# Patient Record
Sex: FEMALE | Race: WHITE | Employment: UNEMPLOYED | URBAN - METROPOLITAN AREA
[De-identification: names, ages, dates, MRNs, and addresses within clinical notes are randomized per-mention and may not be internally consistent; named-entity substitution may affect disease eponyms.]

---

## 2017-01-23 ENCOUNTER — OFFICE VISIT (OUTPATIENT)
Dept: PHYSICAL MEDICINE AND REHAB | Facility: CLINIC | Age: 47
End: 2017-01-23

## 2017-01-23 VITALS
SYSTOLIC BLOOD PRESSURE: 135 MMHG | HEART RATE: 91 BPM | WEIGHT: 231.9 LBS | HEIGHT: 66 IN | RESPIRATION RATE: 20 BRPM | TEMPERATURE: 97.6 F | OXYGEN SATURATION: 98 % | DIASTOLIC BLOOD PRESSURE: 57 MMHG | BODY MASS INDEX: 37.27 KG/M2

## 2017-01-23 DIAGNOSIS — G24.3 SPASMODIC TORTICOLLIS: Primary | ICD-10-CM

## 2017-01-23 DIAGNOSIS — G24.1 DYSTONIA, TORSION, FRAGMENTS OF: ICD-10-CM

## 2017-01-23 ASSESSMENT — PAIN SCALES - GENERAL: PAINLEVEL: MILD PAIN (3)

## 2017-01-23 NOTE — Clinical Note
"1/23/2017       RE: Roxanna Celis  PO   [facility] [id number]     Dear Colleague,    Thank you for referring your patient, Roxanna Celis, to the [facility] PHYSICAL MEDICINE AND REHABILITATION at [facility]. Please see a copy of my visit note below.    BOTULINUM TOXIN PROCEDURE NOTE    Chief Complaint   Patient presents with     RECHECK     UMP- BOTOX-CERVICAL DYSTONIA       /57 mmHg  Pulse 91  Temp(Src) 97.6  F (36.4  C) (Oral)  Resp 20  Ht 1.676 m (5' 6\")  Wt 105.189 kg (231 lb 14.4 oz)  BMI 37.45 kg/m2  SpO2 98%  Breastfeeding? No      Current outpatient prescriptions:      OnabotulinumtoxinA (BOTOX IJ), Inject 225 Units into the muscle Lot # /C3  Exp: 6/2019, Disp: , Rfl:      gabapentin (NEURONTIN) 100 MG capsule, Take 1 capsule (100 mg) by mouth At Bedtime (Patient taking differently: Take 300 mg by mouth 3 times daily ), Disp: 90 capsule, Rfl: 1     OnabotulinumtoxinA (BOTOX IJ), Inject 225 Units into the muscle once Lot# /C3, Exp 03/2019, Disp: , Rfl:      OnabotulinumtoxinA (BOTOX IJ), Inject 225 Units as directed Lot #:  C3 Exp: 12/2018, Disp: , Rfl:      OnabotulinumtoxinA (BOTOX IJ), Inject 225 Units into the muscle Lot # /C3  Exp: 8/2018, Disp: , Rfl:      OnabotulinumtoxinA (BOTOX IJ), Inject 225 Units into the muscle, Disp: , Rfl:      OnabotulinumtoxinA (BOTOX IJ), Inject 75 Units into the muscle Lot # /C3  Exp: 1/2018, Disp: , Rfl:      OnabotulinumtoxinA (BOTOX IJ), Inject 200 Units into the muscle Lot # /C3  Exp: 1/2018, Disp: , Rfl:      OnabotulinumtoxinA (BOTOX IJ), Inject 200 Units into the muscle Lot#/C3, Exp 09/2017, Disp: , Rfl:      OnabotulinumtoxinA (BOTOX IJ), Inject 200 Units into the muscle Lot # /C3  Exp: 3/2017, Disp: , Rfl:      OnabotulinumtoxinA (BOTOX IJ), Inject 200 Units into the muscle Lot# /C3, Exp 01/2017, Disp: , Rfl:      OLANZapine (ZYPREXA PO), Take 15 mg by mouth, Disp: " , Rfl:      DULoxetine HCl (CYMBALTA PO), Take 60 mg by mouth, Disp: , Rfl:      OnabotulinumtoxinA (BOTOX IJ), Inject 200 Units into the muscle Lot # /C3  Exp: 10/2016, Disp: , Rfl:      OnabotulinumtoxinA (BOTOX IJ), Inject 200 Units into the muscle, Disp: , Rfl:      OnabotulinumtoxinA (BOTOX IJ), Inject 200 Units as directed  C3 Exp 3/2016, Disp: , Rfl:      OnabotulinumtoxinA (BOTOX IJ), Inject 200 Units into the muscle Lot# /C3, Exp 02/2016, Disp: , Rfl:      OnabotulinumtoxinA (BOTOX IJ), Inject 200 Units into the muscle Lot# /C3, Exp 10/2015, Disp: , Rfl:      OnabotulinumtoxinA (BOTOX IJ), Inject 75 Units into the muscle Lot# /C3  Exp: 07/2015, Disp: , Rfl:      OnabotulinumtoxinA (BOTOX IJ), Inject 175 Units into the muscle Lot# /C3, Exp 09/2015, Disp: , Rfl:      OnabotulinumtoxinA (BOTOX IJ), Inject 100 Units into the muscle. Lot # /C  Exp: 4/2015, Disp: , Rfl:      OnabotulinumtoxinA (BOTOX IJ), Inject 125 Units into the muscle. Lot # /C3  Exp: 4/2015, Disp: , Rfl:      solifenacin (VESICARE) 10 MG tablet, Take 1 tablet by mouth daily., Disp: , Rfl:      OnabotulinumtoxinA (BOTOX IJ), Inject 125 Units into the muscle. Lot# /C3, Exp 10/2014, Disp: , Rfl:      Desvenlafaxine Succinate (PRISTIQ) 100 MG TB24 24 hr tablet, Take 1 tablet by mouth daily., Disp: , Rfl:      ClonAZEPAM (KLONOPIN) 1 MG tablet, Take 1 mg by mouth 2 times daily as needed., Disp: , Rfl:      topiramate (TOPAMAX) 100 MG tablet, Take 0.5 tablets by mouth 2 times daily., Disp: , Rfl:      PROGESTERONE 200 MG CAPS COMPOUND (PROGESTERONE 200 MG CAPS COMPOUND), Take 4 capsules by mouth daily., Disp: , Rfl:      Ibuprofen (ADVIL) 200 MG capsule, Take 200 mg by mouth every 4 hours as needed., Disp: , Rfl:      aspirin-acetaminophen-caffeine (EXCEDRIN MIGRAINE) 250-250-65 MG per tablet, Take 1 tablet by mouth every 6 hours as needed., Disp: , Rfl:      EPINEPHrine (EPIPEN) 0.3 MG/0.3ML injection,  Inject 0.3 mg into the muscle once as needed., Disp: , Rfl:      OnabotulinumtoxinA (BOTOX IJ), Inject 140 Units into the muscle. Lot# /C3, Exp 2014, Disp: , Rfl:      Allergies   Allergen Reactions     Ivp Dye [Contrast Dye] Anaphylaxis     Lisinopril-Hctz Anaphylaxis     Maple Tree Anaphylaxis     Allergy includes maple syrup.     Gluten Itching and Swelling     Codeine Sulfate Cramps and GI Disturbance     Erythromycin Nausea and Vomiting and Cramps        PHYSICAL EXAM:  NECK AND TRUNK PATTERN:   Head Tremor: Observed only slightly at right, increases with left rotation   Left Side-bending: Not Present  Left Shoulder Elevation: slightly elevated  Head on Body Lateral Shear: To the right - slight    HPI:    Patient denies new medical diagnoses, illnesses, hospitalizations, emergency room visits, and injuries since the previous injection with botulinum neurotoxin.    We reviewed the recommended safety guidelines for  Botox from any vaccine injection, such as the seasonal flu vaccine, by a minimum of 10-14 days with Roxanna Celis. She acknowledged understanding.    RESPONSE TO PREVIOUS TREATMENT:    Roxanna Celis received 225 units of Botox on 2016.    Problems following the previous series of neurotoxin injections included:  No problems reported    BENEFITS BY PATIENT REPORT:    Pain Improvement: Yes.  Percent Improvement: 95 %    Duration of Benefit:  12 weeks.    Dystonia Improvement: Yes.  Percent Improvement: 80 %    Duration of Benefit:  9 weeks.      BOTULINUM NEUROTOXIN INJECTION PROCEDURES:    VERIFICATION OF PATIENT IDENTIFICATION AND PROCEDURE     Initials   Patient Name rpa   Patient  rpa   Procedure Verified by: rpa     Prior to the start of the procedure and with procedural staff participation, I verbally confirmed the patient s identity using two indicators, relevant allergies, that the procedure was appropriate and matched the consent or emergent situation, and that the  correct equipment/implants were available. Immediately prior to starting the procedure I conducted the Time Out with the procedural staff and re-confirmed the patient s name, procedure, and site/side. (The Joint Commission universal protocol was followed.)  Yes    Sedation (Moderate or Deep): None      Above assessments performed by:  Resident/Fellow         Anamika Diaz          The attending provider was present for the entire procedure documented below.      Haja Bradley MD      INDICATION/S FOR PROCEDURE/S:  Roxanna Celis is a 46 year old patient with dystonia affecting the  head, neck and shoulder girdle musculature secondary to a diagnosis of cervical dystonia with associated  pain, tremor, spasms, loss of joint motion, loss of volitional motor control and difficulty with activities of daily living.     Her baseline symptoms have been recalcitrant to oral medications and conservative therapy.  She is here today for an injection of Botox.      GOAL OF PROCEDURE:  The goal of this procedure is to increase active range of motion, improve volitional motor control, decrease pain  and enhance functional independence associated with dystonic movements.    TOTAL DOSE ADMINISTERED:  Dose Administered:  225 units Botox    Diluent Used: Preservative Free Normal Saline  Total Volume of Diluent Used:  2.25 ml  Lot # /C3 with Expiration Date:  08/2019  NDC #: Botox 100u ([address]-94)    Medication guide was offered to patient and was declined.    CONSENT:  The risks, benefits, and treatment options were discussed with Roxanna Celis and she agreed to proceed.      Written consent was obtained by [facility].     EQUIPMENT USED:  Needle-37mm stimulating/recording  EMG/NCS Machine    SKIN PREPARATION:  Skin preparation was performed using an alcohol wipe.    GUIDANCE DESCRIPTION:  Electro-myographic guidance was necessary throughout the procedure to accurately identify all areas of dystonic muscles while avoiding injection of  non-dystonic muscles, neighboring nerves and nearby vascular structures.     AREA/MUSCLE INJECTED:  225 units of Botox  1. HEAD & NECK MUSCLES: 225 units Botox = Total Dose, 1:1 Dilution for 125 units Botox and 2:1 dilution for 100 units Botox to occipitalis   Right Rectus Capitis (upper cervical) - 5 units of Botox at 1 site/s.   Left Rectus Capitis (upper cervical) - 5 units of Botox at 1 site/s.    Right Mid-Trapezius (mid cervical) - 2.5 units of Botox at 1 site/s.   Left Mid-Trapezius (mid cervical) - 2.5 units of Botox at 1 site/s.    Right Semispinalis - 10 units of Botox at 1 site/s.   Left Semispinalis - 10 units of Botox at 1 site/s.    Right Splenius Cervicis/Capitus - 10 units of Botox at 1 site/s.   Left Splenius Cervicis/Capitus - 10 units of Botox at 1 site/s.    Right Levator Scapulae - 10 units of Botox at 1 site/s.   Left Levator Scapulae - 10 units of Botox at 1 site/s.    Right Longissimus - 10 units of Botox at 1 site/s.   Left Longissimus - 10 units of Botox at 1 site/s.    Right Inferior Oblique - 15 units of Botox at 1 site/s.  Left Inferior Oblique - 15 units of Botox at 1 site/s.    Right occipitalis - 50 units of Botox at 5 site/s (2:1 dilution).    Left occipitalis - 50 units of Botox at 5 site/s (2:1 dilution).     RESPONSE TO PROCEDURE:  Roxanna Celis tolerated the procedure well and there were no immediate complications.  She was allowed to recover for an appropriate period of time and was discharged home in stable condition.    FOLLOW UP:  Roxanna Celis was asked to follow up by phone in 7-14 days with Tiffany Lezama PT, Care Coordinator or Josefina King RN, Care Coordinator, to report her response to this series of injections.  Based on the patient's previous response to this therapy, Roxanna Celis was rescheduled for the next series of injections in 12 weeks.    PLAN (Medication Changes, Therapy Orders, Work or Disability Issues, etc.): Will monitor response to today's  injections.

## 2017-01-23 NOTE — PROGRESS NOTES
"BOTULINUM TOXIN PROCEDURE NOTE    Chief Complaint   Patient presents with     RECHECK     UMP- BOTOX-CERVICAL DYSTONIA       /57 mmHg  Pulse 91  Temp(Src) 97.6  F (36.4  C) (Oral)  Resp 20  Ht 1.676 m (5' 6\")  Wt 105.189 kg (231 lb 14.4 oz)  BMI 37.45 kg/m2  SpO2 98%  Breastfeeding? No      Current outpatient prescriptions:      OnabotulinumtoxinA (BOTOX IJ), Inject 225 Units into the muscle Lot # /C3  Exp: 6/2019, Disp: , Rfl:      gabapentin (NEURONTIN) 100 MG capsule, Take 1 capsule (100 mg) by mouth At Bedtime (Patient taking differently: Take 300 mg by mouth 3 times daily ), Disp: 90 capsule, Rfl: 1     OnabotulinumtoxinA (BOTOX IJ), Inject 225 Units into the muscle once Lot# /C3, Exp 03/2019, Disp: , Rfl:      OnabotulinumtoxinA (BOTOX IJ), Inject 225 Units as directed Lot #:  C3 Exp: 12/2018, Disp: , Rfl:      OnabotulinumtoxinA (BOTOX IJ), Inject 225 Units into the muscle Lot # /C3  Exp: 8/2018, Disp: , Rfl:      OnabotulinumtoxinA (BOTOX IJ), Inject 225 Units into the muscle, Disp: , Rfl:      OnabotulinumtoxinA (BOTOX IJ), Inject 75 Units into the muscle Lot # /C3  Exp: 1/2018, Disp: , Rfl:      OnabotulinumtoxinA (BOTOX IJ), Inject 200 Units into the muscle Lot # /C3  Exp: 1/2018, Disp: , Rfl:      OnabotulinumtoxinA (BOTOX IJ), Inject 200 Units into the muscle Lot#/C3, Exp 09/2017, Disp: , Rfl:      OnabotulinumtoxinA (BOTOX IJ), Inject 200 Units into the muscle Lot # /C3  Exp: 3/2017, Disp: , Rfl:      OnabotulinumtoxinA (BOTOX IJ), Inject 200 Units into the muscle Lot# /C3, Exp 01/2017, Disp: , Rfl:      OLANZapine (ZYPREXA PO), Take 15 mg by mouth, Disp: , Rfl:      DULoxetine HCl (CYMBALTA PO), Take 60 mg by mouth, Disp: , Rfl:      OnabotulinumtoxinA (BOTOX IJ), Inject 200 Units into the muscle Lot # /C3  Exp: 10/2016, Disp: , Rfl:      OnabotulinumtoxinA (BOTOX IJ), Inject 200 Units into the muscle, Disp: , Rfl:      OnabotulinumtoxinA " (BOTOX IJ), Inject 200 Units as directed  C3 Exp 3/2016, Disp: , Rfl:      OnabotulinumtoxinA (BOTOX IJ), Inject 200 Units into the muscle Lot# /C3, Exp 02/2016, Disp: , Rfl:      OnabotulinumtoxinA (BOTOX IJ), Inject 200 Units into the muscle Lot# /C3, Exp 10/2015, Disp: , Rfl:      OnabotulinumtoxinA (BOTOX IJ), Inject 75 Units into the muscle Lot# /C3  Exp: 07/2015, Disp: , Rfl:      OnabotulinumtoxinA (BOTOX IJ), Inject 175 Units into the muscle Lot# /C3, Exp 09/2015, Disp: , Rfl:      OnabotulinumtoxinA (BOTOX IJ), Inject 100 Units into the muscle. Lot # /C  Exp: 4/2015, Disp: , Rfl:      OnabotulinumtoxinA (BOTOX IJ), Inject 125 Units into the muscle. Lot # /C3  Exp: 4/2015, Disp: , Rfl:      solifenacin (VESICARE) 10 MG tablet, Take 1 tablet by mouth daily., Disp: , Rfl:      OnabotulinumtoxinA (BOTOX IJ), Inject 125 Units into the muscle. Lot# /C3, Exp 10/2014, Disp: , Rfl:      Desvenlafaxine Succinate (PRISTIQ) 100 MG TB24 24 hr tablet, Take 1 tablet by mouth daily., Disp: , Rfl:      ClonAZEPAM (KLONOPIN) 1 MG tablet, Take 1 mg by mouth 2 times daily as needed., Disp: , Rfl:      topiramate (TOPAMAX) 100 MG tablet, Take 0.5 tablets by mouth 2 times daily., Disp: , Rfl:      PROGESTERONE 200 MG CAPS COMPOUND (PROGESTERONE 200 MG CAPS COMPOUND), Take 4 capsules by mouth daily., Disp: , Rfl:      Ibuprofen (ADVIL) 200 MG capsule, Take 200 mg by mouth every 4 hours as needed., Disp: , Rfl:      aspirin-acetaminophen-caffeine (EXCEDRIN MIGRAINE) 250-250-65 MG per tablet, Take 1 tablet by mouth every 6 hours as needed., Disp: , Rfl:      EPINEPHrine (EPIPEN) 0.3 MG/0.3ML injection, Inject 0.3 mg into the muscle once as needed., Disp: , Rfl:      OnabotulinumtoxinA (BOTOX IJ), Inject 140 Units into the muscle. Lot# /C3, Exp 02/2014, Disp: , Rfl:      Allergies   Allergen Reactions     Ivp Dye [Contrast Dye] Anaphylaxis     Lisinopril-Hctz Anaphylaxis     Maple Tree  Anaphylaxis     Allergy includes maple syrup.     Gluten Itching and Swelling     Codeine Sulfate Cramps and GI Disturbance     Erythromycin Nausea and Vomiting and Cramps        PHYSICAL EXAM:  NECK AND TRUNK PATTERN:   Head Tremor: Observed only slightly at right, increases with left rotation   Left Side-bending: Not Present  Left Shoulder Elevation: slightly elevated  Head on Body Lateral Shear: To the right - slight    HPI:    Patient denies new medical diagnoses, illnesses, hospitalizations, emergency room visits, and injuries since the previous injection with botulinum neurotoxin.    We reviewed the recommended safety guidelines for  Botox from any vaccine injection, such as the seasonal flu vaccine, by a minimum of 10-14 days with Roxanna Celis. She acknowledged understanding.    RESPONSE TO PREVIOUS TREATMENT:    Roxanna Cleis received 225 units of Botox on 2016.    Problems following the previous series of neurotoxin injections included:  No problems reported    BENEFITS BY PATIENT REPORT:    Pain Improvement: Yes.  Percent Improvement: 95 %    Duration of Benefit:  12 weeks.    Dystonia Improvement: Yes.  Percent Improvement: 80 %    Duration of Benefit:  9 weeks.      BOTULINUM NEUROTOXIN INJECTION PROCEDURES:    VERIFICATION OF PATIENT IDENTIFICATION AND PROCEDURE     Initials   Patient Name rpa   Patient  rpa   Procedure Verified by: rpa     Prior to the start of the procedure and with procedural staff participation, I verbally confirmed the patient s identity using two indicators, relevant allergies, that the procedure was appropriate and matched the consent or emergent situation, and that the correct equipment/implants were available. Immediately prior to starting the procedure I conducted the Time Out with the procedural staff and re-confirmed the patient s name, procedure, and site/side. (The Joint Commission universal protocol was followed.)  Yes    Sedation (Moderate or Deep):  None      Above assessments performed by:  Resident/Fellow         Anamika Diaz          The attending provider was present for the entire procedure documented below.      Haja Bradley MD      INDICATION/S FOR PROCEDURE/S:  Roxanna Celis is a 46 year old patient with dystonia affecting the  head, neck and shoulder girdle musculature secondary to a diagnosis of cervical dystonia with associated  pain, tremor, spasms, loss of joint motion, loss of volitional motor control and difficulty with activities of daily living.     Her baseline symptoms have been recalcitrant to oral medications and conservative therapy.  She is here today for an injection of Botox.      GOAL OF PROCEDURE:  The goal of this procedure is to increase active range of motion, improve volitional motor control, decrease pain  and enhance functional independence associated with dystonic movements.    TOTAL DOSE ADMINISTERED:  Dose Administered:  225 units Botox    Diluent Used: Preservative Free Normal Saline  Total Volume of Diluent Used:  2.25 ml  Lot # /C3 with Expiration Date:  08/2019  NDC #: Botox 100u ([address]-69)    Medication guide was offered to patient and was declined.    CONSENT:  The risks, benefits, and treatment options were discussed with Roxanna Celis and she agreed to proceed.      Written consent was obtained by [facility].     EQUIPMENT USED:  Needle-37mm stimulating/recording  EMG/NCS Machine    SKIN PREPARATION:  Skin preparation was performed using an alcohol wipe.    GUIDANCE DESCRIPTION:  Electro-myographic guidance was necessary throughout the procedure to accurately identify all areas of dystonic muscles while avoiding injection of non-dystonic muscles, neighboring nerves and nearby vascular structures.     AREA/MUSCLE INJECTED:  225 units of Botox  1. HEAD & NECK MUSCLES: 225 units Botox = Total Dose, 1:1 Dilution for 125 units Botox and 2:1 dilution for 100 units Botox to occipitalis   Right Rectus Capitis (upper  cervical) - 5 units of Botox at 1 site/s.   Left Rectus Capitis (upper cervical) - 5 units of Botox at 1 site/s.    Right Mid-Trapezius (mid cervical) - 2.5 units of Botox at 1 site/s.   Left Mid-Trapezius (mid cervical) - 2.5 units of Botox at 1 site/s.    Right Semispinalis - 10 units of Botox at 1 site/s.   Left Semispinalis - 10 units of Botox at 1 site/s.    Right Splenius Cervicis/Capitus - 10 units of Botox at 1 site/s.   Left Splenius Cervicis/Capitus - 10 units of Botox at 1 site/s.    Right Levator Scapulae - 10 units of Botox at 1 site/s.   Left Levator Scapulae - 10 units of Botox at 1 site/s.    Right Longissimus - 10 units of Botox at 1 site/s.   Left Longissimus - 10 units of Botox at 1 site/s.    Right Inferior Oblique - 15 units of Botox at 1 site/s.  Left Inferior Oblique - 15 units of Botox at 1 site/s.    Right occipitalis - 50 units of Botox at 5 site/s (2:1 dilution).    Left occipitalis - 50 units of Botox at 5 site/s (2:1 dilution).     RESPONSE TO PROCEDURE:  Roxanna Celis tolerated the procedure well and there were no immediate complications.  She was allowed to recover for an appropriate period of time and was discharged home in stable condition.    FOLLOW UP:  Roxanna Celis was asked to follow up by phone in 7-14 days with Tiffany Lezama PT, Care Coordinator or Josefina King RN, Care Coordinator, to report her response to this series of injections.  Based on the patient's previous response to this therapy, Roxanna Celis was rescheduled for the next series of injections in 12 weeks.    PLAN (Medication Changes, Therapy Orders, Work or Disability Issues, etc.): Will monitor response to today's injections.

## 2017-03-15 ENCOUNTER — CARE COORDINATION (OUTPATIENT)
Dept: PHYSICAL MEDICINE AND REHAB | Facility: CLINIC | Age: 47
End: 2017-03-15

## 2017-03-15 NOTE — PROGRESS NOTES
I called Roxanna Celis to respond to her message requesting an earlier appointment with Dr. Haja Bradley.  I was unable to reach her and left voice-mail on her phone with the reason for my call and my contact information, as well as the contact information for my colleague, Josefina King RN.   I requested a call back to discuss rescheduling her 04/17/2017 appointment.  Currently awaiting call back.

## 2017-03-24 PROBLEM — G24.3 TORTICOLLIS, SPASMODIC: Status: ACTIVE | Noted: 2017-03-24

## 2017-03-28 ENCOUNTER — OFFICE VISIT (OUTPATIENT)
Dept: PHYSICAL MEDICINE AND REHAB | Facility: CLINIC | Age: 47
End: 2017-03-28

## 2017-03-28 VITALS
SYSTOLIC BLOOD PRESSURE: 154 MMHG | HEART RATE: 109 BPM | WEIGHT: 231.7 LBS | DIASTOLIC BLOOD PRESSURE: 82 MMHG | TEMPERATURE: 97.9 F | BODY MASS INDEX: 37.24 KG/M2 | HEIGHT: 66 IN

## 2017-03-28 DIAGNOSIS — G24.3 SPASMODIC TORTICOLLIS: Primary | ICD-10-CM

## 2017-03-28 DIAGNOSIS — G24.1 DYSTONIA, TORSION, FRAGMENTS OF: ICD-10-CM

## 2017-03-28 ASSESSMENT — PAIN SCALES - GENERAL: PAINLEVEL: EXTREME PAIN (8)

## 2017-03-28 NOTE — PROGRESS NOTES
BOTULINUM TOXIN PROCEDURE NOTE    Chief Complaint   Patient presents with     RECHECK     [facility] RETURN BOTOX       Wt 105.1 kg (231 lb 11.2 oz)  BMI 37.4 kg/m2      Current Outpatient Prescriptions:      OnabotulinumtoxinA (BOTOX IJ), Inject 225 Units as directed once Lot# /C3 Exp: 08/2019, Disp: , Rfl:      OnabotulinumtoxinA (BOTOX IJ), Inject 225 Units into the muscle Lot # /C3  Exp: 6/2019, Disp: , Rfl:      gabapentin (NEURONTIN) 100 MG capsule, Take 1 capsule (100 mg) by mouth At Bedtime (Patient taking differently: Take 300 mg by mouth 3 times daily ), Disp: 90 capsule, Rfl: 1     OnabotulinumtoxinA (BOTOX IJ), Inject 225 Units into the muscle once Lot# /C3, Exp 03/2019, Disp: , Rfl:      OnabotulinumtoxinA (BOTOX IJ), Inject 225 Units as directed Lot #:  C3 Exp: 12/2018, Disp: , Rfl:      OnabotulinumtoxinA (BOTOX IJ), Inject 225 Units into the muscle Lot # /C3  Exp: 8/2018, Disp: , Rfl:      OnabotulinumtoxinA (BOTOX IJ), Inject 225 Units into the muscle, Disp: , Rfl:      OnabotulinumtoxinA (BOTOX IJ), Inject 75 Units into the muscle Lot # /C3  Exp: 1/2018, Disp: , Rfl:      OnabotulinumtoxinA (BOTOX IJ), Inject 200 Units into the muscle Lot # /C3  Exp: 1/2018, Disp: , Rfl:      OnabotulinumtoxinA (BOTOX IJ), Inject 200 Units into the muscle Lot#/C3, Exp 09/2017, Disp: , Rfl:      OnabotulinumtoxinA (BOTOX IJ), Inject 200 Units into the muscle Lot # /C3  Exp: 3/2017, Disp: , Rfl:      OnabotulinumtoxinA (BOTOX IJ), Inject 200 Units into the muscle Lot# /C3, Exp 01/2017, Disp: , Rfl:      OLANZapine (ZYPREXA PO), Take 15 mg by mouth, Disp: , Rfl:      DULoxetine HCl (CYMBALTA PO), Take 60 mg by mouth, Disp: , Rfl:      OnabotulinumtoxinA (BOTOX IJ), Inject 200 Units into the muscle Lot # /C3  Exp: 10/2016, Disp: , Rfl:      OnabotulinumtoxinA (BOTOX IJ), Inject 200 Units into the muscle, Disp: , Rfl:      OnabotulinumtoxinA (BOTOX IJ), Inject 200 Units  as directed  C3 Exp 3/2016, Disp: , Rfl:      OnabotulinumtoxinA (BOTOX IJ), Inject 200 Units into the muscle Lot# /C3, Exp 02/2016, Disp: , Rfl:      OnabotulinumtoxinA (BOTOX IJ), Inject 200 Units into the muscle Lot# /C3, Exp 10/2015, Disp: , Rfl:      OnabotulinumtoxinA (BOTOX IJ), Inject 75 Units into the muscle Lot# /C3  Exp: 07/2015, Disp: , Rfl:      OnabotulinumtoxinA (BOTOX IJ), Inject 175 Units into the muscle Lot# /C3, Exp 09/2015, Disp: , Rfl:      OnabotulinumtoxinA (BOTOX IJ), Inject 100 Units into the muscle. Lot # /C  Exp: 4/2015, Disp: , Rfl:      OnabotulinumtoxinA (BOTOX IJ), Inject 125 Units into the muscle. Lot # /C3  Exp: 4/2015, Disp: , Rfl:      solifenacin (VESICARE) 10 MG tablet, Take 1 tablet by mouth daily., Disp: , Rfl:      OnabotulinumtoxinA (BOTOX IJ), Inject 125 Units into the muscle. Lot# /C3, Exp 10/2014, Disp: , Rfl:      Desvenlafaxine Succinate (PRISTIQ) 100 MG TB24 24 hr tablet, Take 1 tablet by mouth daily., Disp: , Rfl:      ClonAZEPAM (KLONOPIN) 1 MG tablet, Take 1 mg by mouth 2 times daily as needed., Disp: , Rfl:      topiramate (TOPAMAX) 100 MG tablet, Take 0.5 tablets by mouth 2 times daily., Disp: , Rfl:      PROGESTERONE 200 MG CAPS COMPOUND (PROGESTERONE 200 MG CAPS COMPOUND), Take 4 capsules by mouth daily., Disp: , Rfl:      Ibuprofen (ADVIL) 200 MG capsule, Take 200 mg by mouth every 4 hours as needed., Disp: , Rfl:      aspirin-acetaminophen-caffeine (EXCEDRIN MIGRAINE) 250-250-65 MG per tablet, Take 1 tablet by mouth every 6 hours as needed., Disp: , Rfl:      EPINEPHrine (EPIPEN) 0.3 MG/0.3ML injection, Inject 0.3 mg into the muscle once as needed., Disp: , Rfl:      OnabotulinumtoxinA (BOTOX IJ), Inject 140 Units into the muscle. Lot# /C3, Exp 02/2014, Disp: , Rfl:      Allergies   Allergen Reactions     Ivp Dye [Contrast Dye] Anaphylaxis     Lisinopril-Hctz Anaphylaxis     Maple Tree Anaphylaxis     Allergy  includes maple syrup.     Gluten Itching and Swelling     Codeine Sulfate Cramps and GI Disturbance     Erythromycin Nausea and Vomiting and Cramps        PHYSICAL EXAM:  NECK AND TRUNK PATTERN:   Head Tremor: Observed only slightly at right, increases with left rotation   Left Side-bending: Not Present  Left Shoulder Elevation: slightly elevated  Head on Body Lateral Shear: To the right - slight    HPI:    Patient denies new medical diagnoses, illnesses, hospitalizations, emergency room visits, and injuries since the previous injection with botulinum neurotoxin.    We reviewed the recommended safety guidelines for  Botox from any vaccine injection, such as the seasonal flu vaccine, by a minimum of 10-14 days with Roxanna Celis. She acknowledged understanding.    RESPONSE TO PREVIOUS TREATMENT:    Roxanna Celis received 225 units of Botox on 2017.    Problems following the previous series of neurotoxin injections included:  No problems reported    BENEFITS BY PATIENT REPORT:    Pain Improvement: Yes.  Percent Improvement: 45%    Duration of Benefit:  4 weeks and followed by a gradual reduction in benefit.  Patient reports less benefit for pain reduction with this series of injections.  She also noted a shorter duration of effect compared to her usual response.    Dystonia Improvement: Yes.  Percent Improvement: 75%    Duration of Benefit:  6 weeks and followed by a gradual reduction in benefit.  [place] duration of effect compared to her usual response.      BOTULINUM NEUROTOXIN INJECTION PROCEDURES:    VERIFICATION OF PATIENT IDENTIFICATION AND PROCEDURE     Initials   Patient Name tlb   Patient  tlb   Procedure Verified by: tlb     Prior to the start of the procedure and with procedural staff participation, I verbally confirmed the patient s identity using two indicators, relevant allergies, that the procedure was appropriate and matched the consent or emergent situation, and that the correct  equipment/implants were available. Immediately prior to starting the procedure I conducted the Time Out with the procedural staff and re-confirmed the patient s name, procedure, and site/side. (The Joint Commission universal protocol was followed.)  Yes    Sedation (Moderate or Deep): None      Above assessments performed by:  Tiffany Lezama, PT, Care Coordinator    Haja Bradley MD      INDICATION/S FOR PROCEDURE/S:  Roxanna Celis is a 46 year old patient with dystonia affecting the  head, neck and shoulder girdle musculature secondary to a diagnosis of cervical dystonia with associated  pain, tremor, spasms, loss of joint motion, loss of volitional motor control and difficulty with activities of daily living.     In order to provide improved response and longer duration of effect, we will increase her dose from 225 units of Botox to 250 units of Botox.  We discussed this with Ms. Celis.  She acknowledged understanding and is in agreement with this plan.    Her baseline symptoms have been recalcitrant to oral medications and conservative therapy.  She is here today for an injection of Botox.      GOAL OF PROCEDURE:  The goal of this procedure is to increase active range of motion, improve volitional motor control, decrease pain  and enhance functional independence associated with dystonic movements.    TOTAL DOSE ADMINISTERED:  Dose Administered:  250 units Botox    Diluent Used: Preservative Free Normal Saline  Total Volume of Diluent Used:  2.5 ml  Lot # /C3 with Expiration Date:  09/2019  NDC #: Botox 100u ([address]-35)    Medication guide was offered to patient and was declined.    CONSENT:  The risks, benefits, and treatment options were discussed with Roxanna Celis and she agreed to proceed.      Written consent was obtained by TLB.     EQUIPMENT USED:  Needle-37mm stimulating/recording  EMG/NCS Machine    SKIN PREPARATION:  Skin preparation was performed using an alcohol wipe.    GUIDANCE  DESCRIPTION:  Electro-myographic guidance was necessary throughout the procedure to accurately identify all areas of dystonic muscles while avoiding injection of non-dystonic muscles, neighboring nerves and nearby vascular structures.     AREA/MUSCLE INJECTED:   250 units Botox.    1. HEAD & NECK MUSCLES: 225 units Botox = Total Dose, 1:1 Dilution for 125 units Botox and 2:1 dilution for 100 units Botox to occipitalis   Right Rectus Capitis (upper cervical) - 10 units of Botox at 1 site/s.   Left Rectus Capitis (upper cervical) - 10 units of Botox at 1 site/s.    Right Mid-Trapezius (mid cervical) - 5 units of Botox at 1 site/s.   Left Mid-Trapezius (mid cervical) - 5 units of Botox at 1 site/s.    Right Semispinalis - 10 units of Botox at 1 site/s.   Left Semispinalis - 10 units of Botox at 1 site/s.    Right Splenius Cervicis/Capitus - 10 units of Botox at 1 site/s.   Left Splenius Cervicis/Capitus - 10 units of Botox at 1 site/s.    Right Levator Scapulae - 15 units of Botox at 1 site/s.   Left Levator Scapulae - 15 units of Botox at 1 site/s.    Right Longissimus - 10 units of Botox at 1 site/s.   Left Longissimus - 10 units of Botox at 1 site/s.    Right Inferior Oblique - 15 units of Botox at 1 site/s.  Left Inferior Oblique - 15 units of Botox at 1 site/s.    Right occipitalis - 50 units of Botox at 5 site/s (2:1 dilution).    Left occipitalis - 50 units of Botox at 5 site/s (2:1 dilution).     RESPONSE TO PROCEDURE:  Roxanna Celis tolerated the procedure well and there were no immediate complications.  She was allowed to recover for an appropriate period of time and was discharged home in stable condition.    FOLLOW UP:  Roxanna Celis was asked to follow up by phone in 7-14 days with Tiffany Lezama PT, Care Coordinator or Josefina King RN, Care Coordinator, to report her response to this series of injections.  Based on the patient's previous response to this therapy, Roxanna Celis was rescheduled for  the next series of injections in 9-10 weeks.    PLAN (Medication Changes, Therapy Orders, Work or Disability Issues, etc.): Will monitor response to today's injections.

## 2017-03-28 NOTE — LETTER
3/28/2017       RE: Roxanna Celis  [address]   [facility] [address]     Dear Colleague,    Thank you for referring your patient, Roxanna Celis, to the [facility]YS[facility]INE [facility]ITATION at [facility]. Please see a copy of my visit note below.    BOTULINUM TOXIN PROCEDURE NOTE    Chief Complaint   Patient presents with     RECHECK     [facility] RETURN BOTOX       Wt 105.1 kg (231 lb 11.2 oz)  BMI 37.4 kg/m2      Current Outpatient Prescriptions:      OnabotulinumtoxinA (BOTOX IJ), Inject 225 Units as directed once Lot# /C3 Exp: 08/2019, Disp: , Rfl:      OnabotulinumtoxinA (BOTOX IJ), Inject 225 Units into the muscle Lot # /C3  Exp: 6/2019, Disp: , Rfl:      gabapentin (NEURONTIN) 100 MG capsule, Take 1 capsule (100 mg) by mouth At Bedtime (Patient taking differently: Take 300 mg by mouth 3 times daily ), Disp: 90 capsule, Rfl: 1     OnabotulinumtoxinA (BOTOX IJ), Inject 225 Units into the muscle once Lot# /C3, Exp 03/2019, Disp: , Rfl:      OnabotulinumtoxinA (BOTOX IJ), Inject 225 Units as directed Lot #:  C3 Exp: 12/2018, Disp: , Rfl:      OnabotulinumtoxinA (BOTOX IJ), Inject 225 Units into the muscle Lot # /C3  Exp: 8/2018, Disp: , Rfl:      OnabotulinumtoxinA (BOTOX IJ), Inject 225 Units into the muscle, Disp: , Rfl:      OnabotulinumtoxinA (BOTOX IJ), Inject 75 Units into the muscle Lot # /C3  Exp: 1/2018, Disp: , Rfl:      OnabotulinumtoxinA (BOTOX IJ), Inject 200 Units into the muscle Lot # /C3  Exp: 1/2018, Disp: , Rfl:      OnabotulinumtoxinA (BOTOX IJ), Inject 200 Units into the muscle Lot#/C3, Exp 09/2017, Disp: , Rfl:      OnabotulinumtoxinA (BOTOX IJ), Inject 200 Units into the muscle Lot # /C3  Exp: 3/2017, Disp: , Rfl:      OnabotulinumtoxinA (BOTOX IJ), Inject 200 Units into the muscle Lot# /C3, Exp 01/2017, Disp: , Rfl:      OLANZapine (ZYPREXA PO), Take 15 mg by mouth, Disp: , Rfl:      DULoxetine HCl  (CYMBALTA PO), Take 60 mg by mouth, Disp: , Rfl:      OnabotulinumtoxinA (BOTOX IJ), Inject 200 Units into the muscle Lot # /C3  Exp: 10/2016, Disp: , Rfl:      OnabotulinumtoxinA (BOTOX IJ), Inject 200 Units into the muscle, Disp: , Rfl:      OnabotulinumtoxinA (BOTOX IJ), Inject 200 Units as directed  C3 Exp 3/2016, Disp: , Rfl:      OnabotulinumtoxinA (BOTOX IJ), Inject 200 Units into the muscle Lot# /C3, Exp 02/2016, Disp: , Rfl:      OnabotulinumtoxinA (BOTOX IJ), Inject 200 Units into the muscle Lot# /C3, Exp 10/2015, Disp: , Rfl:      OnabotulinumtoxinA (BOTOX IJ), Inject 75 Units into the muscle Lot# /C3  Exp: 07/2015, Disp: , Rfl:      OnabotulinumtoxinA (BOTOX IJ), Inject 175 Units into the muscle Lot# /C3, Exp 09/2015, Disp: , Rfl:      OnabotulinumtoxinA (BOTOX IJ), Inject 100 Units into the muscle. Lot # /C  Exp: 4/2015, Disp: , Rfl:      OnabotulinumtoxinA (BOTOX IJ), Inject 125 Units into the muscle. Lot # /C3  Exp: 4/2015, Disp: , Rfl:      solifenacin (VESICARE) 10 MG tablet, Take 1 tablet by mouth daily., Disp: , Rfl:      OnabotulinumtoxinA (BOTOX IJ), Inject 125 Units into the muscle. Lot# /C3, Exp 10/2014, Disp: , Rfl:      Desvenlafaxine Succinate (PRISTIQ) 100 MG TB24 24 hr tablet, Take 1 tablet by mouth daily., Disp: , Rfl:      ClonAZEPAM (KLONOPIN) 1 MG tablet, Take 1 mg by mouth 2 times daily as needed., Disp: , Rfl:      topiramate (TOPAMAX) 100 MG tablet, Take 0.5 tablets by mouth 2 times daily., Disp: , Rfl:      PROGESTERONE 200 MG CAPS COMPOUND (PROGESTERONE 200 MG CAPS COMPOUND), Take 4 capsules by mouth daily., Disp: , Rfl:      Ibuprofen (ADVIL) 200 MG capsule, Take 200 mg by mouth every 4 hours as needed., Disp: , Rfl:      aspirin-acetaminophen-caffeine (EXCEDRIN MIGRAINE) 250-250-65 MG per tablet, Take 1 tablet by mouth every 6 hours as needed., Disp: , Rfl:      EPINEPHrine (EPIPEN) 0.3 MG/0.3ML injection, Inject 0.3 mg into the  muscle once as needed., Disp: , Rfl:      OnabotulinumtoxinA (BOTOX IJ), Inject 140 Units into the muscle. Lot# /C3, Exp 2014, Disp: , Rfl:      Allergies   Allergen Reactions     Ivp Dye [Contrast Dye] Anaphylaxis     Lisinopril-Hctz Anaphylaxis     Maple Tree Anaphylaxis     Allergy includes maple syrup.     Gluten Itching and Swelling     Codeine Sulfate Cramps and GI Disturbance     Erythromycin Nausea and Vomiting and Cramps        PHYSICAL EXAM:  NECK AND TRUNK PATTERN:   Head Tremor: Observed only slightly at right, increases with left rotation   Left Side-bending: Not Present  Left Shoulder Elevation: slightly elevated  Head on Body Lateral Shear: To the right - slight    HPI:    Patient denies new medical diagnoses, illnesses, hospitalizations, emergency room visits, and injuries since the previous injection with botulinum neurotoxin.    We reviewed the recommended safety guidelines for  Botox from any vaccine injection, such as the seasonal flu vaccine, by a minimum of 10-14 days with Roxanna Celis. She acknowledged understanding.    RESPONSE TO PREVIOUS TREATMENT:    Roxanna Celis received 225 units of Botox on 2017.    Problems following the previous series of neurotoxin injections included:  No problems reported    BENEFITS BY PATIENT REPORT:    Pain Improvement: Yes.  Percent Improvement: 45%    Duration of Benefit:  4 weeks and followed by a gradual reduction in benefit.  Patient reports less benefit for pain reduction with this series of injections.  She also noted a shorter duration of effect compared to her usual response.    Dystonia Improvement: Yes.  Percent Improvement: 75%    Duration of Benefit:  6 weeks and followed by a gradual reduction in benefit.  [place] duration of effect compared to her usual response.      BOTULINUM NEUROTOXIN INJECTION PROCEDURES:    VERIFICATION OF PATIENT IDENTIFICATION AND PROCEDURE     Initials   Patient Name tlb   Patient  tlb    Procedure Verified by: tlb     Prior to the start of the procedure and with procedural staff participation, I verbally confirmed the patient s identity using two indicators, relevant allergies, that the procedure was appropriate and matched the consent or emergent situation, and that the correct equipment/implants were available. Immediately prior to starting the procedure I conducted the Time Out with the procedural staff and re-confirmed the patient s name, procedure, and site/side. (The Joint Commission universal protocol was followed.)  Yes    Sedation (Moderate or Deep): None      Above assessments performed by:  Tiffany Lezama PT, Care Coordinator    Haja Bradley MD      INDICATION/S FOR PROCEDURE/S:  Roxanna Celis is a 46 year old patient with dystonia affecting the  head, neck and shoulder girdle musculature secondary to a diagnosis of cervical dystonia with associated  pain, tremor, spasms, loss of joint motion, loss of volitional motor control and difficulty with activities of daily living.     In order to provide improved response and longer duration of effect, we will increase her dose from 225 units of Botox to 250 units of Botox.  We discussed this with Ms. Celis.  She acknowledged understanding and is in agreement with this plan.    Her baseline symptoms have been recalcitrant to oral medications and conservative therapy.  She is here today for an injection of Botox.      GOAL OF PROCEDURE:  The goal of this procedure is to increase active range of motion, improve volitional motor control, decrease pain  and enhance functional independence associated with dystonic movements.    TOTAL DOSE ADMINISTERED:  Dose Administered:  250 units Botox    Diluent Used: Preservative Free Normal Saline  Total Volume of Diluent Used:  2.5 ml  Lot # /C3 with Expiration Date:  09/2019  NDC #: Botox 100u ([address]-35)    Medication guide was offered to patient and was declined.    CONSENT:  The risks,  benefits, and treatment options were discussed with Roxanna Celis and she agreed to proceed.      Written consent was obtained by TLB.     EQUIPMENT USED:  Needle-37mm stimulating/recording  EMG/NCS Machine    SKIN PREPARATION:  Skin preparation was performed using an alcohol wipe.    GUIDANCE DESCRIPTION:  Electro-myographic guidance was necessary throughout the procedure to accurately identify all areas of dystonic muscles while avoiding injection of non-dystonic muscles, neighboring nerves and nearby vascular structures.     AREA/MUSCLE INJECTED:   250 units Botox.    1. HEAD & NECK MUSCLES: 225 units Botox = Total Dose, 1:1 Dilution for 125 units Botox and 2:1 dilution for 100 units Botox to occipitalis   Right Rectus Capitis (upper cervical) - 10 units of Botox at 1 site/s.   Left Rectus Capitis (upper cervical) - 10 units of Botox at 1 site/s.    Right Mid-Trapezius (mid cervical) - 5 units of Botox at 1 site/s.   Left Mid-Trapezius (mid cervical) - 5 units of Botox at 1 site/s.    Right Semispinalis - 10 units of Botox at 1 site/s.   Left Semispinalis - 10 units of Botox at 1 site/s.    Right Splenius Cervicis/Capitus - 10 units of Botox at 1 site/s.   Left Splenius Cervicis/Capitus - 10 units of Botox at 1 site/s.    Right Levator Scapulae - 15 units of Botox at 1 site/s.   Left Levator Scapulae - 15 units of Botox at 1 site/s.    Right Longissimus - 10 units of Botox at 1 site/s.   Left Longissimus - 10 units of Botox at 1 site/s.    Right Inferior Oblique - 15 units of Botox at 1 site/s.  Left Inferior Oblique - 15 units of Botox at 1 site/s.    Right occipitalis - 50 units of Botox at 5 site/s (2:1 dilution).    Left occipitalis - 50 units of Botox at 5 site/s (2:1 dilution).     RESPONSE TO PROCEDURE:  Roxanna Celis tolerated the procedure well and there were no immediate complications.  She was allowed to recover for an appropriate period of time and was discharged home in stable condition.    FOLLOW  UP:  Roxanna Celis was asked to follow up by phone in 7-14 days with Tiffany Lezama PT, Care Coordinator or Josefina King RN, Care Coordinator, to report her response to this series of injections.  Based on the patient's previous response to this therapy, Roxanna Celis was rescheduled for the next series of injections in 9-10 weeks.    PLAN (Medication Changes, Therapy Orders, Work or Disability Issues, etc.): Will monitor response to today's injections.        Again, thank you for allowing me to participate in the care of your patient.      Sincerely,    Haja Bradley MD

## 2017-03-28 NOTE — MR AVS SNAPSHOT
After Visit Summary   3/28/2017    Roxanna Celis    MRN: [id number]           Patient Information     Date Of Birth          1970        Visit Information        Provider Department      3/28/2017 1:40 PM Haja Bradley MD [facility] Rehabilitation         Follow-ups after your visit        Follow-up notes from your care team     Return in about 10 weeks (around 6/6/2017) for Cervical Dystonia.      Your next 10 appointments already scheduled     May 30, 2017  8:20 AM CDT   (Arrive by 8:05 AM)   Return Botox with Haja Bradley MD   [facility] Rehabilitation ([facility])    [address] [place] [id number] [phone]            Aug 01, 2017  9:40 AM CDT   (Arrive by 9:25 AM)   Return Botox with Haja Bradley MD   [facility] Rehabilitation ([facility])    [address] [place] [id number] [phone]              Who to contact     Please call your clinic at [phone] to:    Ask questions about your health    Make or cancel appointments    Discuss your medicines    Learn about your test results    Speak to your doctor   If you have compliments or concerns about an experience at your clinic, or if you wish to file a complaint, please contact [facility] Patient Relations at [phone] or email us at [email or web address]         Additional Information About Your Visit        MyChart Information     TopVisible is an electronic gateway that provides easy, online access to your medical records. With TopVisible, you can request a clinic appointment, read your test results, renew a prescription or communicate with your care team.     To sign up for StorSimplet visit the website at [email or web address]   You will be asked to enter the access code listed below, as well as some  "personal information. Please follow the directions to create your username and password.     Your access code is: [email or web address]  Expires: 6/15/2017  6:30 AM     Your access code will  in 90 days. If you need help or a new code, please contact your [facility] or call [phone] for assistance.        Care EveryWhere ID     This is your Care EveryWhere ID. This could be used by other organizations to access your [place] medical records  [id number]        Your Vitals Were     Pulse Temperature Height BMI (Body Mass Index)          109 97.9  F (36.6  C) 1.676 m (5' 6\") 37.4 kg/m2         Blood Pressure from Last 3 Encounters:   17 154/82   17 135/57   16 149/85    Weight from Last 3 Encounters:   17 105.1 kg (231 lb 11.2 oz)   17 105.2 kg (231 lb 14.4 oz)   09/10/15 116.6 kg (257 lb)              Today, you had the following     No orders found for display         Today's Medication Changes          These changes are accurate as of: 3/28/17  2:10 PM.  If you have any questions, ask your nurse or doctor.               These medicines have changed or have updated prescriptions.        Dose/Directions    gabapentin 100 MG capsule   Commonly known as:  NEURONTIN   This may have changed:    - how much to take  - when to take this   Used for:  Spasmodic torticollis, Neck pain        Dose:  100 mg   Take 1 capsule (100 mg) by mouth At Bedtime   Quantity:  90 capsule   Refills:  1                Primary Care Provider Office Phone # Fax #    [place] HILLMAN Vignesh [phone]       [facility] [address] [address]        Thank you!     Thank you for choosing [facility] PHYSICAL MEDICINE AND REHABILITATION  for your care. Our goal is always to provide you with excellent care. Hearing back from our patients is one way we can continue to improve our services. Please take a few minutes to complete the written survey " that you may receive in the mail after your visit with us. Thank you!             Your Updated Medication List - Protect others around you: Learn how to safely use, store and throw away your medicines at www.disposemymeds.org.          This list is accurate as of: 3/28/17  2:10 PM.  Always use your most recent med list.                   Brand Name Dispense Instructions for use    ADVIL 200 MG capsule   Generic drug:  ibuprofen      Take 200 mg by mouth every 4 hours as needed.       * BOTOX IJ      Inject 140 Units into the muscle. Lot# /C3, Exp 02/2014       * BOTOX IJ      Inject 125 Units into the muscle. Lot# /C3, Exp 10/2014       * BOTOX IJ      Inject 125 Units into the muscle. Lot # /C3  Exp: 4/2015       * BOTOX IJ      Inject 100 Units into the muscle. Lot # /C  Exp: 4/2015       * BOTOX IJ      Inject 175 Units into the muscle Lot# /C3, Exp 09/2015       * BOTOX IJ      Inject 75 Units into the muscle Lot# /C3  Exp: 07/2015       * BOTOX IJ      Inject 200 Units into the muscle Lot# /C3, Exp 10/2015       * BOTOX IJ      Inject 200 Units into the muscle Lot# /C3, Exp 02/2016       * BOTOX IJ      Inject 200 Units as directed  C3 Exp 3/2016       * BOTOX IJ      Inject 200 Units into the muscle       * BOTOX IJ      Inject 200 Units into the muscle Lot # /C3  Exp: 10/2016       * BOTOX IJ      Inject 200 Units into the muscle Lot# /C3, Exp 01/2017       * BOTOX IJ      Inject 200 Units into the muscle Lot # /C3  Exp: 3/2017       * BOTOX IJ      Inject 200 Units into the muscle Lot#/C3, Exp 09/2017       * BOTOX IJ      Inject 200 Units into the muscle Lot # /C3  Exp: 1/2018       * BOTOX IJ      Inject 75 Units into the muscle Lot # /C3  Exp: 1/2018       * BOTOX IJ      Inject 225 Units into the muscle       * BOTOX IJ      Inject 225 Units into the muscle Lot # /C3  Exp: 8/2018       * BOTOX IJ      Inject 225 Units as  directed Lot #:  C3 Exp: 12/2018       * BOTOX IJ      Inject 225 Units into the muscle once Lot# /C3, Exp 03/2019       * BOTOX IJ      Inject 225 Units into the muscle Lot # /C3  Exp: 6/2019       * BOTOX IJ      Inject 225 Units as directed once Lot# /C3 Exp: 08/2019       * BOTOX IJ      Inject 250 Units into the muscle once Lot# /C3, Exp 09/2019       CYMBALTA PO      Take 60 mg by mouth       EPIPEN 0.3 MG/0.3ML injection   Generic drug:  EPINEPHrine      Inject 0.3 mg into the muscle once as needed.       EXCEDRIN MIGRAINE 250-250-65 MG per tablet   Generic drug:  aspirin-acetaminophen-caffeine      Take 1 tablet by mouth every 6 hours as needed.       gabapentin 100 MG capsule    NEURONTIN    90 capsule    Take 1 capsule (100 mg) by mouth At Bedtime       klonoPIN 1 MG tablet   Generic drug:  clonazePAM      Take 1 mg by mouth 2 times daily as needed.       PRISTIQ 100 MG 24 hr tablet   Generic drug:  desvenlafaxine succinate ER      Take 1 tablet by mouth daily.       PROGESTERONE 200 MG CAPS COMPOUND    PROGESTERONE 200 MG CAPS COMPOUND     Take 4 capsules by mouth daily.       TOPAMAX 100 MG tablet   Generic drug:  topiramate      Take 0.5 tablets by mouth 2 times daily.       VESICARE 10 MG tablet   Generic drug:  solifenacin      Take 1 tablet by mouth daily.       ZYPREXA PO      Take 15 mg by mouth       * Notice:  This list has 23 medication(s) that are the same as other medications prescribed for you. Read the directions carefully, and ask your doctor or other care provider to review them with you.

## 2017-05-30 ENCOUNTER — OFFICE VISIT (OUTPATIENT)
Dept: PHYSICAL MEDICINE AND REHAB | Facility: CLINIC | Age: 47
End: 2017-05-30

## 2017-05-30 VITALS
DIASTOLIC BLOOD PRESSURE: 84 MMHG | SYSTOLIC BLOOD PRESSURE: 144 MMHG | HEART RATE: 91 BPM | HEIGHT: 66 IN | BODY MASS INDEX: 38.02 KG/M2 | WEIGHT: 236.6 LBS

## 2017-05-30 DIAGNOSIS — G24.3 SPASMODIC TORTICOLLIS: Primary | ICD-10-CM

## 2017-05-30 DIAGNOSIS — G24.1 DYSTONIA, TORSION, FRAGMENTS OF: ICD-10-CM

## 2017-05-30 ASSESSMENT — PAIN SCALES - GENERAL: PAINLEVEL: NO PAIN (1)

## 2017-05-30 NOTE — PROGRESS NOTES
"BOTULINUM TOXIN PROCEDURE NOTE    Chief Complaint   Patient presents with     RECHECK     UMP RETURN - BOTOX       /84 (BP Location: Left arm, Patient Position: Chair, Cuff Size: Adult Large)  Pulse 91  Ht 1.676 m (5' 6\")  Wt 107.3 kg (236 lb 9.6 oz)  BMI 38.19 kg/m2      Current Outpatient Prescriptions:      OnabotulinumtoxinA (BOTOX IJ), Inject 250 Units into the muscle once Lot# /C3, Exp 09/2019, Disp: , Rfl:      OnabotulinumtoxinA (BOTOX IJ), Inject 225 Units as directed once Lot# /C3 Exp: 08/2019, Disp: , Rfl:      OnabotulinumtoxinA (BOTOX IJ), Inject 225 Units into the muscle Lot # /C3  Exp: 6/2019, Disp: , Rfl:      gabapentin (NEURONTIN) 100 MG capsule, Take 1 capsule (100 mg) by mouth At Bedtime (Patient taking differently: Take 300 mg by mouth 3 times daily ), Disp: 90 capsule, Rfl: 1     OnabotulinumtoxinA (BOTOX IJ), Inject 225 Units into the muscle once Lot# /C3, Exp 03/2019, Disp: , Rfl:      OnabotulinumtoxinA (BOTOX IJ), Inject 225 Units as directed Lot #:  C3 Exp: 12/2018, Disp: , Rfl:      OnabotulinumtoxinA (BOTOX IJ), Inject 225 Units into the muscle Lot # /C3  Exp: 8/2018, Disp: , Rfl:      OnabotulinumtoxinA (BOTOX IJ), Inject 225 Units into the muscle, Disp: , Rfl:      OnabotulinumtoxinA (BOTOX IJ), Inject 75 Units into the muscle Lot # /C3  Exp: 1/2018, Disp: , Rfl:      OnabotulinumtoxinA (BOTOX IJ), Inject 200 Units into the muscle Lot # /C3  Exp: 1/2018, Disp: , Rfl:      OnabotulinumtoxinA (BOTOX IJ), Inject 200 Units into the muscle Lot#/C3, Exp 09/2017, Disp: , Rfl:      OnabotulinumtoxinA (BOTOX IJ), Inject 200 Units into the muscle Lot # /C3  Exp: 3/2017, Disp: , Rfl:      OnabotulinumtoxinA (BOTOX IJ), Inject 200 Units into the muscle Lot# /C3, Exp 01/2017, Disp: , Rfl:      OLANZapine (ZYPREXA PO), Take 15 mg by mouth, Disp: , Rfl:      DULoxetine HCl (CYMBALTA PO), Take 60 mg by mouth, Disp: , Rfl:      " OnabotulinumtoxinA (BOTOX IJ), Inject 200 Units into the muscle Lot # /C3  Exp: 10/2016, Disp: , Rfl:      OnabotulinumtoxinA (BOTOX IJ), Inject 200 Units into the muscle, Disp: , Rfl:      solifenacin (VESICARE) 10 MG tablet, Take 1 tablet by mouth daily., Disp: , Rfl:      Desvenlafaxine Succinate (PRISTIQ) 100 MG TB24 24 hr tablet, Take 1 tablet by mouth daily., Disp: , Rfl:      ClonAZEPAM (KLONOPIN) 1 MG tablet, Take 1 mg by mouth 2 times daily as needed., Disp: , Rfl:      topiramate (TOPAMAX) 100 MG tablet, Take 0.5 tablets by mouth 2 times daily., Disp: , Rfl:      PROGESTERONE 200 MG CAPS COMPOUND (PROGESTERONE 200 MG CAPS COMPOUND), Take 4 capsules by mouth daily., Disp: , Rfl:      Ibuprofen (ADVIL) 200 MG capsule, Take 200 mg by mouth every 4 hours as needed., Disp: , Rfl:      aspirin-acetaminophen-caffeine (EXCEDRIN MIGRAINE) 250-250-65 MG per tablet, Take 1 tablet by mouth every 6 hours as needed., Disp: , Rfl:      EPINEPHrine (EPIPEN) 0.3 MG/0.3ML injection, Inject 0.3 mg into the muscle once as needed., Disp: , Rfl:      [DISCONTINUED] OnabotulinumtoxinA (BOTOX IJ), Inject 200 Units as directed  C3 Exp 3/2016, Disp: , Rfl:      [DISCONTINUED] OnabotulinumtoxinA (BOTOX IJ), Inject 200 Units into the muscle Lot# /C3, Exp 02/2016, Disp: , Rfl:      [DISCONTINUED] OnabotulinumtoxinA (BOTOX IJ), Inject 200 Units into the muscle Lot# /C3, Exp 10/2015, Disp: , Rfl:      [DISCONTINUED] OnabotulinumtoxinA (BOTOX IJ), Inject 75 Units into the muscle Lot# /C3  Exp: 07/2015, Disp: , Rfl:      [DISCONTINUED] OnabotulinumtoxinA (BOTOX IJ), Inject 175 Units into the muscle Lot# /C3, Exp 09/2015, Disp: , Rfl:      [DISCONTINUED] OnabotulinumtoxinA (BOTOX IJ), Inject 100 Units into the muscle. Lot # /C  Exp: 4/2015, Disp: , Rfl:      [DISCONTINUED] OnabotulinumtoxinA (BOTOX IJ), Inject 125 Units into the muscle. Lot # /C3  Exp: 4/2015, Disp: , Rfl:      [DISCONTINUED]  OnabotulinumtoxinA (BOTOX IJ), Inject 125 Units into the muscle. Lot# /C3, Exp 10/2014, Disp: , Rfl:      [DISCONTINUED] OnabotulinumtoxinA (BOTOX IJ), Inject 140 Units into the muscle. Lot# /C3, Exp 2014, Disp: , Rfl:      Allergies   Allergen Reactions     Ivp Dye [Contrast Dye] Anaphylaxis     Lisinopril-Hctz Anaphylaxis     Maple Tree Anaphylaxis     Allergy includes maple syrup.     Gluten Itching and Swelling     Codeine Sulfate Cramps and GI Disturbance     Erythromycin Nausea and Vomiting and Cramps        PHYSICAL EXAM:  NECK AND TRUNK PATTERN:   Head Tremor: Observed only slightly at right, increases with left rotation   Left Side-bending: Not Present  Left Shoulder Elevation: slightly elevated  Head on Body Lateral Shear: To the right - slight    HPI:    Patient denies new medical diagnoses, illnesses, hospitalizations, emergency room visits, and injuries since the previous injection with botulinum neurotoxin.    We reviewed the recommended safety guidelines for  Botox from any vaccine injection, such as the seasonal flu vaccine, by a minimum of 10-14 days with Roxanna Celis. She acknowledged understanding.    RESPONSE TO PREVIOUS TREATMENT:    Roxanna Celis received 250 units of Botox on 2017.    Problems following the previous series of neurotoxin injections included:  No problems reported    BENEFITS BY PATIENT REPORT:    Pain Improvement: Yes.  Percent Improvement: 95%    Duration of Benefit:  7.5 weeks and followed by a rapid reduction in benefit for 1 week where she had severe pain due to pulling of neck to right, then gradually improved.     Dystonia Improvement: Yes.  Percent Improvement: 95%    Duration of Benefit:  7.5 weeks and followed by a rapid reduction in benefit.         BOTULINUM NEUROTOXIN INJECTION PROCEDURES:    VERIFICATION OF PATIENT IDENTIFICATION AND PROCEDURE     Initials   Patient Name [facility]   Patient  [facility]   Procedure Verified by: [facility]     Prior to  the start of the procedure and with procedural staff participation, I verbally confirmed the patient s identity using two indicators, relevant allergies, that the procedure was appropriate and matched the consent or emergent situation, and that the correct equipment/implants were available. Immediately prior to starting the procedure I conducted the Time Out with the procedural staff and re-confirmed the patient s name, procedure, and site/side. (The Joint Commission universal protocol was followed.)  Yes    Sedation (Moderate or Deep): None      Above assessments performed by:  Cordelia Lynn DO PM&R Resident     Haja Bradley MD      INDICATION/S FOR PROCEDURE/S:  Roxanna Celis is a 46 year old patient with dystonia affecting the  head, neck and shoulder girdle musculature secondary to a diagnosis of cervical dystonia with associated  pain, tremor, spasms, loss of joint motion, loss of volitional motor control and difficulty with activities of daily living.     Her baseline symptoms have been recalcitrant to oral medications and conservative therapy.  She is here today for an injection of Botox.      GOAL OF PROCEDURE:  The goal of this procedure is to increase active range of motion, improve volitional motor control, decrease pain  and enhance functional independence associated with dystonic movements.    TOTAL DOSE ADMINISTERED:  Dose Administered:  250 units Botox    Diluent Used: Preservative Free Normal Saline  Total Volume of Diluent Used:  2.5 ml  Lot # /C3 with Expiration Date:  12/2019  NDC #: Botox 100u ([address]-65)    Medication guide was offered to patient and was declined.    CONSENT:   The risks, benefits, and treatment options were discussed with Roxanna Celis and she agreed to proceed.      Written consent was obtained by [facility].     EQUIPMENT USED:  Needle-37mm stimulating/recording  EMG/NCS Machine    SKIN PREPARATION:  Skin preparation was performed using an alcohol wipe.    GUIDANCE  DESCRIPTION:  Electro-myographic guidance was necessary throughout the procedure to accurately identify all areas of dystonic muscles while avoiding injection of non-dystonic muscles, neighboring nerves and nearby vascular structures.     AREA/MUSCLE INJECTED:   250 units Botox.    1. HEAD & NECK MUSCLES: 250 units Botox = Total Dose, 1:1 Dilution for 150 units Botox and 2:1 dilution for 100 units Botox to occipitalis   Right Rectus Capitis (upper cervical) - 10 units of Botox at 1 site/s.   Left Rectus Capitis (upper cervical) - 10 units of Botox at 1 site/s.    Right Mid-Trapezius (mid cervical) - 5 units of Botox at 1 site/s.   Left Mid-Trapezius (mid cervical) - 5 units of Botox at 1 site/s.    Right Semispinalis - 10 units of Botox at 1 site/s.   Left Semispinalis - 10 units of Botox at 1 site/s.    Right Splenius Cervicis/Capitus - 10 units of Botox at 1 site/s.   Left Splenius Cervicis/Capitus - 10 units of Botox at 1 site/s.    Right Levator Scapulae - 15 units of Botox at 1 site/s.   Left Levator Scapulae - 15 units of Botox at 1 site/s.    Right Longissimus - 10 units of Botox at 1 site/s.   Left Longissimus - 10 units of Botox at 1 site/s.    Right Inferior Oblique - 15 units of Botox at 1 site/s.  Left Inferior Oblique - 15 units of Botox at 1 site/s.    Right occipitalis - 50 units of Botox at 5 site/s (2:1 dilution).    Left occipitalis - 50 units of Botox at 5 site/s (2:1 dilution).     RESPONSE TO PROCEDURE:  Roxanna Celis tolerated the procedure well and there were no immediate complications.  She was allowed to recover for an appropriate period of time and was discharged home in stable condition.    FOLLOW UP:  Roxanna Celis was asked to follow up by phone in 7-14 days with Tiffany Lezama PT, Care Coordinator or Josefina King RN, Care Coordinator, to report her response to this series of injections.  Based on the patient's previous response to this therapy, Roxanna Celis was rescheduled for  the next series of injections in 9-10 weeks.    PLAN (Medication Changes, Therapy Orders, Work or Disability Issues, etc.): Will monitor response to today's injections.

## 2017-05-30 NOTE — MR AVS SNAPSHOT
After Visit Summary   5/30/2017    Roxanna Celis    MRN: [id number]           Patient Information     Date Of Birth          1970        Visit Information        Provider Department      5/30/2017 8:20 AM Haja Bradley MD [facility] Physical Medicine and Rehabilitation         Follow-ups after your visit        Follow-up notes from your care team     Return in about 9 weeks (around 8/1/2017) for Cervical Dystonia.      Your next 10 appointments already scheduled     Aug 01, 2017  9:40 AM CDT   (Arrive by 9:25 AM)   Return Botox with Haja Bradley MD   [facility] Medicine and Rehabilitation ([facility])    [address] [place] [id number] [phone]            Oct 02, 2017  1:40 PM CDT   (Arrive by 1:25 PM)   Return Botox with Haja Bradley MD   [facility] Medicine and Rehabilitation ([facility])    [address] [place] [id number] [phone]              Who to contact     Please call your clinic at [phone] to:    Ask questions about your health    Make or cancel appointments    Discuss your medicines    Learn about your test results    Speak to your doctor   If you have compliments or concerns about an experience at your clinic, or if you wish to file a complaint, please contact [facility] Patient Relations at [phone] or email us at [email or web address]         Additional Information About Your Visit        MyChart Information     Team Everest is an electronic gateway that provides easy, online access to your medical records. With Team Everest, you can request a clinic appointment, read your test results, renew a prescription or communicate with your care team.     To sign up for FlipKeyt visit the website at [email or web address]   You will be asked to enter the access code listed below, as well as some  "personal information. Please follow the directions to create your username and password.     Your access code is: [email or web address]  Expires: 6/15/2017  6:30 AM     Your access code will  in 90 days. If you need help or a new code, please contact your [facility] or call [phone] for assistance.        Care EveryWhere ID     This is your Care EveryWhere ID. This could be used by other organizations to access your [place] medical records  [id number]        Your Vitals Were     Pulse Height BMI (Body Mass Index)             91 1.676 m (5' 6\") 38.19 kg/m2          Blood Pressure from Last 3 Encounters:   17 144/84   17 154/82   17 135/57    Weight from Last 3 Encounters:   17 107.3 kg (236 lb 9.6 oz)   17 105.1 kg (231 lb 11.2 oz)   17 105.2 kg (231 lb 14.4 oz)              Today, you had the following     No orders found for display         Today's Medication Changes          These changes are accurate as of: 17  8:29 AM.  If you have any questions, ask your nurse or doctor.               These medicines have changed or have updated prescriptions.        Dose/Directions    gabapentin 100 MG capsule   Commonly known as:  NEURONTIN   This may have changed:    - how much to take  - when to take this   Used for:  Spasmodic torticollis, Neck pain        Dose:  100 mg   Take 1 capsule (100 mg) by mouth At Bedtime   Quantity:  90 capsule   Refills:  1                Primary Care Provider Office Phone # Fax #    [place] KADY Medellin [phone]       [facility] [address] [address]        Thank you!     Thank you for choosing [facility] PHYSICAL MEDICINE AND REHABILITATION  for your care. Our goal is always to provide you with excellent care. Hearing back from our patients is one way we can continue to improve our services. Please take a few minutes to complete the written survey that you may " receive in the mail after your visit with us. Thank you!             Your Updated Medication List - Protect others around you: Learn how to safely use, store and throw away your medicines at www.disposemymeds.org.          This list is accurate as of: 5/30/17  8:29 AM.  Always use your most recent med list.                   Brand Name Dispense Instructions for use    ADVIL 200 MG capsule   Generic drug:  ibuprofen      Take 200 mg by mouth every 4 hours as needed.       * BOTOX IJ      Inject 200 Units into the muscle       * BOTOX IJ      Inject 200 Units into the muscle Lot # /C3  Exp: 10/2016       * BOTOX IJ      Inject 200 Units into the muscle Lot# /C3, Exp 01/2017       * BOTOX IJ      Inject 200 Units into the muscle Lot # /C3  Exp: 3/2017       * BOTOX IJ      Inject 200 Units into the muscle Lot#/C3, Exp 09/2017       * BOTOX IJ      Inject 200 Units into the muscle Lot # /C3  Exp: 1/2018       * BOTOX IJ      Inject 75 Units into the muscle Lot # /C3  Exp: 1/2018       * BOTOX IJ      Inject 225 Units into the muscle       * BOTOX IJ      Inject 225 Units into the muscle Lot # /C3  Exp: 8/2018       * BOTOX IJ      Inject 225 Units as directed Lot #:  C3 Exp: 12/2018       * BOTOX IJ      Inject 225 Units into the muscle once Lot# /C3, Exp 03/2019       * BOTOX IJ      Inject 225 Units into the muscle Lot # /C3  Exp: 6/2019       * BOTOX IJ      Inject 225 Units as directed once Lot# /C3 Exp: 08/2019       * BOTOX IJ      Inject 250 Units into the muscle once Lot# /C3, Exp 09/2019       * BOTOX IJ      Inject 250 Units as directed once Lot#: /C3 Exp: 12/2019       CYMBALTA PO      Take 60 mg by mouth       EPIPEN 0.3 MG/0.3ML injection   Generic drug:  EPINEPHrine      Inject 0.3 mg into the muscle once as needed.       EXCEDRIN MIGRAINE 250-250-65 MG per tablet   Generic drug:  aspirin-acetaminophen-caffeine      Take 1 tablet by mouth  every 6 hours as needed.       gabapentin 100 MG capsule    NEURONTIN    90 capsule    Take 1 capsule (100 mg) by mouth At Bedtime       klonoPIN 1 MG tablet   Generic drug:  clonazePAM      Take 1 mg by mouth 2 times daily as needed.       PRISTIQ 100 MG 24 hr tablet   Generic drug:  desvenlafaxine succinate      Take 1 tablet by mouth daily.       PROGESTERONE 200 MG CAPS COMPOUND    PROGESTERONE 200 MG CAPS COMPOUND     Take 4 capsules by mouth daily.       TOPAMAX 100 MG tablet   Generic drug:  topiramate      Take 0.5 tablets by mouth 2 times daily.       VESICARE 10 MG tablet   Generic drug:  solifenacin      Take 1 tablet by mouth daily.       ZYPREXA PO      Take 15 mg by mouth       * Notice:  This list has 15 medication(s) that are the same as other medications prescribed for you. Read the directions carefully, and ask your doctor or other care provider to review them with you.

## 2017-05-30 NOTE — LETTER
"5/30/2017       RE: Roxanna Celsi  PO   [facility] [address]     Dear Colleague,    Thank you for referring your patient, Roxanna Celis, to the [facility] PHYSICAL MEDICINE AND REHABILITATION at [facility]. Please see a copy of my visit note below.    BOTULINUM TOXIN PROCEDURE NOTE    Chief Complaint   Patient presents with     RECHECK     [facility] RETURN - BOTOX       /84 (BP Location: Left arm, Patient Position: Chair, Cuff Size: Adult Large)  Pulse 91  Ht 1.676 m (5' 6\")  Wt 107.3 kg (236 lb 9.6 oz)  BMI 38.19 kg/m2      Current Outpatient Prescriptions:      OnabotulinumtoxinA (BOTOX IJ), Inject 250 Units into the muscle once Lot# /C3, Exp 09/2019, Disp: , Rfl:      OnabotulinumtoxinA (BOTOX IJ), Inject 225 Units as directed once Lot# /C3 Exp: 08/2019, Disp: , Rfl:      OnabotulinumtoxinA (BOTOX IJ), Inject 225 Units into the muscle Lot # /C3  Exp: 6/2019, Disp: , Rfl:      gabapentin (NEURONTIN) 100 MG capsule, Take 1 capsule (100 mg) by mouth At Bedtime (Patient taking differently: Take 300 mg by mouth 3 times daily ), Disp: 90 capsule, Rfl: 1     OnabotulinumtoxinA (BOTOX IJ), Inject 225 Units into the muscle once Lot# /C3, Exp 03/2019, Disp: , Rfl:      OnabotulinumtoxinA (BOTOX IJ), Inject 225 Units as directed Lot #:  C3 Exp: 12/2018, Disp: , Rfl:      OnabotulinumtoxinA (BOTOX IJ), Inject 225 Units into the muscle Lot # /C3  Exp: 8/2018, Disp: , Rfl:      OnabotulinumtoxinA (BOTOX IJ), Inject 225 Units into the muscle, Disp: , Rfl:      OnabotulinumtoxinA (BOTOX IJ), Inject 75 Units into the muscle Lot # /C3  Exp: 1/2018, Disp: , Rfl:      OnabotulinumtoxinA (BOTOX IJ), Inject 200 Units into the muscle Lot # /C3  Exp: 1/2018, Disp: , Rfl:      OnabotulinumtoxinA (BOTOX IJ), Inject 200 Units into the muscle Lot#/C3, Exp 09/2017, Disp: , Rfl:      OnabotulinumtoxinA (BOTOX IJ), Inject 200 Units into the muscle Lot # " /C3  Exp: 3/2017, Disp: , Rfl:      OnabotulinumtoxinA (BOTOX IJ), Inject 200 Units into the muscle Lot# /C3, Exp 01/2017, Disp: , Rfl:      OLANZapine (ZYPREXA PO), Take 15 mg by mouth, Disp: , Rfl:      DULoxetine HCl (CYMBALTA PO), Take 60 mg by mouth, Disp: , Rfl:      OnabotulinumtoxinA (BOTOX IJ), Inject 200 Units into the muscle Lot # /C3  Exp: 10/2016, Disp: , Rfl:      OnabotulinumtoxinA (BOTOX IJ), Inject 200 Units into the muscle, Disp: , Rfl:      solifenacin (VESICARE) 10 MG tablet, Take 1 tablet by mouth daily., Disp: , Rfl:      Desvenlafaxine Succinate (PRISTIQ) 100 MG TB24 24 hr tablet, Take 1 tablet by mouth daily., Disp: , Rfl:      ClonAZEPAM (KLONOPIN) 1 MG tablet, Take 1 mg by mouth 2 times daily as needed., Disp: , Rfl:      topiramate (TOPAMAX) 100 MG tablet, Take 0.5 tablets by mouth 2 times daily., Disp: , Rfl:      PROGESTERONE 200 MG CAPS COMPOUND (PROGESTERONE 200 MG CAPS COMPOUND), Take 4 capsules by mouth daily., Disp: , Rfl:      Ibuprofen (ADVIL) 200 MG capsule, Take 200 mg by mouth every 4 hours as needed., Disp: , Rfl:      aspirin-acetaminophen-caffeine (EXCEDRIN MIGRAINE) 250-250-65 MG per tablet, Take 1 tablet by mouth every 6 hours as needed., Disp: , Rfl:      EPINEPHrine (EPIPEN) 0.3 MG/0.3ML injection, Inject 0.3 mg into the muscle once as needed., Disp: , Rfl:      [DISCONTINUED] OnabotulinumtoxinA (BOTOX IJ), Inject 200 Units as directed  C3 Exp 3/2016, Disp: , Rfl:      [DISCONTINUED] OnabotulinumtoxinA (BOTOX IJ), Inject 200 Units into the muscle Lot# /C3, Exp 02/2016, Disp: , Rfl:      [DISCONTINUED] OnabotulinumtoxinA (BOTOX IJ), Inject 200 Units into the muscle Lot# /C3, Exp 10/2015, Disp: , Rfl:      [DISCONTINUED] OnabotulinumtoxinA (BOTOX IJ), Inject 75 Units into the muscle Lot# /C3  Exp: 07/2015, Disp: , Rfl:      [DISCONTINUED] OnabotulinumtoxinA (BOTOX IJ), Inject 175 Units into the muscle Lot# /C3, Exp 09/2015, Disp: ,  Rfl:      [DISCONTINUED] OnabotulinumtoxinA (BOTOX IJ), Inject 100 Units into the muscle. Lot # /C  Exp: 4/2015, Disp: , Rfl:      [DISCONTINUED] OnabotulinumtoxinA (BOTOX IJ), Inject 125 Units into the muscle. Lot # /C3  Exp: 4/2015, Disp: , Rfl:      [DISCONTINUED] OnabotulinumtoxinA (BOTOX IJ), Inject 125 Units into the muscle. Lot# /C3, Exp 10/2014, Disp: , Rfl:      [DISCONTINUED] OnabotulinumtoxinA (BOTOX IJ), Inject 140 Units into the muscle. Lot# /C3, Exp 02/2014, Disp: , Rfl:      Allergies   Allergen Reactions     Ivp Dye [Contrast Dye] Anaphylaxis     Lisinopril-Hctz Anaphylaxis     Maple Tree Anaphylaxis     Allergy includes maple syrup.     Gluten Itching and Swelling     Codeine Sulfate Cramps and GI Disturbance     Erythromycin Nausea and Vomiting and Cramps        PHYSICAL EXAM:  NECK AND TRUNK PATTERN:   Head Tremor: Observed only slightly at right, increases with left rotation   Left Side-bending: Not Present  Left Shoulder Elevation: slightly elevated  Head on Body Lateral Shear: To the right - slight    HPI:    Patient denies new medical diagnoses, illnesses, hospitalizations, emergency room visits, and injuries since the previous injection with botulinum neurotoxin.    We reviewed the recommended safety guidelines for  Botox from any vaccine injection, such as the seasonal flu vaccine, by a minimum of 10-14 days with Roxanna Celis. She acknowledged understanding.    RESPONSE TO PREVIOUS TREATMENT:    Roxanna Celis received 250 units of Botox on 03/28/2017.    Problems following the previous series of neurotoxin injections included:  No problems reported    BENEFITS BY PATIENT REPORT:    Pain Improvement: Yes.  Percent Improvement: 95%    Duration of Benefit:  7.5 weeks and followed by a rapid reduction in benefit for 1 week where she had severe pain due to pulling of neck to right, then gradually improved.     Dystonia Improvement: Yes.  Percent Improvement: 95%     Duration of Benefit:  7.5 weeks and followed by a rapid reduction in benefit.         BOTULINUM NEUROTOXIN INJECTION PROCEDURES:    VERIFICATION OF PATIENT IDENTIFICATION AND PROCEDURE     Initials   Patient Name [facility]   Patient  [facility]   Procedure Verified by: [facility]     Prior to the start of the procedure and with procedural staff participation, I verbally confirmed the patient s identity using two indicators, relevant allergies, that the procedure was appropriate and matched the consent or emergent situation, and that the correct equipment/implants were available. Immediately prior to starting the procedure I conducted the Time Out with the procedural staff and re-confirmed the patient s name, procedure, and site/side. (The Joint Commission universal protocol was followed.)  Yes    Sedation (Moderate or Deep): None      Above assessments performed by:  Cordelia Lynn DO PM&R Resident     Haja Bradley MD      INDICATION/S FOR PROCEDURE/S:  Roxanna Celis is a 46 year old patient with dystonia affecting the  head, neck and shoulder girdle musculature secondary to a diagnosis of cervical dystonia with associated  pain, tremor, spasms, loss of joint motion, loss of volitional motor control and difficulty with activities of daily living.     Her baseline symptoms have been recalcitrant to oral medications and conservative therapy.  She is here today for an injection of Botox.      GOAL OF PROCEDURE:  The goal of this procedure is to increase active range of motion, improve volitional motor control, decrease pain  and enhance functional independence associated with dystonic movements.    TOTAL DOSE ADMINISTERED:  Dose Administered:  250 units Botox    Diluent Used: Preservative Free Normal Saline  Total Volume of Diluent Used:  2.5 ml  Lot # /C3 with Expiration Date:  2019  NDC #: Botox 100u ([address]-89)    Medication guide was offered to patient and was declined.    CONSENT:   The risks, benefits, and treatment  options were discussed with Roxanna Celis and she agreed to proceed.      Written consent was obtained by [facility].     EQUIPMENT USED:  Needle-37mm stimulating/recording  EMG/NCS Machine    SKIN PREPARATION:  Skin preparation was performed using an alcohol wipe.    GUIDANCE DESCRIPTION:  Electro-myographic guidance was necessary throughout the procedure to accurately identify all areas of dystonic muscles while avoiding injection of non-dystonic muscles, neighboring nerves and nearby vascular structures.     AREA/MUSCLE INJECTED:   250 units Botox.    1. HEAD & NECK MUSCLES: 250 units Botox = Total Dose, 1:1 Dilution for 150 units Botox and 2:1 dilution for 100 units Botox to occipitalis   Right Rectus Capitis (upper cervical) - 10 units of Botox at 1 site/s.   Left Rectus Capitis (upper cervical) - 10 units of Botox at 1 site/s.    Right Mid-Trapezius (mid cervical) - 5 units of Botox at 1 site/s.   Left Mid-Trapezius (mid cervical) - 5 units of Botox at 1 site/s.    Right Semispinalis - 10 units of Botox at 1 site/s.   Left Semispinalis - 10 units of Botox at 1 site/s.    Right Splenius Cervicis/Capitus - 10 units of Botox at 1 site/s.   Left Splenius Cervicis/Capitus - 10 units of Botox at 1 site/s.    Right Levator Scapulae - 15 units of Botox at 1 site/s.   Left Levator Scapulae - 15 units of Botox at 1 site/s.    Right Longissimus - 10 units of Botox at 1 site/s.   Left Longissimus - 10 units of Botox at 1 site/s.    Right Inferior Oblique - 15 units of Botox at 1 site/s.  Left Inferior Oblique - 15 units of Botox at 1 site/s.    Right occipitalis - 50 units of Botox at 5 site/s (2:1 dilution).    Left occipitalis - 50 units of Botox at 5 site/s (2:1 dilution).     RESPONSE TO PROCEDURE:  Roxanna Celis tolerated the procedure well and there were no immediate complications.  She was allowed to recover for an appropriate period of time and was discharged home in stable condition.    FOLLOW UP:  Roxanna Celis was  asked to follow up by phone in 7-14 days with Tiffany Lezama PT, Care Coordinator or Josefina King RN, Care Coordinator, to report her response to this series of injections.  Based on the patient's previous response to this therapy, Roxanna Celis was rescheduled for the next series of injections in 9-10 weeks.    PLAN (Medication Changes, Therapy Orders, Work or Disability Issues, etc.): Will monitor response to today's injections.        Again, thank you for allowing me to participate in the care of your patient.      Sincerely,    Haja Bradley MD

## 2017-08-16 ENCOUNTER — OFFICE VISIT (OUTPATIENT)
Dept: PHYSICAL MEDICINE AND REHAB | Facility: CLINIC | Age: 47
End: 2017-08-16

## 2017-08-16 VITALS
HEART RATE: 107 BPM | HEIGHT: 66 IN | WEIGHT: 236 LBS | TEMPERATURE: 97.4 F | BODY MASS INDEX: 37.93 KG/M2 | DIASTOLIC BLOOD PRESSURE: 85 MMHG | SYSTOLIC BLOOD PRESSURE: 135 MMHG

## 2017-08-16 DIAGNOSIS — G24.1 DYSTONIA, TORSION, FRAGMENTS OF: ICD-10-CM

## 2017-08-16 DIAGNOSIS — G24.3 SPASMODIC TORTICOLLIS: Primary | ICD-10-CM

## 2017-08-16 ASSESSMENT — PAIN SCALES - GENERAL: PAINLEVEL: NO PAIN (1)

## 2017-08-16 NOTE — LETTER
"8/16/2017       RE: Roxanna Celis  [place]   [facility] [place] [address]     Dear Colleague,    Thank you for referring your patient, Roxanna Celis, to the [facility] PHYSICAL MEDICINE AND REHABILITATION at [facility]. Please see a copy of my visit note below.    BOTULINUM TOXIN PROCEDURE NOTE  Chief Complaint   Patient presents with     RECHECK     [facility] RETURN - BOTOX     /85 (BP Location: Left arm, Patient Position: Sitting, Cuff Size: Adult Large)  Pulse 107  Temp 97.4  F (36.3  C) (Oral)  Ht 1.676 m (5' 6\")  Wt 107 kg (236 lb)  BMI 38.09 kg/m2      Current Outpatient Prescriptions:      OnabotulinumtoxinA (BOTOX IJ), Inject 250 Units as directed once Lot#: /C3 Exp: 12/2019, Disp: , Rfl:      OnabotulinumtoxinA (BOTOX IJ), Inject 250 Units into the muscle once Lot# /C3, Exp 09/2019, Disp: , Rfl:      OnabotulinumtoxinA (BOTOX IJ), Inject 225 Units as directed once Lot# /C3 Exp: 08/2019, Disp: , Rfl:      OnabotulinumtoxinA (BOTOX IJ), Inject 225 Units into the muscle Lot # /C3  Exp: 6/2019, Disp: , Rfl:      gabapentin (NEURONTIN) 100 MG capsule, Take 1 capsule (100 mg) by mouth At Bedtime (Patient taking differently: Take 300 mg by mouth 3 times daily ), Disp: 90 capsule, Rfl: 1     OnabotulinumtoxinA (BOTOX IJ), Inject 225 Units into the muscle once Lot# /C3, Exp 03/2019, Disp: , Rfl:      OnabotulinumtoxinA (BOTOX IJ), Inject 225 Units as directed Lot #:  C3 Exp: 12/2018, Disp: , Rfl:      OnabotulinumtoxinA (BOTOX IJ), Inject 225 Units into the muscle Lot # /C3  Exp: 8/2018, Disp: , Rfl:      OnabotulinumtoxinA (BOTOX IJ), Inject 225 Units into the muscle, Disp: , Rfl:      OnabotulinumtoxinA (BOTOX IJ), Inject 75 Units into the muscle Lot # /C3  Exp: 1/2018, Disp: , Rfl:      OnabotulinumtoxinA (BOTOX IJ), Inject 200 Units into the muscle Lot # /C3  Exp: 1/2018, Disp: , Rfl:      OnabotulinumtoxinA (BOTOX IJ), Inject 200 " Units into the muscle Lot#/C3, Exp 09/2017, Disp: , Rfl:      OnabotulinumtoxinA (BOTOX IJ), Inject 200 Units into the muscle Lot # /C3  Exp: 3/2017, Disp: , Rfl:      OnabotulinumtoxinA (BOTOX IJ), Inject 200 Units into the muscle Lot# /C3, Exp 01/2017, Disp: , Rfl:      OLANZapine (ZYPREXA PO), Take 15 mg by mouth, Disp: , Rfl:      DULoxetine HCl (CYMBALTA PO), Take 60 mg by mouth, Disp: , Rfl:      OnabotulinumtoxinA (BOTOX IJ), Inject 200 Units into the muscle Lot # /C3  Exp: 10/2016, Disp: , Rfl:      OnabotulinumtoxinA (BOTOX IJ), Inject 200 Units into the muscle, Disp: , Rfl:      solifenacin (VESICARE) 10 MG tablet, Take 1 tablet by mouth daily., Disp: , Rfl:      Desvenlafaxine Succinate (PRISTIQ) 100 MG TB24 24 hr tablet, Take 1 tablet by mouth daily., Disp: , Rfl:      ClonAZEPAM (KLONOPIN) 1 MG tablet, Take 1 mg by mouth 2 times daily as needed., Disp: , Rfl:      topiramate (TOPAMAX) 100 MG tablet, Take 0.5 tablets by mouth 2 times daily., Disp: , Rfl:      PROGESTERONE 200 MG CAPS COMPOUND (PROGESTERONE 200 MG CAPS COMPOUND), Take 4 capsules by mouth daily., Disp: , Rfl:      Ibuprofen (ADVIL) 200 MG capsule, Take 200 mg by mouth every 4 hours as needed., Disp: , Rfl:      aspirin-acetaminophen-caffeine (EXCEDRIN MIGRAINE) 250-250-65 MG per tablet, Take 1 tablet by mouth every 6 hours as needed., Disp: , Rfl:      EPINEPHrine (EPIPEN) 0.3 MG/0.3ML injection, Inject 0.3 mg into the muscle once as needed., Disp: , Rfl:      Allergies   Allergen Reactions     Ivp Dye [Contrast Dye] Anaphylaxis     Lisinopril-Hctz Anaphylaxis     Maple Tree Anaphylaxis     Allergy includes maple syrup.     Gluten Itching and Swelling     Codeine Sulfate Cramps and GI Disturbance     Erythromycin Nausea and Vomiting and Cramps        PHYSICAL EXAM:  NECK AND TRUNK PATTERN:   Head Tremor: Observed only slightly at right, increases with left rotation   Left Side-bending: Not Present  Left Shoulder  Elevation: slightly elevated  Head on Body Lateral Shear: To the right - slight    HPI:    Patient denies new medical diagnoses, illnesses, hospitalizations, emergency room visits, and injuries since the previous injection with botulinum neurotoxin.    We reviewed the recommended safety guidelines for  Botox from any vaccine injection, such as the seasonal flu vaccine, by a minimum of 10-14 days with Roxanna Celis. She acknowledged understanding.    RESPONSE TO PREVIOUS TREATMENT:    Roxanna Celis received 250 units of Botox on 2017.    Problems following the previous series of neurotoxin injections included:  No problems reported    BENEFITS BY PATIENT REPORT:    Pain Improvement: Yes.  Percent Improvement: 95%    Duration of Benefit:  9 weeks and followed by a rapid reduction in benefit for 1 week where she had severe pain due to pulling of neck to right, then gradually improved.     Dystonia Improvement: Yes.  Percent Improvement: 95%    Duration of Benefit:  9 weeks and followed by a rapid reduction in benefit.         BOTULINUM NEUROTOXIN INJECTION PROCEDURES:    VERIFICATION OF PATIENT IDENTIFICATION AND PROCEDURE     Initials   Patient Name pag   Patient  pag   Procedure Verified by: pag     Prior to the start of the procedure and with procedural staff participation, I verbally confirmed the patient s identity using two indicators, relevant allergies, that the procedure was appropriate and matched the consent or emergent situation, and that the correct equipment/implants were available. Immediately prior to starting the procedure I conducted the Time Out with the procedural staff and re-confirmed the patient s name, procedure, and site/side. (The Joint Commission universal protocol was followed.)  Yes    Sedation (Moderate or Deep): None      Above assessments performed by:  YARELY Floyd, Care Coordinator    Haja Bradley MD      INDICATION/S FOR PROCEDURE/S:  Roxanna Celis is a 46  year old patient with dystonia affecting the  head, neck and shoulder girdle musculature secondary to a diagnosis of cervical dystonia with associated  pain, tremor, spasms, loss of joint motion, loss of volitional motor control and difficulty with activities of daily living.     Her baseline symptoms have been recalcitrant to oral medications and conservative therapy.  She is here today for an injection of Botox.      GOAL OF PROCEDURE:  The goal of this procedure is to increase active range of motion, improve volitional motor control, decrease pain  and enhance functional independence associated with dystonic movements.    TOTAL DOSE ADMINISTERED:  Dose Administered:  250 units Botox    Diluent Used: Preservative Free Normal Saline  Total Volume of Diluent Used:  2.5 ml  Lot # /C3 with Expiration Date:  03/2020  NDC #: Botox 100u ([address]-33)    Medication guide was offered to patient and was declined.    CONSENT:   The risks, benefits, and treatment options were discussed with Roxanna KEE Lalita and she agreed to proceed.      Written consent was obtained by [facility].     EQUIPMENT USED:  Needle-37mm stimulating/recording  EMG/NCS Machine    SKIN PREPARATION:  Skin preparation was performed using an alcohol wipe.    GUIDANCE DESCRIPTION:  Electro-myographic guidance was necessary throughout the procedure to accurately identify all areas of dystonic muscles while avoiding injection of non-dystonic muscles, neighboring nerves and nearby vascular structures.     AREA/MUSCLE INJECTED:   250 units Botox.    1. HEAD & NECK MUSCLES: 250 units Botox = Total Dose, 1:1 Dilution for 150 units Botox and 2:1 dilution for 100 units Botox to occipitalis   Right Rectus Capitis (upper cervical) - 10 units of Botox at 1 site/s.   Left Rectus Capitis (upper cervical) - 10 units of Botox at 1 site/s.    Right Mid-Trapezius (mid cervical) - 5 units of Botox at 1 site/s.   Left Mid-Trapezius (mid cervical) - 5 units of Botox at 1  site/s.    Right Semispinalis - 10 units of Botox at 1 site/s.   Left Semispinalis - 10 units of Botox at 1 site/s.    Right Splenius Cervicis/Capitus - 10 units of Botox at 1 site/s.   Left Splenius Cervicis/Capitus - 10 units of Botox at 1 site/s.    Right Levator Scapulae - 15 units of Botox at 1 site/s.   Left Levator Scapulae - 15 units of Botox at 1 site/s.    Right Longissimus - 10 units of Botox at 1 site/s.   Left Longissimus - 10 units of Botox at 1 site/s.    Right Inferior Oblique - 15 units of Botox at 1 site/s.  Left Inferior Oblique - 15 units of Botox at 1 site/s.    Right occipitalis - 50 units of Botox at 5 site/s (2:1 dilution).    Left occipitalis - 50 units of Botox at 5 site/s (2:1 dilution).     RESPONSE TO PROCEDURE:  Roxanna Celis tolerated the procedure well and there were no immediate complications.  She was allowed to recover for an appropriate period of time and was discharged home in stable condition.    FOLLOW UP:  Roxanna Celis was asked to follow up by phone in 7-14 days with Tiffany Lezama PT, Care Coordinator or Josefina King RN, Care Coordinator, to report her response to this series of injections.  Based on the patient's previous response to this therapy, Roxanna Celis was rescheduled for the next series of injections in 9-10 weeks.    PLAN (Medication Changes, Therapy Orders, Work or Disability Issues, etc.): Will monitor response to today's injections.    Again, thank you for allowing me to participate in the care of your patient.    Sincerely,  Haja Bradley MD

## 2017-08-16 NOTE — MR AVS SNAPSHOT
After Visit Summary   8/16/2017    Roxanna Celis    MRN: [id number]           Patient Information     Date Of Birth          1970        Visit Information        Provider Department      8/16/2017 11:00 AM Haja Bradley MD [facility]         Follow-ups after your visit        Your next 10 appointments already scheduled     Oct 23, 2017  2:00 PM CDT   (Arrive by 1:45 PM)   Return Botox with MD DERICK [facility] ([place] [facility])    [address] [place] [address] [id number] [phone]            Jan 08, 2018 10:00 AM CST   (Arrive by 9:45 AM)   Return Botox with Haja Bradley MD   [facility] ([place] [facility])    [address] [place] [id number] [phone]              Who to contact     Please call your clinic at [phone] to:    Ask questions about your health    Make or cancel appointments    Discuss your medicines    Learn about your test results    Speak to your doctor   If you have compliments or concerns about an experience at your clinic, or if you wish to file a complaint, please contact [facility] Patient Relations at [phone] or email us at [email or web address]         Additional Information About Your Visit        MyChart Information     [employer] is an electronic gateway that provides easy, online access to your medical records. With [employer], you can request a clinic appointment, read your test results, renew a prescription or communicate with your care team.     To sign up for [employer] visit the website at [email or web address]   You will be asked to enter the access code listed below, as well as some personal information. Please follow the directions to create your username and password.     Your access code is:  "[email or web address]  Expires: 10/17/2017  6:30 AM     Your access code will  in 90 days. If you need help or a new code, please contact your [facility] or call [phone] for assistance.        Care EveryWhere ID     This is your Care EveryWhere ID. This could be used by other organizations to access your [facility] medical records  [id number]        Your Vitals Were     Pulse Temperature Height BMI (Body Mass Index)          107 97.4  F (36.3  C) (Oral) 1.676 m (5' 6\") 38.09 kg/m2         Blood Pressure from Last 3 Encounters:   17 135/85   17 144/84   17 154/82    Weight from Last 3 Encounters:   17 107 kg (236 lb)   17 107.3 kg (236 lb 9.6 oz)   17 105.1 kg (231 lb 11.2 oz)              Today, you had the following     No orders found for display         Today's Medication Changes          These changes are accurate as of: 17 11:23 AM.  If you have any questions, ask your nurse or doctor.               These medicines have changed or have updated prescriptions.        Dose/Directions    gabapentin 100 MG capsule   Commonly known as:  NEURONTIN   This may have changed:    - how much to take  - when to take this   Used for:  Spasmodic torticollis, Neck pain        Dose:  100 mg   Take 1 capsule (100 mg) by mouth At Bedtime   Quantity:  90 capsule   Refills:  1                Primary Care Provider Office Phone # Fax #    [place] [facility] [phone]       [facility] [address] [address]        Equal Access to Services     [facility] Center[facility]ID[facility] AH: Hadchristophe Fletcher, asad clark, qahiginio garrettalgopal mccullough . So [facility] [phone].    ATENCIÓN: Si habla español, tiene a hawthorne disposición servicios gratuitos de asistencia lingüística. Llame al [phone].    We comply with applicable federal civil rights laws and [place] laws. We do not " discriminate on the basis of race, color, national origin, age, disability sex, sexual orientation or gender identity.            Thank you!     Thank you for choosing [facility]  for your care. Our goal is always to provide you with excellent care. Hearing back from our patients is one way we can continue to improve our services. Please take a few minutes to complete the written survey that you may receive in the mail after your visit with us. Thank you!             Your Updated Medication List - Protect others around you: Learn how to safely use, store and throw away your medicines at www.disposemymeds.org.          This list is accurate as of: 8/16/17 11:23 AM.  Always use your most recent med list.                   Brand Name Dispense Instructions for use Diagnosis    ADVIL 200 MG capsule   Generic drug:  ibuprofen      Take 200 mg by mouth every 4 hours as needed.        * BOTOX IJ      Inject 200 Units into the muscle        * BOTOX IJ      Inject 200 Units into the muscle Lot # /C3  Exp: 10/2016        * BOTOX IJ      Inject 200 Units into the muscle Lot# /C3, Exp 01/2017        * BOTOX IJ      Inject 200 Units into the muscle Lot # /C3  Exp: 3/2017        * BOTOX IJ      Inject 200 Units into the muscle Lot#/C3, Exp 09/2017        * BOTOX IJ      Inject 200 Units into the muscle Lot # /C3  Exp: 1/2018        * BOTOX IJ      Inject 75 Units into the muscle Lot # /C3  Exp: 1/2018        * BOTOX IJ      Inject 225 Units into the muscle        * BOTOX IJ      Inject 225 Units into the muscle Lot # /C3  Exp: 8/2018        * BOTOX IJ      Inject 225 Units as directed Lot #:  C3 Exp: 12/2018        * BOTOX IJ      Inject 225 Units into the muscle once Lot# /C3, Exp 03/2019        * BOTOX IJ      Inject 225 Units into the muscle Lot # /C3  Exp: 6/2019        * BOTOX IJ      Inject 225 Units as directed once Lot# /C3 Exp:  08/2019        * BOTOX IJ      Inject 250 Units into the muscle once Lot# /C3, Exp 09/2019        * BOTOX IJ      Inject 250 Units as directed once Lot#: /C3 Exp: 12/2019        CYMBALTA PO      Take 60 mg by mouth        EPIPEN 0.3 MG/0.3ML injection   Generic drug:  EPINEPHrine      Inject 0.3 mg into the muscle once as needed.        EXCEDRIN MIGRAINE 250-250-65 MG per tablet   Generic drug:  aspirin-acetaminophen-caffeine      Take 1 tablet by mouth every 6 hours as needed.        gabapentin 100 MG capsule    NEURONTIN    90 capsule    Take 1 capsule (100 mg) by mouth At Bedtime    Spasmodic torticollis, Neck pain       klonoPIN 1 MG tablet   Generic drug:  clonazePAM      Take 1 mg by mouth 2 times daily as needed.        PRISTIQ 100 MG 24 hr tablet   Generic drug:  desvenlafaxine succinate      Take 1 tablet by mouth daily.        PROGESTERONE 200 MG CAPS COMPOUND    PROGESTERONE 200 MG CAPS COMPOUND     Take 4 capsules by mouth daily.        TOPAMAX 100 MG tablet   Generic drug:  topiramate      Take 0.5 tablets by mouth 2 times daily.        VESICARE 10 MG tablet   Generic drug:  solifenacin      Take 1 tablet by mouth daily.        ZYPREXA PO      Take 15 mg by mouth        * Notice:  This list has 15 medication(s) that are the same as other medications prescribed for you. Read the directions carefully, and ask your doctor or other care provider to review them with you.

## 2017-08-16 NOTE — PROGRESS NOTES
"BOTULINUM TOXIN PROCEDURE NOTE    Chief Complaint   Patient presents with     RECHECK     [facility] RETURN - BOTOX       /85 (BP Location: Left arm, Patient Position: Sitting, Cuff Size: Adult Large)  Pulse 107  Temp 97.4  F (36.3  C) (Oral)  Ht 1.676 m (5' 6\")  Wt 107 kg (236 lb)  BMI 38.09 kg/m2      Current Outpatient Prescriptions:      OnabotulinumtoxinA (BOTOX IJ), Inject 250 Units as directed once Lot#: /C3 Exp: 12/2019, Disp: , Rfl:      OnabotulinumtoxinA (BOTOX IJ), Inject 250 Units into the muscle once Lot# /C3, Exp 09/2019, Disp: , Rfl:      OnabotulinumtoxinA (BOTOX IJ), Inject 225 Units as directed once Lot# /C3 Exp: 08/2019, Disp: , Rfl:      OnabotulinumtoxinA (BOTOX IJ), Inject 225 Units into the muscle Lot # /C3  Exp: 6/2019, Disp: , Rfl:      gabapentin (NEURONTIN) 100 MG capsule, Take 1 capsule (100 mg) by mouth At Bedtime (Patient taking differently: Take 300 mg by mouth 3 times daily ), Disp: 90 capsule, Rfl: 1     OnabotulinumtoxinA (BOTOX IJ), Inject 225 Units into the muscle once Lot# /C3, Exp 03/2019, Disp: , Rfl:      OnabotulinumtoxinA (BOTOX IJ), Inject 225 Units as directed Lot #:  C3 Exp: 12/2018, Disp: , Rfl:      OnabotulinumtoxinA (BOTOX IJ), Inject 225 Units into the muscle Lot # /C3  Exp: 8/2018, Disp: , Rfl:      OnabotulinumtoxinA (BOTOX IJ), Inject 225 Units into the muscle, Disp: , Rfl:      OnabotulinumtoxinA (BOTOX IJ), Inject 75 Units into the muscle Lot # /C3  Exp: 1/2018, Disp: , Rfl:      OnabotulinumtoxinA (BOTOX IJ), Inject 200 Units into the muscle Lot # /C3  Exp: 1/2018, Disp: , Rfl:      OnabotulinumtoxinA (BOTOX IJ), Inject 200 Units into the muscle Lot#/C3, Exp 09/2017, Disp: , Rfl:      OnabotulinumtoxinA (BOTOX IJ), Inject 200 Units into the muscle Lot # /C3  Exp: 3/2017, Disp: , Rfl:      OnabotulinumtoxinA (BOTOX IJ), Inject 200 Units into the muscle Lot# /C3, Exp 01/2017, Disp: , Rfl:      " OLANZapine (ZYPREXA PO), Take 15 mg by mouth, Disp: , Rfl:      DULoxetine HCl (CYMBALTA PO), Take 60 mg by mouth, Disp: , Rfl:      OnabotulinumtoxinA (BOTOX IJ), Inject 200 Units into the muscle Lot # /C3  Exp: 10/2016, Disp: , Rfl:      OnabotulinumtoxinA (BOTOX IJ), Inject 200 Units into the muscle, Disp: , Rfl:      solifenacin (VESICARE) 10 MG tablet, Take 1 tablet by mouth daily., Disp: , Rfl:      Desvenlafaxine Succinate (PRISTIQ) 100 MG TB24 24 hr tablet, Take 1 tablet by mouth daily., Disp: , Rfl:      ClonAZEPAM (KLONOPIN) 1 MG tablet, Take 1 mg by mouth 2 times daily as needed., Disp: , Rfl:      topiramate (TOPAMAX) 100 MG tablet, Take 0.5 tablets by mouth 2 times daily., Disp: , Rfl:      PROGESTERONE 200 MG CAPS COMPOUND (PROGESTERONE 200 MG CAPS COMPOUND), Take 4 capsules by mouth daily., Disp: , Rfl:      Ibuprofen (ADVIL) 200 MG capsule, Take 200 mg by mouth every 4 hours as needed., Disp: , Rfl:      aspirin-acetaminophen-caffeine (EXCEDRIN MIGRAINE) 250-250-65 MG per tablet, Take 1 tablet by mouth every 6 hours as needed., Disp: , Rfl:      EPINEPHrine (EPIPEN) 0.3 MG/0.3ML injection, Inject 0.3 mg into the muscle once as needed., Disp: , Rfl:      Allergies   Allergen Reactions     Ivp Dye [Contrast Dye] Anaphylaxis     Lisinopril-Hctz Anaphylaxis     Maple Tree Anaphylaxis     Allergy includes maple syrup.     Gluten Itching and Swelling     Codeine Sulfate Cramps and GI Disturbance     Erythromycin Nausea and Vomiting and Cramps        PHYSICAL EXAM:  NECK AND TRUNK PATTERN:   Head Tremor: Observed only slightly at right, increases with left rotation   Left Side-bending: Not Present  Left Shoulder Elevation: slightly elevated  Head on Body Lateral Shear: To the right - slight    HPI:    Patient denies new medical diagnoses, illnesses, hospitalizations, emergency room visits, and injuries since the previous injection with botulinum neurotoxin.    We reviewed the recommended safety  guidelines for  Botox from any vaccine injection, such as the seasonal flu vaccine, by a minimum of 10-14 days with Roxanna Celis. She acknowledged understanding.    RESPONSE TO PREVIOUS TREATMENT:    Roxanna Celis received 250 units of Botox on 2017.    Problems following the previous series of neurotoxin injections included:  No problems reported    BENEFITS BY PATIENT REPORT:    Pain Improvement: Yes.  Percent Improvement: 95%    Duration of Benefit:  9 weeks and followed by a rapid reduction in benefit for 1 week where she had severe pain due to pulling of neck to right, then gradually improved.     Dystonia Improvement: Yes.  Percent Improvement: 95%    Duration of Benefit:  9 weeks and followed by a rapid reduction in benefit.         BOTULINUM NEUROTOXIN INJECTION PROCEDURES:    VERIFICATION OF PATIENT IDENTIFICATION AND PROCEDURE     Initials   Patient Name pag   Patient  pag   Procedure Verified by: pag     Prior to the start of the procedure and with procedural staff participation, I verbally confirmed the patient s identity using two indicators, relevant allergies, that the procedure was appropriate and matched the consent or emergent situation, and that the correct equipment/implants were available. Immediately prior to starting the procedure I conducted the Time Out with the procedural staff and re-confirmed the patient s name, procedure, and site/side. (The Joint Commission universal protocol was followed.)  Yes    Sedation (Moderate or Deep): None      Above assessments performed by:  YARELY Floyd, Care Coordinator    Haja Bradley MD      INDICATION/S FOR PROCEDURE/S:  Roxanna Celis is a 46 year old patient with dystonia affecting the  head, neck and shoulder girdle musculature secondary to a diagnosis of cervical dystonia with associated  pain, tremor, spasms, loss of joint motion, loss of volitional motor control and difficulty with activities of daily living.     Her  baseline symptoms have been recalcitrant to oral medications and conservative therapy.  She is here today for an injection of Botox.      GOAL OF PROCEDURE:  The goal of this procedure is to increase active range of motion, improve volitional motor control, decrease pain  and enhance functional independence associated with dystonic movements.    TOTAL DOSE ADMINISTERED:  Dose Administered:  250 units Botox    Diluent Used: Preservative Free Normal Saline  Total Volume of Diluent Used:  2.5 ml  Lot # /C3 with Expiration Date:  03/2020  NDC #: Botox 100u ([address]-00)    Medication guide was offered to patient and was declined.    CONSENT:   The risks, benefits, and treatment options were discussed with Roxanna Celis and she agreed to proceed.      Written consent was obtained by [facility].     EQUIPMENT USED:  Needle-37mm stimulating/recording  EMG/NCS Machine    SKIN PREPARATION:  Skin preparation was performed using an alcohol wipe.    GUIDANCE DESCRIPTION:  Electro-myographic guidance was necessary throughout the procedure to accurately identify all areas of dystonic muscles while avoiding injection of non-dystonic muscles, neighboring nerves and nearby vascular structures.     AREA/MUSCLE INJECTED:   250 units Botox.    1. HEAD & NECK MUSCLES: 250 units Botox = Total Dose, 1:1 Dilution for 150 units Botox and 2:1 dilution for 100 units Botox to occipitalis   Right Rectus Capitis (upper cervical) - 10 units of Botox at 1 site/s.   Left Rectus Capitis (upper cervical) - 10 units of Botox at 1 site/s.    Right Mid-Trapezius (mid cervical) - 5 units of Botox at 1 site/s.   Left Mid-Trapezius (mid cervical) - 5 units of Botox at 1 site/s.    Right Semispinalis - 10 units of Botox at 1 site/s.   Left Semispinalis - 10 units of Botox at 1 site/s.    Right Splenius Cervicis/Capitus - 10 units of Botox at 1 site/s.   Left Splenius Cervicis/Capitus - 10 units of Botox at 1 site/s.    Right Levator Scapulae - 15 units of  Botox at 1 site/s.   Left Levator Scapulae - 15 units of Botox at 1 site/s.    Right Longissimus - 10 units of Botox at 1 site/s.   Left Longissimus - 10 units of Botox at 1 site/s.    Right Inferior Oblique - 15 units of Botox at 1 site/s.  Left Inferior Oblique - 15 units of Botox at 1 site/s.    Right occipitalis - 50 units of Botox at 5 site/s (2:1 dilution).    Left occipitalis - 50 units of Botox at 5 site/s (2:1 dilution).     RESPONSE TO PROCEDURE:  Roxanna Celis tolerated the procedure well and there were no immediate complications.  She was allowed to recover for an appropriate period of time and was discharged home in stable condition.    FOLLOW UP:  Roxanna Celis was asked to follow up by phone in 7-14 days with Tiffany Lezama PT, Care Coordinator or Josefina King RN, Care Coordinator, to report her response to this series of injections.  Based on the patient's previous response to this therapy, Roxanna Celis was rescheduled for the next series of injections in 9-10 weeks.    PLAN (Medication Changes, Therapy Orders, Work or Disability Issues, etc.): Will monitor response to today's injections.

## 2017-10-23 ENCOUNTER — OFFICE VISIT (OUTPATIENT)
Dept: PHYSICAL MEDICINE AND REHAB | Facility: CLINIC | Age: 47
End: 2017-10-23

## 2017-10-23 VITALS
HEART RATE: 95 BPM | HEIGHT: 66 IN | WEIGHT: 239 LBS | SYSTOLIC BLOOD PRESSURE: 156 MMHG | DIASTOLIC BLOOD PRESSURE: 76 MMHG | BODY MASS INDEX: 38.41 KG/M2

## 2017-10-23 DIAGNOSIS — G24.3 SPASMODIC TORTICOLLIS: Primary | ICD-10-CM

## 2017-10-23 DIAGNOSIS — G24.1 DYSTONIA, TORSION, FRAGMENTS OF: ICD-10-CM

## 2017-10-23 ASSESSMENT — PAIN SCALES - GENERAL: PAINLEVEL: NO PAIN (0)

## 2017-10-23 NOTE — LETTER
"10/23/2017      RE: Roxanna Celis  PO   [facility] [address]       BOTULINUM TOXIN PROCEDURE NOTE    Chief Complaint   Patient presents with     RECHECK     Cervical dystonia- Botox       /76  Pulse 95  Ht 1.676 m (5' 6\")  Wt 108.4 kg (239 lb)  BMI 38.58 kg/m2      Current Outpatient Prescriptions:      ONABOTULINUMTOXINA IJ, Inject 250 Units as directed once LOT # /C3 EXPIRATION: 03/2020, Disp: , Rfl:      OnabotulinumtoxinA (BOTOX IJ), Inject 250 Units as directed once Lot#: /C3 Exp: 12/2019, Disp: , Rfl:      OnabotulinumtoxinA (BOTOX IJ), Inject 250 Units into the muscle once Lot# /C3, Exp 09/2019, Disp: , Rfl:      OnabotulinumtoxinA (BOTOX IJ), Inject 225 Units as directed once Lot# /C3 Exp: 08/2019, Disp: , Rfl:      OnabotulinumtoxinA (BOTOX IJ), Inject 225 Units into the muscle Lot # /C3  Exp: 6/2019, Disp: , Rfl:      gabapentin (NEURONTIN) 100 MG capsule, Take 1 capsule (100 mg) by mouth At Bedtime (Patient taking differently: Take 300 mg by mouth 3 times daily ), Disp: 90 capsule, Rfl: 1     OnabotulinumtoxinA (BOTOX IJ), Inject 225 Units into the muscle once Lot# /C3, Exp 03/2019, Disp: , Rfl:      OnabotulinumtoxinA (BOTOX IJ), Inject 225 Units as directed Lot #:  C3 Exp: 12/2018, Disp: , Rfl:      OnabotulinumtoxinA (BOTOX IJ), Inject 225 Units into the muscle Lot # /C3  Exp: 8/2018, Disp: , Rfl:      OnabotulinumtoxinA (BOTOX IJ), Inject 225 Units into the muscle, Disp: , Rfl:      OnabotulinumtoxinA (BOTOX IJ), Inject 75 Units into the muscle Lot # /C3  Exp: 1/2018, Disp: , Rfl:      OnabotulinumtoxinA (BOTOX IJ), Inject 200 Units into the muscle Lot # /C3  Exp: 1/2018, Disp: , Rfl:      OnabotulinumtoxinA (BOTOX IJ), Inject 200 Units into the muscle Lot#/C3, Exp 09/2017, Disp: , Rfl:      OnabotulinumtoxinA (BOTOX IJ), Inject 200 Units into the muscle Lot # /C3  Exp: 3/2017, Disp: , Rfl:      OnabotulinumtoxinA (BOTOX IJ), " Inject 200 Units into the muscle Lot# /C3, Exp 01/2017, Disp: , Rfl:      OLANZapine (ZYPREXA PO), Take 15 mg by mouth, Disp: , Rfl:      DULoxetine HCl (CYMBALTA PO), Take 60 mg by mouth, Disp: , Rfl:      OnabotulinumtoxinA (BOTOX IJ), Inject 200 Units into the muscle Lot # /C3  Exp: 10/2016, Disp: , Rfl:      OnabotulinumtoxinA (BOTOX IJ), Inject 200 Units into the muscle, Disp: , Rfl:      solifenacin (VESICARE) 10 MG tablet, Take 1 tablet by mouth daily., Disp: , Rfl:      Desvenlafaxine Succinate (PRISTIQ) 100 MG TB24 24 hr tablet, Take 1 tablet by mouth daily., Disp: , Rfl:      ClonAZEPAM (KLONOPIN) 1 MG tablet, Take 1 mg by mouth 2 times daily as needed., Disp: , Rfl:      topiramate (TOPAMAX) 100 MG tablet, Take 0.5 tablets by mouth 2 times daily., Disp: , Rfl:      PROGESTERONE 200 MG CAPS COMPOUND (PROGESTERONE 200 MG CAPS COMPOUND), Take 4 capsules by mouth daily., Disp: , Rfl:      Ibuprofen (ADVIL) 200 MG capsule, Take 200 mg by mouth every 4 hours as needed., Disp: , Rfl:      aspirin-acetaminophen-caffeine (EXCEDRIN MIGRAINE) 250-250-65 MG per tablet, Take 1 tablet by mouth every 6 hours as needed., Disp: , Rfl:      EPINEPHrine (EPIPEN) 0.3 MG/0.3ML injection, Inject 0.3 mg into the muscle once as needed., Disp: , Rfl:      Allergies   Allergen Reactions     Ivp Dye [Contrast Dye] Anaphylaxis     Lisinopril-Hctz Anaphylaxis     Maple Tree Anaphylaxis     Allergy includes maple syrup.     Gluten Itching and Swelling     Codeine Sulfate Cramps and GI Disturbance     Erythromycin Nausea and Vomiting and Cramps        PHYSICAL EXAM:  NECK AND TRUNK PATTERN:   Head Tremor: Observed only slightly at right, increases with left rotation   Left Side-bending: Not Present  Left Shoulder Elevation: slightly elevated  Head on Body Lateral Shear: To the right - slight    HPI:    Patient denies new medical diagnoses, illnesses, hospitalizations, emergency room visits, and injuries since the previous  injection with botulinum neurotoxin.    We reviewed the recommended safety guidelines for  Botox from any vaccine injection, such as the seasonal flu vaccine, by a minimum of 10-14 days with Roxanna Celis. She acknowledged understanding.    RESPONSE TO PREVIOUS TREATMENT:    Roxanna Celis received 250 units of Botox on 2017.    Problems following the previous series of neurotoxin injections included:  No problems reported    BENEFITS BY PATIENT REPORT:    Pain Improvement: Yes.  Percent Improvement: 95%    Duration of Benefit:  9-10 weeks, followed by a gradual reduction in benefit.     Dystonia Improvement: Yes.  Percent Improvement: 95%    Duration of Benefit:  9-10 weeks followed by a gradual reduction in benefit.       BOTULINUM NEUROTOXIN INJECTION PROCEDURES:    VERIFICATION OF PATIENT IDENTIFICATION AND PROCEDURE     Initials   Patient Name ses   Patient  ses   Procedure Verified by: kristina     Prior to the start of the procedure and with procedural staff participation, I verbally confirmed the patient s identity using two indicators, relevant allergies, that the procedure was appropriate and matched the consent or emergent situation, and that the correct equipment/implants were available. Immediately prior to starting the procedure I conducted the Time Out with the procedural staff and re-confirmed the patient s name, procedure, and site/side. (The Joint Commission universal protocol was followed.)  Yes    Sedation (Moderate or Deep): None      Above assessments performed by:  America Bradley MD      INDICATION/S FOR PROCEDURE/S:  Roxanna Celis is a 46-year old patient with dystonia affecting the  head, neck and shoulder girdle musculature secondary to a diagnosis of cervical dystonia with associated  pain, tremor, spasms, loss of joint motion, loss of volitional motor control and difficulty with activities of daily living.     Her baseline symptoms have been  recalcitrant to oral medications and conservative therapy.  She is here today for an injection of Botox.      GOAL OF PROCEDURE:  The goal of this procedure is to increase active range of motion, improve volitional motor control, decrease pain  and enhance functional independence associated with dystonic movements.    TOTAL DOSE ADMINISTERED:  Dose Administered:  250 units Botox    Diluent Used: Preservative Free Normal Saline  Total Volume of Diluent Used:  2.5 ml  Lot # /C3 with Expiration Date:  05/2020  NDC #: Botox 100u ([address]-40)    Medication guide was offered to patient and was declined.    CONSENT:   The risks, benefits, and treatment options were discussed with Roxanna Celis and she agreed to proceed.      Written consent was obtained by [facility].     EQUIPMENT USED:  Needle-37mm stimulating/recording  EMG/NCS Machine    SKIN PREPARATION:  Skin preparation was performed using an alcohol wipe.    GUIDANCE DESCRIPTION:  Electro-myographic guidance was necessary throughout the procedure to accurately identify all areas of dystonic muscles while avoiding injection of non-dystonic muscles, neighboring nerves and nearby vascular structures.     AREA/MUSCLE INJECTED:   250 units Botox.    1. HEAD & NECK MUSCLES: 250 units Botox = Total Dose, 1:1 Dilution for 150 units Botox and 2:1 dilution for 100 units Botox to occipitalis   Right Rectus Capitis (upper cervical) - 10 units of Botox at 1 site/s.   Left Rectus Capitis (upper cervical) - 10 units of Botox at 1 site/s.    Right Mid-Trapezius (mid cervical) - 5 units of Botox at 1 site/s.   Left Mid-Trapezius (mid cervical) - 5 units of Botox at 1 site/s.    Right Semispinalis - 10 units of Botox at 1 site/s.   Left Semispinalis - 10 units of Botox at 1 site/s.    Right Splenius Cervicis/Capitus - 10 units of Botox at 1 site/s.   Left Splenius Cervicis/Capitus - 10 units of Botox at 1 site/s.    Right Levator Scapulae - 15 units of Botox at 1 site/s.   Left  Levator Scapulae - 15 units of Botox at 1 site/s.    Right Longissimus - 10 units of Botox at 1 site/s.   Left Longissimus - 10 units of Botox at 1 site/s.    Right Inferior Oblique - 15 units of Botox at 1 site/s.  Left Inferior Oblique - 15 units of Botox at 1 site/s.    Right occipitalis - 50 units of Botox at 5 site/s (2:1 dilution).    Left occipitalis - 50 units of Botox at 5 site/s (2:1 dilution).       RESPONSE TO PROCEDURE:  Rxoanna Celis tolerated the procedure well and there were no immediate complications.  She was allowed to recover for an appropriate period of time and was discharged home in stable condition.    FOLLOW UP:  Roxanna Celis was asked to follow up by phone in 7-14 days with Tiffany Lezama PT, Care Coordinator or Josefina King RN, Care Coordinator, to report her response to this series of injections.  Based on the patient's previous response to this therapy, Roxanna Celis was rescheduled for the next series of injections in 9-10 weeks.    PLAN (Medication Changes, Therapy Orders, Work or Disability Issues, etc.): Will monitor response to today's injections.    Haja Bradley MD

## 2017-10-23 NOTE — MR AVS SNAPSHOT
After Visit Summary   10/23/2017    Roxanna Celis    MRN: [id number]           Patient Information     Date Of Birth          1970        Visit Information        Provider Department      10/23/2017 2:00 PM Haja Bradley MD [facility]         Follow-ups after your visit        Follow-up notes from your care team     Return in about 11 weeks (around 2018) for Cervical Dystonia.      Your next 10 appointments already scheduled     2018 10:00 AM CST   (Arrive by 9:45 AM)   Return Botox with MD DERICK Salcido [facility] Medicine [facility] Rehabilitation ([facility])    [address]  [address] [facility] [address] [phone]              Who to contact     Please call your clinic at [phone] to:    Ask questions about your health    Make or cancel appointments    Discuss your medicines    Learn about your test results    Speak to your doctor   If you have compliments or concerns about an experience at your clinic, or if you wish to file a complaint, please contact [facility] Patient Relations at [phone] or email us at [email or web address]         Additional Information About Your Visit        MyChart Information     [employer] is an electronic gateway that provides easy, online access to your medical records. With [employer], you can request a clinic appointment, read your test results, renew a prescription or communicate with your care team.     To sign up for rVuet visit the website at [email or web address]   You will be asked to enter the access code listed below, as well as some personal information. Please follow the directions to create your username and password.     Your access code is: [email or web address]  Expires: 2018  2:23 PM     Your access code will  in 90 days. If you need help or a new code, please contact your [facility]  [facility] [facility] or call [phone] for assistance.        Care EveryWhere ID     This is your Care EveryWhere ID. This could be used by other organizations to access your [place] medical records  [id number]        Your Vitals Were     Pulse Height BMI (Body Mass Index)             95 1.676 m (5' 6\") 38.58 kg/m2          Blood Pressure from Last 3 Encounters:   10/23/17 156/76   08/16/17 135/85   05/30/17 144/84    Weight from Last 3 Encounters:   10/23/17 108.4 kg (239 lb)   08/16/17 107 kg (236 lb)   05/30/17 107.3 kg (236 lb 9.6 oz)              Today, you had the following     No orders found for display         Today's Medication Changes          These changes are accurate as of: 10/23/17  2:23 PM.  If you have any questions, ask your nurse or doctor.               These medicines have changed or have updated prescriptions.        Dose/Directions    gabapentin 100 MG capsule   Commonly known as:  NEURONTIN   This may have changed:    - how much to take  - when to take this   Used for:  Spasmodic torticollis, Neck pain        Dose:  100 mg   Take 1 capsule (100 mg) by mouth At Bedtime   Quantity:  90 capsule   Refills:  1                Primary Care Provider Office Phone # Fax #    Africa HILLMAN Vignesh [phone]       [place] [id number] [address]        Equal Access to Services     DIANA DANIEL AH: Hadii hillary ku hadasho Soromanali, waaxda luqadaha, qaybta kaalmada adeegyada, gopal reynoso . So [facility] [phone].    ATENCIÓN: Si habla español, tiene a hawthorne disposición servicios gratuitos de asistencia lingüística. Christi al [phone].    We comply with applicable federal civil rights laws and [place] laws. We do not discriminate on the basis of race, color, national origin, age, disability, sex, sexual orientation, or gender identity.            Thank you!     Thank you for choosing [facility] PHYSICAL MEDICINE AND " REHABILITATION  for your care. Our goal is always to provide you with excellent care. Hearing back from our patients is one way we can continue to improve our services. Please take a few minutes to complete the written survey that you may receive in the mail after your visit with us. Thank you!             Your Updated Medication List - Protect others around you: Learn how to safely use, store and throw away your medicines at www.disposemymeds.org.          This list is accurate as of: 10/23/17  2:23 PM.  Always use your most recent med list.                   Brand Name Dispense Instructions for use Diagnosis    ADVIL 200 MG capsule   Generic drug:  ibuprofen      Take 200 mg by mouth every 4 hours as needed.        * BOTOX IJ      Inject 200 Units into the muscle        * BOTOX IJ      Inject 200 Units into the muscle Lot # /C3  Exp: 10/2016        * BOTOX IJ      Inject 200 Units into the muscle Lot# /C3, Exp 01/2017        * BOTOX IJ      Inject 200 Units into the muscle Lot # /C3  Exp: 3/2017        * BOTOX IJ      Inject 200 Units into the muscle Lot#/C3, Exp 09/2017        * BOTOX IJ      Inject 200 Units into the muscle Lot # /C3  Exp: 1/2018        * BOTOX IJ      Inject 75 Units into the muscle Lot # /C3  Exp: 1/2018        * BOTOX IJ      Inject 225 Units into the muscle        * BOTOX IJ      Inject 225 Units into the muscle Lot # /C3  Exp: 8/2018        * BOTOX IJ      Inject 225 Units as directed Lot #:  C3 Exp: 12/2018        * BOTOX IJ      Inject 225 Units into the muscle once Lot# /C3, Exp 03/2019        * BOTOX IJ      Inject 225 Units into the muscle Lot # /C3  Exp: 6/2019        * BOTOX IJ      Inject 225 Units as directed once Lot# /C3 Exp: 08/2019        * BOTOX IJ      Inject 250 Units into the muscle once Lot# /C3, Exp 09/2019        * BOTOX IJ      Inject 250 Units as directed once Lot#: /C3 Exp: 12/2019        *  ONABOTULINUMTOXINA IJ      Inject 250 Units as directed once LOT # /C3 EXPIRATION: 03/2020        CYMBALTA PO      Take 60 mg by mouth        EPIPEN 0.3 MG/0.3ML injection   Generic drug:  EPINEPHrine      Inject 0.3 mg into the muscle once as needed.        EXCEDRIN MIGRAINE 250-250-65 MG per tablet   Generic drug:  aspirin-acetaminophen-caffeine      Take 1 tablet by mouth every 6 hours as needed.        gabapentin 100 MG capsule    NEURONTIN    90 capsule    Take 1 capsule (100 mg) by mouth At Bedtime    Spasmodic torticollis, Neck pain       klonoPIN 1 MG tablet   Generic drug:  clonazePAM      Take 1 mg by mouth 2 times daily as needed.        PRISTIQ 100 MG 24 hr tablet   Generic drug:  desvenlafaxine succinate      Take 1 tablet by mouth daily.        PROGESTERONE 200 MG CAPS COMPOUND    PROGESTERONE 200 MG CAPS COMPOUND     Take 4 capsules by mouth daily.        TOPAMAX 100 MG tablet   Generic drug:  topiramate      Take 0.5 tablets by mouth 2 times daily.        VESICARE 10 MG tablet   Generic drug:  solifenacin      Take 1 tablet by mouth daily.        ZYPREXA PO      Take 15 mg by mouth        * Notice:  This list has 16 medication(s) that are the same as other medications prescribed for you. Read the directions carefully, and ask your doctor or other care provider to review them with you.

## 2017-10-23 NOTE — NURSING NOTE
Chief Complaint   Patient presents with     RECHECK     Cervical dystonia- Botox     KADY Rodrigues

## 2017-10-23 NOTE — PROGRESS NOTES
"BOTULINUM TOXIN PROCEDURE NOTE    Chief Complaint   Patient presents with     RECHECK     Cervical dystonia- Botox       /76  Pulse 95  Ht 1.676 m (5' 6\")  Wt 108.4 kg (239 lb)  BMI 38.58 kg/m2      Current Outpatient Prescriptions:      ONABOTULINUMTOXINA IJ, Inject 250 Units as directed once LOT # /C3 EXPIRATION: 03/2020, Disp: , Rfl:      OnabotulinumtoxinA (BOTOX IJ), Inject 250 Units as directed once Lot#: /C3 Exp: 12/2019, Disp: , Rfl:      OnabotulinumtoxinA (BOTOX IJ), Inject 250 Units into the muscle once Lot# /C3, Exp 09/2019, Disp: , Rfl:      OnabotulinumtoxinA (BOTOX IJ), Inject 225 Units as directed once Lot# /C3 Exp: 08/2019, Disp: , Rfl:      OnabotulinumtoxinA (BOTOX IJ), Inject 225 Units into the muscle Lot # /C3  Exp: 6/2019, Disp: , Rfl:      gabapentin (NEURONTIN) 100 MG capsule, Take 1 capsule (100 mg) by mouth At Bedtime (Patient taking differently: Take 300 mg by mouth 3 times daily ), Disp: 90 capsule, Rfl: 1     OnabotulinumtoxinA (BOTOX IJ), Inject 225 Units into the muscle once Lot# /C3, Exp 03/2019, Disp: , Rfl:      OnabotulinumtoxinA (BOTOX IJ), Inject 225 Units as directed Lot #:  C3 Exp: 12/2018, Disp: , Rfl:      OnabotulinumtoxinA (BOTOX IJ), Inject 225 Units into the muscle Lot # /C3  Exp: 8/2018, Disp: , Rfl:      OnabotulinumtoxinA (BOTOX IJ), Inject 225 Units into the muscle, Disp: , Rfl:      OnabotulinumtoxinA (BOTOX IJ), Inject 75 Units into the muscle Lot # /C3  Exp: 1/2018, Disp: , Rfl:      OnabotulinumtoxinA (BOTOX IJ), Inject 200 Units into the muscle Lot # /C3  Exp: 1/2018, Disp: , Rfl:      OnabotulinumtoxinA (BOTOX IJ), Inject 200 Units into the muscle Lot#/C3, Exp 09/2017, Disp: , Rfl:      OnabotulinumtoxinA (BOTOX IJ), Inject 200 Units into the muscle Lot # /C3  Exp: 3/2017, Disp: , Rfl:      OnabotulinumtoxinA (BOTOX IJ), Inject 200 Units into the muscle Lot# /C3, Exp 01/2017, Disp: , Rfl: "      OLANZapine (ZYPREXA PO), Take 15 mg by mouth, Disp: , Rfl:      DULoxetine HCl (CYMBALTA PO), Take 60 mg by mouth, Disp: , Rfl:      OnabotulinumtoxinA (BOTOX IJ), Inject 200 Units into the muscle Lot # /C3  Exp: 10/2016, Disp: , Rfl:      OnabotulinumtoxinA (BOTOX IJ), Inject 200 Units into the muscle, Disp: , Rfl:      solifenacin (VESICARE) 10 MG tablet, Take 1 tablet by mouth daily., Disp: , Rfl:      Desvenlafaxine Succinate (PRISTIQ) 100 MG TB24 24 hr tablet, Take 1 tablet by mouth daily., Disp: , Rfl:      ClonAZEPAM (KLONOPIN) 1 MG tablet, Take 1 mg by mouth 2 times daily as needed., Disp: , Rfl:      topiramate (TOPAMAX) 100 MG tablet, Take 0.5 tablets by mouth 2 times daily., Disp: , Rfl:      PROGESTERONE 200 MG CAPS COMPOUND (PROGESTERONE 200 MG CAPS COMPOUND), Take 4 capsules by mouth daily., Disp: , Rfl:      Ibuprofen (ADVIL) 200 MG capsule, Take 200 mg by mouth every 4 hours as needed., Disp: , Rfl:      aspirin-acetaminophen-caffeine (EXCEDRIN MIGRAINE) 250-250-65 MG per tablet, Take 1 tablet by mouth every 6 hours as needed., Disp: , Rfl:      EPINEPHrine (EPIPEN) 0.3 MG/0.3ML injection, Inject 0.3 mg into the muscle once as needed., Disp: , Rfl:      Allergies   Allergen Reactions     Ivp Dye [Contrast Dye] Anaphylaxis     Lisinopril-Hctz Anaphylaxis     Maple Tree Anaphylaxis     Allergy includes maple syrup.     Gluten Itching and Swelling     Codeine Sulfate Cramps and GI Disturbance     Erythromycin Nausea and Vomiting and Cramps        PHYSICAL EXAM:  NECK AND TRUNK PATTERN:   Head Tremor: Observed only slightly at right, increases with left rotation   Left Side-bending: Not Present  Left Shoulder Elevation: slightly elevated  Head on Body Lateral Shear: To the right - slight    HPI:    Patient denies new medical diagnoses, illnesses, hospitalizations, emergency room visits, and injuries since the previous injection with botulinum neurotoxin.    We reviewed the recommended safety  guidelines for  Botox from any vaccine injection, such as the seasonal flu vaccine, by a minimum of 10-14 days with Roxanna Celis. She acknowledged understanding.    RESPONSE TO PREVIOUS TREATMENT:    Roxanna Celis received 250 units of Botox on 2017.    Problems following the previous series of neurotoxin injections included:  No problems reported    BENEFITS BY PATIENT REPORT:    Pain Improvement: Yes.  Percent Improvement: 95%    Duration of Benefit:  9-10 weeks, followed by a gradual reduction in benefit.     Dystonia Improvement: Yes.  Percent Improvement: 95%    Duration of Benefit:  9-10 weeks followed by a gradual reduction in benefit.       BOTULINUM NEUROTOXIN INJECTION PROCEDURES:    VERIFICATION OF PATIENT IDENTIFICATION AND PROCEDURE     Initials   Patient Name ses   Patient  ses   Procedure Verified by: ses     Prior to the start of the procedure and with procedural staff participation, I verbally confirmed the patient s identity using two indicators, relevant allergies, that the procedure was appropriate and matched the consent or emergent situation, and that the correct equipment/implants were available. Immediately prior to starting the procedure I conducted the Time Out with the procedural staff and re-confirmed the patient s name, procedure, and site/side. (The Joint Commission universal protocol was followed.)  Yes    Sedation (Moderate or Deep): None      Above assessments performed by:  America Bradley MD      INDICATION/S FOR PROCEDURE/S:  Roxanna Celis is a 46-year old patient with dystonia affecting the  head, neck and shoulder girdle musculature secondary to a diagnosis of cervical dystonia with associated  pain, tremor, spasms, loss of joint motion, loss of volitional motor control and difficulty with activities of daily living.     Her baseline symptoms have been recalcitrant to oral medications and conservative therapy.  She is here today for  an injection of Botox.      GOAL OF PROCEDURE:  The goal of this procedure is to increase active range of motion, improve volitional motor control, decrease pain  and enhance functional independence associated with dystonic movements.    TOTAL DOSE ADMINISTERED:  Dose Administered:  250 units Botox    Diluent Used: Preservative Free Normal Saline  Total Volume of Diluent Used:  2.5 ml  Lot # /C3 with Expiration Date:  05/2020  NDC #: Botox 100u ([address]-05)    Medication guide was offered to patient and was declined.    CONSENT:   The risks, benefits, and treatment options were discussed with Roxanna Celis and she agreed to proceed.      Written consent was obtained by [facility].     EQUIPMENT USED:  Needle-37mm stimulating/recording  EMG/NCS Machine    SKIN PREPARATION:  Skin preparation was performed using an alcohol wipe.    GUIDANCE DESCRIPTION:  Electro-myographic guidance was necessary throughout the procedure to accurately identify all areas of dystonic muscles while avoiding injection of non-dystonic muscles, neighboring nerves and nearby vascular structures.     AREA/MUSCLE INJECTED:   250 units Botox.    1. HEAD & NECK MUSCLES: 250 units Botox = Total Dose, 1:1 Dilution for 150 units Botox and 2:1 dilution for 100 units Botox to occipitalis   Right Rectus Capitis (upper cervical) - 10 units of Botox at 1 site/s.   Left Rectus Capitis (upper cervical) - 10 units of Botox at 1 site/s.    Right Mid-Trapezius (mid cervical) - 5 units of Botox at 1 site/s.   Left Mid-Trapezius (mid cervical) - 5 units of Botox at 1 site/s.    Right Semispinalis - 10 units of Botox at 1 site/s.   Left Semispinalis - 10 units of Botox at 1 site/s.    Right Splenius Cervicis/Capitus - 10 units of Botox at 1 site/s.   Left Splenius Cervicis/Capitus - 10 units of Botox at 1 site/s.    Right Levator Scapulae - 15 units of Botox at 1 site/s.   Left Levator Scapulae - 15 units of Botox at 1 site/s.    Right Longissimus - 10 units of  Botox at 1 site/s.   Left Longissimus - 10 units of Botox at 1 site/s.    Right Inferior Oblique - 15 units of Botox at 1 site/s.  Left Inferior Oblique - 15 units of Botox at 1 site/s.    Right occipitalis - 50 units of Botox at 5 site/s (2:1 dilution).    Left occipitalis - 50 units of Botox at 5 site/s (2:1 dilution).       RESPONSE TO PROCEDURE:  Roxanna Celis tolerated the procedure well and there were no immediate complications.  She was allowed to recover for an appropriate period of time and was discharged home in stable condition.    FOLLOW UP:  Roxanna Celis was asked to follow up by phone in 7-14 days with Tiffany Leazma PT, Care Coordinator or Josefina King RN, Care Coordinator, to report her response to this series of injections.  Based on the patient's previous response to this therapy, Roxanna Celis was rescheduled for the next series of injections in 9-10 weeks.    PLAN (Medication Changes, Therapy Orders, Work or Disability Issues, etc.): Will monitor response to today's injections.

## 2017-10-23 NOTE — LETTER
"10/23/2017       RE: Roxanna Celis  [address]   [facility] [place] [id number]     Dear Colleague,    Thank you for referring your patient, Roxanna Celis, to the [facility] PHYS[facility]INE AND REHABILITATION at [facility]. Please see a copy of my visit note below.    BOTULINUM TOXIN PROCEDURE NOTE    Chief Complaint   Patient presents with     RECHECK     Cervical dystonia- Botox       /76  Pulse 95  Ht 1.676 m (5' 6\")  Wt 108.4 kg (239 lb)  BMI 38.58 kg/m2      Current Outpatient Prescriptions:      ONABOTULINUMTOXINA IJ, Inject 250 Units as directed once LOT # /C3 EXPIRATION: 03/2020, Disp: , Rfl:      OnabotulinumtoxinA (BOTOX IJ), Inject 250 Units as directed once Lot#: /C3 Exp: 12/2019, Disp: , Rfl:      OnabotulinumtoxinA (BOTOX IJ), Inject 250 Units into the muscle once Lot# /C3, Exp 09/2019, Disp: , Rfl:      OnabotulinumtoxinA (BOTOX IJ), Inject 225 Units as directed once Lot# /C3 Exp: 08/2019, Disp: , Rfl:      OnabotulinumtoxinA (BOTOX IJ), Inject 225 Units into the muscle Lot # /C3  Exp: 6/2019, Disp: , Rfl:      gabapentin (NEURONTIN) 100 MG capsule, Take 1 capsule (100 mg) by mouth At Bedtime (Patient taking differently: Take 300 mg by mouth 3 times daily ), Disp: 90 capsule, Rfl: 1     OnabotulinumtoxinA (BOTOX IJ), Inject 225 Units into the muscle once Lot# /C3, Exp 03/2019, Disp: , Rfl:      OnabotulinumtoxinA (BOTOX IJ), Inject 225 Units as directed Lot #:  C3 Exp: 12/2018, Disp: , Rfl:      OnabotulinumtoxinA (BOTOX IJ), Inject 225 Units into the muscle Lot # /C3  Exp: 8/2018, Disp: , Rfl:      OnabotulinumtoxinA (BOTOX IJ), Inject 225 Units into the muscle, Disp: , Rfl:      OnabotulinumtoxinA (BOTOX IJ), Inject 75 Units into the muscle Lot # /C3  Exp: 1/2018, Disp: , Rfl:      OnabotulinumtoxinA (BOTOX IJ), Inject 200 Units into the muscle Lot # /C3  Exp: 1/2018, Disp: , Rfl:      OnabotulinumtoxinA (BOTOX " IJ), Inject 200 Units into the muscle Lot#/C3, Exp 09/2017, Disp: , Rfl:      OnabotulinumtoxinA (BOTOX IJ), Inject 200 Units into the muscle Lot # /C3  Exp: 3/2017, Disp: , Rfl:      OnabotulinumtoxinA (BOTOX IJ), Inject 200 Units into the muscle Lot# /C3, Exp 01/2017, Disp: , Rfl:      OLANZapine (ZYPREXA PO), Take 15 mg by mouth, Disp: , Rfl:      DULoxetine HCl (CYMBALTA PO), Take 60 mg by mouth, Disp: , Rfl:      OnabotulinumtoxinA (BOTOX IJ), Inject 200 Units into the muscle Lot # /C3  Exp: 10/2016, Disp: , Rfl:      OnabotulinumtoxinA (BOTOX IJ), Inject 200 Units into the muscle, Disp: , Rfl:      solifenacin (VESICARE) 10 MG tablet, Take 1 tablet by mouth daily., Disp: , Rfl:      Desvenlafaxine Succinate (PRISTIQ) 100 MG TB24 24 hr tablet, Take 1 tablet by mouth daily., Disp: , Rfl:      ClonAZEPAM (KLONOPIN) 1 MG tablet, Take 1 mg by mouth 2 times daily as needed., Disp: , Rfl:      topiramate (TOPAMAX) 100 MG tablet, Take 0.5 tablets by mouth 2 times daily., Disp: , Rfl:      PROGESTERONE 200 MG CAPS COMPOUND (PROGESTERONE 200 MG CAPS COMPOUND), Take 4 capsules by mouth daily., Disp: , Rfl:      Ibuprofen (ADVIL) 200 MG capsule, Take 200 mg by mouth every 4 hours as needed., Disp: , Rfl:      aspirin-acetaminophen-caffeine (EXCEDRIN MIGRAINE) 250-250-65 MG per tablet, Take 1 tablet by mouth every 6 hours as needed., Disp: , Rfl:      EPINEPHrine (EPIPEN) 0.3 MG/0.3ML injection, Inject 0.3 mg into the muscle once as needed., Disp: , Rfl:      Allergies   Allergen Reactions     Ivp Dye [Contrast Dye] Anaphylaxis     Lisinopril-Hctz Anaphylaxis     Maple Tree Anaphylaxis     Allergy includes maple syrup.     Gluten Itching and Swelling     Codeine Sulfate Cramps and GI Disturbance     Erythromycin Nausea and Vomiting and Cramps        PHYSICAL EXAM:  NECK AND TRUNK PATTERN:   Head Tremor: Observed only slightly at right, increases with left rotation   Left Side-bending: Not Present  Left  Shoulder Elevation: slightly elevated  Head on Body Lateral Shear: To the right - slight    HPI:    Patient denies new medical diagnoses, illnesses, hospitalizations, emergency room visits, and injuries since the previous injection with botulinum neurotoxin.    We reviewed the recommended safety guidelines for  Botox from any vaccine injection, such as the seasonal flu vaccine, by a minimum of 10-14 days with Roxanna Celis. She acknowledged understanding.    RESPONSE TO PREVIOUS TREATMENT:    Roxanna Celis received 250 units of Botox on 2017.    Problems following the previous series of neurotoxin injections included:  No problems reported    BENEFITS BY PATIENT REPORT:    Pain Improvement: Yes.  Percent Improvement: 95%    Duration of Benefit:  9-10 weeks, followed by a gradual reduction in benefit.     Dystonia Improvement: Yes.  Percent Improvement: 95%    Duration of Benefit:  9-10 weeks followed by a gradual reduction in benefit.       BOTULINUM NEUROTOXIN INJECTION PROCEDURES:    VERIFICATION OF PATIENT IDENTIFICATION AND PROCEDURE     Initials   Patient Name ses   Patient  ses   Procedure Verified by: ses     Prior to the start of the procedure and with procedural staff participation, I verbally confirmed the patient s identity using two indicators, relevant allergies, that the procedure was appropriate and matched the consent or emergent situation, and that the correct equipment/implants were available. Immediately prior to starting the procedure I conducted the Time Out with the procedural staff and re-confirmed the patient s name, procedure, and site/side. (The Joint Commission universal protocol was followed.)  Yes    Sedation (Moderate or Deep): None      Above assessments performed by:  America Bradley MD      INDICATION/S FOR PROCEDURE/S:  Roxanna Celis is a 46-year old patient with dystonia affecting the  head, neck and shoulder girdle musculature secondary  to a diagnosis of cervical dystonia with associated  pain, tremor, spasms, loss of joint motion, loss of volitional motor control and difficulty with activities of daily living.     Her baseline symptoms have been recalcitrant to oral medications and conservative therapy.  She is here today for an injection of Botox.      GOAL OF PROCEDURE:  The goal of this procedure is to increase active range of motion, improve volitional motor control, decrease pain  and enhance functional independence associated with dystonic movements.    TOTAL DOSE ADMINISTERED:  Dose Administered:  250 units Botox    Diluent Used: Preservative Free Normal Saline  Total Volume of Diluent Used:  2.5 ml  Lot # /C3 with Expiration Date:  05/2020  NDC #: Botox 100u ([address]-39)    Medication guide was offered to patient and was declined.    CONSENT:   The risks, benefits, and treatment options were discussed with Roxanna KEE Lalita and she agreed to proceed.      Written consent was obtained by [facility].     EQUIPMENT USED:  Needle-37mm stimulating/recording  EMG/NCS Machine    SKIN PREPARATION:  Skin preparation was performed using an alcohol wipe.    GUIDANCE DESCRIPTION:  Electro-myographic guidance was necessary throughout the procedure to accurately identify all areas of dystonic muscles while avoiding injection of non-dystonic muscles, neighboring nerves and nearby vascular structures.     AREA/MUSCLE INJECTED:   250 units Botox.    1. HEAD & NECK MUSCLES: 250 units Botox = Total Dose, 1:1 Dilution for 150 units Botox and 2:1 dilution for 100 units Botox to occipitalis   Right Rectus Capitis (upper cervical) - 10 units of Botox at 1 site/s.   Left Rectus Capitis (upper cervical) - 10 units of Botox at 1 site/s.    Right Mid-Trapezius (mid cervical) - 5 units of Botox at 1 site/s.   Left Mid-Trapezius (mid cervical) - 5 units of Botox at 1 site/s.    Right Semispinalis - 10 units of Botox at 1 site/s.   Left Semispinalis - 10 units of Botox at  1 site/s.    Right Splenius Cervicis/Capitus - 10 units of Botox at 1 site/s.   Left Splenius Cervicis/Capitus - 10 units of Botox at 1 site/s.    Right Levator Scapulae - 15 units of Botox at 1 site/s.   Left Levator Scapulae - 15 units of Botox at 1 site/s.    Right Longissimus - 10 units of Botox at 1 site/s.   Left Longissimus - 10 units of Botox at 1 site/s.    Right Inferior Oblique - 15 units of Botox at 1 site/s.  Left Inferior Oblique - 15 units of Botox at 1 site/s.    Right occipitalis - 50 units of Botox at 5 site/s (2:1 dilution).    Left occipitalis - 50 units of Botox at 5 site/s (2:1 dilution).       RESPONSE TO PROCEDURE:  Roxanna Celis tolerated the procedure well and there were no immediate complications.  She was allowed to recover for an appropriate period of time and was discharged home in stable condition.    FOLLOW UP:  Roxanna Celis was asked to follow up by phone in 7-14 days with Tiffany Lezama PT, Care Coordinator or Josefina King RN, Care Coordinator, to report her response to this series of injections.  Based on the patient's previous response to this therapy, Roxanna eClis was rescheduled for the next series of injections in 9-10 weeks.    PLAN (Medication Changes, Therapy Orders, Work or Disability Issues, etc.): Will monitor response to today's injections.    Again, thank you for allowing me to participate in the care of your patient.      Sincerely,    Haja Bradley MD

## 2018-05-01 ENCOUNTER — OFFICE VISIT (OUTPATIENT)
Dept: PHYSICAL MEDICINE AND REHAB | Facility: CLINIC | Age: 48
End: 2018-05-01
Payer: COMMERCIAL

## 2018-05-01 VITALS
HEART RATE: 100 BPM | RESPIRATION RATE: 24 BRPM | SYSTOLIC BLOOD PRESSURE: 148 MMHG | WEIGHT: 239.3 LBS | DIASTOLIC BLOOD PRESSURE: 89 MMHG | OXYGEN SATURATION: 96 % | BODY MASS INDEX: 38.46 KG/M2 | HEIGHT: 66 IN | TEMPERATURE: 98.1 F

## 2018-05-01 DIAGNOSIS — G24.3 SPASMODIC TORTICOLLIS: Primary | ICD-10-CM

## 2018-05-01 DIAGNOSIS — G24.1 DYSTONIA, TORSION, FRAGMENTS OF: ICD-10-CM

## 2018-05-01 PROBLEM — G47.30 SLEEP APNEA: Status: ACTIVE | Noted: 2018-05-01

## 2018-05-01 RX ORDER — SPIRONOLACTONE 100 MG/1
100 TABLET, FILM COATED ORAL DAILY
COMMUNITY
Start: 2018-04-04 | End: 2021-02-16

## 2018-05-01 RX ORDER — HYDROCHLOROTHIAZIDE 50 MG/1
50 TABLET ORAL DAILY
COMMUNITY
Start: 2018-04-24 | End: 2022-04-26

## 2018-05-01 RX ORDER — ONABOTULINUMTOXINA 100 [USP'U]/1
250 INJECTION, POWDER, LYOPHILIZED, FOR SOLUTION INTRADERMAL; INTRAMUSCULAR ONCE
COMMUNITY
Start: 2018-05-01

## 2018-05-01 ASSESSMENT — PAIN SCALES - GENERAL: PAINLEVEL: MODERATE PAIN (5)

## 2018-05-01 NOTE — NURSING NOTE
Chief Complaint   Patient presents with     RECHECK     [facility]- CERVICAL DYSTONIA-BOTOX     Josh Oshea, CMA

## 2018-05-01 NOTE — LETTER
"5/1/2018       RE: Roxanna Celis  [address] [place] [address]     Dear Colleague,    Thank you for referring your patient, Roxanna Celis, to the [facility] PHYSICAL MEDICINE AND REHABILITATION at [facility]. Please see a copy of my visit note below.    BOTULINUM TOXIN PROCEDURE NOTE    Chief Complaint   Patient presents with     RECHECK     UMP- CERVICAL DYSTONIA-BOTOX     /89  Pulse 100  Temp 98.1  F (36.7  C)  Resp 24  Ht 1.676 m (5' 6\")  Wt 108.5 kg (239 lb 4.8 oz)  SpO2 96%  Breastfeeding? No  BMI 38.62 kg/m2    Current Outpatient Prescriptions:      aspirin-acetaminophen-caffeine (EXCEDRIN MIGRAINE) 250-250-65 MG per tablet, Take 1 tablet by mouth every 6 hours as needed., Disp: , Rfl:      ClonAZEPAM (KLONOPIN) 1 MG tablet, Take 1 mg by mouth 2 times daily as needed., Disp: , Rfl:      Desvenlafaxine Succinate (PRISTIQ) 100 MG TB24 24 hr tablet, Take 1 tablet by mouth daily., Disp: , Rfl:      DULoxetine HCl (CYMBALTA PO), Take 60 mg by mouth, Disp: , Rfl:      EPINEPHrine (EPIPEN) 0.3 MG/0.3ML injection, Inject 0.3 mg into the muscle once as needed., Disp: , Rfl:      gabapentin (NEURONTIN) 100 MG capsule, Take 1 capsule (100 mg) by mouth At Bedtime (Patient taking differently: Take 300 mg by mouth 3 times daily ), Disp: 90 capsule, Rfl: 1     hydrochlorothiazide (HYDRODIURIL) 50 MG tablet, Take 50 mg by mouth daily, Disp: , Rfl:      Ibuprofen (ADVIL) 200 MG capsule, Take 200 mg by mouth every 4 hours as needed., Disp: , Rfl:      OLANZapine (ZYPREXA PO), Take 15 mg by mouth, Disp: , Rfl:      ONABOTULINUMTOXINA IJ, Inject 250 Units as directed once LOT # /C3 EXPIRATION: 03/2020, Disp: , Rfl:      PROGESTERONE 200 MG CAPS COMPOUND (PROGESTERONE 200 MG CAPS COMPOUND), Take 4 capsules by mouth daily., Disp: , Rfl:      solifenacin (VESICARE) 10 MG tablet, Take 1 tablet by mouth daily., Disp: , Rfl:      spironolactone (ALDACTONE) 100 MG tablet, " Take 100 mg by mouth daily, Disp: , Rfl:      topiramate (TOPAMAX) 100 MG tablet, Take 0.5 tablets by mouth 2 times daily., Disp: , Rfl:      [DISCONTINUED] OnabotulinumtoxinA (BOTOX IJ), Inject 200 Units into the muscle, Disp: , Rfl:      [DISCONTINUED] OnabotulinumtoxinA (BOTOX IJ), Inject 200 Units into the muscle Lot # /C3  Exp: 10/2016, Disp: , Rfl:      [DISCONTINUED] OnabotulinumtoxinA (BOTOX IJ), Inject 200 Units into the muscle Lot# /C3, Exp 01/2017, Disp: , Rfl:      [DISCONTINUED] OnabotulinumtoxinA (BOTOX IJ), Inject 200 Units into the muscle Lot # /C3  Exp: 3/2017, Disp: , Rfl:      [DISCONTINUED] OnabotulinumtoxinA (BOTOX IJ), Inject 200 Units into the muscle Lot#/C3, Exp 09/2017, Disp: , Rfl:      [DISCONTINUED] OnabotulinumtoxinA (BOTOX IJ), Inject 200 Units into the muscle Lot # /C3  Exp: 1/2018, Disp: , Rfl:      [DISCONTINUED] OnabotulinumtoxinA (BOTOX IJ), Inject 75 Units into the muscle Lot # /C3  Exp: 1/2018, Disp: , Rfl:      [DISCONTINUED] OnabotulinumtoxinA (BOTOX IJ), Inject 225 Units into the muscle, Disp: , Rfl:      [DISCONTINUED] OnabotulinumtoxinA (BOTOX IJ), Inject 225 Units into the muscle Lot # /C3  Exp: 8/2018, Disp: , Rfl:      [DISCONTINUED] OnabotulinumtoxinA (BOTOX IJ), Inject 225 Units as directed Lot #:  C3 Exp: 12/2018, Disp: , Rfl:      [DISCONTINUED] OnabotulinumtoxinA (BOTOX IJ), Inject 225 Units into the muscle once Lot# /C3, Exp 03/2019, Disp: , Rfl:      [DISCONTINUED] OnabotulinumtoxinA (BOTOX IJ), Inject 225 Units into the muscle Lot # /C3  Exp: 6/2019, Disp: , Rfl:      [DISCONTINUED] OnabotulinumtoxinA (BOTOX IJ), Inject 225 Units as directed once Lot# /C3 Exp: 08/2019, Disp: , Rfl:      [DISCONTINUED] OnabotulinumtoxinA (BOTOX IJ), Inject 250 Units into the muscle once Lot# /C3, Exp 09/2019, Disp: , Rfl:      [DISCONTINUED] OnabotulinumtoxinA (BOTOX IJ), Inject 250 Units as directed once Lot#: /C3  Exp: 2019, Disp: , Rfl:      [DISCONTINUED] OnabotulinumtoxinA (BOTOX IJ), Inject 250 Units into the muscle once Lot /C3 Exp 2020, Disp: , Rfl:      Allergies   Allergen Reactions     Ivp Dye [Contrast Dye] Anaphylaxis     Lisinopril-Hctz Anaphylaxis     Maple Tree Anaphylaxis     Allergy includes maple syrup.     Gluten Itching and Swelling     Codeine Sulfate Cramps and GI Disturbance     Erythromycin Nausea and Vomiting and Cramps        PHYSICAL EXAM:    NECK AND TRUNK PATTERN:   Head Tremor: Observed only slightly at right, increases with right rotation   Left Side-bending: Not Present  Left Shoulder Elevation: slightly elevated    HPI:    Patient denies new medical diagnoses, illnesses, hospitalizations, emergency room visits, and injuries since the previous injection with botulinum neurotoxin.    We reviewed the recommended safety guidelines for  Botox from any vaccine injection, such as the seasonal flu vaccine, by a minimum of 10-14 days with Roxanna Celis. She acknowledged understanding.    RESPONSE TO PREVIOUS TREATMENT:    Roxanna Celis received 250 units of Botox on 10/23/2017.    Problems following the previous series of neurotoxin injections included:  No problems reported    BENEFITS BY PATIENT REPORT:    Pain Improvement: Yes.  Percent Improvement: 90 %    Duration of Benefit:  6months. Has been gradually wearing off in the last 1-1.5 months.     Dystonia Improvement: Yes.  Percent Improvement: 90 %    Duration of Benefit:  6months. Has been gradually wearing off in the last 1-1.5 months.       BOTULINUM NEUROTOXIN INJECTION PROCEDURES:    VERIFICATION OF PATIENT IDENTIFICATION AND PROCEDURE     Initials   Patient Name SK   Patient  SK   Procedure Verified by: SK     Prior to the start of the procedure and with procedural staff participation, I verbally confirmed the patient s identity using two indicators, relevant allergies, that the procedure was appropriate and matched  the consent or emergent situation, and that the correct equipment/implants were available. Immediately prior to starting the procedure I conducted the Time Out with the procedural staff and re-confirmed the patient s name, procedure, and site/side. (The Joint Commission universal protocol was followed.)  Yes    Sedation (Moderate or Deep): None      Above assessments performed by:  Resident/Fellow         Yessenia Zuluaga MD          The attending provider was present for the entire procedure documented below.      Haja Bradley MD      INDICATION/S FOR PROCEDURE/S:  Roxanna Celis is a 47 year old patient with dystonia affecting the  head, neck and shoulder girdle musculature secondary to a diagnosis of cervical dystonia with associated  pain, tremor, spasms, loss of joint motion, loss of volitional motor control and difficulty with activities of daily living.     Her baseline symptoms have been recalcitrant to oral medications and conservative therapy.  She is here today for an injection of Botox.      GOAL OF PROCEDURE:  The goal of this procedure is to increase active range of motion, improve volitional motor control, decrease pain  and enhance functional independence associated with dystonic movements.    TOTAL DOSE ADMINISTERED:  Dose Administered:  250 units Botox    Diluent Used:  Preservative Free Normal Saline  Total Volume of Diluent Used: 3.5 ml  Lot # /C3 with Expiration Date:  11/2020  NDC #: Botox 100u ([address]-28)    Medication guide was offered to patient and was declined.    CONSENT:  The risks, benefits, and treatment options were discussed with Roxanna Celis and she agreed to proceed.      Written consent was obtained by SS.     EQUIPMENT USED:  Needle-37mm stimulating/recording  EMG/NCS Machine    SKIN PREPARATION:  Skin preparation was performed using an alcohol wipe.    GUIDANCE DESCRIPTION:  Electro-myographic guidance was necessary throughout the procedure to accurately identify all  areas of dystonic muscles while avoiding injection of non-dystonic muscles, neighboring nerves and nearby vascular structures.     AREA/MUSCLE INJECTED:   1. HEAD & NECK MUSCLES: 250 units Botox = Total Dose, 1:1 Dilution for 150 units Botox and 2:1 dilution for 100 units Botox to occipitalis   Right Rectus Capitis (upper cervical) - 10 units of Botox at 1 site/s.  (1:1 dilution)  Left Rectus Capitis (upper cervical) - 10 units of Botox at 1 site/s. (1:1 dilution)     Right Mid-Trapezius (mid cervical) - 5 units of Botox at 1 site/s. (1:1 dilution)  Left Mid-Trapezius (mid cervical) - 5 units of Botox at 1 site/s.(1:1 dilution)     Right Semispinalis - 10 units of Botox at 1 site/s. (1:1 dilution)  Left Semispinalis - 10 units of Botox at 1 site/s.(1:1 dilution)     Right Splenius Cervicis/Capitus - 10 units of Botox at 1 site/s. (1:1 dilution)  Left Splenius Cervicis/Capitus - 10 units of Botox at 1 site/s.(1:1 dilution)     Right Levator Scapulae - 15 units of Botox at 1 site/s. (1:1 dilution)  Left Levator Scapulae - 15 units of Botox at 1 site/s.(1:1 dilution)     Right Longissimus - 10 units of Botox at 1 site/s. (1:1 dilution)  Left Longissimus - 10 units of Botox at 1 site/s.(1:1 dilution)     Right Inferior Oblique - 15 units of Botox at 1 site/s.(1:1 dilution)  Left Inferior Oblique - 15 units of Botox at 1 site/s.(1:1 dilution)     Right occipitalis - 50 units of Botox at 5 site/s (2:1 dilution).               Left occipitalis - 50 units of Botox at 5 site/s (2:1 dilution).     RESPONSE TO PROCEDURE:  Roxanna Celis tolerated the procedure well and there were no immediate complications.  She was allowed to recover for an appropriate period of time and was discharged home in stable condition.    FOLLOW UP:  Roxanna Celis was asked to follow up by phone in 7-14 days with Tiffany Lezama PT, Care Coordinator or Josefina King RN, Care Coordinator, to report her response to this series of injections.   Based on the patient's previous response to this therapy,    -Roxanna KEE Lalita plans to call to reschedule when the effect of her Botox starts to wear off. Reschedule PRN.     PLAN (Medication Changes, Therapy Orders, Work or Disability Issues, etc.): Monitor response to today's injections.      Again, thank you for allowing me to participate in the care of your patient.      Sincerely,    Haja Bradley MD

## 2018-05-01 NOTE — MR AVS SNAPSHOT
"              After Visit Summary   2018    Roxanna Celis    MRN: [id number]           Patient Information     Date Of Birth          1970        Visit Information        Provider Department      2018 11:00 AM Haja Bradley MD [facility] Physical Medicine and Rehabilitation        Today's Diagnoses     Spasmodic torticollis    -  1       Follow-ups after your visit        Follow-up notes from your care team     Return after Botox effect starts to wear off.      Who to contact     Please call your clinic at [phone] to:    Ask questions about your health    Make or cancel appointments    Discuss your medicines    Learn about your test results    Speak to your doctor            Additional Information About Your Visit        MyChart Information     [employer] is an electronic gateway that provides easy, online access to your medical records. With [employer], you can request a clinic appointment, read your test results, renew a prescription or communicate with your care team.     To sign up for [employer] visit the website at [email or web address]   You will be asked to enter the access code listed below, as well as some personal information. Please follow the directions to create your username and password.     Your access code is: [email or web address]  Expires: 2018  6:30 AM     Your access code will  in 90 days. If you need help or a new code, please contact your [facility] or call [phone] for assistance.        Care EveryWhere ID     This is your Care EveryWhere ID. This could be used by other organizations to access your [place] medical records  [id number]        Your Vitals Were     Pulse Temperature Respirations Height Pulse Oximetry Breastfeeding?    100 98.1  F (36.7  C) 24 1.676 m (5' 6\") 96% No    BMI (Body Mass Index)                   38.62 kg/m2            Blood Pressure from Last 3 Encounters:   18 148/89   10/23/17 156/76   17 " 135/85    Weight from Last 3 Encounters:   05/01/18 108.5 kg (239 lb 4.8 oz)   10/23/17 108.4 kg (239 lb)   08/16/17 107 kg (236 lb)              Today, you had the following     No orders found for display         Today's Medication Changes          These changes are accurate as of 5/1/18 11:07 AM.  If you have any questions, ask your nurse or doctor.               These medicines have changed or have updated prescriptions.        Dose/Directions    gabapentin 100 MG capsule   Commonly known as:  NEURONTIN   This may have changed:    - how much to take  - when to take this   Used for:  Spasmodic torticollis, Neck pain        Dose:  100 mg   Take 1 capsule (100 mg) by mouth At Bedtime   Quantity:  90 capsule   Refills:  1                Primary Care Provider Office Phone # Fax #    [place] HILLMAN Vignesh [phone]       [facility] [address] [address]        Equal Access to Services     [facility] : Hadii hillary woodwardo Sonanette, waaxda luqadaha, qaybta kaalmada adeegyarimma, gopal reynoso . So [facility] [phone].    ATENCIÓN: Si scottla español, tiene a hawthorne disposición servicios gratuitos de asistencia lingüística. Llame al [phone].    We comply with applicable federal civil rights laws and [place] laws. We do not discriminate on the basis of race, color, national origin, age, disability, sex, sexual orientation, or gender identity.            Thank you!     Thank you for choosing [facility] PHYSICAL MEDICINE AND REHABILITATION  for your care. Our goal is always to provide you with excellent care. Hearing back from our patients is one way we can continue to improve our services. Please take a few minutes to complete the written survey that you may receive in the mail after your visit with us. Thank you!             Your Updated Medication List - Protect others around you: Learn how to safely use, store and throw away your medicines at  www.disposemymeds.org.          This list is accurate as of 5/1/18 11:07 AM.  Always use your most recent med list.                   Brand Name Dispense Instructions for use Diagnosis    ADVIL 200 MG capsule   Generic drug:  ibuprofen      Take 200 mg by mouth every 4 hours as needed.        CYMBALTA PO      Take 60 mg by mouth        EPIPEN 0.3 MG/0.3ML injection   Generic drug:  EPINEPHrine      Inject 0.3 mg into the muscle once as needed.        EXCEDRIN MIGRAINE 250-250-65 MG per tablet   Generic drug:  aspirin-acetaminophen-caffeine      Take 1 tablet by mouth every 6 hours as needed.        gabapentin 100 MG capsule    NEURONTIN    90 capsule    Take 1 capsule (100 mg) by mouth At Bedtime    Spasmodic torticollis, Neck pain       hydrochlorothiazide 50 MG tablet    HYDRODIURIL     Take 50 mg by mouth daily        klonoPIN 1 MG tablet   Generic drug:  clonazePAM      Take 1 mg by mouth 2 times daily as needed.        * ONABOTULINUMTOXINA IJ      Inject 250 Units as directed once LOT # /C3 EXPIRATION: 03/2020        * BOTOX 100 units injection   Generic drug:  botulinum toxin type A      Inject 250 Units into the muscle once Lot # /C3 with Expiration Date:  11/2020        PRISTIQ 100 MG 24 hr tablet   Generic drug:  desvenlafaxine succinate      Take 1 tablet by mouth daily.        PROGESTERONE 200 MG CAPS COMPOUND    PROGESTERONE 200 MG CAPS COMPOUND     Take 4 capsules by mouth daily.        spironolactone 100 MG tablet    ALDACTONE     Take 100 mg by mouth daily        TOPAMAX 100 MG tablet   Generic drug:  topiramate      Take 0.5 tablets by mouth 2 times daily.        VESICARE 10 MG tablet   Generic drug:  solifenacin      Take 1 tablet by mouth daily.        ZYPREXA PO      Take 15 mg by mouth        * Notice:  This list has 2 medication(s) that are the same as other medications prescribed for you. Read the directions carefully, and ask your doctor or other care provider to review them with  you.

## 2018-05-01 NOTE — PROGRESS NOTES
"BOTULINUM TOXIN PROCEDURE NOTE    Chief Complaint   Patient presents with     RECHECK     UMP- CERVICAL DYSTONIA-BOTOX     /89  Pulse 100  Temp 98.1  F (36.7  C)  Resp 24  Ht 1.676 m (5' 6\")  Wt 108.5 kg (239 lb 4.8 oz)  SpO2 96%  Breastfeeding? No  BMI 38.62 kg/m2    Current Outpatient Prescriptions:      aspirin-acetaminophen-caffeine (EXCEDRIN MIGRAINE) 250-250-65 MG per tablet, Take 1 tablet by mouth every 6 hours as needed., Disp: , Rfl:      ClonAZEPAM (KLONOPIN) 1 MG tablet, Take 1 mg by mouth 2 times daily as needed., Disp: , Rfl:      Desvenlafaxine Succinate (PRISTIQ) 100 MG TB24 24 hr tablet, Take 1 tablet by mouth daily., Disp: , Rfl:      DULoxetine HCl (CYMBALTA PO), Take 60 mg by mouth, Disp: , Rfl:      EPINEPHrine (EPIPEN) 0.3 MG/0.3ML injection, Inject 0.3 mg into the muscle once as needed., Disp: , Rfl:      gabapentin (NEURONTIN) 100 MG capsule, Take 1 capsule (100 mg) by mouth At Bedtime (Patient taking differently: Take 300 mg by mouth 3 times daily ), Disp: 90 capsule, Rfl: 1     hydrochlorothiazide (HYDRODIURIL) 50 MG tablet, Take 50 mg by mouth daily, Disp: , Rfl:      Ibuprofen (ADVIL) 200 MG capsule, Take 200 mg by mouth every 4 hours as needed., Disp: , Rfl:      OLANZapine (ZYPREXA PO), Take 15 mg by mouth, Disp: , Rfl:      ONABOTULINUMTOXINA IJ, Inject 250 Units as directed once LOT # /C3 EXPIRATION: 03/2020, Disp: , Rfl:      PROGESTERONE 200 MG CAPS COMPOUND (PROGESTERONE 200 MG CAPS COMPOUND), Take 4 capsules by mouth daily., Disp: , Rfl:      solifenacin (VESICARE) 10 MG tablet, Take 1 tablet by mouth daily., Disp: , Rfl:      spironolactone (ALDACTONE) 100 MG tablet, Take 100 mg by mouth daily, Disp: , Rfl:      topiramate (TOPAMAX) 100 MG tablet, Take 0.5 tablets by mouth 2 times daily., Disp: , Rfl:      [DISCONTINUED] OnabotulinumtoxinA (BOTOX IJ), Inject 200 Units into the muscle, Disp: , Rfl:      [DISCONTINUED] OnabotulinumtoxinA (BOTOX IJ), Inject 200 " Units into the muscle Lot # /C3  Exp: 10/2016, Disp: , Rfl:      [DISCONTINUED] OnabotulinumtoxinA (BOTOX IJ), Inject 200 Units into the muscle Lot# /C3, Exp 01/2017, Disp: , Rfl:      [DISCONTINUED] OnabotulinumtoxinA (BOTOX IJ), Inject 200 Units into the muscle Lot # /C3  Exp: 3/2017, Disp: , Rfl:      [DISCONTINUED] OnabotulinumtoxinA (BOTOX IJ), Inject 200 Units into the muscle Lot#/C3, Exp 09/2017, Disp: , Rfl:      [DISCONTINUED] OnabotulinumtoxinA (BOTOX IJ), Inject 200 Units into the muscle Lot # /C3  Exp: 1/2018, Disp: , Rfl:      [DISCONTINUED] OnabotulinumtoxinA (BOTOX IJ), Inject 75 Units into the muscle Lot # /C3  Exp: 1/2018, Disp: , Rfl:      [DISCONTINUED] OnabotulinumtoxinA (BOTOX IJ), Inject 225 Units into the muscle, Disp: , Rfl:      [DISCONTINUED] OnabotulinumtoxinA (BOTOX IJ), Inject 225 Units into the muscle Lot # /C3  Exp: 8/2018, Disp: , Rfl:      [DISCONTINUED] OnabotulinumtoxinA (BOTOX IJ), Inject 225 Units as directed Lot #:  C3 Exp: 12/2018, Disp: , Rfl:      [DISCONTINUED] OnabotulinumtoxinA (BOTOX IJ), Inject 225 Units into the muscle once Lot# /C3, Exp 03/2019, Disp: , Rfl:      [DISCONTINUED] OnabotulinumtoxinA (BOTOX IJ), Inject 225 Units into the muscle Lot # /C3  Exp: 6/2019, Disp: , Rfl:      [DISCONTINUED] OnabotulinumtoxinA (BOTOX IJ), Inject 225 Units as directed once Lot# /C3 Exp: 08/2019, Disp: , Rfl:      [DISCONTINUED] OnabotulinumtoxinA (BOTOX IJ), Inject 250 Units into the muscle once Lot# /C3, Exp 09/2019, Disp: , Rfl:      [DISCONTINUED] OnabotulinumtoxinA (BOTOX IJ), Inject 250 Units as directed once Lot#: /C3 Exp: 12/2019, Disp: , Rfl:      [DISCONTINUED] OnabotulinumtoxinA (BOTOX IJ), Inject 250 Units into the muscle once Lot /C3 Exp 05/2020, Disp: , Rfl:      Allergies   Allergen Reactions     Ivp Dye [Contrast Dye] Anaphylaxis     Lisinopril-Hctz Anaphylaxis     Maple Tree Anaphylaxis      Allergy includes maple syrup.     Gluten Itching and Swelling     Codeine Sulfate Cramps and GI Disturbance     Erythromycin Nausea and Vomiting and Cramps        PHYSICAL EXAM:    NECK AND TRUNK PATTERN:   Head Tremor: Observed only slightly at right, increases with right rotation   Left Side-bending: Not Present  Left Shoulder Elevation: slightly elevated    HPI:    Patient denies new medical diagnoses, illnesses, hospitalizations, emergency room visits, and injuries since the previous injection with botulinum neurotoxin.    We reviewed the recommended safety guidelines for  Botox from any vaccine injection, such as the seasonal flu vaccine, by a minimum of 10-14 days with Roxanna Celis. She acknowledged understanding.    RESPONSE TO PREVIOUS TREATMENT:    Roxanna Celis received 250 units of Botox on 10/23/2017.    Problems following the previous series of neurotoxin injections included:  No problems reported    BENEFITS BY PATIENT REPORT:    Pain Improvement: Yes.  Percent Improvement: 90 %    Duration of Benefit:  6months. Has been gradually wearing off in the last 1-1.5 months.     Dystonia Improvement: Yes.  Percent Improvement: 90 %    Duration of Benefit:  6months. Has been gradually wearing off in the last 1-1.5 months.       BOTULINUM NEUROTOXIN INJECTION PROCEDURES:    VERIFICATION OF PATIENT IDENTIFICATION AND PROCEDURE     Initials   Patient Name SK   Patient  SK   Procedure Verified by: SK     Prior to the start of the procedure and with procedural staff participation, I verbally confirmed the patient s identity using two indicators, relevant allergies, that the procedure was appropriate and matched the consent or emergent situation, and that the correct equipment/implants were available. Immediately prior to starting the procedure I conducted the Time Out with the procedural staff and re-confirmed the patient s name, procedure, and site/side. (The Joint Commission universal protocol was  followed.)  Yes    Sedation (Moderate or Deep): None      Above assessments performed by:  Resident/Fellow         Yessenia Zuluaga MD          The attending provider was present for the entire procedure documented below.      Haja Bradley MD      INDICATION/S FOR PROCEDURE/S:  Roxanna Celis is a 47 year old patient with dystonia affecting the  head, neck and shoulder girdle musculature secondary to a diagnosis of cervical dystonia with associated  pain, tremor, spasms, loss of joint motion, loss of volitional motor control and difficulty with activities of daily living.     Her baseline symptoms have been recalcitrant to oral medications and conservative therapy.  She is here today for an injection of Botox.      GOAL OF PROCEDURE:  The goal of this procedure is to increase active range of motion, improve volitional motor control, decrease pain  and enhance functional independence associated with dystonic movements.    TOTAL DOSE ADMINISTERED:  Dose Administered:  250 units Botox    Diluent Used:  Preservative Free Normal Saline  Total Volume of Diluent Used: 3.5 ml  Lot # /C3 with Expiration Date:  11/2020  NDC #: Botox 100u ([address]-33)    Medication guide was offered to patient and was declined.    CONSENT:  The risks, benefits, and treatment options were discussed with Roxanna Celis and she agreed to proceed.      Written consent was obtained by .     EQUIPMENT USED:  Needle-37mm stimulating/recording  EMG/NCS Machine    SKIN PREPARATION:  Skin preparation was performed using an alcohol wipe.    GUIDANCE DESCRIPTION:  Electro-myographic guidance was necessary throughout the procedure to accurately identify all areas of dystonic muscles while avoiding injection of non-dystonic muscles, neighboring nerves and nearby vascular structures.     AREA/MUSCLE INJECTED:   1. HEAD & NECK MUSCLES: 250 units Botox = Total Dose, 1:1 Dilution for 150 units Botox and 2:1 dilution for 100 units Botox to occipitalis    Right Rectus Capitis (upper cervical) - 10 units of Botox at 1 site/s.  (1:1 dilution)  Left Rectus Capitis (upper cervical) - 10 units of Botox at 1 site/s. (1:1 dilution)     Right Mid-Trapezius (mid cervical) - 5 units of Botox at 1 site/s. (1:1 dilution)  Left Mid-Trapezius (mid cervical) - 5 units of Botox at 1 site/s.(1:1 dilution)     Right Semispinalis - 10 units of Botox at 1 site/s. (1:1 dilution)  Left Semispinalis - 10 units of Botox at 1 site/s.(1:1 dilution)     Right Splenius Cervicis/Capitus - 10 units of Botox at 1 site/s. (1:1 dilution)  Left Splenius Cervicis/Capitus - 10 units of Botox at 1 site/s.(1:1 dilution)     Right Levator Scapulae - 15 units of Botox at 1 site/s. (1:1 dilution)  Left Levator Scapulae - 15 units of Botox at 1 site/s.(1:1 dilution)     Right Longissimus - 10 units of Botox at 1 site/s. (1:1 dilution)  Left Longissimus - 10 units of Botox at 1 site/s.(1:1 dilution)     Right Inferior Oblique - 15 units of Botox at 1 site/s.(1:1 dilution)  Left Inferior Oblique - 15 units of Botox at 1 site/s.(1:1 dilution)     Right occipitalis - 50 units of Botox at 5 site/s (2:1 dilution).               Left occipitalis - 50 units of Botox at 5 site/s (2:1 dilution).     RESPONSE TO PROCEDURE:  Roxanna Celis tolerated the procedure well and there were no immediate complications.  She was allowed to recover for an appropriate period of time and was discharged home in stable condition.    FOLLOW UP:  Roxanna Celis was asked to follow up by phone in 7-14 days with Tiffany Lezama PT, Care Coordinator or Josefina King RN, Care Coordinator, to report her response to this series of injections.  Based on the patient's previous response to this therapy,    -Roxanna Celis plans to call to reschedule when the effect of her Botox starts to wear off. Reschedule PRN.     PLAN (Medication Changes, Therapy Orders, Work or Disability Issues, etc.): Monitor response to today's injections.

## 2018-05-14 ENCOUNTER — CARE COORDINATION (OUTPATIENT)
Dept: PHYSICAL MEDICINE AND REHAB | Facility: CLINIC | Age: 48
End: 2018-05-14

## 2018-05-14 NOTE — PROGRESS NOTES
Roxanna Celis called to report on her response to receiving injections of 250 units of Botox, into her neck and shoulder girdle muscles on 05/01/2018 by Dr. 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  She is now 2 weeks post injection.    SIDE-EFFECTS: None noted to date.    BENEFITS: None noted to date.    COMMENTS: Ms. Celis is concerned that she has not had any benefit to date.  We reviewed the usual time until Botox starts working (5-14 days) and I encouraged Ms. Celis to continue to monitor her response and call back in approximately one week with an update on her response to this most recent series of injections with Botox.  Ms. Celis acknowledged understanding and is in agreement with this plan.    Dr. Bradley was notified of this call.

## 2018-10-29 ENCOUNTER — OFFICE VISIT (OUTPATIENT)
Dept: PHYSICAL MEDICINE AND REHAB | Facility: CLINIC | Age: 48
End: 2018-10-29
Payer: COMMERCIAL

## 2018-10-29 VITALS
HEIGHT: 66 IN | BODY MASS INDEX: 34.39 KG/M2 | WEIGHT: 214 LBS | TEMPERATURE: 95.7 F | DIASTOLIC BLOOD PRESSURE: 85 MMHG | SYSTOLIC BLOOD PRESSURE: 135 MMHG

## 2018-10-29 DIAGNOSIS — G24.3 SPASMODIC TORTICOLLIS: Primary | ICD-10-CM

## 2018-10-29 DIAGNOSIS — G24.1 DYSTONIA, TORSION, FRAGMENTS OF: ICD-10-CM

## 2018-10-29 RX ORDER — LINACLOTIDE 290 UG/1
CAPSULE, GELATIN COATED ORAL
Refills: 3 | COMMUNITY
Start: 2018-08-21 | End: 2023-02-14

## 2018-10-29 ASSESSMENT — PAIN SCALES - GENERAL: PAINLEVEL: SEVERE PAIN (7)

## 2018-10-29 NOTE — PROGRESS NOTES
"BOTULINUM TOXIN PROCEDURE NOTE    Chief Complaint   Patient presents with     Dystonia     Botox injections Cervical Dystonia       /85 (BP Location: Left arm)  Temp 95.7  F (35.4  C) (Oral)  Ht 1.676 m (5' 6\")  Wt 97.1 kg (214 lb)  BMI 34.54 kg/m2      Current Outpatient Prescriptions:      aspirin-acetaminophen-caffeine (EXCEDRIN MIGRAINE) 250-250-65 MG per tablet, Take 1 tablet by mouth every 6 hours as needed., Disp: , Rfl:      BOTOX 100 units injection, Inject 250 Units into the muscle once Lot # /C3 with Expiration Date:  11/2020, Disp: , Rfl:      ClonAZEPAM (KLONOPIN) 1 MG tablet, Take 1 mg by mouth 2 times daily as needed., Disp: , Rfl:      DULoxetine HCl (CYMBALTA PO), Take 60 mg by mouth, Disp: , Rfl:      EPINEPHrine (EPIPEN) 0.3 MG/0.3ML injection, Inject 0.3 mg into the muscle once as needed., Disp: , Rfl:      hydrochlorothiazide (HYDRODIURIL) 50 MG tablet, Take 50 mg by mouth daily, Disp: , Rfl:      Ibuprofen (ADVIL) 200 MG capsule, Take 200 mg by mouth every 4 hours as needed., Disp: , Rfl:      LINZESS 290 MCG capsule, TAKE 1 CAPSULE BY MOUTH EVERY DAY, Disp: , Rfl: 3     OLANZapine (ZYPREXA PO), Take 15 mg by mouth, Disp: , Rfl:      ONABOTULINUMTOXINA IJ, Inject 250 Units as directed once LOT # /C3 EXPIRATION: 03/2020, Disp: , Rfl:      spironolactone (ALDACTONE) 100 MG tablet, Take 100 mg by mouth daily, Disp: , Rfl:      Desvenlafaxine Succinate (PRISTIQ) 100 MG TB24 24 hr tablet, Take 1 tablet by mouth daily., Disp: , Rfl:      gabapentin (NEURONTIN) 100 MG capsule, Take 1 capsule (100 mg) by mouth At Bedtime (Patient not taking: Reported on 10/29/2018), Disp: 90 capsule, Rfl: 1     PROGESTERONE 200 MG CAPS COMPOUND (PROGESTERONE 200 MG CAPS COMPOUND), Take 4 capsules by mouth daily., Disp: , Rfl:      solifenacin (VESICARE) 10 MG tablet, Take 1 tablet by mouth daily., Disp: , Rfl:      topiramate (TOPAMAX) 100 MG tablet, Take 0.5 tablets by mouth 2 times daily., Disp: , " Rfl:      Allergies   Allergen Reactions     Ivp Dye [Contrast Dye] Anaphylaxis     Lisinopril-Hctz Anaphylaxis     Maple Tree Anaphylaxis     Allergy includes maple syrup.     Gluten Itching and Swelling     Codeine Sulfate Cramps and GI Disturbance     Erythromycin Nausea and Vomiting and Cramps     Nsaids Other (See Comments)     Patient had bariatric surgery. Should not ever take NSAIDS due to high risk for gastric ulcers.         PHYSICAL EXAM:  NECK AND TRUNK PATTERN:   Head Tremor: Pt states tremor present and increases throughout the day, worse in evening.   Left Side-bending: Present  Left Shoulder Elevation: slightly elevated  Focalized pain at occipitalis     HPI:    Patient denies new medical diagnoses, illnesses, hospitalizations, emergency room visits, and injuries since the previous injection with botulinum neurotoxin.    We reviewed the recommended safety guidelines for  Botox from any vaccine injection, such as the seasonal flu vaccine, by a minimum of 10-14 days with Roxanna Celis. She acknowledged understanding.    RESPONSE TO PREVIOUS TREATMENT:    Roxanna Celis received 250 units of Botox on 2018.    Problems following the previous series of neurotoxin injections included:  No problems reported    BENEFITS BY PATIENT REPORT:    Pain Improvement: Yes.  Percent Improvement: <10 %    Duration of Benefit:  minimal.    Dystonia Improvement: Yes.  Percent Improvement: <10 %    Duration of Benefit:  minimal.    Minimal improvement from last injections.  Pt usually receives very good benefit with this dosing.  Will repeat injections at 250 units again today.      BOTULINUM NEUROTOXIN INJECTION PROCEDURES:    VERIFICATION OF PATIENT IDENTIFICATION AND PROCEDURE     Initials   Patient Name lmd   Patient  lmd   Procedure Verified by: lmd     Prior to the start of the procedure and with procedural staff participation, I verbally confirmed the patient s identity using two indicators,  relevant allergies, that the procedure was appropriate and matched the consent or emergent situation, and that the correct equipment/implants were available. Immediately prior to starting the procedure I conducted the Time Out with the procedural staff and re-confirmed the patient s name, procedure, and site/side. (The Joint Commission universal protocol was followed.)  Yes    Sedation (Moderate or Deep): None      Above assessments performed by:  Josefina Lama RN Care Coordinator    Haja Bradley MD    INDICATION/S FOR PROCEDURE/S:    Roxanna Celis is a 47 year old patient with dystonia affecting the  head, neck and shoulder girdle musculature secondary to a diagnosis of cervical dystonia with associated  pain, tremor, spasms, loss of joint motion, loss of volitional motor control and difficulty with activities of daily living.      Her baseline symptoms have been recalcitrant to oral medications and conservative therapy.  She is here today for an injection of Botox.       GOAL OF PROCEDURE:  The goal of this procedure is to increase active range of motion, improve volitional motor control, decrease pain  and enhance functional independence associated with dystonic movements.    TOTAL DOSE ADMINISTERED:  Dose Administered:  250 units Botox    Diluent Used:  Preservative Free Normal Saline  Total Volume of Diluent Used:  2.50 ml  Lot # /C3 with Expiration Date:  4/2021  NDC #: Botox 100u ([id number])    Medication guide was offered to patient and was declined.    CONSENT:  The risks, benefits, and treatment options were discussed with Roxanna Celis and she agreed to proceed.      Written consent was obtained by lmd.     EQUIPMENT USED:  Needle-37mm stimulating/recording  EMG/NCS Machine     SKIN PREPARATION:  Skin preparation was performed using an alcohol wipe.     GUIDANCE DESCRIPTION:  Electro-myographic guidance was necessary throughout the procedure to accurately identify all areas of dystonic  muscles while avoiding injection of non-dystonic muscles, neighboring nerves and nearby vascular structures.     AREA/MUSCLE INJECTED:    1. HEAD & NECK MUSCLES: 250 units Botox = Total Dose, 1:1 Dilution for 150 units Botox and 2:1 dilution for 100 units Botox to occipitalis   Right Rectus Capitis (upper cervical) - 10 units of Botox at 1 site/s.  (1:1 dilution)  Left Rectus Capitis (upper cervical) - 10 units of Botox at 1 site/s. (1:1 dilution)      Right Mid-Trapezius (mid cervical) - 5 units of Botox at 1 site/s. (1:1 dilution)  Left Mid-Trapezius (mid cervical) - 5 units of Botox at 1 site/s.(1:1 dilution)      Right Semispinalis - 10 units of Botox at 1 site/s. (1:1 dilution)  Left Semispinalis - 10 units of Botox at 1 site/s.(1:1 dilution)      Right Splenius Cervicis/Capitus - 10 units of Botox at 1 site/s. (1:1 dilution)  Left Splenius Cervicis/Capitus - 10 units of Botox at 1 site/s.(1:1 dilution)      Right Levator Scapulae - 15 units of Botox at 1 site/s. (1:1 dilution)  Left Levator Scapulae - 15 units of Botox at 1 site/s.(1:1 dilution)      Right Longissimus - 10 units of Botox at 1 site/s. (1:1 dilution)  Left Longissimus - 10 units of Botox at 1 site/s.(1:1 dilution)      Right Inferior Oblique - 15 units of Botox at 1 site/s.(1:1 dilution)  Left Inferior Oblique - 15 units of Botox at 1 site/s.(1:1 dilution)      Right occipitalis - 50 units of Botox at 5 site/s (2:1 dilution).               Left occipitalis - 50 units of Botox at 5 site/s (2:1 dilution).     RESPONSE TO PROCEDURE:  Roxanna Celis tolerated the procedure well and there were no immediate complications.  She was allowed to recover for an appropriate period of time and was discharged home in stable condition.    FOLLOW UP:  Roxanna Celis was asked to follow up by phone in 7-14 days with Tiffany Lezama PT, Care Coordinator or Josefina King RN, Care Coordinator, to report her response to this series of injections.  Based on the  patient's previous response to this therapy, Roxanna Celis was rescheduled for the next series of injections in 12 weeks.    PLAN (Medication Changes, Therapy Orders, Work or Disability Issues, etc.): Will monitor response to today's injections and report.

## 2018-10-29 NOTE — NURSING NOTE
Chief Complaint   Patient presents with     RECHECK     RE-EVAL. DISCUSS TREATMENT OPTIONS       Annika Bingham MA

## 2018-10-29 NOTE — LETTER
"10/29/2018       RE: Roxanna Celis  [address] [place] [address]     Dear Colleague,    Thank you for referring your patient, Roxanna Celis, to the [facility] PHYSICAL MEDICINE AND REHABILITATION at [facility]. Please see a copy of my visit note below.    BOTULINUM TOXIN PROCEDURE NOTE    Chief Complaint   Patient presents with     Dystonia     Botox injections Cervical Dystonia       /85 (BP Location: Left arm)  Temp 95.7  F (35.4  C) (Oral)  Ht 1.676 m (5' 6\")  Wt 97.1 kg (214 lb)  BMI 34.54 kg/m2      Current Outpatient Prescriptions:      aspirin-acetaminophen-caffeine (EXCEDRIN MIGRAINE) 250-250-65 MG per tablet, Take 1 tablet by mouth every 6 hours as needed., Disp: , Rfl:      BOTOX 100 units injection, Inject 250 Units into the muscle once Lot # /C3 with Expiration Date:  11/2020, Disp: , Rfl:      ClonAZEPAM (KLONOPIN) 1 MG tablet, Take 1 mg by mouth 2 times daily as needed., Disp: , Rfl:      DULoxetine HCl (CYMBALTA PO), Take 60 mg by mouth, Disp: , Rfl:      EPINEPHrine (EPIPEN) 0.3 MG/0.3ML injection, Inject 0.3 mg into the muscle once as needed., Disp: , Rfl:      hydrochlorothiazide (HYDRODIURIL) 50 MG tablet, Take 50 mg by mouth daily, Disp: , Rfl:      Ibuprofen (ADVIL) 200 MG capsule, Take 200 mg by mouth every 4 hours as needed., Disp: , Rfl:      LINZESS 290 MCG capsule, TAKE 1 CAPSULE BY MOUTH EVERY DAY, Disp: , Rfl: 3     OLANZapine (ZYPREXA PO), Take 15 mg by mouth, Disp: , Rfl:      ONABOTULINUMTOXINA IJ, Inject 250 Units as directed once LOT # /C3 EXPIRATION: 03/2020, Disp: , Rfl:      spironolactone (ALDACTONE) 100 MG tablet, Take 100 mg by mouth daily, Disp: , Rfl:      Desvenlafaxine Succinate (PRISTIQ) 100 MG TB24 24 hr tablet, Take 1 tablet by mouth daily., Disp: , Rfl:      gabapentin (NEURONTIN) 100 MG capsule, Take 1 capsule (100 mg) by mouth At Bedtime (Patient not taking: Reported on 10/29/2018), Disp: 90 capsule, " Rfl: 1     PROGESTERONE 200 MG CAPS COMPOUND (PROGESTERONE 200 MG CAPS COMPOUND), Take 4 capsules by mouth daily., Disp: , Rfl:      solifenacin (VESICARE) 10 MG tablet, Take 1 tablet by mouth daily., Disp: , Rfl:      topiramate (TOPAMAX) 100 MG tablet, Take 0.5 tablets by mouth 2 times daily., Disp: , Rfl:      Allergies   Allergen Reactions     Ivp Dye [Contrast Dye] Anaphylaxis     Lisinopril-Hctz Anaphylaxis     Maple Tree Anaphylaxis     Allergy includes maple syrup.     Gluten Itching and Swelling     Codeine Sulfate Cramps and GI Disturbance     Erythromycin Nausea and Vomiting and Cramps     Nsaids Other (See Comments)     Patient had bariatric surgery. Should not ever take NSAIDS due to high risk for gastric ulcers.         PHYSICAL EXAM:  NECK AND TRUNK PATTERN:   Head Tremor: Pt states tremor present and increases throughout the day, worse in evening.   Left Side-bending: Present  Left Shoulder Elevation: slightly elevated  Focalized pain at occipitalis     HPI:    Patient denies new medical diagnoses, illnesses, hospitalizations, emergency room visits, and injuries since the previous injection with botulinum neurotoxin.    We reviewed the recommended safety guidelines for  Botox from any vaccine injection, such as the seasonal flu vaccine, by a minimum of 10-14 days with Roxanna Celis. She acknowledged understanding.    RESPONSE TO PREVIOUS TREATMENT:    Roxanna Celis received 250 units of Botox on 5/1/2018.    Problems following the previous series of neurotoxin injections included:  No problems reported    BENEFITS BY PATIENT REPORT:    Pain Improvement: Yes.  Percent Improvement: <10 %    Duration of Benefit:  minimal.    Dystonia Improvement: Yes.  Percent Improvement: <10 %    Duration of Benefit:  minimal.    Minimal improvement from last injections.  Pt usually receives very good benefit with this dosing.  Will repeat injections at 250 units again today.      BOTULINUM NEUROTOXIN  INJECTION PROCEDURES:    VERIFICATION OF PATIENT IDENTIFICATION AND PROCEDURE     Initials   Patient Name lmd   Patient  lmd   Procedure Verified by: lmd     Prior to the start of the procedure and with procedural staff participation, I verbally confirmed the patient s identity using two indicators, relevant allergies, that the procedure was appropriate and matched the consent or emergent situation, and that the correct equipment/implants were available. Immediately prior to starting the procedure I conducted the Time Out with the procedural staff and re-confirmed the patient s name, procedure, and site/side. (The Joint Commission universal protocol was followed.)  Yes    Sedation (Moderate or Deep): None      Above assessments performed by:  Josefina Lama RN Care Coordinator    Haja Bradley MD    INDICATION/S FOR PROCEDURE/S:    Roxanna Celis is a 47 year old patient with dystonia affecting the  head, neck and shoulder girdle musculature secondary to a diagnosis of cervical dystonia with associated  pain, tremor, spasms, loss of joint motion, loss of volitional motor control and difficulty with activities of daily living.      Her baseline symptoms have been recalcitrant to oral medications and conservative therapy.  She is here today for an injection of Botox.       GOAL OF PROCEDURE:  The goal of this procedure is to increase active range of motion, improve volitional motor control, decrease pain  and enhance functional independence associated with dystonic movements.    TOTAL DOSE ADMINISTERED:  Dose Administered:  250 units Botox    Diluent Used:  Preservative Free Normal Saline  Total Volume of Diluent Used:  2.50 ml  Lot # /C3 with Expiration Date:  2021  NDC #: Botox 100u ([address]-02)    Medication guide was offered to patient and was declined.    CONSENT:  The risks, benefits, and treatment options were discussed with Roxanna Celis and she agreed to proceed.      Written consent was  obtained by lmd.     EQUIPMENT USED:  Needle-37mm stimulating/recording  EMG/NCS Machine     SKIN PREPARATION:  Skin preparation was performed using an alcohol wipe.     GUIDANCE DESCRIPTION:  Electro-myographic guidance was necessary throughout the procedure to accurately identify all areas of dystonic muscles while avoiding injection of non-dystonic muscles, neighboring nerves and nearby vascular structures.     AREA/MUSCLE INJECTED:    1. HEAD & NECK MUSCLES: 250 units Botox = Total Dose, 1:1 Dilution for 150 units Botox and 2:1 dilution for 100 units Botox to occipitalis   Right Rectus Capitis (upper cervical) - 10 units of Botox at 1 site/s.  (1:1 dilution)  Left Rectus Capitis (upper cervical) - 10 units of Botox at 1 site/s. (1:1 dilution)      Right Mid-Trapezius (mid cervical) - 5 units of Botox at 1 site/s. (1:1 dilution)  Left Mid-Trapezius (mid cervical) - 5 units of Botox at 1 site/s.(1:1 dilution)      Right Semispinalis - 10 units of Botox at 1 site/s. (1:1 dilution)  Left Semispinalis - 10 units of Botox at 1 site/s.(1:1 dilution)      Right Splenius Cervicis/Capitus - 10 units of Botox at 1 site/s. (1:1 dilution)  Left Splenius Cervicis/Capitus - 10 units of Botox at 1 site/s.(1:1 dilution)      Right Levator Scapulae - 15 units of Botox at 1 site/s. (1:1 dilution)  Left Levator Scapulae - 15 units of Botox at 1 site/s.(1:1 dilution)      Right Longissimus - 10 units of Botox at 1 site/s. (1:1 dilution)  Left Longissimus - 10 units of Botox at 1 site/s.(1:1 dilution)      Right Inferior Oblique - 15 units of Botox at 1 site/s.(1:1 dilution)  Left Inferior Oblique - 15 units of Botox at 1 site/s.(1:1 dilution)      Right occipitalis - 50 units of Botox at 5 site/s (2:1 dilution).               Left occipitalis - 50 units of Botox at 5 site/s (2:1 dilution).     RESPONSE TO PROCEDURE:  Roxanna R Brazil tolerated the procedure well and there were no immediate complications.  She was allowed to recover for  an appropriate period of time and was discharged home in stable condition.    FOLLOW UP:  Roxanna Celis was asked to follow up by phone in 7-14 days with Tiffany Lezama PT, Care Coordinator or Josefina King RN, Care Coordinator, to report her response to this series of injections.  Based on the patient's previous response to this therapy, Roxanna Celis was rescheduled for the next series of injections in 12 weeks.    PLAN (Medication Changes, Therapy Orders, Work or Disability Issues, etc.): Will monitor response to today's injections and report.    Again, thank you for allowing me to participate in the care of your patient.      Sincerely,    Haja Bradley MD

## 2018-10-29 NOTE — MR AVS SNAPSHOT
After Visit Summary   10/29/2018    Roxanna Celis    MRN: [id number]           Patient Information     Date Of Birth          1970        Visit Information        Provider Department      10/29/2018 9:40 AM Haja Bradely MD [facility]        Today's Diagnoses     Spasmodic torticollis    -  1    Dystonia, torsion, fragments of           Follow-ups after your visit        Follow-up notes from your care team     Return in about 12 weeks (around 2019) for Dystonia.      Your next 10 appointments already scheduled     2019  1:00 PM CST   (Arrive by 12:45 PM)   Return Botox with Haja Bradley MD   [facility] ([facility])    [address] [address] [phone]              Who to contact     Please call your clinic at [phone] to:    Ask questions about your health    Make or cancel appointments    Discuss your medicines    Learn about your test results    Speak to your doctor            Additional Information About Your Visit        MyChart Information     [employer]Pipe is an electronic gateway that provides easy, online access to your medical records. With [employer]Pipe, you can request a clinic appointment, read your test results, renew a prescription or communicate with your care team.     To sign up for WindPipe visit the website at [email or web address]   You will be asked to enter the access code listed below, as well as some personal information. Please follow the directions to create your username and password.     Your access code is: [email or web address]  Expires: 2019  6:30 AM     Your access code will  in 90 days. If you need help or a new code, please contact your [facility] or call [phone] for assistance.        Care EveryWhere ID     This is your Care EveryWhere ID. This could be used by other  "organizations to access your [facility] medical records  [id number]        Your Vitals Were     Temperature Height BMI (Body Mass Index)             95.7  F (35.4  C) (Oral) 1.676 m (5' 6\") 34.54 kg/m2          Blood Pressure from Last 3 Encounters:   10/29/18 135/85   05/01/18 148/89   10/23/17 156/76    Weight from Last 3 Encounters:   10/29/18 97.1 kg (214 lb)   05/01/18 108.5 kg (239 lb 4.8 oz)   10/23/17 108.4 kg (239 lb)              We Performed the Following     HC CHEMODENERVATION 1 EXTREMITY, EA ADDL EXTREMITY, 1-4 MUSCLE     HC CHEMODENERVATION MUSCLE NECK UNILAT     HC CHEMODENERVATION ONE EXTREMITY, 1-4 MUSCLE     Needle EMG Guide w/Chemodenervation (00824)        Primary Care Provider Office Phone # Fax #    [place] [facility] [phone]       [facility] [address] [address]        Equal Access to Services     [facility]: Hadii hillary ku hadasho Sonanette, waaxda luqadaha, qaybta kaalmada adeegyarimma, gopal reynoso . So [facility] [phone].    ATENCIÓN: Si habla español, tiene a hawthorne disposición servicios gratuitos de asistencia lingüística. Llame al [phone].    We comply with applicable federal civil rights laws and [place] laws. We do not discriminate on the basis of race, color, national origin, age, disability, sex, sexual orientation, or gender identity.            Thank you!     Thank you for choosing [facility] PHYSICAL MEDICINE AND REHABILITATION  for your care. Our goal is always to provide you with excellent care. Hearing back from our patients is one way we can continue to improve our services. Please take a few minutes to complete the written survey that you may receive in the mail after your visit with us. Thank you!             Your Updated Medication List - Protect others around you: Learn how to safely use, store and throw away your medicines at www.disposemymeds.org.          This list is accurate as of " 10/29/18  3:42 PM.  Always use your most recent med list.                   Brand Name Dispense Instructions for use Diagnosis    ADVIL 200 MG capsule   Generic drug:  ibuprofen      Take 200 mg by mouth every 4 hours as needed.        CYMBALTA PO      Take 60 mg by mouth        EPIPEN 0.3 MG/0.3ML injection   Generic drug:  EPINEPHrine      Inject 0.3 mg into the muscle once as needed.        EXCEDRIN MIGRAINE 250-250-65 MG per tablet   Generic drug:  aspirin-acetaminophen-caffeine      Take 1 tablet by mouth every 6 hours as needed.        gabapentin 100 MG capsule    NEURONTIN    90 capsule    Take 1 capsule (100 mg) by mouth At Bedtime    Spasmodic torticollis, Neck pain       hydrochlorothiazide 50 MG tablet    HYDRODIURIL     Take 50 mg by mouth daily        klonoPIN 1 MG tablet   Generic drug:  clonazePAM      Take 1 mg by mouth 2 times daily as needed.        LINZESS 290 MCG capsule   Generic drug:  linaclotide      TAKE 1 CAPSULE BY MOUTH EVERY DAY        * ONABOTULINUMTOXINA IJ      Inject 250 Units as directed once LOT # /C3 EXPIRATION: 03/2020        * BOTOX 100 units injection   Generic drug:  botulinum toxin type A      Inject 250 Units into the muscle once Lot # /C3 with Expiration Date:  11/2020        * BOTOX IJ      Inject 250 Units into the muscle Lot # /C3  Exp: 4/2021        PRISTIQ 100 MG 24 hr tablet   Generic drug:  desvenlafaxine succinate      Take 1 tablet by mouth daily.        PROGESTERONE 200 MG CAPS COMPOUND    PROGESTERONE 200 MG CAPS COMPOUND     Take 4 capsules by mouth daily.        spironolactone 100 MG tablet    ALDACTONE     Take 100 mg by mouth daily        TOPAMAX 100 MG tablet   Generic drug:  topiramate      Take 0.5 tablets by mouth 2 times daily.        VESICARE 10 MG tablet   Generic drug:  solifenacin      Take 1 tablet by mouth daily.        ZYPREXA PO      Take 15 mg by mouth        * Notice:  This list has 3 medication(s) that are the same as other  medications prescribed for you. Read the directions carefully, and ask your doctor or other care provider to review them with you.

## 2019-01-16 ENCOUNTER — PATIENT OUTREACH (OUTPATIENT)
Dept: CARE COORDINATION | Facility: CLINIC | Age: 49
End: 2019-01-16

## 2019-01-21 ENCOUNTER — OFFICE VISIT (OUTPATIENT)
Dept: PHYSICAL MEDICINE AND REHAB | Facility: CLINIC | Age: 49
End: 2019-01-21
Payer: COMMERCIAL

## 2019-01-21 VITALS
SYSTOLIC BLOOD PRESSURE: 129 MMHG | WEIGHT: 244 LBS | HEIGHT: 66 IN | OXYGEN SATURATION: 95 % | HEART RATE: 78 BPM | BODY MASS INDEX: 39.21 KG/M2 | DIASTOLIC BLOOD PRESSURE: 46 MMHG

## 2019-01-21 DIAGNOSIS — G24.1 DYSTONIA, TORSION, FRAGMENTS OF: ICD-10-CM

## 2019-01-21 DIAGNOSIS — G24.3 SPASMODIC TORTICOLLIS: Primary | ICD-10-CM

## 2019-01-21 ASSESSMENT — MIFFLIN-ST. JEOR: SCORE: 1743.53

## 2019-01-21 ASSESSMENT — PAIN SCALES - GENERAL: PAINLEVEL: MODERATE PAIN (5)

## 2019-01-21 NOTE — LETTER
"1/21/2019       RE: Roxanna Celis  [address] [place] [address]     Dear Colleague,    Thank you for referring your patient, Roxanna Celis, to the [facility] PHYSICAL MEDICINE AND REHABILITATION at [facility]. Please see a copy of my visit note below.    BOTULINUM TOXIN PROCEDURE NOTE    Chief Complaint   Patient presents with     Dystonia     BOTOX Cervical Dystonia       /46 (BP Location: Left arm)   Pulse 78   Ht 1.676 m (5' 6\")   Wt 110.7 kg (244 lb)   SpO2 95%   BMI 39.38 kg/m         Current Outpatient Medications:      aspirin-acetaminophen-caffeine (EXCEDRIN MIGRAINE) 250-250-65 MG per tablet, Take 1 tablet by mouth every 6 hours as needed., Disp: , Rfl:      BOTOX 100 units injection, Inject 250 Units into the muscle once Lot # /C3 with Expiration Date:  11/2020, Disp: , Rfl:      ClonAZEPAM (KLONOPIN) 1 MG tablet, Take 1 mg by mouth 2 times daily as needed., Disp: , Rfl:      Desvenlafaxine Succinate (PRISTIQ) 100 MG TB24 24 hr tablet, Take 1 tablet by mouth daily., Disp: , Rfl:      DULoxetine HCl (CYMBALTA PO), Take 60 mg by mouth, Disp: , Rfl:      EPINEPHrine (EPIPEN) 0.3 MG/0.3ML injection, Inject 0.3 mg into the muscle once as needed., Disp: , Rfl:      gabapentin (NEURONTIN) 100 MG capsule, Take 1 capsule (100 mg) by mouth At Bedtime, Disp: 90 capsule, Rfl: 1     hydrochlorothiazide (HYDRODIURIL) 50 MG tablet, Take 50 mg by mouth daily, Disp: , Rfl:      Ibuprofen (ADVIL) 200 MG capsule, Take 200 mg by mouth every 4 hours as needed., Disp: , Rfl:      LINZESS 290 MCG capsule, TAKE 1 CAPSULE BY MOUTH EVERY DAY, Disp: , Rfl: 3     OLANZapine (ZYPREXA PO), Take 15 mg by mouth, Disp: , Rfl:      OnabotulinumtoxinA (BOTOX IJ), Inject 250 Units into the muscle Lot # /C3  Exp: 4/2021, Disp: , Rfl:      ONABOTULINUMTOXINA IJ, Inject 250 Units as directed once LOT # /C3 EXPIRATION: 03/2020, Disp: , Rfl:      PROGESTERONE 200 MG CAPS " COMPOUND (PROGESTERONE 200 MG CAPS COMPOUND), Take 4 capsules by mouth daily., Disp: , Rfl:      solifenacin (VESICARE) 10 MG tablet, Take 1 tablet by mouth daily., Disp: , Rfl:      spironolactone (ALDACTONE) 100 MG tablet, Take 100 mg by mouth daily, Disp: , Rfl:      topiramate (TOPAMAX) 100 MG tablet, Take 0.5 tablets by mouth 2 times daily., Disp: , Rfl:      Allergies   Allergen Reactions     Ivp Dye [Contrast Dye] Anaphylaxis     Lisinopril-Hctz Anaphylaxis     Maple Tree Anaphylaxis     Allergy includes maple syrup.     No Clinical Screening - See Comments      maple syrup==anaphylaxis  Dairy- causes to not feel well     Gluten Itching and Swelling     Codeine Sulfate Cramps and GI Disturbance     Erythromycin Nausea and Vomiting and Cramps     Nsaids Other (See Comments)     Patient had bariatric surgery. Should not ever take NSAIDS due to high risk for gastric ulcers.         PHYSICAL EXAM:  NECK AND TRUNK PATTERN:   Head Tremor: Pt states tremor is intermittent but not seen today  Left Side-bending: Present  Left Shoulder Elevation: slightly elevated  Head with right shift  Focalized pain at occipitalis     HPI:    Patient denies new medical diagnoses, illnesses, hospitalizations, emergency room visits, and injuries since the previous injection with botulinum neurotoxin.  [facility] [place] [facility] once a week and along with Botox injections is much improved.    We reviewed the recommended safety guidelines for  Botox from any vaccine injection, such as the seasonal flu vaccine, by a minimum of 10-14 days with Roxanna Celis. She acknowledged understanding.    RESPONSE TO PREVIOUS TREATMENT:    Roxanna Celis received 250 units of Botox on 10/29/2018.    Problems following the previous series of neurotoxin injections included:  No problems reported    BENEFITS BY PATIENT REPORT:    Pain Improvement: Yes.  Percent Improvement: 80 %    Duration of Benefit:  still somewhat  ongoing    Dystonia Improvement: Yes.  Percent Improvement: 80 %    Duration of Benefit:  still somewhat ongoing.    Did much better with last injections on 10/29/2018.    BOTULINUM NEUROTOXIN INJECTION PROCEDURES:    VERIFICATION OF PATIENT IDENTIFICATION AND PROCEDURE     Initials   Patient Name lmd   Patient  lmd   Procedure Verified by: joie     Prior to the start of the procedure and with procedural staff participation, I verbally confirmed the patient s identity using two indicators, relevant allergies, that the procedure was appropriate and matched the consent or emergent situation, and that the correct equipment/implants were available. Immediately prior to starting the procedure I conducted the Time Out with the procedural staff and re-confirmed the patient s name, procedure, and site/side. (The Joint Commission universal protocol was followed.)  Yes    Sedation (Moderate or Deep): None      Above assessments performed by:  Josefina Lama RN Care Coordinator      INDICATION/S FOR PROCEDURE/S:  Roxanna Celis is a 47 year old patient with dystonia affecting the  head, neck and shoulder girdle musculature secondary to a diagnosis of cervical dystonia with associated  pain, tremor, spasms, loss of joint motion, loss of volitional motor control and difficulty with activities of daily living.      Her baseline symptoms have been recalcitrant to oral medications and conservative therapy.  She is here today for an injection of Botox.       GOAL OF PROCEDURE:  The goal of this procedure is to increase active range of motion, improve volitional motor control, decrease pain  and enhance functional independence associated with dystonic movements.    TOTAL DOSE ADMINISTERED:  Dose Administered:  250 units Botox    Diluent Used:  Preservative Free Normal Saline  Total Volume of Diluent Used:  2.50 ml  Lot # /C3 with Expiration Date:  2021  NDC #: Botox 100u ([address]-41)    Medication guide was offered to  patient and was declined.    CONSENT:  The risks, benefits, and treatment options were discussed with Roxanna Celis and she agreed to proceed.      Written consent was obtained by lmd.     EQUIPMENT USED:  Needle-37mm stimulating/recording  EMG/NCS Machine     SKIN PREPARATION:  Skin preparation was performed using an alcohol wipe.     GUIDANCE DESCRIPTION:  Electro-myographic guidance was necessary throughout the procedure to accurately identify all areas of dystonic muscles while avoiding injection of non-dystonic muscles, neighboring nerves and nearby vascular structures.     AREA/MUSCLE INJECTED:    1. HEAD & NECK MUSCLES: 250 units Botox = Total Dose, 1:1 Dilution for 150 units Botox and 2:1 dilution for 100 units Botox to occipitalis   Right Rectus Capitis (upper cervical) - 10 units of Botox at 1 site/s.  (1:1 dilution)  Left Rectus Capitis (upper cervical) - 10 units of Botox at 1 site/s. (1:1 dilution)      Right Mid-Trapezius (mid cervical) - 5 units of Botox at 1 site/s. (1:1 dilution)  Left Mid-Trapezius (mid cervical) - 5 units of Botox at 1 site/s.(1:1 dilution)      Right Semispinalis - 10 units of Botox at 1 site/s. (1:1 dilution)  Left Semispinalis - 10 units of Botox at 1 site/s.(1:1 dilution)      Right Splenius Cervicis/Capitus - 10 units of Botox at 1 site/s. (1:1 dilution)  Left Splenius Cervicis/Capitus - 10 units of Botox at 1 site/s.(1:1 dilution)      Right Levator Scapulae - 15 units of Botox at 1 site/s. (1:1 dilution)  Left Levator Scapulae - 15 units of Botox at 1 site/s.(1:1 dilution)      Right Longissimus - 10 units of Botox at 1 site/s. (1:1 dilution)  Left Longissimus - 10 units of Botox at 1 site/s.(1:1 dilution)      Right Inferior Oblique - 15 units of Botox at 1 site/s.(1:1 dilution)  Left Inferior Oblique - 15 units of Botox at 1 site/s.(1:1 dilution)      Right occipitalis - 50 units of Botox at 5 site/s (2:1 dilution).               Left occipitalis - 50 units of Botox at 5  site/s (2:1 dilution).    RESPONSE TO PROCEDURE:  Roxanna Celis tolerated the procedure well and there were no immediate complications.  She was allowed to recover for an appropriate period of time and was discharged home in stable condition.    FOLLOW UP:  Roxanna Celis was asked to follow up by phone in 7-14 days with Tiffany Lezama PT, Care Coordinator or Josefina King RN, Care Coordinator, to report her response to this series of injections.  Based on the patient's previous response to this therapy, Roxanna Celis was rescheduled for the next series of injections in 12 weeks.    PLAN (Medication Changes, Therapy Orders, Work or Disability Issues, etc.): Will monitor response to today's injections and report.    There were 50 units of Botox as unavoidable waste for this patient.      Again, thank you for allowing me to participate in the care of your patient.      Sincerely,    Haja Bradley MD

## 2019-04-15 ENCOUNTER — OFFICE VISIT (OUTPATIENT)
Dept: PHYSICAL MEDICINE AND REHAB | Facility: CLINIC | Age: 49
End: 2019-04-15
Payer: COMMERCIAL

## 2019-04-15 VITALS
HEIGHT: 66 IN | BODY MASS INDEX: 39.38 KG/M2 | HEART RATE: 83 BPM | SYSTOLIC BLOOD PRESSURE: 119 MMHG | DIASTOLIC BLOOD PRESSURE: 81 MMHG

## 2019-04-15 DIAGNOSIS — G24.3 SPASMODIC TORTICOLLIS: Primary | ICD-10-CM

## 2019-04-15 DIAGNOSIS — G24.1 DYSTONIA, TORSION, FRAGMENTS OF: ICD-10-CM

## 2019-04-15 ASSESSMENT — PAIN SCALES - GENERAL: PAINLEVEL: NO PAIN (0)

## 2019-04-15 NOTE — PROGRESS NOTES
"BOTULINUM TOXIN PROCEDURE NOTE    Chief Complaint   Patient presents with     RECHECK     [facility]- BOTOX INJECTION       /81 (BP Location: Left arm, Patient Position: Chair, Cuff Size: Adult Large)   Pulse 83   Ht 1.676 m (5' 6\")   BMI 39.38 kg/m        Current Outpatient Medications:      aspirin-acetaminophen-caffeine (EXCEDRIN MIGRAINE) 250-250-65 MG per tablet, Take 1 tablet by mouth every 6 hours as needed., Disp: , Rfl:      BOTOX 100 units injection, Inject 250 Units into the muscle once Lot # /C3 with Expiration Date:  11/2020, Disp: , Rfl:      ClonAZEPAM (KLONOPIN) 1 MG tablet, Take 1 mg by mouth 2 times daily as needed., Disp: , Rfl:      Desvenlafaxine Succinate (PRISTIQ) 100 MG TB24 24 hr tablet, Take 1 tablet by mouth daily., Disp: , Rfl:      DULoxetine HCl (CYMBALTA PO), Take 60 mg by mouth, Disp: , Rfl:      EPINEPHrine (EPIPEN) 0.3 MG/0.3ML injection, Inject 0.3 mg into the muscle once as needed., Disp: , Rfl:      gabapentin (NEURONTIN) 100 MG capsule, Take 1 capsule (100 mg) by mouth At Bedtime, Disp: 90 capsule, Rfl: 1     hydrochlorothiazide (HYDRODIURIL) 50 MG tablet, Take 50 mg by mouth daily, Disp: , Rfl:      Ibuprofen (ADVIL) 200 MG capsule, Take 200 mg by mouth every 4 hours as needed., Disp: , Rfl:      LINZESS 290 MCG capsule, TAKE 1 CAPSULE BY MOUTH EVERY DAY, Disp: , Rfl: 3     OLANZapine (ZYPREXA PO), Take 15 mg by mouth, Disp: , Rfl:      OnabotulinumtoxinA (BOTOX IJ), Inject 250 Units into the muscle Lot # /C3  Exp: 4/2021, Disp: , Rfl:      ONABOTULINUMTOXINA IJ, Inject 250 Units as directed once LOT # /C3 EXPIRATION: 03/2020, Disp: , Rfl:      PROGESTERONE 200 MG CAPS COMPOUND (PROGESTERONE 200 MG CAPS COMPOUND), Take 4 capsules by mouth daily., Disp: , Rfl:      solifenacin (VESICARE) 10 MG tablet, Take 1 tablet by mouth daily., Disp: , Rfl:      spironolactone (ALDACTONE) 100 MG tablet, Take 100 mg by mouth daily, Disp: , Rfl:      topiramate (TOPAMAX) 100 " MG tablet, Take 0.5 tablets by mouth 2 times daily., Disp: , Rfl:      Allergies   Allergen Reactions     Ivp Dye [Contrast Dye] Anaphylaxis     Lisinopril-Hctz Anaphylaxis     Maple Tree Anaphylaxis     Allergy includes maple syrup.     No Clinical Screening - See Comments      maple syrup==anaphylaxis  Dairy- causes to not feel well     Gluten Itching and Swelling     Codeine Sulfate Cramps and GI Disturbance     Erythromycin Nausea and Vomiting and Cramps     Nsaids Other (See Comments)     Patient had bariatric surgery. Should not ever take NSAIDS due to high risk for gastric ulcers.         PHYSICAL EXAM:  NECK AND TRUNK PATTERN:   Head Tremor: Pt states tremor is intermittent but not today  Left Side-bending: Present  Left Shoulder Elevation: slightly elevated  Head with right shift  Focalized pain at occipitalis - none today     HPI:    Patient denies new medical diagnoses, illnesses, hospitalizations, emergency room visits, and injuries since the previous injection with botulinum neurotoxin.     Completed physical therapy in February, noted to have great benefit.  Continues to see the chiropractor qas well     We reviewed the recommended safety guidelines for  Botox from any vaccine injection, such as the seasonal flu vaccine, by a minimum of 10-14 days with Roxanna Celis. She acknowledged understanding.    RESPONSE TO PREVIOUS TREATMENT:    Roxanna Celis received 250 units of Botox on 01/21/2019    Problems following the previous series of neurotoxin injections included:  Post-procedural headache: Rated as 'Moderate' severity.  Duration: 2 weeks then wore off    BENEFITS BY PATIENT REPORT:    Pain Improvement: Yes.  Percent Improvement: 80 %    Duration of Benefit:  still somewhat ongoing    Dystonia Improvement: Yes.  Percent Improvement: 80 %    Duration of Benefit:  still somewhat ongoing.      BOTULINUM NEUROTOXIN INJECTION PROCEDURES:    VERIFICATION OF PATIENT IDENTIFICATION AND  PROCEDURE     Initials   Patient Name scp   Patient  scp   Procedure Verified by: scp     Prior to the start of the procedure and with procedural staff participation, I verbally confirmed the patient s identity using two indicators, relevant allergies, that the procedure was appropriate and matched the consent or emergent situation, and that the correct equipment/implants were available. Immediately prior to starting the procedure I conducted the Time Out with the procedural staff and re-confirmed the patient s name, procedure, and site/side. (The Joint Commission universal protocol was followed.)  Yes    Sedation (Moderate or Deep): None      Above assessments performed by:  Resident/Fellow         Sharda Rosenberg MD          The attending provider was present for the entire procedure documented below.      Haja Bradley MD      INDICATION/S FOR PROCEDURE/S:  Roxanna Celis is a 48 year old patient with dystonia affecting the  head, neck and shoulder girdle musculature secondary to a diagnosis of cervical dystonia with associated  pain, tremor, spasms, loss of joint motion, loss of volitional motor control and difficulty with activities of daily living.      Her baseline symptoms have been recalcitrant to oral medications and conservative therapy.  She is here today for an injection of Botox.       GOAL OF PROCEDURE:  The goal of this procedure is to increase active range of motion, improve volitional motor control, decrease pain  and enhance functional independence associated with dystonic movements.    TOTAL DOSE ADMINISTERED:  Dose Administered:  250 units Botox    Diluent Used:  Preservative Free Normal Saline  Total Volume of Diluent Used:  2.50 ml  Lot # /C3 with Expiration Date:  2021  NDC #: Botox 100u ([address]-41)    Medication guide was offered to patient and was declined.  Was there drug waste? Yes  Amount of drug waste (mL): 50 units of Botox.  Reason for waste:  Single use vial  Multi-dose  vial: No    Sharda Rosenberg MD  April 15, 2019    CONSENT:  The risks, benefits, and treatment options were discussed with Roxanna Celis and she agreed to proceed.      Written consent was obtained by [facility].    EQUIPMENT USED:  Needle-37mm stimulating/recording  EMG/NCS Machine     SKIN PREPARATION:  Skin preparation was performed using an alcohol wipe.     GUIDANCE DESCRIPTION:  Electro-myographic guidance was necessary throughout the procedure to accurately identify all areas of dystonic muscles while avoiding injection of non-dystonic muscles, neighboring nerves and nearby vascular structures.     AREA/MUSCLE INJECTED:    1. HEAD & NECK MUSCLES: 250 units Botox = Total Dose, 1:1 Dilution for 150 units Botox and 2:1 dilution for 100 units Botox to occipitalis   Right Rectus Capitis (upper cervical) - 10 units of Botox at 1 site/s.  (1:1 dilution)  Left Rectus Capitis (upper cervical) - 10 units of Botox at 1 site/s. (1:1 dilution)      Right Mid-Trapezius (mid cervical) - 5 units of Botox at 1 site/s. (1:1 dilution)  Left Mid-Trapezius (mid cervical) - 5 units of Botox at 1 site/s.(1:1 dilution)      Right Semispinalis - 10 units of Botox at 1 site/s. (1:1 dilution)  Left Semispinalis - 10 units of Botox at 1 site/s.(1:1 dilution)      Right Splenius Cervicis/Capitus - 10 units of Botox at 1 site/s. (1:1 dilution)  Left Splenius Cervicis/Capitus - 10 units of Botox at 1 site/s.(1:1 dilution)      Right Levator Scapulae - 15 units of Botox at 1 site/s. (1:1 dilution)  Left Levator Scapulae - 15 units of Botox at 1 site/s.(1:1 dilution)      Right Longissimus - 10 units of Botox at 1 site/s. (1:1 dilution)  Left Longissimus - 10 units of Botox at 1 site/s.(1:1 dilution)      Right Inferior Oblique - 15 units of Botox at 1 site/s.(1:1 dilution)  Left Inferior Oblique - 15 units of Botox at 1 site/s.(1:1 dilution)      Right occipitalis - 50 units of Botox at 5 site/s (2:1 dilution).               Left occipitalis - 50  units of Botox at 5 site/s (2:1 dilution).    RESPONSE TO PROCEDURE:  Roxanna Celis tolerated the procedure well and there were no immediate complications.  She was allowed to recover for an appropriate period of time and was discharged home in stable condition.    FOLLOW UP:  Roxanna Celis was asked to follow up by phone in 7-14 days with Tiffany Lezama PT, Care Coordinator or Josefina King RN, Care Coordinator, to report her response to this series of injections.  Based on the patient's previous response to this therapy, Roxanna Celis was rescheduled for the next series of injections in 12 weeks.    PLAN (Medication Changes, Therapy Orders, Work or Disability Issues, etc.): Will monitor response to today's injections and report.

## 2019-04-15 NOTE — LETTER
"4/15/2019       RE: Roxanna Celis  [id number] [place] [address]     Dear Colleague,    Thank you for referring your patient, Roxanna Celis, to the [facility] PHYSICAL MEDICINE AND REHABILITATION at [facility]. Please see a copy of my visit note below.    BOTULINUM TOXIN PROCEDURE NOTE    Chief Complaint   Patient presents with     RECHECK     UMP- BOTOX INJECTION       /81 (BP Location: Left arm, Patient Position: Chair, Cuff Size: Adult Large)   Pulse 83   Ht 1.676 m (5' 6\")   BMI 39.38 kg/m         Current Outpatient Medications:      aspirin-acetaminophen-caffeine (EXCEDRIN MIGRAINE) 250-250-65 MG per tablet, Take 1 tablet by mouth every 6 hours as needed., Disp: , Rfl:      BOTOX 100 units injection, Inject 250 Units into the muscle once Lot # /C3 with Expiration Date:  11/2020, Disp: , Rfl:      ClonAZEPAM (KLONOPIN) 1 MG tablet, Take 1 mg by mouth 2 times daily as needed., Disp: , Rfl:      Desvenlafaxine Succinate (PRISTIQ) 100 MG TB24 24 hr tablet, Take 1 tablet by mouth daily., Disp: , Rfl:      DULoxetine HCl (CYMBALTA PO), Take 60 mg by mouth, Disp: , Rfl:      EPINEPHrine (EPIPEN) 0.3 MG/0.3ML injection, Inject 0.3 mg into the muscle once as needed., Disp: , Rfl:      gabapentin (NEURONTIN) 100 MG capsule, Take 1 capsule (100 mg) by mouth At Bedtime, Disp: 90 capsule, Rfl: 1     hydrochlorothiazide (HYDRODIURIL) 50 MG tablet, Take 50 mg by mouth daily, Disp: , Rfl:      Ibuprofen (ADVIL) 200 MG capsule, Take 200 mg by mouth every 4 hours as needed., Disp: , Rfl:      LINZESS 290 MCG capsule, TAKE 1 CAPSULE BY MOUTH EVERY DAY, Disp: , Rfl: 3     OLANZapine (ZYPREXA PO), Take 15 mg by mouth, Disp: , Rfl:      OnabotulinumtoxinA (BOTOX IJ), Inject 250 Units into the muscle Lot # /C3  Exp: 4/2021, Disp: , Rfl:      ONABOTULINUMTOXINA IJ, Inject 250 Units as directed once LOT # /C3 EXPIRATION: 03/2020, Disp: , Rfl:      PROGESTERONE 200 MG " CAPS COMPOUND (PROGESTERONE 200 MG CAPS COMPOUND), Take 4 capsules by mouth daily., Disp: , Rfl:      solifenacin (VESICARE) 10 MG tablet, Take 1 tablet by mouth daily., Disp: , Rfl:      spironolactone (ALDACTONE) 100 MG tablet, Take 100 mg by mouth daily, Disp: , Rfl:      topiramate (TOPAMAX) 100 MG tablet, Take 0.5 tablets by mouth 2 times daily., Disp: , Rfl:      Allergies   Allergen Reactions     Ivp Dye [Contrast Dye] Anaphylaxis     Lisinopril-Hctz Anaphylaxis     Maple Tree Anaphylaxis     Allergy includes maple syrup.     No Clinical Screening - See Comments      maple syrup==anaphylaxis  Dairy- causes to not feel well     Gluten Itching and Swelling     Codeine Sulfate Cramps and GI Disturbance     Erythromycin Nausea and Vomiting and Cramps     Nsaids Other (See Comments)     Patient had bariatric surgery. Should not ever take NSAIDS due to high risk for gastric ulcers.         PHYSICAL EXAM:  NECK AND TRUNK PATTERN:   Head Tremor: Pt states tremor is intermittent but not today  Left Side-bending: Present  Left Shoulder Elevation: slightly elevated  Head with right shift  Focalized pain at occipitalis  - none today     HPI:    Patient denies new medical diagnoses, illnesses, hospitalizations, emergency room visits, and injuries since the previous injection with botulinum neurotoxin.     Completed physical therapy in February, noted to have great benefit.  Continues to see the chiropractor qas well     We reviewed the recommended safety guidelines for  Botox from any vaccine injection, such as the seasonal flu vaccine, by a minimum of 10-14 days with Roxanna KEE Lalita. She acknowledged understanding.    RESPONSE TO PREVIOUS TREATMENT:    Roxanna Celis received 250 units of Botox on 01/21/2019    Problems following the previous series of neurotoxin injections included:  Post-procedural headache: Rated as 'Moderate' severity.  Duration: 2 weeks then wore off    BENEFITS BY PATIENT REPORT:    Pain  Improvement: Yes.  Percent Improvement: 80 %    Duration of Benefit:  still somewhat ongoing    Dystonia Improvement: Yes.  Percent Improvement: 80 %    Duration of Benefit:  still somewhat ongoing.      BOTULINUM NEUROTOXIN INJECTION PROCEDURES:    VERIFICATION OF PATIENT IDENTIFICATION AND PROCEDURE     Initials   Patient Name scp   Patient  scp   Procedure Verified by: [facility]     Prior to the start of the procedure and with procedural staff participation, I verbally confirmed the patient s identity using two indicators, relevant allergies, that the procedure was appropriate and matched the consent or emergent situation, and that the correct equipment/implants were available. Immediately prior to starting the procedure I conducted the Time Out with the procedural staff and re-confirmed the patient s name, procedure, and site/side. (The Joint Commission universal protocol was followed.)  Yes    Sedation (Moderate or Deep): None      Above assessments performed by:  Resident/Fellow         Sharda Rosenberg MD          The attending provider was present for the entire procedure documented below.    INDICATION/S FOR PROCEDURE/S:  Roxanna Celis is a 48 year old patient with dystonia affecting the  head, neck and shoulder girdle musculature secondary to a diagnosis of cervical dystonia with associated  pain, tremor, spasms, loss of joint motion, loss of volitional motor control and difficulty with activities of daily living.      Her baseline symptoms have been recalcitrant to oral medications and conservative therapy.  She is here today for an injection of Botox.       GOAL OF PROCEDURE:  The goal of this procedure is to increase active range of motion, improve volitional motor control, decrease pain  and enhance functional independence associated with dystonic movements.    TOTAL DOSE ADMINISTERED:  Dose Administered:  250 units Botox    Diluent Used:  Preservative Free Normal Saline  Total Volume of Diluent Used:  2.50  ml  Lot # /C3 with Expiration Date:  08/2021  NDC #: Botox 100u ([address]-86)    Medication guide was offered to patient and was declined.  Was there drug waste? Yes  Amount of drug waste (mL): 50 units of Botox.  Reason for waste:  Single use vial  Multi-dose vial: No    Sharda Rosenberg MD  April 15, 2019    CONSENT:  The risks, benefits, and treatment options were discussed with Roxanna KEE Lalita and she agreed to proceed.      Written consent was obtained by [facility].    EQUIPMENT USED:  Needle-37mm stimulating/recording  EMG/NCS Machine     SKIN PREPARATION:  Skin preparation was performed using an alcohol wipe.     GUIDANCE DESCRIPTION:  Electro-myographic guidance was necessary throughout the procedure to accurately identify all areas of dystonic muscles while avoiding injection of non-dystonic muscles, neighboring nerves and nearby vascular structures.     AREA/MUSCLE INJECTED:    1. HEAD & NECK MUSCLES: 250 units Botox = Total Dose, 1:1 Dilution for 150 units Botox and 2:1 dilution for 100 units Botox to occipitalis   Right Rectus Capitis (upper cervical) - 10 units of Botox at 1 site/s.  (1:1 dilution)  Left Rectus Capitis (upper cervical) - 10 units of Botox at 1 site/s. (1:1 dilution)      Right Mid-Trapezius (mid cervical) - 5 units of Botox at 1 site/s. (1:1 dilution)  Left Mid-Trapezius (mid cervical) - 5 units of Botox at 1 site/s.(1:1 dilution)      Right Semispinalis - 10 units of Botox at 1 site/s. (1:1 dilution)  Left Semispinalis - 10 units of Botox at 1 site/s.(1:1 dilution)      Right Splenius Cervicis/Capitus - 10 units of Botox at 1 site/s. (1:1 dilution)  Left Splenius Cervicis/Capitus - 10 units of Botox at 1 site/s.(1:1 dilution)      Right Levator Scapulae - 15 units of Botox at 1 site/s. (1:1 dilution)  Left Levator Scapulae - 15 units of Botox at 1 site/s.(1:1 dilution)      Right Longissimus - 10 units of Botox at 1 site/s. (1:1 dilution)  Left Longissimus - 10 units of Botox at 1  site/s.(1:1 dilution)      Right Inferior Oblique - 15 units of Botox at 1 site/s.(1:1 dilution)  Left Inferior Oblique - 15 units of Botox at 1 site/s.(1:1 dilution)      Right occipitalis - 50 units of Botox at 5 site/s (2:1 dilution).               Left occipitalis - 50 units of Botox at 5 site/s (2:1 dilution).    RESPONSE TO PROCEDURE:  Roxanna Celis tolerated the procedure well and there were no immediate complications.  She was allowed to recover for an appropriate period of time and was discharged home in stable condition.    FOLLOW UP:  Roxanna Celis was asked to follow up by phone in 7-14 days with Tifafny Lezama PT, Care Coordinator or Josefina King RN, Care Coordinator, to report her response to this series of injections.  Based on the patient's previous response to this therapy, Roxanna Celis was rescheduled for the next series of injections in 12 weeks.    PLAN (Medication Changes, Therapy Orders, Work or Disability Issues, etc.): Will monitor response to today's injections and report.    Again, thank you for allowing me to participate in the care of your patient.      Sincerely,    Haja Bradley MD

## 2019-07-08 ENCOUNTER — OFFICE VISIT (OUTPATIENT)
Dept: PHYSICAL MEDICINE AND REHAB | Facility: CLINIC | Age: 49
End: 2019-07-08
Payer: COMMERCIAL

## 2019-07-08 VITALS
TEMPERATURE: 97.3 F | RESPIRATION RATE: 16 BRPM | DIASTOLIC BLOOD PRESSURE: 74 MMHG | SYSTOLIC BLOOD PRESSURE: 114 MMHG | OXYGEN SATURATION: 96 % | HEART RATE: 81 BPM | WEIGHT: 255 LBS | BODY MASS INDEX: 40.98 KG/M2 | HEIGHT: 66 IN

## 2019-07-08 DIAGNOSIS — G24.3 SPASMODIC TORTICOLLIS: Primary | ICD-10-CM

## 2019-07-08 DIAGNOSIS — G24.1 GENETIC TORSION DYSTONIA: ICD-10-CM

## 2019-07-08 ASSESSMENT — PAIN SCALES - GENERAL: PAINLEVEL: MILD PAIN (3)

## 2019-07-08 ASSESSMENT — MIFFLIN-ST. JEOR: SCORE: 1793.42

## 2019-07-08 NOTE — LETTER
"7/8/2019        RE: Roxanna Celis  [address] [place] [address]     Dear Colleague,    Thank you for referring your patient, Roxanna Celis, to the [facility] PHYSICAL MEDICINE AND REHABILITATION at [facility]. Please see a copy of my visit note below.    BOTULINUM TOXIN PROCEDURE NOTE    Chief Complaint   Patient presents with     Dystonia     [facility] RETURN BOTOX- CERICAL DYSTONIA     /74   Pulse 81   Temp 97.3  F (36.3  C) (Oral)   Resp 16   Ht 1.676 m (5' 6\")   Wt 115.7 kg (255 lb)   SpO2 96%   BMI 41.16 kg/m       Current Outpatient Medications:      aspirin-acetaminophen-caffeine (EXCEDRIN MIGRAINE) 250-250-65 MG per tablet, Take 1 tablet by mouth every 6 hours as needed., Disp: , Rfl:      BOTOX 100 units injection, Inject 250 Units into the muscle once Lot # /C3 with Expiration Date:  11/2020, Disp: , Rfl:      ClonAZEPAM (KLONOPIN) 1 MG tablet, Take 1 mg by mouth 2 times daily as needed., Disp: , Rfl:      Desvenlafaxine Succinate (PRISTIQ) 100 MG TB24 24 hr tablet, Take 1 tablet by mouth daily., Disp: , Rfl:      DULoxetine HCl (CYMBALTA PO), Take 60 mg by mouth, Disp: , Rfl:      hydrochlorothiazide (HYDRODIURIL) 50 MG tablet, Take 50 mg by mouth daily, Disp: , Rfl:      Ibuprofen (ADVIL) 200 MG capsule, Take 200 mg by mouth every 4 hours as needed., Disp: , Rfl:      LINZESS 290 MCG capsule, TAKE 1 CAPSULE BY MOUTH EVERY DAY, Disp: , Rfl: 3     OLANZapine (ZYPREXA PO), Take 15 mg by mouth, Disp: , Rfl:      OnabotulinumtoxinA (BOTOX IJ), Inject 250 Units into the muscle Lot # /C3  Exp: 4/2021, Disp: , Rfl:      ONABOTULINUMTOXINA IJ, Inject 250 Units as directed once LOT # /C3 EXPIRATION: 03/2020, Disp: , Rfl:      spironolactone (ALDACTONE) 100 MG tablet, Take 100 mg by mouth daily, Disp: , Rfl:      EPINEPHrine (EPIPEN) 0.3 MG/0.3ML injection, Inject 0.3 mg into the muscle once as needed., Disp: , Rfl:      gabapentin (NEURONTIN) 100 " MG capsule, Take 1 capsule (100 mg) by mouth At Bedtime (Patient not taking: Reported on 7/8/2019), Disp: 90 capsule, Rfl: 1     PROGESTERONE 200 MG CAPS COMPOUND (PROGESTERONE 200 MG CAPS COMPOUND), Take 4 capsules by mouth daily., Disp: , Rfl:      solifenacin (VESICARE) 10 MG tablet, Take 1 tablet by mouth daily., Disp: , Rfl:      topiramate (TOPAMAX) 100 MG tablet, Take 0.5 tablets by mouth 2 times daily., Disp: , Rfl:      Allergies   Allergen Reactions     Ivp Dye [Contrast Dye] Anaphylaxis     Lisinopril-Hctz Anaphylaxis     Maple Tree Anaphylaxis     Allergy includes maple syrup.     No Clinical Screening - See Comments      maple syrup==anaphylaxis  Dairy- causes to not feel well     Gluten Itching and Swelling     Codeine Sulfate Cramps and GI Disturbance     Erythromycin Nausea and Vomiting and Cramps     Nsaids Other (See Comments)     Patient had bariatric surgery. Should not ever take NSAIDS due to high risk for gastric ulcers.         PHYSICAL EXAM:  NECK AND TRUNK PATTERN:   Head Tremor: Pt states tremor is intermittent but not today  Left Side-bending: Present  Left Shoulder Elevation: slightly elevated  Head with right shift  Focalized pain at occipitalis - none today     HPI:    Patient denies new medical diagnoses, illnesses, hospitalizations, emergency room visits, and injuries since the previous injection with botulinum neurotoxin.     We reviewed the recommended safety guidelines for  Botox from any vaccine injection, such as the seasonal flu vaccine, by a minimum of 10-14 days with Roxanna Celis. She acknowledged understanding.    RESPONSE TO PREVIOUS TREATMENT:    Roxanna Celis received 250 units of Botox on 04/15/2019    Problems following the previous series of neurotoxin injections included:  No problems reported    BENEFITS BY PATIENT REPORT:    Pain Improvement: Yes.  Percent Improvement: 75%    Duration of Benefit:  still somewhat ongoing    Dystonia Improvement: Yes.   Percent Improvement:75 %    Duration of Benefit:  still somewhat ongoing.      BOTULINUM NEUROTOXIN INJECTION PROCEDURES:    VERIFICATION OF PATIENT IDENTIFICATION AND PROCEDURE     Initials   Patient Name alp   Patient  alp   Procedure Verified by: payal     Prior to the start of the procedure and with procedural staff participation, I verbally confirmed the patient s identity using two indicators, relevant allergies, that the procedure was appropriate and matched the consent or emergent situation, and that the correct equipment/implants were available. Immediately prior to starting the procedure I conducted the Time Out with the procedural staff and re-confirmed the patient s name, procedure, and site/side. (The Joint Commission universal protocol was followed.)  Yes    Sedation (Moderate or Deep): None      Above assessments performed by:  Resident/Fellow         María Elena Zamora           The attending provider was present for the entire procedure documented below.    America Gonzalez MD      INDICATION/S FOR PROCEDURE/S:  Roxanna Celis is a 48 year old patient with dystonia affecting the  head, neck and shoulder girdle musculature secondary to a diagnosis of cervical dystonia with associated  pain, tremor, spasms, loss of joint motion, loss of volitional motor control and difficulty with activities of daily living.      Her baseline symptoms have been recalcitrant to oral medications and conservative therapy.  She is here today for an injection of Botox.       GOAL OF PROCEDURE:  The goal of this procedure is to increase active range of motion, improve volitional motor control, decrease pain  and enhance functional independence associated with dystonic movements.    TOTAL DOSE ADMINISTERED:  Dose Administered:  250 units Botox    Diluent Used:  Preservative Free Normal Saline  Total Volume of Diluent Used:  2.5 ml  Lot # /C3 with Expiration Date:  2022  NDC #: Botox 100u ([address]-77)    Was there  drug waste? Yes  Amount of drug waste (mL): 50 units of Botox.  Reason for waste:  Single use vial  Multi-dose vial: No    America Gonzalez MD   July 8, 2019    Medication guide was offered to patient and was declined.    CONSENT:  The risks, benefits, and treatment options were discussed with Roxanna Celis and she agreed to proceed.      Written consent was obtained by [facility].    EQUIPMENT USED:  Needle-37mm stimulating/recording  EMG/NCS Machine     SKIN PREPARATION:  Skin preparation was performed using an alcohol wipe.     GUIDANCE DESCRIPTION:  Electro-myographic guidance was necessary throughout the procedure to accurately identify all areas of dystonic muscles while avoiding injection of non-dystonic muscles, neighboring nerves and nearby vascular structures.     AREA/MUSCLE INJECTED:  250 UNITS BOTOX = TOTAL DOSE    1. HEAD & NECK MUSCLES: 250 units Botox = Total Dose    Right Rectus Capitis (upper cervical) - 10 units of Botox at 1 site/s.  (1:1 dilution)  Left Rectus Capitis (upper cervical) - 10 units of Botox at 1 site/s. (1:1 dilution)      Right Mid-Trapezius (mid cervical) - 5 units of Botox at 1 site/s. (1:1 dilution)  Left Mid-Trapezius (mid cervical) - 5 units of Botox at 1 site/s.(1:1 dilution)      Right Semispinalis - 10 units of Botox at 1 site/s. (1:1 dilution)  Left Semispinalis - 10 units of Botox at 1 site/s.(1:1 dilution)      Right Splenius Cervicis/Capitus - 10 units of Botox at 1 site/s. (1:1 dilution)  Left Splenius Cervicis/Capitus - 10 units of Botox at 1 site/s.(1:1 dilution)      Right Levator Scapulae - 15 units of Botox at 1 site/s. (1:1 dilution)  Left Levator Scapulae - 15 units of Botox at 1 site/s.(1:1 dilution)      Right Longissimus - 10 units of Botox at 1 site/s. (1:1 dilution)  Left Longissimus - 10 units of Botox at 1 site/s.(1:1 dilution)      Right Inferior Oblique - 15 units of Botox at 1 site/s.(1:1 dilution)  Left Inferior Oblique - 15 units of Botox at 1  site/s.(1:1 dilution)      Right occipitalis - 50 units of Botox at 5 site/s (2:1 dilution).               Left occipitalis - 50 units of Botox at 5 site/s (2:1 dilution).    RESPONSE TO PROCEDURE:  Roxanna Celis tolerated the procedure well and there were no immediate complications.  She was allowed to recover for an appropriate period of time and was discharged home in stable condition.    FOLLOW UP:  Roxanna Celis was asked to follow up by phone in 7-14 days with Tiffany Lezama PT, Care Coordinator or Josefina King RN, Care Coordinator, to report her response to this series of injections.  Based on the patient's previous response to this therapy, Roxanna Celis was rescheduled for the next series of injections in 12 weeks.    PLAN (Medication Changes, Therapy Orders, Work or Disability Issues, etc.): Will monitor response to today's injections and report.    Again, thank you for allowing me to participate in the care of your patient.      Sincerely,    America Gonzalez MD

## 2019-07-08 NOTE — PROGRESS NOTES
"BOTULINUM TOXIN PROCEDURE NOTE    Chief Complaint   Patient presents with     Dystonia     [facility] RETURN BOTOX- CERICAL DYSTONIA     /74   Pulse 81   Temp 97.3  F (36.3  C) (Oral)   Resp 16   Ht 1.676 m (5' 6\")   Wt 115.7 kg (255 lb)   SpO2 96%   BMI 41.16 kg/m      Current Outpatient Medications:      aspirin-acetaminophen-caffeine (EXCEDRIN MIGRAINE) 250-250-65 MG per tablet, Take 1 tablet by mouth every 6 hours as needed., Disp: , Rfl:      BOTOX 100 units injection, Inject 250 Units into the muscle once Lot # /C3 with Expiration Date:  11/2020, Disp: , Rfl:      ClonAZEPAM (KLONOPIN) 1 MG tablet, Take 1 mg by mouth 2 times daily as needed., Disp: , Rfl:      Desvenlafaxine Succinate (PRISTIQ) 100 MG TB24 24 hr tablet, Take 1 tablet by mouth daily., Disp: , Rfl:      DULoxetine HCl (CYMBALTA PO), Take 60 mg by mouth, Disp: , Rfl:      hydrochlorothiazide (HYDRODIURIL) 50 MG tablet, Take 50 mg by mouth daily, Disp: , Rfl:      Ibuprofen (ADVIL) 200 MG capsule, Take 200 mg by mouth every 4 hours as needed., Disp: , Rfl:      LINZESS 290 MCG capsule, TAKE 1 CAPSULE BY MOUTH EVERY DAY, Disp: , Rfl: 3     OLANZapine (ZYPREXA PO), Take 15 mg by mouth, Disp: , Rfl:      OnabotulinumtoxinA (BOTOX IJ), Inject 250 Units into the muscle Lot # /C3  Exp: 4/2021, Disp: , Rfl:      ONABOTULINUMTOXINA IJ, Inject 250 Units as directed once LOT # /C3 EXPIRATION: 03/2020, Disp: , Rfl:      spironolactone (ALDACTONE) 100 MG tablet, Take 100 mg by mouth daily, Disp: , Rfl:      EPINEPHrine (EPIPEN) 0.3 MG/0.3ML injection, Inject 0.3 mg into the muscle once as needed., Disp: , Rfl:      gabapentin (NEURONTIN) 100 MG capsule, Take 1 capsule (100 mg) by mouth At Bedtime (Patient not taking: Reported on 7/8/2019), Disp: 90 capsule, Rfl: 1     PROGESTERONE 200 MG CAPS COMPOUND (PROGESTERONE 200 MG CAPS COMPOUND), Take 4 capsules by mouth daily., Disp: , Rfl:      solifenacin (VESICARE) 10 MG tablet, Take 1 tablet " by mouth daily., Disp: , Rfl:      topiramate (TOPAMAX) 100 MG tablet, Take 0.5 tablets by mouth 2 times daily., Disp: , Rfl:      Allergies   Allergen Reactions     Ivp Dye [Contrast Dye] Anaphylaxis     Lisinopril-Hctz Anaphylaxis     Maple Tree Anaphylaxis     Allergy includes maple syrup.     No Clinical Screening - See Comments      maple syrup==anaphylaxis  Dairy- causes to not feel well     Gluten Itching and Swelling     Codeine Sulfate Cramps and GI Disturbance     Erythromycin Nausea and Vomiting and Cramps     Nsaids Other (See Comments)     Patient had bariatric surgery. Should not ever take NSAIDS due to high risk for gastric ulcers.         PHYSICAL EXAM:  NECK AND TRUNK PATTERN:   Head Tremor: Pt states tremor is intermittent but not today  Left Side-bending: Present  Left Shoulder Elevation: slightly elevated  Head with right shift  Focalized pain at occipitalis - none today     HPI:    Patient denies new medical diagnoses, illnesses, hospitalizations, emergency room visits, and injuries since the previous injection with botulinum neurotoxin.     We reviewed the recommended safety guidelines for  Botox from any vaccine injection, such as the seasonal flu vaccine, by a minimum of 10-14 days with Roxanna Celis. She acknowledged understanding.    RESPONSE TO PREVIOUS TREATMENT:    Roxanna Celis received 250 units of Botox on 04/15/2019    Problems following the previous series of neurotoxin injections included:  No problems reported    BENEFITS BY PATIENT REPORT:    Pain Improvement: Yes.  Percent Improvement: 75%    Duration of Benefit:  still somewhat ongoing    Dystonia Improvement: Yes.  Percent Improvement:75 %    Duration of Benefit:  still somewhat ongoing.      BOTULINUM NEUROTOXIN INJECTION PROCEDURES:    VERIFICATION OF PATIENT IDENTIFICATION AND PROCEDURE     Initials   Patient Name alp   Patient  alp   Procedure Verified by: alp     Prior to the start of the procedure and with  procedural staff participation, I verbally confirmed the patient s identity using two indicators, relevant allergies, that the procedure was appropriate and matched the consent or emergent situation, and that the correct equipment/implants were available. Immediately prior to starting the procedure I conducted the Time Out with the procedural staff and re-confirmed the patient s name, procedure, and site/side. (The Joint Commission universal protocol was followed.)  Yes    Sedation (Moderate or Deep): None      Above assessments performed by:  Resident/Fellow         María Elena Zamora           The attending provider was present for the entire procedure documented below.    America Gonzalez MD      INDICATION/S FOR PROCEDURE/S:  Roxanna Celis is a 48 year old patient with dystonia affecting the  head, neck and shoulder girdle musculature secondary to a diagnosis of cervical dystonia with associated  pain, tremor, spasms, loss of joint motion, loss of volitional motor control and difficulty with activities of daily living.      Her baseline symptoms have been recalcitrant to oral medications and conservative therapy.  She is here today for an injection of Botox.       GOAL OF PROCEDURE:  The goal of this procedure is to increase active range of motion, improve volitional motor control, decrease pain  and enhance functional independence associated with dystonic movements.    TOTAL DOSE ADMINISTERED:  Dose Administered:  250 units Botox    Diluent Used:  Preservative Free Normal Saline  Total Volume of Diluent Used:  2.5 ml  Lot # /C3 with Expiration Date:  01/2022  NDC #: Botox 100u ([address]-47)    Was there drug waste? Yes  Amount of drug waste (mL): 50 units of Botox.  Reason for waste:  Single use vial  Multi-dose vial: No    America Gonzalez MD   July 8, 2019    Medication guide was offered to patient and was declined.    CONSENT:  The risks, benefits, and treatment options were discussed with Roxanna KEE  Lalita and she agreed to proceed.      Written consent was obtained by [facility].    EQUIPMENT USED:  Needle-37mm stimulating/recording  EMG/NCS Machine     SKIN PREPARATION:  Skin preparation was performed using an alcohol wipe.     GUIDANCE DESCRIPTION:  Electro-myographic guidance was necessary throughout the procedure to accurately identify all areas of dystonic muscles while avoiding injection of non-dystonic muscles, neighboring nerves and nearby vascular structures.     AREA/MUSCLE INJECTED:  250 UNITS BOTOX = TOTAL DOSE    1. HEAD & NECK MUSCLES: 250 units Botox = Total Dose    Right Rectus Capitis (upper cervical) - 10 units of Botox at 1 site/s.  (1:1 dilution)  Left Rectus Capitis (upper cervical) - 10 units of Botox at 1 site/s. (1:1 dilution)      Right Mid-Trapezius (mid cervical) - 5 units of Botox at 1 site/s. (1:1 dilution)  Left Mid-Trapezius (mid cervical) - 5 units of Botox at 1 site/s.(1:1 dilution)      Right Semispinalis - 10 units of Botox at 1 site/s. (1:1 dilution)  Left Semispinalis - 10 units of Botox at 1 site/s.(1:1 dilution)      Right Splenius Cervicis/Capitus - 10 units of Botox at 1 site/s. (1:1 dilution)  Left Splenius Cervicis/Capitus - 10 units of Botox at 1 site/s.(1:1 dilution)      Right Levator Scapulae - 15 units of Botox at 1 site/s. (1:1 dilution)  Left Levator Scapulae - 15 units of Botox at 1 site/s.(1:1 dilution)      Right Longissimus - 10 units of Botox at 1 site/s. (1:1 dilution)  Left Longissimus - 10 units of Botox at 1 site/s.(1:1 dilution)      Right Inferior Oblique - 15 units of Botox at 1 site/s.(1:1 dilution)  Left Inferior Oblique - 15 units of Botox at 1 site/s.(1:1 dilution)      Right occipitalis - 50 units of Botox at 5 site/s (2:1 dilution).               Left occipitalis - 50 units of Botox at 5 site/s (2:1 dilution).    RESPONSE TO PROCEDURE:  Roxanna Celis tolerated the procedure well and there were no immediate complications.  She was allowed to recover  for an appropriate period of time and was discharged home in stable condition.    FOLLOW UP:  Roxanna Celis was asked to follow up by phone in 7-14 days with Tiffany Lezama PT, Care Coordinator or Josefina King RN, Care Coordinator, to report her response to this series of injections.  Based on the patient's previous response to this therapy, Roxanna Celis was rescheduled for the next series of injections in 12 weeks.    PLAN (Medication Changes, Therapy Orders, Work or Disability Issues, etc.): Will monitor response to today's injections and report.

## 2019-07-08 NOTE — NURSING NOTE
Chief Complaint   Patient presents with     Dystonia     [facility] RETURN BOTOX- CERICAL DYSTONIA       Rafiq Goodwin, EMT

## 2019-09-30 ENCOUNTER — OFFICE VISIT (OUTPATIENT)
Dept: PHYSICAL MEDICINE AND REHAB | Facility: CLINIC | Age: 49
End: 2019-09-30
Payer: COMMERCIAL

## 2019-09-30 VITALS
HEART RATE: 90 BPM | BODY MASS INDEX: 40.53 KG/M2 | DIASTOLIC BLOOD PRESSURE: 75 MMHG | HEIGHT: 66 IN | SYSTOLIC BLOOD PRESSURE: 115 MMHG | TEMPERATURE: 97.7 F | WEIGHT: 252.2 LBS

## 2019-09-30 DIAGNOSIS — G24.1 GENETIC TORSION DYSTONIA: ICD-10-CM

## 2019-09-30 DIAGNOSIS — G24.3 SPASMODIC TORTICOLLIS: Primary | ICD-10-CM

## 2019-09-30 ASSESSMENT — MIFFLIN-ST. JEOR: SCORE: 1780.72

## 2019-09-30 ASSESSMENT — PAIN SCALES - GENERAL: PAINLEVEL: EXTREME PAIN (8)

## 2019-09-30 NOTE — PROGRESS NOTES
"BOTULINUM TOXIN PROCEDURE NOTE    Chief Complaint   Patient presents with     RECHECK     [facility]- BOTOX INJECTION     /75 (BP Location: Left arm, Patient Position: Chair, Cuff Size: Adult Large)   Pulse 90   Temp 97.7  F (36.5  C) (Oral)   Ht 1.676 m (5' 6\")   Wt 114.4 kg (252 lb 3.2 oz)   BMI 40.71 kg/m      Current Outpatient Medications:      aspirin-acetaminophen-caffeine (EXCEDRIN MIGRAINE) 250-250-65 MG per tablet, Take 1 tablet by mouth every 6 hours as needed., Disp: , Rfl:      BOTOX 100 units injection, Inject 250 Units into the muscle once Lot # /C3 with Expiration Date:  11/2020, Disp: , Rfl:      ClonAZEPAM (KLONOPIN) 1 MG tablet, Take 1 mg by mouth 2 times daily as needed., Disp: , Rfl:      Desvenlafaxine Succinate (PRISTIQ) 100 MG TB24 24 hr tablet, Take 1 tablet by mouth daily., Disp: , Rfl:      DULoxetine HCl (CYMBALTA PO), Take 60 mg by mouth, Disp: , Rfl:      EPINEPHrine (EPIPEN) 0.3 MG/0.3ML injection, Inject 0.3 mg into the muscle once as needed., Disp: , Rfl:      gabapentin (NEURONTIN) 100 MG capsule, Take 1 capsule (100 mg) by mouth At Bedtime, Disp: 90 capsule, Rfl: 1     hydrochlorothiazide (HYDRODIURIL) 50 MG tablet, Take 50 mg by mouth daily, Disp: , Rfl:      Ibuprofen (ADVIL) 200 MG capsule, Take 200 mg by mouth every 4 hours as needed., Disp: , Rfl:      LINZESS 290 MCG capsule, TAKE 1 CAPSULE BY MOUTH EVERY DAY, Disp: , Rfl: 3     OLANZapine (ZYPREXA PO), Take 15 mg by mouth, Disp: , Rfl:      OnabotulinumtoxinA (BOTOX IJ), Inject 250 Units into the muscle Lot # /C3  Exp: 4/2021, Disp: , Rfl:      ONABOTULINUMTOXINA IJ, Inject 250 Units as directed once LOT # /C3 EXPIRATION: 03/2020, Disp: , Rfl:      PROGESTERONE 200 MG CAPS COMPOUND (PROGESTERONE 200 MG CAPS COMPOUND), Take 4 capsules by mouth daily., Disp: , Rfl:      solifenacin (VESICARE) 10 MG tablet, Take 1 tablet by mouth daily., Disp: , Rfl:      spironolactone (ALDACTONE) 100 MG tablet, Take 100 mg " by mouth daily, Disp: , Rfl:      topiramate (TOPAMAX) 100 MG tablet, Take 0.5 tablets by mouth 2 times daily., Disp: , Rfl:      Allergies   Allergen Reactions     Ivp Dye [Contrast Dye] Anaphylaxis     Lisinopril-Hctz Anaphylaxis     Maple Tree Anaphylaxis     Allergy includes maple syrup.     No Clinical Screening - See Comments      maple syrup==anaphylaxis  Dairy- causes to not feel well     Gluten Itching and Swelling     Codeine Sulfate Cramps and GI Disturbance     Erythromycin Nausea and Vomiting and Cramps     Nsaids Other (See Comments)     Patient had bariatric surgery. Should not ever take NSAIDS due to high risk for gastric ulcers.         PHYSICAL EXAM:  NECK AND TRUNK PATTERN:   Head Tremor: Pt states tremor is intermittent but not today  Left Side-bending: Present  Left Shoulder Elevation: slightly elevated  Head with right shift  Focalized pain at occipitalis - none today     HPI:    Patient denies new medical diagnoses, illnesses, hospitalizations, emergency room visits, and injuries since the previous injection with botulinum neurotoxin.     We reviewed the recommended safety guidelines for  Botox from any vaccine injection, such as the seasonal flu vaccine, by a minimum of 10-14 days with Roxanna Celis. She acknowledged understanding.    RESPONSE TO PREVIOUS TREATMENT:    Roxanna Celis received 250 units of Botox on 2019.    Problems following the previous series of neurotoxin injections included:  No problems reported    BENEFITS BY PATIENT REPORT:    Pain Improvement: Yes.  Percent Improvement:  70%    Duration of Benefit:  8-9 weeks of benefit then significant worsening over the past 3 weeks.    Dystonia Improvement: Yes.  Percent Improvement:  70%    Duration of Benefit:  8-9 weeks and followed by a gradual reduction in benefit.      BOTULINUM NEUROTOXIN INJECTION PROCEDURES:    VERIFICATION OF PATIENT IDENTIFICATION AND PROCEDURE     Initials   Patient Name tlb   Patient   tlb   Procedure Verified by: tlb     Prior to the start of the procedure and with procedural staff participation, I verbally confirmed the patient s identity using two indicators, relevant allergies, that the procedure was appropriate and matched the consent or emergent situation, and that the correct equipment/implants were available. Immediately prior to starting the procedure I conducted the Time Out with the procedural staff and re-confirmed the patient s name, procedure, and site/side. (The Joint Commission universal protocol was followed.)  Yes    Sedation (Moderate or Deep): None      Above assessments performed by:  Tiffany Lezama, PT - Care Coordinator    America Gonzalez MD      INDICATION/S FOR PROCEDURE/S:  Roxanna Celis is a 48 year old patient with dystonia affecting the  head, neck and shoulder girdle musculature secondary to a diagnosis of cervical dystonia with associated  pain, tremor, spasms, loss of joint motion, loss of volitional motor control and difficulty with activities of daily living.     To improve her benefit and the duration of effect, we will increase her dose from 250 units of Botox to 275 units of Botox.  We discussed this with Ms. Celis.  She acknowledged understanding and is in agreement with this plan.     Her baseline symptoms have been recalcitrant to oral medications and conservative therapy.  She is here today for an injection of Botox.       GOAL OF PROCEDURE:  The goal of this procedure is to increase active range of motion, improve volitional motor control, decrease pain  and enhance functional independence associated with dystonic movements.    TOTAL DOSE ADMINISTERED:  Dose Administered:  275 units Botox    Diluent Used:  Preservative Free Normal Saline  Total Volume of Diluent Used:  See dilutional details below  Lot # /C3 with Expiration Date:  10/2021.  NDC #: Botox 100u ([address]-55)    Was there drug waste? Yes  Amount of drug waste (mL): 25 units of  Botox.  Reason for waste:  Single use vial  Multi-dose vial: No    Tiffany VERMA Lezama, PT   September 30, 2019    Medication guide was offered to patient and was declined.    CONSENT:  The risks, benefits, and treatment options were discussed with Roxanna KEE Lalita and she agreed to proceed.      Written consent was obtained by TLB.    EQUIPMENT USED:  Needle-37mm stimulating/recording  EMG/NCS Machine     SKIN PREPARATION:  Skin preparation was performed using an alcohol wipe.     GUIDANCE DESCRIPTION:  Electro-myographic guidance was necessary throughout the procedure to accurately identify all areas of dystonic muscles while avoiding injection of non-dystonic muscles, neighboring nerves and nearby vascular structures.     AREA/MUSCLE INJECTED:  275 UNITS BOTOX = TOTAL DOSE    1. HEAD & NECK MUSCLES: 275 units Botox = Total Dose       Right Rectus Capitis (upper cervical) - 10 units of Botox at 1 site/s.  (1:1 dilution)  Left Rectus Capitis (upper cervical) - 10 units of Botox at 1 site/s. (1:1 dilution)      Right Mid-Trapezius (mid cervical) - 5 units of Botox at 1 site/s. (1:1 dilution)  Left Mid-Trapezius (mid cervical) - 5 units of Botox at 1 site/s.(1:1 dilution)      Right Semispinalis - 10 units of Botox at 1 site/s. (1:1 dilution)  Left Semispinalis - 10 units of Botox at 1 site/s.(1:1 dilution)      Right Splenius Cervicis/Capitus - 10 units of Botox at 1 site/s. (1:1 dilution)  Left Splenius Cervicis/Capitus - 10 units of Botox at 1 site/s.(1:1 dilution)      Right Levator Scapulae - 15 units of Botox at 1 site/s. (1:1 dilution)  Left Levator Scapulae - 15 units of Botox at 1 site/s.(1:1 dilution)      Right Longissimus - 10 units of Botox at 1 site/s. (1:1 dilution)  Left Longissimus - 10 units of Botox at 1 site/s.(1:1 dilution)      Right Inferior Oblique - 15 units of Botox at 1 site/s.(1:1 dilution)  Left Inferior Oblique - 15 units of Botox at 1 site/s.(1:1 dilution)      Right occipitalis - 60 units of  Botox at 10 site/s (2:1 dilution).               Left occipitalis - 65 units of Botox at 11 site/s (2:1 dilution).      RESPONSE TO PROCEDURE:  Roxanna Celis tolerated the procedure well and there were no immediate complications.  She was allowed to recover for an appropriate period of time and was discharged home in stable condition.    FOLLOW UP:  Roxanna Celis was asked to follow up by phone in 7-14 days with Tiffany Lezama PT, Care Coordinator or Josefina King RN, Care Coordinator, to report her response to this series of injections.  Based on the patient's previous response to this therapy, Roxanna Celis was rescheduled for the next series of injections in 12 weeks.    PLAN (Medication Changes, Therapy Orders, Work or Disability Issues, etc.): Will monitor response to today's injections and report.

## 2019-09-30 NOTE — LETTER
"9/30/2019       RE: Roxanna Celis  [address] [place] [address]     Dear Colleague,    Thank you for referring your patient, Roxanna Celis, to the [facility] PHYSICAL MEDICINE AND REHABILITATION at [facility]. Please see a copy of my visit note below.    BOTULINUM TOXIN PROCEDURE NOTE    Chief Complaint   Patient presents with     RECHECK     [facility]- BOTOX INJECTION     /75 (BP Location: Left arm, Patient Position: Chair, Cuff Size: Adult Large)   Pulse 90   Temp 97.7  F (36.5  C) (Oral)   Ht 1.676 m (5' 6\")   Wt 114.4 kg (252 lb 3.2 oz)   BMI 40.71 kg/m       Current Outpatient Medications:      aspirin-acetaminophen-caffeine (EXCEDRIN MIGRAINE) 250-250-65 MG per tablet, Take 1 tablet by mouth every 6 hours as needed., Disp: , Rfl:      BOTOX 100 units injection, Inject 250 Units into the muscle once Lot # /C3 with Expiration Date:  11/2020, Disp: , Rfl:      ClonAZEPAM (KLONOPIN) 1 MG tablet, Take 1 mg by mouth 2 times daily as needed., Disp: , Rfl:      Desvenlafaxine Succinate (PRISTIQ) 100 MG TB24 24 hr tablet, Take 1 tablet by mouth daily., Disp: , Rfl:      DULoxetine HCl (CYMBALTA PO), Take 60 mg by mouth, Disp: , Rfl:      EPINEPHrine (EPIPEN) 0.3 MG/0.3ML injection, Inject 0.3 mg into the muscle once as needed., Disp: , Rfl:      gabapentin (NEURONTIN) 100 MG capsule, Take 1 capsule (100 mg) by mouth At Bedtime, Disp: 90 capsule, Rfl: 1     hydrochlorothiazide (HYDRODIURIL) 50 MG tablet, Take 50 mg by mouth daily, Disp: , Rfl:      Ibuprofen (ADVIL) 200 MG capsule, Take 200 mg by mouth every 4 hours as needed., Disp: , Rfl:      LINZESS 290 MCG capsule, TAKE 1 CAPSULE BY MOUTH EVERY DAY, Disp: , Rfl: 3     OLANZapine (ZYPREXA PO), Take 15 mg by mouth, Disp: , Rfl:      OnabotulinumtoxinA (BOTOX IJ), Inject 250 Units into the muscle Lot # /C3  Exp: 4/2021, Disp: , Rfl:      ONABOTULINUMTOXINA IJ, Inject 250 Units as directed once LOT # " /C3 EXPIRATION: 03/2020, Disp: , Rfl:      PROGESTERONE 200 MG CAPS COMPOUND (PROGESTERONE 200 MG CAPS COMPOUND), Take 4 capsules by mouth daily., Disp: , Rfl:      solifenacin (VESICARE) 10 MG tablet, Take 1 tablet by mouth daily., Disp: , Rfl:      spironolactone (ALDACTONE) 100 MG tablet, Take 100 mg by mouth daily, Disp: , Rfl:      topiramate (TOPAMAX) 100 MG tablet, Take 0.5 tablets by mouth 2 times daily., Disp: , Rfl:      Allergies   Allergen Reactions     Ivp Dye [Contrast Dye] Anaphylaxis     Lisinopril-Hctz Anaphylaxis     Maple Tree Anaphylaxis     Allergy includes maple syrup.     No Clinical Screening - See Comments      maple syrup==anaphylaxis  Dairy- causes to not feel well     Gluten Itching and Swelling     Codeine Sulfate Cramps and GI Disturbance     Erythromycin Nausea and Vomiting and Cramps     Nsaids Other (See Comments)     Patient had bariatric surgery. Should not ever take NSAIDS due to high risk for gastric ulcers.         PHYSICAL EXAM:  NECK AND TRUNK PATTERN:   Head Tremor: Pt states tremor is intermittent but not today  Left Side-bending: Present  Left Shoulder Elevation: slightly elevated  Head with right shift  Focalized pain at occipitalis - none today     HPI:    Patient denies new medical diagnoses, illnesses, hospitalizations, emergency room visits, and injuries since the previous injection with botulinum neurotoxin.     We reviewed the recommended safety guidelines for  Botox from any vaccine injection, such as the seasonal flu vaccine, by a minimum of 10-14 days with Roxanna Celis. She acknowledged understanding.    RESPONSE TO PREVIOUS TREATMENT:    Roxanna Celis received 250 units of Botox on 09/30/2019.    Problems following the previous series of neurotoxin injections included:  No problems reported    BENEFITS BY PATIENT REPORT:    Pain Improvement: Yes.  Percent Improvement:  70%    Duration of Benefit:  8-9 weeks of benefit then significant worsening  over the past 3 weeks.    Dystonia Improvement: Yes.  Percent Improvement:  70%    Duration of Benefit:  8-9 weeks and followed by a gradual reduction in benefit.      BOTULINUM NEUROTOXIN INJECTION PROCEDURES:    VERIFICATION OF PATIENT IDENTIFICATION AND PROCEDURE     Initials   Patient Name tlb   Patient  tlb   Procedure Verified by: maxim     Prior to the start of the procedure and with procedural staff participation, I verbally confirmed the patient s identity using two indicators, relevant allergies, that the procedure was appropriate and matched the consent or emergent situation, and that the correct equipment/implants were available. Immediately prior to starting the procedure I conducted the Time Out with the procedural staff and re-confirmed the patient s name, procedure, and site/side. (The Joint Commission universal protocol was followed.)  Yes    Sedation (Moderate or Deep): None      Above assessments performed by:  Tiffany Lezama, PT - Care Coordinator    America Gonzalez MD      INDICATION/S FOR PROCEDURE/S:  Roxanna Celis is a 48 year old patient with dystonia affecting the  head, neck and shoulder girdle musculature secondary to a diagnosis of cervical dystonia with associated  pain, tremor, spasms, loss of joint motion, loss of volitional motor control and difficulty with activities of daily living.     To improve her benefit and the duration of effect, we will increase her dose from 250 units of Botox to 275 units of Botox.  We discussed this with Ms. Celis.  She acknowledged understanding and is in agreement with this plan.     Her baseline symptoms have been recalcitrant to oral medications and conservative therapy.  She is here today for an injection of Botox.       GOAL OF PROCEDURE:  The goal of this procedure is to increase active range of motion, improve volitional motor control, decrease pain  and enhance functional independence associated with dystonic movements.    TOTAL DOSE  ADMINISTERED:  Dose Administered:  275 units Botox    Diluent Used:  Preservative Free Normal Saline  Total Volume of Diluent Used:  See dilutional details below  Lot # /C3 with Expiration Date:  10/2021.  NDC #: Botox 100u ([address]-58)    Was there drug waste? Yes  Amount of drug waste (mL): 25 units of Botox.  Reason for waste:  Single use vial  Multi-dose vial: No    Tiffany VERMA Teja, PT   September 30, 2019    Medication guide was offered to patient and was declined.    CONSENT:  The risks, benefits, and treatment options were discussed with Roxanna KEE Lalita and she agreed to proceed.      Written consent was obtained by TLB.    EQUIPMENT USED:  Needle-37mm stimulating/recording  EMG/NCS Machine     SKIN PREPARATION:  Skin preparation was performed using an alcohol wipe.     GUIDANCE DESCRIPTION:  Electro-myographic guidance was necessary throughout the procedure to accurately identify all areas of dystonic muscles while avoiding injection of non-dystonic muscles, neighboring nerves and nearby vascular structures.     AREA/MUSCLE INJECTED:  275 UNITS BOTOX = TOTAL DOSE    1. HEAD & NECK MUSCLES: 275 units Botox = Total Dose       Right Rectus Capitis (upper cervical) - 10 units of Botox at 1 site/s.  (1:1 dilution)  Left Rectus Capitis (upper cervical) - 10 units of Botox at 1 site/s. (1:1 dilution)      Right Mid-Trapezius (mid cervical) - 5 units of Botox at 1 site/s. (1:1 dilution)  Left Mid-Trapezius (mid cervical) - 5 units of Botox at 1 site/s.(1:1 dilution)      Right Semispinalis - 10 units of Botox at 1 site/s. (1:1 dilution)  Left Semispinalis - 10 units of Botox at 1 site/s.(1:1 dilution)      Right Splenius Cervicis/Capitus - 10 units of Botox at 1 site/s. (1:1 dilution)  Left Splenius Cervicis/Capitus - 10 units of Botox at 1 site/s.(1:1 dilution)      Right Levator Scapulae - 15 units of Botox at 1 site/s. (1:1 dilution)  Left Levator Scapulae - 15 units of Botox at 1 site/s.(1:1  dilution)      Right Longissimus - 10 units of Botox at 1 site/s. (1:1 dilution)  Left Longissimus - 10 units of Botox at 1 site/s.(1:1 dilution)      Right Inferior Oblique - 15 units of Botox at 1 site/s.(1:1 dilution)  Left Inferior Oblique - 15 units of Botox at 1 site/s.(1:1 dilution)      Right occipitalis - 60 units of Botox at 10 site/s (2:1 dilution).               Left occipitalis - 65 units of Botox at 11 site/s (2:1 dilution).      RESPONSE TO PROCEDURE:  Roxanna Celis tolerated the procedure well and there were no immediate complications.  She was allowed to recover for an appropriate period of time and was discharged home in stable condition.    FOLLOW UP:  Roxanna Celis was asked to follow up by phone in 7-14 days with Tiffany Lezama PT, Care Coordinator or Josefina King RN, Care Coordinator, to report her response to this series of injections.  Based on the patient's previous response to this therapy, Roxanna Celis was rescheduled for the next series of injections in 12 weeks.    PLAN (Medication Changes, Therapy Orders, Work or Disability Issues, etc.): Will monitor response to today's injections and report.    Again, thank you for allowing me to participate in the care of your patient.      Sincerely,    America Gonzalez MD

## 2019-10-28 DIAGNOSIS — M54.2 NECK PAIN: ICD-10-CM

## 2019-10-28 DIAGNOSIS — G24.3 SPASMODIC TORTICOLLIS: Primary | ICD-10-CM

## 2019-10-29 ENCOUNTER — DOCUMENTATION ONLY (OUTPATIENT)
Dept: PHYSICAL MEDICINE AND REHAB | Facility: CLINIC | Age: 49
End: 2019-10-29

## 2019-10-29 NOTE — PROGRESS NOTES
Faxed referral to [facility] in [place]. Attached the demographics and last office note from Dr. Gonzalez. Faxed to [phone]

## 2019-11-19 ENCOUNTER — TRANSFERRED RECORDS (OUTPATIENT)
Dept: HEALTH INFORMATION MANAGEMENT | Facility: CLINIC | Age: 49
End: 2019-11-19

## 2019-12-04 ENCOUNTER — CARE COORDINATION (OUTPATIENT)
Dept: PHYSICAL MEDICINE AND REHAB | Facility: CLINIC | Age: 49
End: 2019-12-04

## 2019-12-04 NOTE — PROGRESS NOTES
MsElisabeth Roxanna Lalita called and asked that medical records be faxed to [facility] at fax # [phone] (phone: [phone]) in preparation for an appointment she has on 12/16/2019.  Verbal permission was obtained to release her records and they were faxed as noted above.

## 2019-12-17 DIAGNOSIS — G24.3 SPASMODIC TORTICOLLIS: Primary | ICD-10-CM

## 2019-12-17 DIAGNOSIS — G24.1 GENETIC TORSION DYSTONIA: ICD-10-CM

## 2019-12-30 ENCOUNTER — OFFICE VISIT (OUTPATIENT)
Dept: PHYSICAL MEDICINE AND REHAB | Facility: CLINIC | Age: 49
End: 2019-12-30
Payer: COMMERCIAL

## 2019-12-30 VITALS
RESPIRATION RATE: 16 BRPM | HEIGHT: 66 IN | BODY MASS INDEX: 41.14 KG/M2 | HEART RATE: 75 BPM | DIASTOLIC BLOOD PRESSURE: 76 MMHG | OXYGEN SATURATION: 98 % | WEIGHT: 256 LBS | SYSTOLIC BLOOD PRESSURE: 125 MMHG

## 2019-12-30 DIAGNOSIS — G24.3 SPASMODIC TORTICOLLIS: Primary | ICD-10-CM

## 2019-12-30 DIAGNOSIS — G24.1 GENETIC TORSION DYSTONIA: ICD-10-CM

## 2019-12-30 ASSESSMENT — MIFFLIN-ST. JEOR: SCORE: 1792.96

## 2019-12-30 ASSESSMENT — PAIN SCALES - GENERAL: PAINLEVEL: EXTREME PAIN (8)

## 2019-12-30 NOTE — LETTER
"12/30/2019       RE: Roxanna Celis  [address] [place] [address]     Dear Colleague,    Thank you for referring your patient, Roxanna Celis, to the [facility] PHYSICAL MEDICINE AND REHABILITATION at [facility]. Please see a copy of my visit note below.    BOTULINUM TOXIN PROCEDURE NOTE    Chief Complaint   Patient presents with     Botox     ump return botox      /76 (BP Location: Right arm, Patient Position: Chair, Cuff Size: Adult Large)   Pulse 75   Resp 16   Ht 1.676 m (5' 6\")   Wt 116.1 kg (256 lb)   SpO2 98%   BMI 41.32 kg/m       Current Outpatient Medications:      aspirin-acetaminophen-caffeine (EXCEDRIN MIGRAINE) 250-250-65 MG per tablet, Take 1 tablet by mouth every 6 hours as needed., Disp: , Rfl:      BOTOX 100 units injection, Inject 250 Units into the muscle once Lot # /C3 with Expiration Date:  11/2020, Disp: , Rfl:      ClonAZEPAM (KLONOPIN) 1 MG tablet, Take 1 mg by mouth 2 times daily as needed., Disp: , Rfl:      Desvenlafaxine Succinate (PRISTIQ) 100 MG TB24 24 hr tablet, Take 1 tablet by mouth daily., Disp: , Rfl:      DULoxetine HCl (CYMBALTA PO), Take 60 mg by mouth, Disp: , Rfl:      EPINEPHrine (EPIPEN) 0.3 MG/0.3ML injection, Inject 0.3 mg into the muscle once as needed., Disp: , Rfl:      gabapentin (NEURONTIN) 100 MG capsule, Take 1 capsule (100 mg) by mouth At Bedtime, Disp: 90 capsule, Rfl: 1     hydrochlorothiazide (HYDRODIURIL) 50 MG tablet, Take 50 mg by mouth daily, Disp: , Rfl:      Ibuprofen (ADVIL) 200 MG capsule, Take 200 mg by mouth every 4 hours as needed., Disp: , Rfl:      LINZESS 290 MCG capsule, TAKE 1 CAPSULE BY MOUTH EVERY DAY, Disp: , Rfl: 3     OLANZapine (ZYPREXA PO), Take 15 mg by mouth, Disp: , Rfl:      OnabotulinumtoxinA (BOTOX IJ), Inject 250 Units into the muscle Lot # /C3  Exp: 4/2021, Disp: , Rfl:      ONABOTULINUMTOXINA IJ, Inject 250 Units as directed once LOT # /C3 EXPIRATION: " 03/2020, Disp: , Rfl:      PROGESTERONE 200 MG CAPS COMPOUND (PROGESTERONE 200 MG CAPS COMPOUND), Take 4 capsules by mouth daily., Disp: , Rfl:      solifenacin (VESICARE) 10 MG tablet, Take 1 tablet by mouth daily., Disp: , Rfl:      spironolactone (ALDACTONE) 100 MG tablet, Take 100 mg by mouth daily, Disp: , Rfl:      topiramate (TOPAMAX) 100 MG tablet, Take 0.5 tablets by mouth 2 times daily., Disp: , Rfl:     Current Facility-Administered Medications:      incobotulinumtoxin A (XEOMIN) 100 units injection 300 Units, 300 Units, Intramuscular, Q90 Days, America Gonzalez MD     Allergies   Allergen Reactions     Ivp Dye [Contrast Dye] Anaphylaxis     Lisinopril-Hctz Anaphylaxis     Maple Tree Anaphylaxis     Allergy includes maple syrup.     No Clinical Screening - See Comments      maple syrup==anaphylaxis  Dairy- causes to not feel well     Gluten Itching and Swelling     Codeine Sulfate Cramps and GI Disturbance     Erythromycin Nausea and Vomiting and Cramps     Nsaids Other (See Comments)     Patient had bariatric surgery. Should not ever take NSAIDS due to high risk for gastric ulcers.         PHYSICAL EXAM:  NECK AND TRUNK PATTERN:   Head Tremor: Pt states tremor is intermittent   Left Side-bending: Present but patient states she feels she has more right side bending now  Left Shoulder Elevation: slightly elevated  Head with right shift  Focalized pain at occipitalis -  has pain today during visit    HPI:    Patient denies new medical diagnoses, illnesses, hospitalizations, emergency room visits, and injuries since the previous injection with botulinum neurotoxin.     We reviewed the recommended safety guidelines for  Botox from any vaccine injection, such as the seasonal flu vaccine, by a minimum of 10-14 days with Roxanna Celis. She acknowledged understanding.      RESPONSE TO PREVIOUS TREATMENT:    Roxanna Celis received 275 units of Botox on 09/30/2019, which was an increase from  previous dose of 275 units Botox.    Patient reports that she has been participating in Physical Therapy sessions since November. She is not sure whether it is helping.    Problems following the previous series of neurotoxin injections included:  No problems reported    BENEFITS BY PATIENT REPORT:    Pain Improvement: Very minimal improvement throughout duration of injection interval.       Dystonia Improvement: Very minimal improvement throughout duration of injection interval.       BOTULINUM NEUROTOXIN INJECTION PROCEDURES:    VERIFICATION OF PATIENT IDENTIFICATION AND PROCEDURE     Initials   Patient Name fj   Patient  fj   Procedure Verified by: rony     Prior to the start of the procedure and with procedural staff participation, I verbally confirmed the patient s identity using two indicators, relevant allergies, that the procedure was appropriate and matched the consent or emergent situation, and that the correct equipment/implants were available. Immediately prior to starting the procedure I conducted the Time Out with the procedural staff and re-confirmed the patient s name, procedure, and site/side. (The Joint Commission universal protocol was followed.)  Yes    Sedation (Moderate or Deep): None      Above assessments performed by:  Mercedez Morales MD- Resident Physician          The attending provider was present for the entire procedure documented below.      America Gonzalez MD      INDICATION/S FOR PROCEDURE/S:  Roxanna Celis is a 49-year-old patient with dystonia affecting the head, neck and shoulder girdle musculature secondary to a diagnosis of cervical dystonia with associated  pain, tremor, spasms, loss of joint motion, loss of volitional motor control and difficulty with activities of daily living.     At the last visit, patient increased her Botox dose from 250 units of Botox to 275 units.  As discussed with Ms. Celis, since she has not received any benefit after the dose was increased to 275  units, we will inject Xeomin. We discussed this with Ms. Celis.  She acknowledged understanding and is in agreement with this plan.     Her baseline symptoms have been recalcitrant to oral medications and conservative therapy. She is here today for an injection of Xeomin.       GOAL OF PROCEDURE:  The goal of this procedure is to increase active range of motion, improve volitional motor control, decrease pain  and enhance functional independence associated with dystonic movements.    TOTAL DOSE ADMINISTERED:  Dose Administered:  200 units Xeomin  Diluent Used:  Preservative Free Normal Saline  Total Volume of Diluent Used:  4 ml  Lot # [id number] with Expiration Date:  1/2022  NDC #: Xeomin 100u ([address]-45)     Medication guide was offered to patient and was declined.    CONSENT:  The risks, benefits, and treatment options were discussed with Roxanna Celis and she agreed to proceed.      Written consent was obtained by .    EQUIPMENT USED:  Needle-37mm stimulating/recording  EMG/NCS Machine     SKIN PREPARATION:  Skin preparation was performed using an alcohol wipe.     GUIDANCE DESCRIPTION:  Electro-myographic guidance was necessary throughout the procedure to accurately identify all areas of dystonic muscles while avoiding injection of non-dystonic muscles, neighboring nerves and nearby vascular structures.     AREA/MUSCLE INJECTED:  200 UNITS XEOMIN = TOTAL DOSE    1. HEAD & NECK MUSCLES: 200 UNITS XEOMIN = TOTAL DOSE;  2:1 DILUTION     Right Rectus Capitis (upper cervical) - 5 units of Xeomin at 1 site/s.    Left Rectus Capitis (upper cervical) - 5 units of Xeomin at 1 site/s.       Right Mid-Trapezius (mid cervical) - 5 units of Xeomin at 1 site/s.  Left Mid-Trapezius (mid cervical) - 5 units of Xeomin at 1 site/s.      Right Splenius Cervicis/Capitus - 5 units of Xeomin at 1 site/s.   Left Splenius Cervicis/Capitus - 5 units of Xeomin at 1 site/s.      Right Levator Scapulae - 5 units of Xeomin at 1  site/s.   Left Levator Scapulae - 5 units of Xeomin at 1 site/s.      Right Longissimus - 5 units of Xeomin at 1 site/s.   Left Longissimus - 5 units of Xeomin at 1 site/s.      Right Occipitalis - 75 units of Xeomin at 20 site/s.              Left Occipitalis - 75 units of Xeomin at 20 site/s.      RESPONSE TO PROCEDURE:  Roxanna Celis tolerated the procedure well and there were no immediate complications. She was allowed to recover for an appropriate period of time and was discharged home in stable condition.    FOLLOW UP:  Roxanna Celis was asked to follow up by phone in 7-14 days with Tiffany Lezama PT, Care Coordinator or Josefina King RN, Care Coordinator, to report her response to this series of injections. Based on the patient's previous response to this therapy, Roxanna Celis was rescheduled for the next series of injections in 12 weeks.    PLAN (Medication Changes, Therapy Orders, Work or Disability Issues, etc.): Will monitor response to today's injections and report.    Again, thank you for allowing me to participate in the care of your patient.      Sincerely,    America Gonzalez MD

## 2019-12-30 NOTE — PROGRESS NOTES
"BOTULINUM TOXIN PROCEDURE NOTE    Chief Complaint   Patient presents with     Botox     ump return botox      /76 (BP Location: Right arm, Patient Position: Chair, Cuff Size: Adult Large)   Pulse 75   Resp 16   Ht 1.676 m (5' 6\")   Wt 116.1 kg (256 lb)   SpO2 98%   BMI 41.32 kg/m      Current Outpatient Medications:      aspirin-acetaminophen-caffeine (EXCEDRIN MIGRAINE) 250-250-65 MG per tablet, Take 1 tablet by mouth every 6 hours as needed., Disp: , Rfl:      BOTOX 100 units injection, Inject 250 Units into the muscle once Lot # /C3 with Expiration Date:  11/2020, Disp: , Rfl:      ClonAZEPAM (KLONOPIN) 1 MG tablet, Take 1 mg by mouth 2 times daily as needed., Disp: , Rfl:      Desvenlafaxine Succinate (PRISTIQ) 100 MG TB24 24 hr tablet, Take 1 tablet by mouth daily., Disp: , Rfl:      DULoxetine HCl (CYMBALTA PO), Take 60 mg by mouth, Disp: , Rfl:      EPINEPHrine (EPIPEN) 0.3 MG/0.3ML injection, Inject 0.3 mg into the muscle once as needed., Disp: , Rfl:      gabapentin (NEURONTIN) 100 MG capsule, Take 1 capsule (100 mg) by mouth At Bedtime, Disp: 90 capsule, Rfl: 1     hydrochlorothiazide (HYDRODIURIL) 50 MG tablet, Take 50 mg by mouth daily, Disp: , Rfl:      Ibuprofen (ADVIL) 200 MG capsule, Take 200 mg by mouth every 4 hours as needed., Disp: , Rfl:      LINZESS 290 MCG capsule, TAKE 1 CAPSULE BY MOUTH EVERY DAY, Disp: , Rfl: 3     OLANZapine (ZYPREXA PO), Take 15 mg by mouth, Disp: , Rfl:      OnabotulinumtoxinA (BOTOX IJ), Inject 250 Units into the muscle Lot # /C3  Exp: 4/2021, Disp: , Rfl:      ONABOTULINUMTOXINA IJ, Inject 250 Units as directed once LOT # /C3 EXPIRATION: 03/2020, Disp: , Rfl:      PROGESTERONE 200 MG CAPS COMPOUND (PROGESTERONE 200 MG CAPS COMPOUND), Take 4 capsules by mouth daily., Disp: , Rfl:      solifenacin (VESICARE) 10 MG tablet, Take 1 tablet by mouth daily., Disp: , Rfl:      spironolactone (ALDACTONE) 100 MG tablet, Take 100 mg by mouth daily, Disp: " , Rfl:      topiramate (TOPAMAX) 100 MG tablet, Take 0.5 tablets by mouth 2 times daily., Disp: , Rfl:     Current Facility-Administered Medications:      incobotulinumtoxin A (XEOMIN) 100 units injection 300 Units, 300 Units, Intramuscular, Q90 Days, America Gonzalez MD     Allergies   Allergen Reactions     Ivp Dye [Contrast Dye] Anaphylaxis     Lisinopril-Hctz Anaphylaxis     Maple Tree Anaphylaxis     Allergy includes maple syrup.     No Clinical Screening - See Comments      maple syrup==anaphylaxis  Dairy- causes to not feel well     Gluten Itching and Swelling     Codeine Sulfate Cramps and GI Disturbance     Erythromycin Nausea and Vomiting and Cramps     Nsaids Other (See Comments)     Patient had bariatric surgery. Should not ever take NSAIDS due to high risk for gastric ulcers.         PHYSICAL EXAM:  NECK AND TRUNK PATTERN:   Head Tremor: Pt states tremor is intermittent   Left Side-bending: Present but patient states she feels she has more right side bending now  Left Shoulder Elevation: slightly elevated  Head with right shift  Focalized pain at occipitalis - has pain today during visit    HPI:    Patient denies new medical diagnoses, illnesses, hospitalizations, emergency room visits, and injuries since the previous injection with botulinum neurotoxin.     We reviewed the recommended safety guidelines for  Botox from any vaccine injection, such as the seasonal flu vaccine, by a minimum of 10-14 days with Roxanna Celis. She acknowledged understanding.      RESPONSE TO PREVIOUS TREATMENT:    Roxanna Celis received 275 units of Botox on 09/30/2019, which was an increase from previous dose of 275 units Botox.    Patient reports that she has been participating in Physical Therapy sessions since November. She is not sure whether it is helping.    Problems following the previous series of neurotoxin injections included:  No problems reported    BENEFITS BY PATIENT REPORT:    Pain  Improvement: Very minimal improvement throughout duration of injection interval.       Dystonia Improvement: Very minimal improvement throughout duration of injection interval.       BOTULINUM NEUROTOXIN INJECTION PROCEDURES:    VERIFICATION OF PATIENT IDENTIFICATION AND PROCEDURE     Initials   Patient Name fj   Patient  fj   Procedure Verified by: rony     Prior to the start of the procedure and with procedural staff participation, I verbally confirmed the patient s identity using two indicators, relevant allergies, that the procedure was appropriate and matched the consent or emergent situation, and that the correct equipment/implants were available. Immediately prior to starting the procedure I conducted the Time Out with the procedural staff and re-confirmed the patient s name, procedure, and site/side. (The Joint Commission universal protocol was followed.)  Yes    Sedation (Moderate or Deep): None      Above assessments performed by:  Mercedez Morales MD- Resident Physician          The attending provider was present for the entire procedure documented below.      America Gonzalez MD      INDICATION/S FOR PROCEDURE/S:  Roxanna Celis is a 49-year-old patient with dystonia affecting the head, neck and shoulder girdle musculature secondary to a diagnosis of cervical dystonia with associated  pain, tremor, spasms, loss of joint motion, loss of volitional motor control and difficulty with activities of daily living.     At the last visit, patient increased her Botox dose from 250 units of Botox to 275 units.  As discussed with Ms. Celis, since she has not received any benefit after the dose was increased to 275 units, we will inject Xeomin. We discussed this with Ms. Celis.  She acknowledged understanding and is in agreement with this plan.     Her baseline symptoms have been recalcitrant to oral medications and conservative therapy. She is here today for an injection of Xeomin.       GOAL OF PROCEDURE:  The  goal of this procedure is to increase active range of motion, improve volitional motor control, decrease pain  and enhance functional independence associated with dystonic movements.    TOTAL DOSE ADMINISTERED:  Dose Administered:  200 units Xeomin  Diluent Used:  Preservative Free Normal Saline  Total Volume of Diluent Used:  4 ml  Lot # [id number] with Expiration Date:  1/2022  NDC #: Xeomin 100u ([address]-67)     Medication guide was offered to patient and was declined.    CONSENT:  The risks, benefits, and treatment options were discussed with Roxanna KEE Lalita and she agreed to proceed.      Written consent was obtained by FJ.    EQUIPMENT USED:  Needle-37mm stimulating/recording  EMG/NCS Machine     SKIN PREPARATION:  Skin preparation was performed using an alcohol wipe.     GUIDANCE DESCRIPTION:  Electro-myographic guidance was necessary throughout the procedure to accurately identify all areas of dystonic muscles while avoiding injection of non-dystonic muscles, neighboring nerves and nearby vascular structures.     AREA/MUSCLE INJECTED:  200 UNITS XEOMIN = TOTAL DOSE    1. HEAD & NECK MUSCLES: 200 UNITS XEOMIN = TOTAL DOSE;  2:1 DILUTION     Right Rectus Capitis (upper cervical) - 5 units of Xeomin at 1 site/s.    Left Rectus Capitis (upper cervical) - 5 units of Xeomin at 1 site/s.       Right Mid-Trapezius (mid cervical) - 5 units of Xeomin at 1 site/s.  Left Mid-Trapezius (mid cervical) - 5 units of Xeomin at 1 site/s.      Right Splenius Cervicis/Capitus - 5 units of Xeomin at 1 site/s.   Left Splenius Cervicis/Capitus - 5 units of Xeomin at 1 site/s.      Right Levator Scapulae - 5 units of Xeomin at 1 site/s.   Left Levator Scapulae - 5 units of Xeomin at 1 site/s.      Right Longissimus - 5 units of Xeomin at 1 site/s.   Left Longissimus - 5 units of Xeomin at 1 site/s.      Right Occipitalis - 75 units of Xeomin at 20 site/s.              Left Occipitalis - 75 units of Xeomin at 20 site/s.      RESPONSE  TO PROCEDURE:  Roxanna Celis tolerated the procedure well and there were no immediate complications. She was allowed to recover for an appropriate period of time and was discharged home in stable condition.    FOLLOW UP:  Roxanna Celis was asked to follow up by phone in 7-14 days with Tiffany Lezama PT, Care Coordinator or Josefina King RN, Care Coordinator, to report her response to this series of injections. Based on the patient's previous response to this therapy, Roxanna Celis was rescheduled for the next series of injections in 12 weeks.    PLAN (Medication Changes, Therapy Orders, Work or Disability Issues, etc.): Will monitor response to today's injections and report.

## 2020-01-07 ENCOUNTER — DOCUMENTATION ONLY (OUTPATIENT)
Dept: PHYSICAL MEDICINE AND REHAB | Facility: CLINIC | Age: 50
End: 2020-01-07

## 2020-01-07 ENCOUNTER — TRANSFERRED RECORDS (OUTPATIENT)
Dept: HEALTH INFORMATION MANAGEMENT | Facility: CLINIC | Age: 50
End: 2020-01-07

## 2020-01-29 ENCOUNTER — OFFICE VISIT (OUTPATIENT)
Dept: PHYSICAL MEDICINE AND REHAB | Facility: CLINIC | Age: 50
End: 2020-01-29
Payer: COMMERCIAL

## 2020-01-29 VITALS
SYSTOLIC BLOOD PRESSURE: 142 MMHG | OXYGEN SATURATION: 99 % | HEART RATE: 85 BPM | RESPIRATION RATE: 16 BRPM | DIASTOLIC BLOOD PRESSURE: 87 MMHG | WEIGHT: 255.2 LBS | BODY MASS INDEX: 41.19 KG/M2

## 2020-01-29 DIAGNOSIS — M54.2 ACUTE NECK PAIN: Primary | ICD-10-CM

## 2020-01-29 RX ORDER — METHYLPREDNISOLONE 4 MG
TABLET, DOSE PACK ORAL
Qty: 21 TABLET | Refills: 0 | Status: SHIPPED | OUTPATIENT
Start: 2020-01-29 | End: 2021-06-01

## 2020-01-29 NOTE — LETTER
1/29/2020       RE: Rxoanna Celis  [address] [place] [address]     Dear Colleague,    Thank you for referring your patient, Roxanna Celis, to the [facility] PHYSICAL MEDICINE AND REHABILITATION at [facility]. Please see a copy of my visit note below.    [facility]  [facility]  Physical Medicine & Rehabilitation Progress Note    Roxanna Celis   YOB: 1970  MRN: [id number]   Date of Service: 01/29/20      History of Present Illness:  Roxanna Celis is a 49 year old female with a history of spasmodic torticollis who presents to clinic today for follow up after her last series of botulinum toxin injections on 12/30/2019. Patient has been a long-time patient in this clinic for several years, and had been experiencing significant improvement with regards to pain, dystonic pulling and involuntary muscle spasms up until the last several months to a year when the injections started to lose their efficacy. After her round of Botox on 9/30/2019, which was essentially ineffective despite escalation of dose from 250 units to 275 units, the decision was made to switch to Xeomin at her next visit out of concern that patient had been developing neutralizing antibodies against Botox.  A lower dose of Xeomin of 200 units was administered at her 12/30/2019 visit to prevent unwanted side effects in the case that the Xeomin was more effective.  Unfortunately, the patient is now 4 weeks out from these injections and has experienced no benefit today.    Today, patient comes to clinic in tears over the severity of her symptoms.  She reports severe muscle spasms in her upper neck down into her shoulders, and severe pain at the base of her skull.  She states that the vertebrae in her spine feel like they are jamming up together. The pain radiates from the base of her skull, to bitemporal regions, down her trapezius muscles and into the  proximal aspects of her arms bilaterally.  Her typical pain is a 10 out of 10 and essentially leaves her incapacitated.  She has been attempting massage, alternating heat/cold, Flexeril, trigger point injections, chiropractic adjustments, and physical therapy with no improvement in her symptoms.  Her ability to participate in PT has been significantly limited by severe pain.    Roxanna had a cervical spine MRI on 12/26/2019 for arm paresthesias, weakness, numbness and stabbing pain.  This was revealing for a small C5-6 disc bulge without herniation, stenosis or impingement.  The patient does not currently report any numbness, tingling, or weakness.  No bowel or bladder changes.      Physical Examination:  VS: BP (!) 142/87   Pulse 85   Resp 16   Wt 115.8 kg (255 lb 3.2 oz)   SpO2 99%   BMI 41.19 kg/m        Pleasant and cooperative  Patient is distressed, very limited movement is tolerated in her cervical spine  Cervical flexion reproduces skull base pain      Imaging:    EXAM: MR CERVICAL SPINE WITHOUT CONTRAST    CLINICAL INFORMATION: Arm paresthesias and stabbing pain, weakness and numbness.    COMPARISON: 11/5/2011.    CONTRAST: None.    SEDATION: None.    TECHNICAL INFORMATION: This exam was performed on the 1.5 scanner. T1, T2 MPGR, T2 FSE and STIR sagittal thin sections through the cervical spine with T2 gradient refocused and T2 FSE axial sections at selected levels.    INTERPRETATION: The cord is intrinsically normal, and craniovertebral junction structures are unremarkable. Vertebral alignment is well maintained, and there is no central stenosis. A nonspecific right thyroid gland cyst or nodule measures 16 mm x 9 mm in size.    Normal flow signals are present in the vertebral arteries. All cervical discs are preserved in height and hydration status.    C5-6 disc bulge without herniation, stenosis or impingement. All remaining dorsal disc margins are normal and foramina appear patent.    Facet joints  appear intact at all levels including upper thoracic levels to the extent visualized. Sagittal STIR images are negative for active inflammatory lesion or fracture.    CONCLUSION:    1. C5-6 disc bulge without herniation, stenosis or interval change since 11/5/2011.    2. Right thyroid gland cyst or nodule requires clinical and ultrasound correlation. The thyroid gland was not visualized on the prior study.      Assessment & Recommendations:  Roxanna Celis is a 49 year old female who presents to rehabilitation clinic for evaluation following most recent series of Xeomin injections for management of spasmodic torticollis on 12/30/2019.  Unfortunately, the patient has experienced a reduction in benefit to Botox injections over the years despite escalation of dose.  Injections were switched to Xeomin at her last visit, and this unfortunately did not provide any benefit.      At this time, patient is in severe pain with persistent muscle spasms.  My recommendation would be for her to start a Medrol Dosepak to help with the inflammatory component of her pain, and then return to physical therapy as tolerated.  Prescription sent to her pharmacy.  Additionally, patient expressed interest in enrolling in the medicinal cannabis program, which I believe is appropriate at this time.  Patient was enrolled on the medical cannabis registry.    With regards to future injections of botulinum toxin, I do not recommend moving forward with any further injections with Botox or Xeomin, as they are both A toxins and have been very minimally effective.  Recommendation would be to switch over to Myobloc, which is a B toxin and may provide patient with more optimal benefit.  Given that she is only 4 weeks out from her last series of injections, and she has experienced no benefit to date, additional recommendation would be to ask her insurance for prior authorization to inject sooner than the recommended 12-week interval.  It would be safe and  reasonable to inject her again 6 weeks from her last injections which were on 12/30/2019.  We will request insurance approval and schedule.      I spent a total of 40 minutes face-to-face and managing the care of Roxanna Celis. Over 50% of my time was spent counseling the patient and coordinating care. Please see note for details.        America Gonzalez MD

## 2020-01-29 NOTE — PROGRESS NOTES
[facility] [place]  Physical Medicine & Rehabilitation Progress Note    Roxanna Celis   YOB: 1970  MRN: [id number]   Date of Service: 01/29/20      History of Present Illness:  Roxanna Celis is a 49 year old female with a history of spasmodic torticollis who presents to clinic today for follow up after her last series of botulinum toxin injections on 12/30/2019. Patient has been a long-time patient in this clinic for several years, and had been experiencing significant improvement with regards to pain, dystonic pulling and involuntary muscle spasms up until the last several months to a year when the injections started to lose their efficacy. After her round of Botox on 9/30/2019, which was essentially ineffective despite escalation of dose from 250 units to 275 units, the decision was made to switch to Xeomin at her next visit out of concern that patient had been developing neutralizing antibodies against Botox.  A lower dose of Xeomin of 200 units was administered at her 12/30/2019 visit to prevent unwanted side effects in the case that the Xeomin was more effective.  Unfortunately, the patient is now 4 weeks out from these injections and has experienced no benefit today.    Today, patient comes to clinic in tears over the severity of her symptoms.  She reports severe muscle spasms in her upper neck down into her shoulders, and severe pain at the base of her skull.  She states that the vertebrae in her spine feel like they are jamming up together. The pain radiates from the base of her skull, to bitemporal regions, down her trapezius muscles and into the proximal aspects of her arms bilaterally.  Her typical pain is a 10 out of 10 and essentially leaves her incapacitated.  She has been attempting massage, alternating heat/cold, Flexeril, trigger point injections, chiropractic adjustments, and physical therapy with no improvement in her symptoms.  Her  ability to participate in PT has been significantly limited by severe pain.    Roxanna had a cervical spine MRI on 12/26/2019 for arm paresthesias, weakness, numbness and stabbing pain.  This was revealing for a small C5-6 disc bulge without herniation, stenosis or impingement.  The patient does not currently report any numbness, tingling, or weakness.  No bowel or bladder changes.      Physical Examination:  VS: BP (!) 142/87   Pulse 85   Resp 16   Wt 115.8 kg (255 lb 3.2 oz)   SpO2 99%   BMI 41.19 kg/m       Pleasant and cooperative  Patient is distressed, very limited movement is tolerated in her cervical spine  Cervical flexion reproduces skull base pain      Imaging:    EXAM: MR CERVICAL SPINE WITHOUT CONTRAST    CLINICAL INFORMATION: Arm paresthesias and stabbing pain, weakness and numbness.    COMPARISON: 11/5/2011.    CONTRAST: None.    SEDATION: None.    TECHNICAL INFORMATION: This exam was performed on the 1.5 scanner. T1, T2 MPGR, T2 FSE and STIR sagittal thin sections through the cervical spine with T2 gradient refocused and T2 FSE axial sections at selected levels.    INTERPRETATION: The cord is intrinsically normal, and craniovertebral junction structures are unremarkable. Vertebral alignment is well maintained, and there is no central stenosis. A nonspecific right thyroid gland cyst or nodule measures 16 mm x 9 mm in size.    Normal flow signals are present in the vertebral arteries. All cervical discs are preserved in height and hydration status.    C5-6 disc bulge without herniation, stenosis or impingement. All remaining dorsal disc margins are normal and foramina appear patent.    Facet joints appear intact at all levels including upper thoracic levels to the extent visualized. Sagittal STIR images are negative for active inflammatory lesion or fracture.    CONCLUSION:    1. C5-6 disc bulge without herniation, stenosis or interval change since 11/5/2011.    2. Right thyroid gland cyst or nodule  requires clinical and ultrasound correlation. The thyroid gland was not visualized on the prior study.      Assessment & Recommendations:  Roxanna Celis is a 49 year old female who presents to rehabilitation clinic for evaluation following most recent series of Xeomin injections for management of spasmodic torticollis on 12/30/2019.  Unfortunately, the patient has experienced a reduction in benefit to Botox injections over the years despite escalation of dose.  Injections were switched to Xeomin at her last visit, and this unfortunately did not provide any benefit.      At this time, patient is in severe pain with persistent muscle spasms.  My recommendation would be for her to start a Medrol Dosepak to help with the inflammatory component of her pain, and then return to physical therapy as tolerated.  Prescription sent to her pharmacy.  Additionally, patient expressed interest in enrolling in the medicinal cannabis program, which I believe is appropriate at this time.  Patient was enrolled on the medical cannabis registry.    With regards to future injections of botulinum toxin, I do not recommend moving forward with any further injections with Botox or Xeomin, as they are both A toxins and have been very minimally effective.  Recommendation would be to switch over to Myobloc, which is a B toxin and may provide patient with more optimal benefit.  Given that she is only 4 weeks out from her last series of injections, and she has experienced no benefit to date, additional recommendation would be to ask her insurance for prior authorization to inject sooner than the recommended 12-week interval.  It would be safe and reasonable to inject her again 6 weeks from her last injections which were on 12/30/2019.  We will request insurance approval and schedule.      I spent a total of 40 minutes face-to-face and managing the care of Roxanna Celis. Over 50% of my time was spent counseling the patient and coordinating care.  Please see note for details.

## 2020-01-29 NOTE — NURSING NOTE
Chief Complaint   Patient presents with     Dystonia     [facility] RETURN BOTOX CERVICAL DYSTONIA       Elieser Gonzalez

## 2020-02-19 ENCOUNTER — CARE COORDINATION (OUTPATIENT)
Dept: PHYSICAL MEDICINE AND REHAB | Facility: CLINIC | Age: 50
End: 2020-02-19

## 2020-02-19 DIAGNOSIS — G24.3 SPASMODIC TORTICOLLIS: Primary | ICD-10-CM

## 2020-02-19 DIAGNOSIS — G24.1 GENETIC TORSION DYSTONIA: ICD-10-CM

## 2020-02-19 NOTE — PROGRESS NOTES
Ms. Roxanna Celis called to report that the Medrol dose pack that Dr. Gonzalez prescribed for her on 01/29/2020 has improved her baseline neck pain exceptionally well.  She notes she is able to accomplish much more in physical therapy and is very, very pleased with her response.    Dr. Gonzalez was notified of this call.

## 2020-08-06 ENCOUNTER — THERAPY VISIT (OUTPATIENT)
Dept: PHYSICAL THERAPY | Facility: CLINIC | Age: 50
End: 2020-08-06
Payer: COMMERCIAL

## 2020-08-06 DIAGNOSIS — M54.50 CHRONIC BILATERAL LOW BACK PAIN WITHOUT SCIATICA: ICD-10-CM

## 2020-08-06 DIAGNOSIS — G89.29 CHRONIC BILATERAL LOW BACK PAIN WITHOUT SCIATICA: ICD-10-CM

## 2020-08-06 PROCEDURE — 97161 PT EVAL LOW COMPLEX 20 MIN: CPT | Mod: GP | Performed by: PHYSICAL THERAPIST

## 2020-08-06 PROCEDURE — 97112 NEUROMUSCULAR REEDUCATION: CPT | Mod: GP | Performed by: PHYSICAL THERAPIST

## 2020-08-06 PROCEDURE — 97110 THERAPEUTIC EXERCISES: CPT | Mod: GP | Performed by: PHYSICAL THERAPIST
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
## 2020-08-31 ENCOUNTER — THERAPY VISIT (OUTPATIENT)
Dept: PHYSICAL THERAPY | Facility: CLINIC | Age: 50
End: 2020-08-31
Payer: COMMERCIAL

## 2020-08-31 DIAGNOSIS — M54.50 CHRONIC BILATERAL LOW BACK PAIN WITHOUT SCIATICA: ICD-10-CM

## 2020-08-31 DIAGNOSIS — G89.29 CHRONIC BILATERAL LOW BACK PAIN WITHOUT SCIATICA: ICD-10-CM

## 2020-08-31 PROCEDURE — 97110 THERAPEUTIC EXERCISES: CPT | Mod: GP | Performed by: PHYSICAL THERAPIST

## 2020-11-05 ENCOUNTER — TRANSFERRED RECORDS (OUTPATIENT)
Dept: HEALTH INFORMATION MANAGEMENT | Facility: CLINIC | Age: 50
End: 2020-11-05

## 2020-11-10 ENCOUNTER — OFFICE VISIT (OUTPATIENT)
Dept: OPHTHALMOLOGY | Facility: CLINIC | Age: 50
End: 2020-11-10
Attending: OPHTHALMOLOGY
Payer: COMMERCIAL

## 2020-11-10 DIAGNOSIS — H02.889 MGD (MEIBOMIAN GLAND DYSFUNCTION): ICD-10-CM

## 2020-11-10 DIAGNOSIS — H04.123 BILATERAL DRY EYES: ICD-10-CM

## 2020-11-10 DIAGNOSIS — H01.02B SQUAMOUS BLEPHARITIS OF UPPER AND LOWER EYELIDS OF BOTH EYES: Primary | ICD-10-CM

## 2020-11-10 DIAGNOSIS — H01.02A SQUAMOUS BLEPHARITIS OF UPPER AND LOWER EYELIDS OF BOTH EYES: Primary | ICD-10-CM

## 2020-11-10 PROCEDURE — G0463 HOSPITAL OUTPT CLINIC VISIT: HCPCS

## 2020-11-10 PROCEDURE — 99203 OFFICE O/P NEW LOW 30 MIN: CPT | Performed by: OPHTHALMOLOGY

## 2020-11-10 RX ORDER — MINERAL OIL AND WHITE PETROLATUM 30; 940 MG/G; MG/G
1 OINTMENT OPHTHALMIC PRN
COMMUNITY
End: 2021-06-01

## 2020-11-10 RX ORDER — DOXYCYCLINE 50 MG/1
50 TABLET ORAL 2 TIMES DAILY
Qty: 60 TABLET | Refills: 3 | Status: SHIPPED | OUTPATIENT
Start: 2020-11-10 | End: 2021-03-08

## 2020-11-10 RX ORDER — NEOMYCIN SULFATE, POLYMYXIN B SULFATE, AND DEXAMETHASONE 3.5; 10000; 1 MG/G; [USP'U]/G; MG/G
0.5 OINTMENT OPHTHALMIC 3 TIMES DAILY
Qty: 2 TUBE | Refills: 1 | Status: SHIPPED | OUTPATIENT
Start: 2020-11-10 | End: 2020-12-04

## 2020-11-10 ASSESSMENT — TONOMETRY
OD_IOP_MMHG: 16
OS_IOP_MMHG: 17
IOP_METHOD: ICARE

## 2020-11-10 ASSESSMENT — EXTERNAL EXAM - LEFT EYE: OS_EXAM: NORMAL

## 2020-11-10 ASSESSMENT — REFRACTION_WEARINGRX
OD_ADD: +1.25
OS_CYLINDER: +0.75
OD_AXIS: 154
SPECS_TYPE: BIFOCAL
OS_ADD: +1.25
OS_AXIS: 039
OD_CYLINDER: +0.75
OD_SPHERE: -2.50
OS_SPHERE: -2.25

## 2020-11-10 ASSESSMENT — CONF VISUAL FIELD
OD_NORMAL: 1
METHOD: COUNTING FINGERS
OS_NORMAL: 1

## 2020-11-10 ASSESSMENT — VISUAL ACUITY
OD_CC: 20/40
METHOD: SNELLEN - LINEAR
OS_PH_CC: 20/20
OD_PH_CC: 20/20
CORRECTION_TYPE: GLASSES
OS_CC: 20/40

## 2020-11-10 ASSESSMENT — EXTERNAL EXAM - RIGHT EYE: OD_EXAM: NORMAL

## 2020-11-10 NOTE — NURSING NOTE
Chief Complaints and History of Present Illnesses   Patient presents with     Eye Pain Both Eyes     Chief Complaint(s) and History of Present Illness(es)     Eye Pain Both Eyes     Laterality: In both eyes              Comments     Eye pain in both eyes.  Pt states that she has been dealing with severe dry eyes since January.  Pt started Vraylar in December 2019 (stopped in August 2020) and feels that eye issues started after using that medication.  Pt is currently doing warm compresses BID. Pt taking Pred Healon QID, Systane ointment all day.  FB sensation in LE for the past month, worse over the past week. Pt blinking constantly to get rid of irritation.  Pt states vision fluctuates daily with dryness.  Occasional redness in BE, that comes and goes.    CARMEN Batista November 10, 2020 8:36 AM

## 2020-11-10 NOTE — PROGRESS NOTES
Chief Complaint(s) and History of Present Illness(es)     Eye Pain Both Eyes     Laterality: In both eyes              Comments     Eye pain in both eyes.  Pt states that she has been dealing with severe dry eyes since January.  Pt started Vraylar in December 2019 (stopped in August 2020) and feels   that eye issues started after using that medication.  Pt is currently doing warm compresses BID. Pt taking Pred Healon QID,   Systane ointment all day.  FB sensation in LE for the past month, worse over the past week. Pt   blinking constantly to get rid of irritation.  Pt states vision fluctuates daily with dryness.  Occasional redness in BE, that comes and goes.    CARMEN Batista November 10, 2020 8:36 AM                Review of systems for the eyes was negative other than the pertinent positives/negatives listed in the HPI.      Assessment & Plan      Roxanna Celis is a 50 year old female with the following diagnoses:   1. Squamous blepharitis of upper and lower eyelids of both eyes    2. MGD (meibomian gland dysfunction)    3. Bilateral dry eyes         Feels Vraylar started the new symptoms, stopped this medication in Aug, but symptoms continue to bother    Previously followed with Dr. Garcia  Has been on xiidra for about  1.5 months (unsure about previous restasis)  Started Pred healon last week  Using PM tear ointment multiple times per day  S/P punctal plugs bilateral lower lids     Significant blepharitis and meibomian gland dysfunction contributing to symptoms  Discussed contributing factors  Start lid hygiene   Cont warm compresses 2-3x daily   Add doxycycline 50 mg orally twice a day   Add maxitrol anjelica three times a day to lids/eye  Recommend artifical tears as needed   Continue xiidra and pred healon as before    Patient disposition:   Return in about 4 weeks (around 12/8/2020) for VT only.           Attending Physician Attestation:  Complete documentation of historical and exam elements from today's  encounter can be found in the full encounter summary report (not reduplicated in this progress note).  I personally obtained the chief complaint(s) and history of present illness.  I confirmed and edited as necessary the review of systems, past medical/surgical history, family history, social history, and examination findings as documented by others; and I examined the patient myself.  I personally reviewed the relevant tests, images, and reports as documented above.  I formulated and edited as necessary the assessment and plan and discussed the findings and management plan with the patient and family. . - Faheem Ocampo MD

## 2020-11-10 NOTE — PATIENT INSTRUCTIONS
Start doxycycline 50 mg orally  Twice a day     Start maxitrol ointment three times a day to eye/eyelids (replace current ointment with new prescription)     Continue pred healon four times a day     Continue xiidra twice a day   Warm compresses 2-3x daily   Lid scrubs (or baby shampoo) daily

## 2020-11-12 ENCOUNTER — TELEPHONE (OUTPATIENT)
Dept: OPHTHALMOLOGY | Facility: CLINIC | Age: 50
End: 2020-11-12

## 2020-11-12 NOTE — TELEPHONE ENCOUNTER
Spoke to pt at 0910  Reviewed with Dr. Ocampo after speaking with pt  No improvement in past two days in symptoms    Pt not using OTC often and do not coat eye well per pt     Pt not good candidate for bandage contact lenses at this time-- recently started steroid eye drop    Reviewed with pt at 0920 symptoms will take time to improve and may take over a month    Reviewed may try Refresh Celluvisc-- preservative free gel in between ointment applications-- may use hourly     Pt verbally demonstrated understanding and seemed comfortable with plan  Haider Webber RN 9:27 AM 11/12/20        [facility] Call Center    Phone Message    May a detailed message be left on voicemail: yes     Reason for Call: Medication Question or concern regarding medication   Prescription Clarification  Name of Medication: neomycin-polymyxin-dexamethasone (MAXITROL) 3.5-74222-0.1 ophthalmic ointment  Prescribing Provider: Dr Ocampo   Pharmacy: [facility] [id number] [place] [address]   What on the order needs clarification? Pt has been using the ointment as directed but feels it has not brought her relief from eye pain. Is wondering if a thicker ointment can be added to her regime or if/how she could use Systane to supplement it. Notes her eyes feel 'horrible'. Please reach out to her at [phone] to discuss. Thank you    Action Taken: Message routed to:  [facility] ([facility] EYE    Travel Screening: Not Applicable

## 2020-11-30 ENCOUNTER — TELEPHONE (OUTPATIENT)
Dept: OPHTHALMOLOGY | Facility: CLINIC | Age: 50
End: 2020-11-30
Payer: COMMERCIAL

## 2020-11-30 NOTE — TELEPHONE ENCOUNTER
The patient called stating she has extreme left eye pain.  I reviewed her drops and she is doing all of the drops and the oral Doxycyline as ordered. She is doing the warm compresses. She recently added the Celluvisc PF gel.    She does keep her eyes closed if she is up and about.  She had ankle surgery last week so she is not up as regularly as normal.    I told her I would reach out to Dr. Ocampo.  The patient notes it is okay to leave VM

## 2020-11-30 NOTE — TELEPHONE ENCOUNTER
I spoke to the patient after speaking to Dr. Ocampo.  The patient should be seen for a scleral lens consultation and recheck with the provider.  The patient is now scheduled for both appointments.   No

## 2020-12-02 ENCOUNTER — OFFICE VISIT (OUTPATIENT)
Dept: OPTOMETRY | Facility: CLINIC | Age: 50
End: 2020-12-02
Payer: COMMERCIAL

## 2020-12-02 ENCOUNTER — TELEPHONE (OUTPATIENT)
Dept: OPTOMETRY | Facility: CLINIC | Age: 50
End: 2020-12-02

## 2020-12-02 DIAGNOSIS — H04.123 DRY EYES: Primary | ICD-10-CM

## 2020-12-02 DIAGNOSIS — H16.223 KERATITIS SICCA, BILATERAL: ICD-10-CM

## 2020-12-02 ASSESSMENT — REFRACTION_WEARINGRX
OD_ADD: +1.25
OD_AXIS: 154
OD_CYLINDER: +0.75
OS_SPHERE: -2.25
SPECS_TYPE: BIFOCAL
OS_ADD: +1.25
OS_CYLINDER: +0.75
OS_AXIS: 039
OD_SPHERE: -2.50

## 2020-12-02 ASSESSMENT — VISUAL ACUITY
CORRECTION_TYPE: CONTACTS
OS_CC: 20/70
METHOD: SNELLEN - LINEAR
OD_CC: 20/50

## 2020-12-02 ASSESSMENT — EXTERNAL EXAM - RIGHT EYE: OD_EXAM: NORMAL

## 2020-12-02 ASSESSMENT — REFRACTION_CURRENTRX
OD_BRAND: ONEFIT 2.0
OD_SPHERE: -3.00
OD_DIAMETER: 14.9
OS_SPHERE: -3.00
OS_DIAMETER: 14.9
OS_BRAND: ONEFIT 2.0
OD_BASECURVE: 7.6
OS_BASECURVE: 7.5

## 2020-12-02 ASSESSMENT — EXTERNAL EXAM - LEFT EYE: OS_EXAM: NORMAL

## 2020-12-02 NOTE — PATIENT INSTRUCTIONS
We will start with a pair of scleral lenses for your eyes set to distance vision. You will need to wear over-the-counter reading glasses over for things up close.     Fitting scleral lenses is a process and we will likely need to adjust the fit as they eyes start to be less inflamed over time. This is normal.

## 2020-12-02 NOTE — TELEPHONE ENCOUNTER
Pt left message on triage line last night. Requesting if ok to take drops prior to visit with Dr. boudreaux today at 2 PM    Left message at 0925   Reviewed ok to take eye drops as normally does.    Reviewed if uses any ointments-- to hold couple hours prior to scleral lens fit    Direct number provided for any further assistance.    Haider Webber RN 9:27 AM 12/02/20

## 2020-12-03 NOTE — PROGRESS NOTES
A/P  1.) Dry Eye each eye  -Severely symptomatic despite max topical treatment  -Severe stinging/burning with Xiidra > pred healon  -Some relief with scleral lens today. Eyes are somewhat inflamed today which is likely affecting our trial  -Reviewed options with pt, she would like to proceed. Aim for DVO and readers over    Order lenses, RTC 2 week for [facility] dispense visit and I&R

## 2020-12-04 DIAGNOSIS — H01.02B SQUAMOUS BLEPHARITIS OF UPPER AND LOWER EYELIDS OF BOTH EYES: ICD-10-CM

## 2020-12-04 DIAGNOSIS — H02.889 MGD (MEIBOMIAN GLAND DYSFUNCTION): ICD-10-CM

## 2020-12-04 DIAGNOSIS — H01.02A SQUAMOUS BLEPHARITIS OF UPPER AND LOWER EYELIDS OF BOTH EYES: ICD-10-CM

## 2020-12-04 RX ORDER — NEOMYCIN SULFATE, POLYMYXIN B SULFATE, AND DEXAMETHASONE 3.5; 10000; 1 MG/G; [USP'U]/G; MG/G
OINTMENT OPHTHALMIC
Qty: 2 TUBE | Refills: 1 | Status: SHIPPED | OUTPATIENT
Start: 2020-12-04 | End: 2020-12-15

## 2020-12-04 NOTE — TELEPHONE ENCOUNTER
Rx for rob/poly/dex ointment sent per request    Pt has f/u appt next week    Haider Webber RN 5:32 PM 12/04/20

## 2020-12-10 ENCOUNTER — OFFICE VISIT (OUTPATIENT)
Dept: OPHTHALMOLOGY | Facility: CLINIC | Age: 50
End: 2020-12-10
Attending: OPHTHALMOLOGY
Payer: COMMERCIAL

## 2020-12-10 ENCOUNTER — TELEPHONE (OUTPATIENT)
Dept: OPHTHALMOLOGY | Facility: CLINIC | Age: 50
End: 2020-12-10

## 2020-12-10 DIAGNOSIS — H01.02B SQUAMOUS BLEPHARITIS OF UPPER AND LOWER EYELIDS OF BOTH EYES: ICD-10-CM

## 2020-12-10 DIAGNOSIS — H16.123 FILAMENTARY KERATITIS OF BOTH EYES: ICD-10-CM

## 2020-12-10 DIAGNOSIS — H04.123 BILATERAL DRY EYES: Primary | ICD-10-CM

## 2020-12-10 DIAGNOSIS — H01.02A SQUAMOUS BLEPHARITIS OF UPPER AND LOWER EYELIDS OF BOTH EYES: ICD-10-CM

## 2020-12-10 PROCEDURE — G0463 HOSPITAL OUTPT CLINIC VISIT: HCPCS

## 2020-12-10 PROCEDURE — 99213 OFFICE O/P EST LOW 20 MIN: CPT | Performed by: OPHTHALMOLOGY

## 2020-12-10 ASSESSMENT — REFRACTION_WEARINGRX
OD_AXIS: 154
OS_ADD: +1.25
OS_AXIS: 039
OD_CYLINDER: +0.75
OS_SPHERE: -2.25
SPECS_TYPE: BIFOCAL
OD_ADD: +1.25
OS_CYLINDER: +0.75
OD_SPHERE: -2.50

## 2020-12-10 ASSESSMENT — TONOMETRY
OS_IOP_MMHG: 22
OD_IOP_MMHG: 21
IOP_METHOD: TONOPEN

## 2020-12-10 ASSESSMENT — VISUAL ACUITY
OD_CC: 20/70
OS_PH_CC+: -1
OD_PH_CC: 20/25
OS_PH_CC: 20/30
METHOD: SNELLEN - LINEAR
OD_PH_CC+: -1
OS_CC: 20/60
OS_CC+: -1

## 2020-12-10 ASSESSMENT — EXTERNAL EXAM - LEFT EYE: OS_EXAM: NORMAL

## 2020-12-10 ASSESSMENT — EXTERNAL EXAM - RIGHT EYE: OD_EXAM: NORMAL

## 2020-12-10 NOTE — PATIENT INSTRUCTIONS
BOTH EYES    Start serum tears four times a day    Start mucomyst four times a day    Doxycycline 50 mg orally twice a day   Maxitrol ointment three times a day to eye/eyelids    Pred healon 6 times a day     Xiidra twice a day   Warm compresses 2-3x daily   Lid scrubs (or baby shampoo) daily

## 2020-12-10 NOTE — TELEPHONE ENCOUNTER
Spoke to pt pharmacy  Filamentary keratisi and Rx for mucmyst eye drops not able to be compounded til tomorrow    Reviewed ok to dispense tomorrow    Haider Webber RN 5:08 PM 12/10/20

## 2020-12-10 NOTE — NURSING NOTE
Chief Complaints and History of Present Illnesses   Patient presents with     Blepharitis Follow Up     Chief Complaint(s) and History of Present Illness(es)     Blepharitis Follow Up     Laterality: both eyes    Associated signs and symptoms: eye pain and foreign body sensation    Response to treatment: no improvement    Pain scale: 6/10              Comments     Roxanna is here for a follow up of Squamous blepharitis of upper and lower eyelids of both eyes. She states no improvement since the onset    Jean Pierre Kelly COT 3:42 PM December 10, 2020

## 2020-12-11 NOTE — PROGRESS NOTES
Chief Complaint(s) and History of Present Illness(es)     Blepharitis Follow Up     Laterality: both eyes    Associated signs and symptoms: eye pain and foreign body sensation    Response to treatment: no improvement    Pain scale: 6/10              Comments     Roxanna is here for a follow up of Squamous blepharitis of upper and lower   eyelids of both eyes. She states no improvement since the onset    Jean Pierre Kelly COT 3:42 PM December 10, 2020               Review of systems for the eyes was negative other than the pertinent positives/negatives listed in the HPI.      Assessment & Plan      Roxanna Celis is a 50 year old female with the following diagnoses:   1. Bilateral dry eyes    2. Filamentary keratitis of both eyes    3. Squamous blepharitis of upper and lower eyelids of both eyes         Significant dry eyes after starting Vraylar; persistent after cessation in 8/2020  Previously followed with Dr. Garcia  S/P punctal plugs bilateral lower lids     No improvement in symptoms after last evaluation/treatment  Reports good compliance with all recommendations except artificial tears.  Feels these exacerbate symptoms, regardless of brand tried    Underwent scleral lens eval and is awaiting lenses to arrive next week  Appreciate Dr. Anthony's assistance  Cont warm compresses 2-3x daily   Cont doxycycline 50 mg orally twice a day   Cont maxitrol anjelica three times a day to lids/eye  Continue xiidra twice a day   Increase pred healon to 6x daily   Start serum tears four times a day (ok to fill scleral lenses when able)  Add mucomyst four times a day  Both eyes           Patient disposition:   1 week with Dr. Anthony for scleral lens initiation  Return in about 4 weeks (around 1/7/2021) for VT only.           Attending Physician Attestation:  Complete documentation of historical and exam elements from today's encounter can be found in the full encounter summary report (not reduplicated in this progress note).  I  personally obtained the chief complaint(s) and history of present illness.  I confirmed and edited as necessary the review of systems, past medical/surgical history, family history, social history, and examination findings as documented by others; and I examined the patient myself.  I personally reviewed the relevant tests, images, and reports as documented above.  I formulated and edited as necessary the assessment and plan and discussed the findings and management plan with the patient and family. . - Faheem Ocampo MD

## 2020-12-14 ENCOUNTER — TELEPHONE (OUTPATIENT)
Dept: OPHTHALMOLOGY | Facility: CLINIC | Age: 50
End: 2020-12-14
Payer: COMMERCIAL

## 2020-12-14 NOTE — TELEPHONE ENCOUNTER
The patient left a VM noting that she was instructed to use the Eye ointment as much as she need.  She notes that she ran out of the ointment and the pharmacy won't let her refill it this soon.  She is asking for a new prescription that gives her more tubes.    I left a VM for the patient and told her we would review this with the doctor

## 2020-12-15 DIAGNOSIS — H01.02B SQUAMOUS BLEPHARITIS OF UPPER AND LOWER EYELIDS OF BOTH EYES: ICD-10-CM

## 2020-12-15 DIAGNOSIS — H01.02A SQUAMOUS BLEPHARITIS OF UPPER AND LOWER EYELIDS OF BOTH EYES: ICD-10-CM

## 2020-12-15 DIAGNOSIS — H02.889 MGD (MEIBOMIAN GLAND DYSFUNCTION): ICD-10-CM

## 2020-12-15 RX ORDER — NEOMYCIN SULFATE, POLYMYXIN B SULFATE, AND DEXAMETHASONE 3.5; 10000; 1 MG/G; [USP'U]/G; MG/G
OINTMENT OPHTHALMIC
Qty: 2 TUBE | Refills: 3 | Status: SHIPPED | OUTPATIENT
Start: 2020-12-15 | End: 2021-06-01

## 2020-12-17 ENCOUNTER — OFFICE VISIT (OUTPATIENT)
Dept: OPHTHALMOLOGY | Facility: CLINIC | Age: 50
End: 2020-12-17
Payer: COMMERCIAL

## 2020-12-17 ENCOUNTER — OFFICE VISIT (OUTPATIENT)
Dept: OPTOMETRY | Facility: CLINIC | Age: 50
End: 2020-12-17
Payer: COMMERCIAL

## 2020-12-17 DIAGNOSIS — H04.123 DRY EYES: Primary | ICD-10-CM

## 2020-12-17 DIAGNOSIS — H16.123 FILAMENTARY KERATITIS OF BOTH EYES: ICD-10-CM

## 2020-12-17 DIAGNOSIS — H02.889 MGD (MEIBOMIAN GLAND DYSFUNCTION): ICD-10-CM

## 2020-12-17 DIAGNOSIS — H16.223 KERATITIS SICCA, BILATERAL: ICD-10-CM

## 2020-12-17 DIAGNOSIS — H01.02A SQUAMOUS BLEPHARITIS OF UPPER AND LOWER EYELIDS OF BOTH EYES: ICD-10-CM

## 2020-12-17 DIAGNOSIS — H01.02B SQUAMOUS BLEPHARITIS OF UPPER AND LOWER EYELIDS OF BOTH EYES: ICD-10-CM

## 2020-12-17 DIAGNOSIS — H04.123 BILATERAL DRY EYES: Primary | ICD-10-CM

## 2020-12-17 PROCEDURE — 99213 OFFICE O/P EST LOW 20 MIN: CPT | Performed by: OPTOMETRIST

## 2020-12-17 RX ORDER — SODIUM CHLORIDE FOR INHALATION 0.9 %
5 VIAL, NEBULIZER (ML) INHALATION 2 TIMES DAILY
Qty: 500 ML | Refills: 11 | Status: SHIPPED | OUTPATIENT
Start: 2020-12-17 | End: 2021-05-04

## 2020-12-17 ASSESSMENT — REFRACTION_CURRENTRX
OD_BASECURVE: 7.8
OD_BRAND: ONEFIT
OS_BRAND: ONEFIT
OD_DIAMETER: 15.2
OD_ADDL_SPECS: OPT EXTRA CLEAR, HYDRAPEG
OD_SPHERE: -3.37
OS_DIAMETER: 15.2
OS_BASECURVE: 7.8
OS_SPHERE: -2.75
OS_ADDL_SPECS: OPT EXTRA BLUE, HYDRAPEG

## 2020-12-17 ASSESSMENT — VISUAL ACUITY
OD_CC: 20/25
OS_CC: 20/20
CORRECTION_TYPE: CONTACTS
METHOD: SNELLEN - LINEAR

## 2020-12-17 ASSESSMENT — EXTERNAL EXAM - RIGHT EYE: OD_EXAM: NORMAL

## 2020-12-17 ASSESSMENT — EXTERNAL EXAM - LEFT EYE: OS_EXAM: NORMAL

## 2020-12-17 NOTE — PROGRESS NOTES
No office visit. CL order only. Billing for lenses dispensed at today's [facility] visit    Contact Lens Billing  V-Code:  Scleral Cover Shell  Final Contact Lens Rx       Brand Base Curve Diameter Sphere Lens Addl. Specs    Right Onefit 8.1 15.5 -1.75 1 flat/1 steep APS Opt Extra clear, HydraPEG    Left Onefit 8.1 15.5 -1.12 1 flat/1 steep APS Opt Extra blue, HydraPEG         # of units: 2  Price per Unit: $200    This patient requires contact lenses that are medically necessary for either improvement in vision over spectacles, support of the ocular surface, or other therapeutic benefit. These are not cosmetic contact lenses.     Encounter Diagnoses   Name Primary?     Dry eyes Yes     Keratitis sicca, bilateral (H)         Date of last eye exam: Today [facility]    205.5

## 2020-12-18 NOTE — PROGRESS NOTES
A/P  1.) Dry Eye each eye  -Severely symptomatic despite max topical treatment  -Severe stinging/burning with Xiidra > pred healon  -Given degree of symptoms would suspect she would do well with scleral lenses once in them  -Extensive I&R today with Padao, difficult as patient's eye are very sore and painful. Was able to insert and remove herself several times  -Excellent vision with lenses in. Can make mild fit adjustments initially, but likely to need adjustment along the way as her inflammation settles  -Continue all drops as previous for now. Re-eval with Ocampo once adapted into scleral lenses  -Currently in DVO, will need OTC readers. She may elect to switch to near correction in CL's which is closer to her habitual but we can discuss next exam  -Slow adaptation as her eyes are able    Dispensed lenses today, order new pair with mild fit adjustment and mail direct. F/u 3-4 weeks wearing new lenses.    I have confirmed the patient's CC, HPI and reviewed Past Medical History, Past Surgical History, Social History, Family History, Problem List, Medication List and agree with Tech note.     Sudha Anthony, OD FAAO FSLS

## 2020-12-22 ENCOUNTER — TELEPHONE (OUTPATIENT)
Dept: OPTOMETRY | Facility: CLINIC | Age: 50
End: 2020-12-22

## 2020-12-22 NOTE — TELEPHONE ENCOUNTER
Spoke to pt at 1505    Pt states contacts ok after using medicated eye drops before putting contacts in (no ointment use).  After couple hours eyes develop mucus. Symptoms worsening over past few days    Offered appt tomorrow with Dr. Anthony and pt unable and aware Dr. BATES out following week.    Pt would like to review possible -- feels getting deposits on lenses and possible cause    Will forward to Dr. Anthony for review and assistance with plan of care    Haider Webber RN 3:12 PM 12/22/20            [facility] Call Center    Phone Message    May a detailed message be left on voicemail: yes     Reason for Call: Other:    Pt states that CL went pretty well the first day but now pt is having issues because it's coated with junk/mucous from pt's eyes. Pt has taken them out numerous times to clean them but problem keeps reoccuring. Pt would like to know what Gabrielle suggest for pt to do.     Action Taken: Other: eye    Travel Screening: Not Applicable

## 2020-12-23 NOTE — TELEPHONE ENCOUNTER
Called, spoke with patient. Buildup/debris/clouding typically improve over the first month or two with regular wear. If we can reduce her Rx eye drops further I think this will help reduce buildup.    Can rinse with saline or with AT over as needed to help clear up vision    Also rec switching to [employer] cleaning solution (reviewed instructions with pt) to eliminate preservatives.    I&R largely going well.     If further questions come up while I am out rec forwarding to Renetta to answer.    Keep f/u appt in January

## 2020-12-29 ENCOUNTER — TELEPHONE (OUTPATIENT)
Dept: OPTOMETRY | Facility: CLINIC | Age: 50
End: 2020-12-29

## 2021-01-04 NOTE — TELEPHONE ENCOUNTER
Called LVM. Ava trying a few drops of serum tears in bowl (1-2 drops) and rest preservative free saline.    Message stated pt had other questions that were not listed so gave callback number. Encouraged MyChart signup so I can more effectively answer questions

## 2021-01-14 ENCOUNTER — OFFICE VISIT (OUTPATIENT)
Dept: OPHTHALMOLOGY | Facility: CLINIC | Age: 51
End: 2021-01-14
Attending: OPHTHALMOLOGY
Payer: COMMERCIAL

## 2021-01-14 ENCOUNTER — OFFICE VISIT (OUTPATIENT)
Dept: OPHTHALMOLOGY | Facility: CLINIC | Age: 51
End: 2021-01-14
Payer: COMMERCIAL

## 2021-01-14 DIAGNOSIS — H02.889 MGD (MEIBOMIAN GLAND DYSFUNCTION): ICD-10-CM

## 2021-01-14 DIAGNOSIS — H01.02B SQUAMOUS BLEPHARITIS OF UPPER AND LOWER EYELIDS OF BOTH EYES: ICD-10-CM

## 2021-01-14 DIAGNOSIS — H01.02A SQUAMOUS BLEPHARITIS OF UPPER AND LOWER EYELIDS OF BOTH EYES: ICD-10-CM

## 2021-01-14 DIAGNOSIS — H04.123 BILATERAL DRY EYES: Primary | ICD-10-CM

## 2021-01-14 PROCEDURE — 99213 OFFICE O/P EST LOW 20 MIN: CPT | Mod: 25 | Performed by: OPHTHALMOLOGY

## 2021-01-14 PROCEDURE — 99213 OFFICE O/P EST LOW 20 MIN: CPT | Performed by: OPTOMETRIST

## 2021-01-14 PROCEDURE — G0463 HOSPITAL OUTPT CLINIC VISIT: HCPCS

## 2021-01-14 ASSESSMENT — VISUAL ACUITY
OD_CC+: -3
CORRECTION_TYPE: CONTACTS
METHOD: SNELLEN - LINEAR
OS_PH_CC: 20/20
OS_CC: 20/25
CORRECTION_TYPE: CONTACTS
OS_PH_CC+: -2
METHOD: SNELLEN - LINEAR
OS_CC: 20/25
OS_CC: J10
OS_CC+: -2
OD_CC: 20/20
OD_CC+: -3
OD_CC: J16
OD_CC: 20/20
OS_CC+: -2

## 2021-01-14 ASSESSMENT — REFRACTION_WEARINGRX
OS_ADD: +1.25
OD_CYLINDER: +0.75
OS_SPHERE: -2.25
OS_SPHERE: -2.25
OS_AXIS: 039
OS_CYLINDER: +0.75
OS_ADD: +1.25
OS_CYLINDER: +0.75
OD_CYLINDER: +0.75
OD_ADD: +1.25
OD_SPHERE: -2.50
SPECS_TYPE: BIFOCAL
OD_AXIS: 154
OD_AXIS: 154
OS_AXIS: 039
OD_SPHERE: -2.50
SPECS_TYPE: BIFOCAL
OD_ADD: +1.25

## 2021-01-14 ASSESSMENT — REFRACTION_CURRENTRX
OD_BASECURVE: 8.1
OS_ADDL_SPECS: OPT EXTRA BLUE, HYDRAPEG
OD_BRAND: ONEFIT
OS_DIAMETER: 15.5
OS_BRAND: ONEFIT
OD_DIAMETER: 15.5
OS_BASECURVE: 8.1
OD_ADDL_SPECS: OPT EXTRA CLEAR, HYDRAPEG
OS_SPHERE: -1.12
OD_SPHERE: -1.75

## 2021-01-14 ASSESSMENT — CONF VISUAL FIELD
OS_NORMAL: 1
OS_NORMAL: 1
METHOD: COUNTING FINGERS
OD_NORMAL: 1
METHOD: COUNTING FINGERS
OD_NORMAL: 1

## 2021-01-14 ASSESSMENT — TONOMETRY
OS_IOP_MMHG: 20
OD_IOP_MMHG: 18
IOP_METHOD: ICARE

## 2021-01-14 ASSESSMENT — EXTERNAL EXAM - LEFT EYE
OS_EXAM: NORMAL
OS_EXAM: NORMAL

## 2021-01-14 ASSESSMENT — EXTERNAL EXAM - RIGHT EYE
OD_EXAM: NORMAL
OD_EXAM: NORMAL

## 2021-01-14 NOTE — NURSING NOTE
Chief Complaints and History of Present Illnesses   Patient presents with     Follow Up     1 month Scleral lens recheck     Chief Complaint(s) and History of Present Illness(es)     Follow Up     Laterality: both eyes    Associated symptoms: dryness, eye pain, redness and tearing    Treatments tried: eye drops    Pain scale: 6/10    Comments: 1 month Scleral lens recheck              Comments     Much more comfortable with lenses in now. Still not feeling quite right with each eye. Will get a lot of mucus through out the day begins right after putting in lenses. Not sure of color with this. Still getting the stringing white shards in eye though out the day. Irritatating each eye. Left eye lens feels worse than the right with the fit. Gets irritated more than right eye.   Distance vision each eye is still not want it needs to be feels poor clarity could be due to mucus discharge with each eye. Wearing readers most of the time with the contacts for near.  Taking contacts out eyes get very painful and has to use a lot of ointment to get relief with dryness.   Uses Serum gtts with contact before insertion then 6 times daily   Then saline gtts Q 1 H each eye during the day PRN.  Ointment with each eye Q HS.( uses maxitrol BID/ Petrol um ointment on the alterate days)   Pred. QID each eye each eye  Xiidra BID each eye.    Thelma Goodwin, COT COT 12:27 PM January 14, 2021

## 2021-01-14 NOTE — PROGRESS NOTES
A/P  1.) Dry Eye each eye  -Severely symptomatic despite max topical treatment  -Severe stinging/burning with Xiidra > pred healon  -Good start with scleral lenses. I&R significantly improved  -Overminused in current Rx, able to improve distance and near with plus Rx. Habitually she is nearsighted so may eventually prefer to set near correction in lenses and wear Rx glasses over  -Slightly tight vertical merid left eye>right eye, otherwise fitting well.   -Complaints of mucous debris/buildup while wearing lenses - this typically improves over time as dryness improves but may benefit from drop schedule reduction if appropriate    Order new pair with mild adjustments and mail direct. F/u 1 month wearing new lenses    I have confirmed the patient's CC, HPI and reviewed Past Medical History, Past Surgical History, Social History, Family History, Problem List, Medication List and agree with Tech note.     Sudha Anthony, SHELLI GARCÍAO FSLS

## 2021-01-14 NOTE — NURSING NOTE
Chief Complaints and History of Present Illnesses   Patient presents with     Dry Eye(s) Both Eyes     Chief Complaint(s) and History of Present Illness(es)     Dry Eye(s) Both Eyes               Comments     Pt states vision is the same as last visit, fluctuates.  Both eyes still feel very dry when scleral lenses are out. Pt needing to use lots of drops for eyes to feel well again.  No flashes or floaters.   Mucous like discharge from both eyes, pt feeling strands of mucous in both eyes RE>JAREN.    CARMEN Batista January 14, 2021 2:00 PM

## 2021-01-14 NOTE — PROGRESS NOTES
Chief Complaint(s) and History of Present Illness(es)     Dry Eye(s) Both Eyes               Comments     Pt states vision is the same as last visit, fluctuates.  Both eyes still feel very dry when scleral lenses are out. Pt needing to   use lots of drops for eyes to feel well again.  No flashes or floaters.   Mucous like discharge from both eyes, pt feeling strands of mucous in both   eyes RE>LEElisabeth Diasmacy CARMEN Vallecillo January 14, 2021 2:00 PM                Review of systems for the eyes was negative other than the pertinent positives/negatives listed in the HPI.      Assessment & Plan      Roxanna Celis is a 50 year old female with the following diagnoses:   1. Bilateral dry eyes    2. Squamous blepharitis of upper and lower eyelids of both eyes    3. MGD (meibomian gland dysfunction)         Significant dry eyes after starting Vraylar; persistent after cessation in 8/2020  S/P punctal plugs bilateral lower lids   Started using scleral lenses for the past few weeks and does feel better most of the time when lenses are in  Reviewed note from Dr. Anthony's who is planning to adjust the fit and prescription in the lenses  Symptoms still bothersome when out of the lenses    Significant improvement on the exam today  Reassurance given of improving surface  Encouraged more scleral lens use as much as possible during waking hours    Cont warm compresses 2-3x daily   Cont doxycycline 50 mg orally twice a day   Cont maxitrol anjelica three times a day to lids/eye  Continue xiidra twice a day   Continue pred healon to 6x daily   Continue serum tears four times a day (ok to put 1-2 gtts in scleral lenses before fill)  Continue mucomyst four times a day both eyes         Patient disposition:   Return in about 4 weeks (around 2/11/2021) for VT only.         Attending Physician Attestation:  Complete documentation of historical and exam elements from today's encounter can be found in the full encounter summary report (not  reduplicated in this progress note).  I personally obtained the chief complaint(s) and history of present illness.  I confirmed and edited as necessary the review of systems, past medical/surgical history, family history, social history, and examination findings as documented by others; and I examined the patient myself.  I personally reviewed the relevant tests, images, and reports as documented above.  I formulated and edited as necessary the assessment and plan and discussed the findings and management plan with the patient and family. . - Faheem Ocampo MD

## 2021-01-27 NOTE — PROGRESS NOTES
"BOTULINUM TOXIN PROCEDURE NOTE    Chief Complaint   Patient presents with     Dystonia     BOTOX Cervical Dystonia       /46 (BP Location: Left arm)   Pulse 78   Ht 1.676 m (5' 6\")   Wt 110.7 kg (244 lb)   SpO2 95%   BMI 39.38 kg/m        Current Outpatient Medications:      aspirin-acetaminophen-caffeine (EXCEDRIN MIGRAINE) 250-250-65 MG per tablet, Take 1 tablet by mouth every 6 hours as needed., Disp: , Rfl:      BOTOX 100 units injection, Inject 250 Units into the muscle once Lot # /C3 with Expiration Date:  11/2020, Disp: , Rfl:      ClonAZEPAM (KLONOPIN) 1 MG tablet, Take 1 mg by mouth 2 times daily as needed., Disp: , Rfl:      Desvenlafaxine Succinate (PRISTIQ) 100 MG TB24 24 hr tablet, Take 1 tablet by mouth daily., Disp: , Rfl:      DULoxetine HCl (CYMBALTA PO), Take 60 mg by mouth, Disp: , Rfl:      EPINEPHrine (EPIPEN) 0.3 MG/0.3ML injection, Inject 0.3 mg into the muscle once as needed., Disp: , Rfl:      gabapentin (NEURONTIN) 100 MG capsule, Take 1 capsule (100 mg) by mouth At Bedtime, Disp: 90 capsule, Rfl: 1     hydrochlorothiazide (HYDRODIURIL) 50 MG tablet, Take 50 mg by mouth daily, Disp: , Rfl:      Ibuprofen (ADVIL) 200 MG capsule, Take 200 mg by mouth every 4 hours as needed., Disp: , Rfl:      LINZESS 290 MCG capsule, TAKE 1 CAPSULE BY MOUTH EVERY DAY, Disp: , Rfl: 3     OLANZapine (ZYPREXA PO), Take 15 mg by mouth, Disp: , Rfl:      OnabotulinumtoxinA (BOTOX IJ), Inject 250 Units into the muscle Lot # /C3  Exp: 4/2021, Disp: , Rfl:      ONABOTULINUMTOXINA IJ, Inject 250 Units as directed once LOT # /C3 EXPIRATION: 03/2020, Disp: , Rfl:      PROGESTERONE 200 MG CAPS COMPOUND (PROGESTERONE 200 MG CAPS COMPOUND), Take 4 capsules by mouth daily., Disp: , Rfl:      solifenacin (VESICARE) 10 MG tablet, Take 1 tablet by mouth daily., Disp: , Rfl:      spironolactone (ALDACTONE) 100 MG tablet, Take 100 mg by mouth daily, Disp: , Rfl:      topiramate (TOPAMAX) 100 MG tablet, " Take 0.5 tablets by mouth 2 times daily., Disp: , Rfl:      Allergies   Allergen Reactions     Ivp Dye [Contrast Dye] Anaphylaxis     Lisinopril-Hctz Anaphylaxis     Maple Tree Anaphylaxis     Allergy includes maple syrup.     No Clinical Screening - See Comments      maple syrup==anaphylaxis  Dairy- causes to not feel well     Gluten Itching and Swelling     Codeine Sulfate Cramps and GI Disturbance     Erythromycin Nausea and Vomiting and Cramps     Nsaids Other (See Comments)     Patient had bariatric surgery. Should not ever take NSAIDS due to high risk for gastric ulcers.         PHYSICAL EXAM:  NECK AND TRUNK PATTERN:   Head Tremor: Pt states tremor is intermittent but not seen today  Left Side-bending: Present  Left Shoulder Elevation: slightly elevated  Head with right shift  Focalized pain at occipitalis     HPI:    Patient denies new medical diagnoses, illnesses, hospitalizations, emergency room visits, and injuries since the previous injection with botulinum neurotoxin.  Sees [facility] in [place] for therapy once a week and along with Botox injections is much improved.    We reviewed the recommended safety guidelines for  Botox from any vaccine injection, such as the seasonal flu vaccine, by a minimum of 10-14 days with Roxanna Celis. She acknowledged understanding.    RESPONSE TO PREVIOUS TREATMENT:    Roxanna Celis received 250 units of Botox on 10/29/2018.    Problems following the previous series of neurotoxin injections included:  No problems reported    BENEFITS BY PATIENT REPORT:    Pain Improvement: Yes.  Percent Improvement: 80 %    Duration of Benefit:  still somewhat ongoing    Dystonia Improvement: Yes.  Percent Improvement: 80 %    Duration of Benefit:  still somewhat ongoing.    Did much better with last injections on 10/29/2018.    BOTULINUM NEUROTOXIN INJECTION PROCEDURES:    VERIFICATION OF PATIENT IDENTIFICATION AND PROCEDURE     Initials   Patient Name lmd   Patient   lmd   Procedure Verified by: lmd     Prior to the start of the procedure and with procedural staff participation, I verbally confirmed the patient s identity using two indicators, relevant allergies, that the procedure was appropriate and matched the consent or emergent situation, and that the correct equipment/implants were available. Immediately prior to starting the procedure I conducted the Time Out with the procedural staff and re-confirmed the patient s name, procedure, and site/side. (The Joint Commission universal protocol was followed.)  Yes    Sedation (Moderate or Deep): None      Above assessments performed by:  Josefina Lama RN Care Coordinator    Haja Bradley MD      INDICATION/S FOR PROCEDURE/S:  Roxanna Celis is a 47 year old patient with dystonia affecting the  head, neck and shoulder girdle musculature secondary to a diagnosis of cervical dystonia with associated  pain, tremor, spasms, loss of joint motion, loss of volitional motor control and difficulty with activities of daily living.      Her baseline symptoms have been recalcitrant to oral medications and conservative therapy.  She is here today for an injection of Botox.       GOAL OF PROCEDURE:  The goal of this procedure is to increase active range of motion, improve volitional motor control, decrease pain  and enhance functional independence associated with dystonic movements.    TOTAL DOSE ADMINISTERED:  Dose Administered:  250 units Botox    Diluent Used:  Preservative Free Normal Saline  Total Volume of Diluent Used:  2.50 ml  Lot # /C3 with Expiration Date:  7/2021  NDC #: Botox 100u ([address]-39)    Medication guide was offered to patient and was declined.    CONSENT:  The risks, benefits, and treatment options were discussed with Roxanna Celis and she agreed to proceed.      Written consent was obtained by lmd.     EQUIPMENT USED:  Needle-37mm stimulating/recording  EMG/NCS Machine     SKIN PREPARATION:  Skin preparation  was performed using an alcohol wipe.     GUIDANCE DESCRIPTION:  Electro-myographic guidance was necessary throughout the procedure to accurately identify all areas of dystonic muscles while avoiding injection of non-dystonic muscles, neighboring nerves and nearby vascular structures.     AREA/MUSCLE INJECTED:    1. HEAD & NECK MUSCLES: 250 units Botox = Total Dose, 1:1 Dilution for 150 units Botox and 2:1 dilution for 100 units Botox to occipitalis   Right Rectus Capitis (upper cervical) - 10 units of Botox at 1 site/s.  (1:1 dilution)  Left Rectus Capitis (upper cervical) - 10 units of Botox at 1 site/s. (1:1 dilution)      Right Mid-Trapezius (mid cervical) - 5 units of Botox at 1 site/s. (1:1 dilution)  Left Mid-Trapezius (mid cervical) - 5 units of Botox at 1 site/s.(1:1 dilution)      Right Semispinalis - 10 units of Botox at 1 site/s. (1:1 dilution)  Left Semispinalis - 10 units of Botox at 1 site/s.(1:1 dilution)      Right Splenius Cervicis/Capitus - 10 units of Botox at 1 site/s. (1:1 dilution)  Left Splenius Cervicis/Capitus - 10 units of Botox at 1 site/s.(1:1 dilution)      Right Levator Scapulae - 15 units of Botox at 1 site/s. (1:1 dilution)  Left Levator Scapulae - 15 units of Botox at 1 site/s.(1:1 dilution)      Right Longissimus - 10 units of Botox at 1 site/s. (1:1 dilution)  Left Longissimus - 10 units of Botox at 1 site/s.(1:1 dilution)      Right Inferior Oblique - 15 units of Botox at 1 site/s.(1:1 dilution)  Left Inferior Oblique - 15 units of Botox at 1 site/s.(1:1 dilution)      Right occipitalis - 50 units of Botox at 5 site/s (2:1 dilution).               Left occipitalis - 50 units of Botox at 5 site/s (2:1 dilution).    RESPONSE TO PROCEDURE:  Roxanna Celis tolerated the procedure well and there were no immediate complications.  She was allowed to recover for an appropriate period of time and was discharged home in stable condition.    FOLLOW UP:  Roxanna Celis was asked to follow up by  phone in 7-14 days with Tiffany Lezama, PT, Care Coordinator or Josefina King, RN, Care Coordinator, to report her response to this series of injections.  Based on the patient's previous response to this therapy, Roxanna Celis was rescheduled for the next series of injections in 12 weeks.    PLAN (Medication Changes, Therapy Orders, Work or Disability Issues, etc.): Will monitor response to today's injections and report.    There were 50 units of Botox as unavoidable waste for this patient.     Niacinamide Counseling: I recommended taking niacin or niacinamide, also know as vitamin B3, twice daily. Recent evidence suggests that taking vitamin B3 (500 mg twice daily) can reduce the risk of actinic keratoses and non-melanoma skin cancers. Side effects of vitamin B3 include flushing and headache.

## 2021-02-11 ENCOUNTER — TRANSFERRED RECORDS (OUTPATIENT)
Dept: HEALTH INFORMATION MANAGEMENT | Facility: CLINIC | Age: 51
End: 2021-02-11

## 2021-02-16 ENCOUNTER — OFFICE VISIT (OUTPATIENT)
Dept: OPHTHALMOLOGY | Facility: CLINIC | Age: 51
End: 2021-02-16
Attending: OPHTHALMOLOGY
Payer: COMMERCIAL

## 2021-02-16 ENCOUNTER — OFFICE VISIT (OUTPATIENT)
Dept: OPTOMETRY | Facility: CLINIC | Age: 51
End: 2021-02-16
Payer: COMMERCIAL

## 2021-02-16 DIAGNOSIS — H04.123 BILATERAL DRY EYES: Primary | ICD-10-CM

## 2021-02-16 DIAGNOSIS — H02.889 MGD (MEIBOMIAN GLAND DYSFUNCTION): ICD-10-CM

## 2021-02-16 DIAGNOSIS — H16.223 KERATITIS SICCA, BILATERAL: ICD-10-CM

## 2021-02-16 DIAGNOSIS — H01.02B SQUAMOUS BLEPHARITIS OF UPPER AND LOWER EYELIDS OF BOTH EYES: ICD-10-CM

## 2021-02-16 DIAGNOSIS — H01.02A SQUAMOUS BLEPHARITIS OF UPPER AND LOWER EYELIDS OF BOTH EYES: ICD-10-CM

## 2021-02-16 DIAGNOSIS — H04.123 DRY EYES: Primary | ICD-10-CM

## 2021-02-16 PROCEDURE — G0463 HOSPITAL OUTPT CLINIC VISIT: HCPCS

## 2021-02-16 PROCEDURE — 99214 OFFICE O/P EST MOD 30 MIN: CPT | Mod: GC | Performed by: OPHTHALMOLOGY

## 2021-02-16 RX ORDER — BUPROPION HYDROCHLORIDE 300 MG/1
300 TABLET ORAL EVERY MORNING
COMMUNITY
Start: 2020-12-17

## 2021-02-16 ASSESSMENT — REFRACTION_CURRENTRX
OD_BASECURVE: 8.1
OS_BASECURVE: 8.1
OS_ADDL_SPECS: OPT EXTRA BLUE, HYDRAPEG
OD_BASECURVE: 4.8/7.1
OS_DIAMETER: 15.5
OS_SPHERE: -0.50
OD_ADDL_SPECS: OPT EXTRA CLEAR, HYDRAPEG
OD_DIAMETER: 16.0
OD_DIAMETER: 15.5
OD_SPHERE: -1.00
OD_SPHERE: -2.00

## 2021-02-16 ASSESSMENT — TONOMETRY
OS_IOP_MMHG: 19
IOP_METHOD: ICARE
OD_IOP_MMHG: 19
IOP_METHOD: ICARE
OD_IOP_MMHG: 19
OS_IOP_MMHG: 19

## 2021-02-16 ASSESSMENT — VISUAL ACUITY
OD_CC: 20/25
METHOD: SNELLEN - LINEAR
METHOD: SNELLEN - LINEAR
OS_CC: 20/20
CORRECTION_TYPE: CONTACTS
OS_CC: 20/20
CORRECTION_TYPE: CONTACTS
OS_CC: 20/20
CORRECTION_TYPE: CONTACTS
OD_CC: 20/20
OD_CC+: -3
CORRECTION_TYPE: CONTACTS
OD_CC: 20/20
METHOD: SNELLEN - LINEAR
OD_CC: 20/25-3
OS_CC: 20/20
METHOD: SNELLEN - LINEAR

## 2021-02-16 ASSESSMENT — REFRACTION_WEARINGRX
OD_SPHERE: -2.50
OS_AXIS: 039
SPECS_TYPE: BIFOCAL
OD_CYLINDER: +0.75
OD_AXIS: 154
OS_CYLINDER: +0.75
OS_SPHERE: -2.25
OD_ADD: +1.25
OS_ADD: +1.25

## 2021-02-16 ASSESSMENT — CONF VISUAL FIELD
OS_NORMAL: 1
OD_NORMAL: 1
METHOD: COUNTING FINGERS

## 2021-02-16 ASSESSMENT — EXTERNAL EXAM - LEFT EYE
OS_EXAM: NORMAL
OS_EXAM: NORMAL

## 2021-02-16 ASSESSMENT — EXTERNAL EXAM - RIGHT EYE
OD_EXAM: NORMAL
OD_EXAM: NORMAL

## 2021-02-16 NOTE — NURSING NOTE
Chief Complaints and History of Present Illnesses   Patient presents with     Dry Eye(s) Follow-Up     Chief Complaint(s) and History of Present Illness(es)     Dry Eye(s) Follow-Up     Laterality: both eyes    Associated signs and symptoms: discharge, eye pain and irritation    Course: gradually improving    Treatments tried: ointment and artificial tears              Comments     Severe dry eye here for f/u scleral lens each eye. Some adjustments made last month. New lenses are working better overall. Still dealing with debris/buildup. Can wear for about 5 hours before she needs to take them out. Yesterday was better. Clear discharge. Better vision overall. I&R going well. Dryness is better when they are in - but feels very bad when they are out. Using Systane ointment when out. Left lens is less comfortable than the right. Left eye gets some tearing/mucous sometimes.     She is filling totally with serum tears at this time, which she thinks is beneficial  Last edited by Sudha Anthony, OD on 2/16/2021  8:13 AM

## 2021-02-16 NOTE — PROGRESS NOTES
HPI     Dry Eye(s) Follow-Up     In both eyes.  Associated signs and symptoms include discharge, eye pain and irritation.  Since onset it is gradually improving.  Treatments tried include ointment and artificial tears.              Comments     Severe dry eye here for f/u scleral lens each eye. Some adjustments made last month. New lenses are working better overall. Still dealing with debris/buildup. Can wear for about 5 hours before she needs to take them out. Yesterday was better. Clear discharge. Better vision overall. I&R going well. Dryness is better when they are in - but feels very bad when they are out. Using Systane ointment when out. Left lens is less comfortable than the right. Left eye gets some tearing/mucous sometimes.     She is filling totally with serum tears at this time, which she thinks is beneficial  Last edited by Sudha Anthony, OD on 2/16/2021  8:13 AM              Last edited by Siri Ashley, COT on 2/16/2021  8:57 AM. (History)           Review of systems for the eyes was negative other than the pertinent positives/negatives listed in the HPI.      Assessment & Plan      Roxanna Celis is a 50 year old female with the following diagnoses:   1. Bilateral dry eyes    2. Squamous blepharitis of upper and lower eyelids of both eyes    3. MGD (meibomian gland dysfunction)       Significant dry eyes after starting Vraylar; persistent after cessation in 8/2020  S/P punctal plugs bilateral lower lids   Started using scleral lenses and does feel better most of the time when lenses are in. Recent adjustments to lens fit have been beneficial  Reviewed note from Dr. Anthony's who is planning to adjust the fit and prescription in the lenses after the visit today    Symptoms still quite bothersome when out of the lenses  Stopped Maxitrol ointment because it was irritating the eyes    Interval improvement on the exam again today  Reassurance given of improving surface  Encouraged more scleral  lens use as much as possible during waking hours    Cont warm compresses 2-3x daily   Cont doxycycline 50 mg orally twice a day   Continue xiidra twice a day   Reduce pred healon to 4x daily   Continue serum tears four times a day (ok to put 1-2 gtts in scleral lenses before fill)  Continue Systane ointment PRN when lenses are out  Increase mucomyst 6 times a day both eyes     Patient disposition:   Return in about 2 months (around 4/16/2021) for VT only.    Prasanth Fontenot MD  Ophthalmology Resident, PGY-4       Attending Physician Attestation:  Complete documentation of historical and exam elements from today's encounter can be found in the full encounter summary report (not reduplicated in this progress note).  I personally obtained the chief complaint(s) and history of present illness.  I confirmed and edited as necessary the review of systems, past medical/surgical history, family history, social history, and examination findings as documented by others; and I examined the patient myself.  I personally reviewed the relevant tests, images, and reports as documented above.  I formulated and edited as necessary the assessment and plan and discussed the findings and management plan with the patient and family. . - Faheem Ocampo MD

## 2021-02-16 NOTE — PROGRESS NOTES
A/P  1.) Dry Eye each eye  -Severely symptomatic despite max topical treatment  -Good progress with scleral lenses. Improved dryness symptoms when in. Corneas look significantly improved  -I&R going well in general. She is filling with 100% serum tears as she finds this more beneficial than PF saline or PF AT  -Current lenses fitting slightly tight in vertical meridian. Maxed out on this design. Refit to larger diam (16.0)  -She is habitually nearsighted and per discussion would like to target this Rx in her lenses for good near vision, will wear Rx glasses over  -Still has issues with mucous/debris buildup while in, which limit wear time. Would monitor and see if any drops need adjusting per Dr. Ocampo. Lens surfaces look good today without any debris or non-wetting    Order new lenses in bigger diam and flatter vertical merid. Aim for -2.00 target each eye (nearsighted) and she will wear her Rx glasses over for distance. Reviewed she will need adjusted Rx for use over the contacts but these should be functional for now. Discuss DVO vs PAL for use over the lenses next time    F/u 1 month fit/Rx check

## 2021-03-04 ENCOUNTER — TELEPHONE (OUTPATIENT)
Dept: OPHTHALMOLOGY | Facility: CLINIC | Age: 51
End: 2021-03-04

## 2021-03-04 NOTE — TELEPHONE ENCOUNTER
Spoke to pt at 0925    Worsening symptoms in past month-- increase burning, itching, irritation, mucus    Pt feels at square one with symptoms again and using frequent lubricating and patching intermittently for relief    Scheduled Tuesday with Dr García/Dr. Ocampo at [facility] location    Pt aware of date/time/location at [facility]    Reviewed [phone] option 4 for information to page on call for any sudden worsening of symptoms/vision/concerns    Pt seemed comfortable with plan  Haider Webber RN 9:33 AM 03/05/21    --    Left message with direct number at [phone]    Haider Webber RN 4:24 PM 03/04/21           [facility] Call Center    Phone Message    May a detailed message be left on voicemail: yes     Reason for Call: Symptoms or Concerns     If patient has red-flag symptoms, warm transfer to triage line    Current symptom or concern: Eyes are dry, burning, stinging, itchy, and painful    Symptoms have been present for:  1 month(s)    Has patient previously been seen for this? Yes    By Dr Ocampo    Are there any new or worsening symptoms? Yes: All symptoms increasingly getting worse       Action Taken: Message routed to:  [facility] Eye     Travel Screening: Not Applicable

## 2021-03-08 DIAGNOSIS — H01.02B SQUAMOUS BLEPHARITIS OF UPPER AND LOWER EYELIDS OF BOTH EYES: ICD-10-CM

## 2021-03-08 DIAGNOSIS — H02.889 MGD (MEIBOMIAN GLAND DYSFUNCTION): ICD-10-CM

## 2021-03-08 DIAGNOSIS — H01.02A SQUAMOUS BLEPHARITIS OF UPPER AND LOWER EYELIDS OF BOTH EYES: ICD-10-CM

## 2021-03-08 RX ORDER — DOXYCYCLINE 50 MG/1
50 TABLET ORAL 2 TIMES DAILY
Qty: 60 TABLET | Refills: 3 | Status: SHIPPED | OUTPATIENT
Start: 2021-03-08 | End: 2021-06-08

## 2021-03-08 NOTE — TELEPHONE ENCOUNTER
Medication: doxycycline monohydrate (ADOXA) 50 MG tablet   Dx:   Squamous blepharitis of upper and lower eyelids of both eyes [H01.02A, H01.02B]  - Primary       MGD (meibomian gland dysfunction        Requested directions: same  Current directions on the medication list:Take 1 tablet (50 mg) by mouth 2 times daily - Oral     Last Written Prescription Date:  11/10/20  Last Fill Quantity: 60,   # refills: 3    Last Office Visit: 2/16/21  Future Office visit: 3/9/21      Attending Provider: Faheem Ocampo MD  Last Clinic Note: 2/16/21  Cont warm compresses 2-3x daily   Cont doxycycline 50 mg orally twice a day   Continue xiidra twice a day   Reduce pred healon to 4x daily   Continue serum tears four times a day (ok to put 1-2 gtts in scleral lenses before fill)  Continue Systane ointment PRN when lenses are out  Increase mucomyst 6 times a day both eyes      Patient disposition:   Return in about 2 months (around 4/16/2021) for VT only.    Routing refill request to provider for review/approval because:  Not on medication list  Not on protocol

## 2021-03-09 ENCOUNTER — OFFICE VISIT (OUTPATIENT)
Dept: OPHTHALMOLOGY | Facility: CLINIC | Age: 51
End: 2021-03-09
Attending: OPHTHALMOLOGY
Payer: COMMERCIAL

## 2021-03-09 DIAGNOSIS — H01.02B SQUAMOUS BLEPHARITIS OF UPPER AND LOWER EYELIDS OF BOTH EYES: ICD-10-CM

## 2021-03-09 DIAGNOSIS — H01.02A SQUAMOUS BLEPHARITIS OF UPPER AND LOWER EYELIDS OF BOTH EYES: ICD-10-CM

## 2021-03-09 DIAGNOSIS — H04.123 BILATERAL DRY EYES: Primary | ICD-10-CM

## 2021-03-09 DIAGNOSIS — H02.889 MGD (MEIBOMIAN GLAND DYSFUNCTION): ICD-10-CM

## 2021-03-09 PROCEDURE — G0463 HOSPITAL OUTPT CLINIC VISIT: HCPCS

## 2021-03-09 PROCEDURE — 99214 OFFICE O/P EST MOD 30 MIN: CPT | Mod: GC | Performed by: OPHTHALMOLOGY

## 2021-03-09 ASSESSMENT — CONF VISUAL FIELD
OD_NORMAL: 1
OS_NORMAL: 1

## 2021-03-09 ASSESSMENT — CUP TO DISC RATIO
OD_RATIO: 0.3
OS_RATIO: 0.3

## 2021-03-09 ASSESSMENT — REFRACTION_WEARINGRX
SPECS_TYPE: BIFOCAL
OS_ADD: +1.25
OD_SPHERE: -2.50
OD_CYLINDER: +0.75
OS_AXIS: 039
OD_AXIS: 154
OD_ADD: +1.25
OS_CYLINDER: +0.75
OS_SPHERE: -2.25

## 2021-03-09 ASSESSMENT — VISUAL ACUITY
CORRECTION_TYPE: GLASSES
OS_CC: 20/20 SLOWLY
OS_CC+: -2
METHOD: SNELLEN - LINEAR
OD_CC+: -3
OD_CC: 20/20 SLOWLY

## 2021-03-09 ASSESSMENT — TONOMETRY
OD_IOP_MMHG: 18
OS_IOP_MMHG: 19
IOP_METHOD: ICARE

## 2021-03-09 ASSESSMENT — EXTERNAL EXAM - RIGHT EYE: OD_EXAM: NORMAL

## 2021-03-09 ASSESSMENT — EXTERNAL EXAM - LEFT EYE: OS_EXAM: NORMAL

## 2021-03-09 NOTE — NURSING NOTE
"Chief Complaints and History of Present Illnesses   Patient presents with     Follow Up     F/u dry eyes/blepharitis -      Chief Complaint(s) and History of Present Illness(es)     Follow Up     Associated symptoms: eye pain, redness, photophobia, itching, foreign body sensation (\"feels like rocks in the eyes\"), burning and tearing    Pain scale: 7/10    Comments: F/u dry eyes/blepharitis -               Comments     Pt states the scleral lenses get too corroded, so she is unable to wear them for too long of a time period  Pt states her eyes are always painful - worsens as the days wears on; is noting strange headaches more frequently lately (had one yesterday)  Notes more discharge and white crystals on the eyes coming back, notes more light sensitivity, and itching - wears patches over the eyes more frequently to help with the light sensitivity issue  Notes extreme pain upon taking lenses out when she wears them  Goes through tubes of the Systane ointment every week to try to get comfort  Pt had US of thyroid recently which showed a larger nodule coated in calcium - will be having a biopsy of this soon (3/18/21)    Pt was previously told that she had MS, but further MRI testing ruled this out - pt states she needs to always be monitored for this, however (looking at optic nerve with required testing)  Also requesting a referral to [place] for overall health issues  Pt's doctor just informed pt yesterday that she is going to be ordering the blood testing for Sjogren's - other lab work has not shown autoimmune disease    Ocular meds:  Warm compresses 2-3x daily  Doxycycline 50 mg orally twice a day  Xiidra twice a day, both eyes  Pred healon 4x daily, both eyes  Serum tears four times a day, both eyes (ok to put 1-2 gtts in scleral lenses before fill)  Systane ointment PRN when lenses are out  Mucomyst 6 times a day, both eyes    STANISLAV Sam 8:45 AM March 9, 2021                 "

## 2021-03-09 NOTE — PROGRESS NOTES
Pt states the scleral lenses get too corroded, so she is unable to wear them for too long of a time period   Pt states her eyes are always painful - worsens as the days wears on; is noting strange headaches more frequently lately (had one yesterday)   Notes more discharge and white crystals on the eyes coming back, notes more light sensitivity, and itching - wears patches over the eyes more frequently to help with the light sensitivity issue   Notes extreme pain upon taking lenses out when she wears them   Goes through tubes of the Systane ointment every week to try to get comfort   Pt had US of thyroid recently which showed a larger nodule coated in calcium - will be having a biopsy of this soon (3/18/21)     Pt was previously told that she had MS, but further MRI testing ruled this out - pt states she needs to always be monitored for this, however (looking at optic nerve with required testing)   Also requesting a referral to [place] for overall health issues   Pt's doctor just informed pt yesterday that she is going to be ordering the blood testing for Sjogren's - other lab work has not shown autoimmune disease     Ocular meds:   Warm compresses 2-3x daily   Doxycycline 50 mg orally twice a day   Xiidra twice a day, both eyes   Pred healon 4x daily, both eyes   Serum tears four times a day, both eyes (ok to put 1-2 gtts in scleral lenses before fill)   Systane ointment PRN when lenses are out   Mucomyst 6 times a day, both eyes     Review of systems for the eyes was negative other than the pertinent positives/negatives listed in the HPI.      Assessment & Plan      Roxanna Celis is a 50 year old female with the following diagnoses:   1. Bilateral dry eyes - Both Eyes    2. MGD (meibomian gland dysfunction)    3. Squamous blepharitis of upper and lower eyelids of both eyes         H/O worsened dry eyes after starting Vraylar; persistent after cessation in 8/2020  S/P punctal plugs bilateral lower lids   Started using  scleral lenses, which were initially beneficial, but cannot tolerate them for long time.   Significant difficulty and stress with contact lens insertion and removal limiting use    Symptoms still quite bothersome when out of the lenses  Stopped Maxitrol ointment because it was irritating the eyes  No improvement in contact lens comfort with increased acetylcysteine    Surface disease stable on the exam again today (no filaments, no stain, low tear meniscus and oily tear film)  Encouraged more scleral lens use as much as possible during waking hours    Cont warm compresses 2-3x daily   Cont doxycycline 50 mg orally twice a day   Continue xiidra twice a day   Continue pred healon to 4x daily   Continue serum tears four times a day (ok to put 1-2 gtts in scleral lenses before fill)  Continue Systane ointment PRN when lenses are out  Decrease mucomyst to 4 times a day both eyes     Has appointment with Dr. Anthony next week for scleral lens. Would recommend patient's  come to the visit to learn to help with lens care    Recommend cognitive behavioral therapy as patient's symptoms are out of proportion to her exam findings.     Will help patient set up referral to [place] [facility].     Return to clinic with Dr. Anthony as scheduled.   Seen and discussed with Dr. Terrence García MD  Ophthalmology PGY-3          Attending Physician Attestation:  Complete documentation of historical and exam elements from today's encounter can be found in the full encounter summary report (not reduplicated in this progress note).  I personally obtained the chief complaint(s) and history of present illness.  I confirmed and edited as necessary the review of systems, past medical/surgical history, family history, social history, and examination findings as documented by others; and I examined the patient myself.  I personally reviewed the relevant tests, images, and reports as documented above.  I formulated  and edited as necessary the assessment and plan and discussed the findings and management plan with the patient and family. . - Faheem Ocampo MD

## 2021-03-15 ENCOUNTER — TELEPHONE (OUTPATIENT)
Dept: OPHTHALMOLOGY | Facility: CLINIC | Age: 51
End: 2021-03-15

## 2021-03-16 ENCOUNTER — OFFICE VISIT (OUTPATIENT)
Dept: OPTOMETRY | Facility: CLINIC | Age: 51
End: 2021-03-16
Payer: COMMERCIAL

## 2021-03-16 DIAGNOSIS — H57.13 EYE PAIN, BILATERAL: ICD-10-CM

## 2021-03-16 DIAGNOSIS — H16.223 KERATITIS SICCA, BILATERAL: ICD-10-CM

## 2021-03-16 DIAGNOSIS — H04.123 DRY EYES: Primary | ICD-10-CM

## 2021-03-16 ASSESSMENT — VISUAL ACUITY
OD_CC: 20/30
METHOD: SNELLEN - LINEAR
OS_CC: 20/50
CORRECTION_TYPE: GLASSES, CONTACTS

## 2021-03-16 ASSESSMENT — EXTERNAL EXAM - LEFT EYE: OS_EXAM: NORMAL

## 2021-03-16 ASSESSMENT — REFRACTION_MANIFEST
OS_SPHERE: -2.50
OD_SPHERE: -2.50
OD_CYLINDER: SPHERE
OS_CYLINDER: SPHERE

## 2021-03-16 ASSESSMENT — EXTERNAL EXAM - RIGHT EYE: OD_EXAM: NORMAL

## 2021-03-16 ASSESSMENT — REFRACTION_CURRENTRX
OD_DIAMETER: 16.0
OS_DIAMETER: 16.0
OD_ADDL_SPECS: BOSTON XO CLEAR HYDRAPEG
OS_SPHERE: -1.75
OD_SPHERE: -2.25
OS_ADDL_SPECS: BOSTON XO BLUE HYDRAPEG

## 2021-03-16 ASSESSMENT — REFRACTION_WEARINGRX
OD_AXIS: 154
OS_SPHERE: -2.25
OS_CYLINDER: +0.75
OD_ADD: +1.25
OD_SPHERE: -2.50
OS_AXIS: 039
SPECS_TYPE: BIFOCAL
OD_CYLINDER: +0.75
OS_ADD: +1.25

## 2021-03-24 ENCOUNTER — TELEPHONE (OUTPATIENT)
Dept: OPHTHALMOLOGY | Facility: CLINIC | Age: 51
End: 2021-03-24

## 2021-03-24 NOTE — TELEPHONE ENCOUNTER
The patient left a VM on the triage phone number.  She notes that [facility] wants records. I gave her the release of information contact number.  I asked her to contact us if we can help her more

## 2021-03-29 ENCOUNTER — VIRTUAL VISIT (OUTPATIENT)
Dept: BEHAVIORAL HEALTH | Facility: CLINIC | Age: 51
End: 2021-03-29
Payer: COMMERCIAL

## 2021-03-29 DIAGNOSIS — Z53.9 ERRONEOUS ENCOUNTER--DISREGARD: Primary | ICD-10-CM

## 2021-03-29 NOTE — PROGRESS NOTES
Appointment rescheduled by patient - indicated she could not log onto [employer] to complete questionnaires and do the video visit. Agreed to reschedule for 4/21/2021.     Sergio Weber Psy.D, LP   Behavioral Health Clinician   -[facility]

## 2021-03-30 DIAGNOSIS — H04.123 BILATERAL DRY EYES: Primary | ICD-10-CM

## 2021-03-30 DIAGNOSIS — H16.123 FILAMENTARY KERATITIS OF BOTH EYES: ICD-10-CM

## 2021-04-01 ENCOUNTER — OFFICE VISIT (OUTPATIENT)
Dept: OPHTHALMOLOGY | Facility: CLINIC | Age: 51
End: 2021-04-01
Payer: COMMERCIAL

## 2021-04-01 DIAGNOSIS — H01.02B SQUAMOUS BLEPHARITIS OF UPPER AND LOWER EYELIDS OF BOTH EYES: ICD-10-CM

## 2021-04-01 DIAGNOSIS — H57.13 PAIN AROUND BOTH EYES: ICD-10-CM

## 2021-04-01 DIAGNOSIS — H01.02A SQUAMOUS BLEPHARITIS OF UPPER AND LOWER EYELIDS OF BOTH EYES: ICD-10-CM

## 2021-04-01 DIAGNOSIS — H02.889 MGD (MEIBOMIAN GLAND DYSFUNCTION): ICD-10-CM

## 2021-04-01 DIAGNOSIS — H04.129 DRY EYE: Primary | ICD-10-CM

## 2021-04-01 PROCEDURE — 99213 OFFICE O/P EST LOW 20 MIN: CPT | Performed by: OPTOMETRIST

## 2021-04-01 RX ORDER — ERYTHROMYCIN 5 MG/G
0.5 OINTMENT OPHTHALMIC AT BEDTIME
Qty: 3.5 G | Refills: 11 | Status: SHIPPED | OUTPATIENT
Start: 2021-04-01 | End: 2021-06-01

## 2021-04-01 ASSESSMENT — REFRACTION_CURRENTRX
OD_DIAMETER: 16.0
OD_ADDL_SPECS: BOSTON XO CLEAR HYDRAPEG
OS_ADDL_SPECS: BOSTON XO BLUE HYDRAPEG
OS_DIAMETER: 16.0
OD_SPHERE: -2.25
OS_SPHERE: -1.75

## 2021-04-01 ASSESSMENT — VISUAL ACUITY
OS_CC: 20/20-
OD_CC: 20/20-
CORRECTION_TYPE: GLASSES
METHOD: SNELLEN - LINEAR

## 2021-04-01 ASSESSMENT — EXTERNAL EXAM - LEFT EYE: OS_EXAM: NORMAL

## 2021-04-01 ASSESSMENT — EXTERNAL EXAM - RIGHT EYE: OD_EXAM: NORMAL

## 2021-04-01 NOTE — PATIENT INSTRUCTIONS
Lid scrubs: These can help prevent buildup of debris on eyelids/eyelashes and help reduce inflammation of the eyelids (blepharitis). Use daily.  -Tranquileyes 1% Tea Tree Eyelid & Facial Cleanser by [employer]  -Avenova Lid   -Cliradex Lid Wipes  -We Love Eyes Foaming Tea Tree Cleanser      Warm compresses: Use 1-2x/day for 5-10 minutes over closed eyelids  -David mask  -Tranquileyes beaded mask  -Mibo heating pad  -[place] REST & RELIEF Eye Mask (Hot or Cold)  -I-RELIEF Therapy Mask  -OcuTherm Essentials Kit    To purchase these products you can look over-the-counter at [employer] or purchase online at the following websites:  -www.Multiplicom/  -www.Dragonfly      Lipiflow/Miboflow - a very intense meibomian gland treatment to help get the glands functioning better    Blephex - A very concentrated eyelid scrub to reduce blepharitis      [facility] - Dr. Huber Mckeon    [facility] - Please call to make sure they offer treatments similar to the above

## 2021-04-01 NOTE — PROGRESS NOTES
Warm compresses 2-3x daily   Doxycycline 50 mg orally twice a day   Xiidra twice a day, both eyes   Pred healon 4x daily, both eyes   Serum tears four times a day, both eyes (ok to put 1-2 gtts in scleral lenses before fill)   Systane ointment PRN when lenses are out   Mucomyst 6 times a day, both eyes     A/P  1.) Dry Eye each eye  -Severely symptomatic despite max topical treatment and significant objective improvement in corneal appearance with scleral lens wear  -Overall good fit with larger diam scleral lenses. Did further I&R work with tech today. Sides now fitting loose, but vertical merid much improved  -Happy with nearsighted target in CL's (-2.50 today, distance Rx updated for use over). Continue in current barber  -Excellent corneal fit, surfaces of lenses look good without nonwetting or buildup. She endorses significant mucous, debris, buildup but we have not visualized on exam to date  -Upper edge awareness left eye>right eye but resolves when I hold lids out of the way    2.) Blepharitis each eye  -Endorses significant irritation, burning, stinging mostly upper lids  -Currently using wet washcloth and Ocusoft. Switch to David mask (or similar) and different lid  with tea tree oil given persistent collarettes  -Discussed options of further treatments including Lipiflow (or similar) and Blephex (or similar). Refer to [facility] locations offering these treatments  -Can start erythromycin anjelica at night per pt request    Her exam and vision are significantly improved from initial visit. Her corneas looked excellent at last exam. However she is still experiencing a high degree of pain disproportionate to slit lamp findings. Pt has behavioral health appt set up in new few weeks and is getting referral to [facility] [place] for rheum and eye pain. Discussed corneal neuropathy briefly with pt. Reassured her that her eyes have shown significant improvement. She is understandably frustrated that eyes continue to hurt  so much. I cannot improve further with scleral lens - option for referral for further blepharitis treatment.     Will order adjusted lenses with slight haptic change. Largely her lenses fit as well as they can and I do not necessarily have to see her back. She continues care with Dr. Ocampo. Appt with [place] pain specialist tomorrow    I have confirmed the patient's CC, HPI and reviewed Past Medical History, Past Surgical History, Social History, Family History, Problem List, Medication List and agree with Tech note.     Sudha Anthony, SHELLI GARCÍAO DALIAS

## 2021-04-02 ENCOUNTER — TRANSFERRED RECORDS (OUTPATIENT)
Dept: HEALTH INFORMATION MANAGEMENT | Facility: CLINIC | Age: 51
End: 2021-04-02

## 2021-04-06 DIAGNOSIS — H04.123 DRY EYES, BILATERAL: Primary | ICD-10-CM

## 2021-04-06 NOTE — TELEPHONE ENCOUNTER
Rx sent for pred healon per pt request to [facility] pharmacy    Haider Webber RN 12:14 PM 04/06/21

## 2021-04-07 NOTE — PROGRESS NOTES
"Subjective:  HPI  Physical Exam                    Objective:  System    Physical Exam    General     ROS    Assessment/Plan:    DISCHARGE REPORT    Progress reporting period is from 08/06/2020 to 08/31/2020.       SUBJECTIVE  Subjective:  When last seen on 08/31/2020, patient reported,  \"Horrible.\"  She reported her LB had been significantly flared-up over the past 3 weeks for unknown reasons.  Pain was constant across her beltline and into the hips and thighs to the knees.  She could only stand for 2 minutes at times and had not been able to walk around much.  Sleep was significantly interrupted, and she reported there was no comfortable position.  Current status is unknown as patient did not return for additional follow-up visits.  Current Pain level: 7/10.     Initial Pain level: 5/10.   Changes in function:  Unknown as patient did not return for additional follow-up visits.  Adverse reaction to treatment or activity: Unknown as patient did not return for additional follow-up visits.    OBJECTIVE  Objective:  As of 08/31/2020:  Lumbar AROM:  flexion 50% (hands to knees)--increases B LBP, pain with return to neutral; extension mod to max loss, increases LBP.  Very minimal tolerance to exercises.  Scaled back extension to prone with alternating knee flexion--NE during or after.  SEDA too painful.  Added gentle core strengthening but very little tolerance due to fatigue and pain.     Current objective measurements are unavailable as patient did not return for additional follow-up visits.    ASSESSMENT/PLAN  Patient was only seen for 2 visits in a 3-week timeframe with treatment focusing on posture and gentle core strengthening exercises.  No change was noted during the course of treatment.  She has not returned for additional follow-up visits so current assessment is unavailable.   Updated problem list and treatment plan: Diagnosis 1:  LBP   No updated problem list or treatment plan as patient did not return for " additional visits and is discharged from PT at this time.    STG/LTGs have been met or progress has been made towards goals:  Unknown as patient did not return for additional follow-up visits.  Assessment of Progress: The patient has not returned to therapy. Current status is unknown.  Self Management Plans:  Patient has been instructed in a home treatment program.  Patient  has been instructed in self management of symptoms.  Roxanna continues to require the following intervention to meet STG and LTG's:  PT intervention is no longer required to meet STG/LTG.    Recommendations:  Patient is discharged from PT as she did not return for further follow-up visits.    Please refer to the daily flowsheet for treatment today, total treatment time and time spent performing 1:1 timed codes.

## 2021-04-21 ENCOUNTER — OFFICE VISIT (OUTPATIENT)
Dept: OPTOMETRY | Facility: CLINIC | Age: 51
End: 2021-04-21
Payer: COMMERCIAL

## 2021-04-21 DIAGNOSIS — H16.223 KERATITIS SICCA, BILATERAL: ICD-10-CM

## 2021-04-21 DIAGNOSIS — H04.123 DRY EYES: Primary | ICD-10-CM

## 2021-04-21 DIAGNOSIS — H57.13 EYE PAIN, BILATERAL: ICD-10-CM

## 2021-04-21 RX ORDER — MINERAL OIL AND WHITE PETROLATUM 30; 940 MG/G; MG/G
3.5 OINTMENT OPHTHALMIC 4 TIMES DAILY
Qty: 3.5 G | Refills: 11 | Status: SHIPPED | OUTPATIENT
Start: 2021-04-21 | End: 2021-05-04

## 2021-04-21 ASSESSMENT — REFRACTION_WEARINGRX
OD_CYLINDER: SPHERE
OS_ADD: +2.50
OD_SPHERE: -2.50
OS_CYLINDER: SPHERE
OD_ADD: +2.50
OS_SPHERE: -2.50

## 2021-04-21 ASSESSMENT — TONOMETRY
OS_IOP_MMHG: 17
OD_IOP_MMHG: 15
IOP_METHOD: ICARE

## 2021-04-21 ASSESSMENT — REFRACTION_CURRENTRX
OD_ADDL_SPECS: BOSTON XO CLEAR HYDRAPEG
OS_SPHERE: -1.75
OD_DIAMETER: 16.0
OS_ADDL_SPECS: BOSTON XO BLUE HYDRAPEG
OS_DIAMETER: 16.0
OD_SPHERE: -2.25

## 2021-04-21 ASSESSMENT — VISUAL ACUITY
OS_CC: 20/20-1
METHOD: SNELLEN - LINEAR
CORRECTION_TYPE: GLASSES, CONTACTS
OD_CC: 20/25+2

## 2021-04-21 ASSESSMENT — EXTERNAL EXAM - RIGHT EYE: OD_EXAM: NORMAL

## 2021-04-21 ASSESSMENT — EXTERNAL EXAM - LEFT EYE: OS_EXAM: NORMAL

## 2021-04-21 NOTE — PATIENT INSTRUCTIONS
Instructions    Lid scrubs: These can help prevent buildup of debris on eyelids/eyelashes and help reduce inflammation of the eyelids (blepharitis). Use daily.  -Tranquileyes 1% Tea Tree Eyelid & Facial Cleanser by [employer] Eco  -Avenova Lid   -Cliradex Lid Wipes  -We Love Eyes Foaming Tea Tree Cleanser        Warm compresses: Use 1-2x/day for 5-10 minutes over closed eyelids  -David mask  -Tranquileyes beaded mask  -Mibo heating pad  -[place] REST & RELIEF Eye Mask (Hot or Cold)  -I-RELIEF Therapy Mask  -OcuTherm Essentials Kit     To purchase these products you can look over-the-counter at [employer] or purchase online at the following websites:  -www.[employer]/  -[email or web address].Hive guard unlimited        Lipiflow/Miboflow - a very intense meibomian gland treatment to help get the glands functioning better     Blephex - A very concentrated eyelid scrub to reduce blepharitis        [facility] - Dr. Huber Mckeon     [facility] - Please call to make sure they offer treatments similar to the above            I would NOT recommend this yet, as your lenses largely fit well and I would like to maximize other treatments first, but if after seeing rheumatology and getting the lid treatments done you are still extremely aware of lenses in the eyes, we could consider EyeprintPro.    [email or web address].eyeprintpro.com    These are custom molded lenses where an impression is done minimum 48 hours out of all lenses to get the most natural eye shape. We then sent to a lab who 3D prints them specifically for your eyes.  With severely sensitive eyes I have had mixed results - I do not think comfort would be worse but I cannot guarantee they will be significantly better, although it is possible.

## 2021-04-21 NOTE — PROGRESS NOTES
Warm compresses 2-3x daily   Doxycycline 50 mg orally twice a day   Xiidra twice a day, both eyes   Pred healon 4x daily, both eyes   Serum tears four times a day, both eyes (ok to put 1-2 gtts in scleral lenses before fill)   Systane ointment PRN when lenses are out   Mucomyst 6 times a day, both eyes     A/P  1.) Dry Eye each eye  -Severely symptomatic despite max topical treatment and significant objective improvement in corneal appearance with scleral lens wear  -Overall good fit with larger diam scleral lenses. Would not recommend any lens changes at this time.   -Happy with nearsighted target in CL's (-2.50 today, distance Rx updated for use over). Continue in current barber  -She endorses significant mucous, debris, buildup but we have not visualized on exam to date - lens surface looks clean today without nonwetting or debris  -Upper edge awareness left eye>right eye but resolves when I hold lids out of the way  -Would rec continue with current scleral lenses. Her corneas have shown significant improvement and her eyes are largely white/quiet today. We did discuss option of Eyeprint but I reviewed mixed results in ocular hypersensitivity cases. Hold for now pending other treatments, but would consider in the future.    2.) Blepharitis each eye  -Endorses significant irritation, burning, stinging mostly upper lids  -Previously failed: wet washcloth, Ocusoft. Last time I recommended she switch to David mask and tea tree oil lid scrub which she has just started  -Discussed options of further treatments including Lipiflow (or similar) and Blephex (or similar). Refer to [facility] locations offering these treatments  -Failed: maxitrol anjelica, Refresh PM, Genteal Gel, erythromycin anjelica - has only done well with Systane ointment.     Her exam and vision are significantly improved from initial visit here. Her corneas look excellent today. However she is still experiencing a high degree of pain disproportionate to slit  lamp findings. Saw specialist at [place] who wants her tested for Sjogrens and to see rheumatology. She is hoping to get a lip biopsy later today with [facility] to test for Sjogrens.     Reassured pt that her eyes have shown significant improvement. She is understandably frustrated that eyes continue to hurt so much. I cannot improve further with scleral lens - option for blepharitis/mgd treatment referral (Lipiflow etc). Will forward records if pt decides to pursue.     I do not need to see her back at this time - would consider Eyeprint discussion again in fall after systemic issues further explored.

## 2021-04-26 ENCOUNTER — MEDICAL CORRESPONDENCE (OUTPATIENT)
Dept: HEALTH INFORMATION MANAGEMENT | Facility: CLINIC | Age: 51
End: 2021-04-26

## 2021-04-28 ENCOUNTER — TRANSCRIBE ORDERS (OUTPATIENT)
Dept: OTHER | Age: 51
End: 2021-04-28

## 2021-04-28 DIAGNOSIS — M79.2 NEUROPATHIC PAIN: Primary | ICD-10-CM

## 2021-04-30 NOTE — TELEPHONE ENCOUNTER
RECORDS RECEIVED FROM: External   Date of Appt: 6/1/21   NOTES (FOR ALL VISITS) STATUS DETAILS   OFFICE NOTE from referring provider Care Everywhere Africa Sow PA @ [facility]:  4/26/21 mychart encounter  4/22/21 telephone encounter   OFFICE NOTE from other specialist Care Everywhere Alfonso Jimenez NP @ [facility]:  4/16/21   DISCHARGE SUMMARY from hospital N/A    DISCHARGE REPORT from the ER Care Everywhere [facility]:  8/17/20   OPERATIVE REPORT N/A    MEDICATION LIST Care Everywhere    IMAGING  (FOR ALL VISITS)     EMG Received [facility]:  2/11/21   EEG N/A    LUMBAR PUNCTURE N/A    GANESH SCAN N/A    ULTRASOUND (CAROTID BILAT) *VASCULAR* N/A    MRI (HEAD, NECK, SPINE) Received [facility]:  MRI Brain 8/17/20  MRA Head Brain Venogram 8/17/20  MRI Lumbar spine 5/28/20   CT (HEAD, NECK, SPINE) N/A       Action 4/30/21 MV 9.02am   Action Taken Imaging request faxed to [facility] for:  MRI Brain 8/17/20  MRA Head Brain Venogram 8/17/20  MRI Lumbar spine 5/28/20    [facility]     Action 5/7/21 MV 12.33pm   Action Taken Images received from [facility] and resolved in PACS.     EMG report received via fax and sent to scanning

## 2021-05-04 ENCOUNTER — OFFICE VISIT (OUTPATIENT)
Dept: OPHTHALMOLOGY | Facility: CLINIC | Age: 51
End: 2021-05-04
Attending: OPHTHALMOLOGY
Payer: COMMERCIAL

## 2021-05-04 DIAGNOSIS — H04.123 BILATERAL DRY EYES: ICD-10-CM

## 2021-05-04 DIAGNOSIS — H16.123 FILAMENTARY KERATITIS OF BOTH EYES: ICD-10-CM

## 2021-05-04 DIAGNOSIS — H57.13 EYE PAIN, BILATERAL: ICD-10-CM

## 2021-05-04 DIAGNOSIS — H02.889 MGD (MEIBOMIAN GLAND DYSFUNCTION): ICD-10-CM

## 2021-05-04 DIAGNOSIS — H04.123 DRY EYES, BILATERAL: Primary | ICD-10-CM

## 2021-05-04 DIAGNOSIS — H01.02A SQUAMOUS BLEPHARITIS OF UPPER AND LOWER EYELIDS OF BOTH EYES: ICD-10-CM

## 2021-05-04 DIAGNOSIS — H01.02B SQUAMOUS BLEPHARITIS OF UPPER AND LOWER EYELIDS OF BOTH EYES: ICD-10-CM

## 2021-05-04 PROCEDURE — 99214 OFFICE O/P EST MOD 30 MIN: CPT | Mod: GC | Performed by: OPHTHALMOLOGY

## 2021-05-04 PROCEDURE — G0463 HOSPITAL OUTPT CLINIC VISIT: HCPCS

## 2021-05-04 RX ORDER — SODIUM CHLORIDE FOR INHALATION 0.9 %
5 VIAL, NEBULIZER (ML) INHALATION 2 TIMES DAILY
Qty: 500 ML | Refills: 11 | Status: SHIPPED | OUTPATIENT
Start: 2021-05-04 | End: 2021-06-01

## 2021-05-04 RX ORDER — MINERAL OIL AND WHITE PETROLATUM 30; 940 MG/G; MG/G
3.5 OINTMENT OPHTHALMIC 4 TIMES DAILY PRN
Qty: 7 G | Refills: 11 | Status: SHIPPED | OUTPATIENT
Start: 2021-05-04

## 2021-05-04 ASSESSMENT — TONOMETRY
IOP_METHOD: ICARE
OD_IOP_MMHG: 13
OS_IOP_MMHG: 18

## 2021-05-04 ASSESSMENT — CONF VISUAL FIELD
OS_NORMAL: 1
OD_NORMAL: 1

## 2021-05-04 ASSESSMENT — VISUAL ACUITY
OS_CC+: -2
OD_CC+: +2
OD_CC: 20/40
METHOD: SNELLEN - LINEAR
CORRECTION_TYPE: GLASSES
OD_PH_CC: 20/20
OS_CC: 20/20
OD_PH_CC+: -2

## 2021-05-04 ASSESSMENT — REFRACTION_WEARINGRX
OD_SPHERE: -2.50
SPECS_TYPE: PAL
OS_SPHERE: -2.50
OD_ADD: +2.50
OD_CYLINDER: SPHERE
OS_ADD: +2.50
OS_CYLINDER: SPHERE

## 2021-05-04 ASSESSMENT — DYE DISAPPEARANCE TEST (DDT)
OD_DDT: NEG
OS_DDT: NEG

## 2021-05-04 ASSESSMENT — SCHIRMERS TEST
OD_SCHIRMERS: 5
OS_SCHIRMERS: 11

## 2021-05-04 ASSESSMENT — EXTERNAL EXAM - LEFT EYE: OS_EXAM: NORMAL

## 2021-05-04 ASSESSMENT — EXTERNAL EXAM - RIGHT EYE: OD_EXAM: NORMAL

## 2021-05-04 NOTE — NURSING NOTE
Chief Complaints and History of Present Illnesses   Patient presents with     Dry Eye(s) Both Eyes     Chief Complaint(s) and History of Present Illness(es)     Dry Eye(s) Both Eyes     Laterality: both eyes    Associated symptoms: eye pain (sharp stabbing/burning/stinging), burning and photophobia    Duration: 2 months    Pain scale: 6/10 (BE, but left eye slightly worse - states it is awful by the end of the day)              Comments     Pt did see Dr. Anthony ~1 month ago - states vision and everything is the same  Tearing noted constantly  Pt states she wears the CL's every day    Ocular meds:  Warm compresses 2-3x daily  Doxycycline 50 mg orally twice a day  Xiidra twice a day, both eyes  Pred healon 4x daily, both eyes  Serum tears four times a day, both eyes (ok to put 1-2 gtts in scleral lenses before fill)  Systane ointment PRN when lenses are out  Mucomyst 6 times a day, both eyes  Tranquileyes goggles at night  Pt tried erythromycin ointment at bedtime and this didn't work    STANISLAV Sam 10:33 AM May 4, 2021

## 2021-05-04 NOTE — PROGRESS NOTES
Chief Complaint(s) and History of Present Illness(es)     Dry Eye(s) Both Eyes     Laterality: both eyes    Associated symptoms: eye pain (sharp stabbing/burning/stinging), burning   and photophobia    Duration: 2 months    Pain scale: 6/10 (BE, but left eye slightly worse - states it is awful by   the end of the day)              Comments     Pt did see Dr. Anthony ~1 month ago - states vision and everything is   the same  Tearing noted constantly  Pt states she wears the CL's every day    Ocular meds:  Warm compresses 2-3x daily  Doxycycline 50 mg orally twice a day  Xiidra twice a day, both eyes  Pred healon 4x daily, both eyes  Serum tears four times a day, both eyes (ok to put 1-2 gtts in scleral   lenses before fill)  Systane ointment PRN when lenses are out  Mucomyst 6 times a day, both eyes  Tranquileyes goggles at night  Pt tried erythromycin ointment at bedtime and this didn't work    STANISLAV Sam 10:33 AM May 4, 2021             Review of systems for the eyes was negative other than the pertinent positives/negatives listed in the HPI.      Assessment & Plan      Roxanna Celis is a 50 year old female with the following diagnoses:   1. Dry eyes, bilateral    2. MGD (meibomian gland dysfunction)    3. Squamous blepharitis of upper and lower eyelids of both eyes       H/O worsened dry eyes after starting Vraylar; persistent after cessation in 8/2020   S/P punctal plugs bilateral lower lids   Started using scleral lenses, which were initially beneficial, but cannot tolerate them for long time. Currently planning getting custom sclerals with Dr. Anthony (who is now on maternity leave).    Last saw Dr. Anthony 4/2021 and Dr. Martinez at [place] in 4/2021. Had neg Sjogren panel, ENEIDA, RA, CRP, and ESR in blood testing performed 3/2021. Reports Had seen a rheumatologist in [place] (no chart in system) who did not plan to start empiric therapy for sjogrens. Saw ENT in [place] who did not want to proceed  with lip bx.     Current therapy:  Warm compresses 2-3x daily   Doxycycline 50 mg orally twice a day   Xiidra twice a day, both eyes   Pred healon 4x daily, both eyes   Serum tears four times a day, both eyes (ok to put 1-2 gtts in scleral lenses before fill)   Systane ointment PRN when lenses are out   Mucomyst 6 times a day, both eyes     Symptoms have not changed since last visit, still quite bothersome when out of the lenses.   Left eye feeling worse with Left lower lid plug.  + epiphora  Surface disease stable on the exam again today (no filaments, no stain, low tear meniscus and oily tear film, few PEEs, mild blepharitis)  Schirmer Test I (without anesthesia) today 5 mm right eye and 11 mm left eye   Pt has irrigation and inflammation around the LLL punctal plug-- removed today    Consider temporary left upper lid plug as needed.  If tolerated could cauterize left upper lid   Encouraged more scleral lens use as much as possible during waking hours  Recommend continued cognitive behavioral therapy as patient's symptoms are out of proportion to her exam findings  Pt has an appt with a different rheumatologist in July for consideration for Plaquenil; may require lip biopsy     Patient disposition:   Return in about 1 week (around 5/11/2021) for Follow Up .    Amrita Abbasi MD  PGY-2 Resident Physician  Department of Ophthalmology      Attending Physician Attestation:  Complete documentation of historical and exam elements from today's encounter can be found in the full encounter summary report (not reduplicated in this progress note).  I personally obtained the chief complaint(s) and history of present illness.  I confirmed and edited as necessary the review of systems, past medical/surgical history, family history, social history, and examination findings as documented by others; and I examined the patient myself.  I personally reviewed the relevant tests, images, and reports as documented above.  I formulated and  edited as necessary the assessment and plan and discussed the findings and management plan with the patient and family. . - Faheem Ocampo MD

## 2021-05-09 ENCOUNTER — HEALTH MAINTENANCE LETTER (OUTPATIENT)
Age: 51
End: 2021-05-09

## 2021-05-10 ENCOUNTER — TELEPHONE (OUTPATIENT)
Dept: OPHTHALMOLOGY | Facility: CLINIC | Age: 51
End: 2021-05-10

## 2021-05-10 NOTE — TELEPHONE ENCOUNTER
[facility] Call Center    Phone Message    May a detailed message be left on voicemail: yes     Reason for Call: Other: The pt is requesting that a copy of her eye glass RX be mailed to her home. Thanks.    Action Taken: Message routed to:  [facility]    Travel Screening: Not Applicable

## 2021-05-13 ENCOUNTER — TELEPHONE (OUTPATIENT)
Dept: OPHTHALMOLOGY | Facility: CLINIC | Age: 51
End: 2021-05-13

## 2021-05-13 DIAGNOSIS — H16.123 FILAMENTARY KERATITIS OF BOTH EYES: ICD-10-CM

## 2021-05-13 DIAGNOSIS — H04.123 BILATERAL DRY EYES: ICD-10-CM

## 2021-05-13 NOTE — TELEPHONE ENCOUNTER
Left message at 1415    Reviewed Rx sent    Pt also left voicemail about Soothe Max eye drops    Reviewed with Dr Randall and left message stating not for use with contact lens in place-- pt to remove contacts is using    Reviewed ok for preservative free artificial tears with contact lens in place    Direct number provided for any further assistance.    Haider Webber, RN 2:21 PM 05/13/21          [facility] Call Center    Phone Message    May a detailed message be left on voicemail: yes     Reason for Call: Medication Refill Request    Has the patient contacted the pharmacy for the refill? Yes   Name of medication being requested: acetylcysteine 10 % (MUCOMYST) 10% SOLN  Provider who prescribed the medication: Dr. Ocampo  Pharmacy: [facility], [address] [place] [id number]  Date medication is needed: as soon as possible, pt is on vacation tomorrow and needs it asap         Action Taken: Message routed to:  [facility]  eye    Travel Screening: Not Applicable

## 2021-06-01 ENCOUNTER — PRE VISIT (OUTPATIENT)
Dept: NEUROLOGY | Facility: CLINIC | Age: 51
End: 2021-06-01

## 2021-06-01 ENCOUNTER — OFFICE VISIT (OUTPATIENT)
Dept: NEUROLOGY | Facility: CLINIC | Age: 51
End: 2021-06-01
Attending: PHYSICIAN ASSISTANT
Payer: COMMERCIAL

## 2021-06-01 VITALS
OXYGEN SATURATION: 99 % | DIASTOLIC BLOOD PRESSURE: 85 MMHG | WEIGHT: 222.5 LBS | HEIGHT: 66 IN | HEART RATE: 89 BPM | BODY MASS INDEX: 35.76 KG/M2 | SYSTOLIC BLOOD PRESSURE: 164 MMHG

## 2021-06-01 DIAGNOSIS — M79.2 NEUROPATHIC PAIN: ICD-10-CM

## 2021-06-01 DIAGNOSIS — R20.2 PARESTHESIA: Primary | ICD-10-CM

## 2021-06-01 DIAGNOSIS — R20.2 PARESTHESIA: ICD-10-CM

## 2021-06-01 LAB — MISCELLANEOUS TEST: NORMAL

## 2021-06-01 PROCEDURE — 36415 COLL VENOUS BLD VENIPUNCTURE: CPT | Performed by: PATHOLOGY

## 2021-06-01 PROCEDURE — 86255 FLUORESCENT ANTIBODY SCREEN: CPT | Mod: 90 | Performed by: PATHOLOGY

## 2021-06-01 PROCEDURE — 99214 OFFICE O/P EST MOD 30 MIN: CPT | Performed by: PSYCHIATRY & NEUROLOGY

## 2021-06-01 PROCEDURE — G0463 HOSPITAL OUTPT CLINIC VISIT: HCPCS

## 2021-06-01 RX ORDER — GABAPENTIN 600 MG/1
TABLET ORAL
Qty: 120 TABLET | Refills: 11 | Status: SHIPPED | OUTPATIENT
Start: 2021-06-01 | End: 2021-10-15

## 2021-06-01 ASSESSMENT — PAIN SCALES - GENERAL: PAINLEVEL: SEVERE PAIN (7)

## 2021-06-01 ASSESSMENT — MIFFLIN-ST. JEOR: SCORE: 1636

## 2021-06-03 ENCOUNTER — TELEPHONE (OUTPATIENT)
Dept: OPTOMETRY | Facility: CLINIC | Age: 51
End: 2021-06-03

## 2021-06-03 DIAGNOSIS — Z97.3 USES CONTACT LENSES: Primary | ICD-10-CM

## 2021-06-03 RX ORDER — SODIUM CHLORIDE FOR INHALATION 0.9 %
3 VIAL, NEBULIZER (ML) INHALATION PRN
Qty: 500 ML | Refills: 11 | Status: SHIPPED | OUTPATIENT
Start: 2021-06-03 | End: 2021-09-24

## 2021-06-03 NOTE — TELEPHONE ENCOUNTER
Pharmacy calling    States Rx for neb solution denied    Pt uses scleral lenss    Rx for neb solution sent for use with scleral lenses    Haider Webber RN 9:43 AM 06/03/21

## 2021-06-08 ENCOUNTER — OFFICE VISIT (OUTPATIENT)
Dept: OPHTHALMOLOGY | Facility: CLINIC | Age: 51
End: 2021-06-08
Attending: OPHTHALMOLOGY
Payer: COMMERCIAL

## 2021-06-08 DIAGNOSIS — H01.02B SQUAMOUS BLEPHARITIS OF UPPER AND LOWER EYELIDS OF BOTH EYES: ICD-10-CM

## 2021-06-08 DIAGNOSIS — H01.02A SQUAMOUS BLEPHARITIS OF UPPER AND LOWER EYELIDS OF BOTH EYES: ICD-10-CM

## 2021-06-08 DIAGNOSIS — H04.123 BILATERAL DRY EYES: Primary | ICD-10-CM

## 2021-06-08 DIAGNOSIS — H02.889 MGD (MEIBOMIAN GLAND DYSFUNCTION): ICD-10-CM

## 2021-06-08 PROCEDURE — 68761 CLOSE TEAR DUCT OPENING: CPT | Performed by: OPHTHALMOLOGY

## 2021-06-08 PROCEDURE — G0463 HOSPITAL OUTPT CLINIC VISIT: HCPCS | Mod: 25

## 2021-06-08 PROCEDURE — 99214 OFFICE O/P EST MOD 30 MIN: CPT | Mod: 25 | Performed by: OPHTHALMOLOGY

## 2021-06-08 RX ORDER — DOXYCYCLINE 50 MG/1
50 TABLET ORAL 2 TIMES DAILY
Qty: 180 TABLET | Refills: 3 | Status: SHIPPED | OUTPATIENT
Start: 2021-06-08 | End: 2022-04-26

## 2021-06-08 ASSESSMENT — VISUAL ACUITY
METHOD: SNELLEN - LINEAR
CORRECTION_TYPE: GLASSES
OD_CC: 20/20
OS_PH_CC: 20/20
OS_CC: 20/30

## 2021-06-08 ASSESSMENT — CONF VISUAL FIELD
OD_NORMAL: 1
OS_NORMAL: 1

## 2021-06-08 ASSESSMENT — REFRACTION_WEARINGRX
OS_ADD: +2.50
OS_CYLINDER: SPHERE
OD_SPHERE: -2.50
OD_ADD: +2.50
OD_CYLINDER: SPHERE
OS_SPHERE: -2.50
SPECS_TYPE: PAL

## 2021-06-08 ASSESSMENT — EXTERNAL EXAM - RIGHT EYE: OD_EXAM: NORMAL

## 2021-06-08 ASSESSMENT — TONOMETRY
OD_IOP_MMHG: 19
IOP_METHOD: ICARE
OS_IOP_MMHG: 18

## 2021-06-08 ASSESSMENT — EXTERNAL EXAM - LEFT EYE: OS_EXAM: NORMAL

## 2021-06-08 NOTE — PROGRESS NOTES
"Service Date: 06/01/2021    REASON FOR VISIT:  Roxanna Celis is a 50-year-old woman referred by Africa Sow for neurologic consultation regarding paresthesias, pain, visual symptoms, and possible demyelinating disease.    HISTORY OF PRESENT ILLNESS:  Roxanna tells me she is here because \"I am trying to figure out what is going on with me.\" She told me that about 2 years ago, she awoke with her eyes \"horrible.\"  She cannot really describe what exactly was wrong with her vision.  She saw Ophthalmology and was diagnosed with dry eyes.  She describes then a gradual worsening of a variety of symptoms.  She developed numbness and tingling in her arms and lower legs.  She has a burning pain around her wrist and ankles.  She is very fatigued, is not refreshed after sleep.  Her balance is poor and she is frequently dizzy.  She has had workup including apparently visual evoked responses and OCT which were normal.  She had a brain MRI last August and it is normal, with no signs suggestive of MS or other central nervous system disease.  The optic nerves appear normal.  She had EMG and nerve conduction studies done at [facility], which she tells me was normal.  There was concern for possible Sjogren syndrome despite negative Sjogren's antibodies.  She was referred for possible salivary gland biopsy, but that did not happen, I believe.    PAST MEDICAL HISTORY:   1.  Depression and anxiety.  2.  Chronic neck and back pain.  3.  Status post gastric bypass.  4.  Status post cholecystectomy.  5.  History of kidney stones.    ALLERGIES:  Lisinopril.    MEDICATIONS:    1.  Clonazepam.  2.  Doxycycline.  3.  Duloxetine.  4.  Gabapentin.  5.  Hydrochlorothiazide.  6.  Bupropion.    FAMILY HISTORY:  She has a paternal cousin with multiple sclerosis.    SOCIAL HISTORY:  She is  without children and lives with her  in [place].  She does not smoke or abuse alcohol.    PHYSICAL EXAMINATION:  Blood pressure 164/85, pulse 89, weight 222 " pounds.  She is alert and oriented.  Affect is bright, and language functions are normal.  Pupils are equal and normally reactive, without afferent pupillary defect.  The ophthalmoscopic exam is normal.  Eye movements are full and conjugate.  Facial strength and sensation are normal.  There is no dysphonia or dysarthria.  Muscle bulk, tone, strength and dexterity are normal in all 4 limbs.  Finger tapping, toe tapping and finger-nose-finger are all normal.  Sensation is normal to all modalities including light touch, pinprick, vibration, and joint position sense in all 4 limbs.  Deep tendon reflexes are 2/4 and symmetric at the biceps, triceps, brachioradialis, knees and ankles.  Plantar responses are flexor bilaterally.  Her gait is narrow-based and stable with normal tandem walk and negative Romberg.    I reviewed medical records including office notes on Care Everywhere and MRI of the brain from 08/2020, the results of which are described above.    IMPRESSION:  Paresthesias and symptoms suggestive of neuropathic pain, with normal MRI, reportedly normal nerve conduction studies, and normal neurologic exam.  I reassured Roxanna that I see no evidence of multiple sclerosis or other central nervous system disease to explain her symptoms.  I told her I do think that seronegative Sjogren syndrome could be a possible explanation of both her dry eyes and sensory symptoms.  Sjogren's often causes a sensory neuronopathy which is difficult to detect on EMG/nerve conduction studies.  I told her I do think it makes sense to get imaging of the spinal cord, to rule out cord lesions as a possible explanation of her symptoms, but she has no clinical signs of myelopathy and I told her I expect them to be normal.  I also told her I think it is reasonable to check antiMOG antibodies.  She tells me that when her visual symptoms first started, her eye doctor saw inflammation in her optic nerve, and antiMOG demyelinating disease can  "sometimes present as optic neuritis without the presence of brain lesions typical of MS.  I spent 82 minutes on her care on the date of service which included chart review and face-to-face time.  I told her I am as confident as I can be that she does not have multiple sclerosis or a related disorder.  I did bring up the fact that sensory symptoms often turn out to be \"functional\", which is a term to describe real symptoms for which no underlying cause can be found.  I explained that that can be frustrating, but the silver lining is that we are generally pretty good at finding \"bad\" things and that if no underlying cause can be found, then ultimately that suggests she does not have some insidious neurologic disease.  In that case, treatment is in the symptomatic realm.  She is on appropriate medications for neuropathic pain in gabapentin and duloxetine.  We discussed medical cannabis as something that can sometimes be effective for neuropathic pain as well.  She asked if I would continue to follow her going forward, and I told her that unfortunately if MS or a related disorder is ruled out that I would not be able to follow her because of the scarcity of appointment slots in MS Clinic, such that we do not follow patients without a diagnosed inflammatory CNS disorder.  We will see what the spinal MRIs show and communicate the results to her by phone.    PLAN:     1.  MOG antibodies.  2.  MRI of the cervical and thoracic spine.  3.  If as expected the above are normal, no further followup in MS Clinic.  4.  If she has not already done so, I think establishing with a Pain Clinic would be reasonable for her.    Thank you for allowing me to participate in her care.    Ford Bell MD        D: 2021   T: 2021   MT: maida    Name:     SHAUNA SALTER  MRN:      [id number]        Account:      [id number]   :      1970           Service Date: 2021       Document: [id number]    "

## 2021-06-08 NOTE — PROGRESS NOTES
Chief Complaint(s) and History of Present Illness(es)     Dry Eye(s) Follow-Up     Laterality: both eyes    Duration: 1 month    Course: gradually worsening    Response to treatment: no improvement              Comments     Pt. States that dryness has worsened since last visit LE>RE. VA has been   fluctuating. Has been difficult to keep eyes open.  Vianey Mcginnis COT 9:18 AM June 8, 2021               Review of systems for the eyes was negative other than the pertinent positives/negatives listed in the HPI.      Assessment & Plan      Roxanna Celis is a 50 year old female with the following diagnoses:   1. Bilateral dry eyes    2. Squamous blepharitis of upper and lower eyelids of both eyes    3. MGD (meibomian gland dysfunction)       H/O worsened dry eyes after starting Vraylar; persistent after cessation in 8/2020   S/P punctal plugs bilateral lower lids (silicone); removed left lower lid plug last visit due to irritation, now symptoms worsening  Started using scleral lenses, which were initially beneficial, but cannot tolerate them for long time. Currently planning getting custom sclerals with Dr. Anthony (who is now on maternity leave), scheduled in Aug 2021     Last saw Dr. Anthony 4/2021 and Dr. Martinez at [place] in 4/2021. Had neg Sjogren panel, ENEIDA, RA, CRP, and ESR in blood testing performed 3/2021. Reports Had seen a rheumatologist in [place] (no chart in system) who did not plan to start empiric therapy for sjogrens. Saw ENT in [place] who did not want to proceed with lip bx.      Saw neurology for the first time this week (Dr. Bell).    Pt states has 6/18th has two MRIs scheduled for Cx and T-spine to rule out MS, for chronic and worsening all-extremity pain, weak, and numbness.     Had screened for CBT and was told would be difficult d/t chronic nature of symptoms. Has not scheduled to start with them.    Current therapy:  Warm compresses 2-3x daily   Doxycycline 50 mg orally twice a day    Xiidra twice a day, both eyes   Pred healon 4x daily, both eyes   Serum tears four times a day, both eyes (ok to put 1-2 gtts in scleral lenses before fill)   Systane ointment PRN when lenses are out   Mucomyst 6 times a day, both eyes      Symptoms have worsened since LL plug removal on last visit.  Surface disease stable on the exam again today (no filaments, no stain, low tear meniscus and oily tear film, few PEE, mild blepharitis)  Schirmer Test I (without anesthesia) in 5/2021: 5 mm right eye and 11 mm left eye     Will place temporary left lower lid plug and bilateral temporary upper lid plugs as well today.   - LLL two 0.5 collagen plugs  - RUL and WILI 0.3 collagen plugs  Encouraged more scleral lens use as much as possible during waking hours  Recommend continued cognitive behavioral therapy as patient's symptoms are out of proportion to her exam findings  Pt has an appt with a different rheumatologist in July for consideration for Plaquenil; may require lip biopsy  Continue prescription drops as above      Patient disposition:   Return in about 1 month (around 7/8/2021) for Follow Up.    Amrita Abbasi MD  PGY-2 Resident Physician  Department of Ophthalmology    Attending Physician Attestation:  Complete documentation of historical and exam elements from today's encounter can be found in the full encounter summary report (not reduplicated in this progress note).  I personally obtained the chief complaint(s) and history of present illness.  I confirmed and edited as necessary the review of systems, past medical/surgical history, family history, social history, and examination findings as documented by others; and I examined the patient myself.  I personally reviewed the relevant tests, images, and reports as documented above.  I formulated and edited as necessary the assessment and plan and discussed the findings and management plan with the patient and family. Attending Physician Procedure Attestation: I was  present for the entire procedure     . - Faheem Ocampo MD

## 2021-06-08 NOTE — NURSING NOTE
Chief Complaints and History of Present Illnesses   Patient presents with     Dry Eye(s) Follow-Up     Chief Complaint(s) and History of Present Illness(es)     Dry Eye(s) Follow-Up     Laterality: both eyes    Duration: 1 month    Course: gradually worsening    Response to treatment: no improvement              Comments     Pt. States that dryness has worsened since last visit LE>RE. VA has been fluctuating. Has been difficult to keep eyes open.  Vianey Mcginnis COT 9:18 AM June 8, 2021

## 2021-06-18 ENCOUNTER — HOSPITAL ENCOUNTER (OUTPATIENT)
Dept: MRI IMAGING | Facility: CLINIC | Age: 51
End: 2021-06-18
Attending: PSYCHIATRY & NEUROLOGY
Payer: COMMERCIAL

## 2021-06-18 DIAGNOSIS — M79.2 NEUROPATHIC PAIN: ICD-10-CM

## 2021-06-18 DIAGNOSIS — R20.2 PARESTHESIA: ICD-10-CM

## 2021-06-18 LAB — LAB SCANNED RESULT: NORMAL

## 2021-06-18 PROCEDURE — 72141 MRI NECK SPINE W/O DYE: CPT

## 2021-06-18 PROCEDURE — 72146 MRI CHEST SPINE W/O DYE: CPT | Mod: 26 | Performed by: RADIOLOGY

## 2021-06-18 PROCEDURE — 72141 MRI NECK SPINE W/O DYE: CPT | Mod: 26 | Performed by: RADIOLOGY

## 2021-06-18 PROCEDURE — 72146 MRI CHEST SPINE W/O DYE: CPT

## 2021-06-23 ENCOUNTER — TELEPHONE (OUTPATIENT)
Dept: OPTOMETRY | Facility: CLINIC | Age: 51
End: 2021-06-23

## 2021-06-23 NOTE — TELEPHONE ENCOUNTER
[facility] Call Center    Phone Message    May a detailed message be left on voicemail: yes     Reason for Call: Form or Letter   Type or form/letter needing completion: Glasses Rx  Provider: Dr. Anthony  Date form needed: ASAP  Once completed: Mail form to Name: Roxanna Celis, at Address: [address] [place] [address]      Action Taken: Message routed to:  [facility]    Travel Screening: Not Applicable

## 2021-07-01 ENCOUNTER — HOSPITAL ENCOUNTER (EMERGENCY)
Facility: CLINIC | Age: 51
Discharge: HOME | End: 2021-07-01
Attending: EMERGENCY MEDICINE | Admitting: EMERGENCY MEDICINE
Payer: COMMERCIAL

## 2021-07-01 ENCOUNTER — APPOINTMENT (OUTPATIENT)
Dept: ULTRASOUND IMAGING | Facility: CLINIC | Age: 51
End: 2021-07-01
Attending: EMERGENCY MEDICINE
Payer: COMMERCIAL

## 2021-07-01 VITALS
OXYGEN SATURATION: 96 % | BODY MASS INDEX: 35.99 KG/M2 | WEIGHT: 223 LBS | TEMPERATURE: 97.5 F | DIASTOLIC BLOOD PRESSURE: 82 MMHG | SYSTOLIC BLOOD PRESSURE: 131 MMHG | HEART RATE: 81 BPM | RESPIRATION RATE: 18 BRPM

## 2021-07-01 DIAGNOSIS — M79.661 PAIN IN BOTH LOWER LEGS: ICD-10-CM

## 2021-07-01 DIAGNOSIS — M79.662 PAIN IN BOTH LOWER LEGS: ICD-10-CM

## 2021-07-01 PROCEDURE — 93970 EXTREMITY STUDY: CPT

## 2021-07-01 PROCEDURE — 99284 EMERGENCY DEPT VISIT MOD MDM: CPT | Mod: 25

## 2021-07-01 ASSESSMENT — ENCOUNTER SYMPTOMS
BACK PAIN: 1
MYALGIAS: 1
NERVOUS/ANXIOUS: 1

## 2021-07-02 NOTE — ED PROVIDER NOTES
"  History   Chief Complaint:  Leg Pain       HPI   Roxanna Celis is a 50 year old female with history of chronic back pain and prediabetes who presents with leg pain. Patient has had complaints of bilateral leg pain and cramping for the past 8 months. She describes it as a pins and needles type pain and feels like electricity is running through her legs. Her pain started in her eyes 2 years ago and she went to an eye doctor who told her that she has dry eyes. She then says the pain moved to her arms around one years ago before it moved down into her legs. She also has complaints of cramping and says that she is constantly in pain and unable to complete basic tasks on her own. She had a recent MRI of her cervical and thoracic spine which were negative. She also notes that she recently had an \"MS scare\" but it was ruled out.       Review of Systems   Cardiovascular: Positive for leg swelling.   Musculoskeletal: Positive for back pain and myalgias.        Bilateral leg pain   Psychiatric/Behavioral: The patient is nervous/anxious.    All other systems reviewed and are negative.        Allergies:  Iv Dye   Lisinopril  Lisinopril-Hctz  Maple Flavor  Gluten  Codeine Sulfate  Erythromycin  Nsaids    Medications:  Xeomin   Myobloc   Fanapt  Hydrodiuril   Epipen   Klonopin   Cymbalta   Wellbutrin   Neurontin   Adoxa     Past Medical History:    Sleep apnea   KIANA  Thyroid nodules   Achlorhydria   Bipolar disorder   Cervical dystonia   Hypertension   Leiomyoma of uterus   Depression   Chronic back pain    Asthma   Mononucleosis   Upper airway resistance syndrome   Prediabetes   Ovarian cyst   Hypothyroidism   Calculus of kidney   Myalgia   Morbid obesity      Past Surgical History:    Abdomen laparoscopy   [place] teeth   Ankle surgery   Choloecystectomy   Colonoscopy   Lithotripsy     Dilation and curettage     Social History:  Patient presents to the ED with her .    Physical Exam     Patient Vitals for the past 24 " hrs:   BP Temp Temp src Pulse Resp SpO2 Weight   07/01/21 1944 -- -- -- -- -- -- 101.2 kg (223 lb)   07/01/21 1942 (!) 154/78 97.5  F (36.4  C) Temporal 104 18 99 % --       Physical Exam  Constitutional: Alert, attentive  HENT:    Nose: Nose normal.    Mouth/Throat: Oropharynx is clear, mucous membranes are moist  Eyes: EOM are normal.    CV: regular rate and rhythm; no murmurs, rubs or gallups; 2+ DP and PT pulses, brisk distal cap refill  Chest: Effort normal and breath sounds normal.   GI:  There is no tenderness. No distension. Normal bowel sounds  MSK: Normal range of motion. Soft compartments throughout, no pain out of proportion to exam  Neurological: Alert, attentive  5/5 strength to the DF, PF, EHL and FHL motor functions; sensation intact to the DP, SP, T, S and S distributions  Skin: Skin is warm and dry.    Emergency Department Course     Imaging:  US lower extremity venous duplex bilateral :                                                                   IMPRESSION:  1.  No deep venous thrombosis in the bilateral lower extremities.. Reading per radiology.      Emergency Department Course:    Reviewed:  nursing notes, vitals, past medical history and care everywhere    Assessments:    2032 Initial assessment      I rechecked the patient and discussed the results of their workup thus far.       Disposition:  The patient was discharged to home.       Impression & Plan     Medical Decision Making:  This is a pleasant 50-year-old female with history of chronic back pain who presents for evaluation of myriad symptoms over the last 8 months concerning for possible Sjogren's, fibromyalgia, chronic fatigue syndrome, neuropathy, or other.  Today her more acute concern is subjective swelling to the bilateral calves and pain.  Ultrasound shows no evidence of DVT.  She has had a broad and negative work-up to date but is still pending follow-up with neurology and rheumatology.  Plan outpatient follow-up as planned  return precautions for any concerns.      Diagnosis:    ICD-10-CM    1. Pain in both lower legs  M79.661     M79.662        Discharge Medications:  New Prescriptions    No medications on file       Scribe Disclosure:  I, Kwame Crocker, am serving as a scribe at 8:23 PM on 7/1/2021 to document services personally performed by Andrew Martinez MD based on my observations and the provider's statements to me.              Andrew Martinez MD  07/01/21 [id number]

## 2021-07-02 NOTE — ED TRIAGE NOTES
Pt complains of 8 months of intermittent bilateral leg pain, numbness, sensation of tightness. Recent MRI of c and t spines and also ruled out MS.

## 2021-07-02 NOTE — ED NOTES
"Pt states \"i've had issues with all 4 of my limbs for 8 months now but its worse now.\" Pt states burning pain and \"it feels like I have blood pressure cuffs on all of my limbs that people keep adding air to and not releasing\". Pain 8/10. Pt states \"electrical shock feeling\" when touched on legs. Pt states she has been worked up for MS with [facility] neuro - negative. Pt also concerned for blood clots - no redness noted, ~+1 edema to jonn ankles. Strength slightly weaker in RLE. Pt has rheumatology appt end of July and another related appt in the beginning of august.   "

## 2021-07-10 ENCOUNTER — HOSPITAL ENCOUNTER (EMERGENCY)
Dept: EMERGENCY MEDICINE | Facility: CLINIC | Age: 51
Discharge: HOME | End: 2021-07-10
Attending: EMERGENCY MEDICINE
Payer: COMMERCIAL

## 2021-07-10 DIAGNOSIS — R07.89 ATYPICAL CHEST PAIN: ICD-10-CM

## 2021-07-10 LAB
ALBUMIN SERPL-MCNC: 4 G/DL (ref 3.5–5)
ALP SERPL-CCNC: 124 U/L (ref 45–120)
ALT SERPL W P-5'-P-CCNC: 22 U/L (ref 0–45)
ANION GAP SERPL CALCULATED.3IONS-SCNC: 5 MMOL/L (ref 5–18)
AST SERPL W P-5'-P-CCNC: 20 U/L (ref 0–40)
BILIRUB DIRECT SERPL-MCNC: 0.1 MG/DL
BILIRUB SERPL-MCNC: 0.4 MG/DL (ref 0–1)
BNP SERPL-MCNC: 34 PG/ML (ref 0–76)
BUN SERPL-MCNC: 19 MG/DL (ref 8–22)
CALCIUM SERPL-MCNC: 9.6 MG/DL (ref 8.5–10.5)
CHLORIDE BLD-SCNC: 107 MMOL/L (ref 98–107)
CO2 SERPL-SCNC: 28 MMOL/L (ref 22–31)
CREAT SERPL-MCNC: 1 MG/DL (ref 0.6–1.1)
D DIMER PPP FEU-MCNC: 0.31 FEU UG/ML
ERYTHROCYTE [DISTWIDTH] IN BLOOD BY AUTOMATED COUNT: 13.3 % (ref 11–14.5)
GFR SERPL CREATININE-BSD FRML MDRD: 58 ML/MIN/1.73M2
GLUCOSE BLD-MCNC: 90 MG/DL (ref 70–125)
HCT VFR BLD AUTO: 41.6 % (ref 35–47)
HGB BLD-MCNC: 13.9 G/DL (ref 12–16)
LIPASE SERPL-CCNC: 54 U/L (ref 0–52)
MCH RBC QN AUTO: 29.2 PG (ref 27–34)
MCHC RBC AUTO-ENTMCNC: 33.4 G/DL (ref 32–36)
MCV RBC AUTO: 87 FL (ref 80–100)
PLATELET # BLD AUTO: 261 THOU/UL (ref 140–440)
PMV BLD AUTO: 10.6 FL (ref 8.5–12.5)
POTASSIUM BLD-SCNC: 3.9 MMOL/L (ref 3.5–5)
PROT SERPL-MCNC: 6.9 G/DL (ref 6–8)
RBC # BLD AUTO: 4.76 MILL/UL (ref 3.8–5.4)
SODIUM SERPL-SCNC: 140 MMOL/L (ref 136–145)
TROPONIN I SERPL-MCNC: 0.01 NG/ML (ref 0–0.29)
TROPONIN I SERPL-MCNC: <0.01 NG/ML (ref 0–0.29)
WBC: 8.5 THOU/UL (ref 4–11)

## 2021-07-10 NOTE — ED TRIAGE NOTES
"ED Triage Notes by Afsaneh Price RN at 7/10/2021  6:55 PM     Author: Afsaneh Price RN Service: -- Author Type: Registered Nurse    Filed: 7/10/2021  6:56 PM Date of Service: 7/10/2021  6:55 PM Status: Signed    : Afsaneh Price RN (Registered Nurse)       Patient presents with sudden onset midsternal chest pain that radiates under bilateral breasts and up into her upper chest. She also endorses intermittent shortness of breath for the past 3 weeks. Patient also states she had a \"twinge\" of pain in her central chest 1 week ago, but this resolved quickly. She has a strong familial history of heart issues (mother had CABG x3 at 40 years old).       "

## 2021-07-11 NOTE — ED PROVIDER NOTES
ED Provider Notes by Laci Tejeda MD at 7/10/2021  6:55 PM     Author: Laci Tejeda MD Service: Emergency Medicine Author Type: Physician    Filed: 7/10/2021 11:31 PM Date of Service: 7/10/2021  6:55 PM Status: Signed    : Laci Tejeda MD (Physician)       EMERGENCY DEPARTMENT ENCOUnter      NAME: Roxanna Celis  AGE: 50 y.o. female  YOB: 1970  MRN: [id number]  EVALUATION DATE & TIME: 7/10/2021  6:50 PM    PCP: Africa Sow PA-C    ED PROVIDER: Laci Tejeda M.D.      Chief Complaint   Patient presents with   ? Chest Pain   ? Shortness of Breath         FINAL IMPRESSION:  1. Atypical chest pain          ED COURSE & MEDICAL DECISION MAKING:    Pertinent Labs & Imaging studies reviewed. (See chart for details)  50 y.o. female presents to the Emergency Department for evaluation of chest pain. Patient appears non toxic with stable vitals signs, patient is afebrile with no tachycardia or hypoxia, did note increased respiratory rate. Overall exam is benign.  Lungs are clear and abdomen is benign.  Patient denies any ripping or tearing chest discomfort of the back or shoulders, pleurisy, hemoptysis, fevers or productive cough.  She does endorses intermittent chest discomfort for the past month only lasting a few seconds with some intermittent shortness of breath, today however the episode persisted for several minutes.  States it is now much improved.  She was slightly tachycardic on my exam, overall very low suspicion for PE but we will obtain D-dimer, does have some family history of cardiac disease but her herself no significant cardiac risk factors without any history of diabetes, tobacco use, considered that her story is atypical for ACS, with no ripping or tearing chest discomfort of the back or shoulders, no hypotension, improvement in her discomfort without intervention, certainly nothing to suggest dissection, lungs are clear with nothing suggest  pneumonia, CHF, COPD, asthma or other restrictive lung process.  No fever or body aches to suggest Covid or influenza.  We will obtain screening labs including troponin, D-dimer, ECG and based on these labs either CT or plain films.  Patient was given aspirin.    Reassessment: Labs showed no acute concerning findings.  Chest x-ray reported no acute concerning findings, D-dimer was negative.  Both initial and repeat 3-hour delta troponin ECG showed no acute concerning findings.  Repeat exam is benign.  With negative work-up and benign exam feel she is safe for discharge and close follow-up.  Will recommend follow-up with our rapid access clinic in the next 1 to 2 days for continued outpatient management evaluation.  Discussed these findings recommendations the patient felt reassured and comfortable with discharge.  Return precautions provided.    At the conclusion of the encounter I discussed the results of all of the tests and the disposition. The questions were answered and return precautions provided. The patient or family acknowledged understanding and was agreeable with the care plan.     7:03 PM Met with patient for initial interview and exam. Discussed initial plan for care for their stay in the emergency department.   7:37 PM Nursing reports patient pain is now epigastric.  8:43 PM Repeat exam is benign, discussed findings and repeat troponin and ECG.  10:43 PM Repeat exam is benign, discussed findings and close follow up.    MEDICATIONS GIVEN IN THE EMERGENCY:  Medications   sodium chloride flush 10 mL (NS) (has no administration in time range)   aspirin chewable tablet 81 mg (81 mg Oral Given 7/10/21 1926)   aluminum-magnesium hydroxide-simethicone 15 mL, viscous lidocaine HC 15 mL (GI COCKTAIL) (30 mL Oral Given 7/10/21 1943)       NEW PRESCRIPTIONS STARTED AT TODAY'S ER VISIT  There are no discharge medications for this patient.      "    =================================================================    HPI    Patient information was obtained from: patient    Use of Intrepreter: N/A      Roxanna Celis is a 50 y.o. female with history of bipolar disorder, depression, anxiety, hypertension, s/p laparoscopic vertical sleeve gastrectomy, and sleep apnea who presents with chest pain and shortness of breath.    Patient reports that approximately 20 minutes prior to arrival she was driving home from [employer] when she had sudden onset of chest pain that was localized to under her breasts on both sides. She describes this pain as sharp and the \"worst pain she has ever felt.\" This episode lasted approximately 5 minutes before starting to resolve and localize to her mid chest, and it is currently 6/10. She did not have any radicular pain to her shoulder, neck, legs, or back at any time. She denies any provoking or palliating factors. Patient states that she had been having one month of intermittent chest pain twinges with associated shortness of breath, but localized to her mid chest and only lasting a few seconds. She denies any personal history of MI, lung disease, heart failure, COPD, PE, or DVT, but states her mother had a triple bypass at 40 and she also has other extensive family history of heart problems. Denies any use of blood thinners. Denies use of tobacco, alcohol, or illicit drugs. Otherwise denies any vomiting, diarrhea, blood in her stool, fevers, cough, or any other complaints.    REVIEW OF SYSTEMS   Constitutional:  Denies fever, chills, excessive weight loss  Eyes:  Denies any vision changes  Respiratory:  Denies productive cough. Positive for shortness of breath (resolved)  Cardiovascular:  Denies palpitations. Positive for chest pain.  GI:  Denies abdominal pain, nausea, vomiting, or change in bowel or bladder habits   Musculoskeletal:  Denies any new muscle/joint pain.    Skin:  Denies rash   Neurologic:  Denies headache, focal " "weakness or sensory changes   Psych: Mood and affect normal  All systems reviewed and are negative except as marked.     PAST MEDICAL HISTORY:  No past medical history on file.    PAST SURGICAL HISTORY:  No past surgical history on file.        CURRENT MEDICATIONS:    No current facility-administered medications on file prior to encounter.      No current outpatient medications on file prior to encounter.       ALLERGIES:  Allergies   Allergen Reactions   ? Enzymes Otherwise Unspecified Anaphylaxis     IVP DYE   ? Iodinated Contrast Media Anaphylaxis   ? Lisinopril Anaphylaxis   ? Codeine Myalgia, Nausea Only and Nausea And Vomiting     PN: LW Reaction: stomach upset  cramping     ? Erythromycin Myalgia and Nausea And Vomiting     PN: LW Reaction: stomach upset  cramping     ? Nsaids (Non-Steroidal Anti-Inflammatory Drug) Other (See Comments)     Patient had bariatric surgery. Should not ever take NSAIDS due to high risk for gastric ulcers.        FAMILY HISTORY:  No family history on file.    SOCIAL HISTORY:   No tobacco, alcohol, or illicit drug use.  Patient is .    VITALS:  Patient Vitals for the past 24 hrs:   BP Temp Temp src Pulse Resp SpO2 Height Weight   07/10/21 2252 -- -- -- -- -- -- 5' 6\" (1.676 m) --   07/10/21 2245 -- -- -- 79 26 96 % -- --   07/10/21 2230 125/60 -- -- 78 19 96 % -- --   07/10/21 2215 120/56 -- -- 84 (!) 33 98 % -- --   07/10/21 2200 124/58 -- -- 81 17 96 % -- --   07/10/21 2145 128/58 -- -- 82 -- 95 % -- --   07/10/21 2130 115/53 -- -- 84 19 96 % -- --   07/10/21 2115 117/56 -- -- 81 -- 94 % -- --   07/10/21 2100 128/60 -- -- 84 -- 95 % -- --   07/10/21 2045 130/60 -- -- 85 -- 97 % -- --   07/10/21 2030 130/55 -- -- 88 26 95 % -- --   07/10/21 2015 142/66 -- -- 94 -- 96 % -- --   07/10/21 2000 133/64 -- -- 98 18 97 % -- --   07/10/21 1930 146/67 -- -- 99 18 98 % -- --   07/10/21 1915 148/70 -- -- 96 13 96 % -- --   07/10/21 1900 154/72 -- -- 95 -- 96 % -- --   07/10/21 1486 " 154/86 97.8  F (36.6  C) Temporal 98 24 97 % -- (!) 223 lb (101.2 kg)        PHYSICAL EXAM    Constitutional:  Awake, alert, in no apparent distress  HENT:  Normocephalic, Atraumatic, Bilateral external ears normal, Oropharynx moist, Nose normal. Neck- Normal range of motion, No tenderness, Supple, No stridor.   Eyes:  PERRL, EOMI, Conjunctiva normal, No discharge.   Respiratory:  Normal breath sounds, No respiratory distress, No wheezing   Cardiovascular:  Slightly tachycardic, Normal rhythm, No appreciable rubs or gallops.   GI:  Soft, No tenderness, No distension, No palpable or pulsatile masses  Musculoskeletal:  Intact distal pulses, No edema. Good range of motion in all major joints. No tenderness to palpation or major deformities noted.   Integument:  Warm, Dry, No erythema, No rash.   Neurologic:  Alert & oriented, Normal motor function, Normal sensory function, No focal deficits noted.   Psychiatric:  Affect normal, Judgment normal, Mood normal.      LAB:  All pertinent labs reviewed and interpreted.  Results for orders placed or performed during the hospital encounter of 07/10/21   Basic Metabolic Panel   Result Value Ref Range    Sodium 140 136 - 145 mmol/L    Potassium 3.9 3.5 - 5.0 mmol/L    Chloride 107 98 - 107 mmol/L    CO2 28 22 - 31 mmol/L    Anion Gap, Calculation 5 5 - 18 mmol/L    Glucose 90 70 - 125 mg/dL    Calcium 9.6 8.5 - 10.5 mg/dL    BUN 19 8 - 22 mg/dL    Creatinine 1.00 0.60 - 1.10 mg/dL    GFR MDRD Af Amer >60 >60 mL/min/1.73m2    GFR MDRD Non Af Amer 58 (L) >60 mL/min/1.73m2   HM2 (CBC W/O DIFF)   Result Value Ref Range    WBC 8.5 4.0 - 11.0 thou/uL    RBC 4.76 3.80 - 5.40 mill/uL    Hemoglobin 13.9 12.0 - 16.0 g/dL    Hematocrit 41.6 35.0 - 47.0 %    MCV 87 80 - 100 fL    MCH 29.2 27.0 - 34.0 pg    MCHC 33.4 32.0 - 36.0 g/dL    RDW 13.3 11.0 - 14.5 %    Platelets 261 140 - 440 thou/uL    MPV 10.6 8.5 - 12.5 fL   Troponin I   Result Value Ref Range    Troponin I <0.01 0.00 - 0.29  ng/mL   BNP(B-type Natriuretic Peptide)   Result Value Ref Range    BNP 34 0 - 76 pg/mL   D-dimer, Quantitative   Result Value Ref Range    D-Dimer, Quant 0.31 <=0.50 FEU ug/mL   Lipase   Result Value Ref Range    Lipase 54 (H) 0 - 52 U/L   Hepatic Profile   Result Value Ref Range    Bilirubin, Total 0.4 0.0 - 1.0 mg/dL    Bilirubin, Direct 0.1 <=0.5 mg/dL    Protein, Total 6.9 6.0 - 8.0 g/dL    Albumin 4.0 3.5 - 5.0 g/dL    Alkaline Phosphatase 124 (H) 45 - 120 U/L    AST 20 0 - 40 U/L    ALT 22 0 - 45 U/L   Troponin I   Result Value Ref Range    Troponin I 0.01 0.00 - 0.29 ng/mL       RADIOLOGY:  Reviewed all pertinent imaging. Please see official radiology report.  Xr Chest 2 Views    Result Date: 7/10/2021  EXAM: XR CHEST 2 VIEWS LOCATION: [facility] DATE/TIME: 7/10/2021 8:13 PM INDICATION: cp COMPARISON: 3/18/2020     Heart size and pulmonary vessels are normal. Tiny left pleural effusion. Lungs otherwise clear.      EKG:    Normal sinus rhythm, no acute ischemic changes, no concerning dysrhythmias or interval prolongation, no priors for comparison     Repeat ECG shows normal sinus rhythm, no acute ischemic changes, no concerning dysrhythmias or interval prolongation, no ischemic changes when compared to ECG from earlier today    I have independently reviewed and interpreted the EKG(s) documented above.    PROCEDURES:          I, Luana Reed, am serving as a scribe to document services personally performed by Laci Tejeda MD, based on my observation and the provider's statements to me. I, Laci Tejeda MD attest that Luana Reed is acting in a scribe capacity, has observed my performance of the services and has documented them in accordance with my direction.    Laci Tejeda M.D.  Emergency Medicine  [facility] EMERGENCY ROOM  [address] [address]  Dept:  [phone]  Loc: [phone]     Laci Tejeda MD  07/10/21 9598

## 2021-07-11 NOTE — ED NOTES
ED Notes by Erica Hill RN at 7/10/2021  7:34 PM     Author: Erica Hill RN Service: -- Author Type: Registered Nurse    Filed: 7/10/2021  7:35 PM Date of Service: 7/10/2021  7:34 PM Status: Signed    : Erica Hill RN (Registered Nurse)       Pt reports since MD exam pain in now epigastric and into central chest, reports this is worse than it has been.  Provider will be updated

## 2021-07-12 VITALS — WEIGHT: 223 LBS | HEIGHT: 66 IN | BODY MASS INDEX: 35.84 KG/M2

## 2021-07-14 ENCOUNTER — TELEPHONE (OUTPATIENT)
Dept: PALLIATIVE MEDICINE | Facility: CLINIC | Age: 51
End: 2021-07-14

## 2021-07-14 NOTE — NURSING NOTE
Roxanna Celis's goals for this visit include: bilateral dry eyes  She requests these members of her care team be copied on today's visit information:     PCP: Africa Medellin    Referring Provider:  Fawad Jackson MD  [address] [place] [id number]    /81 (BP Location: Left arm, Patient Position: Sitting, Cuff Size: Adult Large)   Pulse 92   Temp 98.3  F (36.8  C) (Oral)   Wt 98.9 kg (218 lb)   SpO2 98%   BMI 35.19 kg/m      Do you need any medication refills at today's visit?  No    MARIA E Bedoya [facility] Rheumatology

## 2021-07-14 NOTE — TELEPHONE ENCOUNTER

## 2021-07-14 NOTE — LETTER
July 14, 2021    Roxanna Celis  [address] [address]    Dear Roxanna       [facility] to the [facility] at the [facility]. We are located at [address], [address], [place] [address]. Your appointment has been scheduled on Thursday 8/12/21 at 3:30p with Yasmany Rivera MD .    At your first visit, you will meet your team of caregivers who will help you to develop pain management strategies that will last a lifetime. You will meet with our support staff to review your insurance information and collect your co-payment if required by your insurance company. You will meet with a medical pain specialist and care coordinator who will assess your pain and develop a plan of care for your successful pain rehabilitation. You should expect to spend approximately 1 hour at your first visit with us. Usually, patients work with us for a period of 6-12 months, and eventually return to their primary doctor once their pain management has stabilized.      To help us make your visit go as smoothly as possible, please bring the following items with you on your visit:     Completed Pain Questionnaire enclosed in this packet.  If you do not bring the completed questionnaire, we may have to reschedule your appointment.    List of any medicines that you are currently taking or have been prescribed    Pertinent NON-[place] medical information such as medical records or tests results (X-rays, or laboratory tests)    Your health insurance card    Financial resources to cover your co-payment or balance due at the time of service (cash, personal check, Visa, and MasterCard are acceptable methods of payment)     Due to the high demand for new patient evaluations, you must notify the scheduling department 48 hours (2 days) in advance if you are not able to keep this appointment.  Failure to do so could affect your ability to reschedule with our clinic. Please do  not assume that you will receive any prescription medications at your first visit.    Please call [phone] with any questions regarding your appointment. We look forward to meeting you and working to address your health care needs.     Sincerely,    [facility]

## 2021-07-21 ENCOUNTER — OFFICE VISIT (OUTPATIENT)
Dept: RHEUMATOLOGY | Facility: CLINIC | Age: 51
End: 2021-07-21
Payer: COMMERCIAL

## 2021-07-21 VITALS
DIASTOLIC BLOOD PRESSURE: 81 MMHG | WEIGHT: 218 LBS | OXYGEN SATURATION: 98 % | SYSTOLIC BLOOD PRESSURE: 130 MMHG | HEART RATE: 92 BPM | TEMPERATURE: 98.3 F | BODY MASS INDEX: 35.19 KG/M2

## 2021-07-21 DIAGNOSIS — M35.00 SICCA SYNDROME (H): ICD-10-CM

## 2021-07-21 DIAGNOSIS — R68.2 DRY MOUTH: Primary | ICD-10-CM

## 2021-07-21 DIAGNOSIS — H16.123 FILAMENTARY KERATITIS OF BOTH EYES: ICD-10-CM

## 2021-07-21 DIAGNOSIS — H04.123 BILATERAL DRY EYES: ICD-10-CM

## 2021-07-21 LAB
CRP SERPL-MCNC: <2.9 MG/L (ref 0–8)
ERYTHROCYTE [SEDIMENTATION RATE] IN BLOOD BY WESTERGREN METHOD: 14 MM/HR (ref 0–30)

## 2021-07-21 PROCEDURE — 82787 IGG 1 2 3 OR 4 EACH: CPT | Performed by: STUDENT IN AN ORGANIZED HEALTH CARE EDUCATION/TRAINING PROGRAM

## 2021-07-21 PROCEDURE — 86160 COMPLEMENT ANTIGEN: CPT | Performed by: STUDENT IN AN ORGANIZED HEALTH CARE EDUCATION/TRAINING PROGRAM

## 2021-07-21 PROCEDURE — 86038 ANTINUCLEAR ANTIBODIES: CPT | Performed by: STUDENT IN AN ORGANIZED HEALTH CARE EDUCATION/TRAINING PROGRAM

## 2021-07-21 PROCEDURE — 82784 ASSAY IGA/IGD/IGG/IGM EACH: CPT | Performed by: STUDENT IN AN ORGANIZED HEALTH CARE EDUCATION/TRAINING PROGRAM

## 2021-07-21 PROCEDURE — 36415 COLL VENOUS BLD VENIPUNCTURE: CPT | Performed by: STUDENT IN AN ORGANIZED HEALTH CARE EDUCATION/TRAINING PROGRAM

## 2021-07-21 PROCEDURE — 86225 DNA ANTIBODY NATIVE: CPT | Performed by: STUDENT IN AN ORGANIZED HEALTH CARE EDUCATION/TRAINING PROGRAM

## 2021-07-21 PROCEDURE — 86140 C-REACTIVE PROTEIN: CPT | Performed by: STUDENT IN AN ORGANIZED HEALTH CARE EDUCATION/TRAINING PROGRAM

## 2021-07-21 PROCEDURE — 86235 NUCLEAR ANTIGEN ANTIBODY: CPT | Performed by: STUDENT IN AN ORGANIZED HEALTH CARE EDUCATION/TRAINING PROGRAM

## 2021-07-21 PROCEDURE — 85652 RBC SED RATE AUTOMATED: CPT | Performed by: STUDENT IN AN ORGANIZED HEALTH CARE EDUCATION/TRAINING PROGRAM

## 2021-07-21 PROCEDURE — 99204 OFFICE O/P NEW MOD 45 MIN: CPT | Performed by: STUDENT IN AN ORGANIZED HEALTH CARE EDUCATION/TRAINING PROGRAM

## 2021-07-21 RX ORDER — CEVIMELINE HYDROCHLORIDE 30 MG/1
30 CAPSULE ORAL 3 TIMES DAILY
Qty: 90 CAPSULE | Refills: 1 | Status: SHIPPED | OUTPATIENT
Start: 2021-07-21 | End: 2021-09-15

## 2021-07-21 ASSESSMENT — PAIN SCALES - GENERAL: PAINLEVEL: EXTREME PAIN (8)

## 2021-07-21 NOTE — PATIENT INSTRUCTIONS
Blood tests ordered     ENT referral for lip biopsy     You can use biotene products for dry mouth.     You can use Cevimeline for sicca symptoms.

## 2021-07-21 NOTE — NURSING NOTE
Follow up appointment scheduled.   MARIA E Bedoya [facility] Rheumatology

## 2021-07-21 NOTE — LETTER
August 13, 2021      Roxanna Celis  [address] [address]          Dear ,    We are writing to inform you of your test results.    Your blood work has shown normal results for autoimmune diseases like lupus, Sjogren's, mixed connective tissue disease etc.  Inflammatory markers are normal.  Based on normal autoimmune work-up, cannot make a diagnosis of autoimmune connective tissue disease.  Lip biopsy can help to know if you have seronegative Sjogren's.     Resulted Orders   Immunoglobulin G subclasses   Result Value Ref Range    Immunoglobulin G 933 610-1,616 mg/dL    IgG1 390 382 - 929 mg/dL    IgG2 443 242 - 700 mg/dL    IgG3 30 22 - 176 mg/dL    IgG4 30 4 - 86 mg/dL    Subclasses Percent 96 %   IgG   Result Value Ref Range    Immunoglobulin G 933 610-1,616 mg/dL   EMMANUEL antibody panel   Result Value Ref Range    RNP [place] IgG Instrument Value <1.1 <5.0 U/mL    RNP Antibody IgG Negative Negative    Bejarano EMMANUEL [place] IgG Instrument Value <1.6 <7.0 U/mL    Smith EMMANUEL Antibody IgG Negative Negative    SSA [place] IgG Instrument Value <0.5 <7.0 U/mL    SSA (Ro) Antibody IgG Negative Negative    SSB [place] IgG Instrument Value <0.6 <7.0 U/mL    SSB (La) Antibody IgG Negative Negative   Anti Nuclear [place] IgG by IFA with Reflex   Result Value Ref Range    ENEIDA interpretation Negative Negative      Comment:        Negative:              <1:40  Borderline Positive:   1:40 - 1:80  Positive:              >1:80   Complement C3   Result Value Ref Range    C3 Complement 146 81 - 157 mg/dL   Complement C4   Result Value Ref Range    C4 Complement 38 13 - 39 mg/dL   Centromere Antibody IgG   Result Value Ref Range    Centromere [place] IgG Instrument Value <0.7 <7.0 U/mL    Centromere Antibody IgG Negative Negative   DNA ANTIBODY, NATV/2 STRAND   Result Value Ref Range    DNA (ds) Antibody 0.7 <10.0 IU/mL      Comment:      Negative    Narrative    Negative:  Less than 10  Equivocal: 10-15  Positive:  Greater than 15    Erythrocyte sedimentation rate auto   Result Value Ref Range    Erythrocyte Sedimentation Rate 14 0 - 30 mm/hr   CRP inflammation   Result Value Ref Range    CRP Inflammation <2.9 0.0 - 8.0 mg/L       If you have any questions or concerns, please call the clinic at the number listed above.       Sincerely,    Floyd Velazquez MD/brionna

## 2021-07-21 NOTE — PROGRESS NOTES
Rheumatology Clinic Visit     Roxanna Celis MRN# [id number]   YOB: 1970 Age: 50 year old     Date of Visit: Jul 21, 2021   Primary care provider: [facility]          Assessment and Plan:     Assessment     Sicca symptoms ( severe dry mouth and eyes )  Paresthesia  KIANA  Thyroid nodule  HTN  Neg ENEIDA, SSA/SSB    MsElisabeth Celis is 50 year old female seen in clinic for evaluation of possible Sjogren's disease    Sicca symptoms : She has severe dry mouth and eyes for few years.  Progressively getting worse.  She is using multiple eyedrops even then eye symptoms are difficult to control. Her ENEIDA, SSA/SSB antibodies done in 2/21 were negative.  Due to severity of sicca symptoms, seronegative Sjogren's is a possibility.  Severe sicca symptoms can be seen in patients with IgG4 related disease as well.  IgG levels ordered.  Minor salivary gland biopsy can be helpful.  ENT referral placed.  For dry mouth she can try Biotene products over-the-counter.    I will prescribe her cholinergic agonists like cevimeline to help with severe dry mouth.  Side effects of cevimeline discussed with the patient which includes abdominal cramps, flushing, increased sweating etc.    Paresthesia: She reports burning sensation, pins and needles sensation in her arms and legs.  Progressively getting worse.  It started over her hands and feet and gradually went up to involve her entire arm and leg up to the mid thigh.  EMG of bilateral upper extremities done in 2/2021 was normal.  MRI of the cervical and thoracic spine done in 6/2021 did not reveal any evidence of demyelinating disease. MRI brain in 8/2020 was normal. She has seen Dr. Bell in Neurology who ruled out demyelinating process. She has another appointment with Neurologist at [facility].  EMG of bilateral lower extremities can be done.  If EMG comes back normal then her symptoms could be related to small fiber neuropathy.  She is on gabapentin,  Cymbalta.  She has seronegative Sjogren's and her symptoms could be related to Sjogren's disease but all neuropathic symptoms in Sjogren's disease are not treated with immunosuppressive medication.  Based on the biopsy will discuss with Neurologist.     Plan    Blood tests ordered     ENT referral for lip biopsy     You can use biotene products for dry mouth.     You can use Cevimeline 30 mg TID for sicca symptoms.     -- Orders placed this encounter  Orders Placed This Encounter   Procedures     Immunoglobulin G subclasses     IgG     EMMANUEL antibody panel     Anti Nuclear Aixa IgG by IFA with Reflex     Complement C3     Complement C4     Centromere Antibody IgG     DNA ANTIBODY, NATV/2 STRAND     Erythrocyte sedimentation rate auto     CRP inflammation     Otolaryngology Referral       -- Medications   Orders Placed This Encounter   Medications     cevimeline (EVOXAC) 30 MG capsule     Sig: Take 1 capsule (30 mg) by mouth 3 times daily     Dispense:  90 capsule     Refill:  1         TIME SPENT:   A total of 60 minutes was spent on the patient today, greater than 50% of that time was spent on face to face counseling and care coordination regarding diagnoses and treatment options as mentioned above.                    Active Problem List:     Patient Active Problem List    Diagnosis Date Noted     Chronic bilateral low back pain without sciatica 08/06/2020     Priority: Medium     Sleep apnea 05/01/2018     Priority: Medium     Overview:   on CPAP       Torticollis, spasmodic 03/24/2017     Priority: Medium     Achlorhydria 10/20/2015     Priority: Medium     Bariatric surgery status 10/20/2015     Priority: Medium     Morbid obesity with BMI of 40.0-44.9, adult (H) 10/20/2015     Priority: Medium     Hypokalemia 06/05/2014     Priority: Medium     Hypothyroid 06/05/2014     Priority: Medium     KIANA (generalized anxiety disorder) 12/03/2010     Priority: Medium     Depression with anxiety 02/12/2010     Priority:  Medium     Multiple thyroid nodules 10/23/2008     Priority: Medium     Essential hypertension 06/23/2008     Priority: Medium     Intramural leiomyoma of uterus 07/18/2007     Priority: Medium     Obesity 07/19/2006     Priority: Medium            History of Present Illness:   Roxanna Celis is a 50 year old female with PMH of paresthesia, essential hypertension, generalized anxiety disorder, depression, sleep apnea, morbid obesity seen in the clinic in consultation at request of Dr Jackson for evaluation of sicca symptoms.    She describes that couple years ago she developed burning pain around her left wrist which progressed to involve the entire hand and went up to the elbows.  She also noticed that on the right wrist which progressive disease.  Now she has burning sensation, pins-and-needles, electric shocks and pressure sensation up to her shoulders.  She also noticed it in her feet and sensation has progressed up to the mid thighs. She feel that somebody has put blood pressure cuff and is not released. Muscles of her leg feel weak, unstable. She has to use walker all the time. She feel she will end up falling. She has been evaluated by neurologist and had MRI of the brain, MRI cervical spine, thoracic spine which has been normal.  No evidence of demyelinating lesion noted.  EMG of bilateral upper extremities was normal.  She is on gabapentin and Cymbalta which helps some.    Couple years ago she woke up with severe pain in her eyes.  She felt sand and gritty sensation in her eyes.  She was seen by ophthalmologist and diagnosed with dry eyes.  Since then she has used multiple eyedrops over-the-counter as well as autologous serum eyedrops which has helped some but still her symptoms are debilitating.  She also has dry mouth and as result has developed multiple cavities in her teeth.     She had chest pain recently. She is going to see cardiologist to make sure she does not need stress tests. She is very  exhausted all the time.  She has thyroid nodule and will be getting thyroid nodule biopsy at [facility].    She had a fall in February 2021 and injured her wrist. MRI of the left wrist did not reveal any evidence of synovitis, effusion or fracture.  Low-grade sprain was noted.  She was given wrist injection which helped.     She has hx of gastric bypass 8 years ago and ever since reports abdominal discomfort.  She has never seen a GI to get upper or lower GI endoscopy.    Denies any raynauds, malar rash, photosensitivity, recurrent mouth/genital ulcers, pleuritic chest pains, recurrent sinusitis/rhinitis, swallowing difficulty, hearing or visual changes recently. No h/o arterial/venous thrombosis in the past.            Review of Systems:     Review Of Systems  Constitutional: denies fever, chills, night sweats and weight loss.  Skin: No skin rash.  Eyes: + dryness or irritation in eyes. No episode of eye inflammation or redness.   Ears/Nose/Throat: no recurrent sinus infections.  Respiratory: No shortness of breath, dyspnea on exertion, cough, or hemoptysis  Cardiovascular: no chest pain or palpitations.  Gastrointestinal: no nausea, vomiting, abdominal pain.  Normal bowel movements.  Genitourinary: no dysuria, frequency  or hematuria.  Musculoskeletal: as in HPI  Neurologic: + numbness, tingling.  Psychiatric: no mood disorders.  Hematologic/Lymphatic/Immunologic: no history of easy bruising, petechia or purpura.  No abnormal bleeding.   Endocrine: no h/o thyroid disease or Diabetes.                  Past Medical History:     Past Medical History:   Diagnosis Date     Cervical dystonia 1993     Past Surgical History:   Procedure Laterality Date     AS FRAGMENTING OF KIDNEY STONE       GALLBLADDER SURGERY       STOMACH SURGERY      for weight loss            Social History:     Social History     Occupational History     Not on file   Tobacco Use     Smoking status: Never Smoker     Smokeless tobacco: Never Used    Substance and Sexual Activity     Alcohol use: No     Drug use: No     Sexual activity: Yes     Partners: Male            Family History:     Family History   Problem Relation Age of Onset     Glaucoma No family hx of      Macular Degeneration No family hx of             Allergies:     Allergies   Allergen Reactions     Ivp Dye [Contrast Dye] Anaphylaxis     Lisinopril Anaphylaxis     Lisinopril-Hctz Anaphylaxis     Maple Flavor Anaphylaxis     MAPLE SYRUP     Maple Tree Anaphylaxis     Allergy includes maple syrup.     No Clinical Screening - See Comments      maple syrup==anaphylaxis  Dairy- causes to not feel well     Gluten Itching and Swelling     Codeine Sulfate Cramps and GI Disturbance     Erythromycin Nausea and Vomiting and Cramps     Nsaids Other (See Comments)     Patient had bariatric surgery. Should not ever take NSAIDS due to high risk for gastric ulcers.             Medications:     Current Outpatient Medications   Medication Sig Dispense Refill     acetylcysteine 10 % (MUCOMYST) 10% SOLN Place 2 drops into both eyes 6 times daily 30 mL 5     aspirin-acetaminophen-caffeine (EXCEDRIN MIGRAINE) 250-250-65 MG per tablet Take 1 tablet by mouth every 6 hours as needed.       buPROPion (WELLBUTRIN XL) 300 MG 24 hr tablet Take 300 mg by mouth every morning       cevimeline (EVOXAC) 30 MG capsule Take 1 capsule (30 mg) by mouth 3 times daily 90 capsule 1     ClonAZEPAM (KLONOPIN) 1 MG tablet Take 1 mg by mouth 2 times daily as needed.       doxycycline monohydrate (ADOXA) 50 MG tablet Take 1 tablet (50 mg) by mouth 2 times daily 180 tablet 3     DULoxetine HCl (CYMBALTA PO) Take 60 mg by mouth       EPINEPHrine (EPIPEN) 0.3 MG/0.3ML injection Inject 0.3 mg into the muscle once as needed.       gabapentin (NEURONTIN) 600 MG tablet Take 1 tab (600 mg) by mouth twice a day and then 2 tabs (1200 mg) at bedtine 120 tablet 11     hydrochlorothiazide (HYDRODIURIL) 50 MG tablet Take 50 mg by mouth daily        Ibuprofen (ADVIL) 200 MG capsule Take 200 mg by mouth every 4 hours as needed.       iloperidone (FANAPT) 6 MG TABS tablet Take 4 mg by mouth daily        lifitegrast (XIIDRA) 5 % opthalmic solution Place 1 drop into both eyes 2 times daily       LINZESS 290 MCG capsule TAKE 1 CAPSULE BY MOUTH EVERY DAY  3     prednisolone 0.25% and hyaluronate in balanced salt SUSP compounded ophthalmic suspension Place 1 drop into both eyes 4 times daily 6.67 mL 11     sodium chloride 0.9 % neb solution 3 mLs by Other route as needed for other (medically necessary for scleral lens use) 500 mL 11     White Petrolatum-Mineral Oil (SYSTANE NIGHTTIME) OINT Apply 3.5 g to eye 4 times daily as needed (dry eyes) 7 g 11     BOTOX 100 units injection Inject 250 Units into the muscle once Lot # /C3 with Expiration Date:  11/2020 (Patient not taking: Reported on 7/21/2021)       ONABOTULINUMTOXINA IJ Inject 250 Units as directed once LOT # /C3  EXPIRATION: 03/2020 (Patient not taking: Reported on 7/21/2021)              Physical Exam:   Blood pressure 130/81, pulse 92, temperature 98.3  F (36.8  C), temperature source Oral, weight 98.9 kg (218 lb), SpO2 98 %, not currently breastfeeding.  Wt Readings from Last 4 Encounters:   07/21/21 98.9 kg (218 lb)   07/01/21 101.2 kg (223 lb)   06/01/21 100.9 kg (222 lb 8 oz)   01/29/20 115.8 kg (255 lb 3.2 oz)       Constitutional: Obese, appearing stated age; cooperative  Eyes: nl EOM, PERRLA, vision, conjunctiva, sclera  ENT: nl external ears, nose, hearing, lips, teeth, gums, throat  No mucous membrane lesions, normal saliva pool  Neck: no mass or thyroid enlargement  Resp: lungs clear to auscultation, nl to palpation  CV: RRR, no murmurs, rubs or gallops, no edema  GI: no ABD mass or tenderness, no HSM  : not tested  Lymph: no cervical, supraclavicular, inguinal or epitrochlear nodes    MS: All TMJ, neck, shoulder, elbow, wrist, MCP/PIP/DIP, spine, hip, knee, ankle, and foot MTP/IP  joints were examined.   -- No active synovitis or deformity. Full ROM.  Normal  strength.   -- No dactylitis,  tenosynovitis, enthesopathy.  -- Multiple myofascial tender points present over interscapular area, upper back, inner thighs, medial border of knees, calves.    Skin: no nail pitting, alopecia, rash, nodules or lesions  Neuro: nl cranial nerves, strength, sensation, DTRs.   Psych: nl judgement, orientation, memory, affect.         Data:     No results found for any visits on 07/21/21.    Recent Labs   Lab Test 07/10/21  1931 07/10/21  1931   WBC 8.5  --    RBC 4.76  --    HGB 13.9  --    HCT 41.6  --    MCV 87  --    RDW 13.3  --      --    ALBUMIN  --  4.0   BUN  --  19      No results for input(s): TSH, T4 in the last 44911 hours.  Hemoglobin   Date Value Ref Range Status   07/10/2021 13.9 12.0 - 16.0 g/dL Final   02/10/2006 14.0 11.7 - 15.7 g/dL Final   02/07/2006 14.1 11.7 - 15.7 g/dL Final     Urea Nitrogen   Date Value Ref Range Status   07/10/2021 19 8 - 22 mg/dL Final   02/10/2006 22 5 - 24 mg/dL Final     AST   Date Value Ref Range Status   07/10/2021 20 0 - 40 U/L Final     Albumin   Date Value Ref Range Status   07/10/2021 4.0 3.5 - 5.0 g/dL Final     Alkaline Phosphatase   Date Value Ref Range Status   07/10/2021 124 (H) 45 - 120 U/L Final     ALT   Date Value Ref Range Status   07/10/2021 22 0 - 45 U/L Final     Recent Labs   Lab Test 07/10/21  1931 07/10/21  1931   WBC 8.5  --    HGB 13.9  --    HCT 41.6  --    MCV 87  --      --    BUN  --  19   AST  --  20   ALT  --  22   ALKPHOS  --  124*     Outside studies reviewed:     INDICATION:   Paresthesia of both hands. Chronic pain.     COMPARISON:   Plain film 22 January 2021.     TECHNIQUE:   Axial PD and PD fat-sat, coronal T1, PD fat sat and 3D gradient recall and sagittal PD fat-sat sequences left wrist without contrast.     FINDINGS:   Tendons: Intact normal caliber flexor and extensor tendons. No tenosynovitis. No  subluxation.   -   Ligaments: Somewhat indistinct intermediate signal articular disc and central triangle fibrocartilage for mild likely mucoid change and degenerative marginal tearing. No full-thickness defect appreciated. Intermediate signal intact scapholunate ligament. Normal low signal lunotriquetral ligament.   -   Wrist joint: Anatomic alignment. Uniform cartilage thickness and signal. Physiologic fluid. No periarticular fluid collection. No synovitis.   -   Bones and soft tissues: Anatomic carpal alignment. No fracture or bone lesion. Carpal metacarpal joints appear normal. Normal muscle bulk and signal through the field-of-view.     IMPRESSION:   Minor mucoid degeneration and perhaps low-grade sprain appearance of triangle fibrocartilage and scapholunate ligament.       [facility] reviewed .    Reviewed Rheumatology lab flowsheet    Jose Velazquez MD  [facility]  Department of Rheumatology & Autoimmune Disorders  [facility]: [phone]   Pager - [phone]

## 2021-07-22 ENCOUNTER — OFFICE VISIT (OUTPATIENT)
Dept: CARDIOLOGY | Facility: CLINIC | Age: 51
End: 2021-07-22
Attending: EMERGENCY MEDICINE
Payer: COMMERCIAL

## 2021-07-22 VITALS
RESPIRATION RATE: 18 BRPM | HEART RATE: 90 BPM | OXYGEN SATURATION: 99 % | SYSTOLIC BLOOD PRESSURE: 122 MMHG | BODY MASS INDEX: 35.19 KG/M2 | DIASTOLIC BLOOD PRESSURE: 74 MMHG | WEIGHT: 218 LBS

## 2021-07-22 DIAGNOSIS — R07.2 PRECORDIAL PAIN: Primary | ICD-10-CM

## 2021-07-22 LAB
C3 SERPL-MCNC: 146 MG/DL (ref 81–157)
C4 SERPL-MCNC: 38 MG/DL (ref 13–39)
ENA SM IGG SER IA-ACNC: <1.6 U/ML
ENA SM IGG SER IA-ACNC: NEGATIVE
ENA SS-A AB SER IA-ACNC: <0.5 U/ML
ENA SS-A AB SER IA-ACNC: NEGATIVE
ENA SS-B IGG SER IA-ACNC: <0.6 U/ML
ENA SS-B IGG SER IA-ACNC: NEGATIVE
IGG SERPL-MCNC: 933 MG/DL (ref 610–1616)
U1 SNRNP IGG SER IA-ACNC: <1.1 U/ML
U1 SNRNP IGG SER IA-ACNC: NEGATIVE

## 2021-07-22 PROCEDURE — 99214 OFFICE O/P EST MOD 30 MIN: CPT | Performed by: INTERNAL MEDICINE

## 2021-07-22 NOTE — PATIENT INSTRUCTIONS
Roxanna Celis,    It was a pleasure to see you today at the [facility].     My recommendations after this visit include:    Pharmacological stress test to rule out blockages in heart vessels as a cause of chest pain    LARA Prather MD, FACC, [facility]

## 2021-07-22 NOTE — PROGRESS NOTES
Thank you, Dr. Tejeda, for asking [facility] [place] to evaluate Ms. Roxanna Celis.      Assessment/Recommendations   Assessment:    Chest pain, atypical, uncertain etiology-not classical for angina or pericarditis/myocarditis  Family history of premature coronary artery disease    Plan:  She is unable to exercise and she is allergic to IV contrast.  Diagnostic options are limited.  We will proceed with Lexiscan stress test mainly because of unexplained typical chest pain and family history of coronary artery disease.  She had normal CRP and ESR yesterday.  Pericarditis is very unlikely       History of Present Illness    Ms. Roxanna Celis is a 50 year old female who comes to rapid access clinic for evaluation of chest pain.  12 days ago she was riding in the car when she developed pain under her left and right breast.  The pain radiates to the middle of the chest.  There was no pleuritic features of the pain the severe pain lasted about 5 minutes.  She denies any heart palpitations or shortness of breath.  She came to the emergency room.  She had normal EKG and normal troponins and D-dimer.  She states that she has some minor chest discomfort on and off for last 12 days.  She has no history of coronary artery disease.  She does not recall ever being tested for CAD.  She is concerned about her family history of coronary artery disease,  reportedly her mother had first cardiac events in the 40s.    ECG: Not in MUSE reportedly normal per ER physician    Echocardiogram: May 2021 at [facility]         Physical Examination Review of Systems   /74 (BP Location: Left arm, Patient Position: Sitting, Cuff Size: Adult Large)   Pulse 90   Resp 18   Wt 98.9 kg (218 lb)   SpO2 99%   BMI 35.19 kg/m    Body mass index is 35.19 kg/m .  Wt Readings from Last 3 Encounters:   07/22/21 98.9 kg (218 lb)   07/21/21 98.9 kg (218 lb)   07/01/21 101.2 kg (223 lb)     General Appearance:   Alert, cooperative,  no distress, appears stated age   Head/ENT: Normocephalic, without obvious abnormality. Membranes moist      EYES:  no scleral icterus, normal conjunctivae   Neck: Supple, symmetrical, trachea midline, no adenopathy, thyroid: not enlarged, symmetric, no carotid bruit or JVD   Chest/Lungs:   Lungs are clear to auscultation, respirations unlabored. No tenderness or deformity    Cardiovascular:   Regular rhythm, S1, S2 normal, no murmur, rub or gallop.   Abdomen:  Soft, non-tender, bowel sounds active all four quadrants,  no masses, no organomegaly   Extremities: no cyanosis or clubbing. No edema   Skin: Skin color, texture, turgor normal, no rashes or lesions.    Psychiatric: Normal affect, calm   Neurologic: Alert and oriented x 3, moving all four extremities.     Enc Vitals  BP: 122/74  Pulse: 90  Resp: 18  SpO2: 99 %  Weight: 98.9 kg (218 lb)                                          Lab Results    Chemistry/lipid CBC Cardiac Enzymes/BNP/TSH/INR   No results for input(s): CHOL, HDL, LDL, TRIG, CHOLHDLRATIO in the last 46548 hours.  No results for input(s): LDL in the last 05166 hours.  Recent Labs   Lab Test 07/10/21  1931      POTASSIUM 3.9   CHLORIDE 107   CO2 28   GLC 90   BUN 19   CR 1.00   GFRESTIMATED 58*   JUNIOR 9.6     Recent Labs   Lab Test 07/10/21  1931   CR 1.00     No results for input(s): A1C in the last 50172 hours.       Recent Labs   Lab Test 07/10/21  1931   WBC 8.5   HGB 13.9   HCT 41.6   MCV 87        Recent Labs   Lab Test 07/10/21  1931   HGB 13.9    Recent Labs   Lab Test 07/10/21  2217 07/10/21  1931   TROPONINI 0.01 <0.01     Recent Labs   Lab Test 07/10/21  1931   BNP 34     No results for input(s): TSH in the last 46535 hours.  No results for input(s): INR in the last 15557 hours.     Medical History  Surgical History Family History Social History   Past Medical History:   Diagnosis Date     Cervical dystonia 1993     Past Surgical History:   Procedure Laterality Date     AS  FRAGMENTING OF KIDNEY STONE       GALLBLADDER SURGERY       STOMACH SURGERY      for weight loss     No  SCD,cardiomyopathy  Mother had first heart attack in her early 40s Social History     Socioeconomic History     Marital status:      Spouse name: Not on file     Number of children: Not on file     Years of education: Not on file     Highest education level: Not on file   Occupational History     Not on file   Tobacco Use     Smoking status: Never Smoker     Smokeless tobacco: Never Used   Substance and Sexual Activity     Alcohol use: No     Drug use: No     Sexual activity: Yes     Partners: Male   Other Topics Concern     Parent/sibling w/ CABG, MI or angioplasty before 65F 55M? No   Social History Narrative     Not on file     Social Determinants of Health     Financial Resource Strain:      Difficulty of Paying Living Expenses:    Food Insecurity:      Worried About Running Out of Food in the Last Year:      Ran Out of Food in the Last Year:    Transportation Needs:      Lack of Transportation (Medical):      Lack of Transportation (Non-Medical):    Physical Activity:      Days of Exercise per Week:      Minutes of Exercise per Session:    Stress:      Feeling of Stress :    Social Connections:      Frequency of Communication with Friends and Family:      Frequency of Social Gatherings with Friends and Family:      Attends Islam Services:      Active Member of Clubs or Organizations:      Attends Club or Organization Meetings:      Marital Status:    Intimate Partner Violence:      Fear of Current or Ex-Partner:      Emotionally Abused:      Physically Abused:      Sexually Abused:          Medications  Allergies   Current Outpatient Medications   Medication Sig Dispense Refill     acetylcysteine 10 % (MUCOMYST) 10% SOLN Place 2 drops into both eyes 6 times daily 30 mL 5     aspirin-acetaminophen-caffeine (EXCEDRIN MIGRAINE) 250-250-65 MG per tablet Take 1 tablet by mouth every 6 hours as needed.        BOTOX 100 units injection Inject 250 Units into the muscle once Lot # /C3 with Expiration Date:  11/2020       buPROPion (WELLBUTRIN XL) 300 MG 24 hr tablet Take 300 mg by mouth every morning       cevimeline (EVOXAC) 30 MG capsule Take 1 capsule (30 mg) by mouth 3 times daily 90 capsule 1     ClonAZEPAM (KLONOPIN) 1 MG tablet Take 1 mg by mouth 2 times daily as needed.       doxycycline monohydrate (ADOXA) 50 MG tablet Take 1 tablet (50 mg) by mouth 2 times daily 180 tablet 3     DULoxetine HCl (CYMBALTA PO) Take 60 mg by mouth       EPINEPHrine (EPIPEN) 0.3 MG/0.3ML injection Inject 0.3 mg into the muscle once as needed.       gabapentin (NEURONTIN) 600 MG tablet Take 1 tab (600 mg) by mouth twice a day and then 2 tabs (1200 mg) at bedtine 120 tablet 11     hydrochlorothiazide (HYDRODIURIL) 50 MG tablet Take 50 mg by mouth daily       Ibuprofen (ADVIL) 200 MG capsule Take 200 mg by mouth every 4 hours as needed.       iloperidone (FANAPT) 6 MG TABS tablet Take 4 mg by mouth daily        lifitegrast (XIIDRA) 5 % opthalmic solution Place 1 drop into both eyes 2 times daily       LINZESS 290 MCG capsule TAKE 1 CAPSULE BY MOUTH EVERY DAY  3     ONABOTULINUMTOXINA IJ Inject 250 Units as directed once LOT # /C3  EXPIRATION: 03/2020       prednisolone 0.25% and hyaluronate in balanced salt SUSP compounded ophthalmic suspension Place 1 drop into both eyes 4 times daily 6.67 mL 11     sodium chloride 0.9 % neb solution 3 mLs by Other route as needed for other (medically necessary for scleral lens use) 500 mL 11     White Petrolatum-Mineral Oil (SYSTANE NIGHTTIME) OINT Apply 3.5 g to eye 4 times daily as needed (dry eyes) 7 g 11        Allergies   Allergen Reactions     Ivp Dye [Contrast Dye] Anaphylaxis     Lisinopril Anaphylaxis     Lisinopril-Hctz Anaphylaxis     Maple Flavor Anaphylaxis     MAPLE SYRUP     Maple Tree Anaphylaxis     Allergy includes maple syrup.     No Clinical Screening - See Comments       maple syrup==anaphylaxis  Dairy- causes to not feel well     Gluten Itching and Swelling     Codeine Sulfate Cramps and GI Disturbance     Erythromycin Nausea and Vomiting and Cramps     Nsaids Other (See Comments)     Patient had bariatric surgery. Should not ever take NSAIDS due to high risk for gastric ulcers.

## 2021-07-23 ENCOUNTER — TELEPHONE (OUTPATIENT)
Dept: OTOLARYNGOLOGY | Facility: CLINIC | Age: 51
End: 2021-07-23

## 2021-07-23 LAB
ANA SER QL IF: NEGATIVE
CENTROMERE B AB SER-ACNC: <0.7 U/ML
CENTROMERE B AB SER-ACNC: NEGATIVE
DSDNA AB SER-ACNC: 0.7 IU/ML
IGG SERPL-MCNC: 933 MG/DL (ref 610–1616)
IGG1 SER-MCNC: 390 MG/DL (ref 382–929)
IGG2 SER-MCNC: 443 MG/DL (ref 242–700)
IGG3 SER-MCNC: 30 MG/DL (ref 22–176)
IGG4 SER-MCNC: 30 MG/DL (ref 4–86)
SUBCLASSES, PERCENT: 96 %

## 2021-07-23 NOTE — TELEPHONE ENCOUNTER
Writer returned pt call stating that Dr. Wilkerson does lip biopsies in clinic based on what he see when the patient is here. Writer explained that Dr. Wilkerson will numb the lip with lidocaine and then remove salivary glands to be sent up to pathology for testing. Pt was very adamant that she wants to biopsy done in clinic and will not come if she cannot get the biopsy that day. Writer explained that I cannot promise anything as it is up to Dr. Wilkerson. Writer explained that we will discuss patient concerns with Dr. Wilkerson next week and writer will call patient back with more information. Patient expressed understanding.      Katelyn Oakley, EMT

## 2021-07-23 NOTE — TELEPHONE ENCOUNTER
[facility] Call Center    Phone Message    May a detailed message be left on voicemail: yes     Reason for Call: Other:   Pt would like a call back to discuss questions that she has in regards to the lip biopsy that she has scheduled in august. Pt would like to know how long Dr Wilkerson has been doing these lip biopsies. What does the process look like? Will pt be knocked out? How long is it? Please follow-up.     Action Taken: Other:  ent    Travel Screening: Not Applicable

## 2021-07-28 NOTE — TELEPHONE ENCOUNTER
Writer called pt and informed her that Dr. Wilkerson is agreeable to doing the lip biopsy in clinic on pts appointment day. Patient expressed understanding.    Katleyn Oakley, EMT

## 2021-08-03 ENCOUNTER — OFFICE VISIT (OUTPATIENT)
Dept: OPTOMETRY | Facility: CLINIC | Age: 51
End: 2021-08-03
Payer: COMMERCIAL

## 2021-08-03 ENCOUNTER — TELEPHONE (OUTPATIENT)
Dept: RHEUMATOLOGY | Facility: CLINIC | Age: 51
End: 2021-08-03

## 2021-08-03 DIAGNOSIS — H16.223 KERATITIS SICCA, BILATERAL: ICD-10-CM

## 2021-08-03 DIAGNOSIS — R20.2 LEG PARESTHESIA: Primary | ICD-10-CM

## 2021-08-03 DIAGNOSIS — H04.123 DRY EYES: Primary | ICD-10-CM

## 2021-08-03 DIAGNOSIS — H57.13 EYE PAIN, BILATERAL: ICD-10-CM

## 2021-08-03 ASSESSMENT — REFRACTION_MANIFEST
OD_AXIS: 155
OS_SPHERE: -2.50
OS_CYLINDER: SPHERE
OD_SPHERE: -2.50
OD_CYLINDER: SPHERE
OS_SPHERE: -3.00
OS_CYLINDER: SPHERE
OD_CYLINDER: +0.75
OD_SPHERE: -3.75

## 2021-08-03 ASSESSMENT — REFRACTION_WEARINGRX
OS_CYLINDER: SPHERE
OD_CYLINDER: SPHERE
OS_CYLINDER: +0.75
OS_SPHERE: -2.50
OS_SPHERE: -2.25
OD_SPHERE: -2.50
OS_ADD: +2.50
OD_ADD: +1.75
OS_ADD: +1.75
OS_AXIS: 039
OD_ADD: +2.50
OD_AXIS: 154
OD_SPHERE: -2.50
OD_CYLINDER: +0.75

## 2021-08-03 ASSESSMENT — REFRACTION_CURRENTRX
OS_SPHERE: -1.75
OS_ADDL_SPECS: BOSTON XO BLUE HYDRAPEG
OD_ADDL_SPECS: BOSTON XO CLEAR HYDRAPEG
OD_SPHERE: -2.25
OS_DIAMETER: 16.0
OD_DIAMETER: 16.0

## 2021-08-03 ASSESSMENT — VISUAL ACUITY
OS_CC+: -2
METHOD: SNELLEN - LINEAR
OD_CC: 20/20
OS_CC: 20/20
VA_OR_OS_CURRENT_RX: 20/20
VA_OR_OD_CURRENT_RX: 20/20
CORRECTION_TYPE: GLASSES, CONTACTS

## 2021-08-03 ASSESSMENT — CONF VISUAL FIELD
OS_NORMAL: 1
METHOD: COUNTING FINGERS
OD_NORMAL: 1

## 2021-08-03 ASSESSMENT — EXTERNAL EXAM - RIGHT EYE: OD_EXAM: NORMAL

## 2021-08-03 ASSESSMENT — EXTERNAL EXAM - LEFT EYE: OS_EXAM: NORMAL

## 2021-08-03 NOTE — PROGRESS NOTES
Warm compresses 2-3x daily   Doxycycline 50 mg orally twice a day   Xiidra twice a day, both eyes   Pred healon 4x daily, both eyes   Serum tears four times a day, both eyes (ok to put 1-2 gtts in scleral lenses before fill)   Systane ointment PRN when lenses are out   Mucomyst 6 times a day, both eyes     A/P  1.) Dry Eye each eye  -Severely symptomatic despite max topical treatment and significant objective improvement in corneal appearance with scleral lens wear  -Overall good fit with larger diam scleral lenses. Could consider small peripheral adjustment, but this does not correspond with the level of discomfort she is describing   -Happy with nearsighted target in CL's (-2.50 today, distance Rx updated for use over). Continue in current barber  -Lenses help with dryness when in (does not need constant ointment) but can only tolerate for 5-6 hours per day before they get too sore    2.) Blepharitis each eye  -Endorses significant irritation, burning, stinging mostly upper lids  -Failed: maxitrol anjelica, Refresh PM, Genteal Gel, erythromycin anjelica - has only done well with Systane ointment    Clinical exam is largely unchanged from previous. Her degree of discomfort is still disproportionate to ocular surface findings. She is working with Rheumatology and soon ENT for systemic diagnosis and management. I do think if her systemic condition is diagnosed and treated her eyes will improve.    Discussed limitations on eye treatment. I do think we could improve comfort in Eyeprint lenses but discussed realistic expectations. She would like to proceed.    RTC when convenient for Eyeprint impressions    I have confirmed the patient's CC, HPI and reviewed Past Medical History, Past Surgical History, Social History, Family History, Problem List, Medication List and agree with Tech note.     Sudha Anthony, SHELLI FAAO FSLS

## 2021-08-03 NOTE — PATIENT INSTRUCTIONS
Use Refresh Celluvisc inside the lenses in addition to serum tears inside the lenses    [email or web address]

## 2021-08-03 NOTE — TELEPHONE ENCOUNTER
Called patient. She states that the neurologist at the [facility] would not do an EMG again. She is wondering if she can be referred to someone at the [facility] and have an EMG done.     THIEN Schwartz, RN   Medical Specialty Care Coordinator   [facility]

## 2021-08-04 ENCOUNTER — HOSPITAL ENCOUNTER (EMERGENCY)
Facility: CLINIC | Age: 51
Discharge: HOME | End: 2021-08-04
Attending: EMERGENCY MEDICINE | Admitting: EMERGENCY MEDICINE
Payer: COMMERCIAL

## 2021-08-04 VITALS
RESPIRATION RATE: 16 BRPM | OXYGEN SATURATION: 97 % | HEART RATE: 101 BPM | TEMPERATURE: 97.1 F | SYSTOLIC BLOOD PRESSURE: 130 MMHG | DIASTOLIC BLOOD PRESSURE: 62 MMHG

## 2021-08-04 DIAGNOSIS — E16.2 HYPOGLYCEMIA: ICD-10-CM

## 2021-08-04 LAB
ANION GAP SERPL CALCULATED.3IONS-SCNC: 6 MMOL/L (ref 3–14)
BASOPHILS # BLD AUTO: 0.1 10E3/UL (ref 0–0.2)
BASOPHILS NFR BLD AUTO: 1 %
BUN SERPL-MCNC: 17 MG/DL (ref 7–30)
CALCIUM SERPL-MCNC: 9.6 MG/DL (ref 8.5–10.1)
CHLORIDE BLD-SCNC: 102 MMOL/L (ref 94–109)
CO2 SERPL-SCNC: 29 MMOL/L (ref 20–32)
CREAT SERPL-MCNC: 0.99 MG/DL (ref 0.52–1.04)
EOSINOPHIL # BLD AUTO: 0.2 10E3/UL (ref 0–0.7)
EOSINOPHIL NFR BLD AUTO: 2 %
ERYTHROCYTE [DISTWIDTH] IN BLOOD BY AUTOMATED COUNT: 13.1 % (ref 10–15)
GFR SERPL CREATININE-BSD FRML MDRD: 66 ML/MIN/1.73M2
GLUCOSE BLD-MCNC: 91 MG/DL (ref 70–99)
GLUCOSE BLDC GLUCOMTR-MCNC: 179 MG/DL (ref 70–99)
GLUCOSE BLDC GLUCOMTR-MCNC: 94 MG/DL (ref 70–99)
GLUCOSE BLDC GLUCOMTR-MCNC: 96 MG/DL (ref 70–99)
HCT VFR BLD AUTO: 42.8 % (ref 35–47)
HGB BLD-MCNC: 14.4 G/DL (ref 11.7–15.7)
HOLD SPECIMEN: NORMAL
IMM GRANULOCYTES # BLD: 0 10E3/UL
IMM GRANULOCYTES NFR BLD: 0 %
LYMPHOCYTES # BLD AUTO: 2.2 10E3/UL (ref 0.8–5.3)
LYMPHOCYTES NFR BLD AUTO: 31 %
MCH RBC QN AUTO: 30.3 PG (ref 26.5–33)
MCHC RBC AUTO-ENTMCNC: 33.6 G/DL (ref 31.5–36.5)
MCV RBC AUTO: 90 FL (ref 78–100)
MONOCYTES # BLD AUTO: 0.5 10E3/UL (ref 0–1.3)
MONOCYTES NFR BLD AUTO: 6 %
NEUTROPHILS # BLD AUTO: 4.4 10E3/UL (ref 1.6–8.3)
NEUTROPHILS NFR BLD AUTO: 60 %
NRBC # BLD AUTO: 0 10E3/UL
NRBC BLD AUTO-RTO: 0 /100
PLATELET # BLD AUTO: 265 10E3/UL (ref 150–450)
POTASSIUM BLD-SCNC: 3.8 MMOL/L (ref 3.4–5.3)
RBC # BLD AUTO: 4.75 10E6/UL (ref 3.8–5.2)
SODIUM SERPL-SCNC: 137 MMOL/L (ref 133–144)
WBC # BLD AUTO: 7.3 10E3/UL (ref 4–11)

## 2021-08-04 PROCEDURE — 80048 BASIC METABOLIC PNL TOTAL CA: CPT | Performed by: EMERGENCY MEDICINE

## 2021-08-04 PROCEDURE — 36415 COLL VENOUS BLD VENIPUNCTURE: CPT | Performed by: EMERGENCY MEDICINE

## 2021-08-04 PROCEDURE — 85025 COMPLETE CBC W/AUTO DIFF WBC: CPT | Performed by: EMERGENCY MEDICINE

## 2021-08-04 PROCEDURE — 99283 EMERGENCY DEPT VISIT LOW MDM: CPT

## 2021-08-04 ASSESSMENT — ENCOUNTER SYMPTOMS
VOMITING: 0
COUGH: 0
FEVER: 0
HEMATURIA: 0
NUMBNESS: 1
FREQUENCY: 0
DIARRHEA: 0
DYSURIA: 0
DIFFICULTY URINATING: 0

## 2021-08-04 NOTE — ED NOTES
Blood sugar: 96. Pt is eating boxed lunch and apple juice. Call light within reach. Will continue to monitor.

## 2021-08-04 NOTE — ED TRIAGE NOTES
Blood drawn today, results showed BG of 40 after fasting test. Pt called primary and neurologist but hasn't heard back so came to ED. No hx of diabetes. Now feeling shaky, weak, tingling in extremities, palpitations and dizziness. VSs on RA.

## 2021-08-04 NOTE — ED PROVIDER NOTES
History   Chief Complaint:  Hypoglycemia       The history is provided by the patient.      Roxanna Celis is a 50 year old female with history of hypertension and anxiety who presents with hypoglycemia. Patient was getting her blood drawn due to ongoing medical problems. Her neurologist ordered blood work and she had blood drawn earlier today. She was ordered to fast before getting blood drawn and last ate at 1930 last night. After getting blood work she had half a sandwich and pop. Her blood sugar result was 40. Patient states she has not been feeling well over the past few months and this test prompted her to be concerned. She notes numbness and tingling of her extremities for a long period of time. Denies fever, cough, vomiting, or diarrhea. No urinary symptoms.     Review of Systems   Constitutional: Negative for fever.   Respiratory: Negative for cough.    Gastrointestinal: Negative for diarrhea and vomiting.   Genitourinary: Negative for difficulty urinating, dysuria, frequency, hematuria and urgency.   Neurological: Positive for numbness.   Hematological:        Abnormal lab: blood sugar 40   All other systems reviewed and are negative.    Allergies:  Contrast Dye  Lisinopril  Lisinopril-Hctz  Maple Flavor  Maple Tree  Gluten  Codeine Sulfate  Erythromycin  Nsaids    Medications:  Excedrin migraine  Wellbutrin  Evoxac  Klonopin  Adoxa  Cymbalta  Epipen  Neurontin  Hydrodiuril  Fanapt  Linzess  Onabotulinumtoxina    Past Medical History:    Cervical dystonia  Precordial pain  Sleep apnea  Torticollis  Achlorhydria  Morbid obesity  Hypokalemia  Hypothyroid  KIANA  Depression with anxiety  Multiple thyroid nodules  Essential hypertension  Intramural leiomyoma of uterus  Calculus of kidney    Past Surgical History:    Laparoscopy abdomen diagnostic  Lithotripsy  Laparoscopic cholecystectomy  Dilation and curettage  [place] teeth extraction  Gastrectomy sleeve  Ankle surgery     Family History:    Brother:  hypertension  Father: prostate cancer, diabetes, hyperlipidemia, hypertension, obesity  Mother: heart disease, hyperlipidemia  Sister: hyperlipidemia    Social History:  Presents to ED alone    Physical Exam     Patient Vitals for the past 24 hrs:   BP Temp Temp src Pulse Resp SpO2   08/04/21 1940 130/62 -- -- 101 -- 97 %   08/04/21 1716 (!) 168/93 97.1  F (36.2  C) Temporal 105 16 99 %       Physical Exam  VS: Reviewed per above  HENT: normal speech, no nuchal rigidity  EYES: sclera anicteric  CV: Rate as noted, regular rhythm.   RESP: Effort normal. Breath sounds are normal bilaterally.  GI: no tenderness/rebound/guarding, not distended.  NEURO: GCS 15, cranial nerves II through XII are intact, 5 out of 5 strength in all 4 extremities, paresthesias in all 4 extremities.  MSK: No deformity of the extremities  SKIN: Warm and dry      Emergency Department Course   Laboratory:  Glucose by meter (1720): 94  Glucose by meter (1847): 96  Glucose by meter (1947): 179(H)  BMP: WNL (Creatinine 0.99)   CBC: WBC 7.3, HGB 14.41,      Emergency Department Course:    Reviewed:  I reviewed nursing notes, vitals, past medical history and care everywhere    Assessments:  1832 I obtained history and examined the patient as noted above.   2001 I rechecked the patient and explained findings.     Disposition:  The patient was discharged to home.       Impression & Plan     Medical Decision Making:  Patient presents to the ER for evaluation of incidental hypoglycemia of 40 detected on fasting blood work earlier today.  On arrival her blood sugar is stable.  She was given small meal and afterwards patient felt better.  On serial blood glucose checks here in the ER, there was no recurrent hypoglycemia.  No concerning renal function or history to support occult infectious process.  Patient denies diabetic medications or beta-blocker usage.  I suspect that patient exhausted her glucose storages during her period of fasting which may  have contributed to the hypoglycemia noted earlier today.  I encouraged close primary care follow-up.  Return precautions were discussed prior to discharge.    Covid-19  Roxanna Celis was evaluated during a global COVID-19 pandemic, which necessitated consideration that the patient might be at risk for infection with the SARS-CoV-2 virus that causes COVID-19.   Applicable protocols for evaluation were followed during the patient's care.     Diagnosis:    ICD-10-CM    1. Hypoglycemia  E16.2        Scribe Disclosure:  I, James Lindo, am serving as a scribe at 6:30 PM on 8/4/2021 to document services personally performed by Sd Portillo MD based on my observations and the provider's statements to me.            Sd Portillo MD  08/04/21 8906

## 2021-08-05 NOTE — DISCHARGE INSTRUCTIONS
Return for recurrent episodes or new concerns. It seems likely that the low blood sugar was due to fasting and exhausting your glucose/blood sugar stores.

## 2021-08-10 ENCOUNTER — HOSPITAL ENCOUNTER (OUTPATIENT)
Dept: NUCLEAR MEDICINE | Facility: CLINIC | Age: 51
End: 2021-08-10
Attending: INTERNAL MEDICINE
Payer: COMMERCIAL

## 2021-08-10 ENCOUNTER — HOSPITAL ENCOUNTER (OUTPATIENT)
Dept: CARDIOLOGY | Facility: CLINIC | Age: 51
End: 2021-08-10
Attending: INTERNAL MEDICINE
Payer: COMMERCIAL

## 2021-08-10 DIAGNOSIS — R07.2 PRECORDIAL PAIN: Primary | ICD-10-CM

## 2021-08-10 DIAGNOSIS — R07.2 PRECORDIAL PAIN: ICD-10-CM

## 2021-08-10 LAB
CV STRESS CURRENT BP HE: NORMAL
CV STRESS CURRENT HR HE: 101
CV STRESS CURRENT HR HE: 104
CV STRESS CURRENT HR HE: 104
CV STRESS CURRENT HR HE: 105
CV STRESS CURRENT HR HE: 111
CV STRESS CURRENT HR HE: 112
CV STRESS CURRENT HR HE: 112
CV STRESS CURRENT HR HE: 114
CV STRESS CURRENT HR HE: 82
CV STRESS CURRENT HR HE: 82
CV STRESS CURRENT HR HE: 83
CV STRESS CURRENT HR HE: 84
CV STRESS CURRENT HR HE: 86
CV STRESS CURRENT HR HE: 88
CV STRESS CURRENT HR HE: 89
CV STRESS CURRENT HR HE: 90
CV STRESS DEVIATION TIME HE: NORMAL
CV STRESS ECHO PERCENT HR HE: NORMAL
CV STRESS EXERCISE STAGE HE: NORMAL
CV STRESS FINAL RESTING BP HE: NORMAL
CV STRESS FINAL RESTING HR HE: 84
CV STRESS MAX HR HE: 115
CV STRESS MAX TREADMILL GRADE HE: 0
CV STRESS MAX TREADMILL SPEED HE: 0
CV STRESS PEAK DIA BP HE: NORMAL
CV STRESS PEAK SYS BP HE: NORMAL
CV STRESS PHASE HE: NORMAL
CV STRESS PROTOCOL HE: NORMAL
CV STRESS RESTING PT POSITION HE: NORMAL
CV STRESS ST DEVIATION AMOUNT HE: NORMAL
CV STRESS ST DEVIATION ELEVATION HE: NORMAL
CV STRESS ST EVELATION AMOUNT HE: NORMAL
CV STRESS TEST TYPE HE: NORMAL
CV STRESS TOTAL STAGE TIME MIN 1 HE: NORMAL
RATE PRESSURE PRODUCT: NORMAL
STRESS ECHO BASELINE DIASTOLIC HE: 83
STRESS ECHO BASELINE HR: 81
STRESS ECHO BASELINE SYSTOLIC BP: 147
STRESS ECHO CALCULATED PERCENT HR: 68 %
STRESS ECHO LAST STRESS DIASTOLIC BP: 80
STRESS ECHO LAST STRESS HR: 101
STRESS ECHO LAST STRESS SYSTOLIC BP: 142
STRESS ECHO TARGET HR: 168

## 2021-08-10 PROCEDURE — A9500 TC99M SESTAMIBI: HCPCS | Performed by: INTERNAL MEDICINE

## 2021-08-10 PROCEDURE — 78452 HT MUSCLE IMAGE SPECT MULT: CPT

## 2021-08-10 PROCEDURE — 78452 HT MUSCLE IMAGE SPECT MULT: CPT | Mod: 26 | Performed by: INTERNAL MEDICINE

## 2021-08-10 PROCEDURE — 93016 CV STRESS TEST SUPVJ ONLY: CPT | Performed by: INTERNAL MEDICINE

## 2021-08-10 PROCEDURE — 343N000001 HC RX 343: Performed by: INTERNAL MEDICINE

## 2021-08-10 PROCEDURE — 93018 CV STRESS TEST I&R ONLY: CPT | Performed by: INTERNAL MEDICINE

## 2021-08-10 PROCEDURE — 250N000011 HC RX IP 250 OP 636: Performed by: INTERNAL MEDICINE

## 2021-08-10 PROCEDURE — 93017 CV STRESS TEST TRACING ONLY: CPT

## 2021-08-10 RX ORDER — AMINOPHYLLINE 25 MG/ML
50 INJECTION, SOLUTION INTRAVENOUS
Status: DISCONTINUED | OUTPATIENT
Start: 2021-08-10 | End: 2021-08-10 | Stop reason: HOSPADM

## 2021-08-10 RX ORDER — REGADENOSON 0.08 MG/ML
0.4 INJECTION, SOLUTION INTRAVENOUS ONCE
Status: COMPLETED | OUTPATIENT
Start: 2021-08-10 | End: 2021-08-10

## 2021-08-10 RX ADMIN — REGADENOSON 0.4 MG: 0.08 INJECTION, SOLUTION INTRAVENOUS at 13:18

## 2021-08-10 RX ADMIN — Medication 31.3 MILLICURIE: at 15:11

## 2021-08-10 RX ADMIN — Medication 8.1 MCI.: at 12:41

## 2021-08-10 NOTE — PROGRESS NOTES
Here for lexiscan nuclear stress test for cardiac evaluation.  Failed stress echo.  One epsoide of chest pain left sided lasting 5 min and back pain. And Shortness of breath at rest.  Nonexertional chest pain.  Denies chest pain at this time does c/o SOB.  Bridget Toth RN

## 2021-08-11 NOTE — TELEPHONE ENCOUNTER
Called patient and left generic vm asking them to call the clinic when they received the message. Call back number provided.    MELVINA DennisonN, RN   Rheumatology Care Coordinator   [facility]

## 2021-08-11 NOTE — TELEPHONE ENCOUNTER
If she has seen neurologist and they did not recommend getting EMG, then I do not think referral to another neurologist will change the management plan.

## 2021-08-12 NOTE — TELEPHONE ENCOUNTER
Patient was adamant that she wanted [facility]. Informed her that we would place the order.     MELVINA SchwartzN, RN   Medical Specialty Care Coordinator   [facility]

## 2021-08-15 DIAGNOSIS — H04.123 BILATERAL DRY EYES: ICD-10-CM

## 2021-08-15 DIAGNOSIS — R68.2 DRY MOUTH: ICD-10-CM

## 2021-08-16 ENCOUNTER — OFFICE VISIT (OUTPATIENT)
Dept: OPTOMETRY | Facility: CLINIC | Age: 51
End: 2021-08-16
Payer: COMMERCIAL

## 2021-08-16 DIAGNOSIS — H16.223 KERATITIS SICCA, BILATERAL: ICD-10-CM

## 2021-08-16 DIAGNOSIS — H04.123 DRY EYES: Primary | ICD-10-CM

## 2021-08-16 DIAGNOSIS — H57.13 EYE PAIN, BILATERAL: ICD-10-CM

## 2021-08-16 ASSESSMENT — REFRACTION_CURRENTRX
OD_ADDL_SPECS: BOSTON XO CLEAR HYDRAPEG
OD_DIAMETER: 16.0
OS_ADDL_SPECS: BOSTON XO BLUE HYDRAPEG
OS_DIAMETER: 16.0
OD_SPHERE: -2.25
OS_SPHERE: -1.75

## 2021-08-16 ASSESSMENT — VISUAL ACUITY
METHOD: SNELLEN - LINEAR
OD_CC: 20/20
OS_CC: 20/25
CORRECTION_TYPE: GLASSES

## 2021-08-16 ASSESSMENT — EXTERNAL EXAM - RIGHT EYE: OD_EXAM: NORMAL

## 2021-08-16 ASSESSMENT — EXTERNAL EXAM - LEFT EYE: OS_EXAM: NORMAL

## 2021-08-16 NOTE — PATIENT INSTRUCTIONS
Right lens will have ONE dot, left lens will have TWO dots. [place] the dots so they they are at the bottom of your eye (6:00 position)

## 2021-08-16 NOTE — PROGRESS NOTES
Warm compresses 2-3x daily   Doxycycline 50 mg orally twice a day   Xiidra twice a day, both eyes   Pred healon 4x daily, both eyes   Serum tears four times a day, both eyes (ok to put 1-2 gtts in scleral lenses before fill)   Systane ointment PRN when lenses are out   Mucomyst 6 times a day, both eyes     A/P  1.) Dry Eye each eye  -Severely symptomatic despite max topical treatment and significant objective improvement in corneal appearance with scleral lens wear  -Overall good fit with larger diam scleral lenses. Could consider small peripheral adjustment, but this does not correspond with the level of discomfort she is describing   -Happy with nearsighted target in CL's (-2.50 today, distance Rx updated for use over). Continue in current barber  -Lenses help with dryness when in (does not need constant ointment) but can only tolerate for 5-6 hours per day before they get too sore    2.) Blepharitis each eye  -Endorses significant irritation, burning, stinging mostly upper lids  -Failed: maxitrol anjelica, Refresh PM, Genteal Gel, erythromycin anjelica - has only done well with Systane ointment    Clinical exam is largely unchanged from previous. Her degree of discomfort is still disproportionate to ocular surface findings. She is working with Rheumatology and soon ENT for systemic diagnosis and management. I do think if her systemic condition is diagnosed and treated her eyes will improve.    Interval Hx: Here for eyeprint impressions each eye. Tolerated well today. Likes celluvisc inside the lenses. Irritated left lower puncta (had two collagen plugs inserted 6/8/21). I am able to visualize a collagen plug in her puncta that has not dissolved yet, but unable to remove. Had Dr. Snyder take a quick look who also did not recommend removal and rec waiting for it to dissolve    Will order Eyefit lenses and mail to pt when arrives. F/u 1 month wearing lenses.    Contact Lens Billing  V-Code:  Scleral Cover Shell  Final  Contact Lens Rx       Brand Sphere    Right Eyefit Pro TBD    Left Eyefit Pro TBD         Eyeprint order: [id number]  # of units: 2  Price per Unit: $575    This patient requires contact lenses that are medically necessary for either improvement in vision over spectacles, support of the ocular surface, or other therapeutic benefit. These are not cosmetic contact lenses.     Encounter Diagnoses   Name Primary?     Dry eyes Yes     Keratitis sicca, bilateral (H)      Eye pain, bilateral

## 2021-08-17 RX ORDER — CEVIMELINE HYDROCHLORIDE 30 MG/1
CAPSULE ORAL
Qty: 90 CAPSULE | Refills: 1 | OUTPATIENT
Start: 2021-08-17

## 2021-08-20 ENCOUNTER — TELEPHONE (OUTPATIENT)
Dept: NEUROLOGY | Facility: CLINIC | Age: 51
End: 2021-08-20

## 2021-08-23 NOTE — PROGRESS NOTES
Salivary Gland Lip Biopsy:  Lip injection 1% lidocaine with epinephrine. Elliptical incison was made in lower lip. Several minor salivary removed with sharp dissection. Wound closed 4-0 chromic suture.       PHYSICAL EXAMINATION:    Constitutional:  The patient was unaccompanied, well-groomed, and in no acute distress.    Skin:  Warm and pink.    Neurologic:  Alert and oriented x 3.  CN's III-XII within normal limits.  Voice normal.   Psychiatric:  The patient's affect was calm, cooperative, and appropriate.    Respiratory:  Breathing comfortably without stridor or exertion of accessory muscles.    Eyes: Pupils were equal and reactive.  Extraocular movement intact.    Head:  Normocephalic and atraumatic.  No lesions or scars.    Ears:  Pinnae and tragus non-tender.  EAC's and TM's were clear.     Nose:  Sinuses were non-tender.  Anterior rhinoscopy revealed midline septum and absence of purulence or polyps.    OC/OP:  Normal tongue, floor of mouth, buccal mucosa, and palate.  No lesions or masses on inspection or palpation.  No abnormal lymph tissue in the oropharynx.  The pterygoid region is non-tender.       Neck:  Supple with normal laryngeal and tracheal landmarks.  The parotid beds were without masses.  No palpable thyroid.  Lymphatic:  There is no palpable lymphadenopathy in the neck.            Scribe Disclosure:  Krystal ZEE, am serving as a scribe to document services personally performed by William Wilkerson MD at this visit, based upon the provider's statements to me. All documentation has been reviewed by the aforementioned provider prior to being entered into the official medical record.

## 2021-08-24 ENCOUNTER — OFFICE VISIT (OUTPATIENT)
Dept: OTOLARYNGOLOGY | Facility: CLINIC | Age: 51
End: 2021-08-24
Payer: COMMERCIAL

## 2021-08-24 VITALS — BODY MASS INDEX: 34.51 KG/M2 | HEIGHT: 66 IN | WEIGHT: 214.73 LBS

## 2021-08-24 DIAGNOSIS — H04.123 BILATERAL DRY EYES: ICD-10-CM

## 2021-08-24 DIAGNOSIS — R68.2 DRY MOUTH: ICD-10-CM

## 2021-08-24 DIAGNOSIS — K11.8 LESION OF SALIVARY GLAND: Primary | ICD-10-CM

## 2021-08-24 PROCEDURE — 42405 BIOPSY OF SALIVARY GLAND: CPT | Performed by: OTOLARYNGOLOGY

## 2021-08-24 PROCEDURE — 88305 TISSUE EXAM BY PATHOLOGIST: CPT | Performed by: PATHOLOGY

## 2021-08-24 ASSESSMENT — MIFFLIN-ST. JEOR: SCORE: 1600.75

## 2021-08-24 ASSESSMENT — PAIN SCALES - GENERAL: PAINLEVEL: EXTREME PAIN (8)

## 2021-08-24 NOTE — LETTER
8/24/2021       RE: Roxanna Celis  [id number] [place] [address]     Dear Colleague,    Thank you for referring your patient, Roxanna Celis, to the [facility] [place] at [facility]. Please see a copy of my visit note below.    Salivary Gland Lip Biopsy:  Lip injection 1% lidocaine with epinephrine. Elliptical incison was made in lower lip. Several minor salivary removed with sharp dissection. Wound closed 4-0 chromic suture.       PHYSICAL EXAMINATION:    Constitutional:  The patient was unaccompanied, well-groomed, and in no acute distress.    Skin:  Warm and pink.    Neurologic:  Alert and oriented x 3.  CN's III-XII within normal limits.  Voice normal.   Psychiatric:  The patient's affect was calm, cooperative, and appropriate.    Respiratory:  Breathing comfortably without stridor or exertion of accessory muscles.    Eyes: Pupils were equal and reactive.  Extraocular movement intact.    Head:  Normocephalic and atraumatic.  No lesions or scars.    Ears:  Pinnae and tragus non-tender.  EAC's and TM's were clear.     Nose:  Sinuses were non-tender.  Anterior rhinoscopy revealed midline septum and absence of purulence or polyps.    OC/OP:  Normal tongue, floor of mouth, buccal mucosa, and palate.  No lesions or masses on inspection or palpation.  No abnormal lymph tissue in the oropharynx.  The pterygoid region is non-tender.       Neck:  Supple with normal laryngeal and tracheal landmarks.  The parotid beds were without masses.  No palpable thyroid.  Lymphatic:  There is no palpable lymphadenopathy in the neck.            Scribe Disclosure:  I, Krystal Davis, am serving as a scribe to document services personally performed by William Wilkerson MD at this visit, based upon the provider's statements to me. All documentation has been reviewed by the aforementioned provider prior to being entered into the official medical  record.       Again, thank you for allowing me to participate in the care of your patient.      Sincerely,    William Wilkerson MD

## 2021-08-24 NOTE — NURSING NOTE
"Chief Complaint   Patient presents with     RECHECK     [facility] procedure       Height 1.676 m (5' 6\"), weight 97.4 kg (214 lb 11.7 oz), not currently breastfeeding.    Stanislav Pascual, EMT  "

## 2021-08-24 NOTE — PATIENT INSTRUCTIONS
1.  You were seen in the ENT Clinic today by Dr. Wilkerson. The following has been recommended:   - Biopsy done in clinic today. We will call you with the results.    2.  Plan is to return to clinic pending pathology results.    If you have any questions or concerns after your appointment, please call the clinic.   - Clinic phone: [phone]. Press option #1 for scheduling related needs. Press option #3 for Nurse advice.   - Arminda Encinas RN (Dr. Wilkerson's nurse) [phone]    Shaila Willard RN  [phone]  [facility]  Department of Otolaryngology

## 2021-08-25 LAB
PATH REPORT.COMMENTS IMP SPEC: NORMAL
PATH REPORT.FINAL DX SPEC: NORMAL
PATH REPORT.GROSS SPEC: NORMAL
PATH REPORT.MICROSCOPIC SPEC OTHER STN: NORMAL
PATH REPORT.RELEVANT HX SPEC: NORMAL
PHOTO IMAGE: NORMAL

## 2021-08-30 ENCOUNTER — TELEPHONE (OUTPATIENT)
Dept: OTOLARYNGOLOGY | Facility: CLINIC | Age: 51
End: 2021-08-30
Payer: COMMERCIAL

## 2021-08-30 ENCOUNTER — DOCUMENTATION ONLY (OUTPATIENT)
Dept: OPTOMETRY | Facility: CLINIC | Age: 51
End: 2021-08-30

## 2021-08-30 NOTE — PROGRESS NOTES
Eyeprint lenses mailed out today to address on file:    [facility] tracking [id number] [phone]

## 2021-08-30 NOTE — TELEPHONE ENCOUNTER
[facility]    Phone Message    May a detailed message be left on voicemail: yes     Reason for Call: Other: Pt calling to notify staff that she would like to move forward with scheduling procedure to evaluate for Sjogren's following biopsy results. Please call Pt to discuss.      Action Taken: Message routed to:  [facility] ENT    Travel Screening: Not Applicable

## 2021-08-31 NOTE — NURSING NOTE
Roxanna Celis's goals for this visit include: sicca syndrome  She requests these members of her care team be copied on today's visit information:     PCP: [facility] [place]    Referring Provider:  No referring provider defined for this encounter.    /79 (BP Location: Left arm, Patient Position: Sitting, Cuff Size: Adult Large)   Pulse 76   Temp 97.8  F (36.6  C) (Oral)   Wt 98.4 kg (217 lb)   SpO2 97%   BMI 35.02 kg/m      Do you need any medication refills at today's visit?  No    MARIA E Bedoya [facility] Rheumatology

## 2021-09-02 ENCOUNTER — PATIENT OUTREACH (OUTPATIENT)
Dept: OTOLARYNGOLOGY | Facility: CLINIC | Age: 51
End: 2021-09-02

## 2021-09-02 NOTE — PROGRESS NOTES
Called and spoke with patient regarding the following pathology results:    Final Diagnosis   Lip, lower, biopsy:  -Benign minor salivary gland tissue with focal minimal chronic inflammation (see comment)  -Benign squamous mucosa without significant histologic abnormality   Electronically signed by Ben Williamson MD on 8/25/2021 at  7:19 PM   Comment  [id number]   Sections reveal approximately 12 mm  of minor salivary gland tissue with rare lymphocytes and plasma cells.  No lymphoid aggregate with more than 50 lymphocytes is identified.  These findings are not diagnostic for Sjogren's syndrome.       Reviewed with patient that this biopsy does not show evidence of Sjogren's syndrome. Patient indicates that she would like to discuss open biopsy with Dr. Wilkerson more as he suggested this after doing the lip biopsy. Patient will return to clinic on 9/14 at 2:20pm to further discuss and plan to proceed pending that discussion.     All questions answered and patient was encouraged to call with further questions or concerns.       Arminda Encinas, RN, BSN

## 2021-09-14 ENCOUNTER — OFFICE VISIT (OUTPATIENT)
Dept: OPHTHALMOLOGY | Facility: CLINIC | Age: 51
End: 2021-09-14
Attending: OPHTHALMOLOGY
Payer: COMMERCIAL

## 2021-09-14 DIAGNOSIS — H04.123 BILATERAL DRY EYES: Primary | ICD-10-CM

## 2021-09-14 PROCEDURE — 99213 OFFICE O/P EST LOW 20 MIN: CPT | Performed by: OPHTHALMOLOGY

## 2021-09-14 PROCEDURE — 68761 CLOSE TEAR DUCT OPENING: CPT | Performed by: OPHTHALMOLOGY

## 2021-09-14 PROCEDURE — G0463 HOSPITAL OUTPT CLINIC VISIT: HCPCS | Performed by: TECHNICIAN/TECHNOLOGIST

## 2021-09-14 PROCEDURE — 99207 PUNCTAL CLOSURE, PLUGS: CPT | Performed by: OPHTHALMOLOGY

## 2021-09-14 ASSESSMENT — VISUAL ACUITY
OS_CC+: -1
OS_CC: 20/20
CORRECTION_TYPE: GLASSES
OD_CC: 20/20
OD_CC+: -2
METHOD: SNELLEN - LINEAR

## 2021-09-14 ASSESSMENT — EXTERNAL EXAM - RIGHT EYE: OD_EXAM: NORMAL

## 2021-09-14 ASSESSMENT — CONF VISUAL FIELD
OS_NORMAL: 1
METHOD: COUNTING FINGERS
OD_NORMAL: 1

## 2021-09-14 ASSESSMENT — REFRACTION_WEARINGRX
OD_SPHERE: -2.25
OS_SPHERE: -2.25
OD_AXIS: 137
SPECS_TYPE: PAL
OD_CYLINDER: +0.25
OD_ADD: +2.33
OS_ADD: +2.33
OS_CYLINDER: +0.25
OS_AXIS: 106

## 2021-09-14 ASSESSMENT — TONOMETRY
OD_IOP_MMHG: 14
IOP_METHOD: ICARE
OS_IOP_MMHG: 15

## 2021-09-14 ASSESSMENT — EXTERNAL EXAM - LEFT EYE: OS_EXAM: NORMAL

## 2021-09-14 NOTE — PROGRESS NOTES
"Chief Complaint(s) and History of Present Illness(es)     Dry Eye Syndrome Follow Up     Laterality: both eyes    Associated symptoms: irritation, burning, itching, dryness, foreign body   sensation, mattering, photophobia and blurred vision    Frequency: constantly    Timing: throughout the day    Context: dry eyes    Course: stable    Treatments tried: warm compresses, artificial tears, ointment and oral   allergy medications    Response to treatment: mild improvement    Pain scale: 0/10              Comments     Patient reports just started to wear scleral contact lenses a week ago,   huge and having challenges with them. Patient states \"it's hard to get   them in without getting any air bubbles\". Patient did mentioned that   scleral lenses are better than just regular ones and grateful for them.   Needs refill on Xiidra.    Patient states she has decided to try the Lipoflow at [facility] with Dr. Estella Chacon and patient is ?if she needs to sign   release forms for them to continue the treatment.     Current tx for dry eyes:  Warm compresses 2-3x daily   Doxycycline 50 mg orally twice a day   Xiidra twice a day, both eyes   Pred healon 4x daily, both eyes   Serum tears four times a day, both eyes (ok to put 1-2 gtts in scleral   lenses before fill)   Systane ointment PRN when lenses are out   Mucomyst 6 times a day, both eyes     BALWINDER Epperson 9/14/2021 8:46 AM            Review of systems for the eyes was negative other than the pertinent positives/negatives listed in the HPI.      Assessment & Plan      Roxanna Celis is a 50 year old female with the following diagnoses:   1. Bilateral dry eyes       Right lower lid > left lower lid irritation nasally  Removed right lower lid silicone plug and replaced with collagen today   Discussed option of cautery in the future.  She will consider    Having difficulty with insertion of new scleral lenses.  Seeing Dr. BATES on Friday  Recommend to have "  with for visit    Scheduled for lipiflow in October    Continue current drops for now        Patient disposition:   Return in about 10 weeks (around 11/23/2021).           Attending Physician Attestation:  Complete documentation of historical and exam elements from today's encounter can be found in the full encounter summary report (not reduplicated in this progress note).  I personally obtained the chief complaint(s) and history of present illness.  I confirmed and edited as necessary the review of systems, past medical/surgical history, family history, social history, and examination findings as documented by others; and I examined the patient myself.  I personally reviewed the relevant tests, images, and reports as documented above.  I formulated and edited as necessary the assessment and plan and discussed the findings and management plan with the patient and family. . - Faheem Ocampo MD

## 2021-09-15 ENCOUNTER — OFFICE VISIT (OUTPATIENT)
Dept: RHEUMATOLOGY | Facility: CLINIC | Age: 51
End: 2021-09-15
Payer: COMMERCIAL

## 2021-09-15 VITALS
OXYGEN SATURATION: 97 % | WEIGHT: 217 LBS | SYSTOLIC BLOOD PRESSURE: 124 MMHG | TEMPERATURE: 97.8 F | DIASTOLIC BLOOD PRESSURE: 79 MMHG | HEART RATE: 76 BPM | BODY MASS INDEX: 35.02 KG/M2

## 2021-09-15 DIAGNOSIS — M79.7 FIBROMYALGIA: Primary | ICD-10-CM

## 2021-09-15 DIAGNOSIS — M79.18 MYOFASCIAL PAIN: ICD-10-CM

## 2021-09-15 PROCEDURE — 99213 OFFICE O/P EST LOW 20 MIN: CPT | Performed by: STUDENT IN AN ORGANIZED HEALTH CARE EDUCATION/TRAINING PROGRAM

## 2021-09-15 ASSESSMENT — PAIN SCALES - GENERAL: PAINLEVEL: SEVERE PAIN (7)

## 2021-09-15 NOTE — PROGRESS NOTES
Rheumatology Clinic Visit     Roxanna Celis MRN# [id number]   YOB: 1970 Age: 50 year old     Date of Visit: Sep 15, 2021   Primary care provider: [facility] [place]          Assessment and Plan:     Assessment     Sicca symptoms ( severe dry mouth and eyes )  Paresthesia  KIANA  Thyroid nodule  HTN  Neg ENEIDA, SSA/SSB  Negative Minor salivary gland lip biopsy - 8/24/21    Ms. Celis is 50 year old female seen in clinic for evaluation of possible Sjogren's disease    Sicca symptoms : She has severe dry mouth and eyes for few years.  Progressively getting worse.  She is using multiple eyedrops even then eye symptoms are difficult to control. Her ENEIDA, SSA/SSB antibodies done in 2/21 were negative.  Due to severity of sicca symptoms, seronegative Sjogren's was a possibility but her recent Minor salivary gland lip biopsy did not meet criteria for Sjogren's disease. Severe sicca symptoms can be seen in patients with IgG4 related disease as well but her IgG levels are normal.      She was prescribed cevimeline to help with severe dry mouth but discontinued it due to no benefit.     I recommend her to follow up with her Ophthalmologist to manage dry eye symptoms. Use Biotene products and follow up with dentist for dry mouth symptoms. No evidence of systemic autoimmune connective tissue disease to warrant immune suppressive use.     Paresthesia: She reports burning sensation, pins and needles sensation in her arms and legs.  Progressively getting worse.  It started over her hands and feet and gradually went up to involve her entire arm and leg up to the mid thigh.  EMG of bilateral upper extremities done in 2/2021 was normal.  MRI of the cervical and thoracic spine done in 6/2021 did not reveal any evidence of demyelinating disease. MRI brain in 8/2020 was normal. She has seen Dr. Bell in Neurology who ruled out demyelinating process.  She is on gabapentin, Cymbalta.  She has appointment with Neurologist  at [facility]. She want to discuss about skin biopsy for small fiber neuropathy. I defer it to neurologist.     Myofascial pain : She has multiple myofascial tender points on exam suggestive of Fibromyalgia. For fibromyalgia she was instructed to try relaxing techniques like Yoga, Dustin chi. She can try Aerobic conditioning. Low-impact aerobic activities such as fast walking, biking, swimming, or water aerobics are most successful among the interventions that have been studied. I will place Massage therapy referral. She has appointment with pain clinic and can discuss with them further.   I will place behavioral pain specialist referral.       Plan    Massage therapy referral placed.     No inflammation noted on the joints exam. No evidence of Sjogren's disease on biopsy     I recommend follow up with pain clinic for management of Fibromyalgia     I can refer you to Behavioral pain specialist.     Follow up as needed    -- Orders placed this encounter  Orders Placed This Encounter   Procedures     Massage Therapy Referral                 Active Problem List:     Patient Active Problem List    Diagnosis Date Noted     Precordial pain 07/22/2021     Priority: Medium     Chronic bilateral low back pain without sciatica 08/06/2020     Priority: Medium     Sleep apnea 05/01/2018     Priority: Medium     Overview:   on CPAP       Torticollis, spasmodic 03/24/2017     Priority: Medium     Achlorhydria 10/20/2015     Priority: Medium     Bariatric surgery status 10/20/2015     Priority: Medium     Morbid obesity with BMI of 40.0-44.9, adult (H) 10/20/2015     Priority: Medium     Hypokalemia 06/05/2014     Priority: Medium     Hypothyroid 06/05/2014     Priority: Medium     KIANA (generalized anxiety disorder) 12/03/2010     Priority: Medium     Depression with anxiety 02/12/2010     Priority: Medium     Multiple thyroid nodules 10/23/2008     Priority: Medium     Essential hypertension 06/23/2008     Priority: Medium      Intramural leiomyoma of uterus 07/18/2007     Priority: Medium     Obesity 07/19/2006     Priority: Medium            History of Present Illness:   Roxanna Celis is a 50 year old female with PMH of paresthesia, essential hypertension, generalized anxiety disorder, depression, sleep apnea, morbid obesity seen in the clinic in consultation at request of Dr Jackson for evaluation of sicca symptoms.    She describes that couple years ago she developed burning pain around her left wrist which progressed to involve the entire hand and went up to the elbows.  She also noticed that on the right wrist which progressive disease.  Now she has burning sensation, pins-and-needles, electric shocks and pressure sensation up to her shoulders.  She also noticed it in her feet and sensation has progressed up to the mid thighs. She feel that somebody has put blood pressure cuff and is not released. Muscles of her leg feel weak, unstable. She has to use walker all the time. She feel she will end up falling. She has been evaluated by neurologist and had MRI of the brain, MRI cervical spine, thoracic spine which has been normal.  No evidence of demyelinating lesion noted.  EMG of bilateral upper extremities was normal.  She is on gabapentin and Cymbalta which helps some.    Couple years ago she woke up with severe pain in her eyes.  She felt sand and gritty sensation in her eyes.  She was seen by ophthalmologist and diagnosed with dry eyes.  Since then she has used multiple eyedrops over-the-counter as well as autologous serum eyedrops which has helped some but still her symptoms are debilitating.  She also has dry mouth and as result has developed multiple cavities in her teeth.     She had chest pain recently. She is going to see cardiologist to make sure she does not need stress tests. She is very exhausted all the time.  She has thyroid nodule and will be getting thyroid nodule biopsy at [facility].    She had a fall in February  2021 and injured her wrist. MRI of the left wrist did not reveal any evidence of synovitis, effusion or fracture.  Low-grade sprain was noted.  She was given wrist injection which helped.     She has hx of gastric bypass 8 years ago and ever since reports abdominal discomfort.  She has never seen a GI to get upper or lower GI endoscopy.    Denies any raynauds, malar rash, photosensitivity, recurrent mouth/genital ulcers, pleuritic chest pains, recurrent sinusitis/rhinitis, swallowing difficulty, hearing or visual changes recently. No h/o arterial/venous thrombosis in the past.    September 15, 2021 - She has pain in her arms, legs. Reports having Pins and needles sensation in her fingers, legs. Hands hurt so much all the time and can not function. Joints started to hurt bad. She has pain in her elbows, knees, legs and feet. Minor salivary gland lip biopsy done in 8/2021 did not meet criteria for Sjogren's disease.             Review of Systems:     Review Of Systems  Constitutional: denies fever, chills, night sweats and weight loss.  Skin: No skin rash.  Eyes: + dryness or irritation in eyes. No episode of eye inflammation or redness.   Ears/Nose/Throat: no recurrent sinus infections.  Respiratory: No shortness of breath, dyspnea on exertion, cough, or hemoptysis  Cardiovascular: no chest pain or palpitations.  Gastrointestinal: no nausea, vomiting, abdominal pain.  Normal bowel movements.  Genitourinary: no dysuria, frequency  or hematuria.  Musculoskeletal: as in HPI  Neurologic: + numbness, tingling.  Psychiatric: no mood disorders.  Hematologic/Lymphatic/Immunologic: no history of easy bruising, petechia or purpura.  No abnormal bleeding.   Endocrine: no h/o thyroid disease or Diabetes.                  Past Medical History:     Past Medical History:   Diagnosis Date     Cervical dystonia 1993     Past Surgical History:   Procedure Laterality Date     AS FRAGMENTING OF KIDNEY STONE       GALLBLADDER SURGERY        STOMACH SURGERY      for weight loss            Social History:     Social History     Occupational History     Not on file   Tobacco Use     Smoking status: Never Smoker     Smokeless tobacco: Never Used   Substance and Sexual Activity     Alcohol use: No     Drug use: No     Sexual activity: Yes     Partners: Male            Family History:     Family History   Problem Relation Age of Onset     Glaucoma No family hx of      Macular Degeneration No family hx of             Allergies:     Allergies   Allergen Reactions     Ivp Dye [Contrast Dye] Anaphylaxis     Lisinopril Anaphylaxis     Lisinopril-Hctz Anaphylaxis     Maple Flavor Anaphylaxis     MAPLE SYRUP     Maple Tree Anaphylaxis     Allergy includes maple syrup.     No Clinical Screening - See Comments      maple syrup==anaphylaxis  Dairy- causes to not feel well     Gluten Itching and Swelling     Codeine Sulfate Cramps and GI Disturbance     Erythromycin Nausea and Vomiting and Cramps     Nsaids Other (See Comments)     Patient had bariatric surgery. Should not ever take NSAIDS due to high risk for gastric ulcers.             Medications:     Current Outpatient Medications   Medication Sig Dispense Refill     acetylcysteine 10 % (MUCOMYST) 10% SOLN Place 2 drops into both eyes 6 times daily 30 mL 5     aspirin-acetaminophen-caffeine (EXCEDRIN MIGRAINE) 250-250-65 MG per tablet Take 1 tablet by mouth every 6 hours as needed.       buPROPion (WELLBUTRIN XL) 300 MG 24 hr tablet Take 300 mg by mouth every morning       ClonAZEPAM (KLONOPIN) 1 MG tablet Take 1 mg by mouth 2 times daily as needed.       doxycycline monohydrate (ADOXA) 50 MG tablet Take 1 tablet (50 mg) by mouth 2 times daily 180 tablet 3     DULoxetine HCl (CYMBALTA PO) Take 60 mg by mouth       gabapentin (NEURONTIN) 600 MG tablet Take 1 tab (600 mg) by mouth twice a day and then 2 tabs (1200 mg) at bedtine 120 tablet 11     hydrochlorothiazide (HYDRODIURIL) 50 MG tablet Take 50 mg by mouth  daily       Ibuprofen (ADVIL) 200 MG capsule Take 200 mg by mouth every 4 hours as needed.       iloperidone (FANAPT) 6 MG TABS tablet Take 4 mg by mouth daily        lifitegrast (XIIDRA) 5 % opthalmic solution Place 1 drop into both eyes 2 times daily 60 each 3     LINZESS 290 MCG capsule TAKE 1 CAPSULE BY MOUTH EVERY DAY  3     prednisolone 0.25% and hyaluronate in balanced salt SUSP compounded ophthalmic suspension Place 1 drop into both eyes 4 times daily 6.67 mL 11     sodium chloride 0.9 % neb solution 3 mLs by Other route as needed for other (medically necessary for scleral lens use) 500 mL 11     White Petrolatum-Mineral Oil (SYSTANE NIGHTTIME) OINT Apply 3.5 g to eye 4 times daily as needed (dry eyes) 7 g 11     BOTOX 100 units injection Inject 250 Units into the muscle once Lot # /C3 with Expiration Date:  11/2020 (Patient not taking: Reported on 9/15/2021)       EPINEPHrine (EPIPEN) 0.3 MG/0.3ML injection Inject 0.3 mg into the muscle once as needed. (Patient not taking: Reported on 9/15/2021)       ONABOTULINUMTOXINA IJ Inject 250 Units as directed once LOT # /C3  EXPIRATION: 03/2020 (Patient not taking: Reported on 9/15/2021)              Physical Exam:   Blood pressure 124/79, pulse 76, temperature 97.8  F (36.6  C), temperature source Oral, weight 98.4 kg (217 lb), SpO2 97 %, not currently breastfeeding.  Wt Readings from Last 4 Encounters:   09/15/21 98.4 kg (217 lb)   08/24/21 97.4 kg (214 lb 11.7 oz)   07/22/21 98.9 kg (218 lb)   07/21/21 98.9 kg (218 lb)       Constitutional: Obese, appearing stated age; cooperative  Eyes: nl EOM, PERRLA, vision, conjunctiva, sclera  ENT: nl external ears, nose, hearing, lips, teeth, gums, throat  No mucous membrane lesions, normal saliva pool  Neck: no mass or thyroid enlargement  Resp: lungs clear to auscultation, nl to palpation  CV: RRR, no murmurs, rubs or gallops, no edema  GI: no ABD mass or tenderness, no HSM  : not tested  Lymph: no cervical,  supraclavicular, inguinal or epitrochlear nodes    MS: All TMJ, neck, shoulder, elbow, wrist, MCP/PIP/DIP, spine, hip, knee, ankle, and foot MTP/IP joints were examined.   -- No active synovitis or deformity. Full ROM.  Normal  strength.   -- No dactylitis,  tenosynovitis, enthesopathy.  -- Multiple myofascial tender points present over interscapular area, upper back, inner thighs, medial border of knees, calves, arms.     Skin: no nail pitting, alopecia, rash, nodules or lesions  Neuro: nl cranial nerves, strength, sensation, DTRs.   Psych: nl judgement, orientation, memory, affect.         Data:     No results found for any visits on 09/15/21.    Recent Labs   Lab Test 07/10/21  1931 07/10/21  1931   WBC 8.5  --    RBC 4.76  --    HGB 13.9  --    HCT 41.6  --    MCV 87  --    RDW 13.3  --      --    ALBUMIN  --  4.0   BUN  --  19      No results for input(s): TSH, T4 in the last 58055 hours.  Hemoglobin   Date Value Ref Range Status   08/04/2021 14.4 11.7 - 15.7 g/dL Final   07/10/2021 13.9 12.0 - 16.0 g/dL Final   02/10/2006 14.0 11.7 - 15.7 g/dL Final   02/07/2006 14.1 11.7 - 15.7 g/dL Final     Urea Nitrogen   Date Value Ref Range Status   08/04/2021 17 7 - 30 mg/dL Final   07/10/2021 19 8 - 22 mg/dL Final   02/10/2006 22 5 - 24 mg/dL Final     Erythrocyte Sedimentation Rate   Date Value Ref Range Status   07/21/2021 14 0 - 30 mm/hr Final     CRP Inflammation   Date Value Ref Range Status   07/21/2021 <2.9 0.0 - 8.0 mg/L Final     AST   Date Value Ref Range Status   07/10/2021 20 0 - 40 U/L Final     Albumin   Date Value Ref Range Status   07/10/2021 4.0 3.5 - 5.0 g/dL Final     Alkaline Phosphatase   Date Value Ref Range Status   07/10/2021 124 (H) 45 - 120 U/L Final     ALT   Date Value Ref Range Status   07/10/2021 22 0 - 45 U/L Final     Recent Labs   Lab Test 08/04/21  1728 07/10/21  1931 07/10/21  1931   WBC 7.3 8.5  --    HGB 14.4 13.9  --    HCT 42.8 41.6  --    MCV 90 87  --     261   --    BUN 17  --  19   AST  --   --  20   ALT  --   --  22   ALKPHOS  --   --  124*     Outside studies reviewed:     INDICATION:   Paresthesia of both hands. Chronic pain.     COMPARISON:   Plain film 22 January 2021.     TECHNIQUE:   Axial PD and PD fat-sat, coronal T1, PD fat sat and 3D gradient recall and sagittal PD fat-sat sequences left wrist without contrast.     FINDINGS:   Tendons: Intact normal caliber flexor and extensor tendons. No tenosynovitis. No subluxation.   -   Ligaments: Somewhat indistinct intermediate signal articular disc and central triangle fibrocartilage for mild likely mucoid change and degenerative marginal tearing. No full-thickness defect appreciated. Intermediate signal intact scapholunate ligament. Normal low signal lunotriquetral ligament.   -   Wrist joint: Anatomic alignment. Uniform cartilage thickness and signal. Physiologic fluid. No periarticular fluid collection. No synovitis.   -   Bones and soft tissues: Anatomic carpal alignment. No fracture or bone lesion. Carpal metacarpal joints appear normal. Normal muscle bulk and signal through the field-of-view.     IMPRESSION:   Minor mucoid degeneration and perhaps low-grade sprain appearance of triangle fibrocartilage and scapholunate ligament.       EMG done in [facility] reviewed .    Reviewed Rheumatology lab flowsheet    Jose Velazquez MD  [facility]  Department of Rheumatology & Autoimmune Disorders  [facility]: [phone]   Pager - [phone]

## 2021-09-15 NOTE — PATIENT INSTRUCTIONS
I have placed Massage therapy referral     No inflammation noted on the joints exam. No evidence of Sjogren's disease on biopsy     I recommend follow up with pain clinic for management of Fibromyalgia     I can refer you to Behavioral pain specialist.     Follow up as needed

## 2021-09-17 ENCOUNTER — OFFICE VISIT (OUTPATIENT)
Dept: OPTOMETRY | Facility: CLINIC | Age: 51
End: 2021-09-17
Payer: COMMERCIAL

## 2021-09-17 DIAGNOSIS — H57.13 EYE PAIN, BILATERAL: ICD-10-CM

## 2021-09-17 DIAGNOSIS — H04.123 DRY EYES: Primary | ICD-10-CM

## 2021-09-17 ASSESSMENT — REFRACTION_CURRENTRX
OS_DIAMETER: 18.0
OD_SPHERE: +0.75
OD_DIAMETER: 18.0
OS_SPHERE: +0.50
OD_BASECURVE: 8.047
OS_BRAND: EYEFIT PRO
OS_BASECURVE: 7.899
OD_BRAND: EYEFIT PRO

## 2021-09-17 ASSESSMENT — VISUAL ACUITY
OD_CC: 0.75M
METHOD: SNELLEN - LINEAR
OS_CC: 20/20-2
OS_CC: 0.75M
CORRECTION_TYPE: GLASSES, CONTACTS
OD_CC: 20/20

## 2021-09-17 ASSESSMENT — TONOMETRY
OD_IOP_MMHG: 14
IOP_METHOD: ICARE
OS_IOP_MMHG: 15

## 2021-09-17 ASSESSMENT — REFRACTION_WEARINGRX
OS_SPHERE: -2.75
OD_CYLINDER: +0.25
OD_SPHERE: -2.25
OS_CYLINDER: +0.25
OS_SPHERE: -2.25
OD_CYLINDER: +0.75
OS_AXIS: 106
OD_ADD: +2.33
OD_SPHERE: -3.50
OS_ADD: +2.33
OD_AXIS: 155
OS_CYLINDER: SPHERE
SPECS_TYPE: PAL
OD_AXIS: 137

## 2021-09-17 NOTE — PROGRESS NOTES
Warm compresses 2-3x daily   Doxycycline 50 mg orally twice a day   Xiidra twice a day, both eyes   Pred healon 4x daily, both eyes   Serum tears four times a day, both eyes (ok to put 1-2 gtts in scleral lenses before fill)   Systane ointment PRN when lenses are out   Mucomyst 6 times a day, both eyes     A/P  1.) Dry Eye each eye  -Severely symptomatic despite max topical treatment and significant objective improvement in corneal appearance with scleral lens wear  -Excellent response to Eyefit lenses, much improved comfort and wear time. Current fit looks excellent, would not recommend any adjustments  -Happy with nearsighted target in CL's (-2.50 today, distance Rx updated for use over). Continue in current barber    2.) Blepharitis/MGD each eye  -Endorses significant irritation, burning, stinging mostly upper lids  -Failed: maxitrol anjelica, Refresh PM, Genteal Gel, erythromycin anjelica - has only done well with Systane ointment  -She is getting Lipiflow in the next few months at local eyecare office (see below) and would like records faxed    Good response to custom scleral lenses. No changes recommended at this time. Working with Rheum - negative for Sjogrens, they are considering dx of Fibromyalgia    F/u 3-4 months here      [facility]  [phone]  Fax: [phone] [address] [place] [address]

## 2021-09-20 ENCOUNTER — TELEPHONE (OUTPATIENT)
Dept: OPHTHALMOLOGY | Facility: CLINIC | Age: 51
End: 2021-09-20

## 2021-09-20 DIAGNOSIS — H04.123 BILATERAL DRY EYES: ICD-10-CM

## 2021-09-20 ASSESSMENT — EXTERNAL EXAM - RIGHT EYE: OD_EXAM: NORMAL

## 2021-09-20 ASSESSMENT — EXTERNAL EXAM - LEFT EYE: OS_EXAM: NORMAL

## 2021-09-20 NOTE — TELEPHONE ENCOUNTER
Needs xiidra sent in for 90 day supply instead of 30 days    Yadira Bella, COT 10:13 AM 09/20/2021

## 2021-09-23 ENCOUNTER — OFFICE VISIT (OUTPATIENT)
Dept: PALLIATIVE MEDICINE | Facility: CLINIC | Age: 51
End: 2021-09-23
Payer: COMMERCIAL

## 2021-09-23 VITALS — HEART RATE: 108 BPM | OXYGEN SATURATION: 98 % | DIASTOLIC BLOOD PRESSURE: 70 MMHG | SYSTOLIC BLOOD PRESSURE: 104 MMHG

## 2021-09-23 DIAGNOSIS — F41.8 DEPRESSION WITH ANXIETY: Primary | ICD-10-CM

## 2021-09-23 DIAGNOSIS — F41.1 GAD (GENERALIZED ANXIETY DISORDER): ICD-10-CM

## 2021-09-23 DIAGNOSIS — M79.2 NEUROPATHIC PAIN: ICD-10-CM

## 2021-09-23 PROCEDURE — 99205 OFFICE O/P NEW HI 60 MIN: CPT | Performed by: PHYSICAL MEDICINE & REHABILITATION

## 2021-09-23 RX ORDER — PREGABALIN 50 MG/1
50 CAPSULE ORAL 2 TIMES DAILY
Qty: 14 CAPSULE | Refills: 0 | Status: SHIPPED | OUTPATIENT
Start: 2021-09-23 | End: 2021-10-15

## 2021-09-23 RX ORDER — PREGABALIN 100 MG/1
100 CAPSULE ORAL 2 TIMES DAILY
Qty: 60 CAPSULE | Refills: 0 | Status: SHIPPED | OUTPATIENT
Start: 2021-09-23 | End: 2022-05-05

## 2021-09-23 ASSESSMENT — PAIN SCALES - GENERAL: PAINLEVEL: EXTREME PAIN (8)

## 2021-09-23 NOTE — Clinical Note
Roxanna was seen today for chronic myofascial and neuropathic pain. They will work with pool PT and our pain psychologist. Will change gabapentin to lyrica.    Thanks for this referral,    Yasmany Rivera DO  [facility]

## 2021-09-23 NOTE — PROGRESS NOTES
[facility] Consultation    Date of visit: 2021      Assessment:  Roxanna Celis is a 50 year old with past medical history including: ELY, Obesity, HTN, SICCA symptoms, spasmodic torticollis, who presents for evaluation and treatment of the followin. Painful peripheral neuropathy: Patient reports gradual onset of paresthesias and burning/tingling type pain that began in their upper and lower extremities about 2 years ago that began in the periphery and spread proximally in their upper and lower extremities. Currently they describe paresthesias and pain in their entire bilateral upper and lower extremities. Upper extremity EMG was previously normal. Cervical, thoracic and lumbar MRI completed, no pathology to explain their current symptoms. They have a follow-up/new evaluation scheduled for November with neurology.    2. Chronic Myofascial Pain: Patient meets the diagnostic criteria for fibromyalgia based on history and exam. Prior work-up has been unrevealing for a neurologic or rheumatologic cause for their symptoms. Discussed the pathophysiology of fibromyalgia and overlap with their neuropathic symptoms as well as how fibromyalgia may exacerbate an underlying peripheral neuropathy.    Mental Health - the patient's mental health concerns, specifically anxiety, depression, affect her experience of pain and contribute to her clinically significant distress.      Plan:  The following recommendations were given to the patient. Diagnosis, treatment options, risks, benefits, and alternatives were discussed, and all questions were answered. The patient expressed understanding of the plan for management.     I am recommending a multidisciplinary treatment plan to help this patient better manage her pain.  This includes:     1. Physical Therapy: Patient to check with their insurance regarding pool therapy options.  2. Clinical Health Pain Psychologist: pain phd  referral placed.    1. Therapy can help reduce physical and psychosocial triggers or reinforcers of pain by adapting thoughts, feelings and behaviors to reduce symptoms and increase quality of life.  Evidence indicates that the practice of relaxation, meditation, and mindfulness techniques can significantly affect pain levels and overall well being.  3. Self Care Recommendations: Gentle progressive exercise that does not increase pain - gradually increase daily walking program.  Take mini breaks - 5 minutes of mindfullness a couple times a day.   4. Diagnostic Studies: none  5. Medication Management:  1. Reduce gabapentin to 600mg HS  2. Start Lyrica 50mg BID. After 1 week stop gabapentin and increase lyrica to 50mg/100mghs. After 3-4 days increase to 100mg BID.   3. Continue cymbalta.       6. Further procedures recommended: none  7. Follow up: 6 weeks.      Yasmany Rivera DO  [facility]          Reason for consultation:    Roxanna Celis is a 50 year old female who is seen in consultation today at the request of her primary care physician, Africa Medellin     Consultation and Evaluation for: chronic pain    Review of [place] Prescription Monitoring Program (): Today I have also reviewed the patient's history of controlled substance use, as provided by [place] licensed pharmacies and prescriber dispensers.     Review of Electronic Chart: Today I have also reviewed available medical information in the patient's medical record at [facility] ([facility]), including relevant provider notes, laboratory work, and imaging.     Roxanna Celis has not been seen at a pain clinic in the past.      Chief Complaint:    No chief complaint on file.      Pain history:  Roxanna Celis is a 50 year old female who presents for initial evaluation of chief pain complaint of chronic pain..     A couple years ago they woke up one morning with sensations of sand/dirt in their eyes and this was exceptionally  painful.   Subsequently they developed pain and weakness in their upper extremities. Last fall they started having pain around their wrists that began radiating up their forearms bilaterally. They have had an EMG of their upper extremities which was normal. Last fall they began having pain in their ankles that started radiating up their bilateral lower extremities gradually.    Currently they report having constant pain in their bilateral upper and lower extremities, they characterize this as sharp and squeezing. They also report pins/needles and burning sensations in their upper and lower extremities.    They saw Dr. Bell in neurology and has been cleared from an MS stand point. They saw Dr. Jacobs at [facility]. They have requested an EMG test of their lower extremities and a skin biopsy to test for peripheral neuropathy but Dr. Jacobs declined. They have an appointment coming up with Dr. Torres in November. They did see Dr. Velazquez from rheumatolgy who is managing/working up their Sicca symptoms.      They had a fall in 2000 that caused worsening back, head and neck pain and had to stop working at that time.    Onset: last 2 years  Location/Radiation: bilateral upper and lower extremities  Quality: squeezing, throbbing, sharp  Severity/Intensity:  10/10 on average  Aggravating factors include: activity  Relieving factors include: lying down  Red Flags: The patient denies bowel or bladder incontinence, weakness, saddle anesthesia, unintentional weight loss, or fever/chills/sweats.         Pain Treatments:  1. Medications:       Current pain medications:  -Gabapentin 1200mg at night- drowsy with higher dose  -Bupropion 300mg daily  -Cymbalta 120mg daily    2. Physical Therapy: hasn't tried     TENS unit: hasn't tried  3. Pain psychology: hasn't tried  4. Surgery: hasn't tried  5. Injections: none  6. Alternative Therapies:    Chiropractic: hasn't tried    Acupuncture: hasn't tried      Diagnostic tests:  MRI of  Cervical and thoracic spine was completed on 6/18/21 was reviewed with the patient and shows:  Impression:   1.  Cervical spine: No definite myelopathic spinal cord signal  identified. No high-grade spinal canal or neuroforaminal narrowing at  any level.  2.  Thoracic spine: No definite myelopathic spinal cord signal  identified. No high-grade spinal canal or neuroforaminal narrowing at  any level.     I have personally reviewed the examination and initial interpretation  and I agree with the findings.     SUSIE BETTS MD      Lumbar MRI from 5/29/20 shows:  FINDINGS:   The lumbar lordosis is preserved. Mild leftward lumbar curvature. Vertebral heights maintained. No acute fracture or spondylolisthesis. No concerning bone marrow signal abnormalities. Normal conus terminates at L1.   T12-L1 and L1-2: No spinal canal or neural foraminal narrowing.   L2-3: Mild-to-moderate disc degeneration. No spinal canal or neural foraminal narrowing.   L3-4: Mild-to-moderate disc degeneration. No spinal canal or neural foraminal narrowing.   L4-5: Moderate disc degeneration. Shallow posterior disc bulge. Dorsal annular fissure. No spinal canal or neural foraminal narrowing.   L5-S1: Moderate disc degeneration. Left eccentric disc bulge. Broad-based right central disc protrusion measuring 3 mm in short axis contacts and potentially irritates traversing right S1 nerve roots. Mild bilateral facet arthropathy. No spinal canal narrowing. Mild left without right neural foraminal narrowing.     IMPRESSION:   1. At L5-S1, broad-based right central disc protrusion contacts and potentially irritates traversing right S1 nerve roots.   2. Multilevel lumbar spondylosis is otherwise not significantly changed.   3. Annular fissure at L4-5.          Labs:   Lab Results   Component Value Date    WBC 7.3 08/04/2021    WBC 9.3 02/10/2006     Lab Results   Component Value Date    RBC 4.75 08/04/2021    RBC 4.57 02/10/2006     Lab Results   Component  Value Date    HGB 14.4 08/04/2021    HGB 14.0 02/10/2006     Lab Results   Component Value Date    HCT 42.8 08/04/2021    HCT 40.9 02/10/2006     No components found for: MCT  Lab Results   Component Value Date    MCV 90 08/04/2021    MCV 89 02/10/2006     Lab Results   Component Value Date    MCH 30.3 08/04/2021    MCH 30.7 02/10/2006     Lab Results   Component Value Date    MCHC 33.6 08/04/2021    MCHC 34.3 02/10/2006     Lab Results   Component Value Date    RDW 13.1 08/04/2021    RDW 11.5 02/10/2006     Lab Results   Component Value Date     08/04/2021     02/10/2006     Last Comprehensive Metabolic Panel:  Sodium   Date Value Ref Range Status   08/04/2021 137 133 - 144 mmol/L Final   02/10/2006 140 133 - 144 mmol/L Final     Potassium   Date Value Ref Range Status   08/04/2021 3.8 3.4 - 5.3 mmol/L Final   02/10/2006 4.1 3.4 - 5.3 mmol/L Final     Chloride   Date Value Ref Range Status   08/04/2021 102 94 - 109 mmol/L Final   02/10/2006 103 94 - 109 mmol/L Final     Carbon Dioxide   Date Value Ref Range Status   02/10/2006 27 20 - 32 mmol/L Final     Carbon Dioxide (CO2)   Date Value Ref Range Status   08/04/2021 29 20 - 32 mmol/L Final     Anion Gap   Date Value Ref Range Status   08/04/2021 6 3 - 14 mmol/L Final   02/10/2006 10 6 - 17 mmol/L Final     Glucose   Date Value Ref Range Status   08/04/2021 91 70 - 99 mg/dL Final   02/10/2006 82 60 - 110 mg/dL Final     GLUCOSE BY METER POCT   Date Value Ref Range Status   08/04/2021 179 (H) 70 - 99 mg/dL Final     Urea Nitrogen   Date Value Ref Range Status   08/04/2021 17 7 - 30 mg/dL Final   02/10/2006 22 5 - 24 mg/dL Final     Creatinine   Date Value Ref Range Status   08/04/2021 0.99 0.52 - 1.04 mg/dL Final   02/10/2006 1.10 0.60 - 1.30 mg/dL Final     GFR Estimate   Date Value Ref Range Status   08/04/2021 66 >60 mL/min/1.73m2 Final     Comment:     As of July 11, 2021, eGFR is calculated by the CKD-EPI creatinine equation, without race  adjustment. eGFR can be influenced by muscle mass, exercise, and diet. The reported eGFR is an estimation only and is only applicable if the renal function is stable.   07/10/2021 58 (L) >60 mL/min/1.73m2 Final   02/10/2006 59 (L) >60 mL/min/1.7m2 Final     Calcium   Date Value Ref Range Status   08/04/2021 9.6 8.5 - 10.1 mg/dL Final   02/10/2006 9.1 8.5 - 10.4 mg/dL Final     Bilirubin Total   Date Value Ref Range Status   07/10/2021 0.4 0.0 - 1.0 mg/dL Final     Alkaline Phosphatase   Date Value Ref Range Status   07/10/2021 124 (H) 45 - 120 U/L Final     ALT   Date Value Ref Range Status   07/10/2021 22 0 - 45 U/L Final     AST   Date Value Ref Range Status   07/10/2021 20 0 - 40 U/L Final                 Past Medical History:  Past Medical History:   Diagnosis Date     Cervical dystonia 1993       Past Surgical History:  Past Surgical History:   Procedure Laterality Date     AS FRAGMENTING OF KIDNEY STONE       GALLBLADDER SURGERY       STOMACH SURGERY      for weight loss       Medications:  Current Outpatient Medications   Medication Sig Dispense Refill     acetylcysteine 10 % (MUCOMYST) 10% SOLN Place 2 drops into both eyes 6 times daily 30 mL 5     aspirin-acetaminophen-caffeine (EXCEDRIN MIGRAINE) 250-250-65 MG per tablet Take 1 tablet by mouth every 6 hours as needed.       BOTOX 100 units injection Inject 250 Units into the muscle once Lot # /C3 with Expiration Date:  11/2020 (Patient not taking: Reported on 9/15/2021)       buPROPion (WELLBUTRIN XL) 300 MG 24 hr tablet Take 300 mg by mouth every morning       ClonAZEPAM (KLONOPIN) 1 MG tablet Take 1 mg by mouth 2 times daily as needed.       doxycycline monohydrate (ADOXA) 50 MG tablet Take 1 tablet (50 mg) by mouth 2 times daily 180 tablet 3     DULoxetine HCl (CYMBALTA PO) Take 60 mg by mouth       EPINEPHrine (EPIPEN) 0.3 MG/0.3ML injection Inject 0.3 mg into the muscle once as needed. (Patient not taking: Reported on 9/15/2021)       gabapentin  (NEURONTIN) 600 MG tablet Take 1 tab (600 mg) by mouth twice a day and then 2 tabs (1200 mg) at bedtine 120 tablet 11     hydrochlorothiazide (HYDRODIURIL) 50 MG tablet Take 50 mg by mouth daily       Ibuprofen (ADVIL) 200 MG capsule Take 200 mg by mouth every 4 hours as needed.       iloperidone (FANAPT) 6 MG TABS tablet Take 4 mg by mouth daily        lifitegrast (XIIDRA) 5 % opthalmic solution Place 1 drop into both eyes 2 times daily 180 each 1     LINZESS 290 MCG capsule TAKE 1 CAPSULE BY MOUTH EVERY DAY  3     ONABOTULINUMTOXINA IJ Inject 250 Units as directed once LOT # /C3  EXPIRATION: 03/2020 (Patient not taking: Reported on 9/15/2021)       prednisolone 0.25% and hyaluronate in balanced salt SUSP compounded ophthalmic suspension Place 1 drop into both eyes 4 times daily 6.67 mL 11     sodium chloride 0.9 % neb solution 3 mLs by Other route as needed for other (medically necessary for scleral lens use) 500 mL 11     White Petrolatum-Mineral Oil (SYSTANE NIGHTTIME) OINT Apply 3.5 g to eye 4 times daily as needed (dry eyes) 7 g 11       Allergies:     Allergies   Allergen Reactions     Ivp Dye [Contrast Dye] Anaphylaxis     Lisinopril Anaphylaxis     Lisinopril-Hctz Anaphylaxis     Maple Flavor Anaphylaxis     MAPLE SYRUP     Maple Tree Anaphylaxis     Allergy includes maple syrup.     No Clinical Screening - See Comments      maple syrup==anaphylaxis  Dairy- causes to not feel well     Gluten Itching and Swelling     Codeine Sulfate Cramps and GI Disturbance     Erythromycin Nausea and Vomiting and Cramps     Nsaids Other (See Comments)     Patient had bariatric surgery. Should not ever take NSAIDS due to high risk for gastric ulcers.        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   Tobacco use: denies  Drug use: denies  Alcohol use: denies  History of chemical dependency treatment: denies  Mental  health admissions: denies    Family history:  Family History   Problem Relation Age of Onset     Glaucoma No family hx of      Macular Degeneration No family hx of        Review of Systems:    POSTIVE IN BOLD  GENERAL: fever/chills, fatigue, general unwell feeling, weight gain/loss.  HEAD/EYES:  headache, dizziness, or vision changes.    EARS/NOSE/THROAT:  Nosebleeds, hearing loss, sinus infection, earache, tinnitus.  IMMUNE:  Allergies, cancer, immune deficiency, or infections.  SKIN:  Urticaria, rash, hives  HEME/Lymphatic:   anemia, easy bruising, easy bleeding.  RESPIRATORY:  cough, wheezing, or shortness of breath  CARDIOVASCULAR/Circulation:  Extremity edema, syncope, hypertension, tachycardia, or angina.  GASTROINTESTINAL:  abdominal pain, nausea/emesis, diarrhea, constipation,  hematochezia, or melena.  ENDOCRINE:  Diabetes, steroid use,  thyroid disease or osteoporosis.  MUSCULOSKELETAL: neck pain, back pain, arthralgia, arthritis, or gout.  GENITOURINARY:  frequency, urgency, dysuria, difficulty voiding, hematuria or incontinence.  NEUROLOGIC:  weakness, numbness, paresthesias, seizure, tremor, stroke or memory loss.  PSYCHIATRIC:  depression, anxiety, stress, suicidal thoughts or mood swings.     Physical Exam:  Vitals:    09/23/21 1459   BP: 104/70   Pulse: 108   SpO2: 98%     Exam:  Constitutional: Well developed, well nourished, appears stated age.  HEENT: Head atraumatic, normocephalic. Eyes without conjunctival injection or jaundice. Oropharynx clear.   Skin: No rash, lesions, or petechiae of exposed skin.   Extremities: Peripheral pulses intact. No clubbing, cyanosis, or edema.  Psychiatric/mental status: Alert, without lethargy or stupor. Speech fluent. Appropriate affect. Mood normal. Able to follow commands without difficulty.     Musculoskeletal exam:  Gait/Station/Posture: reduced stride and swing, they used a wheeled walker for ambulation      Cervical spine:  Range of motion mildly reduced in  all planes  Myofascial tenderness: bilateral parasipnals, bilateral periscapular muscles     Lumbar spine:  Range of motion moderately reduced in all planes   Rotation/ext to right: painful    Rotation/ext to left: painful   Myofascial tenderness:  Bilateral hip girdle  SLR: neg  Frank's maneuver: neg    Hip exam:   normal internal and external range of motion bilaterally. NATE negative. Scour negative.         Neurologic exam:  CN:  Cranial nerves 2-12 are grossly intact  Motor Strength:  5/5 symmetric UE and LE strength      Reflexes:     Biceps C5:     R:  2/4 L: 2/4   Brachioradialis  C6: R:  2/4 L: 2/4   Triceps C7:  R:  2/4 L: 2/4   Patella L4:  R:  2/4 L: 2/4   Achilles S1:  R:  2/4 L: 2/4      Sensory:  (upper and lower extremities):   Light touch: reduced in a stocking/glove distribution        BILLING TIME DOCUMENTATION:   The total TIME spent on this patient on the date of the encounter/appointment was 75 minutes.      TOTAL TIME includes:   Time spent preparing to see the patient (reviewing records and tests)   Time spent face to face (or over the phone) with the patient   Time spent ordering tests, medications, procedures and referrals  Time spent Referring and communicating with other healthcare professionals   Time spent documenting clinical information in Epic     Yasmany Rivera DO  [facility]

## 2021-09-24 ENCOUNTER — TELEPHONE (OUTPATIENT)
Dept: OPTOMETRY | Facility: CLINIC | Age: 51
End: 2021-09-24

## 2021-09-24 ENCOUNTER — PATIENT OUTREACH (OUTPATIENT)
Dept: OTOLARYNGOLOGY | Facility: CLINIC | Age: 51
End: 2021-09-24

## 2021-09-24 DIAGNOSIS — Z97.3 USES CONTACT LENSES: ICD-10-CM

## 2021-09-24 RX ORDER — SODIUM CHLORIDE FOR INHALATION 0.9 %
VIAL, NEBULIZER (ML) INHALATION
Qty: 300 ML | Refills: 11 | Status: SHIPPED | OUTPATIENT
Start: 2021-09-24 | End: 2021-10-06

## 2021-09-24 NOTE — PROGRESS NOTES
Received a message from patient indicating that she continue to have sutures in place after a lip biopsy. She indicates that these have no dissolved on their own.     Returned call and left message for patient indicating that sometimes these sutures take a bit longer to dissolve but we are happy to have her return to clinic and see our nurse practitioner for evaluation and removal. Left direct line and encouraged patient return call to discuss.     Arminda Encinas, RN, BSN

## 2021-09-24 NOTE — TELEPHONE ENCOUNTER
Left message at 1615    Reviewed ok to use artificial tears with contact lenses in    Haider Webber RN 4:19 PM 09/24/21        [facility]    Phone Message    May a detailed message be left on voicemail: yes     Reason for Call: Other: Pt is also wondering if she can use Refresh and refresh relieba while wearing contacts , she said she was having a hard time using My chart and was very frustrated, Please call Pt back to discuss    Thank you,    Action Taken: Message routed to:  [facility] eye    Travel Screening: Not Applicable

## 2021-09-24 NOTE — TELEPHONE ENCOUNTER
Left message Rx sent    Haider Webber RN 4:18 PM 09/24/21          [facility]    Phone Message    May a detailed message be left on voicemail: yes     Reason for Call: Medication Refill Request    Has the patient contacted the pharmacy for the refill? Yes   Name of medication being requested: sodium chloride 0.9 % neb solution 5ml 5 boxes every 3 months in the pink   Provider who prescribed the medication: Dr Anthony  Pharmacy: [facility] [id number] [address]  Date medication is needed: ASAP         Action Taken: Message routed to:  [facility]    Travel Screening: Not Applicable

## 2021-10-05 ENCOUNTER — MYC MEDICAL ADVICE (OUTPATIENT)
Dept: OPTOMETRY | Facility: CLINIC | Age: 51
End: 2021-10-05

## 2021-10-05 DIAGNOSIS — Z97.3 USES CONTACT LENSES: ICD-10-CM

## 2021-10-06 RX ORDER — SODIUM CHLORIDE FOR INHALATION 0.9 %
VIAL, NEBULIZER (ML) INHALATION
Qty: 300 ML | Refills: 11 | Status: SHIPPED | OUTPATIENT
Start: 2021-10-06 | End: 2021-12-06

## 2021-10-07 ENCOUNTER — MYC MEDICAL ADVICE (OUTPATIENT)
Dept: PALLIATIVE MEDICINE | Facility: CLINIC | Age: 51
End: 2021-10-07

## 2021-10-07 DIAGNOSIS — M79.18 CHRONIC MYOFASCIAL PAIN: Primary | ICD-10-CM

## 2021-10-07 DIAGNOSIS — M79.2 NEUROPATHIC PAIN: ICD-10-CM

## 2021-10-07 DIAGNOSIS — G89.29 CHRONIC MYOFASCIAL PAIN: Primary | ICD-10-CM

## 2021-10-07 NOTE — TELEPHONE ENCOUNTER
Routing to provider to review for orders-pool therapy to [facility]    mychart below from pt-  Namrata Rivera,     I have check in to a lot of pool therapy programs.  I have decided to follow your recommendation.  Please send a referral for pool therapy to the [facility].     The Lyrica medication is going well but no improvement with my symptoms yet.  How long before I know if the 200 mgs of the Lyrica is going to make a difference?     Can you please send me a message letting me know when the referral has been sent.     It was a pleasure meeting you the other week.  I look forward to working with you.     Thank you for your help,  Roxanna Celis

## 2021-10-08 NOTE — TELEPHONE ENCOUNTER
Routing to [place] pool to fax referral for pool therapy    Below sent to pt  Axel Mcknight,     We will fax to [facility] in [place] today for you. Dr Rivera recommends that you stay at the same dose of lyrica until your follow-up. This is a medication that can take several weeks to take effect. He was hopeful that lyrica combined with the pool exercises provides you with some relief.     Cleo NEUMANN, RN Care Coordinator  [facility]  Pain Management

## 2021-10-08 NOTE — TELEPHONE ENCOUNTER
Referral for pool therapy was faxed to [facility] in [place]. RightFax confirmed.    Nita Carl MA

## 2021-10-08 NOTE — TELEPHONE ENCOUNTER
Pool therapy referral placed, please fax to [email or web address] in [place].    Recommend staying at the same dose of lyrica until our follow-up, this can take several weeks to take effect. Hopefully this combined with the pool exercises provides relief.    Yasmany Rivera DO  [facility]

## 2021-10-15 ENCOUNTER — OFFICE VISIT (OUTPATIENT)
Dept: DERMATOLOGY | Facility: CLINIC | Age: 51
End: 2021-10-15
Payer: COMMERCIAL

## 2021-10-15 VITALS — DIASTOLIC BLOOD PRESSURE: 78 MMHG | SYSTOLIC BLOOD PRESSURE: 128 MMHG

## 2021-10-15 DIAGNOSIS — L73.8 SEBACEOUS HYPERPLASIA: ICD-10-CM

## 2021-10-15 DIAGNOSIS — D22.9 NEVUS: Primary | ICD-10-CM

## 2021-10-15 DIAGNOSIS — L70.0 OPEN COMEDONE: ICD-10-CM

## 2021-10-15 DIAGNOSIS — L81.4 LENTIGO: ICD-10-CM

## 2021-10-15 DIAGNOSIS — D18.01 ANGIOMA OF SKIN: ICD-10-CM

## 2021-10-15 DIAGNOSIS — L82.1 SEBORRHEIC KERATOSIS: ICD-10-CM

## 2021-10-15 PROCEDURE — 99204 OFFICE O/P NEW MOD 45 MIN: CPT | Performed by: PHYSICIAN ASSISTANT

## 2021-10-15 RX ORDER — TRETINOIN 0.25 MG/G
CREAM TOPICAL
Qty: 45 G | Refills: 11 | Status: SHIPPED | OUTPATIENT
Start: 2021-10-15 | End: 2023-04-18

## 2021-10-15 NOTE — PATIENT INSTRUCTIONS
AM    1. Wash with a mild cleanser - CeraVe foaming cleanser; neutrogena ultra gentle foaming cleanser  2. Try the Gylcolic Acid toner from [employer]   3. Moisturizer over     PM  1. Wash   2. Apply a pea size of tretinoin cream to entire face  3. Moisturizer over

## 2021-10-15 NOTE — LETTER
10/15/2021         RE: Roxanna Celis  [id number] [place] [address]        Dear Colleague,    Thank you for referring your patient, Roxanna Celis, to the [facility]. Please see a copy of my visit note below.    HPI:   Chief complaints: Roxanna Celis is a pleasant 50 year old female who presents for Full skin cancer screening to rule out skin cancer   Last Skin Exam: n/a      1st Baseline: yes  Personal HX of Skin Cancer: no   Personal HX of Malignant Melanoma: no   Family HX of Skin Cancer / Malignant Melanoma: no  Personal HX of Atypical Moles:   no  Risk factors: history of sun exposure and burns  New / Changing lesions:yes she is getting enlarged pores and blackheads on the face  Social History:   On review of systems, there are no further skin complaints, patient is feeling otherwise well.   ROS of the following were done and are negative: Constitutional, Eyes, Ears, Nose,   Mouth, Throat, Cardiovascular, Respiratory, GI, Genitourinary, Musculoskeletal,   Psychiatric, Endocrine, Allergic/Immunologic.    PHYSICAL EXAM:   /78   Skin exam performed as follows: Type 2 skin. Mood appropriate  Alert and Oriented X 3. Well developed, well nourished in no distress.  General appearance: Normal  Head including face: Normal  Eyes: conjunctiva and lids: Normal  Mouth: Lips, teeth, gums: Normal  Neck: Normal  Chest-breast/axillae: Normal  Back: Normal  Spleen and liver: Normal  Cardiovascular: Exam of peripheral vascular system by observation for swelling, varicosities, edema: Normal  Genitalia: groin, buttocks: Normal  Extremities: digits/nails (clubbing): Normal  Eccrine and Apocrine glands: Normal  Right upper extremity: Normal  Left upper extremity: Normal  Right lower extremity: Normal  Left lower extremity: Normal  Skin: Scalp and body hair: See below    Pt deferred exam of breasts, groin, buttocks: No    Other physical findings:  1. Multiple pigmented macules on  extremities and trunk  2. Multiple pigmented macules on face, trunk and extremities  3. Multiple vascular papules on trunk, arms and legs  4. Multiple scattered keratotic plaques  5. Pink dome shaped papules on the face  6. Comedones on the face       Except as noted above, no other signs of skin cancer or melanoma.     ASSESSMENT/PLAN:   Benign Full skin cancer screening today. . Patient with history of none  Advised on monthly self exams and 1 year  Patient Education: Appropriate brochures given.    1. Multiple benign appearing nevi on arms, legs and trunk. Discussed ABCDEs of melanoma and sunscreen.   2. Multiple lentigos on arms, legs and trunk. Advised benign, no treatment needed.  3. Multiple scattered angiomas. Advised benign, no treatment needed.   4. Seborrheic keratosis on arms, legs and trunk. Advised benign, no treatment needed.  5. Sebaceous hyperplasia, comedones on the face  --start the glycolic acid toner from [employer]  --Start tretinoin 0.025% cream daily. Discussed potential for dryness/irritation. Advised to use emollients if needed.              Follow-up: yearly    1.) Patient was asked about new and changing moles. YES  2.) Patient received a complete physical skin examination: YES  3.) Patient was counseled to perform a monthly self skin examination: YES  Scribed By: Kimberley Reece, MS, PAYUAN          Again, thank you for allowing me to participate in the care of your patient.        Sincerely,        Kimberley Reece PA-C

## 2021-10-15 NOTE — PROGRESS NOTES
HPI:   Chief complaints: Roxanna Celis is a pleasant 50 year old female who presents for Full skin cancer screening to rule out skin cancer   Last Skin Exam: n/a      1st Baseline: yes  Personal HX of Skin Cancer: no   Personal HX of Malignant Melanoma: no   Family HX of Skin Cancer / Malignant Melanoma: no  Personal HX of Atypical Moles:   no  Risk factors: history of sun exposure and burns  New / Changing lesions:yes she is getting enlarged pores and blackheads on the face  Social History:   On review of systems, there are no further skin complaints, patient is feeling otherwise well.   ROS of the following were done and are negative: Constitutional, Eyes, Ears, Nose,   Mouth, Throat, Cardiovascular, Respiratory, GI, Genitourinary, Musculoskeletal,   Psychiatric, Endocrine, Allergic/Immunologic.    PHYSICAL EXAM:   /78   Skin exam performed as follows: Type 2 skin. Mood appropriate  Alert and Oriented X 3. Well developed, well nourished in no distress.  General appearance: Normal  Head including face: Normal  Eyes: conjunctiva and lids: Normal  Mouth: Lips, teeth, gums: Normal  Neck: Normal  Chest-breast/axillae: Normal  Back: Normal  Spleen and liver: Normal  Cardiovascular: Exam of peripheral vascular system by observation for swelling, varicosities, edema: Normal  Genitalia: groin, buttocks: Normal  Extremities: digits/nails (clubbing): Normal  Eccrine and Apocrine glands: Normal  Right upper extremity: Normal  Left upper extremity: Normal  Right lower extremity: Normal  Left lower extremity: Normal  Skin: Scalp and body hair: See below    Pt deferred exam of breasts, groin, buttocks: No    Other physical findings:  1. Multiple pigmented macules on extremities and trunk  2. Multiple pigmented macules on face, trunk and extremities  3. Multiple vascular papules on trunk, arms and legs  4. Multiple scattered keratotic plaques  5. Pink dome shaped papules on the face  6. Comedones on the face       Except as  noted above, no other signs of skin cancer or melanoma.     ASSESSMENT/PLAN:   Benign Full skin cancer screening today. . Patient with history of none  Advised on monthly self exams and 1 year  Patient Education: Appropriate brochures given.    1. Multiple benign appearing nevi on arms, legs and trunk. Discussed ABCDEs of melanoma and sunscreen.   2. Multiple lentigos on arms, legs and trunk. Advised benign, no treatment needed.  3. Multiple scattered angiomas. Advised benign, no treatment needed.   4. Seborrheic keratosis on arms, legs and trunk. Advised benign, no treatment needed.  5. Sebaceous hyperplasia, comedones on the face  --start the glycolic acid toner from [employer]  --Start tretinoin 0.025% cream daily. Discussed potential for dryness/irritation. Advised to use emollients if needed.              Follow-up: yearly    1.) Patient was asked about new and changing moles. YES  2.) Patient received a complete physical skin examination: YES  3.) Patient was counseled to perform a monthly self skin examination: YES  Scribed By: Kimberley Reece MS, PA-C

## 2021-10-18 ENCOUNTER — TELEPHONE (OUTPATIENT)
Dept: DERMATOLOGY | Facility: CLINIC | Age: 51
End: 2021-10-18

## 2021-10-18 ENCOUNTER — MYC MEDICAL ADVICE (OUTPATIENT)
Dept: PALLIATIVE MEDICINE | Facility: CLINIC | Age: 51
End: 2021-10-18

## 2021-10-18 DIAGNOSIS — G89.29 CHRONIC MYOFASCIAL PAIN: Primary | ICD-10-CM

## 2021-10-18 DIAGNOSIS — M79.18 CHRONIC MYOFASCIAL PAIN: Primary | ICD-10-CM

## 2021-10-18 DIAGNOSIS — M79.2 NEUROPATHIC PAIN: ICD-10-CM

## 2021-10-18 NOTE — TELEPHONE ENCOUNTER
Prior Authorization Retail Medication Request    Medication/Dose: tretinoin (RETIN-A) 0.025 % external cream  ICD code (if different than what is on RX):  L70.0  Previously Tried and Failed:    Rationale:      Insurance Name:  [employer]  Insurance ID:  [id number]      Pharmacy Information (if different than what is on RX)  [facility]:  [facility]  Phone:  [phone]

## 2021-10-18 NOTE — TELEPHONE ENCOUNTER
Routing to provider for new referral. Please route to [facility] pool for faxing once completed.     Mychart below from hiren Rivera,  I am writing to ask you to do a new referral for pool therapy.  The [facility] can not get me in until February of 2022 because of Covid.  As a result, I have looked for another location why I am able to start pool therapy this year.  I have finally found a location where I can start at the end of October.  Can you please send a referral for pool therapy to the company below and send me a message or phone call when this has been taken care of.     [facility]  [facility]  FAX #:  [phone]     If needed their phone # is [phone]     Thank you for your help,  Roxanna NEUMANN, RN Care Coordinator  [facility]  Pain Management

## 2021-10-19 NOTE — TELEPHONE ENCOUNTER
New PT referral placed, please fax to [facility].    Yasmany Rivera [facility]

## 2021-10-19 NOTE — TELEPHONE ENCOUNTER
Referral faxed to [facility] per patients request. The correct fax number is [phone].    Verified by [employer].     Notified patient via MyChart.     Raven Rodriguez [facility] [place]

## 2021-10-20 NOTE — TELEPHONE ENCOUNTER
PA Initiation    Medication: tretinoin (RETIN-A) 0.025 % external cream  Insurance Company: [employer] - Phone [phone] Fax [phone]  Pharmacy Filling the Rx: [id number] [place] [facility] [place] [id number] [facility] [address]  Filling Pharmacy Phone: [phone]  Filling Pharmacy Fax: [phone]  Start Date: 10/20/2021    Roxanna Brazil Key: [id number]

## 2021-10-20 NOTE — TELEPHONE ENCOUNTER
Prior Authorization Approval    Authorization Effective Date: 9/20/2021  Authorization Expiration Date: 10/20/2022  Medication: tretinoin (RETIN-A) 0.025 % external cream  Approved Dose/Quantity:   Reference #: [id number]   Insurance Company: [employer] - Phone [phone] Fax [phone]  Expected CoPay:       CoPay Card Available:      Foundation Assistance Needed:    Which Pharmacy is filling the prescription (Not needed for infusion/clinic administered): [facility] [id number] [facility] [address] [address]  Pharmacy Notified: Yes  Patient Notified: Yes

## 2021-10-24 ENCOUNTER — HEALTH MAINTENANCE LETTER (OUTPATIENT)
Age: 51
End: 2021-10-24

## 2021-11-02 ENCOUNTER — TRANSFERRED RECORDS (OUTPATIENT)
Dept: HEALTH INFORMATION MANAGEMENT | Facility: CLINIC | Age: 51
End: 2021-11-02
Payer: COMMERCIAL

## 2021-11-05 ENCOUNTER — TELEPHONE (OUTPATIENT)
Dept: OPHTHALMOLOGY | Facility: CLINIC | Age: 51
End: 2021-11-05

## 2021-11-05 NOTE — TELEPHONE ENCOUNTER
Pt calling triage line today    Pt with allergies in sinus/face/chest and working with allergies about types of allergy relief    Reviewed with dry eye history was recommended flonase by allergist    Reviewed may be better than antihistamine medication for dry eyes-- antihistamine may increase dryness    Reviewed if need antihistamine medication-- ok to use per ophthalmology, but may increase dryness and recommend increasing preservative free tears if decision made to use antihistamine medication     Pt verbally demonstrated understanding    Note to Dr. Terrence Webber RN 10:22 AM 11/05/21

## 2021-11-10 NOTE — PROGRESS NOTES
[facility]  Section of General Neurology  New Patient Visit      Roxanna Celis MRN# [id number]   Age: 51 year old YOB: 1970     Requesting physician: No ref. provider found  [place] A     Reason for Consultation: arm+ leg paresthesias, weakness.         History of Presenting Symptoms:   Roxanna Celis is a 51 year old female who presents today for evaluation of diffuse pain, numbness, tingling  She has a PMH of ELY, anxiety, depression and is s/p baratric surgery     She notes that 2 years ago she woke up with very itchy eyes. She saw an eye doctor who told her she had dry eyes and that was that reportedly.    She also noted weakness in her proximal arms at this time too.    That fall she noted L dorsal wrist pain after a fall out of bed, she thinks.      She feels progressively delicate movements have been harder with her hands.  She is RH.  She can't wash her own hair given issues with strength and dexterity.    It is a pain numbness and tingling in b/l arms extending up into her neck.  The legs started about a year later.  It can be in her feet, in her knees, ankles, hips.  She feels like she is walking drunk at times.    Her muscles just feel diffusely weak.      She is here with her  Pat  They live in [place]   She does not work.      She follows with Dr. Rivera in pain medicine.  She doesn't like that people keep telling her she has neuropathy and fibromyalgia.  --last seen 9/23   At that time:   1. Medication Management:  1. Reduce gabapentin to 600mg HS  2. Start Lyrica 50mg BID. After 1 week stop gabapentin and increase lyrica to 50mg/100mghs. After 3-4 days increase to 100mg BID.   3. Continue cymbalta.--taking at 120 QAM    She just started pool therapy.    She has had a normal EMG of her UE which was normal previously in spring of this year, she thinks.    She has also had normal EMGs of her lower extremities in her lower legs which were find as  "well.      She saw Rheumatology  (Dr. Velazquez) recently as well--who wrote a very thorough note:    \"Paresthesia: She reports burning sensation, pins and needles sensation in her arms and legs.  Progressively getting worse.  It started over her hands and feet and gradually went up to involve her entire arm and leg up to the mid thigh.  EMG of bilateral upper extremities done in 2/2021 was normal.  MRI of the cervical and thoracic spine done in 6/2021 did not reveal any evidence of demyelinating disease. MRI brain in 8/2020 was normal. She has seen Dr. Bell in Neurology who ruled out demyelinating process. She has another appointment with Neurologist at [facility] neurolog[facility] clinic.  EMG of bilateral lower extremities can be done.  If EMG comes back normal then her symptoms could be related to small fiber neuropathy.  She is on gabapentin, Cymbalta.  She has seronegative Sjogren's and her symptoms could be related to Sjogren's disease but all neuropathic symptoms in Sjogren's disease are not treated with immunosuppressive medication.  Based on the biopsy will discuss with Neurologist.\"     They saw Dr. Bell in the MS clinic who also wrote a very thorough and thoughtful note.  He noted she has no evidence of MS/demylinating disease to exam or later to imaging either.      Her lip biopsy was reportedly negative re possible seronegative sjogrens.      They are seeing a chiropractor who is optimizing vitamins--functional medicine clinic  She is trying a special diet and special supplements--low gluten, low dairy.            Past Medical History:     Patient Active Problem List   Diagnosis     Torticollis, spasmodic     Achlorhydria     Depression with anxiety     KIANA (generalized anxiety disorder)     Hypokalemia     Hypothyroid     Intramural leiomyoma of uterus     Bariatric surgery status     Morbid obesity with BMI of 40.0-44.9, adult (H)     Multiple thyroid nodules     Obesity     Sleep apnea     Essential " hypertension     Chronic bilateral low back pain without sciatica     Precordial pain     Past Medical History:   Diagnosis Date     Cervical dystonia 1993        Past Surgical History:     Past Surgical History:   Procedure Laterality Date     AS FRAGMENTING OF KIDNEY STONE       GALLBLADDER SURGERY       STOMACH SURGERY      for weight loss        Social History:     Social History     Tobacco Use     Smoking status: Never Smoker     Smokeless tobacco: Never Used   Substance Use Topics     Alcohol use: No     Drug use: No        Family History:     Family History   Problem Relation Age of Onset     Glaucoma No family hx of      Macular Degeneration No family hx of         Medications:     Current Outpatient Medications   Medication Sig     acetylcysteine 10 % (MUCOMYST) 10% SOLN Place 2 drops into both eyes 6 times daily     aspirin-acetaminophen-caffeine (EXCEDRIN MIGRAINE) 250-250-65 MG per tablet Take 1 tablet by mouth every 6 hours as needed.     BOTOX 100 units injection Inject 250 Units into the muscle once Lot # /C3 with Expiration Date:  11/2020     buPROPion (WELLBUTRIN XL) 300 MG 24 hr tablet Take 300 mg by mouth every morning     ClonAZEPAM (KLONOPIN) 1 MG tablet Take 1 mg by mouth 2 times daily as needed.     doxycycline monohydrate (ADOXA) 50 MG tablet Take 1 tablet (50 mg) by mouth 2 times daily     DULoxetine HCl (CYMBALTA PO) Take 60 mg by mouth     EPINEPHrine (EPIPEN) 0.3 MG/0.3ML injection Inject 0.3 mg into the muscle once as needed      hydrochlorothiazide (HYDRODIURIL) 50 MG tablet Take 50 mg by mouth daily     Ibuprofen (ADVIL) 200 MG capsule Take 200 mg by mouth every 4 hours as needed.     iloperidone (FANAPT) 6 MG TABS tablet Take 4 mg by mouth daily      lifitegrast (XIIDRA) 5 % opthalmic solution Place 1 drop into both eyes 2 times daily     LINZESS 290 MCG capsule TAKE 1 CAPSULE BY MOUTH EVERY DAY     ONABOTULINUMTOXINA IJ Inject 250 Units as directed once LOT #  /C3  EXPIRATION: 03/2020     prednisolone 0.25% and hyaluronate in balanced salt SUSP compounded ophthalmic suspension Place 1 drop into both eyes 4 times daily     pregabalin (LYRICA) 100 MG capsule Take 1 capsule (100 mg) by mouth 2 times daily     sodium chloride 0.9 % neb solution Medically necessary for scleral contact lens use. May use 3 ml 6 times a day for contact lens use     tretinoin (RETIN-A) 0.025 % external cream Apply a pea size to entire face QD     White Petrolatum-Mineral Oil (SYSTANE NIGHTTIME) OINT Apply 3.5 g to eye 4 times daily as needed (dry eyes)     Current Facility-Administered Medications   Medication     botulinum toxin type B (MYOBLOC) injection 15,000 Units     incobotulinumtoxin A (XEOMIN) 100 units injection 300 Units        Allergies:     Allergies   Allergen Reactions     Ivp Dye [Contrast Dye] Anaphylaxis     Lisinopril Anaphylaxis     Lisinopril-Hctz Anaphylaxis     Maple Flavor Anaphylaxis     MAPLE SYRUP     Maple Tree Anaphylaxis     Allergy includes maple syrup.     No Clinical Screening - See Comments      maple syrup==anaphylaxis  Dairy- causes to not feel well     Gluten Itching and Swelling     Codeine Sulfate Cramps and GI Disturbance     Erythromycin Nausea and Vomiting and Cramps     Nsaids Other (See Comments)     Patient had bariatric surgery. Should not ever take NSAIDS due to high risk for gastric ulcers.         Review of Systems:   As noted above     Physical Exam:   Vitals: /80 (BP Location: Right arm, Patient Position: Sitting, Cuff Size: Adult Regular)   Pulse 87   Wt 96.2 kg (212 lb)   BMI 34.22 kg/m      CV: peripheral pulse appreciated  Lungs: breathing comfortably  Extremities: minimal edema      Neuro:   General Appearance: Intermittently tearful    Mental Status: Alert and oriented to person, place, and time. Speech fluent and comprehension intact. No dysarthria. Normal memory, fund of knowledge, attention/concentration, and  language    Cranial Nerves:   II: Visual fields: normal  III: Pupils: 3 mm, equal, round, reactive to light   III,IV,VI: Extraocular Movements: intact   V: Facial sensation: intact to light touch  VII: Facial strength: intact without asymmetry  VIII: Hearing: intact grossly  IX: Palate: intact   XI: Shoulder shrug: intact  XII: Tongue movement: normal     Motor Exam:   Upper Extremities  Deltoid  Bicep  Tricep  Wrist Extensors  strength Intrinsic Muscles    Right  5  5  5  5 5 5    Left  5  5  5  5 5 5      Lower Extremities  Hip Flexors  Knee Extensors  Knee   Flexors  Dorsi Flexion  Plantar   Flexion    Right  4 5  5  5  5    Left  4 5  5  5  5    Hip flexion limited by pain    No drift is present. No abnormal movements. Tone is normal throughout.    Sensory: intact to light touch, vibration throughout.  PP diminished in RLE compared to LLE in somewhat length dependent fashion.     Coordination: no dysmetria with finger-to-nose bilaterally    Reflexes: biceps, triceps, brachioradialis, patellar, and ankle jerks 2+ and symmetric.     Gait: antalgic and cautious gait.         Data: Pertinent prior to visit   Imaging:  MR CERVICAL SPINE W/O CONTRAST, MR THORACIC SPINE W/O CONTRAST  6/18/2021 5:42 PM     History: Demyelinating disease; Neuropathic pain; Paresthesia     Comparison: CT cervical spine dated 8/31/2016, CT cervical spine dated  8/31/2016     Technique: Sagittal T2- and T1-weighted images of the cervical and  thoracic spine, axial T2* gradient echo images of the cervical spine  and axial T2-weighted images were obtained.     Findings:  Cervical: The cervical vertebrae appear normally aligned.  There is no  disc height narrowing at any level.  There is normal signal within the  cervical spinal cord which also demonstrates a normal contour.  The  findings on a level by level basis are as follows:     C2-3:  No significant spinal canal or neural foraminal narrowing.     C3-4:  No significant spinal canal  or neural foraminal narrowing.     C4-5:  No significant spinal canal or neuroforaminal narrowing.     C5-6:  Uncovertebral hypertrophy and facet arthropathy bilaterally. No  significant spinal canal stenosis. Mild bilateral neuroforaminal  narrowing, left greater than right.     C6-7:  No significant spinal canal or neuroforaminal narrowing.     C7-T1:  No significant spinal canal or neuroforaminal narrowing.     Thoracic: The thoracic vertebral column appears in normal alignment.  There is loss of disk height at any level. The spinal cord contour and  signal pattern appear within normal limits. No significant spinal  canal or neural foraminal narrowing.     Right inferior thyroid gland 1.5 x 1.0 cm T2 hyperintense lesion,  corresponding to CT neck on 8/31/2016, not significantly changed.                                                                      Impression:   1.  Cervical spine: No definite myelopathic spinal cord signal  identified. No high-grade spinal canal or neuroforaminal narrowing at  any level.  2.  Thoracic spine: No definite myelopathic spinal cord signal  identified. No high-grade spinal canal or neuroforaminal narrowing at  any level.    I personally reviewed the above imaging and agree with the findings in the report.      Brain MRI without contrast 2010      History: Possible MS and blurred vision.       Comparison: MRI brain 6/17/2009       Technique: Sagittal and axial turboFLAIR, T1-weighted axial,   diffusion-weighted axial with ADC map, T2-weighted axial, and T2*   gradient echo axial images were obtained without contrast. After   intravenous administration of gadolinium, axial turboFLAIR and coronal   and axial T1-weighted images were obtained.       Findings: There is a single focus of increased T2 signal on sagittal   and axial turboFLAIR images in the left frontal parasagittal   subcortical white matter that was not seen on the prior study of   6/17/2009. The previously noted area  of contrast enhancement   surrounding the left optic nerve is not evident on this study.   However, the T1-weighted postcontrast images on this study are not   fat-saturated which limits the ability to detect subtle enhancement   adjacent to the intraconal fat.  The postcontrast turboFLAIR sequences   with fat saturation do not demonstrate contrast enhancement   surrounding the optic nerves. The images reveal no intracranial mass   lesion, midline shift or abnormal extraaxial fluid collection. The   ventricles and sulci appear within normal for age.  Diffusion-weighted   images appear normal.       Normal intravascular flow voids are identified at the base of the   brain bilaterally.       Impression:   1. There is a nonspecific small focus of increased signal on   turboFLAIR and T2-weighted imaging in the left subcortical   parasagittal white matter.   2. The previously noted subtle contrast enhancement surrounding the   left optic nerve is not appreciated on the current study.  However,   the T1-weighted postcontrast images of the orbits were not   fat-saturated, which limits the ability to distinguish subtle   peripheral optic nerve enhancement. The postcontrast turboFLAIR images   of this region demonstrate no abnormal contrast enhancement   surrounding the left optic nerve.         Laboratory:  Last Comprehensive Metabolic Panel:  Sodium   Date Value Ref Range Status   08/04/2021 137 133 - 144 mmol/L Final   02/10/2006 140 133 - 144 mmol/L Final     Potassium   Date Value Ref Range Status   08/04/2021 3.8 3.4 - 5.3 mmol/L Final   02/10/2006 4.1 3.4 - 5.3 mmol/L Final     Chloride   Date Value Ref Range Status   08/04/2021 102 94 - 109 mmol/L Final   02/10/2006 103 94 - 109 mmol/L Final     Carbon Dioxide   Date Value Ref Range Status   02/10/2006 27 20 - 32 mmol/L Final     Carbon Dioxide (CO2)   Date Value Ref Range Status   08/04/2021 29 20 - 32 mmol/L Final     Anion Gap   Date Value Ref Range Status    08/04/2021 6 3 - 14 mmol/L Final   02/10/2006 10 6 - 17 mmol/L Final     Glucose   Date Value Ref Range Status   08/04/2021 91 70 - 99 mg/dL Final   02/10/2006 82 60 - 110 mg/dL Final     GLUCOSE BY METER POCT   Date Value Ref Range Status   08/04/2021 179 (H) 70 - 99 mg/dL Final     Urea Nitrogen   Date Value Ref Range Status   08/04/2021 17 7 - 30 mg/dL Final   02/10/2006 22 5 - 24 mg/dL Final     Creatinine   Date Value Ref Range Status   08/04/2021 0.99 0.52 - 1.04 mg/dL Final   02/10/2006 1.10 0.60 - 1.30 mg/dL Final     GFR Estimate   Date Value Ref Range Status   08/04/2021 66 >60 mL/min/1.73m2 Final     Comment:     As of July 11, 2021, eGFR is calculated by the CKD-EPI creatinine equation, without race adjustment. eGFR can be influenced by muscle mass, exercise, and diet. The reported eGFR is an estimation only and is only applicable if the renal function is stable.   07/10/2021 58 (L) >60 mL/min/1.73m2 Final   02/10/2006 59 (L) >60 mL/min/1.7m2 Final     Calcium   Date Value Ref Range Status   08/04/2021 9.6 8.5 - 10.1 mg/dL Final   02/10/2006 9.1 8.5 - 10.4 mg/dL Final     Lab Results   Component Value Date    WBC 7.3 08/04/2021    WBC 9.3 02/10/2006     Lab Results   Component Value Date    RBC 4.75 08/04/2021    RBC 4.57 02/10/2006     Lab Results   Component Value Date    HGB 14.4 08/04/2021    HGB 14.0 02/10/2006     Lab Results   Component Value Date    HCT 42.8 08/04/2021    HCT 40.9 02/10/2006     Lab Results   Component Value Date    MCV 90 08/04/2021    MCV 89 02/10/2006     Lab Results   Component Value Date    MCH 30.3 08/04/2021    MCH 30.7 02/10/2006     Lab Results   Component Value Date    MCHC 33.6 08/04/2021    MCHC 34.3 02/10/2006     Lab Results   Component Value Date    RDW 13.1 08/04/2021    RDW 11.5 02/10/2006     Lab Results   Component Value Date     08/04/2021     02/10/2006     No results found for: A1C           Assessment and Plan:   Roxanna Celis is a 51 year old  female who presents today for evaluation of diffuse pain, numbness, tingling  She has a PMH of ELY, anxiety, depression and is s/p baratric surgery.  She has had several years of ongoing symptoms that began with dry eyes, other Sicca like symptoms, though she has reportedly have negative lip biopsy and has had negative SSA/SSB antibodies as well.  She has seen multiple specialists without answer including MRIs as above, rheumatologic lab work, EMGs that are normal of the upper and lower extremities.  She has received a diagnosis of neuropathy and of fibromyalgia in the past but medications to treat as such have provided muted/limited benefit, she states.    She is optimistic about a new water therapy she is going to start trying soon.  She states this weakness and pain profoundly affects her life.  She struggles with hand clumsiness, she struggles with diffuse weakness and uses assistive devices. She struggles with the pain.    In auditing her work up we discussed we could add epidermal nerve fiber density to further work up small fiber neuropathy as this is not well seen on EMG testing.  I agreed that she does not have a demyelinating condition to history exam or testing.  We discussed how I do think there is an element of central pain processing disorder and that nerve changes without abnormal testing can be common but is frustrating for patients and providers alike.  If our testing comes back normal would continue to treat pain symptomatically with her terrific pain team she already has.  She would like to explore medical cannabis which could be potentially reasonable.  The seronegative sjogrens angle appears to be not a though after her lip biopsy any longer.  I told her I will add what I think a general neurologist could add to her work up as below and we will continue forward pending the results    --Given bariatric surgery hx will get vitamin labs: Vitamin E, B12, B1,B6 to further determine if these could be  playing a role in her symptoms  --A1C, TSH  --MRIs and other labwork reviewed above  --PT/OT will be important for her, ordered OT  --Epidermal nerve fiber density biopsy to be scheduled to further work up possible small fiber neuropathy  --Discussed ideas to discuss with her pain doctor including possible increased lyrica, depending on comfort of SNRI/TCA combination therapy low dose TCA could be of consideration going forward as well, she will discuss medical cannabis with them as well.    --Follow up in 4 months           Agustín Torres MD   of Neurology   [facility]      The total time of this encounter today amounted to 77 minutes. This time included time spent with the patient, prep work, ordering tests, and performing post visit documentation.

## 2021-11-11 ENCOUNTER — OFFICE VISIT (OUTPATIENT)
Dept: NEUROLOGY | Facility: CLINIC | Age: 51
End: 2021-11-11
Payer: COMMERCIAL

## 2021-11-11 VITALS
WEIGHT: 212 LBS | SYSTOLIC BLOOD PRESSURE: 124 MMHG | BODY MASS INDEX: 34.22 KG/M2 | HEART RATE: 87 BPM | DIASTOLIC BLOOD PRESSURE: 80 MMHG

## 2021-11-11 DIAGNOSIS — R29.898 HAND WEAKNESS: ICD-10-CM

## 2021-11-11 DIAGNOSIS — M79.2 NEUROPATHIC PAIN: Primary | ICD-10-CM

## 2021-11-11 DIAGNOSIS — R27.8 CLUMSINESS: ICD-10-CM

## 2021-11-11 DIAGNOSIS — M62.81 GENERALIZED MUSCLE WEAKNESS: ICD-10-CM

## 2021-11-11 DIAGNOSIS — R20.2 PARESTHESIA: ICD-10-CM

## 2021-11-11 LAB
CK SERPL-CCNC: 155 U/L (ref 30–225)
HBA1C MFR BLD: 5.2 % (ref 0–5.6)
TSH SERPL DL<=0.005 MIU/L-ACNC: 3.4 MU/L (ref 0.4–4)
VIT B12 SERPL-MCNC: 2172 PG/ML (ref 193–986)

## 2021-11-11 PROCEDURE — 82607 VITAMIN B-12: CPT | Performed by: STUDENT IN AN ORGANIZED HEALTH CARE EDUCATION/TRAINING PROGRAM

## 2021-11-11 PROCEDURE — 84207 ASSAY OF VITAMIN B-6: CPT | Mod: 90 | Performed by: STUDENT IN AN ORGANIZED HEALTH CARE EDUCATION/TRAINING PROGRAM

## 2021-11-11 PROCEDURE — 83036 HEMOGLOBIN GLYCOSYLATED A1C: CPT | Performed by: STUDENT IN AN ORGANIZED HEALTH CARE EDUCATION/TRAINING PROGRAM

## 2021-11-11 PROCEDURE — 99000 SPECIMEN HANDLING OFFICE-LAB: CPT | Performed by: STUDENT IN AN ORGANIZED HEALTH CARE EDUCATION/TRAINING PROGRAM

## 2021-11-11 PROCEDURE — 86334 IMMUNOFIX E-PHORESIS SERUM: CPT | Performed by: PATHOLOGY

## 2021-11-11 PROCEDURE — 84446 ASSAY OF VITAMIN E: CPT | Mod: 90 | Performed by: STUDENT IN AN ORGANIZED HEALTH CARE EDUCATION/TRAINING PROGRAM

## 2021-11-11 PROCEDURE — 84443 ASSAY THYROID STIM HORMONE: CPT | Performed by: STUDENT IN AN ORGANIZED HEALTH CARE EDUCATION/TRAINING PROGRAM

## 2021-11-11 PROCEDURE — 82550 ASSAY OF CK (CPK): CPT | Performed by: STUDENT IN AN ORGANIZED HEALTH CARE EDUCATION/TRAINING PROGRAM

## 2021-11-11 PROCEDURE — 84425 ASSAY OF VITAMIN B-1: CPT | Mod: 90 | Performed by: STUDENT IN AN ORGANIZED HEALTH CARE EDUCATION/TRAINING PROGRAM

## 2021-11-11 PROCEDURE — 99205 OFFICE O/P NEW HI 60 MIN: CPT | Performed by: STUDENT IN AN ORGANIZED HEALTH CARE EDUCATION/TRAINING PROGRAM

## 2021-11-11 PROCEDURE — 36415 COLL VENOUS BLD VENIPUNCTURE: CPT | Performed by: STUDENT IN AN ORGANIZED HEALTH CARE EDUCATION/TRAINING PROGRAM

## 2021-11-11 ASSESSMENT — PAIN SCALES - GENERAL: PAINLEVEL: EXTREME PAIN (8)

## 2021-11-11 NOTE — LETTER
11/11/2021         RE: Roxanna Celis  [id number]  [facility] [place] [address]        Dear Colleague,    Thank you for referring your patient, Roxanna Celis, to the [facility] [place]. Please see a copy of my visit note below.    [facility]  Section of General Neurology  New Patient Visit      Roxanna Celis MRN# [id number]   Age: 51 year old YOB: 1970     Requesting physician: No ref. provider found  Africa [place] A     Reason for Consultation: arm+ leg paresthesias, weakness.         History of Presenting Symptoms:   Roxanna Celis is a 51 year old female who presents today for evaluation of diffuse pain, numbness, tingling  She has a PMH of ELY, anxiety, depression and is s/p baratric surgery     She notes that 2 years ago she woke up with very itchy eyes. She saw an eye doctor who told her she had dry eyes and that was that reportedly.    She also noted weakness in her proximal arms at this time too.    That fall she noted L dorsal wrist pain after a fall out of bed, she thinks.      She feels progressively delicate movements have been harder with her hands.  She is RH.  She can't wash her own hair given issues with strength and dexterity.    It is a pain numbness and tingling in b/l arms extending up into her neck.  The legs started about a year later.  It can be in her feet, in her knees, ankles, hips.  She feels like she is walking drunk at times.    Her muscles just feel diffusely weak.      She is here with her  Pat  They live in [place]   She does not work.      She follows with Dr. Rivera in pain medicine.  She doesn't like that people keep telling her she has neuropathy and fibromyalgia.  --last seen 9/23   At that time:   1. Medication Management:  1. Reduce gabapentin to 600mg HS  2. Start Lyrica 50mg BID. After 1 week stop gabapentin and increase lyrica to 50mg/100mghs. After 3-4 days increase to 100mg BID.   3. Continue  "cymbalta.--taking at 120 QAM    She just started pool therapy.    She has had a normal EMG of her UE which was normal previously in spring of this year, she thinks.    She has also had normal EMGs of her lower extremities in her lower legs which were find as well.      She saw Rheumatology  (Dr. Velazquez) recently as well--who wrote a very thorough note:    \"Paresthesia: She reports burning sensation, pins and needles sensation in her arms and legs.  Progressively getting worse.  It started over her hands and feet and gradually went up to involve her entire arm and leg up to the mid thigh.  EMG of bilateral upper extremities done in 2/2021 was normal.  MRI of the cervical and thoracic spine done in 6/2021 did not reveal any evidence of demyelinating disease. MRI brain in 8/2020 was normal. She has seen Dr. Bell in Neurology who ruled out demyelinating process. She has another appointment with Neurologist at [facility] neurology clinic.  EMG of bilateral lower extremities can be done.  If EMG comes back normal then her symptoms could be related to small fiber neuropathy.  She is on gabapentin, Cymbalta.  She has seronegative Sjogren's and her symptoms could be related to Sjogren's disease but all neuropathic symptoms in Sjogren's disease are not treated with immunosuppressive medication.  Based on the biopsy will discuss with Neurologist.\"     They saw Dr. Bell in the MS clinic who also wrote a very thorough and thoughtful note.  He noted she has no evidence of MS/demylinating disease to exam or later to imaging either.      Her lip biopsy was reportedly negative re possible seronegative sjogrens.      They are seeing a chiropractor who is optimizing vitamins--functional medicine clinic  She is trying a special diet and special supplements--low gluten, low dairy.            Past Medical History:     Patient Active Problem List   Diagnosis     Torticollis, spasmodic     Achlorhydria     Depression with anxiety "     KIANA (generalized anxiety disorder)     Hypokalemia     Hypothyroid     Intramural leiomyoma of uterus     Bariatric surgery status     Morbid obesity with BMI of 40.0-44.9, adult (H)     Multiple thyroid nodules     Obesity     Sleep apnea     Essential hypertension     Chronic bilateral low back pain without sciatica     Precordial pain     Past Medical History:   Diagnosis Date     Cervical dystonia 1993        Past Surgical History:     Past Surgical History:   Procedure Laterality Date     AS FRAGMENTING OF KIDNEY STONE       GALLBLADDER SURGERY       STOMACH SURGERY      for weight loss        Social History:     Social History     Tobacco Use     Smoking status: Never Smoker     Smokeless tobacco: Never Used   Substance Use Topics     Alcohol use: No     Drug use: No        Family History:     Family History   Problem Relation Age of Onset     Glaucoma No family hx of      Macular Degeneration No family hx of         Medications:     Current Outpatient Medications   Medication Sig     acetylcysteine 10 % (MUCOMYST) 10% SOLN Place 2 drops into both eyes 6 times daily     aspirin-acetaminophen-caffeine (EXCEDRIN MIGRAINE) 250-250-65 MG per tablet Take 1 tablet by mouth every 6 hours as needed.     BOTOX 100 units injection Inject 250 Units into the muscle once Lot # /C3 with Expiration Date:  11/2020     buPROPion (WELLBUTRIN XL) 300 MG 24 hr tablet Take 300 mg by mouth every morning     ClonAZEPAM (KLONOPIN) 1 MG tablet Take 1 mg by mouth 2 times daily as needed.     doxycycline monohydrate (ADOXA) 50 MG tablet Take 1 tablet (50 mg) by mouth 2 times daily     DULoxetine HCl (CYMBALTA PO) Take 60 mg by mouth     EPINEPHrine (EPIPEN) 0.3 MG/0.3ML injection Inject 0.3 mg into the muscle once as needed      hydrochlorothiazide (HYDRODIURIL) 50 MG tablet Take 50 mg by mouth daily     Ibuprofen (ADVIL) 200 MG capsule Take 200 mg by mouth every 4 hours as needed.     iloperidone (FANAPT) 6 MG TABS tablet Take  4 mg by mouth daily      lifitegrast (XIIDRA) 5 % opthalmic solution Place 1 drop into both eyes 2 times daily     LINZESS 290 MCG capsule TAKE 1 CAPSULE BY MOUTH EVERY DAY     ONABOTULINUMTOXINA IJ Inject 250 Units as directed once LOT # /C3  EXPIRATION: 03/2020     prednisolone 0.25% and hyaluronate in balanced salt SUSP compounded ophthalmic suspension Place 1 drop into both eyes 4 times daily     pregabalin (LYRICA) 100 MG capsule Take 1 capsule (100 mg) by mouth 2 times daily     sodium chloride 0.9 % neb solution Medically necessary for scleral contact lens use. May use 3 ml 6 times a day for contact lens use     tretinoin (RETIN-A) 0.025 % external cream Apply a pea size to entire face QD     White Petrolatum-Mineral Oil (SYSTANE NIGHTTIME) OINT Apply 3.5 g to eye 4 times daily as needed (dry eyes)     Current Facility-Administered Medications   Medication     botulinum toxin type B (MYOBLOC) injection 15,000 Units     incobotulinumtoxin A (XEOMIN) 100 units injection 300 Units        Allergies:     Allergies   Allergen Reactions     Ivp Dye [Contrast Dye] Anaphylaxis     Lisinopril Anaphylaxis     Lisinopril-Hctz Anaphylaxis     Maple Flavor Anaphylaxis     MAPLE SYRUP     Maple Tree Anaphylaxis     Allergy includes maple syrup.     No Clinical Screening - See Comments      maple syrup==anaphylaxis  Dairy- causes to not feel well     Gluten Itching and Swelling     Codeine Sulfate Cramps and GI Disturbance     Erythromycin Nausea and Vomiting and Cramps     Nsaids Other (See Comments)     Patient had bariatric surgery. Should not ever take NSAIDS due to high risk for gastric ulcers.         Review of Systems:   As noted above     Physical Exam:   Vitals: /80 (BP Location: Right arm, Patient Position: Sitting, Cuff Size: Adult Regular)   Pulse 87   Wt 96.2 kg (212 lb)   BMI 34.22 kg/m      CV: peripheral pulse appreciated  Lungs: breathing comfortably  Extremities: minimal edema      Neuro:    General Appearance: Intermittently tearful    Mental Status: Alert and oriented to person, place, and time. Speech fluent and comprehension intact. No dysarthria. Normal memory, fund of knowledge, attention/concentration, and language    Cranial Nerves:   II: Visual fields: normal  III: Pupils: 3 mm, equal, round, reactive to light   III,IV,VI: Extraocular Movements: intact   V: Facial sensation: intact to light touch  VII: Facial strength: intact without asymmetry  VIII: Hearing: intact grossly  IX: Palate: intact   XI: Shoulder shrug: intact  XII: Tongue movement: normal     Motor Exam:   Upper Extremities  Deltoid  Bicep  Tricep  Wrist Extensors  strength Intrinsic Muscles    Right  5  5  5  5 5 5    Left  5  5  5  5 5 5      Lower Extremities  Hip Flexors  Knee Extensors  Knee   Flexors  Dorsi Flexion  Plantar   Flexion    Right  4 5  5  5  5    Left  4 5  5  5  5    Hip flexion limited by pain    No drift is present. No abnormal movements. Tone is normal throughout.    Sensory: intact to light touch, vibration throughout.  PP diminished in RLE compared to LLE in somewhat length dependent fashion.     Coordination: no dysmetria with finger-to-nose bilaterally    Reflexes: biceps, triceps, brachioradialis, patellar, and ankle jerks 2+ and symmetric.     Gait: antalgic and cautious gait.         Data: Pertinent prior to visit   Imaging:  MR CERVICAL SPINE W/O CONTRAST, MR THORACIC SPINE W/O CONTRAST  6/18/2021 5:42 PM     History: Demyelinating disease; Neuropathic pain; Paresthesia     Comparison: CT cervical spine dated 8/31/2016, CT cervical spine dated  8/31/2016     Technique: Sagittal T2- and T1-weighted images of the cervical and  thoracic spine, axial T2* gradient echo images of the cervical spine  and axial T2-weighted images were obtained.     Findings:  Cervical: The cervical vertebrae appear normally aligned.  There is no  disc height narrowing at any level.  There is normal signal within  the  cervical spinal cord which also demonstrates a normal contour.  The  findings on a level by level basis are as follows:     C2-3:  No significant spinal canal or neural foraminal narrowing.     C3-4:  No significant spinal canal or neural foraminal narrowing.     C4-5:  No significant spinal canal or neuroforaminal narrowing.     C5-6:  Uncovertebral hypertrophy and facet arthropathy bilaterally. No  significant spinal canal stenosis. Mild bilateral neuroforaminal  narrowing, left greater than right.     C6-7:  No significant spinal canal or neuroforaminal narrowing.     C7-T1:  No significant spinal canal or neuroforaminal narrowing.     Thoracic: The thoracic vertebral column appears in normal alignment.  There is loss of disk height at any level. The spinal cord contour and  signal pattern appear within normal limits. No significant spinal  canal or neural foraminal narrowing.     Right inferior thyroid gland 1.5 x 1.0 cm T2 hyperintense lesion,  corresponding to CT neck on 8/31/2016, not significantly changed.                                                                      Impression:   1.  Cervical spine: No definite myelopathic spinal cord signal  identified. No high-grade spinal canal or neuroforaminal narrowing at  any level.  2.  Thoracic spine: No definite myelopathic spinal cord signal  identified. No high-grade spinal canal or neuroforaminal narrowing at  any level.    I personally reviewed the above imaging and agree with the findings in the report.      Brain MRI without contrast 2010      History: Possible MS and blurred vision.       Comparison: MRI brain 6/17/2009       Technique: Sagittal and axial turboFLAIR, T1-weighted axial,   diffusion-weighted axial with ADC map, T2-weighted axial, and T2*   gradient echo axial images were obtained without contrast. After   intravenous administration of gadolinium, axial turboFLAIR and coronal   and axial T1-weighted images were obtained.        Findings: There is a single focus of increased T2 signal on sagittal   and axial turboFLAIR images in the left frontal parasagittal   subcortical white matter that was not seen on the prior study of   6/17/2009. The previously noted area of contrast enhancement   surrounding the left optic nerve is not evident on this study.   However, the T1-weighted postcontrast images on this study are not   fat-saturated which limits the ability to detect subtle enhancement   adjacent to the intraconal fat.  The postcontrast turboFLAIR sequences   with fat saturation do not demonstrate contrast enhancement   surrounding the optic nerves. The images reveal no intracranial mass   lesion, midline shift or abnormal extraaxial fluid collection. The   ventricles and sulci appear within normal for age.  Diffusion-weighted   images appear normal.       Normal intravascular flow voids are identified at the base of the   brain bilaterally.       Impression:   1. There is a nonspecific small focus of increased signal on   turboFLAIR and T2-weighted imaging in the left subcortical   parasagittal white matter.   2. The previously noted subtle contrast enhancement surrounding the   left optic nerve is not appreciated on the current study.  However,   the T1-weighted postcontrast images of the orbits were not   fat-saturated, which limits the ability to distinguish subtle   peripheral optic nerve enhancement. The postcontrast turboFLAIR images   of this region demonstrate no abnormal contrast enhancement   surrounding the left optic nerve.         Laboratory:  Last Comprehensive Metabolic Panel:  Sodium   Date Value Ref Range Status   08/04/2021 137 133 - 144 mmol/L Final   02/10/2006 140 133 - 144 mmol/L Final     Potassium   Date Value Ref Range Status   08/04/2021 3.8 3.4 - 5.3 mmol/L Final   02/10/2006 4.1 3.4 - 5.3 mmol/L Final     Chloride   Date Value Ref Range Status   08/04/2021 102 94 - 109 mmol/L Final   02/10/2006 103 94 - 109  mmol/L Final     Carbon Dioxide   Date Value Ref Range Status   02/10/2006 27 20 - 32 mmol/L Final     Carbon Dioxide (CO2)   Date Value Ref Range Status   08/04/2021 29 20 - 32 mmol/L Final     Anion Gap   Date Value Ref Range Status   08/04/2021 6 3 - 14 mmol/L Final   02/10/2006 10 6 - 17 mmol/L Final     Glucose   Date Value Ref Range Status   08/04/2021 91 70 - 99 mg/dL Final   02/10/2006 82 60 - 110 mg/dL Final     GLUCOSE BY METER POCT   Date Value Ref Range Status   08/04/2021 179 (H) 70 - 99 mg/dL Final     Urea Nitrogen   Date Value Ref Range Status   08/04/2021 17 7 - 30 mg/dL Final   02/10/2006 22 5 - 24 mg/dL Final     Creatinine   Date Value Ref Range Status   08/04/2021 0.99 0.52 - 1.04 mg/dL Final   02/10/2006 1.10 0.60 - 1.30 mg/dL Final     GFR Estimate   Date Value Ref Range Status   08/04/2021 66 >60 mL/min/1.73m2 Final     Comment:     As of July 11, 2021, eGFR is calculated by the CKD-EPI creatinine equation, without race adjustment. eGFR can be influenced by muscle mass, exercise, and diet. The reported eGFR is an estimation only and is only applicable if the renal function is stable.   07/10/2021 58 (L) >60 mL/min/1.73m2 Final   02/10/2006 59 (L) >60 mL/min/1.7m2 Final     Calcium   Date Value Ref Range Status   08/04/2021 9.6 8.5 - 10.1 mg/dL Final   02/10/2006 9.1 8.5 - 10.4 mg/dL Final     Lab Results   Component Value Date    WBC 7.3 08/04/2021    WBC 9.3 02/10/2006     Lab Results   Component Value Date    RBC 4.75 08/04/2021    RBC 4.57 02/10/2006     Lab Results   Component Value Date    HGB 14.4 08/04/2021    HGB 14.0 02/10/2006     Lab Results   Component Value Date    HCT 42.8 08/04/2021    HCT 40.9 02/10/2006     Lab Results   Component Value Date    MCV 90 08/04/2021    MCV 89 02/10/2006     Lab Results   Component Value Date    MCH 30.3 08/04/2021    MCH 30.7 02/10/2006     Lab Results   Component Value Date    MCHC 33.6 08/04/2021    MCHC 34.3 02/10/2006     Lab Results    Component Value Date    RDW 13.1 08/04/2021    RDW 11.5 02/10/2006     Lab Results   Component Value Date     08/04/2021     02/10/2006     No results found for: A1C           Assessment and Plan:   Roxanna Celis is a 51 year old female who presents today for evaluation of diffuse pain, numbness, tingling  She has a PMH of ELY, anxiety, depression and is s/p baratric surgery.  She has had several years of ongoing symptoms that began with dry eyes, other Sicca like symptoms, though she has reportedly have negative lip biopsy and has had negative SSA/SSB antibodies as well.  She has seen multiple specialists without answer including MRIs as above, rheumatologic lab work, EMGs that are normal of the upper and lower extremities.  She has received a diagnosis of neuropathy and of fibromyalgia in the past but medications to treat as such have provided muted/limited benefit, she states.    She is optimistic about a new water therapy she is going to start trying soon.  She states this weakness and pain profoundly affects her life.  She struggles with hand clumsiness, she struggles with diffuse weakness and uses assistive devices. She struggles with the pain.    In auditing her work up we discussed we could add epidermal nerve fiber density to further work up small fiber neuropathy as this is not well seen on EMG testing.  I agreed that she does not have a demyelinating condition to history exam or testing.  We discussed how I do think there is an element of central pain processing disorder and that nerve changes without abnormal testing can be common but is frustrating for patients and providers alike.  If our testing comes back normal would continue to treat pain symptomatically with her terrific pain team she already has.  She would like to explore medical cannabis which could be potentially reasonable.  The seronegative sjogrens angle appears to be not a though after her lip biopsy any longer.  I told her I  will add what I think a general neurologist could add to her work up as below and we will continue forward pending the results    --Given bariatric surgery hx will get vitamin labs: Vitamin E, B12, B1,B6 to further determine if these could be playing a role in her symptoms  --A1C, TSH  --MRIs and other labwork reviewed above  --PT/OT will be important for her, ordered OT  --Epidermal nerve fiber density biopsy to be scheduled to further work up possible small fiber neuropathy  --Discussed ideas to discuss with her pain doctor including possible increased lyrica, depending on comfort of SNRI/TCA combination therapy low dose TCA could be of consideration going forward as well, she will discuss medical cannabis with them as well.    --Follow up in 4 months           Agustín Torres MD   of Neurology   [facility]      The total time of this encounter today amounted to 77 minutes. This time included time spent with the patient, prep work, ordering tests, and performing post visit documentation.        Again, thank you for allowing me to participate in the care of your patient.        Sincerely,        Huber Torres MD

## 2021-11-11 NOTE — PATIENT INSTRUCTIONS
Continue PT/OT  Blood work  See me in 3-4 months  I Wouldn't push the duloxetine any farther  I think you have room to go up on the lyrica  Pending your pain doctor's comfort with duloxetine and TCAs.  Low doses at bed time (elavil 10 mg at bedtime) can help with nerve pain and with sleep.

## 2021-11-12 ENCOUNTER — OFFICE VISIT (OUTPATIENT)
Dept: PALLIATIVE MEDICINE | Facility: CLINIC | Age: 51
End: 2021-11-12
Payer: COMMERCIAL

## 2021-11-12 VITALS
OXYGEN SATURATION: 100 % | BODY MASS INDEX: 35.19 KG/M2 | HEART RATE: 69 BPM | WEIGHT: 218 LBS | DIASTOLIC BLOOD PRESSURE: 72 MMHG | SYSTOLIC BLOOD PRESSURE: 113 MMHG

## 2021-11-12 DIAGNOSIS — M79.18 CHRONIC MYOFASCIAL PAIN: Primary | ICD-10-CM

## 2021-11-12 DIAGNOSIS — M79.2 NEUROPATHIC PAIN: ICD-10-CM

## 2021-11-12 DIAGNOSIS — G89.29 CHRONIC MYOFASCIAL PAIN: Primary | ICD-10-CM

## 2021-11-12 LAB — PROT PATTERN SERPL IFE-IMP: NORMAL

## 2021-11-12 PROCEDURE — 99215 OFFICE O/P EST HI 40 MIN: CPT | Performed by: PHYSICAL MEDICINE & REHABILITATION

## 2021-11-12 RX ORDER — PREGABALIN 150 MG/1
150 CAPSULE ORAL 2 TIMES DAILY
Qty: 40 CAPSULE | Refills: 0 | Status: SHIPPED | OUTPATIENT
Start: 2021-11-12 | End: 2022-05-05

## 2021-11-12 RX ORDER — METHOCARBAMOL 500 MG/1
500 TABLET, FILM COATED ORAL 2 TIMES DAILY PRN
Qty: 60 TABLET | Refills: 0 | Status: SHIPPED | OUTPATIENT
Start: 2021-11-12 | End: 2021-12-14

## 2021-11-12 ASSESSMENT — PAIN SCALES - GENERAL: PAINLEVEL: SEVERE PAIN (7)

## 2021-11-12 NOTE — PATIENT INSTRUCTIONS
1. You can try CBD oil from one of the local CBD shops or Co-ops. Get a product that has a QR code that will have information about quality testing. If you cannot find this, you can look at [email or web address] online.    2. Start lyrica 150mg twice daily.    3. I ordered methocarbamol 500mg twice daily as needed. This is a muscle relaxant. Start this 3 days after taking the higher dose of lyrica.    4. Follow up in 6-8 weeks.        ----------------------------------------------------------------  Clinic Number:  [phone]     Call with any questions about your care and for scheduling assistance.     Calls are returned Monday through Friday between 8 AM and 4:30 PM. We usually get back to you within 2 business days depending on the issue/request.    If we are prescribing your medications:    For opioid medication refills, call the clinic or send a Hunie message 7 days in advance.  Please include:    Name of requested medication    Name of the pharmacy.    For non-opioid medications, call your pharmacy directly to request a refill. Please allow 3-4 days to be processed.     Per [place] State Law:    All controlled substance prescriptions must be filled within 30 days of being written.      For those controlled substances allowing refills, pickup must occur within 30 days of last fill.      We believe regular attendance is key to your success in our program!      Any time you are unable to keep your appointment we ask that you call us at least 24 hours in advance to cancel.This will allow us to offer the appointment time to another patient.     Multiple missed appointments may lead to dismissal from the clinic.

## 2021-11-12 NOTE — PROGRESS NOTES
[facility]    Date of visit: 2021         Assessment:  Roxanna Celis is a 50 year old with past medical history including: ELY, Obesity, HTN, SICCA symptoms, spasmodic torticollis, who presents for evaluation and treatment of the followin. Painful peripheral neuropathy: Patient reports gradual onset of paresthesias and burning/tingling type pain that began in their upper and lower extremities about 2 years ago that began in the periphery and spread proximally in their upper and lower extremities. Currently they describe paresthesias and pain in their entire bilateral upper and lower extremities. Upper extremity EMG was previously normal. Cervical, thoracic and lumbar MRI completed, no pathology to explain their current symptoms. Seen by Dr. Torres and will have a small fiber nerve biopsy completed.     2. Chronic Myofascial Pain: Patient meets the diagnostic criteria for fibromyalgia based on history and exam. Prior work-up has been unrevealing for a neurologic or rheumatologic cause for their symptoms. Discussed the pathophysiology of fibromyalgia and overlap with their neuropathic symptoms as well as how fibromyalgia may exacerbate an underlying peripheral neuropathy and arthritic pain. Overall pain has been worsening without inciting event in the past week, possibly related to weather changes.     Mental Health - the patient's mental health concerns, specifically anxiety, depression, affect her experience of pain and contribute to her clinically significant distress.        Plan:  The following recommendations were given to the patient. Diagnosis, treatment options, risks, benefits, and alternatives were discussed, and all questions were answered. The patient expressed understanding of the plan for management.      I am recommending a multidisciplinary treatment plan to help this patient better manage her pain.  This includes:      1. Physical  Therapy: Continue pool therapy at [facility].  1. Start chronic pain PT once pool PT is completed.  2. Clinical Health Pain Psychologist: pain phd referral placed.    1. Therapy can help reduce physical and psychosocial triggers or reinforcers of pain by adapting thoughts, feelings and behaviors to reduce symptoms and increase quality of life.  Evidence indicates that the practice of relaxation, meditation, and mindfulness techniques can significantly affect pain levels and overall well being.  3. Self Care Recommendations: Gentle progressive exercise that does not increase pain - gradually increase daily walking program.  Take mini breaks - 5 minutes of mindfullness a couple times a day.   4. Diagnostic Studies: none  5. Medication Management:  1. Increase lyrica to 150mg BID.  2. Start methocarbamol 500mg BID prn  3. Start CBD oil.  4. Continue cymbalta.      5. Consider long acting NSAID.   6. Further procedures recommended: none  7. Follow up: 6-8 weeks.        Yasmany Rivera DO  [facility]          Chief complaint:   Chief Complaint   Patient presents with     Pain       Interval history:  Roxanna Celis is a 51 year old female last seen by me on 9/23/21.        Since her last visit, Roxanna Celis reports:  -The pain in all four limbs has been worsening significantly in the past week.     -There is worsening pain in all their joints.    -They saw Dr. Torres yesterday and will be getting a small nerve fiber biopsy done. They are also repeating some lab work.    -Occupation therapy was ordered to help with hand and wrist pain.    -They started pool therapy at [facility]. They have a warm pool. The chlorine gives them hives but the therapy is going ok. Some of the exercises have been hard on Roxanna, even just walking or small hand movements.    -Mood has been down but they check in with their  to make sure they're doing ok.    -They see Dr. Estella Driver (psychiatry) and have been discussing  their pain and mood with them as well.    -They are furstrated with their recently increased pain.     -Discussed that it is normal to have some aches and pains after starting a new exercise program.    -Discussed that recent weather changes may be causing arthritic pain and fibromyalgia may be exacerbating this as well.    -They have tried muscle relaxants before but do not recall which. Methocarbamol doesn't sound familiar.    Pain scores:  Pain intensity on average is 10 on a scale of 0-10.     Pain Treatments:  1. Medications:       Current pain medications:  -Lyrica 100mg BID  -Bupropion 300mg daily  -Cymbalta 120mg daily      Prior pain medications:   -Gabapentin 1200mg at night- drowsy with higher dose  2. Physical Therapy: Started pool PT.               TENS unit: hasn't tried  3. Pain psychology: hasn't tried  4. Surgery: hasn't tried  5. Injections: none  6. Alternative Therapies:               Chiropractic: hasn't tried               Acupuncture: hasn't tried       Side Effects: no side effect    Medications:  Current Outpatient Medications   Medication Sig Dispense Refill     acetylcysteine 10 % (MUCOMYST) 10% SOLN Place 2 drops into both eyes 6 times daily 30 mL 5     aspirin-acetaminophen-caffeine (EXCEDRIN MIGRAINE) 250-250-65 MG per tablet Take 1 tablet by mouth every 6 hours as needed.       buPROPion (WELLBUTRIN XL) 300 MG 24 hr tablet Take 300 mg by mouth every morning       ClonAZEPAM (KLONOPIN) 1 MG tablet Take 1 mg by mouth 2 times daily as needed.       doxycycline monohydrate (ADOXA) 50 MG tablet Take 1 tablet (50 mg) by mouth 2 times daily 180 tablet 3     DULoxetine HCl (CYMBALTA PO) Take 60 mg by mouth       EPINEPHrine (EPIPEN) 0.3 MG/0.3ML injection Inject 0.3 mg into the muscle once as needed        hydrochlorothiazide (HYDRODIURIL) 50 MG tablet Take 50 mg by mouth daily       Ibuprofen (ADVIL) 200 MG capsule Take 200 mg by mouth every 4 hours as needed.       iloperidone (FANAPT) 6 MG  TABS tablet Take 4 mg by mouth daily        lifitegrast (XIIDRA) 5 % opthalmic solution Place 1 drop into both eyes 2 times daily 180 each 1     LINZESS 290 MCG capsule TAKE 1 CAPSULE BY MOUTH EVERY DAY  3     prednisolone 0.25% and hyaluronate in balanced salt SUSP compounded ophthalmic suspension Place 1 drop into both eyes 4 times daily 6.67 mL 11     pregabalin (LYRICA) 100 MG capsule Take 1 capsule (100 mg) by mouth 2 times daily 60 capsule 0     sodium chloride 0.9 % neb solution Medically necessary for scleral contact lens use. May use 3 ml 6 times a day for contact lens use 300 mL 11     tretinoin (RETIN-A) 0.025 % external cream Apply a pea size to entire face QD 45 g 11     White Petrolatum-Mineral Oil (SYSTANE NIGHTTIME) OINT Apply 3.5 g to eye 4 times daily as needed (dry eyes) 7 g 11     BOTOX 100 units injection Inject 250 Units into the muscle once Lot # /[id number] with Expiration Date:  11/2020 (Patient not taking: Reported on 11/12/2021)       ONABOTULINUMTOXINA IJ Inject 250 Units as directed once LOT # /[id number]  EXPIRATION: 03/2020 (Patient not taking: Reported on 11/12/2021)         Medical History: any changes in medical history since they were last seen? No    Review of Systems:  Positive for: neck pain, back pain, paresthesias, numbness    Mood: depression    Physical Exam:  not currently breastfeeding.  General: NAD, pleasant  Gait: Slow, antalgic. No specific gait deviations noted.  Rest of exam deferred today for conversation.    BILLING TIME DOCUMENTATION:   The total TIME spent on this patient on the date of the encounter/appointment was 40 minutes.      TOTAL TIME includes:   Time spent preparing to see the patient (reviewing records and tests)   Time spent face to face (or over the phone) with the patient   Time spent ordering tests, medications, procedures and referrals   Time spent Referring and communicating with other healthcare professionals   Time spent documenting clinical  information in Epic       Yasmany Rivera DO  [facility]  11/12/2021

## 2021-11-14 LAB
A-TOCOPHEROL VIT E SERPL-MCNC: 14.5 MG/L
BETA+GAMMA TOCOPHEROL SERPL-MCNC: 0.9 MG/L

## 2021-11-15 LAB — PYRIDOXAL PHOS SERPL-SCNC: 144.8 NMOL/L

## 2021-11-16 LAB — VIT B1 PYROPHOSHATE BLD-SCNC: 239 NMOL/L

## 2021-11-23 ENCOUNTER — OFFICE VISIT (OUTPATIENT)
Dept: OPHTHALMOLOGY | Facility: CLINIC | Age: 51
End: 2021-11-23
Attending: OPHTHALMOLOGY
Payer: COMMERCIAL

## 2021-11-23 DIAGNOSIS — H02.889 MGD (MEIBOMIAN GLAND DYSFUNCTION): ICD-10-CM

## 2021-11-23 DIAGNOSIS — H04.123 BILATERAL DRY EYES: Primary | ICD-10-CM

## 2021-11-23 DIAGNOSIS — H01.02B SQUAMOUS BLEPHARITIS OF UPPER AND LOWER EYELIDS OF BOTH EYES: ICD-10-CM

## 2021-11-23 DIAGNOSIS — H16.123 FILAMENTARY KERATITIS OF BOTH EYES: ICD-10-CM

## 2021-11-23 DIAGNOSIS — H01.02A SQUAMOUS BLEPHARITIS OF UPPER AND LOWER EYELIDS OF BOTH EYES: ICD-10-CM

## 2021-11-23 PROCEDURE — G0463 HOSPITAL OUTPT CLINIC VISIT: HCPCS | Mod: 25

## 2021-11-23 PROCEDURE — 68761 CLOSE TEAR DUCT OPENING: CPT | Mod: 50 | Performed by: OPHTHALMOLOGY

## 2021-11-23 ASSESSMENT — VISUAL ACUITY
METHOD: SNELLEN - LINEAR
CORRECTION_TYPE: GLASSES
OS_CC: 20/20
OD_CC: 20/30
OD_PH_CC: 20/25

## 2021-11-23 ASSESSMENT — REFRACTION_WEARINGRX
OD_AXIS: 155
OD_SPHERE: -3.50
OD_AXIS: 137
SPECS_TYPE: PAL
OD_CYLINDER: +0.75
OS_SPHERE: -2.75
OD_SPHERE: -2.25
OS_CYLINDER: SPHERE
OD_ADD: +2.33
OS_SPHERE: -2.25
OS_CYLINDER: +0.25
OS_ADD: +2.33
OD_CYLINDER: +0.25
OS_AXIS: 106

## 2021-11-23 ASSESSMENT — TONOMETRY
IOP_METHOD: ICARE
OD_IOP_MMHG: 15
OS_IOP_MMHG: 16

## 2021-11-23 ASSESSMENT — EXTERNAL EXAM - LEFT EYE: OS_EXAM: NORMAL

## 2021-11-23 ASSESSMENT — CONF VISUAL FIELD
OS_NORMAL: 1
METHOD: COUNTING FINGERS
OD_NORMAL: 1

## 2021-11-23 ASSESSMENT — EXTERNAL EXAM - RIGHT EYE: OD_EXAM: NORMAL

## 2021-11-23 NOTE — PROGRESS NOTES
Chief Complaint(s) and History of Present Illness(es)     Follow Up     Laterality: both eyes    Treatments tried: artificial tears and eye drops    Pain scale: 0/10              Comments     Follow up for dry eyes and post right lower punctal plug insertion.    The patient notes that the custom made Scleral lens have helped her dry   eye symptoms.  The patient is using Xiidra twice daily, Serum tears 6 times daily, Pred   Healon four times daily, Systane ointment and Mucmyst 6 times daily.  The patient fills the Scleral lens cup with Serum tears and Celluvisc.  Bethany Burgess, COA, COA 8:14 AM 11/23/2021               Review of systems for the eyes was negative other than the pertinent positives/negatives listed in the HPI.      Assessment & Plan      Roxanna Celis is a 51 year old female with the following diagnoses:   1. Bilateral dry eyes    2. Filamentary keratitis of both eyes    3. Squamous blepharitis of upper and lower eyelids of both eyes    4. MGD (meibomian gland dysfunction)         H/O worsened dry eyes after starting Vraylar; persistent after cessation in 8/2020   Intolerance to silicone plugs   Symptoms greatly improving with custom scleral lenses  S/P Lipiflow last month    Current therapy:  Warm compresses 2-3x daily   Doxycycline 50 mg orally twice a day   Xiidra twice a day, both eyes   Pred healon 4x daily, both eyes   Serum tears to fill scleral lenses 2-3x daily  Systane ointment PRN when lenses are out   Mucomyst 4-6 times a day, both eyes      Continue scleral lenses as many waking hours as tolerated  Will await improving weather before trying to reduce prescription drops as above  Intraocular pressure acceptable both eyes   Replaced bilateral lower lids collagen plugs today in both eyes   Return precautions reviewed     Patient disposition:   Return in about 5 months (around 4/23/2022) for DFE.           Attending Physician Attestation:  Complete documentation of historical and exam  elements from today's encounter can be found in the full encounter summary report (not reduplicated in this progress note).  I personally obtained the chief complaint(s) and history of present illness.  I confirmed and edited as necessary the review of systems, past medical/surgical history, family history, social history, and examination findings as documented by others; and I examined the patient myself.  I personally reviewed the relevant tests, images, and reports as documented above.  I formulated and edited as necessary the assessment and plan and discussed the findings and management plan with the patient and family. . - Faheem Ocampo MD

## 2021-11-23 NOTE — NURSING NOTE
Chief Complaints and History of Present Illnesses   Patient presents with     Follow Up     Chief Complaint(s) and History of Present Illness(es)     Follow Up     Laterality: both eyes    Treatments tried: artificial tears and eye drops    Pain scale: 0/10              Comments     Follow up for dry eyes and post right lower punctal plug insertion.    The patient notes that the custom made Scleral lens have helped her dry eye symptoms.  The patient is using Xiidra twice daily, Serum tears 6 times daily, Pred Healon four times daily, Systane ointment and Mucmyst 6 times daily.  The patient fills the Scleral lens cup with Serum tears and Celluvisc.  Bethany Burgess, COA, COA 8:14 AM 11/23/2021

## 2021-11-26 ENCOUNTER — THERAPY VISIT (OUTPATIENT)
Dept: OCCUPATIONAL THERAPY | Facility: CLINIC | Age: 51
End: 2021-11-26
Attending: STUDENT IN AN ORGANIZED HEALTH CARE EDUCATION/TRAINING PROGRAM
Payer: COMMERCIAL

## 2021-11-26 DIAGNOSIS — R29.898 HAND WEAKNESS: ICD-10-CM

## 2021-11-26 DIAGNOSIS — R27.8 CLUMSINESS: ICD-10-CM

## 2021-11-26 PROCEDURE — 97760 ORTHOTIC MGMT&TRAING 1ST ENC: CPT | Mod: GO

## 2021-11-26 PROCEDURE — 97110 THERAPEUTIC EXERCISES: CPT | Mod: GO

## 2021-11-26 PROCEDURE — 97165 OT EVAL LOW COMPLEX 30 MIN: CPT | Mod: GO

## 2021-11-26 NOTE — PROGRESS NOTES
Hand Therapy Initial Evaluation    Current Date:  11/26/2021    Diagnosis: Clumsiness; Hand Weakness  DOI: 2 years ago  MD Order Date: 11/11/2021    Subjective:  Roxanna Celis is a 51 year old female.    Patient reports symptoms of the bilateral arms which occurred due to gradual onset. Since onset symptoms are Gradually getting worse.  General health as reported by patient is good.  Pertinent medical history includes:Concussions/Dizziness, Depression, History of Fractures, Incontinence, Numbness/Tingling, Overweight, Sleep Disorder/Apnea, Calf Pain, Swelling, Warmth, Chest Pain, Cold/Hot Extremity, Pain at Night/Rest, Progressive Neurological Deficits, Severe Headaches, Significant Weakness  Medical allergies:none.  Surgical history: none.  Medication history: Anti-depressants, Muscle Relaxants, Pain.    Current occupation is housewife  Job Tasks: Computer Work, Driving, Lifting, Carrying, Prolonged Standing, Pushing, Pulling, Repetitive Tasks    Occupational Profile Information:  Right hand dominant  Prior functional level:  independent-shared household chores  Patient reports symptoms of pain, weakness/loss of strength, edema, numbness and tingling   Special tests:  x-ray, EMG and MRI.    Previous treatment: otc medication, ointments, essential oils, icing, pool therapy  Barriers include:none  Mobility: No difficulty  Transportation: drives  Leisure activities/hobbies: cooking    Functional Outcome Measure:   Upper Extremity Functional Index Score:  SCORE:   Column Totals: /80: 2   (A lower score indicates greater disability.)    Objective:  Pain Level (Scale 0-10)   11/26/2021   At Rest R: 7/10   L: 7.5/10   With Use R: 9/10  L: 9/10     Pain Description  Date 11/26/2021   Location B volar wrist pain at crease, shooting pain from hand to elbow, lateral epicondyles    Pain Quality Aching, Burning, Dull, Numb, Sharp, Shooting, Tingling and Tiring   Frequency constant     Pain is worst  daytime or nighttime    Exacerbated by  with use   Relieved by rest   Progression Gradually worsening     Posture  Normal    Sensation  Decreased Radial Nerve distribution per pt report    ROM  Pain Report: - none  + mild    ++ moderate    +++ severe   Elbow 11/26/2021 11/26/2021   AROM (PROM) R L   Extension 0 -8   Flexion 127 124   Supination 86 76   Pronation 88       86     Wrist 11/26/2021 11/26/2021   AROM (PROM) R L   Extension 59 43   Flexion 12 14   RD 1 6   UD 23 27     Resisted Testing  Pain Report:  - none    + mild    ++ moderate    +++ severe    11/26/2021   Elbow Extension R: 4/5, 6/10 pain  L: 4/5, 8/10 pain   Elbow Flexion R: 4/5, 5/10 pain  L: 4/5, 8/10 pain   Supination  R: 4/5, 7/10 pain  L: 4/5, 9/10 pain   Pronation R: 5/5, 7/10 pain  L: 3/5, 9.9/10 pain   Wrist Ext with RD, Elbow at side R: NT - too painful  L: NT - too painful   Wrist Ext with UD, Elbow at side R: NT - too painful  L: NT - too painful   Wrist Flex with RD, Elbow at side R: NT - too painful  L: NT - too painful   Wrist Flex with UD, Elbow at side R: NT - too painful  L: NT - too painful   EDC with Elbow at side R: 4/5, 9.5/10 pain  L: NT - too painful   Long Finger Test R: 5/5, 8/10 pain  L: 5/5, 9/10 pain     Neural Tension Testing  RNT: Radial Neurodynamic Test (based on RILEY Argueta's ULNT)   11/26/2021   0-5 Scale R: 2/5    L: 1/5   Position:   0/5: Arm across abdomen in coronal plane  1/5: Depress shoulder, ER to neutral ABD shoulder to 45 degrees  2/5: IR shoulder to end range, keep elbow at 90 degrees  3/5: Extend elbow to 0 degrees  4/5: Fully pronate forearm  5/5: Flex wrist and fingers with UD  Notes:    (+) indicates beyond grade level but less than residential to next level    (-) indicates over residential to level    S1  onset/change of patient's symptoms    S2 definite stop point based on patient's discomfort level    Strength   (Measured in pounds)  Not tested at this time due to current pain level. Pt reports difficulty with lifting items  like a cup and utensils during mealtimes.     Palpation  Pain Report:  - none    + mild    ++ moderate    +++ severe    11/26/2021   Proximal Triceps R: 5/10  L: 9.9/10   Spiral Groove R: 5/10  L: 9.9/10   Distal Triceps R: 9/10  L: 9.9/10   ECRB at LEP R: 8/10  L: 6/10   ECU at LEP R: 4/10  L: 7/10   EDC at LEP R: 8/10  L: 8.5/10   Radial Head R: 9/10  L: 10/10   Extensor Wad R: 9/10  L: 10/10   PIN Site R: 10/10  L: 9/10     Assessment:  Patient presents with symptoms consistent with diagnosis of bilateral hand weakness, with conservative intervention.     Patient's limitations or Problem List includes:  Pain, Decreased ROM/motion, Weakness, Sensory disturbance and Tightness in musculature of the bilateral hand which interferes with the patient's ability to perform Self Care Tasks (dressing, bathing, hygiene/toileting), Sleep Patterns, Recreational Activities, Household Chores and Driving  as compared to previous level of function.    Rehab Potential:  Good - Return to full activity, some limitations    Patient will benefit from skilled Occupational Therapy to increase ROM, flexibility, motion, overall strength,  strength, pinch strength and sensation and decrease pain to return to previous activity level and resume normal daily tasks and to reach their rehab potential.    Barriers to Learning:  No barrier    Communication Issues:  Patient appears to be able to clearly communicate and understand verbal and written communication and follow directions correctly.    Chart Review: Chart Review and Simple history review with patient    Identified Performance Deficits: bathing/showering, dressing, hygiene and grooming, driving and community mobility, health management and maintenance, home establishment and management, meal preparation and cleanup, shopping, sleep and leisure activities    Assessment of Occupational Performance:  5 or more Performance Deficits    Clinical Decision Making (Complexity): Low  complexity    Treatment Explanation:  The following has been discussed with the patient:  RX ordered/plan of care  Anticipated outcomes  Possible risks and side effects    Plan:  Frequency:  1 X week, once daily  Duration:  for 8 weeks    Treatment Plan:    Modalities:    US, Fluidotherapy and Paraffin   Therapeutic Exercise:    AROM, AAROM, PROM, Tendon Gliding, Isotonics, Isometrics and Stabilization  Therapeutic Activities:  Functional activities   Neuromuscular re-ed:   Nerve Gliding, Kinesiotaping and Stabilization  Manual Techniques:   Joint mobilization, Friction massage, Myofascial release and Manual edema mobilization  Orthotic Fabrication:    Static  Self Care:    Self Care Tasks and Ergonomic Considerations    Discharge Plan:  Achieve all LTG.  Independent in home treatment program.  Reach maximal therapeutic benefit.    Home Program:   Avoid activities that exacerbate pain in the elbow  Lift with elbows at side and forearms in neutral/supinated position  TENNIS ELBOW PREVENTION  EMR Notes  HEP - Sets  Reps  Sessions per day  Notes  Icing  EMR Notes  HEP - Sets  Reps  Sessions per day  Notes ice at least 1x/day, for 10-15 minutes Can also do icing after an activity that is painful  Warmth  EMR Notes  HEP - Sets  Reps  Sessions per day  Notes  Ball Massage  EMR Notes  HEP - Sets  Reps  Sessions per day 1-2  Notes Massage forearm  Forearm PROM Advanced Flexor Stretch  EMR Notes  HEP - Sets  Reps 10 reps. hold 15-30 sec  Sessions per day 2-3  Notes hold 15-20 sec  Forearm Passive Range of Motion Extensor Stretch  EMR Notes  HEP - Sets  Reps 10 reps. hold 15-30 sec  Sessions per day 2-3  Notes PAIN FREE  Nerve Gliding Proximal Radial  EMR Notes  HEP - Sets  Reps 10  Sessions per day 1-2  Notes Ignore head movements. Hold for 5 seconds in position where you start to feel the pull.    Next Visit:  Review HEP  Review Orthosis Fit - make wrist in neutral for right side  Nerve Gliding  TFM to LEP  MFR to  extensors, avoiding PIN site

## 2021-11-30 ENCOUNTER — THERAPY VISIT (OUTPATIENT)
Dept: OCCUPATIONAL THERAPY | Facility: CLINIC | Age: 51
End: 2021-11-30
Payer: COMMERCIAL

## 2021-11-30 ENCOUNTER — TELEPHONE (OUTPATIENT)
Dept: PALLIATIVE MEDICINE | Facility: CLINIC | Age: 51
End: 2021-11-30

## 2021-11-30 DIAGNOSIS — M79.18 CHRONIC MYOFASCIAL PAIN: Primary | ICD-10-CM

## 2021-11-30 DIAGNOSIS — R29.898 HAND WEAKNESS: ICD-10-CM

## 2021-11-30 DIAGNOSIS — R27.8 CLUMSINESS: ICD-10-CM

## 2021-11-30 DIAGNOSIS — G89.29 CHRONIC MYOFASCIAL PAIN: Primary | ICD-10-CM

## 2021-11-30 DIAGNOSIS — M79.2 NEUROPATHIC PAIN: ICD-10-CM

## 2021-11-30 PROCEDURE — 97112 NEUROMUSCULAR REEDUCATION: CPT | Mod: GO | Performed by: OCCUPATIONAL THERAPIST

## 2021-11-30 NOTE — TELEPHONE ENCOUNTER
Reason for call:  Other   Patient called regarding (reason for call): call back  Additional comments: Pt calling to state that she has stopped all medications due to weight gain. Pt states since the last appointment and starting the medications she has gained 15 pounds.    Phone number to reach patient:  Cell number on file:    Telephone Information:   Mobile [phone]       Best Time:  anytime    Can we leave a detailed message on this number?  YES    Travel screening: Not Applicable     Marcela KENNEY    [facility]

## 2021-12-01 NOTE — TELEPHONE ENCOUNTER
"Routing to provider pool, pt requesting Lyrica taper as wanting to discontinue med d/t side effects. Currently on Lyrica, 150mg BID.      Spoke with pt, she states was retaining fluid prior to increase in lyrica at OV 11/12 but went ahead with increase. Has now gained too much weight and does not want to stay on med. Wants to figure out how to stop.    States she \"stopped\" but when clarified she said she did not take dose this am only. Discussed that medication should not be suddenly discontinued and that we will route to provider pool in Nicole's absence to set up taper plan. Pt also inquiring about meds for pain once off Lyrica.    Last OV note 11/12/21: Continue cymbalta. Consider long acting NSAID. Increase lyrica to 150mg BID.    Annika ZEE RN Care Coordinator  [facility]    "

## 2021-12-01 NOTE — TELEPHONE ENCOUNTER
Routing to provider pool with 75mg x4day rx prepped as discussed below    This was omitted in error in previous note - pt has no 100mg from previous dose ALPESH ZEE RN Care Coordinator  [facility]

## 2021-12-01 NOTE — TELEPHONE ENCOUNTER
I recommend that she do the 150mg daily dose for 4-5 days.    Does she have any lower doses at home from her previous scripts?  Ideally we wouldn't go from 150mg/day to off.  I may need to supply a very short script for taper (like 75mg daily x 4 days).    Erica Tom MD  [facility]

## 2021-12-02 RX ORDER — PREGABALIN 75 MG/1
75 CAPSULE ORAL DAILY
Qty: 4 CAPSULE | Refills: 0 | Status: SHIPPED | OUTPATIENT
Start: 2021-12-02 | End: 2022-05-05

## 2021-12-02 NOTE — TELEPHONE ENCOUNTER
Tapering prescription sent per previous instructions.    Kimberley Tejada PA-C on 12/2/2021 at 12:53 PM

## 2021-12-02 NOTE — TELEPHONE ENCOUNTER
Called to discuss taper plan w/ pt:    She will start today:    150mg daily dose for 4 days (has only 4 OH)    followed by    75mg daily x 4 days      Then stop    Pt expressed understanding. Will discuss alternative meds at 12/20 follow-up. Will update via my chart with any concerns. Instructions sent via [facility] also.     Annika ZEE RN Care Coordinator  [facility]

## 2021-12-06 ENCOUNTER — OFFICE VISIT (OUTPATIENT)
Dept: OPTOMETRY | Facility: CLINIC | Age: 51
End: 2021-12-06
Payer: COMMERCIAL

## 2021-12-06 DIAGNOSIS — Z97.3 USES CONTACT LENSES: ICD-10-CM

## 2021-12-06 DIAGNOSIS — H04.123 DRY EYES: Primary | ICD-10-CM

## 2021-12-06 DIAGNOSIS — H16.223 KERATITIS SICCA, BILATERAL: ICD-10-CM

## 2021-12-06 DIAGNOSIS — H57.13 EYE PAIN, BILATERAL: ICD-10-CM

## 2021-12-06 RX ORDER — SODIUM CHLORIDE FOR INHALATION 0.9 %
VIAL, NEBULIZER (ML) INHALATION
Qty: 300 ML | Refills: 11 | Status: SHIPPED | OUTPATIENT
Start: 2021-12-06 | End: 2023-04-18

## 2021-12-06 ASSESSMENT — REFRACTION_MANIFEST
OD_SPHERE: -2.25
OD_CYLINDER: SPHERE
METHOD_AUTOREFRACTION: 1
OS_CYLINDER: SPHERE
OS_SPHERE: -2.25

## 2021-12-06 ASSESSMENT — REFRACTION_WEARINGRX
OD_AXIS: 155
OS_ADD: +2.33
SPECS_TYPE: PAL
OD_CYLINDER: +0.75
OD_SPHERE: -3.50
OD_CYLINDER: +0.25
OS_CYLINDER: +0.25
OS_AXIS: 106
OD_ADD: +2.33
OS_SPHERE: -2.25
OD_AXIS: 137
OD_SPHERE: -2.25
OS_SPHERE: -2.75
OS_CYLINDER: SPHERE

## 2021-12-06 ASSESSMENT — REFRACTION_CURRENTRX
OD_BASECURVE: 8.047
OS_SPHERE: +0.50
OD_SPHERE: +0.75
OD_BRAND: EYEFIT PRO
OS_DIAMETER: 18.0
OS_BASECURVE: 7.899
OD_DIAMETER: 18.0
OS_BRAND: EYEFIT PRO

## 2021-12-06 ASSESSMENT — VISUAL ACUITY
OD_CC: 20/20-2
METHOD: SNELLEN - LINEAR
CORRECTION_TYPE: GLASSES, CONTACTS
OS_CC: 20/20

## 2021-12-06 ASSESSMENT — EXTERNAL EXAM - RIGHT EYE: OD_EXAM: NORMAL

## 2021-12-06 ASSESSMENT — EXTERNAL EXAM - LEFT EYE: OS_EXAM: NORMAL

## 2021-12-06 ASSESSMENT — TONOMETRY
OS_IOP_MMHG: 16
OD_IOP_MMHG: 15
IOP_METHOD: ICARE

## 2021-12-06 NOTE — PROGRESS NOTES
Warm compresses 2-3x daily   Doxycycline 50 mg orally twice a day   Xiidra twice a day, both eyes   Pred healon 4x daily, both eyes   Serum tears to fill scleral lenses 2-3x daily  Systane ointment PRN when lenses are out   Mucomyst 4-6 times a day, both eyes   S/p Lipiflow x 1  Tyrvaya nasal spray (new)    A/P  1.) Dry Eye each eye  -Severely symptomatic despite max topical treatment and significant objective improvement in corneal appearance with scleral lens wear  -Excellent response to Eyefit lenses, much improved comfort and wear time. Current fit looks excellent  -Happy with nearsighted target in CL's (-2.50 distance Rx).     2.) Blepharitis/MGD each eye  -Endorses significant irritation, burning, stinging mostly upper lids  -Failed: maxitrol anjelica, Refresh PM, Genteal Gel, erythromycin anjelica - has only done well with Systane ointment  -s/p first round of Lipiflow. Difficult recovery but does not feel things have improved. Scheduled for next session in January    Lenses look excellent. Good corneal surface and tear film volume today. Systemic symptoms still bothersome but eyes making symptomatic improvement slowly.   Will order new pair with minimal adjustments for spare set and mail direct. F/u 6 months.     30+ min spent on the date of encounter in direct care/counseling/review of charts/prescribing/documentation

## 2021-12-14 DIAGNOSIS — G89.29 CHRONIC MYOFASCIAL PAIN: ICD-10-CM

## 2021-12-14 DIAGNOSIS — M79.18 CHRONIC MYOFASCIAL PAIN: ICD-10-CM

## 2021-12-14 RX ORDER — METHOCARBAMOL 500 MG/1
500 TABLET, FILM COATED ORAL 2 TIMES DAILY PRN
Qty: 60 TABLET | Refills: 0 | Status: SHIPPED | OUTPATIENT
Start: 2021-12-14 | End: 2022-11-28

## 2021-12-14 NOTE — TELEPHONE ENCOUNTER
Received fax from pharmacy requesting refill(s) for methocarbamol (ROBAXIN) 500 MG tablet     Last refilled on 11/12/21    Pt last seen on 11/12/21  Next appt scheduled for 12/20/21    E-prescribe to:     [id number] [place] [facility] - [place], [place] - [address]     Will facilitate refill.      Crystal Grady MA  [facility]

## 2021-12-14 NOTE — TELEPHONE ENCOUNTER
Methocarbamol refill approved.    Yasmany Rivera DO  [facility]

## 2021-12-20 ENCOUNTER — OFFICE VISIT (OUTPATIENT)
Dept: PALLIATIVE MEDICINE | Facility: CLINIC | Age: 51
End: 2021-12-20
Payer: COMMERCIAL

## 2021-12-20 ENCOUNTER — DOCUMENTATION ONLY (OUTPATIENT)
Dept: OPTOMETRY | Facility: CLINIC | Age: 51
End: 2021-12-20
Payer: COMMERCIAL

## 2021-12-20 VITALS — OXYGEN SATURATION: 100 % | DIASTOLIC BLOOD PRESSURE: 81 MMHG | HEART RATE: 89 BPM | SYSTOLIC BLOOD PRESSURE: 135 MMHG

## 2021-12-20 DIAGNOSIS — G89.29 CHRONIC MYOFASCIAL PAIN: Primary | ICD-10-CM

## 2021-12-20 DIAGNOSIS — M79.18 CHRONIC MYOFASCIAL PAIN: Primary | ICD-10-CM

## 2021-12-20 PROCEDURE — 99214 OFFICE O/P EST MOD 30 MIN: CPT | Performed by: PHYSICAL MEDICINE & REHABILITATION

## 2021-12-20 ASSESSMENT — PAIN SCALES - GENERAL: PAINLEVEL: EXTREME PAIN (8)

## 2021-12-20 NOTE — PROGRESS NOTES
[facility]    Date of visit: 21         Assessment:  Roxanna Celis is a 50 year old with past medical history including: ELY, Obesity, HTN, SICCA symptoms, spasmodic torticollis, who presents for follow-up and treatment of the followin. Painful peripheral neuropathy: Patient reports gradual onset of paresthesias and burning/tingling type pain around 2019 that began in the periphery and spread proximally in their upper and lower extremities. Currently they describe paresthesias and pain in their entire bilateral upper and lower extremities. Upper extremity EMG was previously normal. Cervical, thoracic and lumbar MRI completed, no pathology to explain their current symptoms. Seen by Dr. Torres and will have a small fiber nerve biopsy completed.     2. Chronic Myofascial Pain: Patient meets the diagnostic criteria for fibromyalgia based on history and exam. Prior work-up has been unrevealing for a neurologic or rheumatologic cause for their symptoms. Discussed the pathophysiology of fibromyalgia and overlap with their neuropathic symptoms as well as how fibromyalgia may exacerbate an underlying peripheral neuropathy and arthritic pain.      Mental Health - the patient's mental health concerns, specifically anxiety, depression, affect her experience of pain and contribute to her clinically significant distress.        Plan:  The following recommendations were given to the patient. Diagnosis, treatment options, risks, benefits, and alternatives were discussed, and all questions were answered. The patient expressed understanding of the plan for management.      I am recommending a multidisciplinary treatment plan to help this patient better manage her pain.  This includes:      1. Physical Therapy: Continue pool therapy at [facility].  1. Start chronic pain PT once pool PT is completed.  2. Clinical Health Pain Psychologist: pain phd referral placed.   "  1. Therapy can help reduce physical and psychosocial triggers or reinforcers of pain by adapting thoughts, feelings and behaviors to reduce symptoms and increase quality of life.  Evidence indicates that the practice of relaxation, meditation, and mindfulness techniques can significantly affect pain levels and overall well being.  3. Self Care Recommendations: Gentle progressive exercise that does not increase pain - gradually increase daily walking program.  Take mini breaks - 5 minutes of mindfullness a couple times a day.   4. Diagnostic Studies: none  5. Medication Management:  1. Start low dose naltrexone 3mg nightly.  2. Continue methocarbamol 500mg BID prn  3. Continue CBD oil. Consider medical cannabis.  4. Continue cymbalta.      5. Consider long acting NSAID.   6. Further procedures recommended: none  7. Follow up: 6-8 weeks.        Yasmany Rivera [facility]          Chief complaint:   Chief Complaint   Patient presents with     Pain       Interval history:  Roxanna Celis is a 51 year old female last seen by me on 11/12/2021.        Since her last visit, Roxanna Celis reports:    -They've been having worsening left wrist pain which is making it difficult to tolerate pool PT. They have an MRI pending for this later this month. They have had a left wrist steroid injection in the past with improvement for a few months.    -They have been doing hand therapy and have had a brace made which helps unless they wear it too long.    -They continue having significant pain in all limbs.    -They weren't able to tolerate lyrica due to swelling in their legs, they had swelling to the point where they couldn't wear their shoes or boots. They did wean down their lyrica and are closer to their baseline weight now, about 2 pounds higher.    -They are unable to \"do anything at home\". Anything they do seems to make their pain worse.    -They're getting cramping in their legs and get pins and " needles pains as well.    -They are getting a nerve fiber biopsy next week and are hoping this will help clarify a peripheral neuropathy diagnosis.        Pain scores:  Pain intensity on average is 10 on a scale of 0-10.     Pain Treatments:  1. Medications:       Current pain medications:    -Bupropion 300mg daily  -Cymbalta 120mg daily      Prior pain medications:   -Gabapentin 1200mg at night- drowsy with higher dose   -Lyrica 100mg BID  2. Physical Therapy: Doing pool PT currently              TENS unit: hasn't tried  3. Pain psychology: hasn't tried  4. Surgery: hasn't tried  5. Injections: none  6. Alternative Therapies:               Chiropractic: hasn't tried               Acupuncture: hasn't tried       Side Effects: no side effect    Medications:  Current Outpatient Medications   Medication Sig Dispense Refill     buPROPion (WELLBUTRIN XL) 300 MG 24 hr tablet Take 300 mg by mouth every morning       ClonAZEPAM (KLONOPIN) 1 MG tablet Take 1 mg by mouth 2 times daily as needed.       doxycycline monohydrate (ADOXA) 50 MG tablet Take 1 tablet (50 mg) by mouth 2 times daily 180 tablet 3     DULoxetine HCl (CYMBALTA PO) Take 60 mg by mouth       Ibuprofen (ADVIL) 200 MG capsule Take 200 mg by mouth every 4 hours as needed.       iloperidone (FANAPT) 6 MG TABS tablet Take 4 mg by mouth daily        LINZESS 290 MCG capsule TAKE 1 CAPSULE BY MOUTH EVERY DAY  3     acetylcysteine 10 % (MUCOMYST) 10% SOLN Place 2 drops into both eyes 6 times daily 30 mL 5     aspirin-acetaminophen-caffeine (EXCEDRIN MIGRAINE) 250-250-65 MG per tablet Take 1 tablet by mouth every 6 hours as needed. (Patient not taking: Reported on 12/20/2021)       autologous serum compounded ophthalmic solution Place 1 drop into both eyes every 2 hours (while awake)       BOTOX 100 units injection Inject 250 Units into the muscle once Lot # /C3 with Expiration Date:  11/2020 (Patient not taking: Reported on 12/20/2021)       EPINEPHrine  (EPIPEN) 0.3 MG/0.3ML injection Inject 0.3 mg into the muscle once as needed        hydrochlorothiazide (HYDRODIURIL) 50 MG tablet Take 50 mg by mouth daily       lifitegrast (XIIDRA) 5 % opthalmic solution Place 1 drop into both eyes 2 times daily 180 each 1     methocarbamol (ROBAXIN) 500 MG tablet Take 1 tablet (500 mg) by mouth 2 times daily as needed for muscle spasms (Patient not taking: Reported on 12/20/2021) 60 tablet 0     ONABOTULINUMTOXINA IJ Inject 250 Units as directed once LOT # /C3  EXPIRATION: 03/2020       prednisolone 0.25% and hyaluronate in balanced salt SUSP compounded ophthalmic suspension Place 1 drop into both eyes 4 times daily 6.67 mL 11     pregabalin (LYRICA) 100 MG capsule Take 1 capsule (100 mg) by mouth 2 times daily (Patient not taking: Reported on 12/20/2021) 60 capsule 0     pregabalin (LYRICA) 150 MG capsule Take 1 capsule (150 mg) by mouth 2 times daily for 20 days 40 capsule 0     pregabalin (LYRICA) 75 MG capsule Take 1 capsule (75 mg) by mouth daily for 4 days 4 capsule 0     sodium chloride 0.9 % neb solution Medically necessary for scleral contact lens use. May use 3 ml 6 times a day for contact lens use 300 mL 11     tretinoin (RETIN-A) 0.025 % external cream Apply a pea size to entire face QD 45 g 11     White Petrolatum-Mineral Oil (SYSTANE NIGHTTIME) OINT Apply 3.5 g to eye 4 times daily as needed (dry eyes) 7 g 11       Medical History: any changes in medical history since they were last seen? No    Review of Systems:  Positive for: neck pain, back pain, joint pain, paresthesias, numbness    Mood: depression    Physical Exam:  Blood pressure 135/81, pulse 89, SpO2 100 %, not currently breastfeeding.  General: NAD, pleasant  Gait: Slow, antalgic. No specific gait deviations noted.  Bilateral wrist ROM is mildly reduced in all planes. No significant swelling or erythema noted. Pain along the ulnar aspect of the wrist with palpation. Strength is 5/5 and symmetric with  wrist extension/felxion and finger abduction. Sensation is intact and symmetric.    BILLING TIME DOCUMENTATION:   The total TIME spent on this patient on the date of the encounter/appointment was 30 minutes.      TOTAL TIME includes:   Time spent preparing to see the patient (reviewing records and tests)   Time spent face to face (or over the phone) with the patient   Time spent ordering tests, medications, procedures and referrals   Time spent Referring and communicating with other healthcare professionals   Time spent documenting clinical information in Epic       Yasmany Rivera DO  [facility]

## 2021-12-20 NOTE — PATIENT INSTRUCTIONS
1. Continue pool therapy and hand therapy.    2. I ordered low dose naltrexone 3mg, you can take one tablet every night. Call [phone] to see when this will be ready.    3. Once you've completed pool therapy, give us a call and I can order chronic pain physical therapy.    4. Follow up in 2 months.    Take care,    Yasmany Rivera [facility]        ----------------------------------------------------------------  Clinic Number:  [phone]     Call with any questions about your care and for scheduling assistance.     Calls are returned Monday through Friday between 8 AM and 4:30 PM. We usually get back to you within 2 business days depending on the issue/request.    If we are prescribing your medications:    For opioid medication refills, call the clinic or send a [employer] message 7 days in advance.  Please include:    Name of requested medication    Name of the pharmacy.    For non-opioid medications, call your pharmacy directly to request a refill. Please allow 3-4 days to be processed.     Per [place] State Law:    All controlled substance prescriptions must be filled within 30 days of being written.      For those controlled substances allowing refills, pickup must occur within 30 days of last fill.      We believe regular attendance is key to your success in our program!      Any time you are unable to keep your appointment we ask that you call us at least 24 hours in advance to cancel.This will allow us to offer the appointment time to another patient.     Multiple missed appointments may lead to dismissal from the clinic.

## 2021-12-20 NOTE — PROGRESS NOTES
Lenses arrived here instead of direct to pt as requested    Mailed out today USPS tracking [id number] [phone]

## 2021-12-28 ENCOUNTER — OFFICE VISIT (OUTPATIENT)
Dept: NEUROLOGY | Facility: CLINIC | Age: 51
End: 2021-12-28
Payer: COMMERCIAL

## 2021-12-28 VITALS
SYSTOLIC BLOOD PRESSURE: 140 MMHG | HEART RATE: 87 BPM | TEMPERATURE: 98.4 F | WEIGHT: 215 LBS | BODY MASS INDEX: 34.7 KG/M2 | DIASTOLIC BLOOD PRESSURE: 85 MMHG

## 2021-12-28 DIAGNOSIS — R20.2 PARESTHESIA: ICD-10-CM

## 2021-12-28 DIAGNOSIS — M79.2 NEUROPATHIC PAIN: ICD-10-CM

## 2021-12-28 DIAGNOSIS — M79.2 NEUROPATHIC PAIN: Primary | ICD-10-CM

## 2021-12-28 PROCEDURE — 11104 PUNCH BX SKIN SINGLE LESION: CPT | Performed by: PSYCHIATRY & NEUROLOGY

## 2021-12-28 PROCEDURE — 88350 IMFLUOR EA ADDL 1ANTB STN PX: CPT | Mod: 90 | Performed by: PSYCHIATRY & NEUROLOGY

## 2021-12-28 PROCEDURE — 11105 PUNCH BX SKIN EA SEP/ADDL: CPT | Performed by: PSYCHIATRY & NEUROLOGY

## 2021-12-28 PROCEDURE — 88356 ANALYSIS NERVE: CPT | Mod: 90 | Performed by: PSYCHIATRY & NEUROLOGY

## 2021-12-28 PROCEDURE — 88314 HISTOCHEMICAL STAINS ADD-ON: CPT | Mod: 90 | Performed by: PSYCHIATRY & NEUROLOGY

## 2021-12-28 PROCEDURE — 88305 TISSUE EXAM BY PATHOLOGIST: CPT | Mod: 90 | Performed by: PSYCHIATRY & NEUROLOGY

## 2021-12-28 PROCEDURE — 88348 ELECTRON MICROSCOPY DX: CPT | Mod: 90 | Performed by: PSYCHIATRY & NEUROLOGY

## 2021-12-28 PROCEDURE — 88346 IMFLUOR 1ST 1ANTB STAIN PX: CPT | Mod: 90 | Performed by: PSYCHIATRY & NEUROLOGY

## 2021-12-28 ASSESSMENT — PAIN SCALES - GENERAL: PAINLEVEL: EXTREME PAIN (8)

## 2021-12-28 NOTE — PROGRESS NOTES
Procedure note: Skin biopsy. Indication: sensory disturbance, skin. After obtaining informed consent, 3 mm skin biopsies were performed over the right calf and left forearm. 1% lidocaine anesthesia and betadine sterile prep were used. The patient tolerated the procedure well. Wound care and patient education were provided by Amelia Bai R.N.

## 2021-12-28 NOTE — PATIENT INSTRUCTIONS
Skin Biopsy Education    1.) Please keep dressing on for 24 hours. You may re-bandage the site if needed and use a band-aid after 24 hours.   2.) You can typically resume normal work/home activities but avoid any excessive activity of biopsied site if possible. You may shower but please avoid submerging the biopsied site until fully healed (baths, hot tubs, pools, etc.)   3.) Watch for signs of infection such as increased redness, swelling, pain, yellow drainage, fever, etc.)   4.) It may take up to 3-4 weeks for the results to come in. Your provider will be in touch or they may have already scheduled a follow-up appointment to discuss your results.   5.) Please call us back if you should have any questions [phone]. Thank you.

## 2021-12-28 NOTE — NURSING NOTE
Pt, witness and provider signed the informed consent. This will be sent down to be scanned into the pt's chart. The skin biopsy was performed by Dr. Zamora for reasons indicated in the pt's chart or on the referral/order. Writer cleansed the surrounding site and applied sterile petroleum jelly to each site (right lower leg and left forearm). Covered both sites with a sterile 2x2 gauze and Tegaderm. Areas wrapped with Coban. Pt sent home with extra dressing supplies if needed.   Encouraged pt to call if any further questions or concerns.      Amelia Bai, RNCC  Neurology

## 2021-12-28 NOTE — LETTER
12/28/2021         RE: Roxanna Celis  [id number] [place] [address]        Dear Colleague,    Thank you for referring your patient, Roxanna Celis, to the [facility] [place]. Please see a copy of my visit note below.    Procedure note: Skin biopsy. Indication: sensory disturbance, skin. After obtaining informed consent, 3 mm skin biopsies were performed over the right calf and left forearm. 1% lidocaine anesthesia and betadine sterile prep were used. The patient tolerated the procedure well. Wound care and patient education were provided by Amelia Bai R.N.      Again, thank you for allowing me to participate in the care of your patient.        Sincerely,        Emeka Zamora MD

## 2021-12-30 ENCOUNTER — TELEPHONE (OUTPATIENT)
Dept: OPTOMETRY | Facility: CLINIC | Age: 51
End: 2021-12-30
Payer: COMMERCIAL

## 2022-01-11 ENCOUNTER — OFFICE VISIT (OUTPATIENT)
Dept: OPTOMETRY | Facility: CLINIC | Age: 52
End: 2022-01-11
Payer: COMMERCIAL

## 2022-01-11 DIAGNOSIS — Z97.3 USES CONTACT LENSES: ICD-10-CM

## 2022-01-11 DIAGNOSIS — H57.13 EYE PAIN, BILATERAL: ICD-10-CM

## 2022-01-11 DIAGNOSIS — H04.123 DRY EYES: Primary | ICD-10-CM

## 2022-01-11 DIAGNOSIS — H16.223 KERATITIS SICCA, BILATERAL: ICD-10-CM

## 2022-01-11 RX ORDER — SODIUM CHLORIDE FOR INHALATION 0.9 %
5 VIAL, NEBULIZER (ML) INHALATION
Qty: 3000 ML | Refills: 11 | Status: SHIPPED | OUTPATIENT
Start: 2022-01-11 | End: 2023-02-14

## 2022-01-11 ASSESSMENT — REFRACTION_WEARINGRX
OD_ADD: +2.33
OS_ADD: +2.33
OS_AXIS: 106
OD_CYLINDER: +0.25
OD_AXIS: 137
OS_SPHERE: -2.25
OD_SPHERE: -2.25
SPECS_TYPE: PAL
OS_CYLINDER: +0.25

## 2022-01-11 ASSESSMENT — REFRACTION_CURRENTRX
OD_ADDL_SPECS: 158101
OD_BRAND: EYEFIT PRO
OD_SPHERE: +1.00
OS_DIAMETER: 18.0
OD_BASECURVE: 8.047
OD_DIAMETER: 18.0
OS_BRAND: EYEFIT PRO
OS_BASECURVE: 7.899
OS_ADDL_SPECS: 158101
OS_SPHERE: +0.50

## 2022-01-11 ASSESSMENT — VISUAL ACUITY
OD_CC+: -1
OS_CC: J1+
OD_CC: J1+
OS_CC: 20/20
OD_CC: 20/20
METHOD: SNELLEN - LINEAR
CORRECTION_TYPE: GLASSES, CONTACTS

## 2022-01-11 ASSESSMENT — REFRACTION_MANIFEST
OS_AXIS: 010
OD_ADD: +2.50
OS_ADD: +2.50
OS_CYLINDER: +0.50
OD_CYLINDER: SPHERE
OD_SPHERE: -2.50
OS_SPHERE: -2.75

## 2022-01-11 ASSESSMENT — EXTERNAL EXAM - RIGHT EYE: OD_EXAM: NORMAL

## 2022-01-11 ASSESSMENT — EXTERNAL EXAM - LEFT EYE: OS_EXAM: NORMAL

## 2022-01-11 NOTE — PROGRESS NOTES
Warm compresses 2-3x daily   Doxycycline 50 mg orally twice a day   Xiidra twice a day, both eyes   Pred healon 4x daily, both eyes   Serum tears to fill scleral lenses 2-3x daily  Systane ointment PRN when lenses are out   Mucomyst 4-6 times a day, both eyes   S/p Lipiflow x 1 (next session next week)  Tyrvaya nasal spray    A/P  1.) Dry Eye each eye  -Severely symptomatic despite max topical treatment and significant objective improvement in corneal appearance with scleral lens wear  -Excellent response to Eyefit lenses, much improved comfort and wear time. Current fit looks excellent  -Happy with nearsighted target in CL's (-2.50 distance Rx)  -Symptomatic for more eyestrain at near with newer CL's. Surprising considering no Rx change from previous (she does like fit adjustment though)  -Excellent acuity distance and near (20/20 and J1+). Optically no adjustment needed. She may be experiencing focal dryness when she has issues with near vision. DDx is newer lenses may need to 'break in' more compared to her previous ones. Reassured pt no lens changes are needed at this time. Would rec continuing with current pair.     2.) Blepharitis/MGD each eye  -Endorses significant irritation, burning, stinging mostly upper lids  -Failed: maxitrol anjelica, Refresh PM, Genteal Gel, erythromycin anjelica - has only done well with Systane ointment  -s/p first round of Lipiflow. Difficult recovery but does not feel things have improved. Scheduled for next session next week    Lenses look excellent. Good corneal surface and tear film volume today. Systemic symptoms still bothersome but eyes making symptomatic improvement slowly. No changes recommended on current pair. Continue with current pair. F/u 4-5 months as scheduled.     I have confirmed the patient's CC, HPI and reviewed Past Medical History, Past Surgical History, Social History, Family History, Problem List, Medication List and agree with Tech note.     Sudha Anthony, SHELLI FAAO  F[facility]

## 2022-01-12 ENCOUNTER — MYC MEDICAL ADVICE (OUTPATIENT)
Dept: PALLIATIVE MEDICINE | Facility: CLINIC | Age: 52
End: 2022-01-12
Payer: COMMERCIAL

## 2022-01-12 DIAGNOSIS — M79.18 CHRONIC MYOFASCIAL PAIN: ICD-10-CM

## 2022-01-12 DIAGNOSIS — G89.29 CHRONIC MYOFASCIAL PAIN: ICD-10-CM

## 2022-01-12 NOTE — TELEPHONE ENCOUNTER
Patient  requesting refill(s) for naltrexone for an increase in dose to 5 MG. I don't see documentation about increasing the dose but did pend per patient request. Please review.    Last refilled on 12/28/22    Pt last seen on 12/20/21  Next appt scheduled for 02/24/22    Will facilitate refill.    From: Roxanna Celis      Created: 1/12/2022 9:53 AM        Hello,  Can you please send in a new prescription for Naltrexone?  I would like to try the 5 mg capsules as we discussed at my last visit.  Thank you for your help,  Roxanna Celis

## 2022-01-13 NOTE — TELEPHONE ENCOUNTER
[employer] message sent with Rx approval from provider.  J Carlos  Pain Clinic Management Team
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
## 2022-01-19 DIAGNOSIS — H04.123 DRY EYES, BILATERAL: ICD-10-CM

## 2022-01-20 NOTE — TELEPHONE ENCOUNTER
sod. hyaluronate- prednisolone (0.001-0.25%)in BSS(PRED-HEALON)(KELLIE AMB MIXTURE)  Last Written Prescription Date:   4/6/2021  Last Fill Quantity: 6.67,   # refills: 11  Last Office Visit :  1/11/2022  Future Office visit:  None     Sudha Anthony, OD  Optometry    Warm compresses 2-3x daily   Doxycycline 50 mg orally twice a day   Xiidra twice a day, both eyes   Pred healon 4x daily, both eyes   Serum tears to fill scleral lenses 2-3x daily  Systane ointment PRN when lenses are out   Mucomyst 4-6 times a day, both eyes   S/p Lipiflow x 1 (next session next week)  Tyrvaya nasal spray     A/P  1.) Dry Eye each eye  -Severely symptomatic despite max topical treatment and significant objective improvement in corneal appearance with scleral lens wear  -Excellent response to Eyefit lenses, much improved comfort and wear time. Current fit looks excellent  -Happy with nearsighted target in CL's (-2.50 distance Rx)  -Symptomatic for more eyestrain at near with newer CL's. Surprising considering no Rx change from previous (she does like fit adjustment though)  -Excellent acuity distance and near (20/20 and J1+). Optically no adjustment needed. She may be experiencing focal dryness when she has issues with near vision. DDx is newer lenses may need to 'break in' more compared to her previous ones. Reassured pt no lens changes are needed at this time. Would rec continuing with current pair.      2.) Blepharitis/MGD each eye  -Endorses significant irritation, burning, stinging mostly upper lids  -Failed: maxitrol anjelica, Refresh PM, Genteal Gel, erythromycin anjelica - has only done well with Systane ointment  -s/p first round of Lipiflow. Difficult recovery but does not feel things have improved. Scheduled for next session next week     Lenses look excellent. Good corneal surface and tear film volume today. Systemic symptoms still bothersome but eyes making symptomatic improvement slowly. No changes recommended on current pair.  Continue with current pair. F/u 4-5 months as scheduled.      I have confirmed the patient's CC, HPI and reviewed Past Medical History, Past Surgical History, Social History, Family History, Problem List, Medication List and agree with Tech note.      SHELLI StevensO FSLS     Routing refill request to provider for review/approval because:  Med not on refill protocol  Refer to clinic for review       Caryn Manning RN  Central Triage Red Flags/Med Refills

## 2022-01-25 ENCOUNTER — TELEPHONE (OUTPATIENT)
Dept: OPTOMETRY | Facility: CLINIC | Age: 52
End: 2022-01-25
Payer: COMMERCIAL

## 2022-01-25 NOTE — TELEPHONE ENCOUNTER
Spoke to pt at 0839    Reviewed response sent by Dr. Anthony yesteryday/monday morning--(Not read by pt)    Pt states checked mychart earlier and did not see.    Pt will recheck mychart and call direct triage number if needs any further assistance.    Haider Webber RN 8:41 AM 01/25/22          [facility]    Phone Message    May a detailed message be left on voicemail: yes     Reason for Call: Other: Pt called and said she left a Mychart message as well explaining what she requests for new sclera lenses. Says this is time sensitive as she is almost out of warranty to get them for free and doesn't want to miss out. Please call Pt with update. Thank you.      Action Taken: Message routed to:  [facility] EYE    Travel Screening: Not Applicable

## 2022-01-26 ENCOUNTER — VIRTUAL VISIT (OUTPATIENT)
Dept: PALLIATIVE MEDICINE | Facility: CLINIC | Age: 52
End: 2022-01-26
Attending: PHYSICAL MEDICINE & REHABILITATION
Payer: COMMERCIAL

## 2022-01-26 DIAGNOSIS — M79.2 NEUROPATHIC PAIN: ICD-10-CM

## 2022-01-26 DIAGNOSIS — M79.18 CHRONIC MYOFASCIAL PAIN: Primary | ICD-10-CM

## 2022-01-26 DIAGNOSIS — G89.29 CHRONIC MYOFASCIAL PAIN: Primary | ICD-10-CM

## 2022-01-26 PROCEDURE — 96156 HLTH BHV ASSMT/REASSESSMENT: CPT | Mod: GT | Performed by: PSYCHOLOGIST

## 2022-01-31 ENCOUNTER — TRANSFERRED RECORDS (OUTPATIENT)
Dept: HEALTH INFORMATION MANAGEMENT | Facility: CLINIC | Age: 52
End: 2022-01-31
Payer: COMMERCIAL

## 2022-02-01 ENCOUNTER — THERAPY VISIT (OUTPATIENT)
Dept: OCCUPATIONAL THERAPY | Facility: CLINIC | Age: 52
End: 2022-02-01
Payer: COMMERCIAL

## 2022-02-01 DIAGNOSIS — R27.8 CLUMSINESS: ICD-10-CM

## 2022-02-01 DIAGNOSIS — R29.898 HAND WEAKNESS: ICD-10-CM

## 2022-02-01 PROCEDURE — 97763 ORTHC/PROSTC MGMT SBSQ ENC: CPT | Mod: GO | Performed by: OCCUPATIONAL THERAPIST

## 2022-02-01 PROCEDURE — 97112 NEUROMUSCULAR REEDUCATION: CPT | Mod: GO | Performed by: OCCUPATIONAL THERAPIST

## 2022-02-07 ENCOUNTER — TELEPHONE (OUTPATIENT)
Dept: NEUROLOGY | Facility: CLINIC | Age: 52
End: 2022-02-07
Payer: COMMERCIAL

## 2022-02-07 DIAGNOSIS — H16.123 FILAMENTARY KERATITIS OF BOTH EYES: ICD-10-CM

## 2022-02-07 DIAGNOSIS — M79.2 NEUROPATHIC PAIN: Primary | ICD-10-CM

## 2022-02-07 DIAGNOSIS — R29.898 HAND WEAKNESS: ICD-10-CM

## 2022-02-07 DIAGNOSIS — H04.123 BILATERAL DRY EYES: ICD-10-CM

## 2022-02-07 NOTE — TELEPHONE ENCOUNTER
Called to discuss epidermal nerve fiber density results: Normal in legs, borderline reduced in her forearm which is where symptoms began.  Certainly interesting in the context of her Sicca symptoms that started in a similar time frame.  Discussed I am not sure if our colleagues in rheumatology would consider this information to be helpful in the context of her previous work up for Sicca symptoms that has been quite thorough, but I would value their opinion in this regard given that the study was not entirely normal.  Will obtain FGFR3 and TS-HDS antibodies from [facility] given these findings as well.

## 2022-02-07 NOTE — TELEPHONE ENCOUNTER
LCV: 11/23/2021  [facility]  Last clinic note;Mucomyst 4-6 times a day, both eyes   NCV: 4-26-22

## 2022-02-08 ENCOUNTER — THERAPY VISIT (OUTPATIENT)
Dept: OCCUPATIONAL THERAPY | Facility: CLINIC | Age: 52
End: 2022-02-08
Payer: COMMERCIAL

## 2022-02-08 DIAGNOSIS — R29.898 HAND WEAKNESS: ICD-10-CM

## 2022-02-08 DIAGNOSIS — R27.8 CLUMSINESS: ICD-10-CM

## 2022-02-08 PROCEDURE — 97763 ORTHC/PROSTC MGMT SBSQ ENC: CPT | Mod: GO | Performed by: OCCUPATIONAL THERAPIST

## 2022-02-08 PROCEDURE — 97110 THERAPEUTIC EXERCISES: CPT | Mod: GO | Performed by: OCCUPATIONAL THERAPIST

## 2022-02-08 NOTE — PROGRESS NOTES
Hand Therapy Progress Note  Current Date: 2/8/22  Reporting Period: 11/26/2021 to 2/8/2022    Diagnosis: Clumsiness; Hand Weakness  DOI: 2 years ago  MD Order Date: 11/11/2021    Subjective:  Roxanna Celis is a 51 year old female.    S:  Subjective changes as noted by patient: I am still having pain.  With you making the right brace last time, I am noticing that I need the L brace remade.  It isn't as comfortable as the right one.     Functional changes noted by patient: still difficulty with most tasks     Response to previous treatment:  good  Patient has noted adverse reaction to:   None      Objective:  Pain Level (Scale 0-10)   11/26/2021 2/8/22   At Rest R: 7/10   L: 7.5/10 R: 7/10  L: 9/10   With Use R: 9/10  L: 9/10 R: 10/10  L: 10/10     Pain Description  Date 11/26/2021 2/8/22   Location B volar wrist pain at crease, shooting pain from hand to elbow, lateral epicondyles  B volar wrists and radiates B to pecs and tips of fingers   Pain Quality Aching, Burning, Dull, Numb, Sharp, Shooting, Tingling and Tiring Aching, Burning, Dull, Numb, Sharp, Shooting, Tingling and Tiring, Electricity   Frequency constant   constant   Pain is worst  daytime or nighttime Daytime or nighttime   Exacerbated by  with use use   Relieved by rest rest   Progression Gradually worsening unchanged     Posture  Normal    Sensation  Decreased Radial Nerve distribution per pt report    ROM  Pain Report: - none  + mild    ++ moderate    +++ severe   Elbow 11/26/2021 11/26/2021 2/8/22 2/8/22   AROM (PROM) R L R L   Extension 0 -8 0 0   Flexion 127 124 138 135   Supination 86 76 75 83   Pronation 88       86 78 80     Wrist 11/26/2021 11/26/2021 2/8/22 2/8/22   AROM (PROM) R L R L   Extension 59 43 56 60   Flexion 12 14 67 35   RD 1 6 19 19   UD 23 27 24 35     Resisted Testing  Pain Report:  - none    + mild    ++ moderate    +++ severe    11/26/2021   Elbow Extension R: 4/5, 6/10 pain  L: 4/5, 8/10 pain   Elbow Flexion R: 4/5, 5/10  pain  L: 4/5, 8/10 pain   Supination  R: 4/5, 7/10 pain  L: 4/5, 9/10 pain   Pronation R: 5/5, 7/10 pain  L: 3/5, 9.9/10 pain   Wrist Ext with RD, Elbow at side R: NT - too painful  L: NT - too painful   Wrist Ext with UD, Elbow at side R: NT - too painful  L: NT - too painful   Wrist Flex with RD, Elbow at side R: NT - too painful  L: NT - too painful   Wrist Flex with UD, Elbow at side R: NT - too painful  L: NT - too painful   EDC with Elbow at side R: 4/5, 9.5/10 pain  L: NT - too painful   Long Finger Test R: 5/5, 8/10 pain  L: 5/5, 9/10 pain     Neural Tension Testing  RNT: Radial Neurodynamic Test (based on RILEY Argueta's ULNT)   11/26/2021   0-5 Scale R: 2/5    L: 1/5   Position:   0/5: Arm across abdomen in coronal plane  1/5: Depress shoulder, ER to neutral ABD shoulder to 45 degrees  2/5: IR shoulder to end range, keep elbow at 90 degrees  3/5: Extend elbow to 0 degrees  4/5: Fully pronate forearm  5/5: Flex wrist and fingers with UD  Notes:    (+) indicates beyond grade level but less than penitentiary to next level    (-) indicates over penitentiary to level    S1  onset/change of patient's symptoms    S2 definite stop point based on patient's discomfort level    Strength   (Measured in pounds)  Not tested at this time due to current pain level. Pt reports difficulty with lifting items like a cup and utensils during mealtimes.     Palpation  Pain Report:  - none    + mild    ++ moderate    +++ severe    11/26/2021   Proximal Triceps R: 5/10  L: 9.9/10   Spiral Groove R: 5/10  L: 9.9/10   Distal Triceps R: 9/10  L: 9.9/10   ECRB at LEP R: 8/10  L: 6/10   ECU at LEP R: 4/10  L: 7/10   EDC at LEP R: 8/10  L: 8.5/10   Radial Head R: 9/10  L: 10/10   Extensor Wad R: 9/10  L: 10/10   PIN Site R: 10/10  L: 9/10     Assessment:  Response to therapy has been improvement to:  ROM of Wrist:  All Planes  Thumb:  All Planes  Response to therapy has been lack of progress in:  Pain:  No change    Overall Assessment:  Patient's  symptoms are resolving.  Patient is ready to progress to more complex exercises.  Patient is ready to progress to next level of protocol.  Patient is becoming more independent in home exercise program  Patient would benefit from continued therapy to achieve rehab potential  STG/LTG:  STGoals have been reviewed;  see goal sheet for details and updates.  LTGoals have been reviewed;  see goal sheet for details and updates.    I have re-evaluated this patient and find that the nature, scope, duration and intensity of the therapy is appropriate for the medical condition of the patient.    Plan:  Frequency:  1 X week, once daily  Duration:  for 8 weeks    Treatment Plan:    Modalities:    US, Fluidotherapy and Paraffin   Therapeutic Exercise:    AROM, AAROM, PROM, Tendon Gliding, Isotonics, Isometrics and Stabilization  Therapeutic Activities:  Functional activities   Neuromuscular re-ed:   Nerve Gliding, Kinesiotaping and Stabilization  Manual Techniques:   Joint mobilization, Friction massage, Myofascial release and Manual edema mobilization  Orthotic Fabrication:    Static  Self Care:    Self Care Tasks and Ergonomic Considerations    Discharge Plan:  Achieve all LTG.  Independent in home treatment program.  Reach maximal therapeutic benefit.    Home Program:   Avoid activities that exacerbate pain in the elbow  Lift with elbows at side and forearms in neutral/supinated position  TENNIS ELBOW PREVENTION  EMR Notes  HEP - Sets  Reps  Sessions per day  Notes  Icing  EMR Notes  HEP - Sets  Reps  Sessions per day  Notes ice at least 1x/day, for 10-15 minutes Can also do icing after an activity that is painful  Warmth  EMR Notes  HEP - Sets  Reps  Sessions per day  Notes  Ball Massage  EMR Notes  HEP - Sets  Reps  Sessions per day 1-2  Notes Massage forearm  Forearm PROM Advanced Flexor Stretch  EMR Notes  HEP - Sets  Reps 10 reps. hold 15-30 sec  Sessions per day 2-3  Notes hold 15-20 sec  Forearm Passive Range of Motion  Extensor Stretch  EMR Notes  HEP - Sets  Reps 10 reps. hold 15-30 sec  Sessions per day 2-3  Notes PAIN FREE  Nerve Gliding Proximal Radial  EMR Notes  HEP - Sets  Reps 10  Sessions per day 1-2  Notes Ignore head movements. Hold for 5 seconds in position where you start to feel the pull.    Next Visit:  Review HEP  Review Orthosis Fit   Nerve Gliding  MFR to extensors, avoiding PIN site

## 2022-02-09 ENCOUNTER — VIRTUAL VISIT (OUTPATIENT)
Dept: PALLIATIVE MEDICINE | Facility: CLINIC | Age: 52
End: 2022-02-09
Payer: COMMERCIAL

## 2022-02-09 DIAGNOSIS — G89.29 CHRONIC MYOFASCIAL PAIN: ICD-10-CM

## 2022-02-09 DIAGNOSIS — M79.18 CHRONIC MYOFASCIAL PAIN: ICD-10-CM

## 2022-02-09 DIAGNOSIS — M79.2 NEUROPATHIC PAIN: Primary | ICD-10-CM

## 2022-02-09 PROCEDURE — 96159 HLTH BHV IVNTJ INDIV EA ADDL: CPT | Mod: GT | Performed by: PSYCHOLOGIST

## 2022-02-09 PROCEDURE — 96158 HLTH BHV IVNTJ INDIV 1ST 30: CPT | Mod: GT | Performed by: PSYCHOLOGIST

## 2022-02-09 NOTE — PROGRESS NOTES
"Roxanna Celis is a 51 year old female who is being evaluated via a billable video visit.      The patient has been notified of following:     \"This video visit will be conducted via a call between you and your physician/provider. We have found that certain health care needs can be provided without the need for an in-person physical exam.  This service lets us provide the care you need with a video conversation.     Video visits are billed at different rates depending on your insurance coverage.  Please reach out to your insurance provider with any questions.    If during the course of the call the physician/provider feels a video visit is not appropriate, you will not be charged for this service.\"    Patient has given verbal consent for Video visit? Yes    Patient would like the video invitation sent by: Text to cell phone: .[phone]}    Video Start Time: 2:00 PM    Additional provider notes:     Pain Diagnoses per pain provider:   Neuropathic pain     Chronic myofascial pain        DATA: During today's visit you reported the following: Your neuropathic pain is 'unbearable' - report pain has been 10/10. Your mood is unchanged. Your activity level is unchanged - discussed pacing activity. Your stress level is unchanged. Your sleep is still poor. You reported engaging in self-care for your pain 2-3 times daily.    You identified that you would like to focus on the following or had questions regarding the following issues or concerns, and we discussed the following:   - oriented to follow up  - no longer taking Naltrexone - it caused emotional side effects of sadness - have discussed with psychiatrist who would like to manage and start you at much lower dose due to side effects  - reviewed current self-care - laying down with legs elevated and arms straight out, using essential oils on painful areas, hot shower, infrared sauna, read Bible and do devotions, talk to support system, dog, hand PT and HEP, pool therapy with " eventual plan to move to land PT   - report you have had pain at 8.5/10, but feel it snowballs too quickly to prevent it getting to 10/10  - discussed talking more regular breaks as you wonder if this could be helpful  - Spoon Theory  - radical acceptance or 'it is what it is, now what can I do about it?'    ASSESSMENT: Easily engaged and open to exploration of different strategies to manage pain.     PLAN:   Your next appointment is scheduled for 2/23 at 2:00 PM.  Assignment/Objectives /interventions for next session:   - check out Spoonie Day robert  - take more regular breaks and do body scan, try once an hour if able    Video-Visit Details    Type of service:  Video Visit    Video End Time (time video stopped): 2:59 PM    Originating Location (pt. Location): Home    Distant Location (provider location):  [facility]     Mode of Communication:  Video Conference via Gabriella Graham PsyD LP  Licensed Psychologist  Outpatient Clinic Therapist  [facility]

## 2022-02-10 ENCOUNTER — TELEPHONE (OUTPATIENT)
Dept: NEUROLOGY | Facility: CLINIC | Age: 52
End: 2022-02-10
Payer: COMMERCIAL

## 2022-02-10 NOTE — TELEPHONE ENCOUNTER
Tried to reach the pt to let her know that there is a [employer] form that she needs to bring with her to the lab when she goes to get this drawn. There was no answer so a message was left on her VM requesting a call back.  Cleo Fontenot RN   Neurology Care Coordinator

## 2022-02-10 NOTE — TELEPHONE ENCOUNTER
RN spoke to the pt about the [employer] form. We will mail this to her home and she will call to make an appt at [facility].    Cleo Fontenot RN   Neurology Care Coordinator

## 2022-02-10 NOTE — TELEPHONE ENCOUNTER
[facility]    Phone Message    May a detailed message be left on voicemail: yes     Reason for Call: Other: Pt returned missed call from [place]. Please call Pt back regarding form.    Action Taken: Message routed to:  Other:  Neurology    Travel Screening: Not Applicable

## 2022-02-10 NOTE — TELEPHONE ENCOUNTER
See encounter from 2/7 for further documentation.    Cleo Fontenot, RN   Neurology Care Coordinator

## 2022-02-10 NOTE — TELEPHONE ENCOUNTER
Aneudy Goff 3 minutes ago (1:43 PM)     [facility]     Phone Message     May a detailed message be left on voicemail: yes      Reason for Call: Other: Pt returned missed call from [place]. Please call Pt back regarding form.     Action Taken: Message routed to:  Other:  Neurology     Travel Screening: Not Applicable

## 2022-02-16 ENCOUNTER — LAB (OUTPATIENT)
Dept: LAB | Facility: CLINIC | Age: 52
End: 2022-02-16
Payer: COMMERCIAL

## 2022-02-16 DIAGNOSIS — M79.2 NEUROPATHIC PAIN: ICD-10-CM

## 2022-02-16 DIAGNOSIS — R29.898 HAND WEAKNESS: ICD-10-CM

## 2022-02-16 PROCEDURE — 99000 SPECIMEN HANDLING OFFICE-LAB: CPT

## 2022-02-16 PROCEDURE — 83520 IMMUNOASSAY QUANT NOS NONAB: CPT | Mod: 90

## 2022-02-16 PROCEDURE — 36415 COLL VENOUS BLD VENIPUNCTURE: CPT

## 2022-02-17 DIAGNOSIS — M35.00 SICCA SYNDROME (H): Primary | ICD-10-CM

## 2022-02-22 ENCOUNTER — THERAPY VISIT (OUTPATIENT)
Dept: OCCUPATIONAL THERAPY | Facility: CLINIC | Age: 52
End: 2022-02-22
Payer: COMMERCIAL

## 2022-02-22 DIAGNOSIS — R27.8 CLUMSINESS: ICD-10-CM

## 2022-02-22 DIAGNOSIS — R29.898 HAND WEAKNESS: ICD-10-CM

## 2022-02-22 PROCEDURE — 97112 NEUROMUSCULAR REEDUCATION: CPT | Mod: GO | Performed by: OCCUPATIONAL THERAPIST

## 2022-02-22 PROCEDURE — 97140 MANUAL THERAPY 1/> REGIONS: CPT | Mod: GO | Performed by: OCCUPATIONAL THERAPIST

## 2022-02-23 ENCOUNTER — VIRTUAL VISIT (OUTPATIENT)
Dept: PALLIATIVE MEDICINE | Facility: CLINIC | Age: 52
End: 2022-02-23
Payer: COMMERCIAL

## 2022-02-23 DIAGNOSIS — G89.29 CHRONIC MYOFASCIAL PAIN: ICD-10-CM

## 2022-02-23 DIAGNOSIS — M79.2 NEUROPATHIC PAIN: Primary | ICD-10-CM

## 2022-02-23 DIAGNOSIS — M79.18 CHRONIC MYOFASCIAL PAIN: ICD-10-CM

## 2022-02-23 PROCEDURE — 96158 HLTH BHV IVNTJ INDIV 1ST 30: CPT | Mod: GT | Performed by: PSYCHOLOGIST

## 2022-02-23 PROCEDURE — 96159 HLTH BHV IVNTJ INDIV EA ADDL: CPT | Mod: GT | Performed by: PSYCHOLOGIST

## 2022-02-23 NOTE — PROGRESS NOTES
"Roxanna Celis is a 51 year old female who is being evaluated via a billable video visit.      The patient has been notified of following:     \"This video visit will be conducted via a call between you and your physician/provider. We have found that certain health care needs can be provided without the need for an in-person physical exam.  This service lets us provide the care you need with a video conversation.     Video visits are billed at different rates depending on your insurance coverage.  Please reach out to your insurance provider with any questions.    If during the course of the call the physician/provider feels a video visit is not appropriate, you will not be charged for this service.\"    Patient has given verbal consent for Video visit? Yes    Patient would like the video invitation sent by: Text to cell phone: .[phone]}    Video Start Time: 1:59 PM    Additional provider notes:     Pain Diagnoses per pain provider:   Neuropathic pain     Chronic myofascial pain            DATA: During today's visit you reported the following: Your neuropathic and myofascial pain is 'still unbearable'. Your mood is worse - tearful today, deny suicidal thoughts. Your activity level is about the same - working on pacing self. Only stopping when pain increases on top of pain already experiencing. Your stress level is unchanged. Your sleep is still very impacted by pain. You reported engaging in self-care for your pain 2-3 times daily.    You identified that you would like to focus on the following or had questions regarding the following issues or concerns, and we discussed the following:   - 'tired of everything being impacted by my pain'  - very frustrated nothing is offered that could be helpful for pain   - financial burden from medical appointments  - discussed pacing activity and cutting down on activity to determine current level of stamina   - discussed setting a timer for 5 minutes of activity, then pause and do a " body scan, may add 5 more minutes of activity based on information from your body scan    ASSESSMENT: Patient's pain continues to be unmanaged; she does admit to struggling with pacing activity, which we discussed is likely greatly contributing to her overall pain level. Validated her frustration that the etiology of her pain is not clear to her, while also recognizing the importance of pacing, engaging in self-care (e.g., not rationing activities that provide benefit such as scented candle relaxation) and the knowledge that we as her pain team want to help her manage her pain. She seemed to understand that finding the right mix of treatment for her chronic pain can take some time to find the right balance in terms of medications, supportive therapies and home-based strategies to manage pain effectively.    PLAN:   Your next appointment is scheduled for - N/A - on hold as she decides next steps with pain clinic, as she would need to continue with Yasmany Rivera DO in order to continue to have access to pain psychology.  Assignment/Objectives /interventions for next session:   - keep appointment with Yasmany Rivera DO on 3/4 - if you decide to continue to work with him, you may schedule follow up   - shorten time you are engaging in activity as above, countered with periods of rest - this should help you determine your current stamina level and prevent over-exertion which is likely contributing to ongoing high pain    We believe regular attendance is key to your success in our program!      Any time you are unable to keep your appointment we ask that you call us at [phone] at least 24 hours in advance to cancel.This will allow us to offer the appointment time to another patient.     Multiple missed appointments may lead to dismissal from the clinic.    Video-Visit Details    Type of service:  Video Visit    Video End Time (time video stopped): 2:56 PM    Originating Location (pt. Location):  Home    Distant Location (provider location):  [facility]     Mode of Communication:  Video Conference via Gabriella Graham PsyD LP  Licensed Psychologist  Outpatient Clinic Therapist  [facility]

## 2022-02-28 ENCOUNTER — TRANSFERRED RECORDS (OUTPATIENT)
Dept: HEALTH INFORMATION MANAGEMENT | Facility: CLINIC | Age: 52
End: 2022-02-28
Payer: COMMERCIAL

## 2022-03-04 ENCOUNTER — OFFICE VISIT (OUTPATIENT)
Dept: PALLIATIVE MEDICINE | Facility: CLINIC | Age: 52
End: 2022-03-04
Payer: COMMERCIAL

## 2022-03-04 VITALS — HEART RATE: 84 BPM | SYSTOLIC BLOOD PRESSURE: 110 MMHG | DIASTOLIC BLOOD PRESSURE: 76 MMHG | OXYGEN SATURATION: 100 %

## 2022-03-04 DIAGNOSIS — M79.18 CHRONIC MYOFASCIAL PAIN: ICD-10-CM

## 2022-03-04 DIAGNOSIS — G89.29 CHRONIC MYOFASCIAL PAIN: ICD-10-CM

## 2022-03-04 DIAGNOSIS — F41.8 DEPRESSION WITH ANXIETY: ICD-10-CM

## 2022-03-04 DIAGNOSIS — M79.2 NEUROPATHIC PAIN: Primary | ICD-10-CM

## 2022-03-04 PROCEDURE — 99215 OFFICE O/P EST HI 40 MIN: CPT | Performed by: PHYSICAL MEDICINE & REHABILITATION

## 2022-03-04 RX ORDER — VITAMIN B COMPLEX
TABLET ORAL DAILY
COMMUNITY

## 2022-03-04 RX ORDER — TOPIRAMATE 50 MG/1
TABLET, FILM COATED ORAL
Qty: 42 TABLET | Refills: 0 | Status: SHIPPED | OUTPATIENT
Start: 2022-03-04 | End: 2022-03-25

## 2022-03-04 RX ORDER — MULTIPLE VITAMINS W/ MINERALS TAB 9MG-400MCG
1 TAB ORAL DAILY
COMMUNITY

## 2022-03-04 RX ORDER — LANOLIN ALCOHOL/MO/W.PET/CERES
1000 CREAM (GRAM) TOPICAL DAILY
COMMUNITY

## 2022-03-04 ASSESSMENT — PAIN SCALES - GENERAL: PAINLEVEL: SEVERE PAIN (7)

## 2022-03-04 NOTE — PROGRESS NOTES
[facility]    Date of visit: 21         Assessment:  Roxanna Celis is a 51 year old with past medical history including: ELY, Obesity, HTN, SICCA symptoms, spasmodic torticollis, who presents for follow-up and treatment of the followin. Painful peripheral neuropathy: Patient reports gradual onset of paresthesias, dysesthesias and burning/tingling type pain around 2019 that began in the periphery and spread proximally in their upper and lower extremities. Currently they describe paresthesias and pain in their entire bilateral upper and lower extremities. They also note intermittent sensations of weakness and inability to move their limbs which has worsened since starting pool PT, specifically in the past 2-3 weeks. Neurological exam of the upper and lower extremities today was normal with exception of mild sensory loss in the medial aspect of their right foot. Strength and reflexes were symmetric and normal. Upper extremity EMG was previously normal. Cervical, thoracic and lumbar MRI completed, no pathology to explain their current symptoms. Seen by Dr. Torres and will have a small fiber nerve biopsy completed which was borderline low in the upper extremities but normal in the legs. We will try topamax for their neuropathic symptoms.     2. Chronic Myofascial Pain: Patient meets the diagnostic criteria for fibromyalgia based on history and exam. Prior work-up has been unrevealing for a neurologic or rheumatologic cause for their symptoms. Discussed the pathophysiology of fibromyalgia and overlap with their neuropathic symptoms as well as how fibromyalgia may exacerbate an underlying peripheral neuropathy and arthritic pain. Again discussed concepts of central and peripheral sensitization and how this may be impacting their symptoms.      Mental Health - the patient's mental health concerns, specifically anxiety, depression, affect her experience of  pain and contribute to her clinically significant distress.        Plan:  The following recommendations were given to the patient. Diagnosis, treatment options, risks, benefits, and alternatives were discussed, and all questions were answered. The patient expressed understanding of the plan for management.      I am recommending a multidisciplinary treatment plan to help this patient better manage her pain.  This includes:      1. Physical Therapy: Stop pool therapy. Chronic pain PT ordered.  2. Clinical Health Pain Psychologist: Continue pain Phd  1. Continue working with primary psychiatry services.  2. Therapy can help reduce physical and psychosocial triggers or reinforcers of pain by adapting thoughts, feelings and behaviors to reduce symptoms and increase quality of life.  Evidence indicates that the practice of relaxation, meditation, and mindfulness techniques can significantly affect pain levels and overall well being.  3. Self Care Recommendations: Gentle progressive exercise that does not increase pain - gradually increase daily walking program.  Take mini breaks - 5 minutes of mindfullness a couple times a day.   4. Diagnostic Studies: none  5. Medication Management:  1. Continue low dose naltrexone 1mg  2. Start topamax 50mg once daily for 2 weeks, if no SEs increase to BID.   3. Continue methocarbamol 500mg BID prn  4. Continue CBD oil. Consider medical cannabis.  5. Continue cymbalta.      6. Further procedures recommended: none  7. Follow up: 2 months.        Yasmany Rivera [facility]          Chief complaint:   Chief Complaint   Patient presents with     Pain       Interval history:  Roxanna Celis is a 51 year old female last seen by me on 11/12/2021.        Since her last visit, Roxanna Celis reports:    -Leg and arm pain has been worsening with pool therapy.    -They had a day in pool therapy where they were unable to get out of the pool, they were able to move their  legs but their legs felt weak.    -Since that time their legs and arms have continued to feel weak.    -They state that the character of the pain in their arms and legs has changed again. Their legs have a constant pins and needles sensation which is not as pronounced as before, they're feeling more sharp pains intermittently in the bottoms of their feet. The pain is now up to their mid thigh.    -All their limbs feel hot and like they're on fire, then it can switch to a cold freezing sensation.    -They've been doing hand therapy which felt good while they were doing it but an hour after they had severe hand and arm pain.    -They tried low dose naltrexone at 3mg and weren't sure if this was helping, they felt maybe this affected their mood but wanted to try a higher dose. After stopping the higher dose they feel their mood was back to baseline.    -Their psychiatrist felt this may be related to starting at too high of a dose and recommended starting at 1mg instead. They've been taking 1mg now for 2 weeks and this isn't affecting their mood at this time.                Pain scores:  Pain intensity on average is 10 on a scale of 0-10.     Pain Treatments:  1. Medications:       Current pain medications:  -Low dose naltrexone 1mg  -Bupropion 300mg daily  -Cymbalta 120mg daily      Prior pain medications:   -Gabapentin 1200mg at night- drowsy with higher dose   -Lyrica 100mg BID   -Low dose naltrexone 5mg - affected mood  2. Physical Therapy: Doing pool PT currently              TENS unit: hasn't tried  3. Pain psychology: hasn't tried  4. Surgery: hasn't tried  5. Injections: none  6. Alternative Therapies:               Chiropractic: hasn't tried               Acupuncture: hasn't tried       Side Effects: no side effect    Medications:  Current Outpatient Medications   Medication Sig Dispense Refill     acetylcysteine 10 % (MUCOMYST) 10% SOLN Place 2 drops into both eyes 6 times daily 30 mL 2      aspirin-acetaminophen-caffeine (EXCEDRIN MIGRAINE) 250-250-65 MG per tablet Take 1 tablet by mouth every 6 hours as needed. (Patient not taking: Reported on 12/20/2021)       autologous serum compounded ophthalmic solution Place 1 drop into both eyes every 2 hours (while awake)       BOTOX 100 units injection Inject 250 Units into the muscle once Lot # /C3 with Expiration Date:  11/2020 (Patient not taking: Reported on 12/20/2021)       buPROPion (WELLBUTRIN XL) 300 MG 24 hr tablet Take 300 mg by mouth every morning       ClonAZEPAM (KLONOPIN) 1 MG tablet Take 1 mg by mouth 2 times daily as needed.       COMPOUNDED NON-CONTROLLED SUBSTANCE (CMPD RX) - PHARMACY TO MIX COMPOUNDED MEDICATION Low dose naltrexone 5mg per capsule, to be taken by mouth every day. 30 capsule 0     doxycycline monohydrate (ADOXA) 50 MG tablet Take 1 tablet (50 mg) by mouth 2 times daily 180 tablet 3     DULoxetine HCl (CYMBALTA PO) Take 60 mg by mouth       EPINEPHrine (EPIPEN) 0.3 MG/0.3ML injection Inject 0.3 mg into the muscle once as needed        hydrochlorothiazide (HYDRODIURIL) 50 MG tablet Take 50 mg by mouth daily       Ibuprofen (ADVIL) 200 MG capsule Take 200 mg by mouth every 4 hours as needed.       iloperidone (FANAPT) 6 MG TABS tablet Take 4 mg by mouth daily        lifitegrast (XIIDRA) 5 % opthalmic solution Place 1 drop into both eyes 2 times daily 180 each 1     LINZESS 290 MCG capsule TAKE 1 CAPSULE BY MOUTH EVERY DAY  3     methocarbamol (ROBAXIN) 500 MG tablet Take 1 tablet (500 mg) by mouth 2 times daily as needed for muscle spasms 60 tablet 0     ONABOTULINUMTOXINA IJ Inject 250 Units as directed once LOT # /C3  EXPIRATION: 03/2020 (Patient not taking: Reported on 12/28/2021)       prednisolone 0.25% and hyaluronate in balanced salt SUSP compounded ophthalmic suspension Place 1 Drop into both eyes 4 times daily. Discard bottle 15 days after opening. Refrigerate. Unopened bottle expires:     .       . 15 mL 11      pregabalin (LYRICA) 100 MG capsule Take 1 capsule (100 mg) by mouth 2 times daily (Patient not taking: Reported on 12/28/2021) 60 capsule 0     pregabalin (LYRICA) 150 MG capsule Take 1 capsule (150 mg) by mouth 2 times daily for 20 days 40 capsule 0     pregabalin (LYRICA) 75 MG capsule Take 1 capsule (75 mg) by mouth daily for 4 days 4 capsule 0     sodium chloride 0.9 % neb solution 5 mLs by Other route 6 times daily Medically necessary for scleral contact lens use. May use 5ml vials 6x/day for contact lens use 3000 mL 11     sodium chloride 0.9 % neb solution Medically necessary for scleral contact lens use. May use 3 ml 6 times a day for contact lens use 300 mL 11     tretinoin (RETIN-A) 0.025 % external cream Apply a pea size to entire face QD 45 g 11     White Petrolatum-Mineral Oil (SYSTANE NIGHTTIME) OINT Apply 3.5 g to eye 4 times daily as needed (dry eyes) 7 g 11       Medical History: any changes in medical history since they were last seen? No    Review of Systems:  Positive for: neck pain, back pain, joint pain, paresthesias, numbness    Mood: depression    Physical Exam:  Blood pressure 110/76, pulse 84, SpO2 100 %, not currently breastfeeding.  General: NAD, pleasant  Gait: Slow, antalgic. No specific gait deviations noted.   No significant swelling or erythema noted in their hands, wrists or feet. Strength is 5/5 and symmetric in the upper and lower extremities. Sensation is mildly reduced at the medial border of their foot. Reflexes are 2+ and symmetric in the upper and lower extremities. Toes are downgoing with babinski, santos's negative in the hands.    BILLING TIME DOCUMENTATION:   The total TIME spent on this patient on the date of the encounter/appointment was 47 minutes.      TOTAL TIME includes:   Time spent preparing to see the patient (reviewing records and tests)   Time spent face to face (or over the phone) with the patient   Time spent ordering tests, medications, procedures and  referrals   Time spent Referring and communicating with other healthcare professionals   Time spent documenting clinical information in Epic       DO [facility]

## 2022-03-04 NOTE — PATIENT INSTRUCTIONS
1. Start Topamax 50mg daily for 14 days. If no side effects, increase to twice daily.     2. Hold pool therapy, I ordered chronic pain PT. You'll be called to schedule.    3. Follow up in 2 months.    Take mari,    Yasmany Rivera DO  [facility]        ----------------------------------------------------------------  Clinic Number:  [phone]     Call with any questions about your care and for scheduling assistance.     Calls are returned Monday through Friday between 8 AM and 4:30 PM. We usually get back to you within 2 business days depending on the issue/request.    If we are prescribing your medications:    For opioid medication refills, call the clinic or send a [employer] message 7 days in advance.  Please include:    Name of requested medication    Name of the pharmacy.    For non-opioid medications, call your pharmacy directly to request a refill. Please allow 3-4 days to be processed.     Per [place] State Law:    All controlled substance prescriptions must be filled within 30 days of being written.      For those controlled substances allowing refills, pickup must occur within 30 days of last fill.      We believe regular attendance is key to your success in our program!      Any time you are unable to keep your appointment we ask that you call us at least 24 hours in advance to cancel.This will allow us to offer the appointment time to another patient.     Multiple missed appointments may lead to dismissal from the clinic.

## 2022-03-08 ENCOUNTER — THERAPY VISIT (OUTPATIENT)
Dept: OCCUPATIONAL THERAPY | Facility: CLINIC | Age: 52
End: 2022-03-08
Payer: COMMERCIAL

## 2022-03-08 DIAGNOSIS — R29.898 HAND WEAKNESS: ICD-10-CM

## 2022-03-08 DIAGNOSIS — R27.8 CLUMSINESS: ICD-10-CM

## 2022-03-08 PROCEDURE — 97112 NEUROMUSCULAR REEDUCATION: CPT | Mod: GO | Performed by: OCCUPATIONAL THERAPIST

## 2022-03-08 PROCEDURE — 97140 MANUAL THERAPY 1/> REGIONS: CPT | Mod: GO | Performed by: OCCUPATIONAL THERAPIST

## 2022-03-10 ENCOUNTER — TELEPHONE (OUTPATIENT)
Dept: NEUROLOGY | Facility: CLINIC | Age: 52
End: 2022-03-10
Payer: COMMERCIAL

## 2022-03-10 NOTE — PROGRESS NOTES
"[facility]  Section of General Neurology  Return Patient Visit    Roxanna Celis MRN# [id number]   Age: 51 year old YOB: 1970     Brief history of symptoms: The patient was initially seen in neurologic consultation on 11/11/21 for evaluation of diffuse pain. Please see the comprehensive neurologic consultation note from that date in the Epic records for details.         Interval history:   Since our last visit we have epidermal nerve fiber density results: Which were: normal in legs, borderline reduced in her forearm which is where symptoms began.  Repeat SSA SSB testing was negative.  We have [facility] send out labs that our department will often check to further exclude other causes of small fiber neuropathy pending.    She continues to follow with Dr. Graham and Nicole in pain clinic where they are taking a multi modal and multi disciplinary approach.    Medication plan from their last visit:  1. \"Continue low dose naltrexone 1mg  2. Start topamax 50mg once daily for 2 weeks, if no SEs increase to BID.   3. Continue methocarbamol 500mg BID prn  4. Continue CBD oil. Consider medical cannabis.  5. Continue cymbalta.\"    Pool therapy is not helpful.  Tired too easily, was too painful.    They are going to try chronic pain land PT.  Will trial slower therapy.    Topiramate has not helped of yet.  She is optimistic this will help.   She is debating regarding trying medical cannabis-- I encouraged her to try this given how refractory other treatment modalities have been, I also felt the topiramate was a good idea to try as well.    Her legs gave out on her the pool a few times.   Limbs are variably very hot or cold.    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        Past Medical History:     Patient Active Problem List   Diagnosis     Torticollis, spasmodic     Achlorhydria     Depression with " anxiety     KIANA (generalized anxiety disorder)     Hypokalemia     Hypothyroid     Intramural leiomyoma of uterus     Bariatric surgery status     Morbid obesity with BMI of 40.0-44.9, adult (H)     Multiple thyroid nodules     Obesity     Sleep apnea     Essential hypertension     Chronic bilateral low back pain without sciatica     Precordial pain     Clumsiness     Hand weakness     Past Medical History:   Diagnosis Date     Cervical dystonia 1993        Past Surgical History:     Past Surgical History:   Procedure Laterality Date     AS FRAGMENTING OF KIDNEY STONE       GALLBLADDER SURGERY       STOMACH SURGERY      for weight loss        Social History:     Social History     Tobacco Use     Smoking status: Never Smoker     Smokeless tobacco: Never Used   Substance Use Topics     Alcohol use: No     Drug use: No        Family History:     Family History   Problem Relation Age of Onset     Glaucoma No family hx of      Macular Degeneration No family hx of         Medications:     Current Outpatient Medications   Medication Sig     acetylcysteine 10 % (MUCOMYST) 10% SOLN Place 2 drops into both eyes 6 times daily     aspirin-acetaminophen-caffeine (EXCEDRIN MIGRAINE) 250-250-65 MG per tablet Take 1 tablet by mouth every 6 hours as needed. (Patient not taking: Reported on 12/20/2021)     autologous serum compounded ophthalmic solution Place 1 drop into both eyes every 2 hours (while awake)     BOTOX 100 units injection Inject 250 Units into the muscle once Lot # /C3 with Expiration Date:  11/2020 (Patient not taking: Reported on 12/20/2021)     buPROPion (WELLBUTRIN XL) 300 MG 24 hr tablet Take 300 mg by mouth every morning     ClonAZEPAM (KLONOPIN) 1 MG tablet Take 1 mg by mouth 2 times daily as needed.     COMPOUNDED NON-CONTROLLED SUBSTANCE (CMPD RX) - PHARMACY TO MIX COMPOUNDED MEDICATION Low dose naltrexone 5mg per capsule, to be taken by mouth every day. (Patient taking differently: 1 mg Low dose  naltrexone 5mg per capsule, to be taken by mouth every day. Pt reports taking 1mg, was having side effects at higher dose.)     cyanocobalamin (VITAMIN B-12) 1000 MCG tablet Take 1,000 mcg by mouth daily     doxycycline monohydrate (ADOXA) 50 MG tablet Take 1 tablet (50 mg) by mouth 2 times daily     DULoxetine HCl (CYMBALTA PO) Take 60 mg by mouth     EPINEPHrine (EPIPEN) 0.3 MG/0.3ML injection Inject 0.3 mg into the muscle once as needed      hydrochlorothiazide (HYDRODIURIL) 50 MG tablet Take 50 mg by mouth daily     Ibuprofen (ADVIL) 200 MG capsule Take 200 mg by mouth every 4 hours as needed.     iloperidone (FANAPT) 6 MG TABS tablet Take 4 mg by mouth daily      lifitegrast (XIIDRA) 5 % opthalmic solution Place 1 drop into both eyes 2 times daily     LINZESS 290 MCG capsule TAKE 1 CAPSULE BY MOUTH EVERY DAY     methocarbamol (ROBAXIN) 500 MG tablet Take 1 tablet (500 mg) by mouth 2 times daily as needed for muscle spasms     multivitamin w/minerals (MULTI-VITAMIN) tablet Take 1 tablet by mouth daily     ONABOTULINUMTOXINA IJ Inject 250 Units as directed once LOT # /C3  EXPIRATION: 03/2020 (Patient not taking: Reported on 12/28/2021)     prednisolone 0.25% and hyaluronate in balanced salt SUSP compounded ophthalmic suspension Place 1 Drop into both eyes 4 times daily. Discard bottle 15 days after opening. Refrigerate. Unopened bottle expires:     .       .     pregabalin (LYRICA) 100 MG capsule Take 1 capsule (100 mg) by mouth 2 times daily (Patient not taking: Reported on 12/28/2021)     pregabalin (LYRICA) 150 MG capsule Take 1 capsule (150 mg) by mouth 2 times daily for 20 days     pregabalin (LYRICA) 75 MG capsule Take 1 capsule (75 mg) by mouth daily for 4 days     sodium chloride 0.9 % neb solution 5 mLs by Other route 6 times daily Medically necessary for scleral contact lens use. May use 5ml vials 6x/day for contact lens use     sodium chloride 0.9 % neb solution Medically necessary for scleral  "contact lens use. May use 3 ml 6 times a day for contact lens use     topiramate (TOPAMAX) 50 MG tablet Take 1 tablet (50 mg) by mouth daily for 14 days, THEN 1 tablet (50 mg) 2 times daily for 14 days.     tretinoin (RETIN-A) 0.025 % external cream Apply a pea size to entire face QD     Vitamin D3 (CHOLECALCIFEROL) 25 mcg (1000 units) tablet Take by mouth daily     White Petrolatum-Mineral Oil (SYSTANE NIGHTTIME) OINT Apply 3.5 g to eye 4 times daily as needed (dry eyes)     Current Facility-Administered Medications   Medication     botulinum toxin type B (MYOBLOC) injection 15,000 Units     incobotulinumtoxin A (XEOMIN) 100 units injection 300 Units        Allergies:     Allergies   Allergen Reactions     Ivp Dye [Contrast Dye] Anaphylaxis     Lisinopril Anaphylaxis     Lisinopril-Hctz Anaphylaxis     Maple Flavor Anaphylaxis     MAPLE SYRUP     Maple Tree Anaphylaxis     Allergy includes maple syrup.     No Clinical Screening - See Comments      maple syrup==anaphylaxis  Dairy- causes to not feel well     Gluten Itching and Swelling     Codeine Sulfate Cramps and GI Disturbance     Erythromycin Nausea and Vomiting and Cramps     Nsaids Other (See Comments)     Patient had bariatric surgery. Should not ever take NSAIDS due to high risk for gastric ulcers.      Polyethylene Glycol Unknown     Per allergy testing  Per allergy testing            Physical Exam:   Vitals: /80   Pulse 90   Ht 1.715 m (5' 7.5\")   Wt 98.4 kg (217 lb)   SpO2 100%   BMI 33.49 kg/m     CV: peripheral pulse appreciated  Lungs: breathing comfortably       Neuro:   General Appearance: Intermittently tearful     Mental Status: Alert and oriented to person, place, and time. Speech fluent and comprehension intact. No dysarthria. Normal memory.  Cranial Nerves:   II: Visual fields: normal  III: Pupils: 3 mm, equal, round, reactive to light   III,IV,VI: Extraocular Movements: intact   V: Facial sensation: intact to light touch  VII: " Facial strength: intact without asymmetry  VIII: Hearing: intact grossly  IX: Palate: intact   XI: Shoulder shrug: intact  XII: Tongue movement: normal     Motor Exam:   No focal weakness appreciated but limited by pain  Hip flexion and  testing were  limited by pain most prominently     No drift is present. No abnormal movements. Tone is normal throughout.     Sensory: intact to light touch, vibration and PP today, very painful with very small amounts of movement however, she is in quite restrictive wrist braces b/l as this helps prevent this pain, e.g.     Coordination: no dysmetria with finger-to-nose bilaterally     Reflexes: biceps, triceps, brachioradialis, patellar, and ankle jerks 2+ and symmetric.      Gait: antalgic and cautious gait.           Data: Pertinent prior to visit   Imaging:  MR CERVICAL SPINE W/O CONTRAST, MR THORACIC SPINE W/O CONTRAST  6/18/2021 5:42 PM     History: Demyelinating disease; Neuropathic pain; Paresthesia     Comparison: CT cervical spine dated 8/31/2016, CT cervical spine dated  8/31/2016     Technique: Sagittal T2- and T1-weighted images of the cervical and  thoracic spine, axial T2* gradient echo images of the cervical spine  and axial T2-weighted images were obtained.     Findings:  Cervical: The cervical vertebrae appear normally aligned.  There is no  disc height narrowing at any level.  There is normal signal within the  cervical spinal cord which also demonstrates a normal contour.  The  findings on a level by level basis are as follows:     C2-3:  No significant spinal canal or neural foraminal narrowing.     C3-4:  No significant spinal canal or neural foraminal narrowing.     C4-5:  No significant spinal canal or neuroforaminal narrowing.     C5-6:  Uncovertebral hypertrophy and facet arthropathy bilaterally. No  significant spinal canal stenosis. Mild bilateral neuroforaminal  narrowing, left greater than right.     C6-7:  No significant spinal canal or  neuroforaminal narrowing.     C7-T1:  No significant spinal canal or neuroforaminal narrowing.     Thoracic: The thoracic vertebral column appears in normal alignment.  There is loss of disk height at any level. The spinal cord contour and  signal pattern appear within normal limits. No significant spinal  canal or neural foraminal narrowing.     Right inferior thyroid gland 1.5 x 1.0 cm T2 hyperintense lesion,  corresponding to CT neck on 8/31/2016, not significantly changed.                                                                      Impression:   1.  Cervical spine: No definite myelopathic spinal cord signal  identified. No high-grade spinal canal or neuroforaminal narrowing at  any level.  2.  Thoracic spine: No definite myelopathic spinal cord signal  identified. No high-grade spinal canal or neuroforaminal narrowing at  any level.     I personally reviewed the above imaging and agree with the findings in the report.       Brain MRI without contrast 2010      History: Possible MS and blurred vision.       Comparison: MRI brain 6/17/2009       Technique: Sagittal and axial turboFLAIR, T1-weighted axial,   diffusion-weighted axial with ADC map, T2-weighted axial, and T2*   gradient echo axial images were obtained without contrast. After   intravenous administration of gadolinium, axial turboFLAIR and coronal   and axial T1-weighted images were obtained.       Findings: There is a single focus of increased T2 signal on sagittal   and axial turboFLAIR images in the left frontal parasagittal   subcortical white matter that was not seen on the prior study of   6/17/2009. The previously noted area of contrast enhancement   surrounding the left optic nerve is not evident on this study.   However, the T1-weighted postcontrast images on this study are not   fat-saturated which limits the ability to detect subtle enhancement   adjacent to the intraconal fat.  The postcontrast turboFLAIR sequences   with fat  saturation do not demonstrate contrast enhancement   surrounding the optic nerves. The images reveal no intracranial mass   lesion, midline shift or abnormal extraaxial fluid collection. The   ventricles and sulci appear within normal for age.  Diffusion-weighted   images appear normal.       Normal intravascular flow voids are identified at the base of the   brain bilaterally.       Impression:   1. There is a nonspecific small focus of increased signal on   turboFLAIR and T2-weighted imaging in the left subcortical   parasagittal white matter.   2. The previously noted subtle contrast enhancement surrounding the   left optic nerve is not appreciated on the current study.  However,   the T1-weighted postcontrast images of the orbits were not   fat-saturated, which limits the ability to distinguish subtle   peripheral optic nerve enhancement. The postcontrast turboFLAIR images   of this region demonstrate no abnormal contrast enhancement   surrounding the left optic nerve.       Procedures:  Previous EMG WNL (9/21)  Muscle biopsy: normal in leg, borderline reduced in her forearm     Laboratory:             Assessment and Plan:   Roxanna Celis is a 51 year old female who presents in follow up for diffuse pain, numbness, tingling  She has a PMH of ELY, anxiety, depression and is s/p baratric surgery.  She has had several years of ongoing symptoms that began with dry eyes, other Sicca like symptoms, though she has reportedly have negative lip biopsy and has had negative SSA/SSB antibodies.   She has seen multiple specialists without answer including MRIs as above (including seeing one of our neuroimmunological specialists who discussed with her she does not have MS), rheumatologic lab work, normal EMG.  She has received a diagnosis of neuropathy and of fibromyalgia in the past but medications to treat as such have provided limited benefit.  We have subsequently repeated SSA/SSB antibodies which were again negative.   Blood work otherwise only revealing for slightly elevated vitamin B6 as above.  Her symptoms have continued to be refractory.  She is considered medical cannabis which could be worth trialing.  She unfortunately remains quite debilitated from her pain despite trials of many medications and approaches.  Subsequent small fiber biopsy which was normal in her legs, borderline reduced in her forearm which is where symptoms began.   Because of this borderline finding we did send [facility] in [facility] send out labs to further work up a potential cause.  She will discuss if this small fiber biopsy finding would be of any further rheumatological consideration given Sicca symptoms though it is possible her work up will be considered definitive, which we did discuss.  We discussed that small fiber changes are often idiopathic and treated symptomatically in such situations, but the [facility] panel will give us more data in this regard.  It is uncommon in such situations for dangelo weakness outside of the context of pain to develop.  We also discussed that this can be very difficult to treat and that I agree with her pain doctor (Dr. Romano) that I do think central pain processing/fibromyalgia are likely components of her condition as well even if other reasons are in time discovered.      --She will continue working with therapy team, multi disciplinary pain team regarding symptoms  --Will await send out [facility] panel, will contact Ms. Celis with results, follow up will depend on these results.    All questions answered.           Karen S, Ankit TL, Teri J, Renaldo D, Teri Y. Longitudinal follow?up of biopsy?proven small fiber neuropathy. Muscle Nerve. 2019;60(4):376-381.    Agustín Torres MD   of Neurology   [facility]      The total time of this encounter today amounted to 51 minutes. This time included time spent with the patient, prep work, ordering tests, and performing post visit  documentation.

## 2022-03-11 ENCOUNTER — OFFICE VISIT (OUTPATIENT)
Dept: NEUROLOGY | Facility: CLINIC | Age: 52
End: 2022-03-11
Payer: COMMERCIAL

## 2022-03-11 VITALS
BODY MASS INDEX: 32.89 KG/M2 | HEIGHT: 68 IN | SYSTOLIC BLOOD PRESSURE: 138 MMHG | HEART RATE: 90 BPM | DIASTOLIC BLOOD PRESSURE: 80 MMHG | WEIGHT: 217 LBS | OXYGEN SATURATION: 100 %

## 2022-03-11 DIAGNOSIS — M35.00 SICCA SYNDROME (H): Primary | ICD-10-CM

## 2022-03-11 DIAGNOSIS — M79.2 NEUROPATHIC PAIN: ICD-10-CM

## 2022-03-11 DIAGNOSIS — R20.2 PARESTHESIA: ICD-10-CM

## 2022-03-11 PROCEDURE — 99215 OFFICE O/P EST HI 40 MIN: CPT | Performed by: STUDENT IN AN ORGANIZED HEALTH CARE EDUCATION/TRAINING PROGRAM

## 2022-03-11 NOTE — NURSING NOTE
"Roxanna Celis is a 51 year old female who presents for:  Chief Complaint   Patient presents with     NEUROPATHY        Initial Vitals:  /80   Pulse 90   Ht 1.715 m (5' 7.5\")   Wt 98.4 kg (217 lb)   SpO2 100%   BMI 33.49 kg/m   Estimated body mass index is 33.49 kg/m  as calculated from the following:    Height as of this encounter: 1.715 m (5' 7.5\").    Weight as of this encounter: 98.4 kg (217 lb).. Body surface area is 2.16 meters squared. BP completed using cuff size: regular    Nursing Comments:     Patito Beavers    "

## 2022-03-11 NOTE — LETTER
"    3/11/2022         RE: Roxanna Celis  [address] [place] [address]        Dear Colleague,    Thank you for referring your patient, Roxanna Celis, to the [facility] NEUROLOGY [facility]INICS [facility]. Please see a copy of my visit note below.    [facility]  Section of General Neurology  Return Patient Visit    Roxanna Celis MRN# [id number]   Age: 51 year old YOB: 1970     Brief history of symptoms: The patient was initially seen in neurologic consultation on 11/11/21 for evaluation of diffuse pain. Please see the comprehensive neurologic consultation note from that date in the Epic records for details.         Interval history:   Since our last visit we have epidermal nerve fiber density results: Which were: normal in legs, borderline reduced in her forearm which is where symptoms began.  Repeat SSA SSB testing was negative.  We have [facility] send out labs that our department will often check to further exclude other causes of small fiber neuropathy pending.    She continues to follow with Dr. Graham and Nicole in pain clinic where they are taking a multi modal and multi disciplinary approach.    Medication plan from their last visit:  1. \"Continue low dose naltrexone 1mg  2. Start topamax 50mg once daily for 2 weeks, if no SEs increase to BID.   3. Continue methocarbamol 500mg BID prn  4. Continue CBD oil. Consider medical cannabis.  5. Continue cymbalta.\"    Pool therapy is not helpful.  Tired too easily, was too painful.    They are going to try chronic pain land PT.  Will trial slower therapy.    Topiramate has not helped of yet.  She is optimistic this will help.   She is debating regarding trying medical cannabis-- I encouraged her to try this given how refractory other treatment modalities have been, I also felt the topiramate was a good idea to try as well.    Her legs gave out on her the pool a few times.   Limbs are variably very hot or cold.    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        Past Medical History:     Patient Active Problem List   Diagnosis     Torticollis, spasmodic     Achlorhydria     Depression with anxiety     KIANA (generalized anxiety disorder)     Hypokalemia     Hypothyroid     Intramural leiomyoma of uterus     Bariatric surgery status     Morbid obesity with BMI of 40.0-44.9, adult (H)     Multiple thyroid nodules     Obesity     Sleep apnea     Essential hypertension     Chronic bilateral low back pain without sciatica     Precordial pain     Clumsiness     Hand weakness     Past Medical History:   Diagnosis Date     Cervical dystonia 1993        Past Surgical History:     Past Surgical History:   Procedure Laterality Date     AS FRAGMENTING OF KIDNEY STONE       GALLBLADDER SURGERY       STOMACH SURGERY      for weight loss        Social History:     Social History     Tobacco Use     Smoking status: Never Smoker     Smokeless tobacco: Never Used   Substance Use Topics     Alcohol use: No     Drug use: No        Family History:     Family History   Problem Relation Age of Onset     Glaucoma No family hx of      Macular Degeneration No family hx of         Medications:     Current Outpatient Medications   Medication Sig     acetylcysteine 10 % (MUCOMYST) 10% SOLN Place 2 drops into both eyes 6 times daily     aspirin-acetaminophen-caffeine (EXCEDRIN MIGRAINE) 250-250-65 MG per tablet Take 1 tablet by mouth every 6 hours as needed. (Patient not taking: Reported on 12/20/2021)     autologous serum compounded ophthalmic solution Place 1 drop into both eyes every 2 hours (while awake)     BOTOX 100 units injection Inject 250 Units into the muscle once Lot # /C3 with Expiration Date:  11/2020 (Patient not taking: Reported on 12/20/2021)     buPROPion (WELLBUTRIN XL) 300 MG 24 hr tablet Take 300 mg by mouth every morning     ClonAZEPAM (KLONOPIN) 1 MG  tablet Take 1 mg by mouth 2 times daily as needed.     COMPOUNDED NON-CONTROLLED SUBSTANCE (CMPD RX) - PHARMACY TO MIX COMPOUNDED MEDICATION Low dose naltrexone 5mg per capsule, to be taken by mouth every day. (Patient taking differently: 1 mg Low dose naltrexone 5mg per capsule, to be taken by mouth every day. Pt reports taking 1mg, was having side effects at higher dose.)     cyanocobalamin (VITAMIN B-12) 1000 MCG tablet Take 1,000 mcg by mouth daily     doxycycline monohydrate (ADOXA) 50 MG tablet Take 1 tablet (50 mg) by mouth 2 times daily     DULoxetine HCl (CYMBALTA PO) Take 60 mg by mouth     EPINEPHrine (EPIPEN) 0.3 MG/0.3ML injection Inject 0.3 mg into the muscle once as needed      hydrochlorothiazide (HYDRODIURIL) 50 MG tablet Take 50 mg by mouth daily     Ibuprofen (ADVIL) 200 MG capsule Take 200 mg by mouth every 4 hours as needed.     iloperidone (FANAPT) 6 MG TABS tablet Take 4 mg by mouth daily      lifitegrast (XIIDRA) 5 % opthalmic solution Place 1 drop into both eyes 2 times daily     LINZESS 290 MCG capsule TAKE 1 CAPSULE BY MOUTH EVERY DAY     methocarbamol (ROBAXIN) 500 MG tablet Take 1 tablet (500 mg) by mouth 2 times daily as needed for muscle spasms     multivitamin w/minerals (MULTI-VITAMIN) tablet Take 1 tablet by mouth daily     ONABOTULINUMTOXINA IJ Inject 250 Units as directed once LOT # /C3  EXPIRATION: 03/2020 (Patient not taking: Reported on 12/28/2021)     prednisolone 0.25% and hyaluronate in balanced salt SUSP compounded ophthalmic suspension Place 1 Drop into both eyes 4 times daily. Discard bottle 15 days after opening. Refrigerate. Unopened bottle expires:     .       .     pregabalin (LYRICA) 100 MG capsule Take 1 capsule (100 mg) by mouth 2 times daily (Patient not taking: Reported on 12/28/2021)     pregabalin (LYRICA) 150 MG capsule Take 1 capsule (150 mg) by mouth 2 times daily for 20 days     pregabalin (LYRICA) 75 MG capsule Take 1 capsule (75 mg) by mouth daily  "for 4 days     sodium chloride 0.9 % neb solution 5 mLs by Other route 6 times daily Medically necessary for scleral contact lens use. May use 5ml vials 6x/day for contact lens use     sodium chloride 0.9 % neb solution Medically necessary for scleral contact lens use. May use 3 ml 6 times a day for contact lens use     topiramate (TOPAMAX) 50 MG tablet Take 1 tablet (50 mg) by mouth daily for 14 days, THEN 1 tablet (50 mg) 2 times daily for 14 days.     tretinoin (RETIN-A) 0.025 % external cream Apply a pea size to entire face QD     Vitamin D3 (CHOLECALCIFEROL) 25 mcg (1000 units) tablet Take by mouth daily     White Petrolatum-Mineral Oil (SYSTANE NIGHTTIME) OINT Apply 3.5 g to eye 4 times daily as needed (dry eyes)     Current Facility-Administered Medications   Medication     botulinum toxin type B (MYOBLOC) injection 15,000 Units     incobotulinumtoxin A (XEOMIN) 100 units injection 300 Units        Allergies:     Allergies   Allergen Reactions     Ivp Dye [Contrast Dye] Anaphylaxis     Lisinopril Anaphylaxis     Lisinopril-Hctz Anaphylaxis     Maple Flavor Anaphylaxis     MAPLE SYRUP     Maple Tree Anaphylaxis     Allergy includes maple syrup.     No Clinical Screening - See Comments      maple syrup==anaphylaxis  Dairy- causes to not feel well     Gluten Itching and Swelling     Codeine Sulfate Cramps and GI Disturbance     Erythromycin Nausea and Vomiting and Cramps     Nsaids Other (See Comments)     Patient had bariatric surgery. Should not ever take NSAIDS due to high risk for gastric ulcers.      Polyethylene Glycol Unknown     Per allergy testing  Per allergy testing            Physical Exam:   Vitals: /80   Pulse 90   Ht 1.715 m (5' 7.5\")   Wt 98.4 kg (217 lb)   SpO2 100%   BMI 33.49 kg/m     CV: peripheral pulse appreciated  Lungs: breathing comfortably       Neuro:   General Appearance: Intermittently tearful     Mental Status: Alert and oriented to person, place, and time. Speech fluent " and comprehension intact. No dysarthria. Normal memory.  Cranial Nerves:   II: Visual fields: normal  III: Pupils: 3 mm, equal, round, reactive to light   III,IV,VI: Extraocular Movements: intact   V: Facial sensation: intact to light touch  VII: Facial strength: intact without asymmetry  VIII: Hearing: intact grossly  IX: Palate: intact   XI: Shoulder shrug: intact  XII: Tongue movement: normal     Motor Exam:   No focal weakness appreciated but limited by pain  Hip flexion and  testing were  limited by pain most prominently     No drift is present. No abnormal movements. Tone is normal throughout.     Sensory: intact to light touch, vibration and PP today, very painful with very small amounts of movement however, she is in quite restrictive wrist braces b/l as this helps prevent this pain, e.g.     Coordination: no dysmetria with finger-to-nose bilaterally     Reflexes: biceps, triceps, brachioradialis, patellar, and ankle jerks 2+ and symmetric.      Gait: antalgic and cautious gait.           Data: Pertinent prior to visit   Imaging:  MR CERVICAL SPINE W/O CONTRAST, MR THORACIC SPINE W/O CONTRAST  6/18/2021 5:42 PM     History: Demyelinating disease; Neuropathic pain; Paresthesia     Comparison: CT cervical spine dated 8/31/2016, CT cervical spine dated  8/31/2016     Technique: Sagittal T2- and T1-weighted images of the cervical and  thoracic spine, axial T2* gradient echo images of the cervical spine  and axial T2-weighted images were obtained.     Findings:  Cervical: The cervical vertebrae appear normally aligned.  There is no  disc height narrowing at any level.  There is normal signal within the  cervical spinal cord which also demonstrates a normal contour.  The  findings on a level by level basis are as follows:     C2-3:  No significant spinal canal or neural foraminal narrowing.     C3-4:  No significant spinal canal or neural foraminal narrowing.     C4-5:  No significant spinal canal or  neuroforaminal narrowing.     C5-6:  Uncovertebral hypertrophy and facet arthropathy bilaterally. No  significant spinal canal stenosis. Mild bilateral neuroforaminal  narrowing, left greater than right.     C6-7:  No significant spinal canal or neuroforaminal narrowing.     C7-T1:  No significant spinal canal or neuroforaminal narrowing.     Thoracic: The thoracic vertebral column appears in normal alignment.  There is loss of disk height at any level. The spinal cord contour and  signal pattern appear within normal limits. No significant spinal  canal or neural foraminal narrowing.     Right inferior thyroid gland 1.5 x 1.0 cm T2 hyperintense lesion,  corresponding to CT neck on 8/31/2016, not significantly changed.                                                                      Impression:   1.  Cervical spine: No definite myelopathic spinal cord signal  identified. No high-grade spinal canal or neuroforaminal narrowing at  any level.  2.  Thoracic spine: No definite myelopathic spinal cord signal  identified. No high-grade spinal canal or neuroforaminal narrowing at  any level.     I personally reviewed the above imaging and agree with the findings in the report.       Brain MRI without contrast 2010      History: Possible MS and blurred vision.       Comparison: MRI brain 6/17/2009       Technique: Sagittal and axial turboFLAIR, T1-weighted axial,   diffusion-weighted axial with ADC map, T2-weighted axial, and T2*   gradient echo axial images were obtained without contrast. After   intravenous administration of gadolinium, axial turboFLAIR and coronal   and axial T1-weighted images were obtained.       Findings: There is a single focus of increased T2 signal on sagittal   and axial turboFLAIR images in the left frontal parasagittal   subcortical white matter that was not seen on the prior study of   6/17/2009. The previously noted area of contrast enhancement   surrounding the left optic nerve is not  evident on this study.   However, the T1-weighted postcontrast images on this study are not   fat-saturated which limits the ability to detect subtle enhancement   adjacent to the intraconal fat.  The postcontrast turboFLAIR sequences   with fat saturation do not demonstrate contrast enhancement   surrounding the optic nerves. The images reveal no intracranial mass   lesion, midline shift or abnormal extraaxial fluid collection. The   ventricles and sulci appear within normal for age.  Diffusion-weighted   images appear normal.       Normal intravascular flow voids are identified at the base of the   brain bilaterally.       Impression:   1. There is a nonspecific small focus of increased signal on   turboFLAIR and T2-weighted imaging in the left subcortical   parasagittal white matter.   2. The previously noted subtle contrast enhancement surrounding the   left optic nerve is not appreciated on the current study.  However,   the T1-weighted postcontrast images of the orbits were not   fat-saturated, which limits the ability to distinguish subtle   peripheral optic nerve enhancement. The postcontrast turboFLAIR images   of this region demonstrate no abnormal contrast enhancement   surrounding the left optic nerve.       Procedures:  Previous EMG WNL (9/21)  Muscle biopsy: normal in leg, borderline reduced in her forearm     Laboratory:             Assessment and Plan:   Roxanna Celis is a 51 year old female who presents in follow up for diffuse pain, numbness, tingling  She has a PMH of ELY, anxiety, depression and is s/p baratric surgery.  She has had several years of ongoing symptoms that began with dry eyes, other Sicca like symptoms, though she has reportedly have negative lip biopsy and has had negative SSA/SSB antibodies.   She has seen multiple specialists without answer including MRIs as above (including seeing one of our neuroimmunological specialists who discussed with her she does not have MS),  rheumatologic lab work, normal EMG.  She has received a diagnosis of neuropathy and of fibromyalgia in the past but medications to treat as such have provided limited benefit.  We have subsequently repeated SSA/SSB antibodies which were again negative.  Blood work otherwise only revealing for slightly elevated vitamin B6 as above.  Her symptoms have continued to be refractory.  She is considered medical cannabis which could be worth trialing.  She unfortunately remains quite debilitated from her pain despite trials of many medications and approaches.  Subsequent small fiber biopsy which was normal in her legs, borderline reduced in her forearm which is where symptoms began.   Because of this borderline finding [facility] to further work up a potential cause.  She will discuss if this small fiber biopsy finding would be of any further rheumatological consideration given Sicca symptoms though it is possible her work up will be considered definitive, which we did discuss.  We discussed that small fiber changes are often idiopathic and treated symptomatically in such situations, but the [facility] panel will give us more data in this regard.  It is uncommon in such situations for dangelo weakness outside of the context of pain to develop.  We also discussed that this can be very difficult to treat and that I agree with her pain doctor (Dr. Romano) that I do think central pain processing/fibromyalgia are likely components of her condition as well even if other reasons are in time discovered.      --She will continue working with therapy team, multi disciplinary pain team regarding symptoms  --Will await send out [facility] panel, will contact Ms. Celis with results, follow up will depend on these results.    All questions answered.           Karen ROSARIO, Ankit RAMOS, Teri J, Renaldo SHELTON, Teri Y. Longitudinal follow?up of biopsy?proven small fiber neuropathy. Muscle Nerve. 2019;60(4):376-381.    Agustín  MD Melissa   of Neurology   [facility]      The total time of this encounter today amounted to 51 minutes. This time included time spent with the patient, prep work, ordering tests, and performing post visit documentation.        Again, thank you for allowing me to participate in the care of your patient.        Sincerely,        Huber Torres MD

## 2022-03-14 ENCOUNTER — TELEPHONE (OUTPATIENT)
Dept: PALLIATIVE MEDICINE | Facility: CLINIC | Age: 52
End: 2022-03-14
Payer: COMMERCIAL

## 2022-03-14 DIAGNOSIS — G89.29 CHRONIC MYOFASCIAL PAIN: Primary | ICD-10-CM

## 2022-03-14 DIAGNOSIS — M79.18 CHRONIC MYOFASCIAL PAIN: Primary | ICD-10-CM

## 2022-03-14 DIAGNOSIS — F41.1 GAD (GENERALIZED ANXIETY DISORDER): ICD-10-CM

## 2022-03-14 DIAGNOSIS — M79.2 NEUROPATHIC PAIN: ICD-10-CM

## 2022-03-14 NOTE — TELEPHONE ENCOUNTER
----- Message from Karolyn Canseco sent at 3/14/2022  1:18 PM CDT -----  Regarding: Patient requesting pain PT order be placed  Hi Dr. Rivera,    This patient of yours called into our Rehab Central Scheduling department to book chronic pain PT with Crescencio Nesbitt, and she said you placed an order for her, but I didn't see one in her chart.    Can you please place a PAIN PT EVAL & TREAT order form her so we can schedule her with Crescencio?    Thanks,Karolyn LANIER  Rehab Central Scheduling

## 2022-03-15 ENCOUNTER — THERAPY VISIT (OUTPATIENT)
Dept: OCCUPATIONAL THERAPY | Facility: CLINIC | Age: 52
End: 2022-03-15
Payer: COMMERCIAL

## 2022-03-15 DIAGNOSIS — R27.8 CLUMSINESS: ICD-10-CM

## 2022-03-15 DIAGNOSIS — R29.898 HAND WEAKNESS: ICD-10-CM

## 2022-03-15 PROCEDURE — 97112 NEUROMUSCULAR REEDUCATION: CPT | Mod: GO | Performed by: OCCUPATIONAL THERAPIST

## 2022-03-15 PROCEDURE — 97140 MANUAL THERAPY 1/> REGIONS: CPT | Mod: GO | Performed by: OCCUPATIONAL THERAPIST

## 2022-03-21 ENCOUNTER — TELEPHONE (OUTPATIENT)
Dept: NEUROLOGY | Facility: CLINIC | Age: 52
End: 2022-03-21
Payer: COMMERCIAL

## 2022-03-21 LAB
Lab: ABNORMAL
PERFORMING LABORATORY: ABNORMAL
SPECIMEN STATUS: ABNORMAL
TEST NAME: ABNORMAL

## 2022-03-21 NOTE — TELEPHONE ENCOUNTER
Called and left message regarding [facility] send out data.  Data was mostly normal but there was a borderline FGFR3 IgG level.  This is of unclear significance.  I would recommend repeating these send outs in 3-6 months and hope the levels/data provide more clarity.  In the meanwhile I would recommend she work closely with her pain management team.

## 2022-03-22 ENCOUNTER — THERAPY VISIT (OUTPATIENT)
Dept: OCCUPATIONAL THERAPY | Facility: CLINIC | Age: 52
End: 2022-03-22
Payer: COMMERCIAL

## 2022-03-22 DIAGNOSIS — R27.8 CLUMSINESS: ICD-10-CM

## 2022-03-22 DIAGNOSIS — R29.898 HAND WEAKNESS: ICD-10-CM

## 2022-03-22 PROCEDURE — 97763 ORTHC/PROSTC MGMT SBSQ ENC: CPT | Mod: GO | Performed by: OCCUPATIONAL THERAPIST

## 2022-03-22 PROCEDURE — 97140 MANUAL THERAPY 1/> REGIONS: CPT | Mod: GO | Performed by: OCCUPATIONAL THERAPIST

## 2022-03-24 ENCOUNTER — MYC MEDICAL ADVICE (OUTPATIENT)
Dept: PALLIATIVE MEDICINE | Facility: CLINIC | Age: 52
End: 2022-03-24
Payer: COMMERCIAL

## 2022-03-24 DIAGNOSIS — M79.18 CHRONIC MYOFASCIAL PAIN: ICD-10-CM

## 2022-03-24 DIAGNOSIS — G89.29 CHRONIC MYOFASCIAL PAIN: ICD-10-CM

## 2022-03-24 DIAGNOSIS — M79.2 NEUROPATHIC PAIN: ICD-10-CM

## 2022-03-25 RX ORDER — TOPIRAMATE 50 MG/1
50 TABLET, FILM COATED ORAL 2 TIMES DAILY
Qty: 60 TABLET | Refills: 1 | Status: SHIPPED | OUTPATIENT
Start: 2022-03-25 | End: 2022-04-20

## 2022-03-25 NOTE — TELEPHONE ENCOUNTER
Routing to provider pool to review [facility] request to increase/refill naltrexone and refill topamax.     Prepped:  topiramate 50mg, #60, Refill:1  50mg BID    Per 03/04/22:    Plan:  The following recommendations were given to the patient. Diagnosis, treatment options, risks, benefits, and alternatives were discussed, and all questions were answered. The patient expressed understanding of the plan for management.      I am recommending a multidisciplinary treatment plan to help this patient better manage her pain.  This includes:      1. Physical Therapy: Stop pool therapy. Chronic pain PT ordered.  1. Clinical Health Pain Psychologist: Continue pain Phd  1. Continue working with primary psychiatry services.  2. Therapy can help reduce physical and psychosocial triggers or reinforcers of pain by adapting thoughts, feelings and behaviors to reduce symptoms and increase quality of life.  Evidence indicates that the practice of relaxation, meditation, and mindfulness techniques can significantly affect pain levels and overall well being.  2. Self Care Recommendations: Gentle progressive exercise that does not increase pain - gradually increase daily walking program.  Take mini breaks - 5 minutes of mindfullness a couple times a day.   3. Diagnostic Studies: none  4. Medication Management:  1. Continue low dose naltrexone 1mg  2. Start topamax 50mg once daily for 2 weeks, if no SEs increase to BID.   3. Continue methocarbamol 500mg BID prn  4. Continue CBD oil. Consider medical cannabis.  5. Continue cymbalta.      5. Further procedures recommended: none  6. Follow up: 2 months.    Cleo NEUMANN, RN Care Coordinator  [facility]  Pain Management

## 2022-03-25 NOTE — TELEPHONE ENCOUNTER
Will leave encounter open for patient response/chart review by nursing.     ----------------Mychart Below from pt----------------  Hi Dr. Rivera,  Can you please increase my prescriptions of Naltrexone to 2 mg and send in a new prescription to [facility] for me?  Also, my prescription for Topiramate needs to be filled.  I am not sure if you were planning on increasing the dosages on his medication at this point or not?  Thank you for your help,  Roxanna Celis    --------------Mychart below response to pt----------------  Namrata Mcknight,     I just wanted to verify that you are taking the topamax at one tablet twice a day? Are you tolerating it and/or having any side effects? Once I hear back I will go ahead and start processing your refill for you. Dr Rivera is currently out of the office but I can have a covering provider take a look at your Naltrexone increase request.     Cleo NEUMANN, RN Care Coordinator  [facility]  Pain Management    ---------------------------------    Per OV 03/04/22:  1. Physical Therapy: Stop pool therapy. Chronic pain PT ordered.  1. Clinical Health Pain Psychologist: Continue pain Phd  1. Continue working with primary psychiatry services.  2. Therapy can help reduce physical and psychosocial triggers or reinforcers of pain by adapting thoughts, feelings and behaviors to reduce symptoms and increase quality of life.  Evidence indicates that the practice of relaxation, meditation, and mindfulness techniques can significantly affect pain levels and overall well being.  2. Self Care Recommendations: Gentle progressive exercise that does not increase pain - gradually increase daily walking program.  Take mini breaks - 5 minutes of mindfullness a couple times a day.   3. Diagnostic Studies: none  4. Medication Management:  1. Continue low dose naltrexone 1mg  2. Start topamax 50mg once daily for 2 weeks, if no SEs increase to BID.   3. Continue methocarbamol 500mg BID  prn  4. Continue CBD oil. Consider medical cannabis.  5. Continue cymbalta.      5. Further procedures recommended: none  6. Follow up: 2 months.

## 2022-03-29 ENCOUNTER — THERAPY VISIT (OUTPATIENT)
Dept: OCCUPATIONAL THERAPY | Facility: CLINIC | Age: 52
End: 2022-03-29
Payer: COMMERCIAL

## 2022-03-29 DIAGNOSIS — R27.8 CLUMSINESS: ICD-10-CM

## 2022-03-29 DIAGNOSIS — R29.898 HAND WEAKNESS: ICD-10-CM

## 2022-03-29 PROCEDURE — 97140 MANUAL THERAPY 1/> REGIONS: CPT | Mod: GO | Performed by: OCCUPATIONAL THERAPIST

## 2022-03-29 PROCEDURE — 97112 NEUROMUSCULAR REEDUCATION: CPT | Mod: GO | Performed by: OCCUPATIONAL THERAPIST

## 2022-03-30 ENCOUNTER — MYC MEDICAL ADVICE (OUTPATIENT)
Dept: PHYSICAL MEDICINE AND REHAB | Facility: CLINIC | Age: 52
End: 2022-03-30
Payer: COMMERCIAL

## 2022-03-30 NOTE — TELEPHONE ENCOUNTER
Writer replied to patient MyChart message and sent message to PM&R Nurse Pool to reach out to patient to discuss RX.     Amanda Goodwin LPN  Neurosurgery

## 2022-03-30 NOTE — TELEPHONE ENCOUNTER
Signed Prescriptions:                        Disp   Refills    topiramate (TOPAMAX) 50 MG tablet          60 tab*1        Sig: Take 1 tablet (50 mg) by mouth 2 times daily  Authorizing Provider: LAURIE SHIELDS    COMPOUNDED NON-CONTROLLED SUBSTANCE (CMPD *30 cap*0        Sig: Take 2 mg by mouth daily Low dose naltrexone 2mg per           capsule, to be taken by mouth every day.  Authorizing Provider: ISELA DIAZ    Covering for provider who is out of the office.  Refill appears appropriate and was sent to requested pharmacy.    Isela Diaz MD  [facility] Pain Management

## 2022-03-30 NOTE — TELEPHONE ENCOUNTER
Pt informed of rx via mychart - closing encounter    Annika ZEE RN Care Coordinator  [facility] Pain [facility]

## 2022-03-30 NOTE — TELEPHONE ENCOUNTER
Prepped rx for sig attached in encounter    Annika ZEE RN Care Coordinator  [facility] Pain [facility]

## 2022-03-30 NOTE — TELEPHONE ENCOUNTER
Re routing to provider pool to address pt request from 3/25 for Nicole pt while out of office    Rx has not been prepped as increase as not been addressed by provider team.       To: PAIN NURSE      From: Roxanna Celis      Created: 3/25/2022 10:26 AM        *-*-*This message has not been handled.*-*-*    Yes I am taking it 2 × per day without side effects.  Thanks      ---------------      To: PAIN NURSE      From: Roxanna Celis      Created: 3/29/2022 6:14 PM        *-*-*This message has not been handled.*-*-*    Namrata Rivera,  When i was in at my last visit you stated you were open to taking care of my prescription of Naltrexone for me.  You also knew at my next refill i would be increasing to 2 mg caps once a day.  I sent a medical message about this medication last week and it still has not been refilled.  I an completely out.  If would would so kindly call in the new prescription for me i would really appreciate it.  As i feel it is important that you handle this medication for me since you are my pain doctor, and it is being used for nerve pain.  Thank you for your understanding and help,  Roxanna CHARLES could you please send me a message back letting me know this has been taken care of.  Thank you.

## 2022-03-31 DIAGNOSIS — G24.1 GENETIC TORSION DYSTONIA: ICD-10-CM

## 2022-03-31 DIAGNOSIS — G24.3 CERVICAL DYSTONIA: Primary | ICD-10-CM

## 2022-04-07 ENCOUNTER — VIRTUAL VISIT (OUTPATIENT)
Dept: PALLIATIVE MEDICINE | Facility: CLINIC | Age: 52
End: 2022-04-07
Payer: COMMERCIAL

## 2022-04-07 DIAGNOSIS — M79.18 CHRONIC MYOFASCIAL PAIN: Primary | ICD-10-CM

## 2022-04-07 DIAGNOSIS — G89.29 CHRONIC MYOFASCIAL PAIN: Primary | ICD-10-CM

## 2022-04-07 DIAGNOSIS — M79.2 NEUROPATHIC PAIN: ICD-10-CM

## 2022-04-07 PROCEDURE — 96158 HLTH BHV IVNTJ INDIV 1ST 30: CPT | Mod: GT | Performed by: PSYCHOLOGIST

## 2022-04-07 PROCEDURE — 96159 HLTH BHV IVNTJ INDIV EA ADDL: CPT | Mod: GT | Performed by: PSYCHOLOGIST

## 2022-04-07 NOTE — PROGRESS NOTES
"Roxanna Celis is a 51 year old female who is being evaluated via a billable video visit.      The patient has been notified of following:     \"This video visit will be conducted via a call between you and your physician/provider. We have found that certain health care needs can be provided without the need for an in-person physical exam.  This service lets us provide the care you need with a video conversation.     Video visits are billed at different rates depending on your insurance coverage.  Please reach out to your insurance provider with any questions.    If during the course of the call the physician/provider feels a video visit is not appropriate, you will not be charged for this service.\"    Patient has given verbal consent for Video visit? Yes    Patient would like the video invitation sent by: Text to cell phone: .[phone]}    Video Start Time: 9:59 AM    Additional provider notes:      Pain Diagnoses per pain provider:   Neuropathic pain     Chronic myofascial pain        DATA: During today's visit you reported the following: Your neuropathic and myofascial pain is unchanged - recognize it could take a while to notice benefit due to plan to slowly titrate up. Your mood is unchanged - recognize importance of obtaining long term individual therapy. Your activity level is reduced, 'my body is making me pace'. Feeling very low energy and exhausted most days. Your stress level is high - niece is very upset with you but it isn't clear why. Your need for sleep is much greater - low iron and ferritn. You reported engaging in self-care for your pain 2-3 times daily at minimum.    You identified that you would like to focus on the following or had questions regarding the following issues or concerns, and we discussed the following:   - update since last visit on 2/23/2022  - increased low dose naltrexone to 2 mg and Topamax 1 mg from most recent visit with Yasmany Rivera DO   - thinking about starting on disability " - discussed that PCP would be best to help complete this  - discussed contacting insurance to obtain list of mental health providers that would be covered by insurance for long-term mental health support  - feeling very tired/fatigued - recent labs revealed low iron and ferritin levels  - conflict with niece - unsure why or what contributed to this  - made changes to diet  - feeling well-supported by     ASSESSMENT: Pain is still unmanaged, however she seems more hopeful with the current medication regimen and plan for PT; she would like to obtain individual therapy and recognizes there could be a wait to connect. She seems to be paying more attention to cues regarding pain and fatigue, and is noting improvement in some ways in terms of pain, specifically that when she pays attention to cues, pain is not flaring more as she is better attending to self-care in the moment and pacing.    PLAN:   Your next appointment is scheduled for 4/21 at 2:00 PM.  Assignment/Objectives /interventions for next session:   - continue to focus on self-care  - contact insurance regarding coverage for individual therapy    We believe regular attendance is key to your success in our program!      Any time you are unable to keep your appointment we ask that you call us at [phone] at least 24 hours in advance to cancel.This will allow us to offer the appointment time to another patient.     Multiple missed appointments may lead to dismissal from the clinic.    Video-Visit Details    Type of service:  Video Visit    Video End Time (time video stopped): 10:44 AM    Originating Location (pt. Location): Home    Distant Location (provider location):  [facility]     Mode of Communication:  Video Conference via Gabriella Graham PsyD LP  Licensed Psychologist  Outpatient Clinic Therapist  [facility]

## 2022-04-13 ENCOUNTER — TRANSCRIBE ORDERS (OUTPATIENT)
Dept: OTHER | Age: 52
End: 2022-04-13
Payer: COMMERCIAL

## 2022-04-13 ENCOUNTER — PATIENT OUTREACH (OUTPATIENT)
Dept: ONCOLOGY | Facility: CLINIC | Age: 52
End: 2022-04-13
Payer: COMMERCIAL

## 2022-04-13 DIAGNOSIS — D64.9 ANEMIA: Primary | ICD-10-CM

## 2022-04-13 NOTE — PROGRESS NOTES
Writer received referral for iron deficiency. Reviewed for urgency based on labs and symptomology. Appropriate scheduling instructions added and referral sent to New Patient Scheduling for completion.

## 2022-04-19 DIAGNOSIS — R29.898 HAND WEAKNESS: Primary | ICD-10-CM

## 2022-04-19 DIAGNOSIS — M79.2 NEUROPATHIC PAIN: ICD-10-CM

## 2022-04-20 DIAGNOSIS — M79.2 NEUROPATHIC PAIN: ICD-10-CM

## 2022-04-20 DIAGNOSIS — G89.29 CHRONIC MYOFASCIAL PAIN: ICD-10-CM

## 2022-04-20 DIAGNOSIS — M79.18 CHRONIC MYOFASCIAL PAIN: ICD-10-CM

## 2022-04-20 RX ORDER — TOPIRAMATE 50 MG/1
50 TABLET, FILM COATED ORAL 2 TIMES DAILY
Qty: 60 TABLET | Refills: 1 | Status: SHIPPED | OUTPATIENT
Start: 2022-04-20 | End: 2022-05-19

## 2022-04-26 ENCOUNTER — OFFICE VISIT (OUTPATIENT)
Dept: OPHTHALMOLOGY | Facility: CLINIC | Age: 52
End: 2022-04-26
Attending: OPHTHALMOLOGY
Payer: COMMERCIAL

## 2022-04-26 ENCOUNTER — MYC MEDICAL ADVICE (OUTPATIENT)
Dept: PALLIATIVE MEDICINE | Facility: CLINIC | Age: 52
End: 2022-04-26

## 2022-04-26 DIAGNOSIS — H02.889 MGD (MEIBOMIAN GLAND DYSFUNCTION): ICD-10-CM

## 2022-04-26 DIAGNOSIS — H04.123 BILATERAL DRY EYES: ICD-10-CM

## 2022-04-26 DIAGNOSIS — G89.29 CHRONIC MYOFASCIAL PAIN: ICD-10-CM

## 2022-04-26 DIAGNOSIS — H16.123 FILAMENTARY KERATITIS OF BOTH EYES: ICD-10-CM

## 2022-04-26 DIAGNOSIS — H01.02A SQUAMOUS BLEPHARITIS OF UPPER AND LOWER EYELIDS OF BOTH EYES: ICD-10-CM

## 2022-04-26 DIAGNOSIS — H57.13 EYE PAIN, BILATERAL: Primary | ICD-10-CM

## 2022-04-26 DIAGNOSIS — H01.02B SQUAMOUS BLEPHARITIS OF UPPER AND LOWER EYELIDS OF BOTH EYES: ICD-10-CM

## 2022-04-26 DIAGNOSIS — M79.18 CHRONIC MYOFASCIAL PAIN: ICD-10-CM

## 2022-04-26 PROCEDURE — G0463 HOSPITAL OUTPT CLINIC VISIT: HCPCS | Mod: 25 | Performed by: TECHNICIAN/TECHNOLOGIST

## 2022-04-26 PROCEDURE — 68761 CLOSE TEAR DUCT OPENING: CPT | Performed by: OPHTHALMOLOGY

## 2022-04-26 PROCEDURE — 99214 OFFICE O/P EST MOD 30 MIN: CPT | Mod: 25 | Performed by: OPHTHALMOLOGY

## 2022-04-26 RX ORDER — TOPIRAMATE 50 MG/1
50 TABLET, FILM COATED ORAL 2 TIMES DAILY
COMMUNITY
End: 2022-05-05

## 2022-04-26 RX ORDER — DOXYCYCLINE 50 MG/1
50 TABLET ORAL 2 TIMES DAILY
Qty: 180 TABLET | Refills: 3 | Status: SHIPPED | OUTPATIENT
Start: 2022-04-26 | End: 2023-06-13

## 2022-04-26 ASSESSMENT — REFRACTION_WEARINGRX
OS_CYLINDER: +0.25
OS_SPHERE: -2.25
OS_ADD: +2.33
SPECS_TYPE: PAL
OD_AXIS: 137
OD_ADD: +2.33
OD_SPHERE: -2.25
OS_AXIS: 106
OD_CYLINDER: +0.25

## 2022-04-26 ASSESSMENT — VISUAL ACUITY
METHOD: SNELLEN - LINEAR
OS_CC: 20/20
OS_CC+: -3
CORRECTION_TYPE: GLASSES
OD_CC+: -1
OD_CC: 20/20

## 2022-04-26 ASSESSMENT — TONOMETRY
IOP_METHOD: ICARE
OD_IOP_MMHG: 14
OS_IOP_MMHG: 15

## 2022-04-26 ASSESSMENT — CONF VISUAL FIELD
OD_NORMAL: 1
METHOD: COUNTING FINGERS
OS_NORMAL: 1

## 2022-04-26 ASSESSMENT — EXTERNAL EXAM - RIGHT EYE: OD_EXAM: NORMAL

## 2022-04-26 ASSESSMENT — CUP TO DISC RATIO
OD_RATIO: 0.3
OS_RATIO: 0.35

## 2022-04-26 ASSESSMENT — EXTERNAL EXAM - LEFT EYE: OS_EXAM: NORMAL

## 2022-04-26 NOTE — TELEPHONE ENCOUNTER
Routing to provider pool to review medication prepped per below for Dr Rivera pt    #8 rx bridge to 1 day after appt 5/5, pt will discuss dose increase at that point with Nicole    COMPOUNDED NON-CONTROLLED SUBSTANCE (CMPD RX) - PHARMACY TO MIX COMPOUNDED MEDICATION #8 Refill:0  Sig:Take 2 mg by mouth daily Low dose naltrexone 2mg per capsule, to be taken by mouth every day. - Oral  Last picked up 3/30/22 with start on 3/30/22    Due: OK to fill 4/26/22 and start 4/29/22    Per last OV note 3/4/22: Medication Management: Continue low dose naltrexone 1mg (increased 3/30)    [facility] - [place], [place] - [address]  [address] [place] [id number]  Phone: [phone] Fax: [phone]    Annika ZEE RN Care Coordinator  [facility]

## 2022-04-26 NOTE — TELEPHONE ENCOUNTER
Will prep bridge rx to get to 5/5 appt    Naltrexone Medication  4/26/2022 1:58 PM Reply    To: PAIN NURSE      From: Roxanna Celis      Created: 4/26/2022 1:58 PM        *-*-*This message has not been handled.*-*-*    I will need 8 pills at 2 mg.  If you could send this in i would appreciate it.  Thanks,  Roxanna ZEE RN Care Coordinator  [facility]

## 2022-04-26 NOTE — TELEPHONE ENCOUNTER
Will keep encounter open at this time for pt communication and nursing response.    Annika ZEE RN Care Coordinator  [facility]      Naltrexone Medication  4/26/2022 1:18 PM Reply    To: PAIN NURSE      From: Roxanna Celis      Created: 4/26/2022 1:18 PM        *-*-*This message has not been handled.*-*-*    Namrata Rivera,  Things are going well with the Naltrexone but it's time to increase the dosage again.  Can you please call in a new prescription of Naltrexone at 3 mg?  I only have three days left of the older prescription.  Also, i am all set up for the medical marijuana but i have some concerns and questions.  I will talk with you about my questions next week at our in person appointment.  Thanks for your help,  Roxanna Celis        ---------------------------      Naltrexone Medication  4/26/2022 1:50 PM     To: Roxanna Celis      From: Annika Samayoa RN      Created: 4/26/2022 1:50 PM        Roxanna,     We cannot make further changes to your medication without discussing this in person with Dr Rivera.      If you are out of medication we can provide you a small prescription just to get you to your appointment date instead of a 30 day supply so that you do not have an excess of medication if you make changes with Dr Rivera at your appointment in May.      Let us know if this is something that would work for you and we can send a prescription over.     Thanks,  Annika ZEE RN Care Coordinator  [facility]

## 2022-04-27 ENCOUNTER — OFFICE VISIT (OUTPATIENT)
Dept: PHYSICAL MEDICINE AND REHAB | Facility: CLINIC | Age: 52
End: 2022-04-27
Payer: COMMERCIAL

## 2022-04-27 VITALS
DIASTOLIC BLOOD PRESSURE: 76 MMHG | BODY MASS INDEX: 32.41 KG/M2 | RESPIRATION RATE: 16 BRPM | HEART RATE: 105 BPM | TEMPERATURE: 98.2 F | SYSTOLIC BLOOD PRESSURE: 126 MMHG | OXYGEN SATURATION: 100 % | WEIGHT: 210 LBS

## 2022-04-27 DIAGNOSIS — G24.3 CERVICAL DYSTONIA: Primary | ICD-10-CM

## 2022-04-27 PROCEDURE — 99215 OFFICE O/P EST HI 40 MIN: CPT | Mod: 24 | Performed by: PHYSICAL MEDICINE & REHABILITATION

## 2022-04-27 RX ORDER — TOPIRAMATE 50 MG/1
50 TABLET, FILM COATED ORAL
COMMUNITY
Start: 2022-03-25 | End: 2022-05-05

## 2022-04-27 RX ORDER — ACETYLCYSTEINE 100 MG/ML
2 SOLUTION ORAL; RESPIRATORY (INHALATION)
COMMUNITY
Start: 2021-02-24 | End: 2022-11-18

## 2022-04-27 RX ORDER — CLONAZEPAM 0.5 MG/1
TABLET ORAL
COMMUNITY
Start: 2022-01-27 | End: 2023-03-21

## 2022-04-27 RX ORDER — SOFT LENS RINSE,STORE SOLUTION
1 SOLUTION, NON-ORAL MISCELLANEOUS
COMMUNITY

## 2022-04-27 RX ORDER — DULOXETIN HYDROCHLORIDE 60 MG/1
CAPSULE, DELAYED RELEASE ORAL
COMMUNITY
Start: 2022-02-25

## 2022-04-27 RX ORDER — BUPROPION HYDROCHLORIDE 300 MG/1
300 TABLET ORAL
COMMUNITY
Start: 2021-07-30 | End: 2023-02-14

## 2022-04-27 RX ORDER — EPINEPHRINE 0.3 MG/.3ML
INJECTION SUBCUTANEOUS
COMMUNITY
Start: 2021-12-05

## 2022-04-27 RX ORDER — SODIUM CHLORIDE FOR INHALATION 0.9 %
5 VIAL, NEBULIZER (ML) INHALATION
COMMUNITY
Start: 2021-04-03 | End: 2023-02-14

## 2022-04-27 RX ORDER — VARENICLINE 0.03 MG/.05ML
SPRAY NASAL
COMMUNITY
Start: 2022-04-11 | End: 2023-06-13

## 2022-04-27 ASSESSMENT — PAIN SCALES - GENERAL: PAINLEVEL: EXTREME PAIN (8)

## 2022-04-27 NOTE — PROGRESS NOTES
[facility]     PM&R CLINIC NOTE      HPI  Chief Complaint   Patient presents with     Cervical Dystonia     P New-Botox     Roxanna Celis is a 51 year old female with a history of cervical dystonia who presents to clinic for botulinum toxin injections for management of involuntary muscle spasms and neck pain secondary to her cervical dystonia.     SINCE LAST VISIT  Roxanna Celis was last seen here in clinic on 1/29/2020. Prior to that visit, she had been receiving Botox injections for management of her cervical dystonia, but due to decreased efficacy over time, decision was made to switch to Xeomin. Unfortunately, the Xeomin did not provide her with any benefit. She was given a Medrol dosepak for severe muscle spasms and pain in her neck. She was also enrolled in medical cannabis program. Recommendation at that time was to discontinue any type A botulinum toxins due to lack of efficacy and switch over to Myobloc (type B). Due to other health issues and the COVID19 pandemic, the patient has not been seen in this clinic since 1/29/2020.     Since our visit in 1/2020, she has been having problems with all four of her limbs - she is having arm and leg pain and weakness and difficulty with ambulation. She is currently undergoing workup for peripheral neuropathy and the nerve biopsies came back either negative or indeterminate. She has also been tested for MS, rheumatoid arthritis and Sjogren's which have been negative. She has been having severe dry eyes and associated pain in her eyes. This is a significant issue for her.       PHYSICAL EXAM  VS: /76   Pulse 105   Temp 98.2  F (36.8  C)   Resp 16   Wt 95.3 kg (210 lb)   SpO2 100%   BMI 32.41 kg/m     GEN: Pleasant and cooperative, in no acute distress  HEENT: No facial asymmetry  AROM ASSESSMENT - NECK: Cervical rotation is severely limited in all planes with tremor at end range in all planes.   HEAD, NECK AND TRUNK  PATTERN:   Head & Neck Rotation:   Left  Head & Neck Lateral Bend:   Left  Shoulder Elevation:   Right    ALLERGIES  Allergies   Allergen Reactions     Contrast Dye Anaphylaxis     Other reaction(s): almost  from it     Diagnostic X-Ray Materials Anaphylaxis     IVP DYE     Lisinopril Anaphylaxis     Lisinopril-Hctz Anaphylaxis     Maple Flavor Anaphylaxis     MAPLE SYRUP  MAPLE SYRUP  MAPLE SYRUP     Maple Tree Anaphylaxis     Allergy includes maple syrup.     No Clinical Screening - See Comments      maple syrup==anaphylaxis  Dairy- causes to not feel well     Gluten Itching and Swelling     Gluten Meal Itching and Swelling     Cramping     Montrose Meal Unknown     Chamomile Unknown     Codeine Sulfate Cramps and GI Disturbance     Food Unknown     Grapes, almond, lactose, gluten, real maple syrup     Nsaids Other (See Comments) and Unknown     Patient had bariatric surgery. Should not ever take NSAIDS due to high risk for gastric ulcers.   Patient had bariatric surgery. Should not ever take NSAIDS due to high risk for gastric ulcers.      Polyethylene Glycol Unknown     Per allergy testing  Per allergy testing       Erythromycin Nausea and Vomiting, Cramps, Diarrhea and Nausea     PN: LW Reaction: stomach upset  cramping     Lactose Diarrhea     Morphine Other (See Comments)       CURRENT MEDICATIONS    Current Outpatient Medications:      acetylcysteine (MUCOMYST) 10 % nebulizer solution, Apply 2 drops to eye, Disp: , Rfl:      aspirin-acetaminophen-caffeine (EXCEDRIN MIGRAINE) 250-250-65 MG tablet, Take 1 tablet by mouth every 6 hours as needed, Disp: , Rfl:      autologous serum compounded ophthalmic solution, Place 1 drop into both eyes every 2 hours (while awake), Disp: , Rfl:      BOTOX 100 units injection, Inject 250 Units into the muscle once Lot # /C3 with Expiration Date:  2020, Disp: , Rfl:      buPROPion (WELLBUTRIN XL) 300 MG 24 hr tablet, Take 300 mg by mouth every morning, Disp: , Rfl:       clonazePAM (KLONOPIN) 0.5 MG tablet, TAKE 2 TABLETS BY MOUTH AT BEDTIME, Disp: , Rfl:      cyanocobalamin (VITAMIN B-12) 1000 MCG tablet, Take 1,000 mcg by mouth daily, Disp: , Rfl:      doxycycline monohydrate (ADOXA) 50 MG tablet, Take 1 tablet (50 mg) by mouth 2 times daily, Disp: 180 tablet, Rfl: 3     DULoxetine (CYMBALTA) 60 MG capsule, TAKE 2 CAPSULES BY MOUTH EVERY DAY, Disp: , Rfl:      EPINEPHrine (EPIPEN) 0.3 MG/0.3ML injection, Inject 0.3 mg into the muscle once as needed , Disp: , Rfl:      ibuprofen (ADVIL/MOTRIN) 200 MG capsule, Take 200 mg by mouth every 4 hours as needed., Disp: , Rfl:      iloperidone (FANAPT) 6 MG TABS tablet, Take 4 mg by mouth daily , Disp: , Rfl:      lifitegrast (XIIDRA) 5 % opthalmic solution, Place 1 drop into both eyes 2 times daily, Disp: 180 each, Rfl: 1     LINZESS 290 MCG capsule, TAKE 1 CAPSULE BY MOUTH EVERY DAY, Disp: , Rfl: 3     methocarbamol (ROBAXIN) 500 MG tablet, Take 1 tablet (500 mg) by mouth 2 times daily as needed for muscle spasms, Disp: 60 tablet, Rfl: 0     multivitamin w/minerals (THERA-VIT-M) tablet, Take 1 tablet by mouth daily, Disp: , Rfl:      NALTREXONE HCL PO, Take 3 mg by mouth daily, Disp: , Rfl:      ONABOTULINUMTOXINA IJ, Inject 250 Units as directed once LOT # /C3 EXPIRATION: 03/2020, Disp: , Rfl:      prednisolone 0.25% and hyaluronate in balanced salt SUSP compounded ophthalmic suspension, Place 1 Drop into both eyes 4 times daily. Discard bottle 15 days after opening. Refrigerate. Unopened bottle expires:     .       ., Disp: 15 mL, Rfl: 11     sodium chloride 0.9 % neb solution, Apply 5 mLs to eye, Disp: , Rfl:      sodium chloride 0.9 % neb solution, 5 mLs by Other route 6 times daily Medically necessary for scleral contact lens use. May use 5ml vials 6x/day for contact lens use, Disp: 3000 mL, Rfl: 11     sodium chloride 0.9 % neb solution, Medically necessary for scleral contact lens use. May use 3 ml 6 times a day for  contact lens use, Disp: 300 mL, Rfl: 11     Soft Lens Products (B&L SENSITIVE EYES) SOLN, Apply 1 drop to eye, Disp: , Rfl:      topiramate (TOPAMAX) 50 MG tablet, Take 1 tablet (50 mg) by mouth 2 times daily, Disp: 60 tablet, Rfl: 1     tretinoin (RETIN-A) 0.025 % external cream, Apply a pea size to entire face QD, Disp: 45 g, Rfl: 11     TYRVAYA 0.03 MG/ACT nasal spray, USE 1 SPRAY IN EACH NOSTRIL TWICE DAILY., Disp: , Rfl:      Vitamin D3 (CHOLECALCIFEROL) 25 mcg (1000 units) tablet, Take by mouth daily, Disp: , Rfl:      White Petrolatum-Mineral Oil (SYSTANE NIGHTTIME) OINT, Apply 3.5 g to eye 4 times daily as needed (dry eyes), Disp: 7 g, Rfl: 11     acetylcysteine 10 % (MUCOMYST) 10% SOLN, Place 2 drops into both eyes 6 times daily, Disp: 30 mL, Rfl: 2     buPROPion (WELLBUTRIN XL) 300 MG 24 hr tablet, Take 300 mg by mouth, Disp: , Rfl:      clonazePAM (KLONOPIN) 1 MG tablet, Take 1 mg by mouth 2 times daily as needed, Disp: , Rfl:      COMPOUNDED NON-CONTROLLED SUBSTANCE (CMPD RX) - PHARMACY TO MIX COMPOUNDED MEDICATION, Take 3 mg by mouth daily Low dose naltrexone 3mg per capsule, to be taken by mouth every day., Disp: 30 capsule, Rfl: 0     DULoxetine HCl (CYMBALTA PO), Take 60 mg by mouth, Disp: , Rfl:      EPINEPHrine (ANY BX GENERIC EQUIV) 0.3 MG/0.3ML injection 2-pack, , Disp: , Rfl:      predniSONE (DELTASONE) 5 MG tablet, 4tab=20 mg qd x 7 days, 3tab=15 mg qd x 5 days, 2tab=10 mg qd, Disp: 100 tablet, Rfl: 2    Current Facility-Administered Medications:      botulinum toxin type A (DYSPORT) injection 500 Units, 500 Units, Intramuscular, Q90 Days, America Gonzalez MD     botulinum toxin type B (MYOBLOC) injection 15,000 Units, 15,000 Units, Intramuscular, Q90 Days, America Gonzalez MD     botulinum toxin type B (MYOBLOC) injection 5,000 Units, 5,000 Units, Intramuscular, Q90 Days, America Gonzalez MD       ASSESSMENT AND PLAN   1. Decision was made to switch to Dysport  injections for management of the persistent muscle spasms and pain in her neck musculature. Plan will be to request up to 800 units of Dysport every 12 weeks for management of cervical dystonia. We are going to avoid any injections with Myobloc due to the fact that it carries a high risk of causing dry eyes, which is something she has been struggling with for a while.   2. Referrals: None.   3. Follow up: Roxanna Celis was scheduled for Dysport injections pending insurance authorization.       I spent a total of 40 minutes face-to-face and managing the care of Roxanna Celis. Over 50% of my time was spent counseling the patient and coordinating care. Please see note for details.

## 2022-04-27 NOTE — LETTER
4/27/2022       RE: Roxanna Celis  [id number] [place] [address]     Dear Colleague,    Thank you for referring your patient, Roxanna Celis, to the [facility] [place] at [facility]. Please see a copy of my visit note below.        [facility]     PM&R CLINIC NOTE      HPI  Chief Complaint   Patient presents with     Cervical Dystonia     [facility] New-Botox     Roxanna Celis is a 51 year old female with a history of cervical dystonia who presents to clinic for botulinum toxin injections for management of involuntary muscle spasms and neck pain secondary to her cervical dystonia.     SINCE LAST VISIT  Roxanna Celis was last seen here in clinic on 1/29/2020. Prior to that visit, she had been receiving Botox injections for management of her cervical dystonia, but due to decreased efficacy over time, decision was made to switch to Xeomin. Unfortunately, the Xeomin did not provide her with any benefit. She was given a Medrol dosepak for severe muscle spasms and pain in her neck. She was also enrolled in medical cannabis program. Recommendation at that time was to discontinue any type A botulinum toxins due to lack of efficacy and switch over to Myobloc (type B). Due to other health issues and the COVID19 pandemic, the patient has not been seen in this clinic since 1/29/2020.     Since our visit in 1/2020, she has been having problems with all four of her limbs - she is having arm and leg pain and weakness and difficulty with ambulation. She is currently undergoing workup for peripheral neuropathy and the nerve biopsies came back either negative or indeterminate. She has also been tested for MS, rheumatoid arthritis and Sjogren's which have been negative. She has been having severe dry eyes and associated pain in her eyes. This is a significant issue for her.       PHYSICAL EXAM  VS: BP  126/76   Pulse 105   Temp 98.2  F (36.8  C)   Resp 16   Wt 95.3 kg (210 lb)   SpO2 100%   BMI 32.41 kg/m     GEN: Pleasant and cooperative, in no acute distress  HEENT: No facial asymmetry  AROM ASSESSMENT - NECK: Cervical rotation is severely limited in all planes with tremor at end range in all planes.   HEAD, NECK AND TRUNK PATTERN:   Head & Neck Rotation:   Left  Head & Neck Lateral Bend:   Left  Shoulder Elevation:   Right    ALLERGIES  Allergies   Allergen Reactions     Contrast Dye Anaphylaxis     Other reaction(s): almost  from it     Diagnostic X-Ray Materials Anaphylaxis     IVP DYE     Lisinopril Anaphylaxis     Lisinopril-Hctz Anaphylaxis     Maple Flavor Anaphylaxis     MAPLE SYRUP  MAPLE SYRUP  MAPLE SYRUP     Maple Tree Anaphylaxis     Allergy includes maple syrup.     No Clinical Screening - See Comments      maple syrup==anaphylaxis  Dairy- causes to not feel well     Gluten Itching and Swelling     Gluten Meal Itching and Swelling     Cramping     Pollard Meal Unknown     Chamomile Unknown     Codeine Sulfate Cramps and GI Disturbance     Food Unknown     Grapes, almond, lactose, gluten, real maple syrup     Nsaids Other (See Comments) and Unknown     Patient had bariatric surgery. Should not ever take NSAIDS due to high risk for gastric ulcers.   Patient had bariatric surgery. Should not ever take NSAIDS due to high risk for gastric ulcers.      Polyethylene Glycol Unknown     Per allergy testing  Per allergy testing       Erythromycin Nausea and Vomiting, Cramps, Diarrhea and Nausea     PN: LW Reaction: stomach upset  cramping     Lactose Diarrhea     Morphine Other (See Comments)       CURRENT MEDICATIONS    Current Outpatient Medications:      acetylcysteine (MUCOMYST) 10 % nebulizer solution, Apply 2 drops to eye, Disp: , Rfl:      aspirin-acetaminophen-caffeine (EXCEDRIN MIGRAINE) 250-250-65 MG tablet, Take 1 tablet by mouth every 6 hours as needed, Disp: , Rfl:      autologous serum  compounded ophthalmic solution, Place 1 drop into both eyes every 2 hours (while awake), Disp: , Rfl:      BOTOX 100 units injection, Inject 250 Units into the muscle once Lot # /C3 with Expiration Date:  11/2020, Disp: , Rfl:      buPROPion (WELLBUTRIN XL) 300 MG 24 hr tablet, Take 300 mg by mouth every morning, Disp: , Rfl:      clonazePAM (KLONOPIN) 0.5 MG tablet, TAKE 2 TABLETS BY MOUTH AT BEDTIME, Disp: , Rfl:      cyanocobalamin (VITAMIN B-12) 1000 MCG tablet, Take 1,000 mcg by mouth daily, Disp: , Rfl:      doxycycline monohydrate (ADOXA) 50 MG tablet, Take 1 tablet (50 mg) by mouth 2 times daily, Disp: 180 tablet, Rfl: 3     DULoxetine (CYMBALTA) 60 MG capsule, TAKE 2 CAPSULES BY MOUTH EVERY DAY, Disp: , Rfl:      EPINEPHrine (EPIPEN) 0.3 MG/0.3ML injection, Inject 0.3 mg into the muscle once as needed , Disp: , Rfl:      ibuprofen (ADVIL/MOTRIN) 200 MG capsule, Take 200 mg by mouth every 4 hours as needed., Disp: , Rfl:      iloperidone (FANAPT) 6 MG TABS tablet, Take 4 mg by mouth daily , Disp: , Rfl:      lifitegrast (XIIDRA) 5 % opthalmic solution, Place 1 drop into both eyes 2 times daily, Disp: 180 each, Rfl: 1     LINZESS 290 MCG capsule, TAKE 1 CAPSULE BY MOUTH EVERY DAY, Disp: , Rfl: 3     methocarbamol (ROBAXIN) 500 MG tablet, Take 1 tablet (500 mg) by mouth 2 times daily as needed for muscle spasms, Disp: 60 tablet, Rfl: 0     multivitamin w/minerals (THERA-VIT-M) tablet, Take 1 tablet by mouth daily, Disp: , Rfl:      NALTREXONE HCL PO, Take 3 mg by mouth daily, Disp: , Rfl:      ONABOTULINUMTOXINA IJ, Inject 250 Units as directed once LOT # /C3 EXPIRATION: 03/2020, Disp: , Rfl:      prednisolone 0.25% and hyaluronate in balanced salt SUSP compounded ophthalmic suspension, Place 1 Drop into both eyes 4 times daily. Discard bottle 15 days after opening. Refrigerate. Unopened bottle expires:     .       ., Disp: 15 mL, Rfl: 11     sodium chloride 0.9 % neb solution, Apply 5 mLs to eye,  Disp: , Rfl:      sodium chloride 0.9 % neb solution, 5 mLs by Other route 6 times daily Medically necessary for scleral contact lens use. May use 5ml vials 6x/day for contact lens use, Disp: 3000 mL, Rfl: 11     sodium chloride 0.9 % neb solution, Medically necessary for scleral contact lens use. May use 3 ml 6 times a day for contact lens use, Disp: 300 mL, Rfl: 11     Soft Lens Products (B&L SENSITIVE EYES) SOLN, Apply 1 drop to eye, Disp: , Rfl:      topiramate (TOPAMAX) 50 MG tablet, Take 1 tablet (50 mg) by mouth 2 times daily, Disp: 60 tablet, Rfl: 1     tretinoin (RETIN-A) 0.025 % external cream, Apply a pea size to entire face QD, Disp: 45 g, Rfl: 11     TYRVAYA 0.03 MG/ACT nasal spray, USE 1 SPRAY IN EACH NOSTRIL TWICE DAILY., Disp: , Rfl:      Vitamin D3 (CHOLECALCIFEROL) 25 mcg (1000 units) tablet, Take by mouth daily, Disp: , Rfl:      White Petrolatum-Mineral Oil (SYSTANE NIGHTTIME) OINT, Apply 3.5 g to eye 4 times daily as needed (dry eyes), Disp: 7 g, Rfl: 11     acetylcysteine 10 % (MUCOMYST) 10% SOLN, Place 2 drops into both eyes 6 times daily, Disp: 30 mL, Rfl: 2     buPROPion (WELLBUTRIN XL) 300 MG 24 hr tablet, Take 300 mg by mouth, Disp: , Rfl:      clonazePAM (KLONOPIN) 1 MG tablet, Take 1 mg by mouth 2 times daily as needed, Disp: , Rfl:      COMPOUNDED NON-CONTROLLED SUBSTANCE (CMPD RX) - PHARMACY TO MIX COMPOUNDED MEDICATION, Take 3 mg by mouth daily Low dose naltrexone 3mg per capsule, to be taken by mouth every day., Disp: 30 capsule, Rfl: 0     DULoxetine HCl (CYMBALTA PO), Take 60 mg by mouth, Disp: , Rfl:      EPINEPHrine (ANY BX GENERIC EQUIV) 0.3 MG/0.3ML injection 2-pack, , Disp: , Rfl:      predniSONE (DELTASONE) 5 MG tablet, 4tab=20 mg qd x 7 days, 3tab=15 mg qd x 5 days, 2tab=10 mg qd, Disp: 100 tablet, Rfl: 2    Current Facility-Administered Medications:      botulinum toxin type A (DYSPORT) injection 500 Units, 500 Units, Intramuscular, Q90 Days, America Gonzalez MD      botulinum toxin type B (MYOBLOC) injection 15,000 Units, 15,000 Units, Intramuscular, Q90 Days, America Gonzalez MD     botulinum toxin type B (MYOBLOC) injection 5,000 Units, 5,000 Units, Intramuscular, Q90 Days, America Gonzalez MD       ASSESSMENT AND PLAN   1. Decision was made to switch to Dysport injections for management of the persistent muscle spasms and pain in her neck musculature. Plan will be to request up to 800 units of Dysport every 12 weeks for management of cervical dystonia. We are going to avoid any injections with Myobloc due to the fact that it carries a high risk of causing dry eyes, which is something she has been struggling with for a while.   2. Referrals: None.   3. Follow up: Roxanna Celis was scheduled for Dysport injections pending insurance authorization.       I spent a total of 40 minutes face-to-face and managing the care of Roxanna Celis. Over 50% of my time was spent counseling the patient and coordinating care. Please see note for details.         Sincerely,    America Gonzalez MD

## 2022-04-28 NOTE — TELEPHONE ENCOUNTER
Dr Gonzalez has been working directly with the insurance company and further communication notes are found in the referral stab.    Roxanna was seen in clinic today for a first round of injections with the toxin of choice and will document plan for further injections and needed orders in plan of care.    America Miranda RN on 4/27/2022 at 8:31 PM

## 2022-04-29 ENCOUNTER — OFFICE VISIT (OUTPATIENT)
Dept: RHEUMATOLOGY | Facility: CLINIC | Age: 52
End: 2022-04-29
Payer: COMMERCIAL

## 2022-04-29 VITALS
BODY MASS INDEX: 33.07 KG/M2 | HEART RATE: 86 BPM | HEIGHT: 68 IN | WEIGHT: 218.2 LBS | SYSTOLIC BLOOD PRESSURE: 123 MMHG | DIASTOLIC BLOOD PRESSURE: 85 MMHG | OXYGEN SATURATION: 100 %

## 2022-04-29 DIAGNOSIS — M25.50 POLYARTHRALGIA: Primary | ICD-10-CM

## 2022-04-29 DIAGNOSIS — R20.2 PARESTHESIA: ICD-10-CM

## 2022-04-29 DIAGNOSIS — M35.00 SICCA SYNDROME (H): ICD-10-CM

## 2022-04-29 DIAGNOSIS — M79.7 FIBROMYALGIA: ICD-10-CM

## 2022-04-29 PROCEDURE — 99214 OFFICE O/P EST MOD 30 MIN: CPT | Mod: 24 | Performed by: STUDENT IN AN ORGANIZED HEALTH CARE EDUCATION/TRAINING PROGRAM

## 2022-04-29 RX ORDER — PREDNISONE 5 MG/1
TABLET ORAL
Qty: 100 TABLET | Refills: 2 | Status: SHIPPED | OUTPATIENT
Start: 2022-04-29 | End: 2023-02-14

## 2022-04-29 ASSESSMENT — PAIN SCALES - GENERAL: PAINLEVEL: EXTREME PAIN (8)

## 2022-04-29 NOTE — PROGRESS NOTES
Rheumatology Clinic Visit     Roxanna Celis MRN# [id number]   YOB: 1970 Age: 50 year old     Date of Visit: Apr 29, 2022   Primary care provider: [facility]          Assessment and Plan:     Assessment     Sicca symptoms ( severe dry mouth and eyes )  Paresthesia  KIANA  Thyroid nodule  HTN  Neg ENEIDA, SSA/SSB  Negative Minor salivary gland lip biopsy - 8/24/21    Ms. Celis is 51 year old female seen in clinic for evaluation of possible Sjogren's disease.     Sicca symptoms : She has severe dry mouth and eyes for few years.  Progressively getting worse.  She is using multiple eyedrops even then eye symptoms are difficult to control. Her ENEIDA, SSA/SSB antibodies done in 2/21 were negative.  Due to severity of sicca symptoms,  Minor salivary gland lip biopsy was done but biopsy is negative for Sjogren's disease.     She was prescribed cevimeline to help with severe dry mouth but discontinued it due to no benefit.    I will get send out Labs called Early Sjogren's panel to check for Seronegative Sjogren's. If negative then likelihood of seronegative Sjogren's disease is low.     I recommend her to follow up with her Ophthalmologist to manage dry eye symptoms. Use Biotene products and follow up with dentist for dry mouth symptoms. No evidence of systemic autoimmune connective tissue disease to warrant immune suppressive use.     Generalized body aches : She reports having progressive worsening of pain in her extremities. She has trouble walking, standing or holding things for long time. We decided to repeat inflammatory markers, rheumatoid serologies to check for inflammatory joint disease.  I will give her prednisone taper as a trial.  If she note marked improvement with prednisone in terms of joint pains then there will be inflammatory component.    Paresthesia: She reports burning sensation, pins and needles sensation in her arms and legs.  Progressively getting worse.  It started over her  hands and feet and gradually went up to involve her entire arm and leg up to the mid thigh.  EMG of bilateral upper extremities done in 2/2021 was normal.  MRI of the cervical and thoracic spine done in 6/2021 did not reveal any evidence of demyelinating disease. MRI brain in 8/2020 was normal. She has seen Dr. Bell in Neurology who ruled out demyelinating process.  She is on gabapentin, Cymbalta. Follow up with Neurology.     Myofascial pain : She has multiple myofascial tender points on exam suggestive of Fibromyalgia. For fibromyalgia she was instructed to try relaxing techniques like Yoga, Dustin chi. She can try Aerobic conditioning. Low-impact aerobic activities such as fast walking, biking, swimming, or water aerobics are most successful among the interventions that have been studied.       Plan    Due to severity of your symptoms I will send in prednisone taper, starting with 20 mg dose.     Will discuss with Neurology     Your previous autoimmune work ups, MRI of B/L wrists have been normal. I will repeat Rheumatoid arthritis tests and send special Sjogren's tests.     Follow up in 2 months on June 30th.    -- Orders placed this encounter  Orders Placed This Encounter   Procedures     Other Laboratory; [facility]; Early Sjogren's panel (Laboratory Miscellaneous Order)     Rheumatoid factor     Cyclic Citrullinated Peptide Antibody IgG     Erythrocyte sedimentation rate auto     CRP inflammation                 Active Problem List:     Patient Active Problem List    Diagnosis Date Noted     Clumsiness 11/26/2021     Priority: Medium     Hand weakness 11/26/2021     Priority: Medium     Precordial pain 07/22/2021     Priority: Medium     Chronic bilateral low back pain without sciatica 08/06/2020     Priority: Medium     Sleep apnea 05/01/2018     Priority: Medium     Overview:   on CPAP       Torticollis, spasmodic 03/24/2017     Priority: Medium     Achlorhydria 10/20/2015     Priority: Medium      Bariatric surgery status 10/20/2015     Priority: Medium     Morbid obesity with BMI of 40.0-44.9, adult (H) 10/20/2015     Priority: Medium     Hypokalemia 06/05/2014     Priority: Medium     Hypothyroid 06/05/2014     Priority: Medium     KIANA (generalized anxiety disorder) 12/03/2010     Priority: Medium     Depression with anxiety 02/12/2010     Priority: Medium     Multiple thyroid nodules 10/23/2008     Priority: Medium     Essential hypertension 06/23/2008     Priority: Medium     Intramural leiomyoma of uterus 07/18/2007     Priority: Medium     Obesity 07/19/2006     Priority: Medium            History of Present Illness:   Roxanna Celis is a 50 year old female with PMH of paresthesia, essential hypertension, generalized anxiety disorder, depression, sleep apnea, morbid obesity seen in the clinic in consultation at request of Dr Jackson for evaluation of sicca symptoms.    She describes that couple years ago she developed burning pain around her left wrist which progressed to involve the entire hand and went up to the elbows.  She also noticed that on the right wrist which progressive disease.  Now she has burning sensation, pins-and-needles, electric shocks and pressure sensation up to her shoulders.  She also noticed it in her feet and sensation has progressed up to the mid thighs. She feel that somebody has put blood pressure cuff and is not released. Muscles of her leg feel weak, unstable. She has to use walker all the time. She feel she will end up falling. She has been evaluated by neurologist and had MRI of the brain, MRI cervical spine, thoracic spine which has been normal.  No evidence of demyelinating lesion noted.  EMG of bilateral upper extremities was normal.  She is on gabapentin and Cymbalta which helps some.    Couple years ago she woke up with severe pain in her eyes.  She felt sand and gritty sensation in her eyes.  She was seen by ophthalmologist and diagnosed with dry eyes.  Since then  she has used multiple eyedrops over-the-counter as well as autologous serum eyedrops which has helped some but still her symptoms are debilitating.  She also has dry mouth and as result has developed multiple cavities in her teeth.     She had chest pain recently. She is going to see cardiologist to make sure she does not need stress tests. She is very exhausted all the time.  She has thyroid nodule and will be getting thyroid nodule biopsy at [facility].    She had a fall in February 2021 and injured her wrist. MRI of the left wrist did not reveal any evidence of synovitis, effusion or fracture.  Low-grade sprain was noted.  She was given wrist injection which helped.     She has hx of gastric bypass 8 years ago and ever since reports abdominal discomfort.  She has never seen a GI to get upper or lower GI endoscopy.    Denies any raynauds, malar rash, photosensitivity, recurrent mouth/genital ulcers, pleuritic chest pains, recurrent sinusitis/rhinitis, swallowing difficulty, hearing or visual changes recently. No h/o arterial/venous thrombosis in the past.    September 15, 2021 - She has pain in her arms, legs. Reports having Pins and needles sensation in her fingers, legs. Hands hurt so much all the time and can not function. Joints started to hurt bad. She has pain in her elbows, knees, legs and feet. Minor salivary gland lip biopsy done in 8/2021 did not meet criteria for Sjogren's disease.     April 29, 2022 - She is using walker and braces all the time. Her wrists and hands hurt very bad. She feel pain in her ankles which travel up the leg. It is up to the hips. On her arms she has pain up the shoulder. It feels like Burning, pins, needles, freezing cold, fire, electricity like pain. She can not cook or brush her hair due to pain in her hands. She has pain in her feet. When she step down she feel that her legs will give out. She is very tired all the time. She can sleep for 10 hours and even then she is wiped out.  Her eyes are very dry. Pool therapy did not work for her. She will starting [facility] PT for pain management. MRI of B/L wirsts done last year was normal. She has appointment for EMG          Review of Systems:     Review Of Systems  Constitutional: denies fever, chills, night sweats and weight loss.  Skin: No skin rash.  Eyes: + dryness or irritation in eyes. No episode of eye inflammation or redness.   Ears/Nose/Throat: no recurrent sinus infections.  Respiratory: No shortness of breath, dyspnea on exertion, cough, or hemoptysis  Cardiovascular: no chest pain or palpitations.  Gastrointestinal: no nausea, vomiting, abdominal pain.  Normal bowel movements.  Genitourinary: no dysuria, frequency  or hematuria.  Musculoskeletal: as in HPI  Neurologic: + numbness, tingling.  Psychiatric: no mood disorders.  Hematologic/Lymphatic/Immunologic: no history of easy bruising, petechia or purpura.  No abnormal bleeding.   Endocrine: no h/o thyroid disease or Diabetes.                  Past Medical History:     Past Medical History:   Diagnosis Date     Cervical dystonia      Past Surgical History:   Procedure Laterality Date     AS FRAGMENTING OF KIDNEY STONE       GALLBLADDER SURGERY       STOMACH SURGERY      for weight loss            Social History:     Social History     Occupational History     Not on file   Tobacco Use     Smoking status: Never Smoker     Smokeless tobacco: Never Used   Substance and Sexual Activity     Alcohol use: No     Drug use: No     Sexual activity: Yes     Partners: Male            Family History:     Family History   Problem Relation Age of Onset     Glaucoma No family hx of      Macular Degeneration No family hx of             Allergies:     Allergies   Allergen Reactions     Contrast Dye Anaphylaxis     Other reaction(s): almost  from it     Diagnostic X-Ray Materials Anaphylaxis     IVP DYE     Lisinopril Anaphylaxis     Lisinopril-Hctz Anaphylaxis     Maple Flavor Anaphylaxis     MAPLE  SYRUP  MAPLE SYRUP  MAPLE SYRUP     Maple Tree Anaphylaxis     Allergy includes maple syrup.     No Clinical Screening - See Comments      maple syrup==anaphylaxis  Dairy- causes to not feel well     Gluten Itching and Swelling     Gluten Meal Itching and Swelling     Cramping     Salem Meal Unknown     Chamomile Unknown     Codeine Sulfate Cramps and GI Disturbance     Food Unknown     Grapes, almond, lactose, gluten, real maple syrup     Nsaids Other (See Comments) and Unknown     Patient had bariatric surgery. Should not ever take NSAIDS due to high risk for gastric ulcers.   Patient had bariatric surgery. Should not ever take NSAIDS due to high risk for gastric ulcers.      Polyethylene Glycol Unknown     Per allergy testing  Per allergy testing       Erythromycin Nausea and Vomiting, Cramps, Diarrhea and Nausea     PN: LW Reaction: stomach upset  cramping     Lactose Diarrhea     Morphine Other (See Comments)            Medications:     Current Outpatient Medications   Medication Sig Dispense Refill     acetylcysteine (MUCOMYST) 10 % nebulizer solution Apply 2 drops to eye       aspirin-acetaminophen-caffeine (EXCEDRIN MIGRAINE) 250-250-65 MG tablet Take 1 tablet by mouth every 6 hours as needed       autologous serum compounded ophthalmic solution Place 1 drop into both eyes every 2 hours (while awake)       BOTOX 100 units injection Inject 250 Units into the muscle once Lot # /C3 with Expiration Date:  11/2020       buPROPion (WELLBUTRIN XL) 300 MG 24 hr tablet Take 300 mg by mouth every morning       clonazePAM (KLONOPIN) 0.5 MG tablet TAKE 2 TABLETS BY MOUTH AT BEDTIME       COMPOUNDED NON-CONTROLLED SUBSTANCE (CMPD RX) - PHARMACY TO MIX COMPOUNDED MEDICATION Take 2 mg by mouth daily Low dose naltrexone 2mg per capsule, to be taken by mouth every day. 8 capsule 0     COMPOUNDED NON-CONTROLLED SUBSTANCE (CMPD RX) - PHARMACY TO MIX COMPOUNDED MEDICATION Low dose naltrexone 5mg per capsule, to be taken  by mouth every day. (Patient taking differently: 1 mg Low dose naltrexone 5mg per capsule, to be taken by mouth every day. Pt reports taking 1mg, was having side effects at higher dose.) 30 capsule 0     cyanocobalamin (VITAMIN B-12) 1000 MCG tablet Take 1,000 mcg by mouth daily       doxycycline monohydrate (ADOXA) 50 MG tablet Take 1 tablet (50 mg) by mouth 2 times daily 180 tablet 3     DULoxetine (CYMBALTA) 60 MG capsule TAKE 2 CAPSULES BY MOUTH EVERY DAY       DULoxetine HCl (CYMBALTA PO) Take 60 mg by mouth       EPINEPHrine (ANY BX GENERIC EQUIV) 0.3 MG/0.3ML injection 2-pack        EPINEPHrine (EPIPEN) 0.3 MG/0.3ML injection Inject 0.3 mg into the muscle once as needed        ibuprofen (ADVIL/MOTRIN) 200 MG capsule Take 200 mg by mouth every 4 hours as needed.       iloperidone (FANAPT) 6 MG TABS tablet Take 4 mg by mouth daily        lifitegrast (XIIDRA) 5 % opthalmic solution Place 1 drop into both eyes 2 times daily 180 each 1     LINZESS 290 MCG capsule TAKE 1 CAPSULE BY MOUTH EVERY DAY  3     methocarbamol (ROBAXIN) 500 MG tablet Take 1 tablet (500 mg) by mouth 2 times daily as needed for muscle spasms 60 tablet 0     multivitamin w/minerals (THERA-VIT-M) tablet Take 1 tablet by mouth daily       NALTREXONE HCL PO Take 3 mg by mouth daily       ONABOTULINUMTOXINA IJ Inject 250 Units as directed once LOT # /C3  EXPIRATION: 03/2020       prednisolone 0.25% and hyaluronate in balanced salt SUSP compounded ophthalmic suspension Place 1 Drop into both eyes 4 times daily. Discard bottle 15 days after opening. Refrigerate. Unopened bottle expires:     .       . 15 mL 11     predniSONE (DELTASONE) 5 MG tablet 4tab=20 mg qd x 7 days, 3tab=15 mg qd x 5 days, 2tab=10 mg qd 100 tablet 2     sodium chloride 0.9 % neb solution Apply 5 mLs to eye       sodium chloride 0.9 % neb solution 5 mLs by Other route 6 times daily Medically necessary for scleral contact lens use. May use 5ml vials 6x/day for contact lens  "use 3000 mL 11     sodium chloride 0.9 % neb solution Medically necessary for scleral contact lens use. May use 3 ml 6 times a day for contact lens use 300 mL 11     Soft Lens Products (B&L SENSITIVE EYES) SOLN Apply 1 drop to eye       topiramate (TOPAMAX) 50 MG tablet Take 50 mg by mouth       topiramate (TOPAMAX) 50 MG tablet Take 1 tablet (50 mg) by mouth 2 times daily 60 tablet 1     tretinoin (RETIN-A) 0.025 % external cream Apply a pea size to entire face QD 45 g 11     TYRVAYA 0.03 MG/ACT nasal spray USE 1 SPRAY IN EACH NOSTRIL TWICE DAILY.       Vitamin D3 (CHOLECALCIFEROL) 25 mcg (1000 units) tablet Take by mouth daily       White Petrolatum-Mineral Oil (SYSTANE NIGHTTIME) OINT Apply 3.5 g to eye 4 times daily as needed (dry eyes) 7 g 11     acetylcysteine 10 % (MUCOMYST) 10% SOLN Place 2 drops into both eyes 6 times daily (Patient not taking: No sig reported) 30 mL 2     buPROPion (WELLBUTRIN XL) 300 MG 24 hr tablet Take 300 mg by mouth (Patient not taking: No sig reported)       clonazePAM (KLONOPIN) 1 MG tablet Take 1 mg by mouth 2 times daily as needed. (Patient not taking: No sig reported)       pregabalin (LYRICA) 100 MG capsule Take 1 capsule (100 mg) by mouth 2 times daily (Patient not taking: No sig reported) 60 capsule 0     pregabalin (LYRICA) 150 MG capsule Take 1 capsule (150 mg) by mouth 2 times daily for 20 days 40 capsule 0     pregabalin (LYRICA) 75 MG capsule Take 1 capsule (75 mg) by mouth daily for 4 days 4 capsule 0     topiramate (TOPAMAX) 50 MG tablet Take 50 mg by mouth 2 times daily (Patient not taking: No sig reported)              Physical Exam:   Blood pressure 123/85, pulse 86, height 1.715 m (5' 7.5\"), weight 99 kg (218 lb 3.2 oz), SpO2 100 %, not currently breastfeeding.  Wt Readings from Last 4 Encounters:   04/29/22 99 kg (218 lb 3.2 oz)   04/27/22 95.3 kg (210 lb)   03/11/22 98.4 kg (217 lb)   12/28/21 97.5 kg (215 lb)       Constitutional: Obese, appearing stated age; " cooperative  Eyes: nl EOM, PERRLA, vision, conjunctiva, sclera  ENT: nl external ears, nose, hearing, lips, teeth, gums, throat  No mucous membrane lesions, normal saliva pool  Neck: no mass or thyroid enlargement  Resp: lungs clear to auscultation, nl to palpation  CV: RRR, no murmurs, rubs or gallops, no edema  GI: no ABD mass or tenderness, no HSM  : not tested  Lymph: no cervical, supraclavicular, inguinal or epitrochlear nodes    MS: All TMJ, neck, shoulder, elbow, wrist, MCP/PIP/DIP, spine, hip, knee, ankle, and foot MTP/IP joints were examined.   -- No active synovitis or deformity. Full ROM.  Normal  strength.   -- No dactylitis,  tenosynovitis, enthesopathy.  -- Multiple myofascial tender points present over interscapular area, upper back, inner thighs, medial border of knees, calves, arms.     Skin: no nail pitting, alopecia, rash, nodules or lesions  Neuro: nl cranial nerves, strength, sensation, DTRs.   Psych: nl judgement, orientation, memory, affect.         Data:     No results found for any visits on 04/29/22.    Recent Labs   Lab Test 07/10/21  1931 07/10/21  1931   WBC 8.5  --    RBC 4.76  --    HGB 13.9  --    HCT 41.6  --    MCV 87  --    RDW 13.3  --      --    ALBUMIN  --  4.0   BUN  --  19      No results for input(s): TSH, T4 in the last 14956 hours.  Hemoglobin   Date Value Ref Range Status   08/04/2021 14.4 11.7 - 15.7 g/dL Final   07/10/2021 13.9 12.0 - 16.0 g/dL Final   02/10/2006 14.0 11.7 - 15.7 g/dL Final   02/07/2006 14.1 11.7 - 15.7 g/dL Final     Urea Nitrogen   Date Value Ref Range Status   08/04/2021 17 7 - 30 mg/dL Final   07/10/2021 19 8 - 22 mg/dL Final   02/10/2006 22 5 - 24 mg/dL Final     Erythrocyte Sedimentation Rate   Date Value Ref Range Status   07/21/2021 14 0 - 30 mm/hr Final     CRP Inflammation   Date Value Ref Range Status   07/21/2021 <2.9 0.0 - 8.0 mg/L Final     AST   Date Value Ref Range Status   07/10/2021 20 0 - 40 U/L Final     Albumin   Date  Value Ref Range Status   07/10/2021 4.0 3.5 - 5.0 g/dL Final     Alkaline Phosphatase   Date Value Ref Range Status   07/10/2021 124 (H) 45 - 120 U/L Final     ALT   Date Value Ref Range Status   07/10/2021 22 0 - 45 U/L Final     Recent Labs   Lab Test 11/11/21  1609 08/04/21  1728 07/10/21  1931 07/10/21  1931   WBC  --  7.3 8.5  --    HGB  --  14.4 13.9  --    HCT  --  42.8 41.6  --    MCV  --  90 87  --    PLT  --  265 261  --    BUN  --  17  --  19   TSH 3.40  --   --   --    AST  --   --   --  20   ALT  --   --   --  22   ALKPHOS  --   --   --  124*     Outside studies reviewed:     INDICATION:   Paresthesia of both hands. Chronic pain.     COMPARISON:   Plain film 22 January 2021.     TECHNIQUE:   Axial PD and PD fat-sat, coronal T1, PD fat sat and 3D gradient recall and sagittal PD fat-sat sequences left wrist without contrast.     FINDINGS:   Tendons: Intact normal caliber flexor and extensor tendons. No tenosynovitis. No subluxation.   -   Ligaments: Somewhat indistinct intermediate signal articular disc and central triangle fibrocartilage for mild likely mucoid change and degenerative marginal tearing. No full-thickness defect appreciated. Intermediate signal intact scapholunate ligament. Normal low signal lunotriquetral ligament.   -   Wrist joint: Anatomic alignment. Uniform cartilage thickness and signal. Physiologic fluid. No periarticular fluid collection. No synovitis.   -   Bones and soft tissues: Anatomic carpal alignment. No fracture or bone lesion. Carpal metacarpal joints appear normal. Normal muscle bulk and signal through the field-of-view.     IMPRESSION:   Minor mucoid degeneration and perhaps low-grade sprain appearance of triangle fibrocartilage and scapholunate ligament.       EMG done in [facility] reviewed .    Reviewed Rheumatology lab flowsheet    Jose Velazquez MD  [facility]  Department of Rheumatology & Autoimmune Disorders  [facility]:  [phone]   Pager - [phone]

## 2022-04-29 NOTE — NURSING NOTE
"Chief Complaint   Patient presents with     RECHECK     Fibromyalgia +1 more       Vitals:    04/29/22 1433   BP: 123/85   BP Location: Right arm   Patient Position: Sitting   Cuff Size: Adult Large   Pulse: 86   SpO2: 100%   Weight: 99 kg (218 lb 3.2 oz)   Height: 1.715 m (5' 7.5\")       Body mass index is 33.67 kg/m .    Tanya Dickens MA    "

## 2022-04-29 NOTE — PATIENT INSTRUCTIONS
Due to severity of your symptoms I will send in prednisone taper, starting with 20 mg dose.     Will discuss with Neurology     Your previous autoimmune work ups, MRI of B/L wrists have been normal. I will repeat Rheumatoid arthritis tests and send special Sjogren's tests.     Follow up in 2 months on June 30th.

## 2022-05-05 ENCOUNTER — OFFICE VISIT (OUTPATIENT)
Dept: PALLIATIVE MEDICINE | Facility: CLINIC | Age: 52
End: 2022-05-05
Payer: COMMERCIAL

## 2022-05-05 VITALS — HEART RATE: 106 BPM | SYSTOLIC BLOOD PRESSURE: 136 MMHG | DIASTOLIC BLOOD PRESSURE: 81 MMHG | OXYGEN SATURATION: 96 %

## 2022-05-05 DIAGNOSIS — M79.18 CHRONIC MYOFASCIAL PAIN: ICD-10-CM

## 2022-05-05 DIAGNOSIS — G89.29 CHRONIC MYOFASCIAL PAIN: ICD-10-CM

## 2022-05-05 DIAGNOSIS — F41.1 GAD (GENERALIZED ANXIETY DISORDER): ICD-10-CM

## 2022-05-05 DIAGNOSIS — M79.2 NEUROPATHIC PAIN: Primary | ICD-10-CM

## 2022-05-05 PROCEDURE — 99215 OFFICE O/P EST HI 40 MIN: CPT | Performed by: PHYSICAL MEDICINE & REHABILITATION

## 2022-05-05 ASSESSMENT — PAIN SCALES - GENERAL: PAINLEVEL: EXTREME PAIN (8)

## 2022-05-05 NOTE — PROGRESS NOTES
[facility]    Date of visit: 22         Assessment:  Roxanna Celis is a 51 year old with past medical history including: ELY, Obesity, HTN, SICCA symptoms, spasmodic torticollis, who presents for follow-up and treatment of the followin. Painful peripheral neuropathy: Patient reports gradual onset of paresthesias, dysesthesias and burning/tingling type pain around 2019 that began in the periphery and spread proximally in their upper and lower extremities. Currently they describe paresthesias and pain in their entire bilateral upper and lower extremities. They also note intermittent subjective weakness. Neurological exam of the upper and lower extremities today was normal, previously they've noted mild sensory loss in the medial aspect of their right foot. Strength and reflexes were symmetric and normal. Upper extremity EMG was previously normal. Cervical, thoracic and lumbar MRI completed, no pathology to explain their current symptoms. Seen by Dr. Torres and will have a small fiber nerve biopsy completed which was borderline low in the upper extremities but normal in the legs. They have noted some improvement since titrating low dose naltrexone and starting topamax.     2. Chronic Myofascial Pain: Patient meets the diagnostic criteria for fibromyalgia based on history and exam. Prior work-up has been unrevealing for a neurologic or rheumatologic cause for their symptoms. Discussed the pathophysiology of fibromyalgia and overlap with their neuropathic symptoms as well as how fibromyalgia may exacerbate an underlying peripheral neuropathy and arthritic pain. Again discussed concepts of central and peripheral sensitization and how this may be impacting their symptoms.      Mental Health - the patient's mental health concerns, specifically anxiety, depression, affect her experience of pain and contribute to her clinically significant  distress.        Plan:  The following recommendations were given to the patient. Diagnosis, treatment options, risks, benefits, and alternatives were discussed, and all questions were answered. The patient expressed understanding of the plan for management.      I am recommending a multidisciplinary treatment plan to help this patient better manage her pain.  This includes:      1. Physical Therapy: Stop pool therapy. Chronic pain PT scheduled.  2. Clinical Health Pain Psychologist: Continue pain Phd  1. Continue working with primary psychiatry services.  2. Therapy can help reduce physical and psychosocial triggers or reinforcers of pain by adapting thoughts, feelings and behaviors to reduce symptoms and increase quality of life.  Evidence indicates that the practice of relaxation, meditation, and mindfulness techniques can significantly affect pain levels and overall well being.  3. Self Care Recommendations: Gentle progressive exercise that does not increase pain - gradually increase daily walking program.  Take mini breaks - 5 minutes of mindfullness a couple times a day.   4. Diagnostic Studies: none  5. Medication Management:  1. Continue low dose naltrexone increase to 3mg  2. Increase topamax to 100mg hs for 1 week then 100mg BID. Can consider increase to 150mg BID if tolerating well.  3. Continue methocarbamol 500mg BID prn  4. Continue medical cannabis.  5. Continue cymbalta.      6. Further procedures recommended: none  7. Follow up: 2 months.        Yasmany Rivera [facility]          Chief complaint:   No chief complaint on file.      Interval history:  Roxanna Celis is a 51 year old female last seen by me on 3/4/22.        Since her last visit, Roxanna Celis reports:    -Patient is scheduled to start pain PT next week, which she is looking forward to.    -She is currently seeing pain psychology, which has been helpful.    -Approved for medical marijuana, wondering if this  will impact how she takes other meds.    -Saw rheum last week; started on prednisone pack to see if this can help with joint pain    -Rheum and neurology discussing possibility of starting plaquenil. Ordered more blood work.    -No s/e noted with topiramate or naltrexone.  She would like to re-increase to 3mg now.    -She was told by her neurologist that topiramate can be taken at higher doses for nerve pain if well-tolerated.    -Her legs sometimes feel like they're going to give out, but patient hasn't had any falls recently.    -Normally sleeping well, but worse this week as patient had to prep for colonoscopy.    -Going on daily walks with her dog, uses cane or walker for support.    -Thinks she has fewer extremity paresthesia symptoms present concurrently since being started on topiramate.    -She is doing hand therapy, frustrated because only working on small area over wrist when entire arms and hands are symptomatic.    -She has restarted acupuncture; also found some massage balls which she uses on her extremities which have been helpful for pain.      Pain scores:  Pain intensity on average is 8 on a scale of 0-10.     Pain Treatments:  1. Medications:       Current pain medications:  -Low dose naltrexone 2mg daily  -Topamax 50mg BID  -Bupropion 300mg daily  -Cymbalta 120mg daily        Prior pain medications:   -Gabapentin 1200mg at night- drowsy with higher dose   -Lyrica 100mg BID   -Low dose naltrexone 5mg - affected mood  2. Physical Therapy: Doing pool PT currently              TENS unit: hasn't tried  3. Pain psychology: hasn't tried  4. Surgery: hasn't tried  5. Injections: none  6. Alternative Therapies:               Chiropractic: hasn't tried               Acupuncture: hasn't tried       Side Effects: no side effect    Medications:  Current Outpatient Medications   Medication Sig Dispense Refill     acetylcysteine (MUCOMYST) 10 % nebulizer solution Apply 2 drops to eye       acetylcysteine 10 %  (MUCOMYST) 10% SOLN Place 2 drops into both eyes 6 times daily (Patient not taking: No sig reported) 30 mL 2     aspirin-acetaminophen-caffeine (EXCEDRIN MIGRAINE) 250-250-65 MG tablet Take 1 tablet by mouth every 6 hours as needed       autologous serum compounded ophthalmic solution Place 1 drop into both eyes every 2 hours (while awake)       BOTOX 100 units injection Inject 250 Units into the muscle once Lot # /C3 with Expiration Date:  11/2020       buPROPion (WELLBUTRIN XL) 300 MG 24 hr tablet Take 300 mg by mouth (Patient not taking: No sig reported)       buPROPion (WELLBUTRIN XL) 300 MG 24 hr tablet Take 300 mg by mouth every morning       clonazePAM (KLONOPIN) 0.5 MG tablet TAKE 2 TABLETS BY MOUTH AT BEDTIME       clonazePAM (KLONOPIN) 1 MG tablet Take 1 mg by mouth 2 times daily as needed. (Patient not taking: No sig reported)       COMPOUNDED NON-CONTROLLED SUBSTANCE (CMPD RX) - PHARMACY TO MIX COMPOUNDED MEDICATION Take 2 mg by mouth daily Low dose naltrexone 2mg per capsule, to be taken by mouth every day. 8 capsule 0     COMPOUNDED NON-CONTROLLED SUBSTANCE (CMPD RX) - PHARMACY TO MIX COMPOUNDED MEDICATION Low dose naltrexone 5mg per capsule, to be taken by mouth every day. (Patient taking differently: 1 mg Low dose naltrexone 5mg per capsule, to be taken by mouth every day. Pt reports taking 1mg, was having side effects at higher dose.) 30 capsule 0     cyanocobalamin (VITAMIN B-12) 1000 MCG tablet Take 1,000 mcg by mouth daily       doxycycline monohydrate (ADOXA) 50 MG tablet Take 1 tablet (50 mg) by mouth 2 times daily 180 tablet 3     DULoxetine (CYMBALTA) 60 MG capsule TAKE 2 CAPSULES BY MOUTH EVERY DAY       DULoxetine HCl (CYMBALTA PO) Take 60 mg by mouth       EPINEPHrine (ANY BX GENERIC EQUIV) 0.3 MG/0.3ML injection 2-pack        EPINEPHrine (EPIPEN) 0.3 MG/0.3ML injection Inject 0.3 mg into the muscle once as needed        ibuprofen (ADVIL/MOTRIN) 200 MG capsule Take 200 mg by mouth  every 4 hours as needed.       iloperidone (FANAPT) 6 MG TABS tablet Take 4 mg by mouth daily        lifitegrast (XIIDRA) 5 % opthalmic solution Place 1 drop into both eyes 2 times daily 180 each 1     LINZESS 290 MCG capsule TAKE 1 CAPSULE BY MOUTH EVERY DAY  3     methocarbamol (ROBAXIN) 500 MG tablet Take 1 tablet (500 mg) by mouth 2 times daily as needed for muscle spasms 60 tablet 0     multivitamin w/minerals (THERA-VIT-M) tablet Take 1 tablet by mouth daily       NALTREXONE HCL PO Take 3 mg by mouth daily       ONABOTULINUMTOXINA IJ Inject 250 Units as directed once LOT # /C3  EXPIRATION: 03/2020       prednisolone 0.25% and hyaluronate in balanced salt SUSP compounded ophthalmic suspension Place 1 Drop into both eyes 4 times daily. Discard bottle 15 days after opening. Refrigerate. Unopened bottle expires:     .       . 15 mL 11     predniSONE (DELTASONE) 5 MG tablet 4tab=20 mg qd x 7 days, 3tab=15 mg qd x 5 days, 2tab=10 mg qd 100 tablet 2     pregabalin (LYRICA) 100 MG capsule Take 1 capsule (100 mg) by mouth 2 times daily (Patient not taking: No sig reported) 60 capsule 0     pregabalin (LYRICA) 150 MG capsule Take 1 capsule (150 mg) by mouth 2 times daily for 20 days 40 capsule 0     pregabalin (LYRICA) 75 MG capsule Take 1 capsule (75 mg) by mouth daily for 4 days 4 capsule 0     sodium chloride 0.9 % neb solution Apply 5 mLs to eye       sodium chloride 0.9 % neb solution 5 mLs by Other route 6 times daily Medically necessary for scleral contact lens use. May use 5ml vials 6x/day for contact lens use 3000 mL 11     sodium chloride 0.9 % neb solution Medically necessary for scleral contact lens use. May use 3 ml 6 times a day for contact lens use 300 mL 11     Soft Lens Products (B&L SENSITIVE EYES) SOLN Apply 1 drop to eye       topiramate (TOPAMAX) 50 MG tablet Take 50 mg by mouth       topiramate (TOPAMAX) 50 MG tablet Take 50 mg by mouth 2 times daily (Patient not taking: No sig reported)        topiramate (TOPAMAX) 50 MG tablet Take 1 tablet (50 mg) by mouth 2 times daily 60 tablet 1     tretinoin (RETIN-A) 0.025 % external cream Apply a pea size to entire face QD 45 g 11     TYRVAYA 0.03 MG/ACT nasal spray USE 1 SPRAY IN EACH NOSTRIL TWICE DAILY.       Vitamin D3 (CHOLECALCIFEROL) 25 mcg (1000 units) tablet Take by mouth daily       White Petrolatum-Mineral Oil (SYSTANE NIGHTTIME) OINT Apply 3.5 g to eye 4 times daily as needed (dry eyes) 7 g 11       Medical History: any changes in medical history since they were last seen? No    Review of Systems:  Positive for: neck pain, back pain, joint pain, paresthesias, numbness    Mood: depression    Physical Exam:  Blood pressure 136/81, pulse 106, SpO2 96 %, not currently breastfeeding.  General:  NAD, pleasant  Gait:  Slowed patricia, antalgic.  No specific gait deviations noted.  No significant swelling or erythema noted in their hands, wrists or feet. Strength is 5/5 and symmetric in the upper and lower extremities. Sensation to light touch is symmetric and intact. Reflexes are 2+ and symmetric in the upper and lower extremities.    I saw and examined the patient with the Pain Fellow/Resident. I have reviewed and agree with the resident's note and plan of care and made changes and corrections directly to the body of the note.    TIME SPENT:  BY FELLOW/RESIDENT ALONE 30 MIN  BY MYSELF WITHOUT THE FELLOW/RESIDENT 25 MIN        BILLING TIME DOCUMENTATION:   The total TIME spent on this patient on the date of the encounter/appointment was 45 minutes.      TOTAL TIME includes:   Time spent preparing to see the patient (reviewing records and tests)   Time spent face to face (or over the phone) with the patient   Time spent ordering tests, medications, procedures and referrals   Time spent Referring and communicating with other healthcare professionals   Time spent documenting clinical information in Epic       Yasmany Rivera DO  [facility]

## 2022-05-05 NOTE — NURSING NOTE
PEG Score 12/20/2021 3/4/2022 5/5/2022   PEG Total Score 9.67 10 8.67         Crystal Grady MA  [facility] [place]

## 2022-05-05 NOTE — PATIENT INSTRUCTIONS
Increase your topamax to 100mg at night for 1 week then to 100mg twice daily. Call when you need a refill and let us know how this is going, we can increase to 150mg twice daily if you are tolerating this well.  I ordered a refill of the low dose naltrexone to [facility], we will increase the dose to 3mg.  Schedule a follow up for last week of June or first week of July.  Take care,    Yasmany Rivera [facility]      ----------------------------------------------------------------  Clinic Number:  [phone]   Call with any questions about your care and for scheduling assistance.   Calls are returned Monday through Friday between 8 AM and 4:30 PM. We usually get back to you within 2 business days depending on the issue/request.    If we are prescribing your medications:  For opioid medication refills, call the clinic or send a [employer] message 7 days in advance.  Please include:  Name of requested medication  Name of the pharmacy.  For non-opioid medications, call your pharmacy directly to request a refill. Please allow 3-4 days to be processed.   Per [place] State Law:  All controlled substance prescriptions must be filled within 30 days of being written.    For those controlled substances allowing refills, pickup must occur within 30 days of last fill.      We believe regular attendance is key to your success in our program!    Any time you are unable to keep your appointment we ask that you call us at least 24 hours in advance to cancel.This will allow us to offer the appointment time to another patient.   Multiple missed appointments may lead to dismissal from the clinic.

## 2022-05-11 ENCOUNTER — VIRTUAL VISIT (OUTPATIENT)
Dept: PALLIATIVE MEDICINE | Facility: CLINIC | Age: 52
End: 2022-05-11
Payer: COMMERCIAL

## 2022-05-11 DIAGNOSIS — M79.2 NEUROPATHIC PAIN: ICD-10-CM

## 2022-05-11 DIAGNOSIS — M79.18 CHRONIC MYOFASCIAL PAIN: Primary | ICD-10-CM

## 2022-05-11 DIAGNOSIS — G89.29 CHRONIC MYOFASCIAL PAIN: Primary | ICD-10-CM

## 2022-05-11 PROCEDURE — 96159 HLTH BHV IVNTJ INDIV EA ADDL: CPT | Mod: GT | Performed by: PSYCHOLOGIST

## 2022-05-11 PROCEDURE — 96158 HLTH BHV IVNTJ INDIV 1ST 30: CPT | Mod: GT | Performed by: PSYCHOLOGIST

## 2022-05-11 NOTE — PROGRESS NOTES
"Roxanna Celis is a 51 year old female who is being evaluated via a billable video visit.      The patient has been notified of following:     \"This video visit will be conducted via a call between you and your physician/provider. We have found that certain health care needs can be provided without the need for an in-person physical exam.  This service lets us provide the care you need with a video conversation.     Video visits are billed at different rates depending on your insurance coverage.  Please reach out to your insurance provider with any questions.    If during the course of the call the physician/provider feels a video visit is not appropriate, you will not be charged for this service.\"    Patient has given verbal consent for Video visit? Yes    Patient would like the video invitation sent by: Text to cell phone: .[phone]}    Video Start Time: 2:00 PM    Additional provider notes:      Pain Diagnoses per pain provider:        Neuropathic pain     Chronic myofascial pain        DATA: During today's visit you reported the following: Your neuropathic pain is unchanged. Your mood is 'ok I guess' - have returned to previous individual therapist. Your activity level is the same - pacing self and learning that trying to fit everything in isn't possible anymore due to pain. Your stress level is mildly reduced since resolving conflict with niece. Your sleep is about the same. You reported engaging in self-care for your pain 2-3 times daily.    You identified that you would like to focus on the following or had questions regarding the following issues or concerns, and we discussed the following:   - started increase in Naltrexone last night  - rheumatology update  - don't feel the prednisone is helping  - start land PT next week  - walking dog daily for an hour but taking several breaks so not walking the entire hour - perhaps up to 25 minutes before need to take a break  - resolved conflict with niece - feel " relationship has been repaired   - approved for medical cannabis - discussed concerns with Yasmany Rivera DO regarding any contraindications with current medication regimen  - processed previous experience with medical cannabis dispensary    ASSESSMENT: Seems to be doing an excellent job of 'hacking happiness chemicals' when walking daily with dog and paying attention to cues from her body regarding pain and fatigue.     PLAN:   Your next appointment is scheduled for 5/25 at 2:00 PM.  Assignment/Objectives /interventions for next session:   - continue to walk daily as able  - continue to engage in self-care as often as needed    We believe regular attendance is key to your success in our program!      Any time you are unable to keep your appointment we ask that you call us at [phone] at least 24 hours in advance to cancel.This will allow us to offer the appointment time to another patient.     Multiple missed appointments may lead to dismissal from the clinic.    Video-Visit Details    Type of service:  Video Visit    Video End Time (time video stopped): 2:47 PM    Originating Location (pt. Location): Home    Distant Location (provider location):  [facility]     Mode of Communication:  Video Conference via Gabriella Graham PsyD LP  Licensed Psychologist  Outpatient Clinic Therapist  [facility]

## 2022-05-16 ENCOUNTER — LAB (OUTPATIENT)
Dept: LAB | Facility: CLINIC | Age: 52
End: 2022-05-16
Payer: COMMERCIAL

## 2022-05-16 DIAGNOSIS — M79.7 FIBROMYALGIA: ICD-10-CM

## 2022-05-16 DIAGNOSIS — M35.00 SICCA SYNDROME (H): ICD-10-CM

## 2022-05-16 DIAGNOSIS — M25.50 POLYARTHRALGIA: ICD-10-CM

## 2022-05-16 DIAGNOSIS — R20.2 PARESTHESIA: ICD-10-CM

## 2022-05-16 LAB — ERYTHROCYTE [SEDIMENTATION RATE] IN BLOOD BY WESTERGREN METHOD: 9 MM/HR (ref 0–30)

## 2022-05-16 PROCEDURE — 83520 IMMUNOASSAY QUANT NOS NONAB: CPT | Mod: 90

## 2022-05-16 PROCEDURE — 86431 RHEUMATOID FACTOR QUANT: CPT

## 2022-05-16 PROCEDURE — 86200 CCP ANTIBODY: CPT

## 2022-05-16 PROCEDURE — 99000 SPECIMEN HANDLING OFFICE-LAB: CPT

## 2022-05-16 PROCEDURE — 85652 RBC SED RATE AUTOMATED: CPT

## 2022-05-16 PROCEDURE — 86140 C-REACTIVE PROTEIN: CPT

## 2022-05-16 PROCEDURE — 36415 COLL VENOUS BLD VENIPUNCTURE: CPT

## 2022-05-16 NOTE — LETTER
June 1, 2022      Roxanna KEE Lalita  [address] [place] [address]        Dear ,    We are writing to inform you of your test results.    Your blood tests have shown normal inflammation markers and Rheumatoid arthritis tests. Specific early Sjogren's antibody panel also came back normal. With normal blood tests and negative Minor salivary gland lip biopsy, Sjogren's disease is unlikely. Also prednisone taper did not help with your joint pains, it further points against the possibility of inflammatory joint disease. For chronic pain and paresthesia you can follow up with Pain management and Neurology.     Let me know if you have any concerns.     Jose Velazquez MD       Resulted Orders   Other Laboratory; [facility]; Early Sjogren's panel (Laboratory Miscellaneous Order)   Result Value Ref Range    See Scanned Result LABORATORY MISCELLANEOUS ORDER-Scanned    Rheumatoid factor   Result Value Ref Range    Rheumatoid Factor <6 <12 IU/mL   Cyclic Citrullinated Peptide Antibody IgG   Result Value Ref Range    Cyclic Citrullinated Peptide Antibody IgG 1.3 <7.0 U/mL      Comment:      Negative   Erythrocyte sedimentation rate auto   Result Value Ref Range    Erythrocyte Sedimentation Rate 9 0 - 30 mm/hr   CRP inflammation   Result Value Ref Range    CRP Inflammation <2.9 0.0 - 8.0 mg/L

## 2022-05-17 ENCOUNTER — THERAPY VISIT (OUTPATIENT)
Dept: PHYSICAL THERAPY | Facility: CLINIC | Age: 52
End: 2022-05-17
Payer: COMMERCIAL

## 2022-05-17 ENCOUNTER — THERAPY VISIT (OUTPATIENT)
Dept: OCCUPATIONAL THERAPY | Facility: CLINIC | Age: 52
End: 2022-05-17
Payer: COMMERCIAL

## 2022-05-17 DIAGNOSIS — R27.8 CLUMSINESS: Primary | ICD-10-CM

## 2022-05-17 DIAGNOSIS — R29.898 HAND WEAKNESS: ICD-10-CM

## 2022-05-17 DIAGNOSIS — M79.2 NEUROPATHIC PAIN: ICD-10-CM

## 2022-05-17 DIAGNOSIS — G89.29 CHRONIC MYOFASCIAL PAIN: ICD-10-CM

## 2022-05-17 DIAGNOSIS — M79.18 CHRONIC MYOFASCIAL PAIN: ICD-10-CM

## 2022-05-17 LAB — CRP SERPL-MCNC: <2.9 MG/L (ref 0–8)

## 2022-05-17 PROCEDURE — 97112 NEUROMUSCULAR REEDUCATION: CPT | Mod: GO | Performed by: OCCUPATIONAL THERAPIST

## 2022-05-17 PROCEDURE — 97110 THERAPEUTIC EXERCISES: CPT | Mod: GP | Performed by: PHYSICAL THERAPIST

## 2022-05-17 PROCEDURE — 97112 NEUROMUSCULAR REEDUCATION: CPT | Mod: GP | Performed by: PHYSICAL THERAPIST

## 2022-05-17 PROCEDURE — 97140 MANUAL THERAPY 1/> REGIONS: CPT | Mod: GO | Performed by: OCCUPATIONAL THERAPIST

## 2022-05-17 PROCEDURE — 97161 PT EVAL LOW COMPLEX 20 MIN: CPT | Mod: GP | Performed by: PHYSICAL THERAPIST

## 2022-05-18 PROBLEM — M79.18 CHRONIC MYOFASCIAL PAIN: Status: ACTIVE | Noted: 2022-05-18

## 2022-05-18 PROBLEM — G89.29 CHRONIC MYOFASCIAL PAIN: Status: ACTIVE | Noted: 2022-05-18

## 2022-05-18 PROBLEM — M79.2 NEUROPATHIC PAIN: Status: ACTIVE | Noted: 2022-05-18

## 2022-05-18 LAB — RHEUMATOID FACT SER NEPH-ACNC: <6 IU/ML

## 2022-05-18 NOTE — PROGRESS NOTES
Physical Therapy Initial Evaluation  Subjective:  The history is provided by the patient. No  was used.   Patient Health History  Roxanna Celis being seen for chronic myofascial pain, neruopathic pain.     Problem began: 5/5/2022.   Problem occurred: insidious   Pain is reported as 8/10 on pain scale.  General health as reported by patient is fair.  Pertinent medical history includes: depression (KIANA, clumsiness, spasmodic torticollis).   Red flags:  None as reported by patient.  Medical allergies: NSAIDS.   Surgeries include:  Other (bariatric surgery).    Current medications:  Pain medication and muscle relaxants.    Current occupation is not working.                     Therapist Generated HPI Evaluation  Problem details: Roxanna Celis is a 51 year old with past medical history including: ELY, Obesity, HTN, SICCA symptoms, spasmodic torticollis.     1. Painful peripheral neuropathy: Patient reports gradual onset of paresthesias, dysesthesias and burning/tingling type pain around 2019 that began in the periphery and spread proximally in their upper and lower extremities. Currently they describe paresthesias and pain in their entire bilateral upper and lower extremities. They also note intermittent subjective weakness. Neurological exam of the upper and lower extremities today was normal, previously they've noted mild sensory loss in the medial aspect of their right foot. Strength and reflexes were symmetric and normal. Upper extremity EMG was previously normal. Cervical, thoracic and lumbar MRI completed, no pathology to explain their current symptoms. Seen by Dr. Torres and will have a small fiber nerve biopsy completed which was borderline low in the upper extremities but normal in the legs. They have noted some improvement since titrating low dose naltrexone and starting topamax.     2. Chronic Myofascial Pain: Patient meets the diagnostic criteria for fibromyalgia based on history and exam.  Prior work-up has been unrevealing for a neurologic or rheumatologic cause for their symptoms. Discussed the pathophysiology of fibromyalgia and overlap with their neuropathic symptoms as well as how fibromyalgia may exacerbate an underlying peripheral neuropathy and arthritic pain. Again discussed concepts of central and peripheral sensitization and how this may be impacting their symptoms.      Mental Health - the patient's mental health concerns, specifically anxiety, depression, affect her experience of pain and contribute to her clinically significant distress..                 Site of Pain: upper and lower back pain, also neuropathic UE and LE pain.  Pain is described as aching and is constant.  Pain is the same all the time.  Since onset symptoms are unchanged.  Symptoms are exacerbated by rotating head, certain positions, sitting, stress, lifting and looking up or down  and relieved by nothing.      Work activity restrictions: not working.  Barriers include:  None as reported by patient.                        Objective:  Standing Alignment:    Cervical/Thoracic:  Forward head  Shoulder/UE:  Rounded shoulders              Gait:    Assistive Devices:  Walker  Deviations:  Hip:  Decr dynamic control L and decr dynamic control RAnkle:  Push off decr R and push off decr L               Lumbar/SI Evaluation  ROM:    AROM Lumbar:   Flexion:        25%  Ext:                    5%   Side Bend:        Left:     Right:   Rotation:           Left:     Right:   Side Glide:        Left:     Right:                                  Cervical/Thoracic Evaluation    AROM:  AROM Cervical:    Flexion:          50%  Extension:       5%  Rotation:         Left: 25%     Right: 25%  Side Bend:      Left:     Right:                                                                       General Evaluation:      Gross Strength:              Lower Extremity:   Significant findings:  Unable to heel walk, toe walk.  Able to partial  "standing heel raise, toe raise with UE assist.  SLS is poor at 1\" for R and for L.                                                               ROS    Assessment/Plan:    Patient is a 51 year old female with chronic myofascial pain, neuropathic pain complaints.    Patient has the following significant findings with corresponding treatment plan.                Diagnosis 1:  Chronic myofascial pain  Pain -  self management, education and home program  Decreased ROM/flexibility - manual therapy, therapeutic exercise and home program  Decreased strength - therapeutic exercise, therapeutic activities and home program  Impaired gait - gait training and home program  Impaired muscle performance - neuro re-education and home program  Decreased function - therapeutic activities and home program  Impaired posture - neuro re-education and home program  Diagnosis 2:  Neuropathic pain   Pain -  self management, education and home program  Decreased ROM/flexibility - manual therapy, therapeutic exercise and home program  Decreased strength - therapeutic exercise, therapeutic activities and home program  Impaired gait - gait training and home program  Impaired muscle performance - neuro re-education and home program  Decreased function - therapeutic activities and home program  Impaired posture - neuro re-education and home program    Therapy Evaluation Codes:   Cumulative Therapy Evaluation is: Low complexity.    Previous and current functional limitations:  (See Goal Flow Sheet for this information)    Short term and Long term goals: (See Goal Flow Sheet for this information)     Communication ability:  Patient appears to be able to clearly communicate and understand verbal and written communication and follow directions correctly.  Treatment Explanation - The following has been discussed with the patient:   RX ordered/plan of care  Anticipated outcomes  Possible risks and side effects  This patient would benefit from PT " intervention to resume normal activities.   Rehab potential is good.    Frequency:  1 X week, once daily  Duration:  for 8 weeks tapering to 2 X a month over 4 months  Discharge Plan:  Achieve all LTG.  Independent in home treatment program.  Reach maximal therapeutic benefit.    Please refer to the daily flowsheet for treatment today, total treatment time and time spent performing 1:1 timed codes.

## 2022-05-19 ENCOUNTER — MYC MEDICAL ADVICE (OUTPATIENT)
Dept: PALLIATIVE MEDICINE | Facility: CLINIC | Age: 52
End: 2022-05-19
Payer: COMMERCIAL

## 2022-05-19 DIAGNOSIS — M79.2 NEUROPATHIC PAIN: ICD-10-CM

## 2022-05-19 DIAGNOSIS — G89.29 CHRONIC MYOFASCIAL PAIN: ICD-10-CM

## 2022-05-19 DIAGNOSIS — M79.18 CHRONIC MYOFASCIAL PAIN: ICD-10-CM

## 2022-05-19 RX ORDER — TOPIRAMATE 100 MG/1
100 TABLET, FILM COATED ORAL 2 TIMES DAILY
Qty: 60 TABLET | Refills: 1 | Status: SHIPPED | OUTPATIENT
Start: 2022-05-19 | End: 2022-06-16

## 2022-05-19 NOTE — TELEPHONE ENCOUNTER
Following sent:    Axel Mcknight,    I refilled your topamax. I increased the strength to the 100mg tablets, you'll take 1 tablet twice daily.    Take care,    Yasmany Rivera [facility]

## 2022-05-19 NOTE — TELEPHONE ENCOUNTER
Routing to provider for sig      Topamax  5/19/2022 12:05 PM Reply    To: PAIN NURSE      From: Roxanna Celis      Created: 5/19/2022 12:05 PM        *-*-*This message has not been handled.*-*-*    Hi Dr. Rivera,  Can you please call in my new prescription for Topamax?  As we discussed it would be 100 mg in the morning & 100 mg in the evening, for a total of   200 mg per day.  Everything is going well with increased dosage.  Thanks for your help,  Roxanna Celis        OV Note 5/5:Increase topamax to 100mg hs for 1 week then 100mg BID. Can consider increase to 150mg BID if tolerating well.      Annika ZEE RN Care Coordinator  [facility]

## 2022-05-20 ENCOUNTER — OFFICE VISIT (OUTPATIENT)
Dept: PHYSICAL MEDICINE AND REHAB | Facility: CLINIC | Age: 52
End: 2022-05-20
Payer: COMMERCIAL

## 2022-05-20 VITALS
HEART RATE: 95 BPM | RESPIRATION RATE: 16 BRPM | OXYGEN SATURATION: 100 % | DIASTOLIC BLOOD PRESSURE: 69 MMHG | SYSTOLIC BLOOD PRESSURE: 115 MMHG

## 2022-05-20 DIAGNOSIS — G24.3 CERVICAL DYSTONIA: Primary | ICD-10-CM

## 2022-05-20 PROCEDURE — 64616 CHEMODENERV MUSC NECK DYSTON: CPT | Mod: 59 | Performed by: PHYSICAL MEDICINE & REHABILITATION

## 2022-05-20 PROCEDURE — 96372 THER/PROPH/DIAG INJ SC/IM: CPT | Performed by: PHYSICAL MEDICINE & REHABILITATION

## 2022-05-20 PROCEDURE — 95874 GUIDE NERV DESTR NEEDLE EMG: CPT | Performed by: PHYSICAL MEDICINE & REHABILITATION

## 2022-05-20 NOTE — LETTER
2022       RE: Roxanna Celis  [id number] [place] [address]     Dear Colleague,    Thank you for referring your patient, Roxanna Celis, to the [facility]INIC [place] [facility]. Please see a copy of my visit note below.      [facility]     PM&R CLINIC NOTE  BOTULINUM TOXIN PROCEDURE      HPI  Chief Complaint   Patient presents with     Botox     Roxanna Celis is a 51 year old female with a history of cervical dystonia who presents to clinic for botulinum toxin injections for management of involuntary muscle spasms and neck pain secondary to her cervical dystonia.     SINCE LAST VISIT  Roxanna Celis was last seen here in clinic on 2022, at which time it was recommended that she start Dysport injections for management of cervical dystonia. She previously was managed well for several years with Botox, but then stopped responding due to presumed resistance. We then switched to Xeomin, which provided no response.      RESPONSE TO PREVIOUS TREATMENT    N/A - This is the first round of Dysport injections. Her last intramuscular injections of Xeomin were on 2020.       PHYSICAL EXAM  VS: /69   Pulse 95   Resp 16   SpO2 100%    GEN: Pleasant and cooperative, in no acute distress  HEENT: No facial asymmetry  NECK AND TRUNK PATTERN:   Head Tremor: Pt states tremor is intermittent   Left Side-bending: Present but patient states she feels she has more right side bending now  Left Shoulder Elevation: slightly elevated  Head with right shift  Focalized pain at occipitalis - has pain today during visit    ALLERGIES  Allergies   Allergen Reactions     Contrast Dye Anaphylaxis     Other reaction(s): almost  from it     Diagnostic X-Ray Materials Anaphylaxis     IVP DYE     Lisinopril Anaphylaxis     Lisinopril-Hctz Anaphylaxis     Maple Flavor Anaphylaxis     MAPLE  SYRUP  MAPLE SYRUP  MAPLE SYRUP     Maple Tree Anaphylaxis     Allergy includes maple syrup.     No Clinical Screening - See Comments      maple syrup==anaphylaxis  Dairy- causes to not feel well     Gluten Itching and Swelling     Gluten Meal Itching and Swelling     Cramping     York Meal Unknown     Chamomile Unknown     Codeine Sulfate Cramps and GI Disturbance     Food Unknown     Grapes, almond, lactose, gluten, real maple syrup     Nsaids Other (See Comments) and Unknown     Patient had bariatric surgery. Should not ever take NSAIDS due to high risk for gastric ulcers.   Patient had bariatric surgery. Should not ever take NSAIDS due to high risk for gastric ulcers.      Polyethylene Glycol Unknown     Per allergy testing  Per allergy testing       Erythromycin Nausea and Vomiting, Cramps, Diarrhea and Nausea     PN: LW Reaction: stomach upset  cramping     Lactose Diarrhea     Morphine Other (See Comments)       CURRENT MEDICATIONS    Current Outpatient Medications:      acetylcysteine (MUCOMYST) 10 % nebulizer solution, Apply 2 drops to eye, Disp: , Rfl:      acetylcysteine 10 % (MUCOMYST) 10% SOLN, Place 2 drops into both eyes 6 times daily, Disp: 30 mL, Rfl: 2     aspirin-acetaminophen-caffeine (EXCEDRIN MIGRAINE) 250-250-65 MG tablet, Take 1 tablet by mouth every 6 hours as needed, Disp: , Rfl:      autologous serum compounded ophthalmic solution, Place 1 drop into both eyes every 2 hours (while awake), Disp: , Rfl:      BOTOX 100 units injection, Inject 250 Units into the muscle once Lot # /C3 with Expiration Date:  11/2020, Disp: , Rfl:      buPROPion (WELLBUTRIN XL) 300 MG 24 hr tablet, Take 300 mg by mouth, Disp: , Rfl:      buPROPion (WELLBUTRIN XL) 300 MG 24 hr tablet, Take 300 mg by mouth every morning, Disp: , Rfl:      clonazePAM (KLONOPIN) 0.5 MG tablet, TAKE 2 TABLETS BY MOUTH AT BEDTIME, Disp: , Rfl:      clonazePAM (KLONOPIN) 1 MG tablet, Take 1 mg by mouth 2 times daily as needed,  Disp: , Rfl:      COMPOUNDED NON-CONTROLLED SUBSTANCE (CMPD RX) - PHARMACY TO MIX COMPOUNDED MEDICATION, Take 3 mg by mouth daily Low dose naltrexone 3mg per capsule, to be taken by mouth every day., Disp: 30 capsule, Rfl: 0     cyanocobalamin (VITAMIN B-12) 1000 MCG tablet, Take 1,000 mcg by mouth daily, Disp: , Rfl:      doxycycline monohydrate (ADOXA) 50 MG tablet, Take 1 tablet (50 mg) by mouth 2 times daily, Disp: 180 tablet, Rfl: 3     DULoxetine (CYMBALTA) 60 MG capsule, TAKE 2 CAPSULES BY MOUTH EVERY DAY, Disp: , Rfl:      DULoxetine HCl (CYMBALTA PO), Take 60 mg by mouth, Disp: , Rfl:      EPINEPHrine (ANY BX GENERIC EQUIV) 0.3 MG/0.3ML injection 2-pack, , Disp: , Rfl:      EPINEPHrine (EPIPEN) 0.3 MG/0.3ML injection, Inject 0.3 mg into the muscle once as needed , Disp: , Rfl:      ibuprofen (ADVIL/MOTRIN) 200 MG capsule, Take 200 mg by mouth every 4 hours as needed., Disp: , Rfl:      iloperidone (FANAPT) 6 MG TABS tablet, Take 4 mg by mouth daily , Disp: , Rfl:      lifitegrast (XIIDRA) 5 % opthalmic solution, Place 1 drop into both eyes 2 times daily, Disp: 180 each, Rfl: 1     LINZESS 290 MCG capsule, TAKE 1 CAPSULE BY MOUTH EVERY DAY, Disp: , Rfl: 3     methocarbamol (ROBAXIN) 500 MG tablet, Take 1 tablet (500 mg) by mouth 2 times daily as needed for muscle spasms, Disp: 60 tablet, Rfl: 0     multivitamin w/minerals (THERA-VIT-M) tablet, Take 1 tablet by mouth daily, Disp: , Rfl:      NALTREXONE HCL PO, Take 3 mg by mouth daily, Disp: , Rfl:      ONABOTULINUMTOXINA IJ, Inject 250 Units as directed once LOT # /C3 EXPIRATION: 03/2020, Disp: , Rfl:      prednisolone 0.25% and hyaluronate in balanced salt SUSP compounded ophthalmic suspension, Place 1 Drop into both eyes 4 times daily. Discard bottle 15 days after opening. Refrigerate. Unopened bottle expires:     .       ., Disp: 15 mL, Rfl: 11     predniSONE (DELTASONE) 5 MG tablet, 4tab=20 mg qd x 7 days, 3tab=15 mg qd x 5 days, 2tab=10 mg qd,  Disp: 100 tablet, Rfl: 2     sodium chloride 0.9 % neb solution, Apply 5 mLs to eye, Disp: , Rfl:      sodium chloride 0.9 % neb solution, 5 mLs by Other route 6 times daily Medically necessary for scleral contact lens use. May use 5ml vials 6x/day for contact lens use, Disp: 3000 mL, Rfl: 11     sodium chloride 0.9 % neb solution, Medically necessary for scleral contact lens use. May use 3 ml 6 times a day for contact lens use, Disp: 300 mL, Rfl: 11     Soft Lens Products (B&L SENSITIVE EYES) SOLN, Apply 1 drop to eye, Disp: , Rfl:      topiramate (TOPAMAX) 100 MG tablet, Take 1 tablet (100 mg) by mouth 2 times daily, Disp: 60 tablet, Rfl: 1     tretinoin (RETIN-A) 0.025 % external cream, Apply a pea size to entire face QD, Disp: 45 g, Rfl: 11     TYRVAYA 0.03 MG/ACT nasal spray, USE 1 SPRAY IN EACH NOSTRIL TWICE DAILY., Disp: , Rfl:      Vitamin D3 (CHOLECALCIFEROL) 25 mcg (1000 units) tablet, Take by mouth daily, Disp: , Rfl:      White Petrolatum-Mineral Oil (SYSTANE NIGHTTIME) OINT, Apply 3.5 g to eye 4 times daily as needed (dry eyes), Disp: 7 g, Rfl: 11    Current Facility-Administered Medications:      botulinum toxin type A (DYSPORT) injection 500 Units, 500 Units, Intramuscular, Q90 Days, [place] MD       BOTULINUM NEUROTOXIN INJECTION PROCEDURES    VERIFICATION OF PATIENT IDENTIFICATION AND PROCEDURE     Initials   Patient Name SES   Patient  SES   Procedure Verified by: SES     Prior to the start of the procedure and with procedural staff participation, I verbally confirmed the patient s identity using two indicators, relevant allergies, that the procedure was appropriate and matched the consent or emergent situation, and that the correct equipment/implants were available. Immediately prior to starting the procedure I conducted the Time Out with the procedural staff and re-confirmed the patient s name, procedure, and site/side. (The [facility] universal protocol was followed.)   Yes    Sedation (Moderate or Deep): None    ABOVE ASSESSMENTS PERFORMED BY    America Gonzalez MD      INDICATIONS FOR PROCEDURES  Roxanna Celis is a 51 year old patient with involuntary muscle spasms and pain secondary to the diagnosis of cervical dystonia. Her baseline symptoms have been recalcitrant to oral medications and conservative therapy.  She is here today for reinjection with Dysport.    GOAL OF PROCEDURE  The goal of this procedure is to increase active range of motion, improve volitional motor control, decrease pain  and enhance functional independence.      TOTAL DOSE ADMINISTERED  Dose Administered:  500 units  Dysport (Botulinum Toxin Type A)        1:1 Dilution   Unavoidable Drug Waste: No  Diluent Used:  Preservative Free Normal Saline  Total Volume of Diluent Used:  5 ml  Lot # [id number] with Expiration Date:  9/30/2022  NDC #: Dysport 500u ([address]-97)      CONSENT  The risks, benefits, and treatment options were discussed with Roxanna Celis and she agreed to proceed.    Written consent was obtained by [facility] [place].     EQUIPMENT USED  Needle-37mm stimulating/recording  Needle-30 gauge  EMG/NCS Machine    SKIN PREPARATION  Skin preparation was performed using an alcohol wipe.    GUIDANCE DESCRIPTION  Electro-myographic guidance was necessary throughout the procedure to accurately identify all areas of dystonic muscles while avoiding injection of non-dystonic muscles, neighboring nerves and nearby vascular structures.       AREA/MUSCLE INJECTED: 500 UNITS DYSPORT = TOTAL DOSE, 1:1 DILUTION  1. HEAD, NECK & SHOULDER GIRDLE MUSCLES: 500 UNITS DYSPORT = TOTAL DOSE, 1:1 DILUTION    Right Rectus Capitis - (upper cervical) - 30 units of Dysport at 1 site/s.   Left Rectus Capitis - (upper cervical) - 30 units of Dysport at 1 site/s.     Right Upper Trapezius (upper, mid & low cervical) - 30 units of Dysport at 3 site/s.   Left Upper Trapezius (upper, mid & low cervical) - 30 units of Dysport at 3 site/s.      Right Semispinalis - 30 units of Dysport at 1 site/s.  Left Semispinalis - 30 units of Dysport at 1 site/s.    Right Splenius Cervicis - 50 units of Dysport at 1 site/s.   Left Splenius Cervicis - 20 units of Dysport at 1 site/s.    Right Levator Scapulae - 10 units of Dysport at 1 site/s.   Left Levator Scapulae - 40 units of Dysport at 1 site/s.    Right Inferior Obliquus Capitis - 20 units of Dysport at 1 site/s.   Left Inferior Obliquus Capitis - 20 units of Dysport at 1 site/s.    Right Sternocleidomastoid - 10 units of Dysport at 1 site/s.  Left Sternocleidomastoid - 10 units of Dysport at 1 site/s.    Right Occipitalis - 70 units of Dysport at 14 site/s.   Left Occipitails - 70 units of Dysport at 14 site/s.      RESPONSE TO PROCEDURE  Roxanna Celis tolerated the procedure well and there were no immediate complications. She was allowed to recover for an appropriate period of time and was discharged home in stable condition.    ASSESSMENT AND PLAN   1. Botulinum toxin injections: Initial injections of Dysport administered today for management of cervical dystonia and cervicogenic pain/occipital headaches. Patient will continue to monitor response and report at next appointment.   2. Referrals: None.   3. Follow up: Roxanna Celis was rescheduled for the next series of injections in 12 weeks, at which time we will evaluate response to today's injections. she may call the clinic prior with any questions or concerns prior to the next appointment.         Sincerely,    America Gonzalez MD

## 2022-05-20 NOTE — PROGRESS NOTES
[facility]     PM&R CLINIC NOTE  BOTULINUM TOXIN PROCEDURE      HPI  Chief Complaint   Patient presents with     Botox     Roxanna Celis is a 51 year old female with a history of cervical dystonia who presents to clinic for botulinum toxin injections for management of involuntary muscle spasms and neck pain secondary to her cervical dystonia.     SINCE LAST VISIT  Roxanna Celis was last seen here in clinic on 2022, at which time it was recommended that she start Dysport injections for management of cervical dystonia. She previously was managed well for several years with Botox, but then stopped responding due to presumed resistance. We then switched to Xeomin, which provided no response.      RESPONSE TO PREVIOUS TREATMENT    N/A - This is the first round of Dysport injections. Her last intramuscular injections of Xeomin were on 2020.       PHYSICAL EXAM  VS: /69   Pulse 95   Resp 16   SpO2 100%    GEN: Pleasant and cooperative, in no acute distress  HEENT: No facial asymmetry  NECK AND TRUNK PATTERN:   Head Tremor: Pt states tremor is intermittent   Left Side-bending: Present but patient states she feels she has more right side bending now  Left Shoulder Elevation: slightly elevated  Head with right shift  Focalized pain at occipitalis - has pain today during visit    ALLERGIES  Allergies   Allergen Reactions     Contrast Dye Anaphylaxis     Other reaction(s): almost  from it     Diagnostic X-Ray Materials Anaphylaxis     IVP DYE     Lisinopril Anaphylaxis     Lisinopril-Hctz Anaphylaxis     Maple Flavor Anaphylaxis     MAPLE SYRUP  MAPLE SYRUP  MAPLE SYRUP     Maple Tree Anaphylaxis     Allergy includes maple syrup.     No Clinical Screening - See Comments      maple syrup==anaphylaxis  Dairy- causes to not feel well     Gluten Itching and Swelling     Gluten Meal Itching and Swelling     Cramping     Sparks Meal Unknown     Chamomile Unknown      Codeine Sulfate Cramps and GI Disturbance     Food Unknown     Grapes, almond, lactose, gluten, real maple syrup     Nsaids Other (See Comments) and Unknown     Patient had bariatric surgery. Should not ever take NSAIDS due to high risk for gastric ulcers.   Patient had bariatric surgery. Should not ever take NSAIDS due to high risk for gastric ulcers.      Polyethylene Glycol Unknown     Per allergy testing  Per allergy testing       Erythromycin Nausea and Vomiting, Cramps, Diarrhea and Nausea     PN: LW Reaction: stomach upset  cramping     Lactose Diarrhea     Morphine Other (See Comments)       CURRENT MEDICATIONS    Current Outpatient Medications:      acetylcysteine (MUCOMYST) 10 % nebulizer solution, Apply 2 drops to eye, Disp: , Rfl:      acetylcysteine 10 % (MUCOMYST) 10% SOLN, Place 2 drops into both eyes 6 times daily, Disp: 30 mL, Rfl: 2     aspirin-acetaminophen-caffeine (EXCEDRIN MIGRAINE) 250-250-65 MG tablet, Take 1 tablet by mouth every 6 hours as needed, Disp: , Rfl:      autologous serum compounded ophthalmic solution, Place 1 drop into both eyes every 2 hours (while awake), Disp: , Rfl:      BOTOX 100 units injection, Inject 250 Units into the muscle once Lot # /C3 with Expiration Date:  11/2020, Disp: , Rfl:      buPROPion (WELLBUTRIN XL) 300 MG 24 hr tablet, Take 300 mg by mouth, Disp: , Rfl:      buPROPion (WELLBUTRIN XL) 300 MG 24 hr tablet, Take 300 mg by mouth every morning, Disp: , Rfl:      clonazePAM (KLONOPIN) 0.5 MG tablet, TAKE 2 TABLETS BY MOUTH AT BEDTIME, Disp: , Rfl:      clonazePAM (KLONOPIN) 1 MG tablet, Take 1 mg by mouth 2 times daily as needed, Disp: , Rfl:      COMPOUNDED NON-CONTROLLED SUBSTANCE (CMPD RX) - PHARMACY TO MIX COMPOUNDED MEDICATION, Take 3 mg by mouth daily Low dose naltrexone 3mg per capsule, to be taken by mouth every day., Disp: 30 capsule, Rfl: 0     cyanocobalamin (VITAMIN B-12) 1000 MCG tablet, Take 1,000 mcg by mouth daily, Disp: , Rfl:       doxycycline monohydrate (ADOXA) 50 MG tablet, Take 1 tablet (50 mg) by mouth 2 times daily, Disp: 180 tablet, Rfl: 3     DULoxetine (CYMBALTA) 60 MG capsule, TAKE 2 CAPSULES BY MOUTH EVERY DAY, Disp: , Rfl:      DULoxetine HCl (CYMBALTA PO), Take 60 mg by mouth, Disp: , Rfl:      EPINEPHrine (ANY BX GENERIC EQUIV) 0.3 MG/0.3ML injection 2-pack, , Disp: , Rfl:      EPINEPHrine (EPIPEN) 0.3 MG/0.3ML injection, Inject 0.3 mg into the muscle once as needed , Disp: , Rfl:      ibuprofen (ADVIL/MOTRIN) 200 MG capsule, Take 200 mg by mouth every 4 hours as needed., Disp: , Rfl:      iloperidone (FANAPT) 6 MG TABS tablet, Take 4 mg by mouth daily , Disp: , Rfl:      lifitegrast (XIIDRA) 5 % opthalmic solution, Place 1 drop into both eyes 2 times daily, Disp: 180 each, Rfl: 1     LINZESS 290 MCG capsule, TAKE 1 CAPSULE BY MOUTH EVERY DAY, Disp: , Rfl: 3     methocarbamol (ROBAXIN) 500 MG tablet, Take 1 tablet (500 mg) by mouth 2 times daily as needed for muscle spasms, Disp: 60 tablet, Rfl: 0     multivitamin w/minerals (THERA-VIT-M) tablet, Take 1 tablet by mouth daily, Disp: , Rfl:      NALTREXONE HCL PO, Take 3 mg by mouth daily, Disp: , Rfl:      ONABOTULINUMTOXINA IJ, Inject 250 Units as directed once LOT # /C3 EXPIRATION: 03/2020, Disp: , Rfl:      prednisolone 0.25% and hyaluronate in balanced salt SUSP compounded ophthalmic suspension, Place 1 Drop into both eyes 4 times daily. Discard bottle 15 days after opening. Refrigerate. Unopened bottle expires:     .       ., Disp: 15 mL, Rfl: 11     predniSONE (DELTASONE) 5 MG tablet, 4tab=20 mg qd x 7 days, 3tab=15 mg qd x 5 days, 2tab=10 mg qd, Disp: 100 tablet, Rfl: 2     sodium chloride 0.9 % neb solution, Apply 5 mLs to eye, Disp: , Rfl:      sodium chloride 0.9 % neb solution, 5 mLs by Other route 6 times daily Medically necessary for scleral contact lens use. May use 5ml vials 6x/day for contact lens use, Disp: 3000 mL, Rfl: 11     sodium chloride 0.9 % neb  solution, Medically necessary for scleral contact lens use. May use 3 ml 6 times a day for contact lens use, Disp: 300 mL, Rfl: 11     Soft Lens Products (B&L SENSITIVE EYES) SOLN, Apply 1 drop to eye, Disp: , Rfl:      topiramate (TOPAMAX) 100 MG tablet, Take 1 tablet (100 mg) by mouth 2 times daily, Disp: 60 tablet, Rfl: 1     tretinoin (RETIN-A) 0.025 % external cream, Apply a pea size to entire face QD, Disp: 45 g, Rfl: 11     TYRVAYA 0.03 MG/ACT nasal spray, USE 1 SPRAY IN EACH NOSTRIL TWICE DAILY., Disp: , Rfl:      Vitamin D3 (CHOLECALCIFEROL) 25 mcg (1000 units) tablet, Take by mouth daily, Disp: , Rfl:      White Petrolatum-Mineral Oil (SYSTANE NIGHTTIME) OINT, Apply 3.5 g to eye 4 times daily as needed (dry eyes), Disp: 7 g, Rfl: 11    Current Facility-Administered Medications:      botulinum toxin type A (DYSPORT) injection 500 Units, 500 Units, Intramuscular, Q90 Days, America Gonzalez MD       BOTULINUM NEUROTOXIN INJECTION PROCEDURES    VERIFICATION OF PATIENT IDENTIFICATION AND PROCEDURE     Initials   Patient Name SES   Patient  SES   Procedure Verified by: SES     Prior to the start of the procedure and with procedural staff participation, I verbally confirmed the patient s identity using two indicators, relevant allergies, that the procedure was appropriate and matched the consent or emergent situation, and that the correct equipment/implants were available. Immediately prior to starting the procedure I conducted the Time Out with the procedural staff and re-confirmed the patient s name, procedure, and site/side. (The Joint Commission universal protocol was followed.)  Yes    Sedation (Moderate or Deep): None    ABOVE ASSESSMENTS PERFORMED BY    America Gonzalez MD      INDICATIONS FOR PROCEDURES  Roxanna Celis is a 51 year old patient with involuntary muscle spasms and pain secondary to the diagnosis of cervical dystonia. Her baseline symptoms have been recalcitrant to oral  medications and conservative therapy.  She is here today for reinjection with Dysport.    GOAL OF PROCEDURE  The goal of this procedure is to increase active range of motion, improve volitional motor control, decrease pain  and enhance functional independence.      TOTAL DOSE ADMINISTERED  Dose Administered:  500 units  Dysport (Botulinum Toxin Type A)        1:1 Dilution   Unavoidable Drug Waste: No  Diluent Used:  Preservative Free Normal Saline  Total Volume of Diluent Used:  5 ml  Lot # [id number] with Expiration Date:  9/30/2022  NDC #: Dysport 500u ([address]-74)      CONSENT  The risks, benefits, and treatment options were discussed with Roxanna KEE Lalita and she agreed to proceed.    Written consent was obtained by [facility].     EQUIPMENT USED  Needle-37mm stimulating/recording  Needle-30 gauge  EMG/NCS Machine    SKIN PREPARATION  Skin preparation was performed using an alcohol wipe.    GUIDANCE DESCRIPTION  Electro-myographic guidance was necessary throughout the procedure to accurately identify all areas of dystonic muscles while avoiding injection of non-dystonic muscles, neighboring nerves and nearby vascular structures.       AREA/MUSCLE INJECTED: 500 UNITS DYSPORT = TOTAL DOSE, 1:1 DILUTION  1. HEAD, NECK & SHOULDER GIRDLE MUSCLES: 500 UNITS DYSPORT = TOTAL DOSE, 1:1 DILUTION    Right Rectus Capitis - (upper cervical) - 30 units of Dysport at 1 site/s.   Left Rectus Capitis - (upper cervical) - 30 units of Dysport at 1 site/s.     Right Upper Trapezius (upper, mid & low cervical) - 30 units of Dysport at 3 site/s.   Left Upper Trapezius (upper, mid & low cervical) - 30 units of Dysport at 3 site/s.     Right Semispinalis - 30 units of Dysport at 1 site/s.  Left Semispinalis - 30 units of Dysport at 1 site/s.    Right Splenius Cervicis - 50 units of Dysport at 1 site/s.   Left Splenius Cervicis - 20 units of Dysport at 1 site/s.    Right Levator Scapulae - 10 units of Dysport at 1 site/s.   Left Levator Scapulae -  40 units of Dysport at 1 site/s.    Right Inferior Obliquus Capitis - 20 units of Dysport at 1 site/s.   Left Inferior Obliquus Capitis - 20 units of Dysport at 1 site/s.    Right Sternocleidomastoid - 10 units of Dysport at 1 site/s.  Left Sternocleidomastoid - 10 units of Dysport at 1 site/s.    Right Occipitalis - 70 units of Dysport at 14 site/s.   Left Occipitails - 70 units of Dysport at 14 site/s.      RESPONSE TO PROCEDURE  Roxanna Celis tolerated the procedure well and there were no immediate complications. She was allowed to recover for an appropriate period of time and was discharged home in stable condition.    ASSESSMENT AND PLAN   1. Botulinum toxin injections: Initial injections of Dysport administered today for management of cervical dystonia and cervicogenic pain/occipital headaches. Patient will continue to monitor response and report at next appointment.   2. Referrals: None.   3. Follow up: Roxanna Celis was rescheduled for the next series of injections in 12 weeks, at which time we will evaluate response to today's injections. she may call the clinic prior with any questions or concerns prior to the next appointment.

## 2022-05-21 LAB — CCP AB SER IA-ACNC: 1.3 U/ML

## 2022-05-23 DIAGNOSIS — G24.3 CERVICAL DYSTONIA: Primary | ICD-10-CM

## 2022-05-24 ENCOUNTER — TELEPHONE (OUTPATIENT)
Dept: PALLIATIVE MEDICINE | Facility: CLINIC | Age: 52
End: 2022-05-24

## 2022-05-24 ENCOUNTER — THERAPY VISIT (OUTPATIENT)
Dept: PHYSICAL THERAPY | Facility: CLINIC | Age: 52
End: 2022-05-24
Payer: COMMERCIAL

## 2022-05-24 DIAGNOSIS — M79.2 NEUROPATHIC PAIN: ICD-10-CM

## 2022-05-24 DIAGNOSIS — G89.29 CHRONIC MYOFASCIAL PAIN: Primary | ICD-10-CM

## 2022-05-24 DIAGNOSIS — M79.18 CHRONIC MYOFASCIAL PAIN: Primary | ICD-10-CM

## 2022-05-24 PROCEDURE — 97110 THERAPEUTIC EXERCISES: CPT | Mod: GP | Performed by: PHYSICAL THERAPIST

## 2022-05-24 PROCEDURE — 97112 NEUROMUSCULAR REEDUCATION: CPT | Mod: GP | Performed by: PHYSICAL THERAPIST

## 2022-05-24 NOTE — TELEPHONE ENCOUNTER
Called pt and LM to call back    Transfer plan: Will transfer to Erica Marks   Has current appt with Dr Rivera 07/08/22    Cleo HEINN, RN Care Coordinator  [facility]  Pain [facility]

## 2022-05-25 ENCOUNTER — VIRTUAL VISIT (OUTPATIENT)
Dept: PALLIATIVE MEDICINE | Facility: CLINIC | Age: 52
End: 2022-05-25
Payer: COMMERCIAL

## 2022-05-25 DIAGNOSIS — M79.2 NEUROPATHIC PAIN: ICD-10-CM

## 2022-05-25 DIAGNOSIS — G89.29 CHRONIC MYOFASCIAL PAIN: Primary | ICD-10-CM

## 2022-05-25 DIAGNOSIS — M79.18 CHRONIC MYOFASCIAL PAIN: Primary | ICD-10-CM

## 2022-05-25 PROCEDURE — 96159 HLTH BHV IVNTJ INDIV EA ADDL: CPT | Mod: GT | Performed by: PSYCHOLOGIST

## 2022-05-25 PROCEDURE — 96158 HLTH BHV IVNTJ INDIV 1ST 30: CPT | Mod: GT | Performed by: PSYCHOLOGIST

## 2022-05-25 NOTE — PROGRESS NOTES
"Roxanna Celis is a 51 year old female who is being evaluated via a billable video visit.      The patient has been notified of following:     \"This video visit will be conducted via a call between you and your physician/provider. We have found that certain health care needs can be provided without the need for an in-person physical exam.  This service lets us provide the care you need with a video conversation.     Video visits are billed at different rates depending on your insurance coverage.  Please reach out to your insurance provider with any questions.    If during the course of the call the physician/provider feels a video visit is not appropriate, you will not be charged for this service.\"    Patient has given verbal consent for Video visit? Yes    Patient would like the video invitation sent by: Text to cell phone: .[phone]}    Video Start Time: 2:00 PM    Additional provider notes:     Pain Diagnoses per pain provider:   Neuropathic pain      Chronic myofascial pain            DATA: During today's visit you reported the following: Your neuropathic pain is still high - being ill doesn't help. Your mood is ok, balanced. Your activity level is more balanced. Your stress level is stable but for party planning. Your sleep is the same. You reported engaging in self-care for your pain 2-3 times daily.    You identified that you would like to focus on the following or had questions regarding the following issues or concerns, and we discussed the following:   - too many appointments last week - exhausted from it, as well as being ill  - had an episode of dizziness, heart palpitations and difficulty breathing last week at the end of the week - stopped Naltrexone and next day no further issues  - encouraged to reach out to Yasmany Rivera, DO to let him know about reaction to Naltrexone  - enjoying having god daughter come help out, spending time with her  - recognizing importance of balancing and pacing activity after " over-engaging in behavior   - hosting family event for Mother's and Father's Day coming up - some worry thoughts about completing all of the tasks related to this  - encouraged to delegate to others as needed for family event    ASSESSMENT: Continues to work on pacing and responding to cues related to pain. She seems to be actively working on this and incorporating self-care actively.    PLAN:   Your next appointment is scheduled for 6/8 at 2:00 PM.  Assignment/Objectives /interventions for next session:   - continue to pace yourself  - take breaks as often as needed  - continue to work on learning curve    We believe regular attendance is key to your success in our program!      Any time you are unable to keep your appointment we ask that you call us at [phone] at least 24 hours in advance to cancel.This will allow us to offer the appointment time to another patient.     Multiple missed appointments may lead to dismissal from the clinic.    Video-Visit Details    Type of service:  Video Visit    Video End Time (time video stopped): 2:54 PM    Originating Location (pt. Location): Home    Distant Location (provider location):  [facility]     Mode of Communication:  Video Conference via Gabriella Graham PsyD LP  Licensed Psychologist  Outpatient Clinic Therapist  [facility]

## 2022-05-26 LAB — SCANNED LAB RESULT: NORMAL

## 2022-05-26 NOTE — TELEPHONE ENCOUNTER
patient called returning a call from nursing        Marcia Thomas    [facility]

## 2022-05-30 NOTE — RESULT ENCOUNTER NOTE
Namrata Mcknight,    Your blood tests have shown normal inflammation markers and Rheumatoid arthritis tests. Specific early Sjogren's antibody panel also came back normal. With normal blood tests and negative Minor salivary gland lip biopsy, Sjogren's disease is unlikely. Also prednisone taper did not help with your joint pains, it further points against the possibility of inflammatory joint disease. For chronic pain and paresthesia you can follow up with Pain management and Neurology.    Let me know if you have any concerns.     Jose Velazquez MD

## 2022-05-31 NOTE — TELEPHONE ENCOUNTER
Called pt and notified of plan to transfer to Erica HEINN, RN Care Coordinator  [facility]  Pain [facility]

## 2022-06-05 ENCOUNTER — HEALTH MAINTENANCE LETTER (OUTPATIENT)
Age: 52
End: 2022-06-05

## 2022-06-06 ENCOUNTER — OFFICE VISIT (OUTPATIENT)
Dept: OPTOMETRY | Facility: CLINIC | Age: 52
End: 2022-06-06
Payer: COMMERCIAL

## 2022-06-06 DIAGNOSIS — H57.13 EYE PAIN, BILATERAL: ICD-10-CM

## 2022-06-06 DIAGNOSIS — H16.223 KERATITIS SICCA, BILATERAL: ICD-10-CM

## 2022-06-06 DIAGNOSIS — H20.9 IRITIS: ICD-10-CM

## 2022-06-06 DIAGNOSIS — H04.123 DRY EYES: Primary | ICD-10-CM

## 2022-06-06 RX ORDER — PREDNISOLONE ACETATE 10 MG/ML
SUSPENSION/ DROPS OPHTHALMIC
Qty: 5 ML | Refills: 0 | Status: SHIPPED | OUTPATIENT
Start: 2022-06-06 | End: 2022-06-20

## 2022-06-06 ASSESSMENT — EXTERNAL EXAM - RIGHT EYE: OD_EXAM: NORMAL

## 2022-06-06 ASSESSMENT — REFRACTION_CURRENTRX
OS_BRAND: EYEFIT PRO
OS_ADDL_SPECS: 158101
OS_DIAMETER: 18.0
OS_SPHERE: +0.50
OD_ADDL_SPECS: 158101
OD_DIAMETER: 18.0
OD_BRAND: EYEFIT PRO
OD_BASECURVE: 8.047
OD_SPHERE: +1.00
OS_BASECURVE: 7.899

## 2022-06-06 ASSESSMENT — REFRACTION_WEARINGRX
OD_AXIS: 155
OD_CYLINDER: +0.75
OD_SPHERE: -3.50
OS_SPHERE: -2.75
OS_CYLINDER: SPHERE

## 2022-06-06 ASSESSMENT — VISUAL ACUITY
OD_CC: 20/20-1
CORRECTION_TYPE: GLASSES, CONTACTS
METHOD: SNELLEN - LINEAR
OS_CC: 20/20

## 2022-06-06 ASSESSMENT — TONOMETRY
IOP_METHOD: ICARE
OD_IOP_MMHG: 18
OS_IOP_MMHG: 17

## 2022-06-06 ASSESSMENT — EXTERNAL EXAM - LEFT EYE: OS_EXAM: NORMAL

## 2022-06-06 NOTE — PROGRESS NOTES
"Warm compresses 2-3x daily   Doxycycline 50 mg orally twice a day   Xiidra twice a day, both eyes   Pred healon 4x daily, both eyes   Serum tears to fill scleral lenses 2-3x daily  Systane ointment PRN when lenses are out   Mucomyst 4-6 times a day, both eyes   S/p Lipiflow x 2 (stopped now as caused punctal plugs to come out)    A/P  1.) Dry Eye each eye  -Severely symptomatic despite max topical treatment and significant objective improvement in corneal appearance with scleral lens wear  -Excellent response to Eyefit lenses, much improved comfort and wear time. Current fit looks excellent  -Happy with nearsighted target in CL's (-2.50 distance Rx)  -Of note did NOT like newest pair of lenses despite extremely minimal changes. Caution with duplicates - reviewed these may feel/function a little different at first until she \"breaks them in\"    2.) Blepharitis/MGD each eye  -Endorses significant irritation, burning, stinging mostly upper lids  -Failed: maxitrol anjelica, Refresh PM, Genteal Gel, erythromycin anjelica - has only done well with Systane ointment  -s/p 2 rounds of Lipiflow - stopped after second session where they pushed punctal plugs out    Lenses look excellent. Good corneal surface and tear film volume today. No changes recommended on current pair. Continue with current pair. Applied Boost cycle today, which she noted improved comfort. Option for monthly Boost cycles at home if desired.     She would like spare pair (dupes of FIRST pair) - reviewed adaptation to new lenses. Will order and mail direct. F/u 6 months, sooner prn    Contact Lens Billing  V-Code:  Scleral Cover Shell  Final Contact Lens Rx       Brand Base Curve Diameter Sphere Lens Addl. Specs    Right Eyefit Pro 8.047 18.0 +0.75 BOZ: 10.56 x 9.50, CT 0.451 [id number] dupe    Left Eyefit Pro 7.899 18.0 +0.50 BOZ: 9.97 x 9.25, CT 0.435 [id number] dupe         # of units: 2  Price per Unit: $575    This patient requires contact lenses that " are medically necessary for either improvement in vision over spectacles, support of the ocular surface, or other therapeutic benefit. These are not cosmetic contact lenses.     Encounter Diagnoses   Name Primary?     Iritis      Dry eyes Yes     Keratitis sicca, bilateral (H)      Eye pain, bilateral

## 2022-06-08 ENCOUNTER — VIRTUAL VISIT (OUTPATIENT)
Dept: PALLIATIVE MEDICINE | Facility: CLINIC | Age: 52
End: 2022-06-08
Payer: COMMERCIAL

## 2022-06-08 DIAGNOSIS — M79.2 NEUROPATHIC PAIN: ICD-10-CM

## 2022-06-08 DIAGNOSIS — G89.29 CHRONIC MYOFASCIAL PAIN: Primary | ICD-10-CM

## 2022-06-08 DIAGNOSIS — M79.18 CHRONIC MYOFASCIAL PAIN: Primary | ICD-10-CM

## 2022-06-08 PROCEDURE — 96159 HLTH BHV IVNTJ INDIV EA ADDL: CPT | Mod: TEL | Performed by: PSYCHOLOGIST

## 2022-06-08 PROCEDURE — 96158 HLTH BHV IVNTJ INDIV 1ST 30: CPT | Mod: TEL | Performed by: PSYCHOLOGIST

## 2022-06-08 NOTE — PROGRESS NOTES
"Roxanna Celis is a 51 year old female who is being evaluated via a billable telephone visit.      The patient has been notified of following:     \"This telephone visit will be conducted via a call between you and Taisha Graham PsyD LP. We have found that certain health care needs can be provided without the need for an in-person session.  This service lets us provide the care you need with a phone conversation.       If during the course of the call I feel a telephone visit is not appropriate, you will not be charged for this service.\"      Reviewed that patient is in a quiet private place, no recording without permission, all apps and notifications of any devices are turned off. Began the session by talking about the risks and benefits of telehealth, what to do if there is a break in the connection, reviewed the safety protocol for during and after sessions. The purpose of using telehealth for this session was due to the COVID-19 pandemic and was to reduce the spread of the disease and protect both the clinician and client.             Patient has given verbal consent for telephone visit? YES    Phone call contact time  Call Started at 2:00 PM  Call Ended at 2:59 PM  Total 59 minutes     Pain Diagnoses per pain provider:   Chronic myofascial pain     Neuropathic pain        DATA: During today's visit you reported the following: Your pain is unchanged. Your mood is variable, recognize would be helpful to increase Fanapt per psychiatrist's direction. Your activity level has been more variable with party prep and rest after. Your stress level is stable. Your sleep is unchanged. You reported engaging in self-care for your pain 2-3 times daily.    You identified that you would like to focus on the following or had questions regarding the following issues or concerns, and we discussed the following:   - restarted Naltrexone at 2 mg a few days ago - feeling quite tired since  - able to ask for help with family " gathering, felt others were happy to help  - very pleased with how well the party went  - provided information to family about your chronic pain which has been well received  - still working on learning limits with yourself  - have niece coming for a visit and will be going to [place] - plotting out best parking and renting scooter  - niece has been helping around house  - exploring different ways to incorporate strength training into routine    ASSESSMENT: Seems to be doing a nice job working on exploring her limits and has been flexible with herself about how pain might interfere.    PLAN:   Your next appointment is scheduled for 6/22 at 2:00 PM.  Assignment/Objectives /interventions for next session:   - continue to work on assessing your limits and responding to them  - continue to engage in self-care as often as needed    We believe regular attendance is key to your success in our program!      Any time you are unable to keep your appointment we ask that you call us at [phone] at least 24 hours in advance to cancel.This will allow us to offer the appointment time to another patient.     Multiple missed appointments may lead to dismissal from the clinic.    Telephone-Visit Details    Type of service:  Telephone Visit    Originating Location (pt. Location): Home    Distant Location (provider location):  [facility]     Mode of Communication:  Telephone    I have reviewed the note as documented above.  This accurately captures the substance of my conversation with the patient.    Taisha Graham PsyD LP  Licensed Psychologist  Outpatient Clinic Therapist  [facility]    Disclaimer: This note consists of symbols derived from keyboarding, dictation and/or voice recognition software. As a result, there may be errors in the script that have gone undetected. Please consider this when interpreting information found in this chart.

## 2022-06-14 ENCOUNTER — OFFICE VISIT (OUTPATIENT)
Dept: NEUROLOGY | Facility: CLINIC | Age: 52
End: 2022-06-14
Attending: STUDENT IN AN ORGANIZED HEALTH CARE EDUCATION/TRAINING PROGRAM
Payer: COMMERCIAL

## 2022-06-14 DIAGNOSIS — R29.898 HAND WEAKNESS: ICD-10-CM

## 2022-06-14 DIAGNOSIS — M79.2 NEUROPATHIC PAIN: ICD-10-CM

## 2022-06-14 NOTE — LETTER
6/14/2022     RE: Roxanna Celis  [id number] [place] [address]     Dear Colleague,    Thank you for referring your patient, Roxanna Celis, to the [facility] at [facility]. Please see a copy of my visit note below.        [facility]  Electrodiagnostic Laboratory    Nerve Conduction & EMG Report          Patient:       Roxanna Celis  Patient ID:    [id number]  Gender:        Female  YOB: 1970  Age:           51 Years 7 Months        History & Examination:  51 year old woman with several years of pain throughout her upper limbs. The left side is more affected than the right. Evaluate for polyneuropathy vs focal neuropathy vs radiculopathy.     Techniques: Motor and sensory conduction studies were done with surface recording electrodes. EMG was done with a concentric needle electrode.     Results:  Nerve conduction studies:  1. Bilateral median-D2, ulnar-D5, and radial sensory responses are normal.   2. Bilateral median-ulnar palmar interlatency differences are normal.   3. Bilateral median-APB and ulnar-ADM motor responses are normal.   4. Bilateral median-lumbrical vs ulnar-interosseous latency differences are normal.    Needle EMG of selected proximal and distal right and left upper limb muscles was performed as tabulated below. No abnormal spontaneous activity was observed in the sampled muscles. Motor unit potential morphology and recruitment patterns were normal.     Interpretation:  This is a normal study. There is no electrophysiologic evidence of a large-fiber polyneuropathy, focal neuropathy, or cervical radiculopathy affecting the upper limbs on the basis of this study.     Sukhi Stern MD  Department of Neurology        Sensory NCS      Nerve / Sites Rec. Site Onset Peak Ref. NP Amp Ref. PP Amp Dist Shad Ref. Temp     ms ms ms  V  V  V cm m/s m/s  C   L MEDIAN - Dig II Anti      Wrist Dig II 1.98 2.81  61.0 10.0 101.4 13  65.7 48.0 31.7   R MEDIAN - Dig II Anti      Wrist Dig II 2.24 2.81  53.0 10.0 68.7 14 62.5 48.0    L ULNAR - Dig V Anti      Wrist Dig V 1.88 2.76  51.3 8.0 79.8 12.5 66.7 48.0 31.7   R ULNAR - Dig V Anti      Wrist Dig V 2.19 2.97  40.9 8.0 74.6 12.5 57.1 48.0 32.2   L RADIAL - Snuff      Forearm Snuff 1.67 2.14  23.1 15.0 40.9 10 60.0 48.0 31.6   L MEDIAN - Ulnar - Palmar      Median Wrist 1.51 1.93 2.40 65.1  81.9 8 53.0  31.6      Ulnar Wrist 1.35 1.67 2.40 9.4  14.9 8 59.1  31.9   R MEDIAN - Ulnar - Palmar      Median Wrist 1.46 1.82 2.40 97.6  119.7 8 54.9  31.9      Ulnar Wrist 1.30 1.72 2.40 39.9  63.0 8 61.4  32.1       Motor NCS      Nerve / Sites Rec. Site Lat Ref. Amp Ref. Rel Amp Dist Shad Ref. Dur. Area Temp.     ms ms mV mV % cm m/s m/s ms %  C   L MEDIAN - APB      Wrist APB 3.07 4.40 10.8 5.0 100 8   6.25 100 31.6      Elbow APB 6.61  10.4  96 19 53.6 48.0 6.35 92.1 31.6   R MEDIAN - APB      Wrist APB 2.86 4.40 13.2 5.0 100 8   6.41 100 32.2      Elbow APB 6.25  12.2  92.7 21 62.0 48.0 6.30 87.7 32.2   L ULNAR - ADM      Wrist ADM 2.19 3.50 11.5 5.0 100 8   7.14 100 31.7      B.Elbow ADM 5.68  10.8  94.3 22 63.0 48.0 7.24 82.9 31.7      A.Elbow ADM 7.24  10.6  92.5 10 64.0 48.0 6.98 85.4 31.7   R ULNAR - ADM      Wrist ADM 2.24 3.50 13.8 5.0 100 8   6.15 100 32.2      B.Elbow ADM 5.83  13.1  94.8 21 58.4 48.0 6.41 96.7 32.1      A.Elbow ADM 7.34  11.8  86 9 59.6 48.0 6.09 88.6 32.1   L MEDIAN - II Lumb      Median II Lumb 2.76  1.4  100 10   6.04 100 31.9      Ulnar Palm Int 2.60  2.5  174 10   6.09 150 31.7   R MEDIAN - II Lumb      Median II Lumb 2.92  2.2  100 10   7.14 100 31.2      Ulnar Palm Int 2.81  2.7  121 10   5.73 92.5 31.2       F  Wave      Nerve Min F Lat Max F Lat Mean FLat Temp.    ms ms ms  C   L ULNAR 24.79 25.73 25.04 31.6       EMG Summary Table     Spontaneous MUAP Recruitment    IA Fib/PSW Fasc H.F. Amp Dur. PPP Pattern   R. DELTOID N None None None N N N Normal   R. TRICEPS  N None None None N N N Normal   R. FIRST D INTEROSS N None None None N N N Normal   L. DELTOID N None None None N N N Normal   L. BICEPS N None None None N N N Normal   L. TRICEPS N None None None N N N Normal   L. PRON TERES N None None None N N N Normal   L. FIRST D INTEROSS N None None None N N N Normal                                    Again, thank you for allowing me to participate in the care of your patient.      Sincerely,    Sukhi Stern MD

## 2022-06-14 NOTE — PROGRESS NOTES
[facility]  Electrodiagnostic Laboratory    Nerve Conduction & EMG Report          Patient:       Roxanna Celis  Patient ID:    [id number]  Gender:        Female  YOB: 1970  Age:           51 Years 7 Months        History & Examination:  51 year old woman with several years of pain throughout her upper limbs. The left side is more affected than the right. Evaluate for polyneuropathy vs focal neuropathy vs radiculopathy.     Techniques: Motor and sensory conduction studies were done with surface recording electrodes. EMG was done with a concentric needle electrode.     Results:  Nerve conduction studies:  1. Bilateral median-D2, ulnar-D5, and radial sensory responses are normal.   2. Bilateral median-ulnar palmar interlatency differences are normal.   3. Bilateral median-APB and ulnar-ADM motor responses are normal.   4. Bilateral median-lumbrical vs ulnar-interosseous latency differences are normal.    Needle EMG of selected proximal and distal right and left upper limb muscles was performed as tabulated below. No abnormal spontaneous activity was observed in the sampled muscles. Motor unit potential morphology and recruitment patterns were normal.     Interpretation:  This is a normal study. There is no electrophysiologic evidence of a large-fiber polyneuropathy, focal neuropathy, or cervical radiculopathy affecting the upper limbs on the basis of this study.     Sukhi Stern MD  Department of Neurology        Sensory NCS      Nerve / Sites Rec. Site Onset Peak Ref. NP Amp Ref. PP Amp Dist Shad Ref. Temp     ms ms ms  V  V  V cm m/s m/s  C   L MEDIAN - Dig II Anti      Wrist Dig II 1.98 2.81  61.0 10.0 101.4 13 65.7 48.0 31.7   R MEDIAN - Dig II Anti      Wrist Dig II 2.24 2.81  53.0 10.0 68.7 14 62.5 48.0    L ULNAR - Dig V Anti      Wrist Dig V 1.88 2.76  51.3 8.0 79.8 12.5 66.7 48.0 31.7   R ULNAR - Dig V Anti      Wrist Dig V 2.19 2.97  40.9 8.0 74.6 12.5 57.1 48.0 32.2   L RADIAL  - Snuff      Forearm Snuff 1.67 2.14  23.1 15.0 40.9 10 60.0 48.0 31.6   L MEDIAN - Ulnar - Palmar      Median Wrist 1.51 1.93 2.40 65.1  81.9 8 53.0  31.6      Ulnar Wrist 1.35 1.67 2.40 9.4  14.9 8 59.1  31.9   R MEDIAN - Ulnar - Palmar      Median Wrist 1.46 1.82 2.40 97.6  119.7 8 54.9  31.9      Ulnar Wrist 1.30 1.72 2.40 39.9  63.0 8 61.4  32.1       Motor NCS      Nerve / Sites Rec. Site Lat Ref. Amp Ref. Rel Amp Dist Shad Ref. Dur. Area Temp.     ms ms mV mV % cm m/s m/s ms %  C   L MEDIAN - APB      Wrist APB 3.07 4.40 10.8 5.0 100 8   6.25 100 31.6      Elbow APB 6.61  10.4  96 19 53.6 48.0 6.35 92.1 31.6   R MEDIAN - APB      Wrist APB 2.86 4.40 13.2 5.0 100 8   6.41 100 32.2      Elbow APB 6.25  12.2  92.7 21 62.0 48.0 6.30 87.7 32.2   L ULNAR - ADM      Wrist ADM 2.19 3.50 11.5 5.0 100 8   7.14 100 31.7      B.Elbow ADM 5.68  10.8  94.3 22 63.0 48.0 7.24 82.9 31.7      A.Elbow ADM 7.24  10.6  92.5 10 64.0 48.0 6.98 85.4 31.7   R ULNAR - ADM      Wrist ADM 2.24 3.50 13.8 5.0 100 8   6.15 100 32.2      B.Elbow ADM 5.83  13.1  94.8 21 58.4 48.0 6.41 96.7 32.1      A.Elbow ADM 7.34  11.8  86 9 59.6 48.0 6.09 88.6 32.1   L MEDIAN - II Lumb      Median II Lumb 2.76  1.4  100 10   6.04 100 31.9      Ulnar Palm Int 2.60  2.5  174 10   6.09 150 31.7   R MEDIAN - II Lumb      Median II Lumb 2.92  2.2  100 10   7.14 100 31.2      Ulnar Palm Int 2.81  2.7  121 10   5.73 92.5 31.2       F  Wave      Nerve Min F Lat Max F Lat Mean FLat Temp.    ms ms ms  C   L ULNAR 24.79 25.73 25.04 31.6       EMG Summary Table     Spontaneous MUAP Recruitment    IA Fib/PSW Fasc H.F. Amp Dur. PPP Pattern   R. DELTOID N None None None N N N Normal   R. TRICEPS N None None None N N N Normal   R. FIRST D INTEROSS N None None None N N N Normal   L. DELTOID N None None None N N N Normal   L. BICEPS N None None None N N N Normal   L. TRICEPS N None None None N N N Normal   L. PRON TERES N None None None N N N Normal   L. FIRST D INTEROSS N  None None None N N N Normal

## 2022-06-16 DIAGNOSIS — G89.29 CHRONIC MYOFASCIAL PAIN: ICD-10-CM

## 2022-06-16 DIAGNOSIS — M79.18 CHRONIC MYOFASCIAL PAIN: ICD-10-CM

## 2022-06-16 DIAGNOSIS — M79.2 NEUROPATHIC PAIN: ICD-10-CM

## 2022-06-16 RX ORDER — TOPIRAMATE 100 MG/1
100 TABLET, FILM COATED ORAL 2 TIMES DAILY
Qty: 60 TABLET | Refills: 1 | Status: SHIPPED | OUTPATIENT
Start: 2022-06-16 | End: 2022-07-08

## 2022-06-16 NOTE — TELEPHONE ENCOUNTER
Fax received from: [id number] [place]  Refill authorization requested    Drug: Topiramate 100 mg tablet   Qty: 60.0  Last filled 05/19/22  Last seen 5/5/22  Next appt 7/8/22    Nita Carl MA

## 2022-06-17 ENCOUNTER — MYC MEDICAL ADVICE (OUTPATIENT)
Dept: OPTOMETRY | Facility: CLINIC | Age: 52
End: 2022-06-17
Payer: COMMERCIAL

## 2022-06-17 DIAGNOSIS — H20.9 IRITIS: ICD-10-CM

## 2022-06-20 RX ORDER — PREDNISOLONE ACETATE 10 MG/ML
SUSPENSION/ DROPS OPHTHALMIC
Qty: 5 ML | Refills: 1 | Status: SHIPPED | OUTPATIENT
Start: 2022-06-20 | End: 2023-06-13

## 2022-06-23 ENCOUNTER — OFFICE VISIT (OUTPATIENT)
Dept: OPHTHALMOLOGY | Facility: CLINIC | Age: 52
End: 2022-06-23
Attending: OPHTHALMOLOGY
Payer: COMMERCIAL

## 2022-06-23 ENCOUNTER — TELEPHONE (OUTPATIENT)
Dept: OPHTHALMOLOGY | Facility: CLINIC | Age: 52
End: 2022-06-23

## 2022-06-23 DIAGNOSIS — H57.13 EYE PAIN, BILATERAL: Primary | ICD-10-CM

## 2022-06-23 DIAGNOSIS — H02.889 MGD (MEIBOMIAN GLAND DYSFUNCTION): ICD-10-CM

## 2022-06-23 DIAGNOSIS — H04.123 BILATERAL DRY EYES: ICD-10-CM

## 2022-06-23 PROCEDURE — G0463 HOSPITAL OUTPT CLINIC VISIT: HCPCS

## 2022-06-23 PROCEDURE — 99213 OFFICE O/P EST LOW 20 MIN: CPT | Mod: GC | Performed by: STUDENT IN AN ORGANIZED HEALTH CARE EDUCATION/TRAINING PROGRAM

## 2022-06-23 ASSESSMENT — REFRACTION_WEARINGRX
OS_CYLINDER: SPHERE
OS_SPHERE: -2.75
OD_CYLINDER: +0.75
OD_AXIS: 155
OD_SPHERE: -3.50

## 2022-06-23 ASSESSMENT — CONF VISUAL FIELD
OS_NORMAL: 1
METHOD: COUNTING FINGERS
OD_NORMAL: 1

## 2022-06-23 ASSESSMENT — TONOMETRY
OS_IOP_MMHG: 19
IOP_METHOD: TONOPEN
OD_IOP_MMHG: 18

## 2022-06-23 ASSESSMENT — VISUAL ACUITY
OD_CC: 20/25
METHOD: SNELLEN - LINEAR
OS_CC+: -2
OS_CC: 20/20
CORRECTION_TYPE: GLASSES

## 2022-06-23 ASSESSMENT — CUP TO DISC RATIO
OS_RATIO: 0.4
OD_RATIO: 0.4

## 2022-06-23 ASSESSMENT — EXTERNAL EXAM - RIGHT EYE: OD_EXAM: NORMAL

## 2022-06-23 NOTE — TELEPHONE ENCOUNTER
[facility] Call Center    Phone Message    May a detailed message be left on voicemail: yes     Reason for Call: Symptoms or Concerns     If patient has red-flag symptoms, warm transfer to triage line    Current symptom or concern: Severe Lt eye pain, worsening, and going into nose and face.    Symptoms have been present for:  Few week(s)    Has patient previously been seen for this? Yes    By : Dr. Ocampo    Date: 4/26/22    Are there any new or worsening symptoms? Yes: Pain is worsening and spreading.      Action Taken: Message routed to:  [facility] EYE    Travel Screening: Not Applicable

## 2022-06-23 NOTE — TELEPHONE ENCOUNTER
The patient notes she has severe left eye pain.  She is leaving on vacation soon and she is concerned about the pain.  I was able to schedule her in the acute clinic today.

## 2022-06-23 NOTE — PATIENT INSTRUCTIONS
Ms. Celis,     Please follow up with our neuro-ophthalmologist for evaluation of your eye pain. You may also speak with your neurologist for possible pain control medications.     Please continue your eye drops as previously scheduled:  - Xiidra twice per day  - serum tears 2-3 times per day  - systeane ointment as needed when the lenses are out  - mucomyst 4-6 times per day both eyes  - pred healon 4x/day both eyes  - doxycycline 50 mg by mouth twice daily    Thank you for allowing us to participate in your care at the [facility].

## 2022-06-23 NOTE — NURSING NOTE
Chief Complaints and History of Present Illnesses   Patient presents with     Eye Pain Both Eyes     Bilateral dry eyes      Chief Complaint(s) and History of Present Illness(es)     Eye Pain Both Eyes     Comments: Bilateral dry eyes               Comments     C/o eye pain Left> right  states 8/10 in the left eye  Pt states the pain is also pain in face around the left eye  No flashes, floaters or nausea    Otilia Ocampo COT 11:22 AM June 23, 2022

## 2022-06-23 NOTE — PROGRESS NOTES
Continuity Clinic Note  Patient Name: Roxanna Celis  Patient : 1970  Today's Date: 2022    Technician Evaluation:       Chief Complaint(s) and History of Present Illness(es)     Eye Pain Both Eyes     Comments: Bilateral dry eyes               Comments     C/o eye pain Left> right  states 8/10 in the left eye  Pt states the pain is also pain in face around the left eye  No flashes, floaters or nausea    Otilia Ocampo COT 11:22 AM 2022        She presents today for pain in the left eye. She says it feels like there is something in the eye going straight across the eyelid. She says the pain is going up into her forehead, down into her cheek, and into her nose on the left side. She says ever since she had the lipiflow treatment  (pushed out all her plugs) she has been having lots of problems again. She had the lipiflow last 4 months ago and has had the plugs re-placed since then. She also has pain on the right side of her nose. She says anywhere she touches around her eye it hurts to the touch. She says last night the white part of her eye on the left side was all bloodshot. She denies any rashes, sinus infections, or cold symptoms.         Past Medical History:   Diagnosis Date     Cervical dystonia        Current Outpatient Medications   Medication     acetylcysteine (MUCOMYST) 10 % nebulizer solution     acetylcysteine 10 % (MUCOMYST) 10% SOLN     aspirin-acetaminophen-caffeine (EXCEDRIN MIGRAINE) 250-250-65 MG tablet     autologous serum compounded ophthalmic solution     BOTOX 100 units injection     buPROPion (WELLBUTRIN XL) 300 MG 24 hr tablet     buPROPion (WELLBUTRIN XL) 300 MG 24 hr tablet     clonazePAM (KLONOPIN) 0.5 MG tablet     clonazePAM (KLONOPIN) 1 MG tablet     COMPOUNDED NON-CONTROLLED SUBSTANCE (CMPD RX) - PHARMACY TO MIX COMPOUNDED MEDICATION     cyanocobalamin (VITAMIN B-12) 1000 MCG tablet     doxycycline monohydrate (ADOXA) 50 MG tablet     DULoxetine (CYMBALTA) 60  MG capsule     DULoxetine HCl (CYMBALTA PO)     EPINEPHrine (ANY BX GENERIC EQUIV) 0.3 MG/0.3ML injection 2-pack     EPINEPHrine (EPIPEN) 0.3 MG/0.3ML injection     ibuprofen (ADVIL/MOTRIN) 200 MG capsule     iloperidone (FANAPT) 6 MG TABS tablet     lifitegrast (XIIDRA) 5 % opthalmic solution     LINZESS 290 MCG capsule     methocarbamol (ROBAXIN) 500 MG tablet     multivitamin w/minerals (THERA-VIT-M) tablet     NALTREXONE HCL PO     ONABOTULINUMTOXINA IJ     prednisolone 0.25% and hyaluronate in balanced salt SUSP compounded ophthalmic suspension     prednisoLONE acetate (PRED FORTE) 1 % ophthalmic suspension     predniSONE (DELTASONE) 5 MG tablet     sodium chloride 0.9 % neb solution     sodium chloride 0.9 % neb solution     sodium chloride 0.9 % neb solution     Soft Lens Products (B&L SENSITIVE EYES) SOLN     topiramate (TOPAMAX) 100 MG tablet     tretinoin (RETIN-A) 0.025 % external cream     TYRVAYA 0.03 MG/ACT nasal spray     Vitamin D3 (CHOLECALCIFEROL) 25 mcg (1000 units) tablet     White Petrolatum-Mineral Oil (SYSTANE NIGHTTIME) OINT     Current Facility-Administered Medications   Medication     botulinum toxin type A (DYSPORT) injection 500 Units     botulinum toxin type A (DYSPORT) injection 500 Units       Base Eye Exam     Visual Acuity (Snellen - Linear)       Right Left    Dist cc 20/25 20/20 -2    Correction: Glasses          Tonometry (Tonopen, 11:26 AM)       Right Left    Pressure 18 19          Pupils       Pupils APD    Right PERRL None    Left PERRL None          Visual Fields (Counting fingers)       Left Right     Full Full          Extraocular Movement       Right Left     Full Full          Neuro/Psych     Oriented x3: Yes    Mood/Affect: Normal            Slit Lamp and Fundus Exam     External Exam       Right Left    External Normal Normal, no erythema, no rash          Slit Lamp Exam       Right Left    Lids/Lashes mild blepharitis, inspissated glands mild blepharitis, inspissated  glands    Conjunctiva/Sclera White and quiet White and quiet    Cornea Clear, no stain, quick TBUT Clear, no stain, quick TBUT    Anterior Chamber Deep and quiet Deep and quiet    Iris Round and reactive Round and reactive    Lens Clear Clear    Vitreous Normal Normal    Dye still in tear film 10 minutes after instilling drop.          Fundus Exam       Right Left    Disc Normal Normal    C/D Ratio 0.4 0.4            Refraction     Wearing Rx       Sphere Cylinder Axis    Right -3.50 +0.75 155    Left -2.75 Sphere                   Assessment & Plan     Roxanna Celis is a 51 year old female with the following diagnoses:   1. Eye pain, bilateral    2. Bilateral dry eyes    3. MGD (meibomian gland dysfunction)         # Dry eye syndrome, bilateral  - she has had chronic dry eye symptoms with minimal exam findings   - hx of intolerance to silicone plugs  - collagen plugs placed at last visit in 4/2022   - bilateral lower collagen plugs double stuff (0.4 mm 6 months)   - bilateral upper collagen plugs with single stuff (0.3 mm 6 months)   - presumably these are still in place as the dye did not disappear after 10 minutes of putting the drop in  - symptoms improved with custom scleral lenses  - last lipiflow was around 2/2022 she says  - today her pain is again out of proportion to exam    Plan:  - continue current therapy:   - warm compresses 2-3 times per day   - Xiidra BID each eye   - pred healon QID each eye   - serum tears 2-3 times per day   - systane ointment PRN   - mucomyst 4-6 times per day both eyes  - ok to stop prednisolone qDay  - medicamentosa may be contributing to symptoms (worth trying to decrease medications one at a time to see if symptoms worsen)  - continue scleral lenses during the day  - follow up with neuro-ophthalmology for evaluation of pain    Patient disposition:   Return for neuro ophthalmology with Dr. Parikh in the next 1-2 months.      Seen and discussed with Dr. Terrence Bledsoe,  MD  Ophthalmology Resident, PGY-2  [facility]      Attending Physician Attestation:  Complete documentation of historical and exam elements from today's encounter can be found in the full encounter summary report (not reduplicated in this progress note).  I personally obtained the chief complaint(s) and history of present illness.  I confirmed and edited as necessary the review of systems, past medical/surgical history, family history, social history, and examination findings as documented by others; and I examined the patient myself.  I personally reviewed the relevant tests, images, and reports as documented above.  I formulated and edited as necessary the assessment and plan and discussed the findings and management plan with the patient and family. . - Faheem Ocampo MD

## 2022-07-07 NOTE — PROGRESS NOTES
[facility]    Date of visit: 22         Assessment:  Roxanna Celis is a 51 year old with past medical history including: ELY, Obesity, HTN, SICCA symptoms, spasmodic torticollis, who presents for follow-up and treatment of the followin. Painful peripheral neuropathy: Patient reports gradual onset of paresthesias, dysesthesias and burning/tingling type pain around 2019 that began in the periphery and spread proximally in their upper and lower extremities. Currently they describe paresthesias and pain in their entire bilateral upper and lower extremities. They also note intermittent subjective weakness. Neurological exam of the upper and lower extremities has been normal, previously they've noted mild sensory loss in the medial aspect of their right foot. Strength and reflexes were symmetric and normal. Upper extremity EMG was previously normal. Cervical, thoracic and lumbar MRI completed, no pathology to explain their current symptoms. Seen by Dr. Torres and will have a small fiber nerve biopsy completed which was borderline low in the upper extremities but normal in the legs. They had noted some improvement since titrating low dose naltrexone and topamax but now report side effects.     2. Chronic Myofascial Pain: Patient meets the diagnostic criteria for fibromyalgia based on history and exam. Prior work-up has been unrevealing for a neurologic or rheumatologic cause for their symptoms. Discussed the pathophysiology of fibromyalgia and overlap with their neuropathic symptoms as well as how fibromyalgia may exacerbate an underlying peripheral neuropathy and arthritic pain. Again discussed concepts of central and peripheral sensitization and how this may be impacting their symptoms. They have had improvement while working with pain PT and phd, will continue this.     Mental Health - the patient's mental health concerns, specifically anxiety,  depression, affect her experience of pain and contribute to her clinically significant distress.        Plan:  The following recommendations were given to the patient. Diagnosis, treatment options, risks, benefits, and alternatives were discussed, and all questions were answered. The patient expressed understanding of the plan for management.      I am recommending a multidisciplinary treatment plan to help this patient better manage her pain.  This includes:      1. Physical Therapy: Continue chronic pain PT.  2. Clinical Health Pain Psychologist: Continue pain Phd  1. Continue working with primary psychiatry services.  2. Therapy can help reduce physical and psychosocial triggers or reinforcers of pain by adapting thoughts, feelings and behaviors to reduce symptoms and increase quality of life.  Evidence indicates that the practice of relaxation, meditation, and mindfulness techniques can significantly affect pain levels and overall well being.  3. Self Care Recommendations: Gentle progressive exercise that does not increase pain - gradually increase daily walking program.  Take mini breaks - 5 minutes of mindfullness a couple times a day.   4. Diagnostic Studies: none  5. Medication Management:  1. Stopped low dose naltrexone - possibly causing diarrhea  2. Ok to stop topamax - possibly causing diarrhea.  3. Continue methocarbamol 500mg BID prn  4. Continue medical cannabis.  5. Continue cymbalta.      6. Further procedures recommended: none  7. Follow up: 2-3 months, will transfer care.        Yasmany Rivera [facility]          Chief complaint:   Chief Complaint   Patient presents with     Pain       Interval history:  Roxanna Celis is a 51 year old female last seen by me on 5/5/22.        Since her last visit, Roxanna Celis reports:    -They were having constant diarrhea when taking low dose naltrexone and topamax, this was going on for weeks so they stopped both medications. They  even had to wear depends during this time.    -They started medical cannabis, no benefit yet. They are working with the pharmacist on trying different formulations. They were started on just CBD oral tincture, now they're getting formulations with 2mg of CBD twice daily. They started THC lozenges this past week at night time only.    -no side effects from cbd or thc.    -They saw Trudy couple times and this went good. They are learning concepts on accepting the pain and how to live with the pain. They are trying to stay positive that this will be beneficial.    -They've been working with Taisha and incorporating the concepts they've been working on into their daily routine.        Pain scores:  Pain intensity on average is 7 on a scale of 0-10.     Pain Treatments:  1. Medications:       Current pain medications:    -Bupropion 300mg daily  -Cymbalta 120mg daily        Prior pain medications:   -Gabapentin 1200mg at night- drowsy with higher dose   -Lyrica 100mg BID  -Low dose naltrexone 3mg daily-diarrhea   -Topamax 100mg BID-diarrhea   -Low dose naltrexone 5mg - affected mood  2. Physical Therapy: Doing pool PT currently              TENS unit: hasn't tried  3. Pain psychology: hasn't tried  4. Surgery: hasn't tried  5. Injections: none  6. Alternative Therapies:               Chiropractic: hasn't tried               Acupuncture: hasn't tried       Side Effects: no side effect    Medications:  Current Outpatient Medications   Medication Sig Dispense Refill     acetylcysteine (MUCOMYST) 10 % nebulizer solution Apply 2 drops to eye       acetylcysteine 10 % (MUCOMYST) 10% SOLN Place 2 drops into both eyes 6 times daily 30 mL 2     aspirin-acetaminophen-caffeine (EXCEDRIN MIGRAINE) 250-250-65 MG tablet Take 1 tablet by mouth every 6 hours as needed       autologous serum compounded ophthalmic solution Place 1 drop into both eyes every 2 hours (while awake)       BOTOX 100 units injection Inject 250 Units into the  muscle once Lot # /[id number] with Expiration Date:  11/2020       buPROPion (WELLBUTRIN XL) 300 MG 24 hr tablet Take 300 mg by mouth       buPROPion (WELLBUTRIN XL) 300 MG 24 hr tablet Take 300 mg by mouth every morning       clonazePAM (KLONOPIN) 0.5 MG tablet TAKE 2 TABLETS BY MOUTH AT BEDTIME       clonazePAM (KLONOPIN) 1 MG tablet Take 1 mg by mouth 2 times daily as needed       COMPOUNDED NON-CONTROLLED SUBSTANCE (CMPD RX) - PHARMACY TO MIX COMPOUNDED MEDICATION Take 2 mg by mouth daily Low dose naltrexone 2mg per capsule, to be taken by mouth every day. 30 capsule 0     cyanocobalamin (VITAMIN B-12) 1000 MCG tablet Take 1,000 mcg by mouth daily       doxycycline monohydrate (ADOXA) 50 MG tablet Take 1 tablet (50 mg) by mouth 2 times daily 180 tablet 3     DULoxetine (CYMBALTA) 60 MG capsule TAKE 2 CAPSULES BY MOUTH EVERY DAY       DULoxetine HCl (CYMBALTA PO) Take 60 mg by mouth       EPINEPHrine (ANY BX GENERIC EQUIV) 0.3 MG/0.3ML injection 2-pack        EPINEPHrine (EPIPEN) 0.3 MG/0.3ML injection Inject 0.3 mg into the muscle once as needed        ibuprofen (ADVIL/MOTRIN) 200 MG capsule Take 200 mg by mouth every 4 hours as needed.       iloperidone (FANAPT) 6 MG TABS tablet Take 4 mg by mouth daily        lifitegrast (XIIDRA) 5 % opthalmic solution Place 1 drop into both eyes 2 times daily 180 each 1     LINZESS 290 MCG capsule TAKE 1 CAPSULE BY MOUTH EVERY DAY  3     methocarbamol (ROBAXIN) 500 MG tablet Take 1 tablet (500 mg) by mouth 2 times daily as needed for muscle spasms 60 tablet 0     multivitamin w/minerals (THERA-VIT-M) tablet Take 1 tablet by mouth daily       NALTREXONE HCL PO Take 3 mg by mouth daily       ONABOTULINUMTOXINA IJ Inject 250 Units as directed once LOT # /[id number]  EXPIRATION: 03/2020       prednisolone 0.25% and hyaluronate in balanced salt SUSP compounded ophthalmic suspension Place 1 Drop into both eyes 4 times daily. Discard bottle 15 days after opening. Refrigerate. Unopened  bottle expires:     .       . 15 mL 11     prednisoLONE acetate (PRED FORTE) 1 % ophthalmic suspension Place 1 drop into both eyes 3 times daily for 7 days, THEN 1 drop 2 times daily for 7 days, THEN 1 drop daily for 7 days. 5 mL 1     predniSONE (DELTASONE) 5 MG tablet 4tab=20 mg qd x 7 days, 3tab=15 mg qd x 5 days, 2tab=10 mg qd 100 tablet 2     sodium chloride 0.9 % neb solution Apply 5 mLs to eye       sodium chloride 0.9 % neb solution 5 mLs by Other route 6 times daily Medically necessary for scleral contact lens use. May use 5ml vials 6x/day for contact lens use 3000 mL 11     sodium chloride 0.9 % neb solution Medically necessary for scleral contact lens use. May use 3 ml 6 times a day for contact lens use 300 mL 11     Soft Lens Products (B&L SENSITIVE EYES) SOLN Apply 1 drop to eye       topiramate (TOPAMAX) 100 MG tablet Take 1 tablet (100 mg) by mouth 2 times daily 60 tablet 1     tretinoin (RETIN-A) 0.025 % external cream Apply a pea size to entire face QD 45 g 11     TYRVAYA 0.03 MG/ACT nasal spray USE 1 SPRAY IN EACH NOSTRIL TWICE DAILY.       Vitamin D3 (CHOLECALCIFEROL) 25 mcg (1000 units) tablet Take by mouth daily       White Petrolatum-Mineral Oil (SYSTANE NIGHTTIME) OINT Apply 3.5 g to eye 4 times daily as needed (dry eyes) 7 g 11       Medical History: any changes in medical history since they were last seen? No    Review of Systems:  Positive for: neck pain, back pain, joint pain, paresthesias, numbness    Mood: depression    Physical Exam:  Blood pressure 129/83, pulse 81, SpO2 98 %, not currently breastfeeding.  General:  NAD, pleasant  Gait:  Slow gait, antalgic. Uses a SEC.  No specific gait deviations noted.  No significant swelling or erythema noted in their hands, wrists or feet. Strength is 5/5 and symmetric in the lower extremities.      BILLING TIME DOCUMENTATION:   The total TIME spent on this patient on the date of the encounter/appointment was 30 minutes.      TOTAL TIME includes:    Time spent preparing to see the patient (reviewing records and tests)   Time spent face to face (or over the phone) with the patient   Time spent ordering tests, medications, procedures and referrals   Time spent Referring and communicating with other healthcare professionals   Time spent documenting clinical information in Epic       Yasmany Rivera DO  [facility]

## 2022-07-08 ENCOUNTER — OFFICE VISIT (OUTPATIENT)
Dept: PALLIATIVE MEDICINE | Facility: CLINIC | Age: 52
End: 2022-07-08
Payer: COMMERCIAL

## 2022-07-08 VITALS — DIASTOLIC BLOOD PRESSURE: 83 MMHG | OXYGEN SATURATION: 98 % | HEART RATE: 81 BPM | SYSTOLIC BLOOD PRESSURE: 129 MMHG

## 2022-07-08 DIAGNOSIS — G89.29 CHRONIC MYOFASCIAL PAIN: Primary | ICD-10-CM

## 2022-07-08 DIAGNOSIS — M79.18 CHRONIC MYOFASCIAL PAIN: Primary | ICD-10-CM

## 2022-07-08 DIAGNOSIS — F41.1 GAD (GENERALIZED ANXIETY DISORDER): ICD-10-CM

## 2022-07-08 DIAGNOSIS — M79.2 NEUROPATHIC PAIN: ICD-10-CM

## 2022-07-08 PROCEDURE — 99214 OFFICE O/P EST MOD 30 MIN: CPT | Performed by: PHYSICAL MEDICINE & REHABILITATION

## 2022-07-08 ASSESSMENT — PAIN SCALES - GENERAL: PAINLEVEL: SEVERE PAIN (7)

## 2022-07-08 NOTE — PATIENT INSTRUCTIONS
Continue working with Miryam.  Schedule a follow up in 3 months, you'll be scheduled with one of my colleagues.    Take care,    Yasmany Rievra,   [facility]      ----------------------------------------------------------------  Clinic Number:  [phone]   Call with any questions about your care and for scheduling assistance.   Calls are returned Monday through Friday between 8 AM and 4:30 PM. We usually get back to you within 2 business days depending on the issue/request.    If we are prescribing your medications:  For opioid medication refills, call the clinic or send a Jigsaw message 7 days in advance.  Please include:  Name of requested medication  Name of the pharmacy.  For non-opioid medications, call your pharmacy directly to request a refill. Please allow 3-4 days to be processed.   Per [place] State Law:  All controlled substance prescriptions must be filled within 30 days of being written.    For those controlled substances allowing refills, pickup must occur within 30 days of last fill.      We believe regular attendance is key to your success in our program!    Any time you are unable to keep your appointment we ask that you call us at least 24 hours in advance to cancel.This will allow us to offer the appointment time to another patient.   Multiple missed appointments may lead to dismissal from the clinic.

## 2022-07-10 PROBLEM — R27.8 CLUMSINESS: Status: RESOLVED | Noted: 2021-11-26 | Resolved: 2022-07-10

## 2022-07-10 PROBLEM — R29.898 HAND WEAKNESS: Status: RESOLVED | Noted: 2021-11-26 | Resolved: 2022-07-10

## 2022-07-12 ENCOUNTER — VIRTUAL VISIT (OUTPATIENT)
Dept: PALLIATIVE MEDICINE | Facility: CLINIC | Age: 52
End: 2022-07-12
Payer: COMMERCIAL

## 2022-07-12 DIAGNOSIS — M79.2 NEUROPATHIC PAIN: ICD-10-CM

## 2022-07-12 DIAGNOSIS — M79.18 CHRONIC MYOFASCIAL PAIN: Primary | ICD-10-CM

## 2022-07-12 DIAGNOSIS — G89.29 CHRONIC MYOFASCIAL PAIN: Primary | ICD-10-CM

## 2022-07-12 PROCEDURE — 96158 HLTH BHV IVNTJ INDIV 1ST 30: CPT | Mod: GT | Performed by: PSYCHOLOGIST

## 2022-07-12 PROCEDURE — 96159 HLTH BHV IVNTJ INDIV EA ADDL: CPT | Mod: GT | Performed by: PSYCHOLOGIST

## 2022-07-12 NOTE — PROGRESS NOTES
"Roxanna Celis is a 51 year old female who is being evaluated via a billable telephone visit.      The patient has been notified of following:     \"This telephone visit will be conducted via a call between you and Taisha Graham PsyD LP. We have found that certain health care needs can be provided without the need for an in-person session.  This service lets us provide the care you need with a phone conversation.       If during the course of the call I feel a telephone visit is not appropriate, you will not be charged for this service.\"      Reviewed that patient is in a quiet private place, no recording without permission, all apps and notifications of any devices are turned off. Began the session by talking about the risks and benefits of telehealth, what to do if there is a break in the connection, reviewed the safety protocol for during and after sessions. The purpose of using telehealth for this session was due to the COVID-19 pandemic and was to reduce the spread of the disease and protect both the clinician and client.             Patient has given verbal consent for telephone visit? YES    Phone call contact time  Call Started 2:04 PM - called patient after she did not respond to text message, reports issues with her computer  Call Ended at 2:42 PM  Total 38 minutes     Pain Diagnoses per pain provider:   Chronic myofascial pain     Neuropathic pain    DATA: During today's visit you reported the following: Managing your pain 'has been hard'. Your mood is fairly stable despite worry about father, pain. Your activity level has been more variable. Your stress level is fairly stable - financial stress is only stress, worry about niece, father. Your sleep is disrupted by distressing dreams - don't recall them once awakening. You reported engaging in self-care for your pain 2-3 things daily.    You identified that you would like to focus on the following or had questions regarding the following issues or " concerns, and we discussed the following:   - family trip - father ended up ill from tick bite  - over-engaged while helping father at [employer] - had to use ice two days to manage before you could comfortably walk   - still working with pharmacist on proper medical cannabis dosing - no benefit yet  - niece not able to provide help with house right now  - working on travel goals - 'I'd prefer to make memories'  - still exploring sensory soothing skills  - looking forward to resuming PT after a long break    ASSESSMENT: Pain is higher likely due to medication changes due to side effects as well as stress. She seems to have a good sense of slowing down her pace and adjusting expectations for herself.    PLAN:   Your next appointment is scheduled for 7/27 at 2:00 PM.  Assignment/Objectives /interventions for next session:   - continue to work with pharmacist regarding right dosing for medical cannabis  - continue to pace yourself based on how you feel    We believe regular attendance is key to your success in our program!      Any time you are unable to keep your appointment we ask that you call us at [phone] at least 24 hours in advance to cancel.This will allow us to offer the appointment time to another patient.     Multiple missed appointments may lead to dismissal from the clinic.    Telephone-Visit Details    Type of service:  Telephone Visit    Originating Location (pt. Location): Home    Distant Location (provider location):  [facility]     Mode of Communication:  Telephone    I have reviewed the note as documented above.  This accurately captures the substance of my conversation with the patient.    Taisha Graham PsyD LP  Licensed Psychologist  Outpatient Clinic Therapist  [facility]    Disclaimer: This note consists of symbols derived from keyboarding, dictation and/or voice recognition software. As a result, there may be errors in the script that have gone  undetected. Please consider this when interpreting information found in this chart.

## 2022-07-26 ENCOUNTER — THERAPY VISIT (OUTPATIENT)
Dept: PHYSICAL THERAPY | Facility: CLINIC | Age: 52
End: 2022-07-26
Payer: COMMERCIAL

## 2022-07-26 DIAGNOSIS — M79.2 NEUROPATHIC PAIN: ICD-10-CM

## 2022-07-26 DIAGNOSIS — G89.29 CHRONIC MYOFASCIAL PAIN: Primary | ICD-10-CM

## 2022-07-26 DIAGNOSIS — M79.18 CHRONIC MYOFASCIAL PAIN: Primary | ICD-10-CM

## 2022-07-26 PROCEDURE — 97112 NEUROMUSCULAR REEDUCATION: CPT | Mod: GP | Performed by: PHYSICAL THERAPIST

## 2022-07-26 PROCEDURE — 97110 THERAPEUTIC EXERCISES: CPT | Mod: GP | Performed by: PHYSICAL THERAPIST

## 2022-08-02 ENCOUNTER — THERAPY VISIT (OUTPATIENT)
Dept: PHYSICAL THERAPY | Facility: CLINIC | Age: 52
End: 2022-08-02
Payer: COMMERCIAL

## 2022-08-02 DIAGNOSIS — G89.29 CHRONIC MYOFASCIAL PAIN: Primary | ICD-10-CM

## 2022-08-02 DIAGNOSIS — M79.18 CHRONIC MYOFASCIAL PAIN: Primary | ICD-10-CM

## 2022-08-02 DIAGNOSIS — M79.2 NEUROPATHIC PAIN: ICD-10-CM

## 2022-08-02 PROCEDURE — 97112 NEUROMUSCULAR REEDUCATION: CPT | Mod: GP | Performed by: PHYSICAL THERAPIST

## 2022-08-02 PROCEDURE — 97110 THERAPEUTIC EXERCISES: CPT | Mod: GP | Performed by: PHYSICAL THERAPIST

## 2022-08-02 NOTE — PROGRESS NOTES
Subjective:  HPI  Physical Exam                    Objective:  System    Physical Exam    General     ROS    Assessment/Plan:    PROGRESS  REPORT    Progress reporting period is from 5-17-22 to 8-2-22.       SUBJECTIVE  Subjective: Feels a little overwhelmed by her pain.  Finds comfort in her dog, Maryan, and goes for walks with her dog that feels calming.  Felt better by end of session nury with focus on her breathing.    Current Pain level:  (7-9//10).      Initial Pain level: 8/10.   Changes in function:  Yes (See Goal flowsheet attached for changes in current functional level)  Adverse reaction to treatment or activity: None    OBJECTIVE  Changes noted in objective findings:  The objective findings below are from DOS 8-2-22.  Objective: Lumbar flex 25%, ext 5%.   Neck rotation L 50%, R 25%.  Gait exhibits mild improvement with increase hip ext and more equal step length.     ASSESSMENT/PLAN  Updated problem list and treatment plan: Diagnosis 1:  Chronic myofascial pain  Pain -  self management, education and home program  Decreased ROM/flexibility - manual therapy, therapeutic exercise and home program  Decreased strength - therapeutic exercise, therapeutic activities and home program  Impaired gait - gait training and home program  Impaired muscle performance - neuro re-education and home program  Decreased function - therapeutic activities and home program  Impaired posture - neuro re-education and home program  Diagnosis 2:  Neuropathic pain   Pain -  self management, education and home program  Decreased ROM/flexibility - manual therapy, therapeutic exercise and home program  Decreased strength - therapeutic exercise, therapeutic activities and home program  Impaired gait - gait training and home program  Impaired muscle performance - neuro re-education and home program  Decreased function - therapeutic activities and home program  Impaired  STG/LTGs have been met or progress has been made towards goals:  Yes  (See Goal flow sheet completed today.)  Assessment of Progress: The patient's condition is improving slowly.  The patient's condition has potential to improve.  Self Management Plans:  Patient has been instructed in a home treatment program.  Patient  has been instructed in self management of symptoms.  I have re-evaluated this patient and find that the nature, scope, duration and intensity of the therapy is appropriate for the medical condition of the patient.  Roxanna continues to require the following intervention to meet STG and LTG's:  PT    Recommendations:  This patient would benefit from continued therapy.     Frequency:  1 X week, once daily  Duration:  for 4 weeks tapering to 2 X a month over 2 months        Please refer to the daily flowsheet for treatment today, total treatment time and time spent performing 1:1 timed codes.

## 2022-08-25 ENCOUNTER — OFFICE VISIT (OUTPATIENT)
Dept: PHYSICAL MEDICINE AND REHAB | Facility: CLINIC | Age: 52
End: 2022-08-25
Payer: COMMERCIAL

## 2022-08-25 VITALS — DIASTOLIC BLOOD PRESSURE: 80 MMHG | HEART RATE: 85 BPM | SYSTOLIC BLOOD PRESSURE: 126 MMHG

## 2022-08-25 DIAGNOSIS — G24.3 CERVICAL DYSTONIA: Primary | ICD-10-CM

## 2022-08-25 PROCEDURE — 96372 THER/PROPH/DIAG INJ SC/IM: CPT | Performed by: PHYSICAL MEDICINE & REHABILITATION

## 2022-08-25 PROCEDURE — 64616 CHEMODENERV MUSC NECK DYSTON: CPT | Mod: 59 | Performed by: PHYSICAL MEDICINE & REHABILITATION

## 2022-08-25 PROCEDURE — 95874 GUIDE NERV DESTR NEEDLE EMG: CPT | Performed by: PHYSICAL MEDICINE & REHABILITATION

## 2022-08-25 NOTE — LETTER
8/25/2022         RE: Roxanna Celis  [id number] [place] [address]        Dear Colleague,    Thank you for referring your patient, Roxanna Celis, to the [facility]. Please see a copy of my visit note below.      [facility]     PM&R CLINIC NOTE  BOTULINUM TOXIN PROCEDURE      HPI  Chief Complaint   Patient presents with     Botox     Roxanna Celis is a 51 year old female with a history of cervical dystonia who presents to clinic for botulinum toxin injections for management of involuntary muscle spasms and neck pain secondary to her cervical dystonia.     SINCE LAST VISIT  Roxanna Celis was last seen here in clinic on 5/20/2022, at which time she received 500 units of Dysport.    Patient denies new medical diagnoses, illnesses, hospitalizations, emergency room visits, and injuries since the previous injection with botulinum neurotoxin.    RESPONSE TO PREVIOUS TREATMENT    Side effects: Severe posterior neck muscle weakness, which lasted approximately 6 weeks followed by full resolution. She states that her head felt heavy and it felt like her neck and head were fighting each other. It was hard for her to even get out of bed due to the weakness.     Pain Improvement: Yes. Even when the weakness was occurring, she had pain improvement in her scalp at the base of the skull, but pain in her neck was worse due to the weakness.    Dystonia Improvement: Difficult to quantify due to the fact that she experienced significant weakness in her neck.       PHYSICAL EXAM  VS: /80 (BP Location: Right arm, Patient Position: Sitting)   Pulse 85    GEN: Pleasant and cooperative, in no acute distress  HEENT: No facial asymmetry  NECK AND TRUNK PATTERN:   Head Tremor: Pt states tremor is intermittent   Left Side-bending: Present but patient states she feels she has more right side bending now  Left Shoulder Elevation: slightly elevated  Head with  right shift  Focalized pain at occipitalis - has pain today during visit    ALLERGIES  Allergies   Allergen Reactions     Contrast Dye Anaphylaxis     Other reaction(s): almost  from it     Diagnostic X-Ray Materials Anaphylaxis     IVP DYE     Lisinopril Anaphylaxis     Lisinopril-Hctz Anaphylaxis     Maple Flavor Anaphylaxis     MAPLE SYRUP  MAPLE SYRUP  MAPLE SYRUP     Maple Tree Anaphylaxis     Allergy includes maple syrup.     No Clinical Screening - See Comments      maple syrup==anaphylaxis  Dairy- causes to not feel well     Gluten Itching and Swelling     Gluten Meal Itching and Swelling     Cramping     [place] Meal Unknown     Chamomile Unknown     Codeine Sulfate Cramps and GI Disturbance     Food Unknown     Grapes, almond, lactose, gluten, real maple syrup     Nsaids Other (See Comments) and Unknown     Patient had bariatric surgery. Should not ever take NSAIDS due to high risk for gastric ulcers.   Patient had bariatric surgery. Should not ever take NSAIDS due to high risk for gastric ulcers.      Polyethylene Glycol Unknown     Per allergy testing  Per allergy testing       Erythromycin Nausea and Vomiting, Cramps, Diarrhea and Nausea     PN: LW Reaction: stomach upset  cramping     Lactose Diarrhea     Morphine Other (See Comments)       CURRENT MEDICATIONS    Current Outpatient Medications:      acetylcysteine (MUCOMYST) 10 % nebulizer solution, Apply 2 drops to eye, Disp: , Rfl:      acetylcysteine 10 % (MUCOMYST) 10% SOLN, Place 2 drops into both eyes 6 times daily, Disp: 30 mL, Rfl: 2     aspirin-acetaminophen-caffeine (EXCEDRIN MIGRAINE) 250-250-65 MG tablet, Take 1 tablet by mouth every 6 hours as needed, Disp: , Rfl:      autologous serum compounded ophthalmic solution, Place 1 drop into both eyes every 2 hours (while awake), Disp: , Rfl:      BOTOX 100 units injection, Inject 250 Units into the muscle once Lot # /C3 with Expiration Date:  2020, Disp: , Rfl:      buPROPion  (WELLBUTRIN XL) 300 MG 24 hr tablet, Take 300 mg by mouth every morning, Disp: , Rfl:      clonazePAM (KLONOPIN) 1 MG tablet, Take 1 mg by mouth 2 times daily as needed, Disp: , Rfl:      cyanocobalamin (VITAMIN B-12) 1000 MCG tablet, Take 1,000 mcg by mouth daily, Disp: , Rfl:      doxycycline monohydrate (ADOXA) 50 MG tablet, Take 1 tablet (50 mg) by mouth 2 times daily, Disp: 180 tablet, Rfl: 3     DULoxetine (CYMBALTA) 60 MG capsule, TAKE 2 CAPSULES BY MOUTH EVERY DAY, Disp: , Rfl:      EPINEPHrine (ANY BX GENERIC EQUIV) 0.3 MG/0.3ML injection 2-pack, , Disp: , Rfl:      ibuprofen (ADVIL/MOTRIN) 200 MG capsule, Take 200 mg by mouth every 4 hours as needed., Disp: , Rfl:      iloperidone (FANAPT) 6 MG TABS tablet, Take 4 mg by mouth daily , Disp: , Rfl:      lifitegrast (XIIDRA) 5 % opthalmic solution, Place 1 drop into both eyes 2 times daily, Disp: 180 each, Rfl: 1     LINZESS 290 MCG capsule, TAKE 1 CAPSULE BY MOUTH EVERY DAY, Disp: , Rfl: 3     medical cannabis (Patient's own supply), See Admin Instructions (The purpose of this order is to document that the patient reports taking medical cannabis.  This is not a prescription, and is not used to certify that the patient has a qualifying medical condition.), Disp: , Rfl:      multivitamin w/minerals (THERA-VIT-M) tablet, Take 1 tablet by mouth daily, Disp: , Rfl:      prednisolone 0.25% and hyaluronate in balanced salt SUSP compounded ophthalmic suspension, Place 1 Drop into both eyes 4 times daily. Discard bottle 15 days after opening. Refrigerate. Unopened bottle expires:     .       ., Disp: 15 mL, Rfl: 11     sodium chloride 0.9 % neb solution, Medically necessary for scleral contact lens use. May use 3 ml 6 times a day for contact lens use, Disp: 300 mL, Rfl: 11     Soft Lens Products (B&L SENSITIVE EYES) SOLN, Apply 1 drop to eye, Disp: , Rfl:      tretinoin (RETIN-A) 0.025 % external cream, Apply a pea size to entire face QD, Disp: 45 g, Rfl: 11      TYRVAYA 0.03 MG/ACT nasal spray, USE 1 SPRAY IN EACH NOSTRIL TWICE DAILY., Disp: , Rfl:      Vitamin D3 (CHOLECALCIFEROL) 25 mcg (1000 units) tablet, Take by mouth daily, Disp: , Rfl:      White Petrolatum-Mineral Oil (SYSTANE NIGHTTIME) OINT, Apply 3.5 g to eye 4 times daily as needed (dry eyes), Disp: 7 g, Rfl: 11     buPROPion (WELLBUTRIN XL) 300 MG 24 hr tablet, Take 300 mg by mouth, Disp: , Rfl:      clonazePAM (KLONOPIN) 0.5 MG tablet, TAKE 2 TABLETS BY MOUTH AT BEDTIME (Patient not taking: Reported on 8/25/2022), Disp: , Rfl:      COMPOUNDED NON-CONTROLLED SUBSTANCE (CMPD RX) - PHARMACY TO MIX COMPOUNDED MEDICATION, Take 2 mg by mouth daily Low dose naltrexone 2mg per capsule, to be taken by mouth every day. (Patient not taking: No sig reported), Disp: 30 capsule, Rfl: 0     DULoxetine HCl (CYMBALTA PO), Take 60 mg by mouth, Disp: , Rfl:      EPINEPHrine (EPIPEN) 0.3 MG/0.3ML injection, Inject 0.3 mg into the muscle once as needed , Disp: , Rfl:      methocarbamol (ROBAXIN) 500 MG tablet, Take 1 tablet (500 mg) by mouth 2 times daily as needed for muscle spasms (Patient not taking: No sig reported), Disp: 60 tablet, Rfl: 0     NALTREXONE HCL PO, Take 3 mg by mouth daily (Patient not taking: Reported on 8/25/2022), Disp: , Rfl:      ONABOTULINUMTOXINA IJ, Inject 250 Units as directed once LOT # /C3 EXPIRATION: 03/2020, Disp: , Rfl:      prednisoLONE acetate (PRED FORTE) 1 % ophthalmic suspension, Place 1 drop into both eyes 3 times daily for 7 days, THEN 1 drop 2 times daily for 7 days, THEN 1 drop daily for 7 days., Disp: 5 mL, Rfl: 1     predniSONE (DELTASONE) 5 MG tablet, 4tab=20 mg qd x 7 days, 3tab=15 mg qd x 5 days, 2tab=10 mg qd (Patient not taking: Reported on 8/25/2022), Disp: 100 tablet, Rfl: 2     sodium chloride 0.9 % neb solution, Apply 5 mLs to eye, Disp: , Rfl:      sodium chloride 0.9 % neb solution, 5 mLs by Other route 6 times daily Medically necessary for scleral contact lens use. May  use 5ml vials 6x/day for contact lens use, Disp: 3000 mL, Rfl: 11    Current Facility-Administered Medications:      botulinum toxin type A (DYSPORT) injection 500 Units, 500 Units, Intramuscular, Q90 Days, America Gonzalez MD     botulinum toxin type A (DYSPORT) injection 500 Units, 500 Units, Intramuscular, Q90 Days, America Gonzalez MD, 500 Units at 22 1028       BOTULINUM NEUROTOXIN INJECTION PROCEDURES    VERIFICATION OF PATIENT IDENTIFICATION AND PROCEDURE     Initials   Patient Name SES   Patient  SES   Procedure Verified by: SES     Prior to the start of the procedure and with procedural staff participation, I verbally confirmed the patient s identity using two indicators, relevant allergies, that the procedure was appropriate and matched the consent or emergent situation, and that the correct equipment/implants were available. Immediately prior to starting the procedure I conducted the Time Out with the procedural staff and re-confirmed the patient s name, procedure, and site/side. (The Joint Commission universal protocol was followed.)  Yes    Sedation (Moderate or Deep): None    ABOVE ASSESSMENTS PERFORMED BY    America Gonzalez MD      INDICATIONS FOR PROCEDURES  Roxanna Celis is a 51 year old patient with involuntary muscle spasms and pain secondary to the diagnosis of cervical dystonia. Her baseline symptoms have been recalcitrant to oral medications and conservative therapy.  She is here today for reinjection with Dysport.    GOAL OF PROCEDURE  The goal of this procedure is to increase active range of motion, improve volitional motor control, decrease pain  and enhance functional independence.      TOTAL DOSE ADMINISTERED  Dose Administered:  400 units  Dysport (Botulinum Toxin Type A)        1:1 Dilution   Unavoidable Drug Waste: Yes  Amount of drug waste (mL): 100 units of Dysport.  Reason for waste:  Single use vial  Diluent Used:  Preservative Free Normal Saline  Total  Volume of Diluent Used:  4 ml  Lot # [id number] with Expiration Date:  03/31/2022  NDC #: Dysport 500u ([address]-63)      CONSENT  The risks, benefits, and treatment options were discussed with Roxanna Celis and she agreed to proceed.    Written consent was obtained by [facility].     EQUIPMENT USED  Needle-37mm stimulating/recording  Needle-30 gauge  EMG/NCS Machine    SKIN PREPARATION  Skin preparation was performed using an alcohol wipe.    GUIDANCE DESCRIPTION  Electro-myographic guidance was necessary throughout the procedure to accurately identify all areas of dystonic muscles while avoiding injection of non-dystonic muscles, neighboring nerves and nearby vascular structures.       AREA/MUSCLE INJECTED: 400 UNITS DYSPORT = TOTAL DOSE, 1:1 DILUTION  1. HEAD, NECK & SHOULDER GIRDLE MUSCLES: 400 UNITS DYSPORT = TOTAL DOSE   Right Rectus Capitis - (upper cervical) - 30 units of Dysport at 1 site/s.   Left Rectus Capitis - (upper cervical) - 30 units of Dysport at 1 site/s.     Right Splenius Cervicis - 50 units of Dysport at 1 site/s.   Left Splenius Cervicis - 20 units of Dysport at 1 site/s.    Right Levator Scapulae - 10 units of Dysport at 1 site/s.   Left Levator Scapulae - 40 units of Dysport at 1 site/s.    Right Sternocleidomastoid - 10 units of Dysport at 1 site/s.  Left Sternocleidomastoid - 10 units of Dysport at 1 site/s.    Right Occipitalis - 70 units of Dysport at 14 site/s.   Left Occipitails - 70 units of Dysport at 14 site/s.     Right Temporalis - 30 units of Dysport at 4 site/s.    Left Temporalis - 30 units of Dysport at 4 site/s.       RESPONSE TO PROCEDURE  Roxanna Celis tolerated the procedure well and there were no immediate complications. She was allowed to recover for an appropriate period of time and was discharged home in stable condition.    ASSESSMENT AND PLAN   1. Botulinum toxin injections: Dose of Dysport was decreased from 500 units of Dysport to 400 units of Dysport due to weakness which  was experienced with the last series of injections. Additionally, distribution was changed to avoid posterior neck muscles to prevent weakness. Patient will continue to monitor response and report at next appointment.   2. Referrals: None.   3. Follow up: Roxanna Celis was rescheduled for the next series of injections in 12 weeks, at which time we will evaluate response to today's injections. she may call the clinic prior with any questions or concerns prior to the next appointment.           Again, thank you for allowing me to participate in the care of your patient.        Sincerely,        America Gonzalez MD     Normal vision: sees adequately in most situations; can see medication labels, newsprint

## 2022-08-25 NOTE — PROGRESS NOTES
[facility]     PM&R CLINIC NOTE  BOTULINUM TOXIN PROCEDURE      HPI  Chief Complaint   Patient presents with     Botox     Roxanna Celis is a 51 year old female with a history of cervical dystonia who presents to clinic for botulinum toxin injections for management of involuntary muscle spasms and neck pain secondary to her cervical dystonia.     SINCE LAST VISIT  Roxanna Celis was last seen here in clinic on 2022, at which time she received 500 units of Dysport.    Patient denies new medical diagnoses, illnesses, hospitalizations, emergency room visits, and injuries since the previous injection with botulinum neurotoxin.    RESPONSE TO PREVIOUS TREATMENT    Side effects: Severe posterior neck muscle weakness, which lasted approximately 6 weeks followed by full resolution. She states that her head felt heavy and it felt like her neck and head were fighting each other. It was hard for her to even get out of bed due to the weakness.     Pain Improvement: Yes. Even when the weakness was occurring, she had pain improvement in her scalp at the base of the skull, but pain in her neck was worse due to the weakness.    Dystonia Improvement: Difficult to quantify due to the fact that she experienced significant weakness in her neck.       PHYSICAL EXAM  VS: /80 (BP Location: Right arm, Patient Position: Sitting)   Pulse 85    GEN: Pleasant and cooperative, in no acute distress  HEENT: No facial asymmetry  NECK AND TRUNK PATTERN:   Head Tremor: Pt states tremor is intermittent   Left Side-bending: Present but patient states she feels she has more right side bending now  Left Shoulder Elevation: slightly elevated  Head with right shift  Focalized pain at occipitalis - has pain today during visit    ALLERGIES  Allergies   Allergen Reactions     Contrast Dye Anaphylaxis     Other reaction(s): almost  from it     Diagnostic X-Ray Materials Anaphylaxis     IVP DYE      Lisinopril Anaphylaxis     Lisinopril-Hctz Anaphylaxis     Maple Flavor Anaphylaxis     MAPLE SYRUP  MAPLE SYRUP  MAPLE SYRUP     Maple Tree Anaphylaxis     Allergy includes maple syrup.     No Clinical Screening - See Comments      maple syrup==anaphylaxis  Dairy- causes to not feel well     Gluten Itching and Swelling     Gluten Meal Itching and Swelling     Cramping     [place] Meal Unknown     Chamomile Unknown     Codeine Sulfate Cramps and GI Disturbance     Food Unknown     Grapes, almond, lactose, gluten, real maple syrup     Nsaids Other (See Comments) and Unknown     Patient had bariatric surgery. Should not ever take NSAIDS due to high risk for gastric ulcers.   Patient had bariatric surgery. Should not ever take NSAIDS due to high risk for gastric ulcers.      Polyethylene Glycol Unknown     Per allergy testing  Per allergy testing       Erythromycin Nausea and Vomiting, Cramps, Diarrhea and Nausea     PN: LW Reaction: stomach upset  cramping     Lactose Diarrhea     Morphine Other (See Comments)       CURRENT MEDICATIONS    Current Outpatient Medications:      acetylcysteine (MUCOMYST) 10 % nebulizer solution, Apply 2 drops to eye, Disp: , Rfl:      acetylcysteine 10 % (MUCOMYST) 10% SOLN, Place 2 drops into both eyes 6 times daily, Disp: 30 mL, Rfl: 2     aspirin-acetaminophen-caffeine (EXCEDRIN MIGRAINE) 250-250-65 MG tablet, Take 1 tablet by mouth every 6 hours as needed, Disp: , Rfl:      autologous serum compounded ophthalmic solution, Place 1 drop into both eyes every 2 hours (while awake), Disp: , Rfl:      BOTOX 100 units injection, Inject 250 Units into the muscle once Lot # /C3 with Expiration Date:  11/2020, Disp: , Rfl:      buPROPion (WELLBUTRIN XL) 300 MG 24 hr tablet, Take 300 mg by mouth every morning, Disp: , Rfl:      clonazePAM (KLONOPIN) 1 MG tablet, Take 1 mg by mouth 2 times daily as needed, Disp: , Rfl:      cyanocobalamin (VITAMIN B-12) 1000 MCG tablet, Take 1,000 mcg by  mouth daily, Disp: , Rfl:      doxycycline monohydrate (ADOXA) 50 MG tablet, Take 1 tablet (50 mg) by mouth 2 times daily, Disp: 180 tablet, Rfl: 3     DULoxetine (CYMBALTA) 60 MG capsule, TAKE 2 CAPSULES BY MOUTH EVERY DAY, Disp: , Rfl:      EPINEPHrine (ANY BX GENERIC EQUIV) 0.3 MG/0.3ML injection 2-pack, , Disp: , Rfl:      ibuprofen (ADVIL/MOTRIN) 200 MG capsule, Take 200 mg by mouth every 4 hours as needed., Disp: , Rfl:      iloperidone (FANAPT) 6 MG TABS tablet, Take 4 mg by mouth daily , Disp: , Rfl:      lifitegrast (XIIDRA) 5 % opthalmic solution, Place 1 drop into both eyes 2 times daily, Disp: 180 each, Rfl: 1     LINZESS 290 MCG capsule, TAKE 1 CAPSULE BY MOUTH EVERY DAY, Disp: , Rfl: 3     medical cannabis (Patient's own supply), See Admin Instructions (The purpose of this order is to document that the patient reports taking medical cannabis.  This is not a prescription, and is not used to certify that the patient has a qualifying medical condition.), Disp: , Rfl:      multivitamin w/minerals (THERA-VIT-M) tablet, Take 1 tablet by mouth daily, Disp: , Rfl:      prednisolone 0.25% and hyaluronate in balanced salt SUSP compounded ophthalmic suspension, Place 1 Drop into both eyes 4 times daily. Discard bottle 15 days after opening. Refrigerate. Unopened bottle expires:     .       ., Disp: 15 mL, Rfl: 11     sodium chloride 0.9 % neb solution, Medically necessary for scleral contact lens use. May use 3 ml 6 times a day for contact lens use, Disp: 300 mL, Rfl: 11     Soft Lens Products (B&L SENSITIVE EYES) SOLN, Apply 1 drop to eye, Disp: , Rfl:      tretinoin (RETIN-A) 0.025 % external cream, Apply a pea size to entire face QD, Disp: 45 g, Rfl: 11     TYRVAYA 0.03 MG/ACT nasal spray, USE 1 SPRAY IN EACH NOSTRIL TWICE DAILY., Disp: , Rfl:      Vitamin D3 (CHOLECALCIFEROL) 25 mcg (1000 units) tablet, Take by mouth daily, Disp: , Rfl:      White Petrolatum-Mineral Oil (SYSTANE NIGHTTIME) OINT, Apply 3.5 g  to eye 4 times daily as needed (dry eyes), Disp: 7 g, Rfl: 11     buPROPion (WELLBUTRIN XL) 300 MG 24 hr tablet, Take 300 mg by mouth, Disp: , Rfl:      clonazePAM (KLONOPIN) 0.5 MG tablet, TAKE 2 TABLETS BY MOUTH AT BEDTIME (Patient not taking: Reported on 8/25/2022), Disp: , Rfl:      COMPOUNDED NON-CONTROLLED SUBSTANCE (CMPD RX) - PHARMACY TO MIX COMPOUNDED MEDICATION, Take 2 mg by mouth daily Low dose naltrexone 2mg per capsule, to be taken by mouth every day. (Patient not taking: No sig reported), Disp: 30 capsule, Rfl: 0     DULoxetine HCl (CYMBALTA PO), Take 60 mg by mouth, Disp: , Rfl:      EPINEPHrine (EPIPEN) 0.3 MG/0.3ML injection, Inject 0.3 mg into the muscle once as needed , Disp: , Rfl:      methocarbamol (ROBAXIN) 500 MG tablet, Take 1 tablet (500 mg) by mouth 2 times daily as needed for muscle spasms (Patient not taking: No sig reported), Disp: 60 tablet, Rfl: 0     NALTREXONE HCL PO, Take 3 mg by mouth daily (Patient not taking: Reported on 8/25/2022), Disp: , Rfl:      ONABOTULINUMTOXINA IJ, Inject 250 Units as directed once LOT # /C3 EXPIRATION: 03/2020, Disp: , Rfl:      prednisoLONE acetate (PRED FORTE) 1 % ophthalmic suspension, Place 1 drop into both eyes 3 times daily for 7 days, THEN 1 drop 2 times daily for 7 days, THEN 1 drop daily for 7 days., Disp: 5 mL, Rfl: 1     predniSONE (DELTASONE) 5 MG tablet, 4tab=20 mg qd x 7 days, 3tab=15 mg qd x 5 days, 2tab=10 mg qd (Patient not taking: Reported on 8/25/2022), Disp: 100 tablet, Rfl: 2     sodium chloride 0.9 % neb solution, Apply 5 mLs to eye, Disp: , Rfl:      sodium chloride 0.9 % neb solution, 5 mLs by Other route 6 times daily Medically necessary for scleral contact lens use. May use 5ml vials 6x/day for contact lens use, Disp: 3000 mL, Rfl: 11    Current Facility-Administered Medications:      botulinum toxin type A (DYSPORT) injection 500 Units, 500 Units, Intramuscular, Q90 Days, Standal, America Jack MD     botulinum toxin  type A (DYSPORT) injection 500 Units, 500 Units, Intramuscular, Q90 Days, America Gonzalez MD, 500 Units at 22 1028       BOTULINUM NEUROTOXIN INJECTION PROCEDURES    VERIFICATION OF PATIENT IDENTIFICATION AND PROCEDURE     Initials   Patient Name SES   Patient  SES   Procedure Verified by: SES     Prior to the start of the procedure and with procedural staff participation, I verbally confirmed the patient s identity using two indicators, relevant allergies, that the procedure was appropriate and matched the consent or emergent situation, and that the correct equipment/implants were available. Immediately prior to starting the procedure I conducted the Time Out with the procedural staff and re-confirmed the patient s name, procedure, and site/side. (The Joint Commission universal protocol was followed.)  Yes    Sedation (Moderate or Deep): None    ABOVE ASSESSMENTS PERFORMED BY    America Gonzalez MD      INDICATIONS FOR PROCEDURES  Roxanna Celis is a 51 year old patient with involuntary muscle spasms and pain secondary to the diagnosis of cervical dystonia. Her baseline symptoms have been recalcitrant to oral medications and conservative therapy.  She is here today for reinjection with Dysport.    GOAL OF PROCEDURE  The goal of this procedure is to increase active range of motion, improve volitional motor control, decrease pain  and enhance functional independence.      TOTAL DOSE ADMINISTERED  Dose Administered:  400 units  Dysport (Botulinum Toxin Type A)        1:1 Dilution   Unavoidable Drug Waste: Yes  Amount of drug waste (mL): 100 units of Dysport.  Reason for waste:  Single use vial  Diluent Used:  Preservative Free Normal Saline  Total Volume of Diluent Used:  4 ml  Lot # [id number] with Expiration Date:  2022  NDC #: Dysport 500u ([address]-59)      CONSENT  The risks, benefits, and treatment options were discussed with Roxanna Celis and she agreed to proceed.    Written consent was  obtained by KIM.     EQUIPMENT USED  Needle-37mm stimulating/recording  Needle-30 gauge  EMG/NCS Machine    SKIN PREPARATION  Skin preparation was performed using an alcohol wipe.    GUIDANCE DESCRIPTION  Electro-myographic guidance was necessary throughout the procedure to accurately identify all areas of dystonic muscles while avoiding injection of non-dystonic muscles, neighboring nerves and nearby vascular structures.       AREA/MUSCLE INJECTED: 400 UNITS DYSPORT = TOTAL DOSE, 1:1 DILUTION  1. HEAD, NECK & SHOULDER GIRDLE MUSCLES: 400 UNITS DYSPORT = TOTAL DOSE   Right Rectus Capitis - (upper cervical) - 30 units of Dysport at 1 site/s.   Left Rectus Capitis - (upper cervical) - 30 units of Dysport at 1 site/s.     Right Splenius Cervicis - 50 units of Dysport at 1 site/s.   Left Splenius Cervicis - 20 units of Dysport at 1 site/s.    Right Levator Scapulae - 10 units of Dysport at 1 site/s.   Left Levator Scapulae - 40 units of Dysport at 1 site/s.    Right Sternocleidomastoid - 10 units of Dysport at 1 site/s.  Left Sternocleidomastoid - 10 units of Dysport at 1 site/s.    Right Occipitalis - 70 units of Dysport at 14 site/s.   Left Occipitails - 70 units of Dysport at 14 site/s.     Right Temporalis - 30 units of Dysport at 4 site/s.    Left Temporalis - 30 units of Dysport at 4 site/s.       RESPONSE TO PROCEDURE  Roxanna Celis tolerated the procedure well and there were no immediate complications. She was allowed to recover for an appropriate period of time and was discharged home in stable condition.    ASSESSMENT AND PLAN   1. Botulinum toxin injections: Dose of Dysport was decreased from 500 units of Dysport to 400 units of Dysport due to weakness which was experienced with the last series of injections. Additionally, distribution was changed to avoid posterior neck muscles to prevent weakness. Patient will continue to monitor response and report at next appointment.   2. Referrals: None.   3. Follow up:  Roxanna Celis was rescheduled for the next series of injections in 12 weeks, at which time we will evaluate response to today's injections. she may call the clinic prior with any questions or concerns prior to the next appointment.

## 2022-09-01 DIAGNOSIS — H53.10 SUBJECTIVE VISUAL DISTURBANCE: Primary | ICD-10-CM

## 2022-09-06 ENCOUNTER — THERAPY VISIT (OUTPATIENT)
Dept: PHYSICAL THERAPY | Facility: CLINIC | Age: 52
End: 2022-09-06
Payer: COMMERCIAL

## 2022-09-06 DIAGNOSIS — M79.18 CHRONIC MYOFASCIAL PAIN: Primary | ICD-10-CM

## 2022-09-06 DIAGNOSIS — M79.2 NEUROPATHIC PAIN: ICD-10-CM

## 2022-09-06 DIAGNOSIS — G89.29 CHRONIC MYOFASCIAL PAIN: Primary | ICD-10-CM

## 2022-09-06 PROCEDURE — 97110 THERAPEUTIC EXERCISES: CPT | Mod: GP | Performed by: PHYSICAL THERAPIST

## 2022-09-06 PROCEDURE — 97112 NEUROMUSCULAR REEDUCATION: CPT | Mod: GP | Performed by: PHYSICAL THERAPIST

## 2022-09-13 ENCOUNTER — THERAPY VISIT (OUTPATIENT)
Dept: PHYSICAL THERAPY | Facility: CLINIC | Age: 52
End: 2022-09-13
Payer: COMMERCIAL

## 2022-09-13 DIAGNOSIS — G89.29 CHRONIC MYOFASCIAL PAIN: Primary | ICD-10-CM

## 2022-09-13 DIAGNOSIS — M79.2 NEUROPATHIC PAIN: ICD-10-CM

## 2022-09-13 DIAGNOSIS — M79.18 CHRONIC MYOFASCIAL PAIN: Primary | ICD-10-CM

## 2022-09-13 PROCEDURE — 97530 THERAPEUTIC ACTIVITIES: CPT | Mod: GP | Performed by: PHYSICAL THERAPIST

## 2022-09-13 PROCEDURE — 97112 NEUROMUSCULAR REEDUCATION: CPT | Mod: GP | Performed by: PHYSICAL THERAPIST

## 2022-09-13 PROCEDURE — 97110 THERAPEUTIC EXERCISES: CPT | Mod: GP | Performed by: PHYSICAL THERAPIST

## 2022-09-15 ENCOUNTER — OFFICE VISIT (OUTPATIENT)
Dept: OPHTHALMOLOGY | Facility: CLINIC | Age: 52
End: 2022-09-15
Attending: OPHTHALMOLOGY
Payer: COMMERCIAL

## 2022-09-15 ENCOUNTER — VIRTUAL VISIT (OUTPATIENT)
Dept: PALLIATIVE MEDICINE | Facility: CLINIC | Age: 52
End: 2022-09-15
Payer: COMMERCIAL

## 2022-09-15 DIAGNOSIS — H04.123 BILATERAL DRY EYES: ICD-10-CM

## 2022-09-15 DIAGNOSIS — H16.123 FILAMENTARY KERATITIS OF BOTH EYES: ICD-10-CM

## 2022-09-15 DIAGNOSIS — M79.18 CHRONIC MYOFASCIAL PAIN: Primary | ICD-10-CM

## 2022-09-15 DIAGNOSIS — G89.29 CHRONIC MYOFASCIAL PAIN: Primary | ICD-10-CM

## 2022-09-15 DIAGNOSIS — M79.2 NEUROPATHIC PAIN: ICD-10-CM

## 2022-09-15 DIAGNOSIS — H02.889 MGD (MEIBOMIAN GLAND DYSFUNCTION): ICD-10-CM

## 2022-09-15 DIAGNOSIS — H00.11 CHALAZION OF RIGHT UPPER EYELID: Primary | ICD-10-CM

## 2022-09-15 PROCEDURE — 96159 HLTH BHV IVNTJ INDIV EA ADDL: CPT | Mod: GT | Performed by: PSYCHOLOGIST

## 2022-09-15 PROCEDURE — 99214 OFFICE O/P EST MOD 30 MIN: CPT | Mod: GC | Performed by: OPHTHALMOLOGY

## 2022-09-15 PROCEDURE — 96158 HLTH BHV IVNTJ INDIV 1ST 30: CPT | Mod: GT | Performed by: PSYCHOLOGIST

## 2022-09-15 PROCEDURE — G0463 HOSPITAL OUTPT CLINIC VISIT: HCPCS

## 2022-09-15 ASSESSMENT — TONOMETRY
IOP_METHOD: TONOPEN
OS_IOP_MMHG: 14
OD_IOP_MMHG: 12

## 2022-09-15 ASSESSMENT — EXTERNAL EXAM - RIGHT EYE: OD_EXAM: NORMAL

## 2022-09-15 ASSESSMENT — VISUAL ACUITY
METHOD: SNELLEN - LINEAR
OS_CC: 20/20
OD_CC: 20/20

## 2022-09-15 ASSESSMENT — EXTERNAL EXAM - LEFT EYE: OS_EXAM: NORMAL

## 2022-09-15 NOTE — PROGRESS NOTES
Ophthalmology Acute Clinic     Chief Complaint(s) and History of Present Illness(es)     No visit information to display            HPI:   oRxanna Celis is a 51 year old female who presents for bump on right upper eyelid. She first noticed this bump last night when she was taking out her contact lenses.    Past Ocular history:   - Glasses: yes  - Contact lens wear: yes, scleral lenses  - Ocular Surgical History: none  - Current Eye drops: Xiidra BID each eye, pred healon (trying to cut back to BID but not working), serum tears at least BID, mucomyst 4-6x/day, doxycycline 50 mg BID  No longer doing Lipoflow        Review of systems for the eyes was negative other than the pertinent positives/negatives listed in the HPI.      Imaging:   none    Assessment & Plan      Roxanna Celis is a 51 year old female with the following diagnoses:   1. Chalazion of right upper eyelid    2. Bilateral dry eyes    3. Filamentary keratitis of both eyes    4. MGD (meibomian gland dysfunction)         #Chalazion, right eye  #MGD  Pt presents with acute chalazion of the right upper lid. History of MGD and dry eyes (see below). Will consider I&D if symptoms are not managed medically.  - continue doxycycline 50 mg BID  - start warm compresses 5x/day for at least 5 minutes with eye mask  - start lid scrubs BID  - discuss restarting Lipiflow at follow up visit    # Dry eye syndrome, bilateral  - she has had chronic dry eye symptoms with minimal exam findings   - hx of intolerance to silicone plugs  - collagen plugs placed at last visit in 4/2022                 - bilateral lower collagen plugs double stuff (0.4 mm 6 months)                 - bilateral upper collagen plugs with single stuff (0.3 mm 6 months)                 - presumably these are still in place as the dye did not disappear after 10 minutes of putting the drop in  - symptoms improved with custom scleral lenses  - last lipiflow was around 2/2022, she does not want to do this  again as it caused all of her plugs to fall out however recommended she should maybe consider trying this treatment again     Plan:  - continue current therapy:                 - warm compresses 2-3 times per day                 - Xiidra BID each eye                 - pred healon QID each eye (has cut down to BID but would like to go back to QID)                 - serum tears 2-3 times per day                 - systane ointment PRN                 - mucomyst 4-6 times per day both eyes  - medicamentosa may be contributing to symptoms (worth trying to decrease medications one at a time to see if symptoms worsen)  - continue scleral lenses during the day  - follow up with Dr. Ocampo in 5 weeks      Patient disposition:   Follow up with Dr. Ocampo 10/27/22    Patient seen with Dr. Terrence Fontenot MD  Resident Physician, PGY-2  Department of Ophthalmology  09/15/22 9:52 AM    Attending Physician Attestation:  Complete documentation of historical and exam elements from today's encounter can be found in the full encounter summary report (not reduplicated in this progress note).  I personally obtained the chief complaint(s) and history of present illness.  I confirmed and edited as necessary the review of systems, past medical/surgical history, family history, social history, and examination findings as documented by others; and I examined the patient myself.  I personally reviewed the relevant tests, images, and reports as documented above.  I formulated and edited as necessary the assessment and plan and discussed the findings and management plan with the patient and family. . - Faheem Ocampo MD

## 2022-09-15 NOTE — PROGRESS NOTES
"Roxanna Celis is a 51 year old female who is being evaluated via a billable video visit.      The patient has been notified of following:     \"This video visit will be conducted via a call between you and your physician/provider. We have found that certain health care needs can be provided without the need for an in-person physical exam.  This service lets us provide the care you need with a video conversation.     Video visits are billed at different rates depending on your insurance coverage.  Please reach out to your insurance provider with any questions.    If during the course of the call the physician/provider feels a video visit is not appropriate, you will not be charged for this service.\"    Patient has given verbal consent for Video visit? Yes    Patient is currently in the [facility]? yes    Patient would like the video invitation sent by: Text to cell phone: [phone]}    Video Start Time: 2:00 PM    Additional provider notes:      Pain Diagnoses per pain provider:   Chronic myofascial pain     Neuropathic pain        DATA: During today's visit you reported the following: Your pain is largely unmanaged - report pain is consistently at an 8/10, nothing seems to make it better. Your mood is overall ok - do recognize increased crabbiness and urges to isolate when pain is high. Your activity level is significantly increased - walking 30-45 minutes with breaks just about daily however this does tend to increase pain specifically legs 'which are just not happy right now'. Your stress level is variable manageability of higher stress level. Your sleep is poor due to pain. You reported engaging in self-care for your pain 2-3 times daily.    You identified that you would like to focus on the following or had questions regarding the following issues or concerns, and we discussed the following:   - 'extremely tired all the time, in pain all the time and I'm just tired of it'  - continuing to focus on " self-care  - report you are struggling to enjoy engaging in activities  - medical cannabis is not providing benefit yet - would like to schedule follow up with pharmacist  - a lot of medical appointments, makes it difficult to not think about the pain  - unable to enjoy [place] due to inability to walk stairs  - explored options for managing pain at its current level - hot shower, lavender soap, distraction, walks, dog  - being tested for celiac next week  - working with Emeka Nesbitt, PT - working on stretches and getting in/out of a chair however this significantly flared your pain  - discussed breaking walks into smaller chunks of time, such as 10 minutes a few times daily or as able  - family and friends do not understand your pain - discussed Spoon [employer]  - would like to work on asserting yourself and saying no    ASSESSMENT: Roxanna is experiencing a significant pain flare - no specific know triggers, however she does admit she has been walking almost daily despite this triggering pain. She seems to open to breaking this walk into smaller amounts of time spread throughout the day to see if this minimizes negative impact.     PLAN:   Your next appointment is scheduled for 10/21 at 12:30 PM.  Assignment/Objectives /interventions for next session:   - consider downloading Spoon[employer] robert  - break walk into 10 minute increments, walk 1-3 times daily unless pain is triggered then reduce as needed    We believe regular attendance is key to your success in our program!      Any time you are unable to keep your appointment we ask that you call us at [phone] at least 24 hours in advance to cancel.This will allow us to offer the appointment time to another patient.     Multiple missed appointments may lead to dismissal from the clinic.    Video-Visit Details    Type of service:  Video Visit    Video End Time (time video stopped): 2:56 PM    Originating Location (pt. Location): Home    Distant Location  (provider location):  [facility]     Mode of Communication:  Video Conference via Gabriella Graham PsyD LP  Licensed Psychologist  Outpatient Clinic Therapist  [facility]

## 2022-09-15 NOTE — LETTER
9/15/2022       RE: Roxanna Celis  [id number] [place] [address]     Dear Colleague,    Thank you for referring your patient, Roxanna Celis, to the [facility] at [facility]. Please see a copy of my visit note below.    Ophthalmology Acute Clinic     Chief Complaint(s) and History of Present Illness(es)     No visit information to display            HPI:   Roxanna Celis is a 51 year old female who presents for bump on right upper eyelid. She first noticed this bump last night when she was taking out her contact lenses.    Past Ocular history:   - Glasses: yes  - Contact lens wear: yes, scleral lenses  - Ocular Surgical History: none  - Current Eye drops: Xiidra BID each eye, pred healon (trying to cut back to BID but not working), serum tears at least BID, mucomyst 4-6x/day, doxycycline 50 mg BID  No longer doing Lipoflow        Review of systems for the eyes was negative other than the pertinent positives/negatives listed in the HPI.      Imaging:   none    Assessment & Plan      Roxanna Celis is a 51 year old female with the following diagnoses:   1. Chalazion of right upper eyelid    2. Bilateral dry eyes    3. Filamentary keratitis of both eyes    4. MGD (meibomian gland dysfunction)         #Chalazion, right eye  #MGD  Pt presents with acute chalazion of the right upper lid. History of MGD and dry eyes (see below). Will consider I&D if symptoms are not managed medically.  - continue doxycycline 50 mg BID  - start warm compresses 5x/day for at least 5 minutes with eye mask  - start lid scrubs BID  - discuss restarting Lipiflow at follow up visit    # Dry eye syndrome, bilateral  - she has had chronic dry eye symptoms with minimal exam findings   - hx of intolerance to silicone plugs  - collagen plugs placed at last visit in 4/2022                 - bilateral lower collagen plugs double stuff (0.4 mm 6 months)                 -  bilateral upper collagen plugs with single stuff (0.3 mm 6 months)                 - presumably these are still in place as the dye did not disappear after 10 minutes of putting the drop in  - symptoms improved with custom scleral lenses  - last lipiflow was around 2/2022, she does not want to do this again as it caused all of her plugs to fall out however recommended she should maybe consider trying this treatment again     Plan:  - continue current therapy:                 - warm compresses 2-3 times per day                 - Xiidra BID each eye                 - pred healon QID each eye (has cut down to BID but would like to go back to QID)                 - serum tears 2-3 times per day                 - systane ointment PRN                 - mucomyst 4-6 times per day both eyes  - medicamentosa may be contributing to symptoms (worth trying to decrease medications one at a time to see if symptoms worsen)  - continue scleral lenses during the day  - follow up with Dr. Ocampo in 5 weeks      Patient disposition:   Follow up with Dr. Ocampo 10/27/22    Patient seen with Dr. Terrence Fontenot MD  Resident Physician, PGY-2  Department of Ophthalmology  09/15/22 9:52 AM    Attending Physician Attestation:  Complete documentation of historical and exam elements from today's encounter can be found in the full encounter summary report (not reduplicated in this progress note).  I personally obtained the chief complaint(s) and history of present illness.  I confirmed and edited as necessary the review of systems, past medical/surgical history, family history, social history, and examination findings as documented by others; and I examined the patient myself.  I personally reviewed the relevant tests, images, and reports as documented above.  I formulated and edited as necessary the assessment and plan and discussed the findings and management plan with the patient and family. . - Faheem Ocampo MD         Again,  thank you for allowing me to participate in the care of your patient.      Sincerely,    Cinthia Fontenot MD

## 2022-09-15 NOTE — PATIENT INSTRUCTIONS
Start warm compresses for 5 minutes 5 times a day with a warm compress mask. Microwave the mask in 30 second intervals but make sure the mask is not too hot before putting on your eye.    Start lid scrubs two times a day with baby shampoo. Massage a small amount gently into lash line for 30 seconds.    Start Zyrtec or Claritin pills for allergies. You can also try a steroid nasal like Flonase for acute worsening of symptoms. Do not use this medication for more than 1-2 weeks at a time.

## 2022-09-19 PROCEDURE — 88305 TISSUE EXAM BY PATHOLOGIST: CPT | Performed by: PATHOLOGY

## 2022-09-20 ENCOUNTER — LAB REQUISITION (OUTPATIENT)
Dept: LAB | Facility: CLINIC | Age: 52
End: 2022-09-20

## 2022-09-20 DIAGNOSIS — K31.89 OTHER DISEASES OF STOMACH AND DUODENUM: ICD-10-CM

## 2022-09-26 LAB
PATH REPORT.ADDENDUM SPEC: NORMAL
PATH REPORT.COMMENTS IMP SPEC: NORMAL
PATH REPORT.COMMENTS IMP SPEC: NORMAL
PATH REPORT.FINAL DX SPEC: NORMAL
PATH REPORT.GROSS SPEC: NORMAL
PATH REPORT.MICROSCOPIC SPEC OTHER STN: NORMAL
PATH REPORT.RELEVANT HX SPEC: NORMAL
PHOTO IMAGE: NORMAL

## 2022-10-04 NOTE — PROGRESS NOTES
[facility]     Date of visit: 10/6/2022      Assessment:   Roxanna Celis is a 51 year old with past medical history including: ELY, Obesity, HTN, SICCA symptoms, spasmodic torticollis, who presents for follow-up and treatment of the followin. Painful peripheral neuropathy: Patient reports gradual onset of paresthesias, dysesthesias and burning/tingling type pain around 2019 that began in the periphery and spread proximally in their upper and lower extremities. Currently they describe paresthesias and pain in their entire bilateral upper and lower extremities. They also note intermittent subjective weakness. Neurological exam of the upper and lower extremities has been normal, previously they've noted mild sensory loss in the medial aspect of their right foot. Strength and reflexes were symmetric and normal. Upper extremity EMG was previously normal. Cervical, thoracic and lumbar MRI completed, no pathology to explain their current symptoms. Seen by Dr. Torres and will have a small fiber nerve biopsy completed which was borderline low in the upper extremities but normal in the legs.      2. Chronic Myofascial Pain: Patient meets the diagnostic criteria for fibromyalgia based on history and exam. Prior work-up has been unrevealing for a neurologic or rheumatologic cause for their symptoms. Discussed the pathophysiology of fibromyalgia and overlap with their neuropathic symptoms as well as how fibromyalgia may exacerbate an underlying peripheral neuropathy and arthritic pain. Again discussed concepts of central and peripheral sensitization and how this may be impacting their symptoms. They have had improvement while working with pain PT and phd, will continue this.     Mental Health - the patient's mental health concerns, specifically anxiety, depression, affect her experience of pain and contribute to her clinically significant distress.       Visit Diagnoses:  1. Fibromyalgia    2.  Chronic pain syndrome        Plan:    1. Physical Therapy: I encouraged she continue to implement chronic pain physical therapy exercises.   2. Clinical Health Pain Psychologist: Continue pain Phd as long as this provides benefit.   1. Continue working with primary psychiatry services.  2. Therapy can help reduce physical and psychosocial triggers or reinforcers of pain by adapting thoughts, feelings and behaviors to reduce symptoms and increase quality of life.  Evidence indicates that the practice of relaxation, meditation, and mindfulness techniques can significantly affect pain levels and overall well being.  3. Self Care Recommendations: Gentle progressive exercise that does not increase pain - gradually increase daily walking program.  Take mini breaks - 5 minutes of mindfullness a couple times a day.   4. Diagnostic Studies: none  5. Medication Management:  1. Discussed various medications Roxanna has tried in the past. Though Lyrica was most effective it caused substantial weight gain. Diarrhea reported in July was definitely not related to Topamax or low dose naltrexone. We discussed restarting low dose naltrexone but at a lower dose and in solution form to allow for closer titration and she is interested. She will restart low dose naltrexone 1mml. Start 1ml daily for 7d, then 1.5ml daily for 7d, then 2ml daily for 7 and so on until max of 6ml daily.  2. Continue methocarbamol 500mg BID prn  3. Continue medical cannabis per direction of the pharmacist.  4. Continue Cymbalta.      6. Further procedures recommended: none  7. Referrals: Roxanna is very interested in fibromyalgia program at [facility].  I will try to refer.  8. I will be leaving [facility] pain management and my last day will be on 10/19. Plan will be to follow up with Estella MUSTAFA CNP.      Erica MUSTAFA [facility]     -------------------------------------------------    Subjective:    Chief  "complaint:   Chief Complaint   Patient presents with     Pain       Interval history:  Roxanna Celis is a 51 year old female last seen on 7/8/2022.  she is a patient of Dr. Rivera seen in follow up.     Recommendations/plan at the last visit included:  1. Physical Therapy: Continue chronic pain PT.  1. Clinical Health Pain Psychologist: Continue pain Phd  1. Continue working with primary psychiatry services.  2. Therapy can help reduce physical and psychosocial triggers or reinforcers of pain by adapting thoughts, feelings and behaviors to reduce symptoms and increase quality of life.  Evidence indicates that the practice of relaxation, meditation, and mindfulness techniques can significantly affect pain levels and overall well being.  2. Self Care Recommendations: Gentle progressive exercise that does not increase pain - gradually increase daily walking program.  Take mini breaks - 5 minutes of mindfullness a couple times a day.   3. Diagnostic Studies: none  4. Medication Management:  1. Stopped low dose naltrexone - possibly causing diarrhea  2. Ok to stop topamax - possibly causing diarrhea.  3. Continue methocarbamol 500mg BID prn  4. Continue medical cannabis.  5. Continue cymbalta.      5. Further procedures recommended: none  6. Follow up: 2-3 months, will transfer care.       Since her last visit, Roxanna Celis reports:  -Today's visit is for the purpose of transfer of care.   -Her pain is somewhat worse than it was at last visit, states, \"its just getting worse.\"   -She isn't sure what to attribute this to be due to. She is struggling with bilateral upper and lower extremities as well as headaches.   -She reports ongoing intermittent urinary and fecal incontinence that is disruptive. She has researched fibromyalgia and thinks it is related to this. She stopped low dose naltrexone and Topamax due to concern for diarrhea, these medications were stopped without any impact on symptoms.   -When she began low " "dose naltrexone and topamax initially she had some benefit but developed side effects at a certain point, 3mg (?). Looking at the medications she has tried in the past, Lyrica was most effective but unfortunately caused significant weight gain.   -She continues medical cannabis but struggling to find benefit, \"my body doesn't like the THC.\" She is working closely with the pharmacist at the dispensary to try to find products that may provide benefit. She is tolerating it well but no pain benefit yet.   -She continues visits with pain psychology Taisha Graham PsyD, LP. She stopped pain physical therapy with [facility] as she ran out of coverage.   -She is desperate for pain benefit, states, \"I've got to do something.\" She is interested in [facility] fibromyalgia program. She recently bought some books and wants to learn about this.     HPI per Dr. Rivera:  Roxanna Celis is a 50 year old female who presents for initial evaluation of chief pain complaint of chronic pain..      A couple years ago they woke up one morning with sensations of sand/dirt in their eyes and this was exceptionally painful.   Subsequently they developed pain and weakness in their upper extremities. Last fall they started having pain around their wrists that began radiating up their forearms bilaterally. They have had an EMG of their upper extremities which was normal. Last fall they began having pain in their ankles that started radiating up their bilateral lower extremities gradually.     Currently they report having constant pain in their bilateral upper and lower extremities, they characterize this as sharp and squeezing. They also report pins/needles and burning sensations in their upper and lower extremities.     They saw Dr. Bell in neurology and has been cleared from an MS stand point. They saw Dr. Jacobs at [facility]. They have requested an EMG test of their lower extremities and a skin biopsy to test for peripheral neuropathy but " Dr. Jacobs declined. They have an appointment coming up with Dr. Torres in November. They did see Dr. Vleazquez from rheumatolgy who is managing/working up their Sicca symptoms.        They had a fall in 2000 that caused worsening back, head and neck pain and had to stop working at that time.    Pain Information:   Pain rating: averages 8/10 on a 0-10 scale.      Current pain medications:   -Bupropion 300mg daily  -Cymbalta 120mg daily    Current MME: 0    Review of [place] Prescription Monitoring Program (): No concern for abuse or misuse of controlled medications based on this report.     Annual Controlled Substance Agreement/UDS due date: NA    Past pain treatments:  Prior pain medications:              -Gabapentin 1200mg at night- drowsy with higher dose              -Lyrica 100mg BID- SE, weight gain right away  -Low dose naltrexone 3mg daily-diarrhea ?              -Topamax 100mg BID-diarrhea              -Low dose naltrexone 5mg - affected mood  2. Physical Therapy: pain physical therapy- [facility] currently              TENS unit: hasn't tried  3. Pain psychology: Taisha Graham PsyD - [facility]  4. Surgery: hasn't tried  5. Injections: none  6. Alternative Therapies:               Chiropractic: hasn't tried               Acupuncture: hasn't tried    Medications:  Current Outpatient Medications   Medication Sig Dispense Refill     acetylcysteine (MUCOMYST) 10 % nebulizer solution Apply 2 drops to eye       acetylcysteine 10 % (MUCOMYST) 10% SOLN Place 2 drops into both eyes 6 times daily 30 mL 2     aspirin-acetaminophen-caffeine (EXCEDRIN MIGRAINE) 250-250-65 MG tablet Take 1 tablet by mouth every 6 hours as needed       autologous serum compounded ophthalmic solution Place 1 drop into both eyes every 2 hours (while awake)       BOTOX 100 units injection Inject 250 Units into the muscle once Lot # /C3 with Expiration Date:  11/2020       buPROPion (WELLBUTRIN XL) 300 MG 24 hr tablet Take 300 mg by  mouth       buPROPion (WELLBUTRIN XL) 300 MG 24 hr tablet Take 300 mg by mouth every morning       clonazePAM (KLONOPIN) 0.5 MG tablet        clonazePAM (KLONOPIN) 1 MG tablet Take 1 mg by mouth 2 times daily as needed       COMPOUNDED NON-CONTROLLED SUBSTANCE (CMPD RX) - PHARMACY TO MIX COMPOUNDED MEDICATION Low dose naltrexone 1mml. Start 1ml daily for 7d, then 1.5ml daily for 7d, then 2ml daily for 7 and so on until max of 6ml daily. 120 mL 1     cyanocobalamin (VITAMIN B-12) 1000 MCG tablet Take 1,000 mcg by mouth daily       doxycycline monohydrate (ADOXA) 50 MG tablet Take 1 tablet (50 mg) by mouth 2 times daily 180 tablet 3     DULoxetine (CYMBALTA) 60 MG capsule TAKE 2 CAPSULES BY MOUTH EVERY DAY       DULoxetine HCl (CYMBALTA PO) Take 60 mg by mouth       EPINEPHrine (ANY BX GENERIC EQUIV) 0.3 MG/0.3ML injection 2-pack        EPINEPHrine (EPIPEN) 0.3 MG/0.3ML injection Inject 0.3 mg into the muscle once as needed        ibuprofen (ADVIL/MOTRIN) 200 MG capsule Take 200 mg by mouth every 4 hours as needed.       iloperidone (FANAPT) 6 MG TABS tablet Take 4 mg by mouth daily        lifitegrast (XIIDRA) 5 % opthalmic solution Place 1 drop into both eyes 2 times daily 180 each 1     LINZESS 290 MCG capsule TAKE 1 CAPSULE BY MOUTH EVERY DAY  3     medical cannabis (Patient's own supply) See Admin Instructions (The purpose of this order is to document that the patient reports taking medical cannabis.  This is not a prescription, and is not used to certify that the patient has a qualifying medical condition.)       multivitamin w/minerals (THERA-VIT-M) tablet Take 1 tablet by mouth daily       NALTREXONE HCL PO Take 3 mg by mouth daily       ONABOTULINUMTOXINA IJ Inject 250 Units as directed once LOT # /C3  EXPIRATION: 2020       prednisolone 0.25% and hyaluronate in balanced salt SUSP compounded ophthalmic suspension Place 1 Drop into both eyes 4 times daily. Discard bottle 15 days after opening.  Refrigerate. Unopened bottle expires:     .       . 15 mL 11     predniSONE (DELTASONE) 5 MG tablet 4tab=20 mg qd x 7 days, 3tab=15 mg qd x 5 days, 2tab=10 mg qd 100 tablet 2     sodium chloride 0.9 % neb solution Apply 5 mLs to eye       sodium chloride 0.9 % neb solution 5 mLs by Other route 6 times daily Medically necessary for scleral contact lens use. May use 5ml vials 6x/day for contact lens use 3000 mL 11     sodium chloride 0.9 % neb solution Medically necessary for scleral contact lens use. May use 3 ml 6 times a day for contact lens use 300 mL 11     Soft Lens Products (B&L SENSITIVE EYES) SOLN Apply 1 drop to eye       tretinoin (RETIN-A) 0.025 % external cream Apply a pea size to entire face QD 45 g 11     TYRVAYA 0.03 MG/ACT nasal spray USE 1 SPRAY IN EACH NOSTRIL TWICE DAILY.       Vitamin D3 (CHOLECALCIFEROL) 25 mcg (1000 units) tablet Take by mouth daily       White Petrolatum-Mineral Oil (SYSTANE NIGHTTIME) OINT Apply 3.5 g to eye 4 times daily as needed (dry eyes) 7 g 11     COMPOUNDED NON-CONTROLLED SUBSTANCE (CMPD RX) - PHARMACY TO MIX COMPOUNDED MEDICATION Take 2 mg by mouth daily Low dose naltrexone 2mg per capsule, to be taken by mouth every day. (Patient not taking: No sig reported) 30 capsule 0     methocarbamol (ROBAXIN) 500 MG tablet Take 1 tablet (500 mg) by mouth 2 times daily as needed for muscle spasms (Patient not taking: No sig reported) 60 tablet 0     prednisoLONE acetate (PRED FORTE) 1 % ophthalmic suspension Place 1 drop into both eyes 3 times daily for 7 days, THEN 1 drop 2 times daily for 7 days, THEN 1 drop daily for 7 days. 5 mL 1       Medical History: any changes in medical history since they were last seen? No    Review of Systems: A 10-point review of systems was negative, with the exception of chronic pain issues.      Objective:    Physical Exam:  Blood pressure (!) 141/81, pulse 110, SpO2 98 %, not currently breastfeeding.  Constitutional: Well developed, well  nourished, appears stated age.  Gait: Normal with walker.   HEENT: Head atraumatic, normocephalic. Eyes without conjunctival injection or jaundice. Oropharynx clear. Neck supple. No obvious neck masses.  Skin: No rash, lesions, or petechiae of exposed skin.   Psychiatric/mental status: Alert, without lethargy or stupor. Speech fluent. Appropriate affect. Mood depressed, tearful at times. Able to follow commands without difficulty.     BILLING TIME DOCUMENTATION:   The total TIME spent on this patient on the date of the encounter/appointment was 25 minutes.      TOTAL TIME includes:   Time spent preparing to see the patient (reviewing records and tests)   Time spent face to face (or over the phone) with the patient   Time spent ordering tests, medications, procedures and referrals   Time spent Referring and communicating with other healthcare professionals   Time spent documenting clinical information in Epic

## 2022-10-06 ENCOUNTER — OFFICE VISIT (OUTPATIENT)
Dept: PALLIATIVE MEDICINE | Facility: CLINIC | Age: 52
End: 2022-10-06
Payer: COMMERCIAL

## 2022-10-06 VITALS — HEART RATE: 110 BPM | OXYGEN SATURATION: 98 % | DIASTOLIC BLOOD PRESSURE: 81 MMHG | SYSTOLIC BLOOD PRESSURE: 141 MMHG

## 2022-10-06 DIAGNOSIS — G89.4 CHRONIC PAIN SYNDROME: ICD-10-CM

## 2022-10-06 DIAGNOSIS — M79.7 FIBROMYALGIA: Primary | ICD-10-CM

## 2022-10-06 PROCEDURE — 99213 OFFICE O/P EST LOW 20 MIN: CPT | Performed by: NURSE PRACTITIONER

## 2022-10-06 ASSESSMENT — PAIN SCALES - GENERAL: PAINLEVEL: EXTREME PAIN (8)

## 2022-10-06 NOTE — PATIENT INSTRUCTIONS
Physical Therapy: Continue to implement chronic pain physical therapy exercises.   Clinical Health Pain Psychologist: Continue pain Phd as long as this provides benefit.   Continue working with primary psychiatry services.  Therapy can help reduce physical and psychosocial triggers or reinforcers of pain by adapting thoughts, feelings and behaviors to reduce symptoms and increase quality of life.  Evidence indicates that the practice of relaxation, meditation, and mindfulness techniques can significantly affect pain levels and overall well being.  Self Care Recommendations: Gentle progressive exercise that does not increase pain - gradually increase daily walking program.  Take mini breaks - 5 minutes of mindfullness a couple times a day.   Diagnostic Studies: none  Medication Management:  Restart low dose naltrexone 1mml. Start 1ml daily for 7d, then 1.5ml daily for 7d, then 2ml daily for 7 and so on until max of 6ml daily.  Continue methocarbamol 500mg BID prn  Continue medical cannabis per direction of the pharmacist.  Continue Cymbalta.      Further procedures recommended: none  Referrals: I will try to refer you to fibromyalgia program at [facility].   I will be leaving [facility] pain management and my last day will be on 10/19. Plan will be to follow up with Estella MUSTAFA CNP.           ----------------------------------------------------------------  Clinic Number:  [phone]   Call with any questions about your care and for scheduling assistance.   Calls are returned Monday through Friday between 8 AM and 4:30 PM. We usually get back to you within 2 business days depending on the issue/request.    If we are prescribing your medications:  For opioid medication refills, call the clinic or send a SCHEDit message 7 days in advance.  Please include:  Name of requested medication  Name of the pharmacy.  For non-opioid medications, call your pharmacy directly to request a refill. Please allow  3-4 days to be processed.   Per [place] State Law:  All controlled substance prescriptions must be filled within 30 days of being written.    For those controlled substances allowing refills, pickup must occur within 30 days of last fill.      We believe regular attendance is key to your success in our program!    Any time you are unable to keep your appointment we ask that you call us at least 24 hours in advance to cancel.This will allow us to offer the appointment time to another patient.   Multiple missed appointments may lead to dismissal from the clinic.

## 2022-10-06 NOTE — NURSING NOTE
PEG Score 5/5/2022 7/8/2022 10/6/2022   PEG Total Score 8.67 8.33 10         Crystal Grady MA  [facility] [place]

## 2022-10-15 ENCOUNTER — HEALTH MAINTENANCE LETTER (OUTPATIENT)
Age: 52
End: 2022-10-15

## 2022-10-18 ENCOUNTER — OFFICE VISIT (OUTPATIENT)
Dept: DERMATOLOGY | Facility: CLINIC | Age: 52
End: 2022-10-18
Payer: COMMERCIAL

## 2022-10-18 DIAGNOSIS — L82.1 SEBORRHEIC KERATOSIS: ICD-10-CM

## 2022-10-18 DIAGNOSIS — L81.4 LENTIGO: ICD-10-CM

## 2022-10-18 DIAGNOSIS — L98.8 FACIAL RHYTIDS: Primary | ICD-10-CM

## 2022-10-18 DIAGNOSIS — D18.01 ANGIOMA OF SKIN: ICD-10-CM

## 2022-10-18 DIAGNOSIS — D22.9 NEVUS: ICD-10-CM

## 2022-10-18 PROCEDURE — 99214 OFFICE O/P EST MOD 30 MIN: CPT | Performed by: PHYSICIAN ASSISTANT

## 2022-10-18 RX ORDER — TRETINOIN 0.5 MG/G
CREAM TOPICAL
Qty: 45 G | Refills: 11 | Status: SHIPPED | OUTPATIENT
Start: 2022-10-18 | End: 2023-11-06

## 2022-10-18 NOTE — LETTER
10/18/2022         RE: Roxanna Celis  [id number] [place] [address]        Dear Colleague,    Thank you for referring your patient, Roxanna Celis, to the [facility]. Please see a copy of my visit note below.    HPI:   Chief complaints: Roxanna Celis is a pleasant 51 year old female who presents for Full skin cancer screening to rule out skin cancer   Last Skin Exam: 1 year ago      1st Baseline: No  Personal HX of Skin Cancer: no   Personal HX of Malignant Melanoma: no   Family HX of Skin Cancer / Malignant Melanoma: no  Personal HX of Atypical Moles:   no  Risk factors: history of sun exposure and burns  New / Changing lesions:Yes new bumps on the face  Social History:   On review of systems, there are no further skin complaints, patient is feeling otherwise well.   ROS of the following were done and are negative: Constitutional, Eyes, Ears, Nose,   Mouth, Throat, Cardiovascular, Respiratory, GI, Genitourinary, Musculoskeletal,   Psychiatric, Endocrine, Allergic/Immunologic.    PHYSICAL EXAM:   There were no vitals taken for this visit.  Skin exam performed as follows: Type 2 skin. Mood appropriate  Alert and Oriented X 3. Well developed, well nourished in no distress.  General appearance: Normal  Head including face: Normal  Eyes: conjunctiva and lids: Normal  Mouth: Lips, teeth, gums: Normal  Neck: Normal  Chest-breast/axillae: Normal  Back: Normal  Spleen and liver: Normal  Cardiovascular: Exam of peripheral vascular system by observation for swelling, varicosities, edema: Normal  Genitalia: groin, buttocks: Normal  Extremities: digits/nails (clubbing): Normal  Eccrine and Apocrine glands: Normal  Right upper extremity: Normal  Left upper extremity: Normal  Right lower extremity: Normal  Left lower extremity: Normal  Skin: Scalp and body hair: See below    Pt deferred exam of breasts, groin, buttocks: No    Other physical findings:  1. Multiple pigmented macules on  extremities and trunk  2. Multiple pigmented macules on face, trunk and extremities  3. Multiple vascular papules on trunk, arms and legs  4. Multiple scattered keratotic plaques  5. Pink dome shaped papules on the face  6. Comedones on the face       Except as noted above, no other signs of skin cancer or melanoma.     ASSESSMENT/PLAN:   Benign Full skin cancer screening today. . Patient with history of none  Advised on monthly self exams and 1 year  Patient Education: Appropriate brochures given.    1. Multiple benign appearing nevi on arms, legs and trunk. Discussed ABCDEs of melanoma and sunscreen.   2. Multiple lentigos on arms, legs and trunk. Advised benign, no treatment needed.  3. Multiple scattered angiomas. Advised benign, no treatment needed.   4. Seborrheic keratosis on arms, legs and trunk. Advised benign, no treatment needed.  5. Discussed Botox for the crow's feet - she will consider this.   6. Sebaceous hyperplasia, comedones on the face. Doing well on tretinoin cream; she would like to increase the strength  --start the glycolic acid toner from [employer]  --Start tretinoin 0.05% cream daily. Discussed potential for dryness/irritation. Advised to use emollients if needed.  --Start The Buffet from [employer]              Follow-up: yearly    1.) Patient was asked about new and changing moles. YES  2.) Patient received a complete physical skin examination: YES  3.) Patient was counseled to perform a monthly self skin examination: YES  Scribed By: Kimberley Reece, MS, PAYUAN          Again, thank you for allowing me to participate in the care of your patient.        Sincerely,        Kimberley Reece PA-C

## 2022-10-18 NOTE — PROGRESS NOTES
HPI:   Chief complaints: Roxanna Celis is a pleasant 51 year old female who presents for Full skin cancer screening to rule out skin cancer   Last Skin Exam: 1 year ago      1st Baseline: No  Personal HX of Skin Cancer: no   Personal HX of Malignant Melanoma: no   Family HX of Skin Cancer / Malignant Melanoma: no  Personal HX of Atypical Moles:   no  Risk factors: history of sun exposure and burns  New / Changing lesions:Yes new bumps on the face  Social History:   On review of systems, there are no further skin complaints, patient is feeling otherwise well.   ROS of the following were done and are negative: Constitutional, Eyes, Ears, Nose,   Mouth, Throat, Cardiovascular, Respiratory, GI, Genitourinary, Musculoskeletal,   Psychiatric, Endocrine, Allergic/Immunologic.    PHYSICAL EXAM:   There were no vitals taken for this visit.  Skin exam performed as follows: Type 2 skin. Mood appropriate  Alert and Oriented X 3. Well developed, well nourished in no distress.  General appearance: Normal  Head including face: Normal  Eyes: conjunctiva and lids: Normal  Mouth: Lips, teeth, gums: Normal  Neck: Normal  Chest-breast/axillae: Normal  Back: Normal  Spleen and liver: Normal  Cardiovascular: Exam of peripheral vascular system by observation for swelling, varicosities, edema: Normal  Genitalia: groin, buttocks: Normal  Extremities: digits/nails (clubbing): Normal  Eccrine and Apocrine glands: Normal  Right upper extremity: Normal  Left upper extremity: Normal  Right lower extremity: Normal  Left lower extremity: Normal  Skin: Scalp and body hair: See below    Pt deferred exam of breasts, groin, buttocks: No    Other physical findings:  1. Multiple pigmented macules on extremities and trunk  2. Multiple pigmented macules on face, trunk and extremities  3. Multiple vascular papules on trunk, arms and legs  4. Multiple scattered keratotic plaques  5. Pink dome shaped papules on the face  6. Comedones on the face       Except  as noted above, no other signs of skin cancer or melanoma.     ASSESSMENT/PLAN:   Benign Full skin cancer screening today. . Patient with history of none  Advised on monthly self exams and 1 year  Patient Education: Appropriate brochures given.    1. Multiple benign appearing nevi on arms, legs and trunk. Discussed ABCDEs of melanoma and sunscreen.   2. Multiple lentigos on arms, legs and trunk. Advised benign, no treatment needed.  3. Multiple scattered angiomas. Advised benign, no treatment needed.   4. Seborrheic keratosis on arms, legs and trunk. Advised benign, no treatment needed.  5. Discussed Botox for the crow's feet - she will consider this.   6. Sebaceous hyperplasia, comedones on the face. Doing well on tretinoin cream; she would like to increase the strength  --start the glycolic acid toner from [employer]  --Start tretinoin 0.05% cream daily. Discussed potential for dryness/irritation. Advised to use emollients if needed.  --Start The Buffet from [employer]              Follow-up: yearly    1.) Patient was asked about new and changing moles. YES  2.) Patient received a complete physical skin examination: YES  3.) Patient was counseled to perform a monthly self skin examination: YES  Scribed By: Kimberley Reece, MS, PA-C

## 2022-10-18 NOTE — PATIENT INSTRUCTIONS
AM    Wash  The Buffet ([employer] [facility])  Vitamin C serum  4. Moisturizer with sunscreen    PM  Wash  The Glycolic acid toner from [employer]  Tretinoin cream  Moisturizer

## 2022-11-16 ENCOUNTER — TELEPHONE (OUTPATIENT)
Dept: DERMATOLOGY | Facility: CLINIC | Age: 52
End: 2022-11-16

## 2022-11-16 DIAGNOSIS — H16.123 FILAMENTARY KERATITIS OF BOTH EYES: ICD-10-CM

## 2022-11-16 DIAGNOSIS — H04.123 BILATERAL DRY EYES: ICD-10-CM

## 2022-11-16 NOTE — TELEPHONE ENCOUNTER
Prior Authorization Specialty Medication Request  Tretinoin 0.05% cream  Medication/Dose:   ICD code (if different than what is on RX):    Previously Tried and Failed:      Important Lab Values:   Rationale:     Insurance Name:   Insurance ID:   Insurance Phone Number:     Pharmacy Information (if different than what is on RX)  Name:    Phone:    Thank you   Estephanie Sheffield [facility]

## 2022-11-17 ENCOUNTER — OFFICE VISIT (OUTPATIENT)
Dept: PHYSICAL MEDICINE AND REHAB | Facility: CLINIC | Age: 52
End: 2022-11-17
Payer: COMMERCIAL

## 2022-11-17 VITALS — HEART RATE: 101 BPM | SYSTOLIC BLOOD PRESSURE: 105 MMHG | DIASTOLIC BLOOD PRESSURE: 73 MMHG

## 2022-11-17 DIAGNOSIS — G24.3 CERVICAL DYSTONIA: Primary | ICD-10-CM

## 2022-11-17 PROCEDURE — 95874 GUIDE NERV DESTR NEEDLE EMG: CPT | Performed by: PHYSICAL MEDICINE & REHABILITATION

## 2022-11-17 PROCEDURE — 64616 CHEMODENERV MUSC NECK DYSTON: CPT | Mod: RT | Performed by: PHYSICAL MEDICINE & REHABILITATION

## 2022-11-17 NOTE — LETTER
11/17/2022         RE: Roxanna Celis  [address] [place] [address]        Dear Colleague,    Thank you for referring your patient, Roxanna Celis, to the [facility]. Please see a copy of my visit note below.      [facility]     PM&R CLINIC NOTE  BOTULINUM TOXIN PROCEDURE      HPI  Chief Complaint   Patient presents with     Procedure     Dysport     Roxanna Celis is a 52 year old female with a history of cervical dystonia who presents to clinic for botulinum toxin injections for management of involuntary muscle spasms and neck pain secondary to her cervical dystonia.     SINCE LAST VISIT  Roxanna Celis was last seen here in clinic on 8/25/2022, at which time she received 400 units of Dysport, which was a decrease from previous dose of 500 units due to posterior neck muscle weakness.    Patient denies new medical diagnoses, illnesses, hospitalizations, emergency room visits, and injuries since the previous injection with botulinum neurotoxin. She is currently participating in PT at [facility].     RESPONSE TO PREVIOUS TREATMENT    Side effects: None, although she did have weakness with the previous round of Botox.     Pain Improvement: Yes. She experienced significant improvement of her neck and occipital pain. She had improvement in the upper back for about a month, and then improvement in the remaining areas gave her ~9 weeks of benefit.    Dystonia Improvement: Yes, she had significant improvement of the cervical dystonia, which seemed to last approximately 9 weeks.       PHYSICAL EXAM  VS: /73 (BP Location: Right arm, Patient Position: Sitting, Cuff Size: Adult Regular)   Pulse 101    GEN: Pleasant and cooperative, in no acute distress  HEENT: No facial asymmetry  NECK AND TRUNK PATTERN:   Head Tremor: Pt states tremor is intermittent   Left Side-bending: Present but patient states she feels she has more right  side bending now  Left Shoulder Elevation: slightly elevated  Head with right shift  Focalized pain at occipitalis - has pain today during visit    ALLERGIES  Allergies   Allergen Reactions     Contrast Dye Anaphylaxis     Other reaction(s): almost  from it     Diagnostic X-Ray Materials Anaphylaxis     IVP DYE     Lisinopril Anaphylaxis     Lisinopril-Hctz Anaphylaxis     Maple Flavor Anaphylaxis     MAPLE SYRUP  MAPLE SYRUP  MAPLE SYRUP     Maple Tree Anaphylaxis     Allergy includes maple syrup.     No Clinical Screening - See Comments      maple syrup==anaphylaxis  Dairy- causes to not feel well     Gluten Itching and Swelling     Gluten Meal Itching and Swelling     Cramping     Salem Meal Unknown     Chamomile Unknown     Codeine Sulfate Cramps and GI Disturbance     Food Unknown     Grapes, almond, lactose, gluten, real maple syrup     Nsaids Other (See Comments) and Unknown     Patient had bariatric surgery. Should not ever take NSAIDS due to high risk for gastric ulcers.   Patient had bariatric surgery. Should not ever take NSAIDS due to high risk for gastric ulcers.      Polyethylene Glycol Unknown     Per allergy testing  Per allergy testing       Erythromycin Nausea and Vomiting, Cramps, Diarrhea and Nausea     PN: LW Reaction: stomach upset  cramping     Lactose Diarrhea     Morphine Other (See Comments)       CURRENT MEDICATIONS    Current Outpatient Medications:      acetylcysteine (MUCOMYST) 10 % nebulizer solution, Apply 2 drops to eye, Disp: , Rfl:      acetylcysteine 10 % (MUCOMYST) 10% SOLN, Place 2 drops into both eyes 6 times daily, Disp: 30 mL, Rfl: 2     autologous serum compounded ophthalmic solution, Place 1 drop into both eyes every 2 hours (while awake), Disp: , Rfl:      BOTOX 100 units injection, Inject 250 Units into the muscle once Lot # /C3 with Expiration Date:  2020, Disp: , Rfl:      buPROPion (WELLBUTRIN XL) 300 MG 24 hr tablet, Take 300 mg by mouth every morning,  Disp: , Rfl:      clonazePAM (KLONOPIN) 0.5 MG tablet, , Disp: , Rfl:      clonazePAM (KLONOPIN) 1 MG tablet, Take 1 mg by mouth 2 times daily as needed, Disp: , Rfl:      COMPOUNDED NON-CONTROLLED SUBSTANCE (CMPD RX) - PHARMACY TO MIX COMPOUNDED MEDICATION, Low dose naltrexone 1mml. Start 1ml daily for 7d, then 1.5ml daily for 7d, then 2ml daily for 7 and so on until max of 6ml daily., Disp: 120 mL, Rfl: 1     cyanocobalamin (VITAMIN B-12) 1000 MCG tablet, Take 1,000 mcg by mouth daily, Disp: , Rfl:      doxycycline monohydrate (ADOXA) 50 MG tablet, Take 1 tablet (50 mg) by mouth 2 times daily, Disp: 180 tablet, Rfl: 3     DULoxetine (CYMBALTA) 60 MG capsule, TAKE 2 CAPSULES BY MOUTH EVERY DAY, Disp: , Rfl:      EPINEPHrine (ANY BX GENERIC EQUIV) 0.3 MG/0.3ML injection 2-pack, , Disp: , Rfl:      ibuprofen (ADVIL/MOTRIN) 200 MG capsule, Take 200 mg by mouth every 4 hours as needed., Disp: , Rfl:      iloperidone (FANAPT) 6 MG TABS tablet, Take 4 mg by mouth daily , Disp: , Rfl:      lifitegrast (XIIDRA) 5 % opthalmic solution, Place 1 drop into both eyes 2 times daily, Disp: 180 each, Rfl: 1     LINZESS 290 MCG capsule, TAKE 1 CAPSULE BY MOUTH EVERY DAY, Disp: , Rfl: 3     medical cannabis (Patient's own supply), See Admin Instructions (The purpose of this order is to document that the patient reports taking medical cannabis.  This is not a prescription, and is not used to certify that the patient has a qualifying medical condition.), Disp: , Rfl:      multivitamin w/minerals (THERA-VIT-M) tablet, Take 1 tablet by mouth daily, Disp: , Rfl:      NALTREXONE HCL PO, Take 3 mg by mouth daily, Disp: , Rfl:      ONABOTULINUMTOXINA IJ, Inject 250 Units as directed once LOT # /C3 EXPIRATION: 2020, Disp: , Rfl:      prednisolone 0.25% and hyaluronate in balanced salt SUSP compounded ophthalmic suspension, Place 1 Drop into both eyes 4 times daily. Discard bottle 15 days after opening. Refrigerate. Unopened bottle  expires:     .       ., Disp: 15 mL, Rfl: 11     predniSONE (DELTASONE) 5 MG tablet, 4tab=20 mg qd x 7 days, 3tab=15 mg qd x 5 days, 2tab=10 mg qd, Disp: 100 tablet, Rfl: 2     sodium chloride 0.9 % neb solution, Medically necessary for scleral contact lens use. May use 3 ml 6 times a day for contact lens use, Disp: 300 mL, Rfl: 11     Soft Lens Products (B&L SENSITIVE EYES) SOLN, Apply 1 drop to eye, Disp: , Rfl:      tretinoin (RETIN-A) 0.025 % external cream, Apply a pea size to entire face QD, Disp: 45 g, Rfl: 11     tretinoin (RETIN-A) 0.05 % external cream, Spread a pea size amount into affected area topically at bedtime.  Use sunscreen SPF>20., Disp: 45 g, Rfl: 11     TYRVAYA 0.03 MG/ACT nasal spray, USE 1 SPRAY IN EACH NOSTRIL TWICE DAILY., Disp: , Rfl:      Vitamin D3 (CHOLECALCIFEROL) 25 mcg (1000 units) tablet, Take by mouth daily, Disp: , Rfl:      White Petrolatum-Mineral Oil (SYSTANE NIGHTTIME) OINT, Apply 3.5 g to eye 4 times daily as needed (dry eyes), Disp: 7 g, Rfl: 11     aspirin-acetaminophen-caffeine (EXCEDRIN MIGRAINE) 250-250-65 MG tablet, Take 1 tablet by mouth every 6 hours as needed (Patient not taking: Reported on 11/17/2022), Disp: , Rfl:      buPROPion (WELLBUTRIN XL) 300 MG 24 hr tablet, Take 300 mg by mouth (Patient not taking: Reported on 11/17/2022), Disp: , Rfl:      COMPOUNDED NON-CONTROLLED SUBSTANCE (CMPD RX) - PHARMACY TO MIX COMPOUNDED MEDICATION, Take 2 mg by mouth daily Low dose naltrexone 2mg per capsule, to be taken by mouth every day. (Patient not taking: Reported on 7/8/2022), Disp: 30 capsule, Rfl: 0     DULoxetine HCl (CYMBALTA PO), Take 60 mg by mouth (Patient not taking: Reported on 11/17/2022), Disp: , Rfl:      EPINEPHrine (EPIPEN) 0.3 MG/0.3ML injection, Inject 0.3 mg into the muscle once as needed  (Patient not taking: Reported on 11/17/2022), Disp: , Rfl:      methocarbamol (ROBAXIN) 500 MG tablet, Take 1 tablet (500 mg) by mouth 2 times daily as needed for  muscle spasms (Patient not taking: Reported on 2022), Disp: 60 tablet, Rfl: 0     prednisoLONE acetate (PRED FORTE) 1 % ophthalmic suspension, Place 1 drop into both eyes 3 times daily for 7 days, THEN 1 drop 2 times daily for 7 days, THEN 1 drop daily for 7 days., Disp: 5 mL, Rfl: 1     sodium chloride 0.9 % neb solution, Apply 5 mLs to eye (Patient not taking: Reported on 2022), Disp: , Rfl:      sodium chloride 0.9 % neb solution, 5 mLs by Other route 6 times daily Medically necessary for scleral contact lens use. May use 5ml vials 6x/day for contact lens use (Patient not taking: Reported on 2022), Disp: 3000 mL, Rfl: 11    Current Facility-Administered Medications:      botulinum toxin type A (DYSPORT) injection 500 Units, 500 Units, Intramuscular, Q90 Days, America Gonzalez MD, 500 Units at 22 1406     botulinum toxin type A (DYSPORT) injection 500 Units, 500 Units, Intramuscular, Q90 Days, America Gonzalez MD, 500 Units at 22 1028       BOTULINUM NEUROTOXIN INJECTION PROCEDURES    VERIFICATION OF PATIENT IDENTIFICATION AND PROCEDURE     Initials   Patient Name SES   Patient  SES   Procedure Verified by: KIM     Prior to the start of the procedure and with procedural staff participation, I verbally confirmed the patient s identity using two indicators, relevant allergies, that the procedure was appropriate and matched the consent or emergent situation, and that the correct equipment/implants were available. Immediately prior to starting the procedure I conducted the Time Out with the procedural staff and re-confirmed the patient s name, procedure, and site/side. (The Joint Commission universal protocol was followed.)  Yes    Sedation (Moderate or Deep): None    ABOVE ASSESSMENTS PERFORMED BY    America Gonzalez MD      INDICATIONS FOR PROCEDURES  Roxanna Celis is a 52 year old patient with involuntary muscle spasms and pain secondary to the diagnosis of cervical  dystonia. Her baseline symptoms have been recalcitrant to oral medications and conservative therapy.  She is here today for reinjection with Dysport.    GOAL OF PROCEDURE  The goal of this procedure is to increase active range of motion, improve volitional motor control, decrease pain  and enhance functional independence.      TOTAL DOSE ADMINISTERED  Dose Administered:  500 units  Dysport (Botulinum Toxin Type A)        1:1 Dilution   Unavoidable Drug Waste: No.  Diluent Used:  Preservative Free Normal Saline  Total Volume of Diluent Used:  5 ml  Lot # [id number] with Expiration Date:  06/30/2022  NDC #: Dysport 500u ([address]-28)      CONSENT  The risks, benefits, and treatment options were discussed with Roxanna KEE Lalita and she agreed to proceed.    Written consent was obtained by [facility].     EQUIPMENT USED  Needle-37mm stimulating/recording  Needle-30 gauge  EMG/NCS Machine    SKIN PREPARATION  Skin preparation was performed using an alcohol wipe.    GUIDANCE DESCRIPTION  Electro-myographic guidance was necessary throughout the procedure to accurately identify all areas of dystonic muscles while avoiding injection of non-dystonic muscles, neighboring nerves and nearby vascular structures.       AREA/MUSCLE INJECTED: 500 UNITS DYSPORT = TOTAL DOSE, 1:1 DILUTION  1. NECK & SHOULDER GIRDLE MUSCLES: 260 UNITS DYSPORT = TOTAL DOSE   Right Rectus Capitis - (upper cervical) - 30 units of Dysport at 1 site/s.   Left Rectus Capitis - (upper cervical) - 30 units of Dysport at 1 site/s.     Right Splenius Cervicis - 30 units of Dysport at 1 site/s.   Left Splenius Cervicis - 30 units of Dysport at 1 site/s.    Right Levator Scapulae - 10 units of Dysport at 1 site/s.   Left Levator Scapulae - 40 units of Dysport at 1 site/s.    Right Sternocleidomastoid - 10 units of Dysport at 1 site/s.  Left Sternocleidomastoid - 10 units of Dysport at 1 site/s.    Right Cervical Paraspinals - 15 units of Dysport at 4 site/s.  Left Cervical  Paraspinals - 15 units of Dysport at 4 site/s.    Right Thoracic Paraspinals - 20 units of Dysport at 4 site/s.  Left Thoracic Paraspinals - 20 units of Dysport at 4 site/s.    1. SCALP MUSCLES: 240 UNITS DYSPORT = TOTAL DOSE  Right Occipitalis - 70 units of Dysport at 14 site/s.   Left Occipitails - 70 units of Dysport at 14 site/s.     Right Temporalis - 50 units of Dysport at 4 site/s.    Left Temporalis - 50 units of Dysport at 4 site/s.       RESPONSE TO PROCEDURE  Roxanna Celis tolerated the procedure well and there were no immediate complications. She was allowed to recover for an appropriate period of time and was discharged home in stable condition.    ASSESSMENT AND PLAN   1. Botulinum toxin injections: Dose of Dysport increased back up from 400 units to 500 units, but caution was used in posterior neck muscles as to prevent excessive weakness which was experienced with previous injections. Patient will continue to monitor response and report at next appointment.   2. Referrals: None.   3. Follow up: Roxanna Celis was rescheduled for the next series of injections in 12 weeks, at which time we will evaluate response to today's injections. she may call the clinic prior with any questions or concerns prior to the next appointment.         Again, thank you for allowing me to participate in the care of your patient.        Sincerely,        America Gonzalez MD

## 2022-11-17 NOTE — TELEPHONE ENCOUNTER
Central Prior Authorization Team - Phone: [phone]     PA Initiation    Medication: Tretinoin 0.05% cream - PA INITIATED  Insurance Company:    Pharmacy Filling the Rx: [id number] [place] [facility] [place] [facility]  Filling Pharmacy Phone: [phone]  Filling Pharmacy Fax:    Start Date: 11/17/2022

## 2022-11-17 NOTE — PROGRESS NOTES
[facility]     PM&R CLINIC NOTE  BOTULINUM TOXIN PROCEDURE      HPI  Chief Complaint   Patient presents with     Procedure     Dysport     Roxanna Celis is a 52 year old female with a history of cervical dystonia who presents to clinic for botulinum toxin injections for management of involuntary muscle spasms and neck pain secondary to her cervical dystonia.     SINCE LAST VISIT  Roxanna Celis was last seen here in clinic on 2022, at which time she received 400 units of Dysport, which was a decrease from previous dose of 500 units due to posterior neck muscle weakness.    Patient denies new medical diagnoses, illnesses, hospitalizations, emergency room visits, and injuries since the previous injection with botulinum neurotoxin. She is currently participating in PT at [facility].     RESPONSE TO PREVIOUS TREATMENT    Side effects: None, although she did have weakness with the previous round of Botox.     Pain Improvement: Yes. She experienced significant improvement of her neck and occipital pain. She had improvement in the upper back for about a month, and then improvement in the remaining areas gave her ~9 weeks of benefit.    Dystonia Improvement: Yes, she had significant improvement of the cervical dystonia, which seemed to last approximately 9 weeks.       PHYSICAL EXAM  VS: /73 (BP Location: Right arm, Patient Position: Sitting, Cuff Size: Adult Regular)   Pulse 101    GEN: Pleasant and cooperative, in no acute distress  HEENT: No facial asymmetry  NECK AND TRUNK PATTERN:   Head Tremor: Pt states tremor is intermittent   Left Side-bending: Present but patient states she feels she has more right side bending now  Left Shoulder Elevation: slightly elevated  Head with right shift  Focalized pain at occipitalis - has pain today during visit    ALLERGIES  Allergies   Allergen Reactions     Contrast Dye Anaphylaxis     Other reaction(s): almost   from it     Diagnostic X-Ray Materials Anaphylaxis     IVP DYE     Lisinopril Anaphylaxis     Lisinopril-Hctz Anaphylaxis     Maple Flavor Anaphylaxis     MAPLE SYRUP  MAPLE SYRUP  MAPLE SYRUP     Maple Tree Anaphylaxis     Allergy includes maple syrup.     No Clinical Screening - See Comments      maple syrup==anaphylaxis  Dairy- causes to not feel well     Gluten Itching and Swelling     Gluten Meal Itching and Swelling     Cramping     [place] Meal Unknown     Chamomile Unknown     Codeine Sulfate Cramps and GI Disturbance     Food Unknown     Grapes, almond, lactose, gluten, real maple syrup     Nsaids Other (See Comments) and Unknown     Patient had bariatric surgery. Should not ever take NSAIDS due to high risk for gastric ulcers.   Patient had bariatric surgery. Should not ever take NSAIDS due to high risk for gastric ulcers.      Polyethylene Glycol Unknown     Per allergy testing  Per allergy testing       Erythromycin Nausea and Vomiting, Cramps, Diarrhea and Nausea     PN: LW Reaction: stomach upset  cramping     Lactose Diarrhea     Morphine Other (See Comments)       CURRENT MEDICATIONS    Current Outpatient Medications:      acetylcysteine (MUCOMYST) 10 % nebulizer solution, Apply 2 drops to eye, Disp: , Rfl:      acetylcysteine 10 % (MUCOMYST) 10% SOLN, Place 2 drops into both eyes 6 times daily, Disp: 30 mL, Rfl: 2     autologous serum compounded ophthalmic solution, Place 1 drop into both eyes every 2 hours (while awake), Disp: , Rfl:      BOTOX 100 units injection, Inject 250 Units into the muscle once Lot # /C3 with Expiration Date:  11/2020, Disp: , Rfl:      buPROPion (WELLBUTRIN XL) 300 MG 24 hr tablet, Take 300 mg by mouth every morning, Disp: , Rfl:      clonazePAM (KLONOPIN) 0.5 MG tablet, , Disp: , Rfl:      clonazePAM (KLONOPIN) 1 MG tablet, Take 1 mg by mouth 2 times daily as needed, Disp: , Rfl:      COMPOUNDED NON-CONTROLLED SUBSTANCE (CMPD RX) - PHARMACY TO MIX COMPOUNDED  MEDICATION, Low dose naltrexone 1mml. Start 1ml daily for 7d, then 1.5ml daily for 7d, then 2ml daily for 7 and so on until max of 6ml daily., Disp: 120 mL, Rfl: 1     cyanocobalamin (VITAMIN B-12) 1000 MCG tablet, Take 1,000 mcg by mouth daily, Disp: , Rfl:      doxycycline monohydrate (ADOXA) 50 MG tablet, Take 1 tablet (50 mg) by mouth 2 times daily, Disp: 180 tablet, Rfl: 3     DULoxetine (CYMBALTA) 60 MG capsule, TAKE 2 CAPSULES BY MOUTH EVERY DAY, Disp: , Rfl:      EPINEPHrine (ANY BX GENERIC EQUIV) 0.3 MG/0.3ML injection 2-pack, , Disp: , Rfl:      ibuprofen (ADVIL/MOTRIN) 200 MG capsule, Take 200 mg by mouth every 4 hours as needed., Disp: , Rfl:      iloperidone (FANAPT) 6 MG TABS tablet, Take 4 mg by mouth daily , Disp: , Rfl:      lifitegrast (XIIDRA) 5 % opthalmic solution, Place 1 drop into both eyes 2 times daily, Disp: 180 each, Rfl: 1     LINZESS 290 MCG capsule, TAKE 1 CAPSULE BY MOUTH EVERY DAY, Disp: , Rfl: 3     medical cannabis (Patient's own supply), See Admin Instructions (The purpose of this order is to document that the patient reports taking medical cannabis.  This is not a prescription, and is not used to certify that the patient has a qualifying medical condition.), Disp: , Rfl:      multivitamin w/minerals (THERA-VIT-M) tablet, Take 1 tablet by mouth daily, Disp: , Rfl:      NALTREXONE HCL PO, Take 3 mg by mouth daily, Disp: , Rfl:      ONABOTULINUMTOXINA IJ, Inject 250 Units as directed once LOT # /C3 EXPIRATION: 2020, Disp: , Rfl:      prednisolone 0.25% and hyaluronate in balanced salt SUSP compounded ophthalmic suspension, Place 1 Drop into both eyes 4 times daily. Discard bottle 15 days after opening. Refrigerate. Unopened bottle expires:     .       ., Disp: 15 mL, Rfl: 11     predniSONE (DELTASONE) 5 MG tablet, 4tab=20 mg qd x 7 days, 3tab=15 mg qd x 5 days, 2tab=10 mg qd, Disp: 100 tablet, Rfl: 2     sodium chloride 0.9 % neb solution, Medically necessary for scleral  contact lens use. May use 3 ml 6 times a day for contact lens use, Disp: 300 mL, Rfl: 11     Soft Lens Products (B&L SENSITIVE EYES) SOLN, Apply 1 drop to eye, Disp: , Rfl:      tretinoin (RETIN-A) 0.025 % external cream, Apply a pea size to entire face QD, Disp: 45 g, Rfl: 11     tretinoin (RETIN-A) 0.05 % external cream, Spread a pea size amount into affected area topically at bedtime.  Use sunscreen SPF>20., Disp: 45 g, Rfl: 11     TYRVAYA 0.03 MG/ACT nasal spray, USE 1 SPRAY IN EACH NOSTRIL TWICE DAILY., Disp: , Rfl:      Vitamin D3 (CHOLECALCIFEROL) 25 mcg (1000 units) tablet, Take by mouth daily, Disp: , Rfl:      White Petrolatum-Mineral Oil (SYSTANE NIGHTTIME) OINT, Apply 3.5 g to eye 4 times daily as needed (dry eyes), Disp: 7 g, Rfl: 11     aspirin-acetaminophen-caffeine (EXCEDRIN MIGRAINE) 250-250-65 MG tablet, Take 1 tablet by mouth every 6 hours as needed (Patient not taking: Reported on 11/17/2022), Disp: , Rfl:      buPROPion (WELLBUTRIN XL) 300 MG 24 hr tablet, Take 300 mg by mouth (Patient not taking: Reported on 11/17/2022), Disp: , Rfl:      COMPOUNDED NON-CONTROLLED SUBSTANCE (CMPD RX) - PHARMACY TO MIX COMPOUNDED MEDICATION, Take 2 mg by mouth daily Low dose naltrexone 2mg per capsule, to be taken by mouth every day. (Patient not taking: Reported on 7/8/2022), Disp: 30 capsule, Rfl: 0     DULoxetine HCl (CYMBALTA PO), Take 60 mg by mouth (Patient not taking: Reported on 11/17/2022), Disp: , Rfl:      EPINEPHrine (EPIPEN) 0.3 MG/0.3ML injection, Inject 0.3 mg into the muscle once as needed  (Patient not taking: Reported on 11/17/2022), Disp: , Rfl:      methocarbamol (ROBAXIN) 500 MG tablet, Take 1 tablet (500 mg) by mouth 2 times daily as needed for muscle spasms (Patient not taking: Reported on 7/8/2022), Disp: 60 tablet, Rfl: 0     prednisoLONE acetate (PRED FORTE) 1 % ophthalmic suspension, Place 1 drop into both eyes 3 times daily for 7 days, THEN 1 drop 2 times daily for 7 days, THEN 1 drop  daily for 7 days., Disp: 5 mL, Rfl: 1     sodium chloride 0.9 % neb solution, Apply 5 mLs to eye (Patient not taking: Reported on 2022), Disp: , Rfl:      sodium chloride 0.9 % neb solution, 5 mLs by Other route 6 times daily Medically necessary for scleral contact lens use. May use 5ml vials 6x/day for contact lens use (Patient not taking: Reported on 2022), Disp: 3000 mL, Rfl: 11    Current Facility-Administered Medications:      botulinum toxin type A (DYSPORT) injection 500 Units, 500 Units, Intramuscular, Q90 Days, America Gonzalez MD, 500 Units at 22 1406     botulinum toxin type A (DYSPORT) injection 500 Units, 500 Units, Intramuscular, Q90 Days, America Gonzalez MD, 500 Units at 22 1028       BOTULINUM NEUROTOXIN INJECTION PROCEDURES    VERIFICATION OF PATIENT IDENTIFICATION AND PROCEDURE     Initials   Patient Name SES   Patient  SES   Procedure Verified by: KIM     Prior to the start of the procedure and with procedural staff participation, I verbally confirmed the patient s identity using two indicators, relevant allergies, that the procedure was appropriate and matched the consent or emergent situation, and that the correct equipment/implants were available. Immediately prior to starting the procedure I conducted the Time Out with the procedural staff and re-confirmed the patient s name, procedure, and site/side. (The Joint Commission universal protocol was followed.)  Yes    Sedation (Moderate or Deep): None    ABOVE ASSESSMENTS PERFORMED BY    America Gonzalez MD      INDICATIONS FOR PROCEDURES  Roxanna Celis is a 52 year old patient with involuntary muscle spasms and pain secondary to the diagnosis of cervical dystonia. Her baseline symptoms have been recalcitrant to oral medications and conservative therapy.  She is here today for reinjection with Dysport.    GOAL OF PROCEDURE  The goal of this procedure is to increase active range of motion, improve  volitional motor control, decrease pain  and enhance functional independence.      TOTAL DOSE ADMINISTERED  Dose Administered:  500 units  Dysport (Botulinum Toxin Type A)        1:1 Dilution   Unavoidable Drug Waste: No.  Diluent Used:  Preservative Free Normal Saline  Total Volume of Diluent Used:  5 ml  Lot # [id number] with Expiration Date:  06/30/2022  NDC #: Dysport 500u ([address]-67)      CONSENT  The risks, benefits, and treatment options were discussed with Roxanna KEE Lalita and she agreed to proceed.    Written consent was obtained by [facility].     EQUIPMENT USED  Needle-37mm stimulating/recording  Needle-30 gauge  EMG/NCS Machine    SKIN PREPARATION  Skin preparation was performed using an alcohol wipe.    GUIDANCE DESCRIPTION  Electro-myographic guidance was necessary throughout the procedure to accurately identify all areas of dystonic muscles while avoiding injection of non-dystonic muscles, neighboring nerves and nearby vascular structures.       AREA/MUSCLE INJECTED: 500 UNITS DYSPORT = TOTAL DOSE, 1:1 DILUTION  1. NECK & SHOULDER GIRDLE MUSCLES: 260 UNITS DYSPORT = TOTAL DOSE   Right Rectus Capitis - (upper cervical) - 30 units of Dysport at 1 site/s.   Left Rectus Capitis - (upper cervical) - 30 units of Dysport at 1 site/s.     Right Splenius Cervicis - 30 units of Dysport at 1 site/s.   Left Splenius Cervicis - 30 units of Dysport at 1 site/s.    Right Levator Scapulae - 10 units of Dysport at 1 site/s.   Left Levator Scapulae - 40 units of Dysport at 1 site/s.    Right Sternocleidomastoid - 10 units of Dysport at 1 site/s.  Left Sternocleidomastoid - 10 units of Dysport at 1 site/s.    Right Cervical Paraspinals - 15 units of Dysport at 4 site/s.  Left Cervical Paraspinals - 15 units of Dysport at 4 site/s.    Right Thoracic Paraspinals - 20 units of Dysport at 4 site/s.  Left Thoracic Paraspinals - 20 units of Dysport at 4 site/s.    1. SCALP MUSCLES: 240 UNITS DYSPORT = TOTAL DOSE  Right Occipitalis - 70  units of Dysport at 14 site/s.   Left Occipitails - 70 units of Dysport at 14 site/s.     Right Temporalis - 50 units of Dysport at 4 site/s.    Left Temporalis - 50 units of Dysport at 4 site/s.       RESPONSE TO PROCEDURE  Roxanna Celis tolerated the procedure well and there were no immediate complications. She was allowed to recover for an appropriate period of time and was discharged home in stable condition.    ASSESSMENT AND PLAN   1. Botulinum toxin injections: Dose of Dysport increased back up from 400 units to 500 units, but caution was used in posterior neck muscles as to prevent excessive weakness which was experienced with previous injections. Patient will continue to monitor response and report at next appointment.   2. Referrals: None.   3. Follow up: Roxanna Celis was rescheduled for the next series of injections in 12 weeks, at which time we will evaluate response to today's injections. she may call the clinic prior with any questions or concerns prior to the next appointment.

## 2022-11-18 NOTE — TELEPHONE ENCOUNTER
acetylcysteine 10 % (MUCOMYST) 10% SOLN  Last Written Prescription Date:  4/26/2022  Last Fill Quantity: 30,   # refills: 2  Last Office Visit :  9/15/2022  Future Office visit:   12/6/2022     Cinthia Fontenot MD  Ophthalmology     Plan:  - continue current therapy:                 - warm compresses 2-3 times per day                 - Xiidra BID each eye                 - pred healon QID each eye (has cut down to BID but would like to go back to QID)                 - serum tears 2-3 times per day                 - systane ointment PRN                 - mucomyst 4-6 times per day both eyes  - medicamentosa may be contributing to symptoms (worth trying to decrease medications one at a time to see if symptoms worsen)  - continue scleral lenses during the day  - follow up with Dr. Ocampo in 5 weeks    Routing refill request to provider for review/approval because:  Please clarify orders.   Is it 4-6 times a day?  Or 6 times a day?  Orders in last note and orders from pharmacy do not match.  Please review and send new order.       Caryn Manning RN  Central Triage Red Flags/Med Refills

## 2022-11-21 ENCOUNTER — TELEPHONE (OUTPATIENT)
Dept: PALLIATIVE MEDICINE | Facility: CLINIC | Age: 52
End: 2022-11-21

## 2022-11-21 NOTE — TELEPHONE ENCOUNTER
LVM for patient to schedule 60 minute in clinic transfer of care appointment with Estella Reeves. When patient returns the call please transfer patient to [phone] to schedule        Marcia Thomas    [facility]

## 2022-11-22 DIAGNOSIS — H04.123 DRY EYES, BILATERAL: ICD-10-CM

## 2022-11-22 NOTE — TELEPHONE ENCOUNTER
prednisolone 0.25% and hyaluronate in balanced salt SUSP compounded ophthalmic suspension   Last Written Prescription Date:   6/20/2022  Last Fill Quantity: 5,   # refills: 1  Last Office Visit :  9/15/2022  Future Office visit:   12/6/2022     Cinthia Fontenot MD  Ophthalmology     Plan:  - continue current therapy:                 - warm compresses 2-3 times per day                 - Xiidra BID each eye                 - pred healon QID each eye (has cut down to BID but would like to go back to QID)                 - serum tears 2-3 times per day                 - systane ointment PRN                 - mucomyst 4-6 times per day both eyes  - medicamentosa may be contributing to symptoms (worth trying to decrease medications one at a time to see if symptoms worsen)  - continue scleral lenses during the day  - follow up with Dr. Ocampo in 5 weeks        Patient disposition:   Follow up with Dr. Ocampo 10/27/22     Patient seen with Dr. Terrence Fontenot MD  Resident Physician, PGY-2  Department of Ophthalmology  09/15/22 9:52 AM    Routing refill request to provider for review/approval because:  Drug not on the G, P or [facility] refill protocol or controlled substance      Caryn Manning RN  Central Triage Red Flags/Med Refills

## 2022-11-23 NOTE — TELEPHONE ENCOUNTER
Central Prior Authorization Team - Phone: [phone]     Prior Authorization Follow Up    Called [facility] at [phone]  to check status of Tretinoin case ID [id number]    Request is still under review. Insurance will have a determination by 2-3 business days.    [place] DEPT  will follow up again on status in 1 to 2 business days.

## 2022-11-24 DIAGNOSIS — H04.123 BILATERAL DRY EYES: ICD-10-CM

## 2022-11-25 ENCOUNTER — TELEPHONE (OUTPATIENT)
Dept: PALLIATIVE MEDICINE | Facility: CLINIC | Age: 52
End: 2022-11-25

## 2022-11-25 DIAGNOSIS — G89.29 CHRONIC MYOFASCIAL PAIN: ICD-10-CM

## 2022-11-25 DIAGNOSIS — M79.18 CHRONIC MYOFASCIAL PAIN: ICD-10-CM

## 2022-11-25 RX ORDER — LIFITEGRAST 50 MG/ML
1 SOLUTION/ DROPS OPHTHALMIC 2 TIMES DAILY
Qty: 30 EACH | Refills: 4 | Status: SHIPPED | OUTPATIENT
Start: 2022-11-25 | End: 2022-12-09

## 2022-11-25 NOTE — TELEPHONE ENCOUNTER
Central Prior Authorization Team - Phone: [phone]     Prior Authorization Follow Up    Called [facility]  to check status of TRETINOIN cream Case ID [id number]    Request is still under review.  stated will have a determination by no later than December 2nd.    [place] [facility]  will follow up again on status in 1 to 2 business days.

## 2022-11-25 NOTE — TELEPHONE ENCOUNTER
XIIDRA 5% EYE DROPS  Last Written Prescription Date:   4/26/2022  Last Fill Quantity: 180,   # refills: 1  Last Office Visit :  9/15/2022  Future Office visit:   12/6/2022     Cinthia Fontenot MD  Ophthalmology       Plan:  - continue current therapy:                 - warm compresses 2-3 times per day                 - Xiidra BID each eye                 - pred healon QID each eye (has cut down to BID but would like to go back to QID)                 - serum tears 2-3 times per day                 - systane ointment PRN                 - mucomyst 4-6 times per day both eyes  - medicamentosa may be contributing to symptoms (worth trying to decrease medications one at a time to see if symptoms worsen)  - continue scleral lenses during the day  - follow up with Dr. Ocampo in 5 weeks        Patient disposition:   Follow up with Dr. Ocampo 10/27/22     Patient seen with Dr. Terrence Fontenot MD  Resident Physician, PGY-2  Department of Ophthalmology  09/15/22 9:52 AM  Routing refill request to provider for review/approval because:  Drug not on the G, P or [facility] refill protocol or controlled substance      Caryn Manning RN  Central Triage Red Flags/Med Refills

## 2022-11-28 DIAGNOSIS — G89.29 CHRONIC MYOFASCIAL PAIN: ICD-10-CM

## 2022-11-28 DIAGNOSIS — M79.18 CHRONIC MYOFASCIAL PAIN: ICD-10-CM

## 2022-11-28 RX ORDER — METHOCARBAMOL 500 MG/1
500 TABLET, FILM COATED ORAL 2 TIMES DAILY PRN
Qty: 60 TABLET | Refills: 0 | Status: SHIPPED | OUTPATIENT
Start: 2022-11-28 | End: 2023-02-14

## 2022-11-28 NOTE — TELEPHONE ENCOUNTER
Received fax from pharmacy requesting refill(s) for methocarbamol (ROBAXIN) 500 MG tablet     Last refilled on 12/14/21    Pt last seen on 10/6/22  Next appt scheduled for 12/14/22  with RAMSEY Hanna CNP      E[facility]prescribe to:     [id number] [place] [facility] [place] [id number] [facility] [address]     Will facilitate refill.

## 2022-11-29 ENCOUNTER — TELEPHONE (OUTPATIENT)
Dept: INTERNAL MEDICINE | Facility: CLINIC | Age: 52
End: 2022-11-29

## 2022-11-29 DIAGNOSIS — L98.8 FACIAL RHYTIDS: ICD-10-CM

## 2022-11-29 NOTE — TELEPHONE ENCOUNTER
Central Prior Authorization Team - Phone: [phone]     Prior Authorization Approval    Authorization Effective Date: 10/18/2022  Authorization Expiration Date: 11/29/2023  Medication: Tretinoin 0.05% cream - [place] APPROVED  Approved Dose/Quantity: 45  Reference #:     Insurance Company:    Expected CoPay:       CoPay Card Available:      Foundation Assistance Needed:    Which Pharmacy is filling the prescription (Not needed for infusion/clinic administered): [id number], [place] - [address] [facility] [address]  Pharmacy Notified: Yes  Patient Notified: YesComment:  pharmacy will notify when ready

## 2022-11-29 NOTE — TELEPHONE ENCOUNTER
Central Prior Authorization Team - Phone: [phone]     Prior Authorization Follow Up    Called [facility]  to check status of TRETINOIN CREAM    Request is in review. Insurance will have a determination by 1-3 BUSINESS days. No later than December 2nd via [place]     [place] DEPT  will follow up again on status in 1 to 2 business days.

## 2022-11-29 NOTE — TELEPHONE ENCOUNTER
"Per pharmacy    \"Alt requested tretinoin (RETIN-A) 0.05 % external cream no longer covered by insurance. Please resubmit a prior auth.\"    [facility] [place] [address]   [phone]   [phone] fx  "

## 2022-11-29 NOTE — TELEPHONE ENCOUNTER
Called and spoke to the pharmacy staff. Per the telephone encounter from 11/16/22, PA has been approved. Pharmacy confirmed they received the phone call that the PA was approved and the medication is currently ready for the patient to pick-up.    Closing encounter.    Chrystal Gagnon RN

## 2022-12-04 DIAGNOSIS — H04.123 BILATERAL DRY EYES: ICD-10-CM

## 2022-12-05 RX ORDER — LIFITEGRAST 50 MG/ML
SOLUTION/ DROPS OPHTHALMIC
Refills: 1 | OUTPATIENT
Start: 2022-12-05

## 2022-12-09 ENCOUNTER — TELEPHONE (OUTPATIENT)
Dept: OPHTHALMOLOGY | Facility: CLINIC | Age: 52
End: 2022-12-09

## 2022-12-09 DIAGNOSIS — H04.123 BILATERAL DRY EYES: ICD-10-CM

## 2022-12-09 RX ORDER — LIFITEGRAST 50 MG/ML
1 SOLUTION/ DROPS OPHTHALMIC 2 TIMES DAILY
Qty: 60 EACH | Refills: 11 | Status: SHIPPED | OUTPATIENT
Start: 2022-12-09 | End: 2022-12-16

## 2022-12-09 NOTE — TELEPHONE ENCOUNTER
Message left on triage line by patient last night    Pt needing updated Rx for Xiidra    Rx last written for 30 each    Pt needing 90 day supply    Rx resent with updated information 60 each ok for 90 day supply    Haider Webber RN 7:59 AM 12/09/22

## 2022-12-14 ENCOUNTER — TELEPHONE (OUTPATIENT)
Dept: PHYSICAL MEDICINE AND REHAB | Facility: CLINIC | Age: 52
End: 2022-12-14

## 2022-12-14 NOTE — TELEPHONE ENCOUNTER
[facility] Call Center    Phone Message    May a detailed message be left on voicemail: yes     Reason for Call: Other: Patient called and wanted to discuss her previous botox injections since they have not been helping. Please call back when available.       Action Taken: Other: PMR    Travel Screening: Not Applicable

## 2022-12-15 NOTE — TELEPHONE ENCOUNTER
[facility] Call Center    Phone Message    May a detailed message be left on voicemail: yes     Reason for Call: Other: Pt returning missed call from  JIMMY Castillo.     Please call pt back at [phone] to discuss.    Action Taken: Message routed to:  Other: WB PMR    Travel Screening: Not Applicable

## 2022-12-16 DIAGNOSIS — H04.123 BILATERAL DRY EYES: ICD-10-CM

## 2022-12-16 RX ORDER — LIFITEGRAST 50 MG/ML
1 SOLUTION/ DROPS OPHTHALMIC 2 TIMES DAILY
Qty: 60 EACH | Refills: 3 | Status: SHIPPED | OUTPATIENT
Start: 2022-12-16 | End: 2022-12-23

## 2022-12-23 ENCOUNTER — TELEPHONE (OUTPATIENT)
Dept: OPHTHALMOLOGY | Facility: CLINIC | Age: 52
End: 2022-12-23

## 2022-12-23 DIAGNOSIS — H04.123 BILATERAL DRY EYES: ICD-10-CM

## 2022-12-23 RX ORDER — LIFITEGRAST 50 MG/ML
1 SOLUTION/ DROPS OPHTHALMIC 2 TIMES DAILY
Qty: 60 EACH | Refills: 11 | Status: SHIPPED | OUTPATIENT
Start: 2022-12-23 | End: 2023-06-13

## 2022-12-23 NOTE — TELEPHONE ENCOUNTER
Updated pt Rx sent with ok for 90 day supply.    Pt has direct number if having any trouble obtaining.    Haider Webber RN 8:23 AM 12/23/22    ---        Pt left voicemessage on triage line after hours yesterday requesting 90 day supply for Xiidra    Last Rx for xiidra appeared to have ok for 90 day supply (long-term medication box checked)    Rx resent per request with comment ok to dispense 90 day supply.    Will update pt today that Rx was sent.    Haider Webber RN 7:20 AM 12/23/22

## 2022-12-28 ENCOUNTER — OFFICE VISIT (OUTPATIENT)
Dept: PHYSICAL MEDICINE AND REHAB | Facility: CLINIC | Age: 52
End: 2022-12-28
Payer: COMMERCIAL

## 2022-12-28 DIAGNOSIS — G89.29 CHRONIC BILATERAL LOW BACK PAIN WITHOUT SCIATICA: Primary | ICD-10-CM

## 2022-12-28 DIAGNOSIS — M79.18 MYOFASCIAL PAIN: ICD-10-CM

## 2022-12-28 DIAGNOSIS — M54.50 CHRONIC BILATERAL LOW BACK PAIN WITHOUT SCIATICA: Primary | ICD-10-CM

## 2022-12-28 PROCEDURE — 20553 NJX 1/MLT TRIGGER POINTS 3/>: CPT | Performed by: PHYSICAL MEDICINE & REHABILITATION

## 2022-12-28 PROCEDURE — 99212 OFFICE O/P EST SF 10 MIN: CPT | Mod: 25 | Performed by: PHYSICAL MEDICINE & REHABILITATION

## 2022-12-28 RX ORDER — BUPIVACAINE HYDROCHLORIDE 5 MG/ML
10 INJECTION, SOLUTION EPIDURAL; INTRACAUDAL ONCE
Status: COMPLETED | OUTPATIENT
Start: 2022-12-28 | End: 2022-12-28

## 2022-12-28 RX ORDER — TRIAMCINOLONE ACETONIDE 40 MG/ML
40 INJECTION, SUSPENSION INTRA-ARTICULAR; INTRAMUSCULAR ONCE
Status: COMPLETED | OUTPATIENT
Start: 2022-12-28 | End: 2022-12-28

## 2022-12-28 RX ADMIN — TRIAMCINOLONE ACETONIDE 40 MG: 40 INJECTION, SUSPENSION INTRA-ARTICULAR; INTRAMUSCULAR at 09:56

## 2022-12-28 RX ADMIN — BUPIVACAINE HYDROCHLORIDE 50 MG: 5 INJECTION, SOLUTION EPIDURAL; INTRACAUDAL at 09:56

## 2022-12-28 NOTE — LETTER
"12/28/2022       RE: Roxanna Celis  [id number] [place] [address]     Dear Colleague,    Thank you for referring your patient, Roxanna Celis, to the [facility] PHYSICAL MEDICINE AND REHABILITATION CLINIC [place] at [facility]. Please see a copy of my visit note below.      [facility]     PHYSICAL MEDICINE & REHABILITATION CLINIC NOTE  TRIGGER POINT INJECTIONS        No chief complaint on file.    HPI:  Roxanna Celis is a 52 year old female with a history of cervical dystonia who presents today for trigger point injections for management of myofascial pain affecting her neck and shoulder muscles. She typically receives intramuscular Dysport injections every 3 months with her last injections on 11/17/2022. Unfortunately, this most recent round of Dysport was essentially ineffective. She has not noticed any effect from the Dysport to date, whereas she usually notices benefit within a few days.    Her pain currently feels similar to when Dysport wears off, but this has been ongoing for many months at this point. Pain is primarily at the base of the skull, down her upper trapezius muscles and into the shoulder blades. It feels very tight at the base of the skull. She also has severe lower back pain, which has been a chronic issue for her. It feels like a \"constant spasm\" in the low thoracic/lumbar region and it radiates out to the sides at the lumbar region. She does not have any weakness, but does report numbness in bilateral lower extremities which is more consistent with her peripheral neuropathy symptoms.       Current Outpatient Medications   Medication Sig Dispense Refill     acetylcysteine 10 % (MUCOMYST) 10% SOLN Place 2 drops into both eyes 6 times daily 30 mL 11     aspirin-acetaminophen-caffeine (EXCEDRIN MIGRAINE) 250-250-65 MG tablet Take 1 tablet by mouth every 6 hours as needed (Patient not taking: Reported " on 2022)       autologous serum compounded ophthalmic solution Place 1 drop into both eyes every 2 hours (while awake)       BOTOX 100 units injection Inject 250 Units into the muscle once Lot # /C3 with Expiration Date:  2020       buPROPion (WELLBUTRIN XL) 300 MG 24 hr tablet Take 300 mg by mouth (Patient not taking: Reported on 2022)       buPROPion (WELLBUTRIN XL) 300 MG 24 hr tablet Take 300 mg by mouth every morning       clonazePAM (KLONOPIN) 0.5 MG tablet        clonazePAM (KLONOPIN) 1 MG tablet Take 1 mg by mouth 2 times daily as needed       COMPOUNDED NON-CONTROLLED SUBSTANCE (CMPD RX) - PHARMACY TO MIX COMPOUNDED MEDICATION Low dose naltrexone 1mml. Start 1ml daily for 7d, then 1.5ml daily for 7d, then 2ml daily for 7 and so on until max of 6ml daily. 120 mL 1     COMPOUNDED NON-CONTROLLED SUBSTANCE (CMPD RX) - PHARMACY TO MIX COMPOUNDED MEDICATION Take 2 mg by mouth daily Low dose naltrexone 2mg per capsule, to be taken by mouth every day. (Patient not taking: Reported on 2022) 30 capsule 0     cyanocobalamin (VITAMIN B-12) 1000 MCG tablet Take 1,000 mcg by mouth daily       doxycycline monohydrate (ADOXA) 50 MG tablet Take 1 tablet (50 mg) by mouth 2 times daily 180 tablet 3     DULoxetine (CYMBALTA) 60 MG capsule TAKE 2 CAPSULES BY MOUTH EVERY DAY       DULoxetine HCl (CYMBALTA PO) Take 60 mg by mouth (Patient not taking: Reported on 2022)       EPINEPHrine (ANY BX GENERIC EQUIV) 0.3 MG/0.3ML injection 2-pack        EPINEPHrine (EPIPEN) 0.3 MG/0.3ML injection Inject 0.3 mg into the muscle once as needed  (Patient not taking: Reported on 2022)       ibuprofen (ADVIL/MOTRIN) 200 MG capsule Take 200 mg by mouth every 4 hours as needed.       iloperidone (FANAPT) 6 MG TABS tablet Take 4 mg by mouth daily        lifitegrast (XIIDRA) 5 % opthalmic solution Place 1 drop into both eyes 2 times daily 60 each 11     LINZESS 290 MCG capsule TAKE 1 CAPSULE BY MOUTH EVERY DAY   3     medical cannabis (Patient's own supply) See Admin Instructions (The purpose of this order is to document that the patient reports taking medical cannabis.  This is not a prescription, and is not used to certify that the patient has a qualifying medical condition.)       methocarbamol (ROBAXIN) 500 MG tablet Take 1 tablet (500 mg) by mouth 2 times daily as needed for muscle spasms 60 tablet 0     multivitamin w/minerals (THERA-VIT-M) tablet Take 1 tablet by mouth daily       NALTREXONE HCL PO Take 3 mg by mouth daily       ONABOTULINUMTOXINA IJ Inject 250 Units as directed once LOT # /C3  EXPIRATION: 03/2020       prednisolone 0.25% and hyaluronate in balanced salt SUSP compounded ophthalmic suspension Place 1 Drop into both eyes 4 times daily. Discard bottle 15 days after opening. Refrigerate. Unopened bottle expires: Strength: 0.25 % 6.67 mL 11     prednisoLONE acetate (PRED FORTE) 1 % ophthalmic suspension Place 1 drop into both eyes 3 times daily for 7 days, THEN 1 drop 2 times daily for 7 days, THEN 1 drop daily for 7 days. 5 mL 1     predniSONE (DELTASONE) 5 MG tablet 4tab=20 mg qd x 7 days, 3tab=15 mg qd x 5 days, 2tab=10 mg qd 100 tablet 2     sodium chloride 0.9 % neb solution Apply 5 mLs to eye (Patient not taking: Reported on 11/17/2022)       sodium chloride 0.9 % neb solution 5 mLs by Other route 6 times daily Medically necessary for scleral contact lens use. May use 5ml vials 6x/day for contact lens use (Patient not taking: Reported on 11/17/2022) 3000 mL 11     sodium chloride 0.9 % neb solution Medically necessary for scleral contact lens use. May use 3 ml 6 times a day for contact lens use 300 mL 11     Soft Lens Products (B&L SENSITIVE EYES) SOLN Apply 1 drop to eye       tretinoin (RETIN-A) 0.025 % external cream Apply a pea size to entire face QD 45 g 11     tretinoin (RETIN-A) 0.05 % external cream Spread a pea size amount into affected area topically at bedtime.  Use sunscreen  SPF>20. 45 g 11     TYRVAYA 0.03 MG/ACT nasal spray USE 1 SPRAY IN EACH NOSTRIL TWICE DAILY.       Vitamin D3 (CHOLECALCIFEROL) 25 mcg (1000 units) tablet Take by mouth daily       White Petrolatum-Mineral Oil (SYSTANE NIGHTTIME) OINT Apply 3.5 g to eye 4 times daily as needed (dry eyes) 7 g 11       Allergies   Allergen Reactions     Contrast Dye Anaphylaxis     Other reaction(s): almost  from it     Diagnostic X-Ray Materials Anaphylaxis     IVP DYE     Lisinopril Anaphylaxis     Lisinopril-Hctz Anaphylaxis     Maple Flavor Anaphylaxis     MAPLE SYRUP  MAPLE SYRUP  MAPLE SYRUP     Maple Tree Anaphylaxis     Allergy includes maple syrup.     No Clinical Screening - See Comments      maple syrup==anaphylaxis  Dairy- causes to not feel well     Gluten Itching and Swelling     Gluten Meal Itching and Swelling     Cramping     [place] Meal Unknown     Chamomile Unknown     Codeine Sulfate Cramps and GI Disturbance     Food Unknown     Grapes, almond, lactose, gluten, real maple syrup     Nsaids Other (See Comments) and Unknown     Patient had bariatric surgery. Should not ever take NSAIDS due to high risk for gastric ulcers.   Patient had bariatric surgery. Should not ever take NSAIDS due to high risk for gastric ulcers.      Polyethylene Glycol Unknown     Per allergy testing  Per allergy testing       Erythromycin Nausea and Vomiting, Cramps, Diarrhea and Nausea     PN: LW Reaction: stomach upset  cramping     Lactose Diarrhea     Morphine Other (See Comments)         PHYSICAL EXAM:  VS: There were no vitals taken for this visit.   GEN: Patient is pleasant and cooperative  SKIN: No lesions or abrasions on exposed skin  MSK: Multiple myofascial trigger points       PROCEDURE: Trigger point injections.     Prior to the start of the procedure and with procedural staff participation, I verbally confirmed the patient s identity using two indicators, relevant allergies, that the procedure was appropriate and matched the  consent or emergent situation, and that the correct equipment/implants were available. Immediately prior to starting the procedure I conducted the Time Out with the procedural staff and re-confirmed the patient s name, procedure, and site/side. (The Joint Commission universal protocol was followed.)  Yes    Sedation (Moderate or Deep): None    Dru% lidocaine: 2 ml  0.5% bupivacaine: 10 ml  [place] mg = 1 ml    Areas of the skin were prepped with ChloraPrep.  Using standard precautions, a 30-gauge 1-inch needle was used to inject a 13 ml mixture of the above medications into the upper trapezius, occipitalis, levator scapulae, thoracic paraspinals, and lumbar paraspinal muscles bilaterally for a total of 30 sites.         Roxanna Celis tolerated the procedure well without any immediate complications. she was allowed to recover for an appropriate period of time and was discharged home in stable condition.  Patient will follow-up regarding response to this procedure.      ASSESSMENT/PLAN:  Roxanna Celis is a 52 year old female who presents to clinic for trigger point injections for management of myofascial pain. Patient tolerated the procedure well, and noted a reduction in pain following the procedure. Steroid was used this time in hopes of achieving a more prolonged response.     Her last round of Dysport injections were not effective as compared to previous rounds, and therefore there is some concern that she may have developed resistance to the Dysport. Plan will be to include her frontalis muscles for a FTAT at next appointment, although it is more likely that she is not resistant and will therefore respond better to her next Dysport round.     Spine  referral was placed today per patient request given her severe low back pain, which seems to be getting worse and significantly interferes with her mobility at this point.       I spent a total of 10 minutes, face-to-face and managing the care of  Roxanna Celis, excluding the procedure. Over 50% of my time was spent counseling the patient and coordinating care. Please see note for details.         Again, thank you for allowing me to participate in the care of your patient.      Sincerely,    America Gonzalez MD

## 2022-12-28 NOTE — PROGRESS NOTES
"  [facility]     PHYSICAL MEDICINE & REHABILITATION CLINIC NOTE  TRIGGER POINT INJECTIONS        No chief complaint on file.    HPI:  Roxanna Celis is a 52 year old female with a history of cervical dystonia who presents today for trigger point injections for management of myofascial pain affecting her neck and shoulder muscles. She typically receives intramuscular Dysport injections every 3 months with her last injections on 11/17/2022. Unfortunately, this most recent round of Dysport was essentially ineffective. She has not noticed any effect from the Dysport to date, whereas she usually notices benefit within a few days.    Her pain currently feels similar to when Dysport wears off, but this has been ongoing for many months at this point. Pain is primarily at the base of the skull, down her upper trapezius muscles and into the shoulder blades. It feels very tight at the base of the skull. She also has severe lower back pain, which has been a chronic issue for her. It feels like a \"constant spasm\" in the low thoracic/lumbar region and it radiates out to the sides at the lumbar region. She does not have any weakness, but does report numbness in bilateral lower extremities which is more consistent with her peripheral neuropathy symptoms.       Current Outpatient Medications   Medication Sig Dispense Refill     acetylcysteine 10 % (MUCOMYST) 10% SOLN Place 2 drops into both eyes 6 times daily 30 mL 11     aspirin-acetaminophen-caffeine (EXCEDRIN MIGRAINE) 250-250-65 MG tablet Take 1 tablet by mouth every 6 hours as needed (Patient not taking: Reported on 11/17/2022)       autologous serum compounded ophthalmic solution Place 1 drop into both eyes every 2 hours (while awake)       BOTOX 100 units injection Inject 250 Units into the muscle once Lot # /C3 with Expiration Date:  11/2020       buPROPion (WELLBUTRIN XL) 300 MG 24 hr tablet Take 300 mg by mouth (Patient not taking: " Reported on 2022)       buPROPion (WELLBUTRIN XL) 300 MG 24 hr tablet Take 300 mg by mouth every morning       clonazePAM (KLONOPIN) 0.5 MG tablet        clonazePAM (KLONOPIN) 1 MG tablet Take 1 mg by mouth 2 times daily as needed       COMPOUNDED NON-CONTROLLED SUBSTANCE (CMPD RX) - PHARMACY TO MIX COMPOUNDED MEDICATION Low dose naltrexone 1mml. Start 1ml daily for 7d, then 1.5ml daily for 7d, then 2ml daily for 7 and so on until max of 6ml daily. 120 mL 1     COMPOUNDED NON-CONTROLLED SUBSTANCE (CMPD RX) - PHARMACY TO MIX COMPOUNDED MEDICATION Take 2 mg by mouth daily Low dose naltrexone 2mg per capsule, to be taken by mouth every day. (Patient not taking: Reported on 2022) 30 capsule 0     cyanocobalamin (VITAMIN B-12) 1000 MCG tablet Take 1,000 mcg by mouth daily       doxycycline monohydrate (ADOXA) 50 MG tablet Take 1 tablet (50 mg) by mouth 2 times daily 180 tablet 3     DULoxetine (CYMBALTA) 60 MG capsule TAKE 2 CAPSULES BY MOUTH EVERY DAY       DULoxetine HCl (CYMBALTA PO) Take 60 mg by mouth (Patient not taking: Reported on 2022)       EPINEPHrine (ANY BX GENERIC EQUIV) 0.3 MG/0.3ML injection 2-pack        EPINEPHrine (EPIPEN) 0.3 MG/0.3ML injection Inject 0.3 mg into the muscle once as needed  (Patient not taking: Reported on 2022)       ibuprofen (ADVIL/MOTRIN) 200 MG capsule Take 200 mg by mouth every 4 hours as needed.       iloperidone (FANAPT) 6 MG TABS tablet Take 4 mg by mouth daily        lifitegrast (XIIDRA) 5 % opthalmic solution Place 1 drop into both eyes 2 times daily 60 each 11     LINZESS 290 MCG capsule TAKE 1 CAPSULE BY MOUTH EVERY DAY  3     medical cannabis (Patient's own supply) See Admin Instructions (The purpose of this order is to document that the patient reports taking medical cannabis.  This is not a prescription, and is not used to certify that the patient has a qualifying medical condition.)       methocarbamol (ROBAXIN) 500 MG tablet Take 1 tablet (500  mg) by mouth 2 times daily as needed for muscle spasms 60 tablet 0     multivitamin w/minerals (THERA-VIT-M) tablet Take 1 tablet by mouth daily       NALTREXONE HCL PO Take 3 mg by mouth daily       ONABOTULINUMTOXINA IJ Inject 250 Units as directed once LOT # /C3  EXPIRATION: 03/2020       prednisolone 0.25% and hyaluronate in balanced salt SUSP compounded ophthalmic suspension Place 1 Drop into both eyes 4 times daily. Discard bottle 15 days after opening. Refrigerate. Unopened bottle expires: Strength: 0.25 % 6.67 mL 11     prednisoLONE acetate (PRED FORTE) 1 % ophthalmic suspension Place 1 drop into both eyes 3 times daily for 7 days, THEN 1 drop 2 times daily for 7 days, THEN 1 drop daily for 7 days. 5 mL 1     predniSONE (DELTASONE) 5 MG tablet 4tab=20 mg qd x 7 days, 3tab=15 mg qd x 5 days, 2tab=10 mg qd 100 tablet 2     sodium chloride 0.9 % neb solution Apply 5 mLs to eye (Patient not taking: Reported on 11/17/2022)       sodium chloride 0.9 % neb solution 5 mLs by Other route 6 times daily Medically necessary for scleral contact lens use. May use 5ml vials 6x/day for contact lens use (Patient not taking: Reported on 11/17/2022) 3000 mL 11     sodium chloride 0.9 % neb solution Medically necessary for scleral contact lens use. May use 3 ml 6 times a day for contact lens use 300 mL 11     Soft Lens Products (B&L SENSITIVE EYES) SOLN Apply 1 drop to eye       tretinoin (RETIN-A) 0.025 % external cream Apply a pea size to entire face QD 45 g 11     tretinoin (RETIN-A) 0.05 % external cream Spread a pea size amount into affected area topically at bedtime.  Use sunscreen SPF>20. 45 g 11     TYRVAYA 0.03 MG/ACT nasal spray USE 1 SPRAY IN EACH NOSTRIL TWICE DAILY.       Vitamin D3 (CHOLECALCIFEROL) 25 mcg (1000 units) tablet Take by mouth daily       White Petrolatum-Mineral Oil (SYSTANE NIGHTTIME) OINT Apply 3.5 g to eye 4 times daily as needed (dry eyes) 7 g 11       Allergies   Allergen Reactions      Contrast Dye Anaphylaxis     Other reaction(s): almost  from it     Diagnostic X-Ray Materials Anaphylaxis     IVP DYE     Lisinopril Anaphylaxis     Lisinopril-Hctz Anaphylaxis     Maple Flavor Anaphylaxis     MAPLE SYRUP  MAPLE SYRUP  MAPLE SYRUP     Maple Tree Anaphylaxis     Allergy includes maple syrup.     No Clinical Screening - See Comments      maple syrup==anaphylaxis  Dairy- causes to not feel well     Gluten Itching and Swelling     Gluten Meal Itching and Swelling     Cramping     [place] Meal Unknown     Chamomile Unknown     Codeine Sulfate Cramps and GI Disturbance     Food Unknown     Grapes, almond, lactose, gluten, real maple syrup     Nsaids Other (See Comments) and Unknown     Patient had bariatric surgery. Should not ever take NSAIDS due to high risk for gastric ulcers.   Patient had bariatric surgery. Should not ever take NSAIDS due to high risk for gastric ulcers.      Polyethylene Glycol Unknown     Per allergy testing  Per allergy testing       Erythromycin Nausea and Vomiting, Cramps, Diarrhea and Nausea     PN: LW Reaction: stomach upset  cramping     Lactose Diarrhea     Morphine Other (See Comments)         PHYSICAL EXAM:  VS: There were no vitals taken for this visit.   GEN: Patient is pleasant and cooperative  SKIN: No lesions or abrasions on exposed skin  MSK: Multiple myofascial trigger points       PROCEDURE: Trigger point injections.     Prior to the start of the procedure and with procedural staff participation, I verbally confirmed the patient s identity using two indicators, relevant allergies, that the procedure was appropriate and matched the consent or emergent situation, and that the correct equipment/implants were available. Immediately prior to starting the procedure I conducted the Time Out with the procedural staff and re-confirmed the patient s name, procedure, and site/side. (The Joint Commission universal protocol was followed.)  Yes    Sedation (Moderate or  Deep): None    Dru% lidocaine: 2 ml  0.5% bupivacaine: 10 ml  [place] mg = 1 ml    Areas of the skin were prepped with ChloraPrep.  Using standard precautions, a 30-gauge 1-inch needle was used to inject a 13 ml mixture of the above medications into the upper trapezius, occipitalis, levator scapulae, thoracic paraspinals, and lumbar paraspinal muscles bilaterally for a total of 30 sites.         Roxanna Celis tolerated the procedure well without any immediate complications. she was allowed to recover for an appropriate period of time and was discharged home in stable condition.  Patient will follow-up regarding response to this procedure.      ASSESSMENT/PLAN:  Roxanna Celis is a 52 year old female who presents to clinic for trigger point injections for management of myofascial pain. Patient tolerated the procedure well, and noted a reduction in pain following the procedure. Steroid was used this time in hopes of achieving a more prolonged response.     Her last round of Dysport injections were not effective as compared to previous rounds, and therefore there is some concern that she may have developed resistance to the Dysport. Plan will be to include her frontalis muscles for a FTAT at next appointment, although it is more likely that she is not resistant and will therefore respond better to her next Dysport round.     Spine  referral was placed today per patient request given her severe low back pain, which seems to be getting worse and significantly interferes with her mobility at this point.     America Gonzalez MD     I spent a total of 10 minutes, face-to-face and managing the care of Roxanna Celis, excluding the procedure. Over 50% of my time was spent counseling the patient and coordinating care. Please see note for details.

## 2022-12-29 ENCOUNTER — OFFICE VISIT (OUTPATIENT)
Dept: OPHTHALMOLOGY | Facility: CLINIC | Age: 52
End: 2022-12-29
Attending: OPHTHALMOLOGY
Payer: COMMERCIAL

## 2022-12-29 DIAGNOSIS — H04.123 DRY EYES, BILATERAL: Primary | ICD-10-CM

## 2022-12-29 DIAGNOSIS — H53.10 SUBJECTIVE VISUAL DISTURBANCE: ICD-10-CM

## 2022-12-29 PROCEDURE — G0463 HOSPITAL OUTPT CLINIC VISIT: HCPCS | Mod: 25

## 2022-12-29 PROCEDURE — 68761 CLOSE TEAR DUCT OPENING: CPT | Performed by: OPHTHALMOLOGY

## 2022-12-29 ASSESSMENT — CONF VISUAL FIELD
OD_SUPERIOR_NASAL_RESTRICTION: 0
OD_SUPERIOR_TEMPORAL_RESTRICTION: 0
OD_INFERIOR_NASAL_RESTRICTION: 0
OS_NORMAL: 1
OD_NORMAL: 1
OS_INFERIOR_NASAL_RESTRICTION: 0
OS_SUPERIOR_NASAL_RESTRICTION: 0
METHOD: COUNTING FINGERS
OD_INFERIOR_TEMPORAL_RESTRICTION: 0
OS_SUPERIOR_TEMPORAL_RESTRICTION: 0
OS_INFERIOR_TEMPORAL_RESTRICTION: 0

## 2022-12-29 ASSESSMENT — VISUAL ACUITY
OD_CC: 20/40
METHOD: SNELLEN - LINEAR
OD_PH_CC: 20/20
CORRECTION_TYPE: GLASSES
OS_CC+: -2
OD_CC+: +1
OS_CC: 20/20

## 2022-12-29 ASSESSMENT — REFRACTION_WEARINGRX
OD_SPHERE: -3.50
OD_CYLINDER: +0.75
OS_CYLINDER: SPHERE
OS_SPHERE: -2.75
OD_AXIS: 155

## 2022-12-29 ASSESSMENT — TONOMETRY
OD_IOP_MMHG: 20
IOP_METHOD: ICARE
OS_IOP_MMHG: 20

## 2022-12-29 ASSESSMENT — EXTERNAL EXAM - RIGHT EYE: OD_EXAM: NORMAL

## 2022-12-29 ASSESSMENT — EXTERNAL EXAM - LEFT EYE: OS_EXAM: NORMAL

## 2022-12-29 NOTE — NURSING NOTE
.  Chief Complaints and History of Present Illnesses   Patient presents with     Follow Up     Chief Complaint(s) and History of Present Illness(es)     Follow Up           Comments    Patient states for the past couple months both of her eyes have been tearing, dry, itching/foreign body sensation. She states that she does get stringy mucous. She is unsure if she still has plugs in. She states that her eyes get red here and there. LE is worse than RE. Patient states that her contact lenses are not feeling as comfortable. No floaters. No flashes of light.     Ocular Meds:serum tears 2-3 times per day  systane ointment PRN  mucomyst 4-6 times per day both eyes  Xiidra BID each eye  pred healon QID each eye   warm compresses 2-3 times per day

## 2022-12-29 NOTE — PROGRESS NOTES
Chief Complaint(s) and History of Present Illness(es)     Follow Up           Comments    Patient states for the past couple months both of her eyes have been   tearing, dry, itching/foreign body sensation. She states that she does get   stringy mucous. She is unsure if she still has plugs in. She states that   her eyes get red here and there. LE is worse than RE. Patient states that   her contact lenses are not feeling as comfortable. No floaters. No flashes   of light.     Ocular Meds:serum tears 2-3 times per day  systane ointment PRN  mucomyst 4-6 times per day both eyes  Xiidra BID each eye  pred healon QID each eye   warm compresses 2-3 times per day             Review of systems for the eyes was negative other than the pertinent positives/negatives listed in the HPI.      Assessment & Plan      Roxanna Celis is a 52 year old female with the following diagnoses:   1. Dry eyes, bilateral    2. Subjective visual disturbance       Here for continued symptoms of dry eye.  Noting worsening since the Fall with reported increased mucous in both eyes   SL exam again today with minimal exam findings.  Fast Tear break up time and good tear lake.  No filaments or mucous in tear film     - hx of intolerance to silicone plugs  - collagen plugs placed at last visit in 6/2022 - 6 month    Offered repeat collagen plugs which helped symptoms previously                 - bilateral lower collagen plugs (0.4 mm 6 months) today                 - bilateral upper collagen plugs (0.3 mm 3 months)  - Return for scleral lens recheck next week as scheduled  - Wishes to reschedule with Dr. Parikh to discuss if there are options of dysesthesia given symptoms >> signs for dry eye syndrome      Plan:  - continue::                 - warm compresses 2-3 times per day                 - Xiidra BID each eye                 - pred healon QID each eye                 - serum tears 3-4 times per day each eye                 - systane ointment PRN                  - mucomyst 4-6 times per day both eyes as needed   - continue scleral lenses during the day      Patient disposition:   Return in about 3 months (around 3/29/2023) for VT only.  Jl for eval as available         Attending Physician Attestation:  Complete documentation of historical and exam elements from today's encounter can be found in the full encounter summary report (not reduplicated in this progress note).  I personally obtained the chief complaint(s) and history of present illness.  I confirmed and edited as necessary the review of systems, past medical/surgical history, family history, social history, and examination findings as documented by others; and I examined the patient myself.  I personally reviewed the relevant tests, images, and reports as documented above.  I formulated and edited as necessary the assessment and plan and discussed the findings and management plan with the patient and family. . - Faheem Ocampo MD

## 2023-01-30 ENCOUNTER — TELEPHONE (OUTPATIENT)
Dept: PALLIATIVE MEDICINE | Facility: CLINIC | Age: 53
End: 2023-01-30
Payer: COMMERCIAL

## 2023-01-30 NOTE — TELEPHONE ENCOUNTER
Patient was not able to change times on 02-08-23 due to other appointments, rescheduled to the following Wednesday, 2/15/2023 at 11:00am

## 2023-01-30 NOTE — TELEPHONE ENCOUNTER
Can you please contact this patient? She is a new patient for me (transfer from [facility]) and needs a 60 minute slot.     When she cancelled her last appointment, she was rescheduled as a 30 minute appointment. Current appointment is 2/8/22 at 3 pm.     Could she either come in at 11 am on 2/8/22 or at 2:30 pm on 2/8/22? Or reschedule her into an open hour appointment on a different day.     Thank you -     Estella Reeves, AUGUSTO

## 2023-02-01 ENCOUNTER — OFFICE VISIT (OUTPATIENT)
Dept: OPTOMETRY | Facility: CLINIC | Age: 53
End: 2023-02-01
Payer: COMMERCIAL

## 2023-02-01 DIAGNOSIS — H57.13 EYE PAIN, BILATERAL: ICD-10-CM

## 2023-02-01 DIAGNOSIS — H04.123 DRY EYES: Primary | ICD-10-CM

## 2023-02-01 ASSESSMENT — REFRACTION_CURRENTRX
OD_SPHERE: +0.50
OS_BRAND: DT1
OD_DIAMETER: 14.1
OS_SPHERE: +0.50
OS_DIAMETER: 14.1
OD_BASECURVE: 8.5
OS_BASECURVE: 8.5
OS_BASECURVE: 7.899
OD_BRAND: DT1
OD_SPHERE: +0.75
OD_BASECURVE: 8.047
OS_DIAMETER: 18.0
OD_DIAMETER: 18.0
OS_BRAND: EYEFIT PRO
OD_BRAND: EYEFIT PRO
OS_SPHERE: +0.50

## 2023-02-01 ASSESSMENT — REFRACTION_WEARINGRX
OD_AXIS: 155
OD_SPHERE: -3.50
OS_SPHERE: -2.75
OS_CYLINDER: SPHERE
OD_CYLINDER: +0.75

## 2023-02-01 ASSESSMENT — VISUAL ACUITY
OD_CC: 20/20
CORRECTION_TYPE: GLASSES, CONTACTS
METHOD: SNELLEN - LINEAR
OS_CC: 20/20/-2

## 2023-02-01 ASSESSMENT — EXTERNAL EXAM - LEFT EYE: OS_EXAM: NORMAL

## 2023-02-01 ASSESSMENT — EXTERNAL EXAM - RIGHT EYE: OD_EXAM: NORMAL

## 2023-02-02 NOTE — PROGRESS NOTES
Warm compresses 2-3x daily   Doxycycline 50 mg orally twice a day   Xiidra twice a day, both eyes   Pred healon 4x daily, both eyes   Serum tears to fill scleral lenses 2-3x daily  Systane ointment PRN when lenses are out   Mucomyst 4-6 times a day, both eyes   S/p Lipiflow x 2 (stopped now as caused punctal plugs to come out)    A/P  1.) Dry Eye each eye  -Severely symptomatic despite max topical treatment and significant objective improvement in corneal appearance with scleral lens wear  -Currently in Eyeprint scleral lenses (failed standard lenses) with excellent fit. Still with CL awareness/discomfort, max wear time 8 hours.   -Happy with nearsighted target in CL's (-2.50 distance Rx)  -Symptoms have worsened with and without lenses  -Likely neuropathic pain. Clinical signs minimal with significant ocular discomfort  -Would trial soft daily BCL's to see if comfort improves. She is previous wearer and demo'd I&R today. I did give several trials to last until neuro appt. If these work better I can update Rx and use them for now    2.) Blepharitis/MGD each eye  -Endorses significant irritation, burning, stinging mostly upper lids  -Failed: maxitrol anjelica, Refresh PM, Genteal Gel, erythromycin anjelica - has only done well with Systane ointment  -s/p 2 rounds of Lipiflow - stopped after second session where they pushed punctal plugs out    Reviewed findings with pt. Neuropathic corneal pain - symptoms>signs. Tolerates Eyeprint scleral lens (excellent fit/wetting) but tolerance decreasing. Will trial daily disposable BCL's. She has neuro-ophth appt coming up     Yes

## 2023-02-09 ENCOUNTER — OFFICE VISIT (OUTPATIENT)
Dept: PHYSICAL MEDICINE AND REHAB | Facility: CLINIC | Age: 53
End: 2023-02-09
Payer: COMMERCIAL

## 2023-02-09 VITALS — HEART RATE: 78 BPM | SYSTOLIC BLOOD PRESSURE: 125 MMHG | DIASTOLIC BLOOD PRESSURE: 80 MMHG

## 2023-02-09 DIAGNOSIS — G24.3 CERVICAL DYSTONIA: Primary | ICD-10-CM

## 2023-02-09 PROCEDURE — 64616 CHEMODENERV MUSC NECK DYSTON: CPT | Mod: RT | Performed by: PHYSICAL MEDICINE & REHABILITATION

## 2023-02-09 PROCEDURE — 64612 DESTROY NERVE FACE MUSCLE: CPT | Mod: LT | Performed by: PHYSICAL MEDICINE & REHABILITATION

## 2023-02-09 PROCEDURE — 95874 GUIDE NERV DESTR NEEDLE EMG: CPT | Performed by: PHYSICAL MEDICINE & REHABILITATION

## 2023-02-09 RX ORDER — SPIRONOLACTONE 50 MG/1
1 TABLET, FILM COATED ORAL DAILY
COMMUNITY
Start: 2023-01-05

## 2023-02-09 NOTE — PROGRESS NOTES
[facility]     PM&R CLINIC NOTE  BOTULINUM TOXIN PROCEDURE      HPI  Chief Complaint   Patient presents with     Procedure     Dysport     Roxanna Celis is a 52 year old female with a history of cervical dystonia who presents to clinic for botulinum toxin injections for management of involuntary muscle spasms and neck pain secondary to her cervical dystonia.     SINCE LAST VISIT  Roxanna Celis was last seen here in clinic on 2022, at which time she received 500 units of Dysport, which was a decrease from previous dose of 500 units due to posterior neck muscle weakness. The patient reported essentially no benefit from these injections and therefore trigger point injections were administered on 2022 and this only gave her about 1.5 weeks of pain relief.     Patient denies new medical diagnoses, illnesses, hospitalizations, emergency room visits, and injuries since the previous injection with botulinum neurotoxin. She is currently participating in PT at [facility].     RESPONSE TO PREVIOUS TREATMENT    Side effects: None.     Pain Improvement: No, this round was ineffective and therefore pain has been poorly-controlled.     Dystonia Improvement: No, this round was ineffective.       PHYSICAL EXAM  VS: /80 (BP Location: Right arm, Patient Position: Sitting, Cuff Size: Adult Regular)   Pulse 78    GEN: Pleasant and cooperative, in no acute distress  HEENT: No facial asymmetry  NECK AND TRUNK PATTERN:   Head Tremor: Pt states tremor is intermittent   Left Side-bending: Present but patient states she feels she has more right side bending now  Left Shoulder Elevation: slightly elevated  Head with right shift  Focalized pain at occipitalis - has pain today during visit    ALLERGIES  Allergies   Allergen Reactions     Contrast Dye Anaphylaxis     Other reaction(s): almost  from it     Diagnostic X-Ray Materials Anaphylaxis     IVP DYE     Lisinopril  Anaphylaxis     Lisinopril-Hctz Anaphylaxis     Maple Flavor Anaphylaxis     MAPLE SYRUP  MAPLE SYRUP  MAPLE SYRUP     Maple Tree Anaphylaxis     Allergy includes maple syrup.     No Clinical Screening - See Comments      maple syrup==anaphylaxis  Dairy- causes to not feel well     Gluten Itching and Swelling     Gluten Meal Itching and Swelling     Cramping     Tranquillity Meal Unknown     Chamomile Unknown     Codeine Sulfate Cramps and GI Disturbance     Food Unknown     Grapes, almond, lactose, gluten, real maple syrup     Nsaids Other (See Comments) and Unknown     Patient had bariatric surgery. Should not ever take NSAIDS due to high risk for gastric ulcers.   Patient had bariatric surgery. Should not ever take NSAIDS due to high risk for gastric ulcers.      Polyethylene Glycol Unknown     Per allergy testing  Per allergy testing       Erythromycin Nausea and Vomiting, Cramps, Diarrhea and Nausea     PN: LW Reaction: stomach upset  cramping     Lactose Diarrhea     Morphine Other (See Comments)       CURRENT MEDICATIONS    Current Outpatient Medications:      acetylcysteine 10 % (MUCOMYST) 10% SOLN, Place 2 drops into both eyes 6 times daily, Disp: 30 mL, Rfl: 11     autologous serum compounded ophthalmic solution, Place 1 drop into both eyes every 2 hours (while awake), Disp: , Rfl:      BOTOX 100 units injection, Inject 250 Units into the muscle once Lot # /C3 with Expiration Date:  11/2020, Disp: , Rfl:      buPROPion (WELLBUTRIN XL) 300 MG 24 hr tablet, Take 300 mg by mouth every morning, Disp: , Rfl:      clonazePAM (KLONOPIN) 0.5 MG tablet, , Disp: , Rfl:      clonazePAM (KLONOPIN) 1 MG tablet, Take 1 mg by mouth 2 times daily as needed, Disp: , Rfl:      cyanocobalamin (VITAMIN B-12) 1000 MCG tablet, Take 1,000 mcg by mouth daily, Disp: , Rfl:      doxycycline monohydrate (ADOXA) 50 MG tablet, Take 1 tablet (50 mg) by mouth 2 times daily, Disp: 180 tablet, Rfl: 3     DULoxetine (CYMBALTA) 60 MG  capsule, TAKE 2 CAPSULES BY MOUTH EVERY DAY, Disp: , Rfl:      EPINEPHrine (ANY BX GENERIC EQUIV) 0.3 MG/0.3ML injection 2-pack, , Disp: , Rfl:      ibuprofen (ADVIL/MOTRIN) 200 MG capsule, Take 200 mg by mouth every 4 hours as needed., Disp: , Rfl:      iloperidone (FANAPT) 6 MG TABS tablet, Take 4 mg by mouth daily , Disp: , Rfl:      lifitegrast (XIIDRA) 5 % opthalmic solution, Place 1 drop into both eyes 2 times daily, Disp: 60 each, Rfl: 11     multivitamin w/minerals (THERA-VIT-M) tablet, Take 1 tablet by mouth daily, Disp: , Rfl:      ONABOTULINUMTOXINA IJ, Inject 250 Units as directed once LOT # /C3 EXPIRATION: 03/2020, Disp: , Rfl:      prednisolone 0.25% and hyaluronate in balanced salt SUSP compounded ophthalmic suspension, Place 1 Drop into both eyes 4 times daily. Discard bottle 15 days after opening. Refrigerate. Unopened bottle expires: Strength: 0.25 %, Disp: 6.67 mL, Rfl: 11     sodium chloride 0.9 % neb solution, Medically necessary for scleral contact lens use. May use 3 ml 6 times a day for contact lens use, Disp: 300 mL, Rfl: 11     Soft Lens Products (B&L SENSITIVE EYES) SOLN, Apply 1 drop to eye, Disp: , Rfl:      spironolactone (ALDACTONE) 50 MG tablet, Take 1 tablet by mouth daily, Disp: , Rfl:      tretinoin (RETIN-A) 0.025 % external cream, Apply a pea size to entire face QD, Disp: 45 g, Rfl: 11     tretinoin (RETIN-A) 0.05 % external cream, Spread a pea size amount into affected area topically at bedtime.  Use sunscreen SPF>20., Disp: 45 g, Rfl: 11     TYRVAYA 0.03 MG/ACT nasal spray, USE 1 SPRAY IN EACH NOSTRIL TWICE DAILY., Disp: , Rfl:      Vitamin D3 (CHOLECALCIFEROL) 25 mcg (1000 units) tablet, Take by mouth daily, Disp: , Rfl:      White Petrolatum-Mineral Oil (SYSTANE NIGHTTIME) OINT, Apply 3.5 g to eye 4 times daily as needed (dry eyes), Disp: 7 g, Rfl: 11     aspirin-acetaminophen-caffeine (EXCEDRIN MIGRAINE) 250-250-65 MG tablet, Take 1 tablet by mouth every 6 hours as  needed (Patient not taking: Reported on 2022), Disp: , Rfl:      buPROPion (WELLBUTRIN XL) 300 MG 24 hr tablet, Take 300 mg by mouth (Patient not taking: Reported on 2023), Disp: , Rfl:      COMPOUNDED NON-CONTROLLED SUBSTANCE (CMPD RX) - PHARMACY TO MIX COMPOUNDED MEDICATION, Low dose naltrexone 1mml. Start 1ml daily for 7d, then 1.5ml daily for 7d, then 2ml daily for 7 and so on until max of 6ml daily. (Patient not taking: Reported on 2023), Disp: 120 mL, Rfl: 1     COMPOUNDED NON-CONTROLLED SUBSTANCE (CMPD RX) - PHARMACY TO MIX COMPOUNDED MEDICATION, Take 2 mg by mouth daily Low dose naltrexone 2mg per capsule, to be taken by mouth every day. (Patient not taking: Reported on 2022), Disp: 30 capsule, Rfl: 0     DULoxetine HCl (CYMBALTA PO), Take 60 mg by mouth (Patient not taking: Reported on 2022), Disp: , Rfl:      EPINEPHrine (EPIPEN) 0.3 MG/0.3ML injection, Inject 0.3 mg into the muscle once as needed  (Patient not taking: Reported on 2022), Disp: , Rfl:      LINZESS 290 MCG capsule, TAKE 1 CAPSULE BY MOUTH EVERY DAY (Patient not taking: Reported on 2023), Disp: , Rfl: 3     medical cannabis (Patient's own supply), See Admin Instructions (The purpose of this order is to document that the patient reports taking medical cannabis.  This is not a prescription, and is not used to certify that the patient has a qualifying medical condition.) (Patient not taking: Reported on 2023), Disp: , Rfl:      methocarbamol (ROBAXIN) 500 MG tablet, Take 1 tablet (500 mg) by mouth 2 times daily as needed for muscle spasms (Patient not taking: Reported on 2023), Disp: 60 tablet, Rfl: 0     NALTREXONE HCL PO, Take 3 mg by mouth daily (Patient not taking: Reported on 2023), Disp: , Rfl:      prednisoLONE acetate (PRED FORTE) 1 % ophthalmic suspension, Place 1 drop into both eyes 3 times daily for 7 days, THEN 1 drop 2 times daily for 7 days, THEN 1 drop daily for 7 days., Disp: 5 mL,  Rfl: 1     predniSONE (DELTASONE) 5 MG tablet, 4tab=20 mg qd x 7 days, 3tab=15 mg qd x 5 days, 2tab=10 mg qd (Patient not taking: Reported on 2023), Disp: 100 tablet, Rfl: 2     sodium chloride 0.9 % neb solution, Apply 5 mLs to eye (Patient not taking: Reported on 2022), Disp: , Rfl:      sodium chloride 0.9 % neb solution, 5 mLs by Other route 6 times daily Medically necessary for scleral contact lens use. May use 5ml vials 6x/day for contact lens use (Patient not taking: Reported on 2022), Disp: 3000 mL, Rfl: 11    Current Facility-Administered Medications:      botulinum toxin type A (DYSPORT) injection 500 Units, 500 Units, Intramuscular, Q90 Days, America Gonzalez MD, 500 Units at 22 [id number]     botulinum toxin type A (DYSPORT) injection 500 Units, 500 Units, Intramuscular, Q90 Days, America Gonzalez MD, 500 Units at 22 1028       BOTULINUM NEUROTOXIN INJECTION PROCEDURES    VERIFICATION OF PATIENT IDENTIFICATION AND PROCEDURE     Initials   Patient Name SES   Patient  SES   Procedure Verified by: SES     Prior to the start of the procedure and with procedural staff participation, I verbally confirmed the patient s identity using two indicators, relevant allergies, that the procedure was appropriate and matched the consent or emergent situation, and that the correct equipment/implants were available. Immediately prior to starting the procedure I conducted the Time Out with the procedural staff and re-confirmed the patient s name, procedure, and site/side. (The Joint Commission universal protocol was followed.)  Yes    Sedation (Moderate or Deep): None    ABOVE ASSESSMENTS PERFORMED BY    America Gonzalez MD      INDICATIONS FOR PROCEDURES  Roxanna Celis is a 52 year old patient with involuntary muscle spasms and pain secondary to the diagnosis of cervical dystonia. Her baseline symptoms have been recalcitrant to oral medications and conservative therapy.  She is  here today for reinjection with Dysport.    GOAL OF PROCEDURE  The goal of this procedure is to increase active range of motion, improve volitional motor control, decrease pain  and enhance functional independence.      TOTAL DOSE ADMINISTERED  Dose Administered:  500 units  Dysport (Botulinum Toxin Type A)        1:1 Dilution   Unavoidable Drug Waste: No.  Diluent Used:  Preservative Free Normal Saline  Total Volume of Diluent Used:  5 ml  NDC #: Dysport 500u ([id number])      CONSENT  The risks, benefits, and treatment options were discussed with Roxanna KEE Lalita and she agreed to proceed.    Written consent was obtained by [facility].     EQUIPMENT USED  Needle-37mm stimulating/recording  Needle-30 gauge  EMG/NCS Machine    SKIN PREPARATION  Skin preparation was performed using an alcohol wipe.    GUIDANCE DESCRIPTION  Electro-myographic guidance was necessary throughout the procedure to accurately identify all areas of dystonic muscles while avoiding injection of non-dystonic muscles, neighboring nerves and nearby vascular structures.       AREA/MUSCLE INJECTED: 500 UNITS DYSPORT = TOTAL DOSE, 1:1 DILUTION  1. NECK & SHOULDER GIRDLE MUSCLES: 260 UNITS DYSPORT = TOTAL DOSE   Right Rectus Capitis - (upper cervical) - 30 units of Dysport at 1 site/s.   Left Rectus Capitis - (upper cervical) - 30 units of Dysport at 1 site/s.     Right Splenius Cervicis - 30 units of Dysport at 1 site/s.   Left Splenius Cervicis - 30 units of Dysport at 1 site/s.    Right Levator Scapulae - 10 units of Dysport at 1 site/s.   Left Levator Scapulae - 40 units of Dysport at 1 site/s.    Right Sternocleidomastoid - 10 units of Dysport at 1 site/s.  Left Sternocleidomastoid - 10 units of Dysport at 1 site/s.    Right Cervical Paraspinals - 15 units of Dysport at 4 site/s.  Left Cervical Paraspinals - 15 units of Dysport at 4 site/s.    Right Thoracic Paraspinals - 20 units of Dysport at 4 site/s.  Left Thoracic Paraspinals - 20 units of Dysport  at 4 site/s.    1. FACIAL & SCALP MUSCLES: 240 UNITS DYSPORT = TOTAL DOSE  Right Occipitalis - 50 units of Dysport at 10 site/s.   Left Occipitails - 50 units of Dysport at 10 site/s.     Right Temporalis - 40 units of Dysport at 5 site/s.    Left Temporalis - 40 units of Dysport at 5 site/s.      Right Frontalis - 20 units of Dysport at 4 site/s.    Left Frontalis - 20 units of Dysport at 4 site/s.      Procerus - 10 units of Dysport at 1 site/s.      Right  - 5 units of Dysport at 1 site/s.    Left  - 5 units of Dysport at 1 site/s.          RESPONSE TO PROCEDURE  Roxanna Celis tolerated the procedure well and there were no immediate complications. She was allowed to recover for an appropriate period of time and was discharged home in stable condition.    ASSESSMENT AND PLAN   1. Botulinum toxin injections: Dose of Dysport remained unchanged today at 500 units, however, frontalis was included today to check for movement, as she should have no change in movement if resistant to the Dysport. My suspicion is low that she has developed resistance to Dysport, as she had weakness with a recent round of Dysport administered in 5/2022. Caution was again used in posterior neck muscles as to prevent excessive weakness. Patient will continue to monitor response and report at next appointment.   2. Referrals: None.   3. Follow up: Roxanna Celis was rescheduled for the next series of injections in 12 weeks, at which time we will evaluate response to today's injections. she may call the clinic prior with any questions or concerns prior to the next appointment.

## 2023-02-09 NOTE — LETTER
2/9/2023         RE: Roxanna Celis  [id number] [place] [address]        Dear Colleague,    Thank you for referring your patient, Roxanna Celis, to the [facility]. Please see a copy of my visit note below.      [facility]     PM&R CLINIC NOTE  BOTULINUM TOXIN PROCEDURE      HPI  Chief Complaint   Patient presents with     Procedure     Dysport     Roxanna Celis is a 52 year old female with a history of cervical dystonia who presents to clinic for botulinum toxin injections for management of involuntary muscle spasms and neck pain secondary to her cervical dystonia.     SINCE LAST VISIT  Roxanna Celis was last seen here in clinic on 11/17/2022, at which time she received 500 units of Dysport, which was a decrease from previous dose of 500 units due to posterior neck muscle weakness. The patient reported essentially no benefit from these injections and therefore trigger point injections were administered on 12/28/2022 and this only gave her about 1.5 weeks of pain relief.     Patient denies new medical diagnoses, illnesses, hospitalizations, emergency room visits, and injuries since the previous injection with botulinum neurotoxin. She is currently participating in [facility]     RESPONSE TO PREVIOUS TREATMENT    Side effects: None.     Pain Improvement: No, this round was ineffective and therefore pain has been poorly-controlled.     Dystonia Improvement: No, this round was ineffective.       PHYSICAL EXAM  VS: /80 (BP Location: Right arm, Patient Position: Sitting, Cuff Size: Adult Regular)   Pulse 78    GEN: Pleasant and cooperative, in no acute distress  HEENT: No facial asymmetry  NECK AND TRUNK PATTERN:   Head Tremor: Pt states tremor is intermittent   Left Side-bending: Present but patient states she feels she has more right side bending now  Left Shoulder Elevation: slightly elevated  Head with right  shift  Focalized pain at occipitalis - has pain today during visit    ALLERGIES  Allergies   Allergen Reactions     Contrast Dye Anaphylaxis     Other reaction(s): almost  from it     Diagnostic X-Ray Materials Anaphylaxis     IVP DYE     Lisinopril Anaphylaxis     Lisinopril-Hctz Anaphylaxis     Maple Flavor Anaphylaxis     MAPLE SYRUP  MAPLE SYRUP  MAPLE SYRUP     Maple Tree Anaphylaxis     Allergy includes maple syrup.     No Clinical Screening - See Comments      maple syrup==anaphylaxis  Dairy- causes to not feel well     Gluten Itching and Swelling     Gluten Meal Itching and Swelling     Cramping     [place] Meal Unknown     Chamomile Unknown     Codeine Sulfate Cramps and GI Disturbance     Food Unknown     Grapes, almond, lactose, gluten, real maple syrup     Nsaids Other (See Comments) and Unknown     Patient had bariatric surgery. Should not ever take NSAIDS due to high risk for gastric ulcers.   Patient had bariatric surgery. Should not ever take NSAIDS due to high risk for gastric ulcers.      Polyethylene Glycol Unknown     Per allergy testing  Per allergy testing       Erythromycin Nausea and Vomiting, Cramps, Diarrhea and Nausea     PN: LW Reaction: stomach upset  cramping     Lactose Diarrhea     Morphine Other (See Comments)       CURRENT MEDICATIONS    Current Outpatient Medications:      acetylcysteine 10 % (MUCOMYST) 10% SOLN, Place 2 drops into both eyes 6 times daily, Disp: 30 mL, Rfl: 11     autologous serum compounded ophthalmic solution, Place 1 drop into both eyes every 2 hours (while awake), Disp: , Rfl:      BOTOX 100 units injection, Inject 250 Units into the muscle once Lot # /C3 with Expiration Date:  2020, Disp: , Rfl:      buPROPion (WELLBUTRIN XL) 300 MG 24 hr tablet, Take 300 mg by mouth every morning, Disp: , Rfl:      clonazePAM (KLONOPIN) 0.5 MG tablet, , Disp: , Rfl:      clonazePAM (KLONOPIN) 1 MG tablet, Take 1 mg by mouth 2 times daily as needed, Disp: , Rfl:       cyanocobalamin (VITAMIN B-12) 1000 MCG tablet, Take 1,000 mcg by mouth daily, Disp: , Rfl:      doxycycline monohydrate (ADOXA) 50 MG tablet, Take 1 tablet (50 mg) by mouth 2 times daily, Disp: 180 tablet, Rfl: 3     DULoxetine (CYMBALTA) 60 MG capsule, TAKE 2 CAPSULES BY MOUTH EVERY DAY, Disp: , Rfl:      EPINEPHrine (ANY BX GENERIC EQUIV) 0.3 MG/0.3ML injection 2-pack, , Disp: , Rfl:      ibuprofen (ADVIL/MOTRIN) 200 MG capsule, Take 200 mg by mouth every 4 hours as needed., Disp: , Rfl:      iloperidone (FANAPT) 6 MG TABS tablet, Take 4 mg by mouth daily , Disp: , Rfl:      lifitegrast (XIIDRA) 5 % opthalmic solution, Place 1 drop into both eyes 2 times daily, Disp: 60 each, Rfl: 11     multivitamin w/minerals (THERA-VIT-M) tablet, Take 1 tablet by mouth daily, Disp: , Rfl:      ONABOTULINUMTOXINA IJ, Inject 250 Units as directed once LOT # /C3 EXPIRATION: 03/2020, Disp: , Rfl:      prednisolone 0.25% and hyaluronate in balanced salt SUSP compounded ophthalmic suspension, Place 1 Drop into both eyes 4 times daily. Discard bottle 15 days after opening. Refrigerate. Unopened bottle expires: Strength: 0.25 %, Disp: 6.67 mL, Rfl: 11     sodium chloride 0.9 % neb solution, Medically necessary for scleral contact lens use. May use 3 ml 6 times a day for contact lens use, Disp: 300 mL, Rfl: 11     Soft Lens Products (B&L SENSITIVE EYES) SOLN, Apply 1 drop to eye, Disp: , Rfl:      spironolactone (ALDACTONE) 50 MG tablet, Take 1 tablet by mouth daily, Disp: , Rfl:      tretinoin (RETIN-A) 0.025 % external cream, Apply a pea size to entire face QD, Disp: 45 g, Rfl: 11     tretinoin (RETIN-A) 0.05 % external cream, Spread a pea size amount into affected area topically at bedtime.  Use sunscreen SPF>20., Disp: 45 g, Rfl: 11     TYRVAYA 0.03 MG/ACT nasal spray, USE 1 SPRAY IN EACH NOSTRIL TWICE DAILY., Disp: , Rfl:      Vitamin D3 (CHOLECALCIFEROL) 25 mcg (1000 units) tablet, Take by mouth daily, Disp: , Rfl:       White Petrolatum-Mineral Oil (SYSTANE NIGHTTIME) OINT, Apply 3.5 g to eye 4 times daily as needed (dry eyes), Disp: 7 g, Rfl: 11     aspirin-acetaminophen-caffeine (EXCEDRIN MIGRAINE) 250-250-65 MG tablet, Take 1 tablet by mouth every 6 hours as needed (Patient not taking: Reported on 2022), Disp: , Rfl:      buPROPion (WELLBUTRIN XL) 300 MG 24 hr tablet, Take 300 mg by mouth (Patient not taking: Reported on 2023), Disp: , Rfl:      COMPOUNDED NON-CONTROLLED SUBSTANCE (CMPD RX) - PHARMACY TO MIX COMPOUNDED MEDICATION, Low dose naltrexone 1mml. Start 1ml daily for 7d, then 1.5ml daily for 7d, then 2ml daily for 7 and so on until max of 6ml daily. (Patient not taking: Reported on 2023), Disp: 120 mL, Rfl: 1     COMPOUNDED NON-CONTROLLED SUBSTANCE (CMPD RX) - PHARMACY TO MIX COMPOUNDED MEDICATION, Take 2 mg by mouth daily Low dose naltrexone 2mg per capsule, to be taken by mouth every day. (Patient not taking: Reported on 2022), Disp: 30 capsule, Rfl: 0     DULoxetine HCl (CYMBALTA PO), Take 60 mg by mouth (Patient not taking: Reported on 2022), Disp: , Rfl:      EPINEPHrine (EPIPEN) 0.3 MG/0.3ML injection, Inject 0.3 mg into the muscle once as needed  (Patient not taking: Reported on 2022), Disp: , Rfl:      LINZESS 290 MCG capsule, TAKE 1 CAPSULE BY MOUTH EVERY DAY (Patient not taking: Reported on 2023), Disp: , Rfl: 3     medical cannabis (Patient's own supply), See Admin Instructions (The purpose of this order is to document that the patient reports taking medical cannabis.  This is not a prescription, and is not used to certify that the patient has a qualifying medical condition.) (Patient not taking: Reported on 2023), Disp: , Rfl:      methocarbamol (ROBAXIN) 500 MG tablet, Take 1 tablet (500 mg) by mouth 2 times daily as needed for muscle spasms (Patient not taking: Reported on 2023), Disp: 60 tablet, Rfl: 0     NALTREXONE HCL PO, Take 3 mg by mouth daily (Patient  not taking: Reported on 2023), Disp: , Rfl:      prednisoLONE acetate (PRED FORTE) 1 % ophthalmic suspension, Place 1 drop into both eyes 3 times daily for 7 days, THEN 1 drop 2 times daily for 7 days, THEN 1 drop daily for 7 days., Disp: 5 mL, Rfl: 1     predniSONE (DELTASONE) 5 MG tablet, 4tab=20 mg qd x 7 days, 3tab=15 mg qd x 5 days, 2tab=10 mg qd (Patient not taking: Reported on 2023), Disp: 100 tablet, Rfl: 2     sodium chloride 0.9 % neb solution, Apply 5 mLs to eye (Patient not taking: Reported on 2022), Disp: , Rfl:      sodium chloride 0.9 % neb solution, 5 mLs by Other route 6 times daily Medically necessary for scleral contact lens use. May use 5ml vials 6x/day for contact lens use (Patient not taking: Reported on 2022), Disp: 3000 mL, Rfl: 11    Current Facility-Administered Medications:      botulinum toxin type A (DYSPORT) injection 500 Units, 500 Units, Intramuscular, Q90 Days, America Gonzalez MD, 500 Units at 22 0359     botulinum toxin type A (DYSPORT) injection 500 Units, 500 Units, Intramuscular, Q90 Days, America Gonzalez MD, 500 Units at 22 1028       BOTULINUM NEUROTOXIN INJECTION PROCEDURES    VERIFICATION OF PATIENT IDENTIFICATION AND PROCEDURE     Initials   Patient Name SES   Patient  SES   Procedure Verified by: SES     Prior to the start of the procedure and with procedural staff participation, I verbally confirmed the patient s identity using two indicators, relevant allergies, that the procedure was appropriate and matched the consent or emergent situation, and that the correct equipment/implants were available. Immediately prior to starting the procedure I conducted the Time Out with the procedural staff and re-confirmed the patient s name, procedure, and site/side. (The Joint Commission universal protocol was followed.)  Yes    Sedation (Moderate or Deep): None    ABOVE ASSESSMENTS PERFORMED BY    America Gonzalez  MD      INDICATIONS FOR PROCEDURES  Roxanna Celis is a 52 year old patient with involuntary muscle spasms and pain secondary to the diagnosis of cervical dystonia. Her baseline symptoms have been recalcitrant to oral medications and conservative therapy.  She is here today for reinjection with Dysport.    GOAL OF PROCEDURE  The goal of this procedure is to increase active range of motion, improve volitional motor control, decrease pain  and enhance functional independence.      TOTAL DOSE ADMINISTERED  Dose Administered:  500 units  Dysport (Botulinum Toxin Type A)        1:1 Dilution   Unavoidable Drug Waste: No.  Diluent Used:  Preservative Free Normal Saline  Total Volume of Diluent Used:  5 ml  NDC #: Dysport 500u ([address]-29)      CONSENT  The risks, benefits, and treatment options were discussed with Roxanna Celis and she agreed to proceed.    Written consent was obtained by [facility].     EQUIPMENT USED  Needle-37mm stimulating/recording  Needle-30 gauge  EMG/NCS Machine    SKIN PREPARATION  Skin preparation was performed using an alcohol wipe.    GUIDANCE DESCRIPTION  Electro-myographic guidance was necessary throughout the procedure to accurately identify all areas of dystonic muscles while avoiding injection of non-dystonic muscles, neighboring nerves and nearby vascular structures.       AREA/MUSCLE INJECTED: 500 UNITS DYSPORT = TOTAL DOSE, 1:1 DILUTION  1. NECK & SHOULDER GIRDLE MUSCLES: 260 UNITS DYSPORT = TOTAL DOSE   Right Rectus Capitis - (upper cervical) - 30 units of Dysport at 1 site/s.   Left Rectus Capitis - (upper cervical) - 30 units of Dysport at 1 site/s.     Right Splenius Cervicis - 30 units of Dysport at 1 site/s.   Left Splenius Cervicis - 30 units of Dysport at 1 site/s.    Right Levator Scapulae - 10 units of Dysport at 1 site/s.   Left Levator Scapulae - 40 units of Dysport at 1 site/s.    Right Sternocleidomastoid - 10 units of Dysport at 1 site/s.  Left Sternocleidomastoid - 10 units  of Dysport at 1 site/s.    Right Cervical Paraspinals - 15 units of Dysport at 4 site/s.  Left Cervical Paraspinals - 15 units of Dysport at 4 site/s.    Right Thoracic Paraspinals - 20 units of Dysport at 4 site/s.  Left Thoracic Paraspinals - 20 units of Dysport at 4 site/s.    1. FACIAL & SCALP MUSCLES: 240 UNITS DYSPORT = TOTAL DOSE  Right Occipitalis - 50 units of Dysport at 10 site/s.   Left Occipitails - 50 units of Dysport at 10 site/s.     Right Temporalis - 40 units of Dysport at 5 site/s.    Left Temporalis - 40 units of Dysport at 5 site/s.      Right Frontalis - 20 units of Dysport at 4 site/s.    Left Frontalis - 20 units of Dysport at 4 site/s.      Procerus - 10 units of Dysport at 1 site/s.      Right  - 5 units of Dysport at 1 site/s.    Left  - 5 units of Dysport at 1 site/s.          RESPONSE TO PROCEDURE  Roxanna Celis tolerated the procedure well and there were no immediate complications. She was allowed to recover for an appropriate period of time and was discharged home in stable condition.    ASSESSMENT AND PLAN   1. Botulinum toxin injections: Dose of Dysport remained unchanged today at 500 units, however, frontalis was included today to check for movement, as she should have no change in movement if resistant to the Dysport. My suspicion is low that she has developed resistance to Dysport, as she had weakness with a recent round of Dysport administered in 5/2022. Caution was again used in posterior neck muscles as to prevent excessive weakness. Patient will continue to monitor response and report at next appointment.   2. Referrals: None.   3. Follow up: Roxanna Celis was rescheduled for the next series of injections in 12 weeks, at which time we will evaluate response to today's injections. she may call the clinic prior with any questions or concerns prior to the next appointment.       Again, thank you for allowing me to participate in the care of your patient.         Sincerely,        America Gonzalez MD

## 2023-02-14 ENCOUNTER — OFFICE VISIT (OUTPATIENT)
Dept: PHYSICAL MEDICINE AND REHAB | Facility: CLINIC | Age: 53
End: 2023-02-14
Attending: PHYSICAL MEDICINE & REHABILITATION
Payer: COMMERCIAL

## 2023-02-14 VITALS
WEIGHT: 222 LBS | DIASTOLIC BLOOD PRESSURE: 80 MMHG | HEART RATE: 83 BPM | OXYGEN SATURATION: 98 % | BODY MASS INDEX: 33.65 KG/M2 | HEIGHT: 68 IN | SYSTOLIC BLOOD PRESSURE: 147 MMHG

## 2023-02-14 DIAGNOSIS — M51.26 LUMBAR DISC HERNIATION: ICD-10-CM

## 2023-02-14 DIAGNOSIS — M47.816 LUMBAR SPONDYLOSIS: ICD-10-CM

## 2023-02-14 DIAGNOSIS — M54.50 CHRONIC BILATERAL LOW BACK PAIN WITHOUT SCIATICA: ICD-10-CM

## 2023-02-14 DIAGNOSIS — G89.29 CHRONIC BILATERAL LOW BACK PAIN WITHOUT SCIATICA: ICD-10-CM

## 2023-02-14 DIAGNOSIS — M47.816 LUMBAR FACET ARTHROPATHY: ICD-10-CM

## 2023-02-14 DIAGNOSIS — M54.16 LUMBAR RADICULITIS: Primary | ICD-10-CM

## 2023-02-14 PROCEDURE — 99215 OFFICE O/P EST HI 40 MIN: CPT | Performed by: PHYSICAL MEDICINE & REHABILITATION

## 2023-02-14 RX ORDER — DIAZEPAM 5 MG
10 TABLET ORAL
Qty: 2 TABLET | Refills: 0 | Status: SHIPPED | OUTPATIENT
Start: 2023-02-14 | End: 2023-03-21

## 2023-02-14 ASSESSMENT — PAIN SCALES - GENERAL: PAINLEVEL: EXTREME PAIN (8)

## 2023-02-14 NOTE — PROGRESS NOTES
"Patient seen at the request of Dr America Gonzalez for an opinion and evaluation of low back pain with radiculopathy.      HISTORY OF PRESENT ILLNESS:  Roxanna Celis is a 52 year old female who presents with a chief complaint of low back pain.     Patient is a somewhat longstanding history of chronic pain issues and has previously been seen and evaluated in the pain clinic.  She states that due to turnover at the [place] pain clinic she has had difficulty scheduling, however, does have an appointment with a new provider this week.  She follows with neurology for neuropathic pain which is diffuse and widespread affecting her upper and lower limbs.  She also sees my colleague in the PM&R clinic for spasmodic torticollis and botulinum toxin injections; she was subsequently referred to our [facility] for evaluation of worsening low back pain.    Patient reports pain that has been worse overall in the last 3 years but significantly increased since December 2022.  She localizes pain symptoms primarily to the \"belt line \"and just below the iliac crests with radiating symptoms in an L5/S1 distribution to the level of the foot/ankle on the left and radiating to the right gluteal region.  Her lumbosacral pain is worse than her lower extremity pain her left lower extremity pain is worse compared to her right.  Pain is constant aching and intermittently sharp in nature.  Pain is reported as 8/10 at rest today.  Symptoms are worse with generalized activity; and improved with laying down and taking pressure of back. She does report pain-related sleep disturbance.       PRIOR INJURIES/TREATMENT:   Ice/Heat - alternates   Physical Therapy:   None recently.  She did attempt pool/aquatic therapy but had increased sensory feedback and pain in the water     - Current Pain Medications -   cymbalta     - Prior/Trialed Pain Medications -   Medical cannabis  LDN - physically sick   Topamax   Muscle relaxers: " Robaxin    Prior Procedures:  Date    Procedure   Improvement (%)  none              Prior Related Surgery: none   Other (acupuncture, OMT, CMM, TENS, DME, etc.):   +Chiropractor - not currently helpful since pain has been increased x3-4mos.     Specialists Seen - (with most recent, available notes and clinic visits reviewed)   1. Pain Clinic - Dr. Rivera, DANIEL Marks, DERICK Reeves  2. PM&R - Dr. Gonzalez   3  Neurology - Dr. Torres     IMAGING - reviewed   02/03/2023 MRI lumbar spine without contrast ([facility])  Interpretation:  Mild generalized levocurvature.  No acute fracture or spondylolysis.  Conus is normal and terminates at L1-2.  Imaged upper sacrum and paraspinal soft tissue structures are unremarkable.  Disc desiccation and annular bulging are demonstrated at L5-S1 through L2-3.  Disc space narrowing is mild at L5-S1    L5-S1: Progressed annular bulging and new central to right paracentral annular fissure and overlying 2 to 3 mm disc protrusion without central stenosis or traversing neural compression.  Facets are unremarkable, but there is mild left chronic foraminal narrowing.  L4-5: Unchanged central annular fissure without overlying disc herniation, although progressed annular bulging is more prominent in the midline with mild bilateral subarticular recess narrowing, no traversing neural compression or central stenosis.  Mild left facet arthrosis with patent foramina.  L3-4: Previous annular bulging and disc space narrowing with a new left foraminal to far left lateral annular fissure, no overlying disc herniation, central stenosis or traversing neural compression.  Mild bilateral facet hypertrophy with patent foramina  L2-3: Progressed annular bulging slightly more prominent left foraminally to the far left laterally without central stenosis or traversing neural compression.  Mild bilateral facet hypertrophy.  Patent foramina.  L1-2 and T12-L1: Normal posterior disc margins and facets.  Patent  foramina.    Conclusion: Multilevel degenerative lumbar spondylosis with the following notable findings:  1.  New, 2 to 3 mm central to right paracentral L5-S1 disc protrusion without central stenosis or traversing neural compression.  2.  Unchanged central L4-5 and new left foraminal to far left lateral L3-4 annular fissures without disc herniations in these locations.  3.  No acute fracture or spondylolysis  4.  Mild left L4-5 facet arthrosis.  5.  Chronic mild left L5-S1 foraminal narrowing without ganglionic compression.    Review Of Systems:  I am responding to those symptoms which are directly relevant to the specific indication for my consultation. I recommend that the patient follow up with their primary or referring provider to pursue any other symptoms which may be of concern.       Medical History:  She  has a past medical history of Cervical dystonia (1993). BLE edema, thyroid nodules, sleep apnea, neuropathy, obesity, KIANA,     She  has a past surgical history that includes Gallbladder surgery; Stomach surgery; and FRAGMENTING OF KIDNEY STONE.     Social History:  Work: Not working  Current living situation: Lives with    She  reports that she has never smoked. She has never used smokeless tobacco. She reports that she does not drink alcohol and does not use drugs.        Current Medications:   She has a current medication list which includes the following prescription(s): diazepam, acetylcysteine 10 %, autologous serum, botox, bupropion, clonazepam, clonazepam, cyanocobalamin, doxycycline monohydrate, duloxetine, epinephrine, ibuprofen, iloperidone, xiidra, multivitamin w/minerals, onabotulinumtoxina, prednisolone 0.25% and hyaluronate in balanced salt, prednisolone acetate, sodium chloride, b&l sensitive eyes, spironolactone, tretinoin, tretinoin, tyrvaya, vitamin d3, and systane nighttime, and the following Facility-Administered Medications: botulinum toxin type a and botulinum toxin type a.   "     Allergies:    -- Contrast Dye -- Anaphylaxis    --  Other reaction(s): almost  from it   -- Diagnostic X-Ray Materials -- Anaphylaxis    --  IVP DYE   -- Lisinopril -- Anaphylaxis   -- Lisinopril-Hctz -- Anaphylaxis   -- Maple Flavor -- Anaphylaxis    --  MAPLE SYRUP             MAPLE SYRUP             MAPLE SYRUP   -- Maple Tree -- Anaphylaxis    --  Allergy includes maple syrup.   -- No Clinical Screening - See Comments     --  maple syrup==anaphylaxis             Dairy- causes to not feel well   -- Gluten -- Itching and Swelling   -- Gluten Meal -- Itching and Swelling    --  Cramping   -- [place] Meal -- Unknown   -- Chamomile -- Unknown   -- Codeine Sulfate -- Cramps and GI Disturbance   -- Food -- Unknown    --  Grapes, almond, lactose, gluten, real maple syrup   -- Nsaids -- Other (See Comments) and Unknown    --  Patient had bariatric surgery. Should not ever             take NSAIDS due to high risk for gastric ulcers.             Patient had bariatric surgery. Should not ever             take NSAIDS due to high risk for gastric ulcers.   -- Polyethylene Glycol -- Unknown    --  Per allergy testing             Per allergy testing   -- Erythromycin -- Nausea and Vomiting, Cramps,                            Diarrhea and Nausea    --  PN: LW Reaction: stomach upset             cramping   -- Lactose -- Diarrhea   -- Morphine -- Other (See Comments)    PHYSICAL EXAMINATION:  BP (!) 147/80   Pulse 83   Ht 1.715 m (5' 7.5\")   Wt 100.7 kg (222 lb)   SpO2 98%   BMI 34.26 kg/m     General: Pleasant, straightforward, WDWN individual.  Mental Status: Pleasant, direct, appropriate mood and affect  Resp: breathing is unlabored without audible wheeze  Vascular: Palpable pedal pulses, no cyanosis, no venous stasis changes  Heme: no visible ecchymosis or erythema on extremities  Skin: No notable rash    Neurologic:  Strength: All major muscle groups of the bilateral lower extremities have normal and symmetric " muscle strength EXCEPT hip abd 4+/5    Subjectively decreased sensation to light touch in L3-S1 distribution    DTRs: bilateral lower extremity stretch reflexes are equal and symmetric   Clonus: none    Gait: reveals normal patrciia and stride     Musculoskeletal:  Lumbar Spine: Mod reduced ROM all planes, tender to palpation diffusely along paraspinals and gluteal musculature, facet loading with pain bilaterally. Str Leg Raise equivcal, hip provocative maneuvers negative (primariliy LBP only)       ASSESSMENT:  Roxanna Celis is a pleasant 52 year old female who presents with:    #.  Lumbar spondylosis which is multifactorial with features of lumbar radiculitis and facet arthropathy  #.  Diffuse myofascial pain    Complicating co-morbidities include:   #.  Neuropathic pain of upper and lower extremities. Follows with neurology  #.  Chronic pain syndrome/fibromyalgia. Follows with pain clinic.   #.  Spasmodic torticollis. Follows with PM&R   #.  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  We had a good, long discussion about the use of GBCA and the decreased likelihood of intrathecal spread transforaminal approach for BERNARD.     PLAN:  -Reviewed imaging in detail today  -Reviewed medications.  She will plan to follow-up with pain clinic as scheduled for overall complaints of pain management strategies including pain PT and CBT  -Refer for bilateral L5-S1 transforaminal epidural steroid injections  -We also discussed the possibility of lumbar diagnostic medial branch blocks and the pathway to radiofrequency ablation in the future.  - RTC for procedure.     Ready to learn, no apparent learning barriers.  Education provided on treatment plan according to patient's preferred learning style.  Patient verbalizes understanding.   __________________________________  Eufemia Bedoya MD  [facility]        45 minutes spent on the date of the encounter doing chart review, review of outside records,  review of test results, interpretation of tests, patient visit and documentation

## 2023-02-14 NOTE — PATIENT INSTRUCTIONS
Preparing for Spine Injection Therapy              Why Do I Need Spine Injection Therapy?  Your Health Care Provider is recommending spine injection therapy to  help relieve your back and neck pain. This will be in addition to other therapies such as medications and physical therapy. The purpose of these injections is to reduce the amount of inflammation (swelling, pain, heat, redness, loss of body function) around the nerves thus reducing the amount of pain.     The medications you will receive with the injections will include:   Anesthetic - medication to numb the painful area.    Steroid - medication that prevents or reduces swelling and pain (anti-inflammatory).   To reduce your discomfort during the injection procedure, you will receive a numbing medication injection prior to the placement of the needles. You will be lying on your stomach during the injection procedure. We will use a low-dose x-ray (fluoroscopy) to help guide needle placement.  You must have a  for this procedure. We will need to reschedule your injection if you do not have a  with you.      What are the different types of injections and procedure?  Below, is a brief description of the different types of injections we use to deliver pain medication as close as possible to the nerves in the painful area:    Epidural injection: (picture on the right) Epidural injections place 2 medications in your epidural space.  This is the space alongside your spinal canal (not inside of it). Nerves from your spinal cord pass through this space. The medications will bathe those nerves.       Medial Branch Block injections: This is a test to see if your pain is coming from a specific nerve. This injection is similar to a facet joint injection but contains only the numbing medication.  You will keep a pain score diary for the rest of the day and the following morning after receiving the injection.    Radiofrequency ablation: This is a procedure  that uses radio waves and numbing medication to block the nerves that feel pain at the joint. The pain relief effects can last for a long time, but are not permanent. The procedure is similar to the Medial Branch Block but requires additional testing to ensure that the needle is near the nerve before numbing and ablating it.  Doctors often order sedation for this procedure.  Please see the sections on sedation below.      What Should I Expect if I Receive Sedation? THIS IS ORDERED BY THE PHYSICIAN  This is conscious sedation.  The medications we give to you will help you relax and reduce your anxiety.  You will still be awake for the procedure so we can ask you questions and hear your answers.     What Are My Responsibilities With Sedation?  You must stop eating and drinking 8 (eight) hours before your procedure. You can have clear fluids (water, coffee/tea without milk) up to 2 hours prior to the injections. Nothing by mouth for 2 hours prior to injection.  This includes gum, mints, or chew.  Take your morning medications with a small sip of water.    You must have a  who will check-in with you, and stay in the building while the procedure is underway.  If you do not have a  your sedation will be cancelled.  We will monitor you for at least 30 minutes after the procedure before being discharged home.      What Are the Risks and Complications For This Procedure?  Risks and complication are rare, but can still occur.  You should understand, discuss, and accept these risks before agreeing to the procedure. They include, but are not limited to:  infection  nerve damage   paralysis  injection failure or a need for further injections or additional procedures  continued or worsening of symptoms/pain,   medication reaction,  dural leak (into the hole covering around the spinal cord. This may cause a a spinal headache)  leak of the medication into the spinal canal, nerves, or blood vessel.  death       What do I  need to do before the procedure?   If you do not follow these instructions your procedure may be cancelled.  Tell us if you are on major blood thinners such as Coumadin, Xarelto , Plavix, Eliquis , Pradaxa , or others.    Contact the doctor who prescribed your blood thinner to ask for permission to stop taking it before you have the injection.    Schedule your pain injection procedure after your doctor gave their permission.   We will notify you when to stop and re-start your blood thinner.  Tell us if you have any allergies to contrast dye.  If you do, we may give additional medications to take before the procedure.  Tell us if you are pregnant, or possibly pregnant.  If so, you cannot receive steroid medications or be exposed to fluoroscopic X-rays.  Tell us if you have been sick during the 10 days before the procedure. This includes:   colds   gastrointestinal illness or discomfort  dental sores,   skin infection, or any other type of infection.  Tell us if you have taken antibiotics during the 10 days prior to the procedure.  Do not drink alcohol the night before or on the day of the procedure.  You must shower the night before and on the day of your procedure.  Wear comfortable, clean clothing.  If you have an outside MRI (Magnetic Resonance Imaging photo), please bring it with you.    What Will Happen After the Procedure?  If you did not receive sedation we will monitor you for 15 minutes after the procedure. If you received sedation, we will monitor you for at least 30 minutes after the procedure     How soon can I expect pain relief?  You have received 2 types of medications with your injection:    Anesthetic - numbing medication which only acts for a few hours    Steroid which may take 3-14 days to be effective.   You can expect to feel your normal pain after the anesthetic wears off, until the steroid becomes effective.    How should I care for myself at home?  Get plenty of rest and avoid twisting,  bending movements, heavy lifting, or strenuous activity for the first 24 hours. This will help the steroid be more effective. Medial Branch Block injections will have different discharge instructions.  Those will be discussed at that injection appointment.  Resume your pain medications  Apply ice packs (on for 20 minutes at a time), every 2-3 hours to your injection area for the first 2-3 days to help with pain control.    Avoid heat (pads or water bottles), which can cause the veins to open up, making the steroid less effective. You can use heat after 48 hours.  Take showers only for the first 48 hours.  No baths, hot tubs, swimming, or soaking for 48 hours to reduce the risk of infection.     When should I call the doctor?  Call us if you have any of the following:   Fever more than 100.5 degrees Fahrenheit   Signs of infection   Severe headache   Severe back pain   Increased numbness or weakness in your legs or arms   Loss of bladder or bowel control  Nausea   Other concerns    What is the contact information?  During business hours Monday-Friday 8a-5p call [phone].   After business hours, on weekends and holidays call [phone] and ask for the PM&R doctor on call

## 2023-02-14 NOTE — NURSING NOTE
"Roxanna Celis is a 52 year old female who presents for:  Chief Complaint   Patient presents with     Consult     Low back pain extending down left leg to foot, also into right buttock with numbness        Initial Vitals:  BP (!) 147/80   Pulse 83   Ht 5' 7.5\" (1.715 m)   Wt 222 lb (100.7 kg)   SpO2 98%   BMI 34.26 kg/m   Estimated body mass index is 34.26 kg/m  as calculated from the following:    Height as of this encounter: 5' 7.5\" (1.715 m).    Weight as of this encounter: 222 lb (100.7 kg).. Body surface area is 2.19 meters squared. BP completed using cuff size: regular  Extreme Pain (8)    Nursing Comments:     Leona Clemens    "

## 2023-02-14 NOTE — LETTER
"2/14/2023       RE: Roxanna Celis  [address] [place] [address]     Dear Colleague,    Thank you for referring your patient, Roxanna Celis, to the [facility]. Please see a copy of my visit note below.    Patient seen at the request of Dr America Gonzalez for an opinion and evaluation of low back pain with radiculopathy.      HISTORY OF PRESENT ILLNESS:  Roxanna Celis is a 52 year old female who presents with a chief complaint of low back pain.     Patient is a somewhat longstanding history of chronic pain issues and has previously been seen and evaluated in the pain clinic.  She states that due to turnover at the [facility] she has had difficulty scheduling, however, does have an appointment with a new provider this week.  She follows with neurology for neuropathic pain which is diffuse and widespread affecting her upper and lower limbs.  She also sees my colleague in the PM&R clinic for spasmodic torticollis and botulinum toxin injections; she was subsequently referred to our [facility] for evaluation of worsening low back pain.    Patient reports pain that has been worse overall in the last 3 years but significantly increased since December 2022.  She localizes pain symptoms primarily to the \"belt line \"and just below the iliac crests with radiating symptoms in an L5/S1 distribution to the level of the foot/ankle on the left and radiating to the right gluteal region.  Her lumbosacral pain is worse than her lower extremity pain her left lower extremity pain is worse compared to her right.  Pain is constant aching and intermittently sharp in nature.  Pain is reported as 8/10 at rest today.  Symptoms are worse with generalized activity; and improved with laying down and taking pressure of back. She does report pain-related sleep disturbance.       PRIOR INJURIES/TREATMENT:   Ice/Heat - alternates   Physical " Therapy:   None recently.  She did attempt pool/aquatic therapy but had increased sensory feedback and pain in the water     - Current Pain Medications -   cymbalta     - Prior/Trialed Pain Medications -   Medical cannabis  LDN - physically sick   Topamax   Muscle relaxers: Robaxin    Prior Procedures:  Date    Procedure   Improvement (%)  none              Prior Related Surgery: none   Other (acupuncture, OMT, CMM, TENS, DME, etc.):   +Chiropractor - not currently helpful since pain has been increased x3-4mos.     Specialists Seen - (with most recent, available notes and clinic visits reviewed)   1. Pain Clinic - Dr. Rivera, DANIEL Marks, DERICK Reeves  2. PM&R - Dr. Gonzalez   3  Neurology - Dr. Torres     IMAGING - reviewed   02/03/2023 MRI lumbar spine without contrast ([facility])  Interpretation:  Mild generalized levocurvature.  No acute fracture or spondylolysis.  Conus is normal and terminates at L1-2.  Imaged upper sacrum and paraspinal soft tissue structures are unremarkable.  Disc desiccation and annular bulging are demonstrated at L5-S1 through L2-3.  Disc space narrowing is mild at L5-S1    L5-S1: Progressed annular bulging and new central to right paracentral annular fissure and overlying 2 to 3 mm disc protrusion without central stenosis or traversing neural compression.  Facets are unremarkable, but there is mild left chronic foraminal narrowing.  L4-5: Unchanged central annular fissure without overlying disc herniation, although progressed annular bulging is more prominent in the midline with mild bilateral subarticular recess narrowing, no traversing neural compression or central stenosis.  Mild left facet arthrosis with patent foramina.  L3-4: Previous annular bulging and disc space narrowing with a new left foraminal to far left lateral annular fissure, no overlying disc herniation, central stenosis or traversing neural compression.  Mild bilateral facet hypertrophy with patent foramina  L2-3: Progressed  annular bulging slightly more prominent left foraminally to the far left laterally without central stenosis or traversing neural compression.  Mild bilateral facet hypertrophy.  Patent foramina.  L1-2 and T12-L1: Normal posterior disc margins and facets.  Patent foramina.    Conclusion: Multilevel degenerative lumbar spondylosis with the following notable findings:  1.  New, 2 to 3 mm central to right paracentral L5-S1 disc protrusion without central stenosis or traversing neural compression.  2.  Unchanged central L4-5 and new left foraminal to far left lateral L3-4 annular fissures without disc herniations in these locations.  3.  No acute fracture or spondylolysis  4.  Mild left L4-5 facet arthrosis.  5.  Chronic mild left L5-S1 foraminal narrowing without ganglionic compression.    Review Of Systems:  I am responding to those symptoms which are directly relevant to the specific indication for my consultation. I recommend that the patient follow up with their primary or referring provider to pursue any other symptoms which may be of concern.       Medical History:  She  has a past medical history of Cervical dystonia (1993). BLE edema, thyroid nodules, sleep apnea, neuropathy, obesity, KIANA,     She  has a past surgical history that includes Gallbladder surgery; Stomach surgery; and FRAGMENTING OF KIDNEY STONE.     Social History:  Work: Not working  Current living situation: Lives with    She  reports that she has never smoked. She has never used smokeless tobacco. She reports that she does not drink alcohol and does not use drugs.        Current Medications:   She has a current medication list which includes the following prescription(s): diazepam, acetylcysteine 10 %, autologous serum, botox, bupropion, clonazepam, clonazepam, cyanocobalamin, doxycycline monohydrate, duloxetine, epinephrine, ibuprofen, iloperidone, xiidra, multivitamin w/minerals, onabotulinumtoxina, prednisolone 0.25% and hyaluronate in  "balanced salt, prednisolone acetate, sodium chloride, b&l sensitive eyes, spironolactone, tretinoin, tretinoin, tyrvaya, vitamin d3, and systane nighttime, and the following Facility-Administered Medications: botulinum toxin type a and botulinum toxin type a.       Allergies:    -- Contrast Dye -- Anaphylaxis    --  Other reaction(s): almost  from it   -- Diagnostic X-Ray Materials -- Anaphylaxis    --  IVP DYE   -- Lisinopril -- Anaphylaxis   -- Lisinopril-Hctz -- Anaphylaxis   -- Maple Flavor -- Anaphylaxis    --  MAPLE SYRUP             MAPLE SYRUP             MAPLE SYRUP   -- Maple Tree -- Anaphylaxis    --  Allergy includes maple syrup.   -- No Clinical Screening - See Comments     --  maple syrup==anaphylaxis             Dairy- causes to not feel well   -- Gluten -- Itching and Swelling   -- Gluten Meal -- Itching and Swelling    --  Cramping   -- [place] Meal -- Unknown   -- Chamomile -- Unknown   -- Codeine Sulfate -- Cramps and GI Disturbance   -- Food -- Unknown    --  Grapes, almond, lactose, gluten, real maple syrup   -- Nsaids -- Other (See Comments) and Unknown    --  Patient had bariatric surgery. Should not ever             take NSAIDS due to high risk for gastric ulcers.             Patient had bariatric surgery. Should not ever             take NSAIDS due to high risk for gastric ulcers.   -- Polyethylene Glycol -- Unknown    --  Per allergy testing             Per allergy testing   -- Erythromycin -- Nausea and Vomiting, Cramps,                            Diarrhea and Nausea    --  PN: LW Reaction: stomach upset             cramping   -- Lactose -- Diarrhea   -- Morphine -- Other (See Comments)    PHYSICAL EXAMINATION:  BP (!) 147/80   Pulse 83   Ht 1.715 m (5' 7.5\")   Wt 100.7 kg (222 lb)   SpO2 98%   BMI 34.26 kg/m     General: Pleasant, straightforward, WDWN individual.  Mental Status: Pleasant, direct, appropriate mood and affect  Resp: breathing is unlabored without audible " wheeze  Vascular: Palpable pedal pulses, no cyanosis, no venous stasis changes  Heme: no visible ecchymosis or erythema on extremities  Skin: No notable rash    Neurologic:  Strength: All major muscle groups of the bilateral lower extremities have normal and symmetric muscle strength EXCEPT hip abd 4+/5    Subjectively decreased sensation to light touch in L3-S1 distribution    DTRs: bilateral lower extremity stretch reflexes are equal and symmetric   Clonus: none    Gait: reveals normal patricia and stride     Musculoskeletal:  Lumbar Spine: Mod reduced ROM all planes, tender to palpation diffusely along paraspinals and gluteal musculature, facet loading with pain bilaterally. Str Leg Raise equivcal, hip provocative maneuvers negative (primariliy LBP only)       ASSESSMENT:  Roxanna Celis is a pleasant 52 year old female who presents with:    #.  Lumbar spondylosis which is multifactorial with features of lumbar radiculitis and facet arthropathy  #.  Diffuse myofascial pain    Complicating co-morbidities include:   #.  Neuropathic pain of upper and lower extremities. Follows with neurology  #.  Chronic pain syndrome/fibromyalgia. Follows with pain clinic.   #.  Spasmodic torticollis. Follows with PM&R   #.  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  We had a good, long discussion about the use of GBCA and the decreased likelihood of intrathecal spread transforaminal approach for BERNARD.     PLAN:  -Reviewed imaging in detail today  -Reviewed medications.  She will plan to follow-up with pain clinic as scheduled for overall complaints of pain management strategies including pain PT and CBT  -Refer for bilateral L5-S1 transforaminal epidural steroid injections  -We also discussed the possibility of lumbar diagnostic medial branch blocks and the pathway to radiofrequency ablation in the future.  - RTC for procedure.     Ready to learn, no apparent learning barriers.  Education provided on treatment plan according to  patient's preferred learning style.  Patient verbalizes understanding.   __________________________________  Eufemia Bedoya MD  Physical Medicine & Rehabilitation        45 minutes spent on the date of the encounter doing chart review, review of outside records, review of test results, interpretation of tests, patient visit and documentation

## 2023-02-20 ENCOUNTER — TELEPHONE (OUTPATIENT)
Dept: PALLIATIVE MEDICINE | Facility: CLINIC | Age: 53
End: 2023-02-20
Payer: COMMERCIAL

## 2023-02-20 NOTE — TELEPHONE ENCOUNTER
[facility] Center    Phone Message    May a detailed message be left on voicemail: yes     Reason for Call: Other: Patient is calling requesting to schedule an hour long appointment with Estella Reeves. Writer unable to schedule hour long appointments due to protocols. Please call patient back to schedule.     Action Taken: Message routed to:  Other: [facility]    Travel Screening: Not Applicable

## 2023-02-21 ENCOUNTER — OFFICE VISIT (OUTPATIENT)
Dept: OPTOMETRY | Facility: CLINIC | Age: 53
End: 2023-02-21
Payer: COMMERCIAL

## 2023-02-21 DIAGNOSIS — H04.123 DRY EYES: Primary | ICD-10-CM

## 2023-02-21 DIAGNOSIS — H16.223 KERATITIS SICCA, BILATERAL: ICD-10-CM

## 2023-02-21 DIAGNOSIS — H57.13 EYE PAIN, BILATERAL: ICD-10-CM

## 2023-02-21 ASSESSMENT — REFRACTION_CURRENTRX
OS_BASECURVE: 8.5
OS_SPHERE: -0.50
OS_DIAMETER: 14.1
OD_BRAND: DAILIES TOTAL 1
OS_BRAND: DAILIES TOTAL 1
OD_BASECURVE: 8.5
OD_SPHERE: -0.50
OD_DIAMETER: 14.1

## 2023-02-21 NOTE — PROGRESS NOTES
No office visit. CL order only. Pt would like 2 month supply of BCLs    Contact Lens Billing  V-Code:  - Soft extended wear  Final Contact Lens Rx       Brand Base Curve Diameter Sphere    Right Dailies Total 1 8.5 14.1 -0.50    Left Dailies Total 1 8.5 14.1 -0.50         [facility] order mailed direct: [id number]  # of units: 1 90 pack @ $95 + 1 30 pack @ $35 = $130 total    This patient requires contact lenses that are medically necessary for either improvement in vision over spectacles, support of the ocular surface, or other therapeutic benefit. These are not cosmetic contact lenses.     Encounter Diagnoses   Name Primary?     Dry eyes Yes     Eye pain, bilateral      Keratitis sicca, bilateral (H)         Date of last eye exam: 2/1/23

## 2023-02-28 ENCOUNTER — OFFICE VISIT (OUTPATIENT)
Dept: OPHTHALMOLOGY | Facility: CLINIC | Age: 53
End: 2023-02-28
Attending: OPHTHALMOLOGY
Payer: COMMERCIAL

## 2023-02-28 DIAGNOSIS — H57.10 PAIN IN OR AROUND EYE, UNSPECIFIED LATERALITY: Primary | ICD-10-CM

## 2023-02-28 DIAGNOSIS — H53.10 SUBJECTIVE VISUAL DISTURBANCE: Primary | ICD-10-CM

## 2023-02-28 DIAGNOSIS — H04.123 DRY EYES, BILATERAL: ICD-10-CM

## 2023-02-28 PROCEDURE — 99214 OFFICE O/P EST MOD 30 MIN: CPT | Mod: GC | Performed by: OPHTHALMOLOGY

## 2023-02-28 PROCEDURE — G0463 HOSPITAL OUTPT CLINIC VISIT: HCPCS

## 2023-02-28 ASSESSMENT — CONF VISUAL FIELD
METHOD: COUNTING FINGERS
OS_SUPERIOR_TEMPORAL_RESTRICTION: 0
OS_SUPERIOR_NASAL_RESTRICTION: 0
OD_SUPERIOR_TEMPORAL_RESTRICTION: 0
OD_INFERIOR_NASAL_RESTRICTION: 0
OS_INFERIOR_TEMPORAL_RESTRICTION: 0
OS_NORMAL: 1
OD_INFERIOR_TEMPORAL_RESTRICTION: 0
OD_SUPERIOR_NASAL_RESTRICTION: 0
OD_NORMAL: 1
OS_INFERIOR_NASAL_RESTRICTION: 0

## 2023-02-28 ASSESSMENT — REFRACTION_WEARINGRX
OS_ADD: +2.00
OD_SPHERE: -2.00
OS_SPHERE: -2.00
OS_CYLINDER: SPHERE
OD_ADD: +2.00
OD_CYLINDER: SPHERE

## 2023-02-28 ASSESSMENT — SLIT LAMP EXAM - LIDS
COMMENTS: 2+ MGD
COMMENTS: 2+ MGD

## 2023-02-28 ASSESSMENT — VISUAL ACUITY
OD_PH_CC+: -1
OS_CC: 20/40
OD_CC: 20/40
OS_PH_CC: 20/20
METHOD: SNELLEN - LINEAR
OD_PH_CC: 20/20
OD_CC+: -1
OS_PH_CC+: -1
OS_CC+: +2
CORRECTION_TYPE: GLASSES

## 2023-02-28 ASSESSMENT — TONOMETRY
IOP_METHOD: ICARE
OD_IOP_MMHG: 20
OS_IOP_MMHG: 19

## 2023-02-28 ASSESSMENT — EXTERNAL EXAM - RIGHT EYE: OD_EXAM: NORMAL

## 2023-02-28 ASSESSMENT — EXTERNAL EXAM - LEFT EYE: OS_EXAM: NORMAL

## 2023-02-28 NOTE — LETTER
"2023         RE:  :  MRN: Roxanna Celis  1970  [id number]     Dear Dr. Ocampo,    Thank you for asking me to see your very pleasant patient, Roxanna Celis, in neuro-ophthalmic consultation.  I would like to thank you for sending your records and I have summarized them in the history of present illness.  My assessment and plan are below.  For further details, please see my attached clinic note.      Roxanna Celis is a 52 year old female with the following diagnoses:   1. Pain in or around eye, unspecified laterality    2. Dry eyes, bilateral         Patient was sent for consultation by Dr. Ocampo for evaluation of eye pain and cervical dystonia.    HPI:    Roxanna Celis is a 52 year old female who presents for evaluation of eye pain.     Beginning about 5 years ago, the patient reports she noticed the \"feeling of rocks and wood\" in BOTH her eyes as well as itchiness and redness.  She reports this was sudden onset, woke up one morning and eyes felt terrible.  Prior to this, she denies any ocular irritation.  This has progressively worsened; she now has a constant foreign body sensation and \"never has a moment of peace.\"  She often experiences tearing, running over down her face.    She notes associated blurry vision, variable.  She has had painful mucous strands that she reports she fishes out often from her lower lid or off of her scleral lenses.  She has episodes of both dull, achey pain and sharp, stabbing pain in her eyes.    She has been in scleral CLs for 3 years; she notes some improvement, but thus has not resolved her episodes of pain.  Despite wearing sclerals, she feels she has debris trapped under her upper eyelid.  She is currently trialint bandage contact lenses and does not note a significant improvement in surface comfort.  She reports her symptoms are steady throughout the year and do not worsen during the winter.    Independent historians:  Patient    Review of outside " testing:  None    Review of outside clinical notes:    -- Visit with Dr. Ocampo 12/29/22          -- Visit with Dr. Ocampo/Rakan 6/23/22      Past medical history:    Patient Active Problem List   Diagnosis     Torticollis, spasmodic     Achlorhydria     Depression with anxiety     KIANA (generalized anxiety disorder)     Hypokalemia     Hypothyroid     Intramural leiomyoma of uterus     Bariatric surgery status     Morbid obesity with BMI of 40.0-44.9, adult (H)     Multiple thyroid nodules     Obesity     Sleep apnea     Essential hypertension     Chronic bilateral low back pain without sciatica     Precordial pain     Chronic myofascial pain     Neuropathic pain         Medications:   acetylcysteine 10 % Soln  autologous serum  B&L Sensitive Eyes Soln  Botox Solr  botulinum toxin type A  buPROPion  clonazePAM  cyanocobalamin  diazepam  doxycycline monohydrate  DULoxetine  EPINEPHrine  ibuprofen  iloperidone Tabs  multivitamin w/minerals  ONABOTULINUMTOXINA IJ  prednisolone 0.25% and hyaluronate in balanced salt Susp  sodium chloride  spironolactone  Systane Nighttime Oint  tretinoin  Tyrvaya Soln  Vitamin D3  Xiidra Soln      Family history / social history:  Patient's family history is not on file.     Patient  reports that she has never smoked. She has never used smokeless tobacco. She reports that she does not drink alcohol and does not use drugs.       Exam:  Corrected distance visual acuity was 20/40 -1 in the right eye and 20/40 +2 in the left eye. Intraocular pressure was 20 in the right eye and 19 in the left eye using ICare.  Color vision 11/11 right eye and 11/11 left eye.  Pupils isocoric with no APD.  Anterior segment exam was remarkable for 2+ MGD in both eyes and tr-1+ papillae of the palpebral conjunctiva.  Her TBUT was less the 5 seconds in both eyes, with minimal corneal staining.      Assessment/Plan:   It is my impression that this patient has significant eye pain.  The most common cause of eye  pain is dry eye.  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  She has tried a myriad of dry eye treatments including plugs, Xiidra, serum tears, pred, and mucomyst.    Her foreign body sensation improves with instillation of proparacaine by 90%.  This really argues that her eye pain is the result of dry eye syndrome.  She reports frequent pulling of mucus strands out of both eyes, I discussed the importance of discontinuing this behavior to avoid exacerbating the stranding.    I will try prescribing Vimpat, which is lacosamide compounded as an eyedrop.  This will be sent to [facility].  Additionally, I will talk to Dr. Anthony about trying a [place] lens and whether she thinks this would be more helpful than the plain scleral lens.  If these changes are not helpful, then she may consider obtaining a third opinion for the management of her dry eye.    Again, thank you for allowing me to participate in the care of your patient.      Sincerely,    Robert Parikh MD  Professor  Ophthalmology Residency   Director of Neuro-Ophthalmology  [facility] Chair  Departments of Ophthalmology, Neurology, and Neurosurgery  [address]  [address]  T - [phone]  F - [phone]  MARIA ALEJANDRA [email or web address]      CC: [place] [address] [address]  Via Fax: [phone]     Faheem Ocampo MD  [address] [facility] [address]  Via In Basket    DX = eye pain, mucus fishing syndrome, dry eye syndrome, vimpat

## 2023-02-28 NOTE — Clinical Note
"2/28/2023       RE: Roxanna Celis  [address] [place] [address]     Dear Colleague,    Thank you for referring your patient, Roxanna Celis, to the [facility] [place] at [facility]. Please see a copy of my visit note below.        Roxanna Celis is a 52 year old female with the following diagnoses:   1. Dry eyes, bilateral         Patient was sent for consultation by Dr. Ocampo for evaluation of eye pain and cervical dystonia.    HPI:    Roxanna Celis is a 52 year old female who presents for evaluation of eye pain.     Beginning about 5 years ago, the patient reports she noticed the \"feeling of rocks and wood\" in BOTH her eyes as well as itchiness and redness.  She reports this was sudden onset, woke up one morning and eyes felt terrible.  Prior to this, she denies any ocular irritation.  This has progressively worsened; she now has a constant foreign body sensation and \"never has a moment of peace.\"  She often experiences tearing, running over down her face.    She notes associated blurry vision, variable.  She has had painful mucous strands that she reports she fishes out often from her lower lid or off of her scleral lenses.  She has episodes of both dull, achey pain and sharp, stabbing pain in her eyes.    She has been in scleral CLs for 3 years; she notes some improvement, but thus has not resolved her episodes of pain.  Despite wearing sclerals, she feels she has debris trapped under her upper eyelid.  She is currently trialint bandage contact lenses and does not note a significant improvement in surface comfort.  She reports her symptoms are steady throughout the year and do not worsen during the winter.    Independent historians:  Patient    Review of outside testing:  None    Review of outside clinical notes:    -- Visit with Dr. Ocampo 12/29/22          -- Visit with Dr. Ocampo/Rakan 6/23/22      Past medical history:    Patient Active " Problem List   Diagnosis     Torticollis, spasmodic     Achlorhydria     Depression with anxiety     KIANA (generalized anxiety disorder)     Hypokalemia     Hypothyroid     Intramural leiomyoma of uterus     Bariatric surgery status     Morbid obesity with BMI of 40.0-44.9, adult (H)     Multiple thyroid nodules     Obesity     Sleep apnea     Essential hypertension     Chronic bilateral low back pain without sciatica     Precordial pain     Chronic myofascial pain     Neuropathic pain         Medications:   acetylcysteine 10 % Soln  autologous serum  B&L Sensitive Eyes Soln  Botox Solr  botulinum toxin type A  buPROPion  clonazePAM  cyanocobalamin  diazepam  doxycycline monohydrate  DULoxetine  EPINEPHrine  ibuprofen  iloperidone Tabs  multivitamin w/minerals  ONABOTULINUMTOXINA IJ  prednisolone 0.25% and hyaluronate in balanced salt Susp  sodium chloride  spironolactone  Systane Nighttime Oint  tretinoin  Tyrvaya Soln  Vitamin D3  Xiidra Soln      Family history / social history:  Patient's family history is not on file.     Patient  reports that she has never smoked. She has never used smokeless tobacco. She reports that she does not drink alcohol and does not use drugs.       Exam:  Corrected distance visual acuity was 20/40 -1 in the right eye and 20/40 +2 in the left eye. Intraocular pressure was 20 in the right eye and 19 in the left eye using ICare.  Color vision 11/11 right eye and 11/11 left eye.  Pupils isocoric with no APD.  Anterior segment exam was remarkable for 2+ MGD in both eyes and tr-1+ papillae of the palpebral conjunctiva.  Her TBUT was less the 5 seconds in both eyes, with minimal corneal staining.      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  She has tried a myriad of dry eye treatments including plugs, Xiidra, serum tears, pred, and mucomyst.    Her foreign body sensation improves with instillation of proparacaine.  She reports  "frequent pulling of mucus strands out of both eyes, I discussed the importance of discontinuing this behavior to avoid exacerbating the stranding.    I will try prescribing Vimpat (ok for sclerals???).  Additionally, I will talk to Dr. Anthony about trying a [place] lens.      Precharting:  Yamila Riddle MD  Resident Physician, PGY-3  Department of Ophthalmology    Cleo Dacosta, OD            Roxanna Celis is a 52 year old female with the following diagnoses:   1. Pain in or around eye, unspecified laterality    2. Dry eyes, bilateral         Patient was sent for consultation by Dr. Ocampo for evaluation of eye pain and cervical dystonia.    HPI:    Roxanna Celis is a 52 year old female who presents for evaluation of eye pain.     Beginning about 5 years ago, the patient reports she noticed the \"feeling of rocks and wood\" in BOTH her eyes as well as itchiness and redness.  She reports this was sudden onset, woke up one morning and eyes felt terrible.  Prior to this, she denies any ocular irritation.  This has progressively worsened; she now has a constant foreign body sensation and \"never has a moment of peace.\"  She often experiences tearing, running over down her face.    She notes associated blurry vision, variable.  She has had painful mucous strands that she reports she fishes out often from her lower lid or off of her scleral lenses.  She has episodes of both dull, achey pain and sharp, stabbing pain in her eyes.    She has been in scleral CLs for 3 years; she notes some improvement, but thus has not resolved her episodes of pain.  Despite wearing sclerals, she feels she has debris trapped under her upper eyelid.  She is currently trialint bandage contact lenses and does not note a significant improvement in surface comfort.  She reports her symptoms are steady throughout the year and do not worsen during the winter.    Independent historians:  Patient    Review of outside testing:  None    Review of " outside clinical notes:    -- Visit with Dr. Ocampo 12/29/22          -- Visit with Dr. Ocampo/Rakan 6/23/22      Past medical history:    Patient Active Problem List   Diagnosis     Torticollis, spasmodic     Achlorhydria     Depression with anxiety     KIANA (generalized anxiety disorder)     Hypokalemia     Hypothyroid     Intramural leiomyoma of uterus     Bariatric surgery status     Morbid obesity with BMI of 40.0-44.9, adult (H)     Multiple thyroid nodules     Obesity     Sleep apnea     Essential hypertension     Chronic bilateral low back pain without sciatica     Precordial pain     Chronic myofascial pain     Neuropathic pain         Medications:   acetylcysteine 10 % Soln  autologous serum  B&L Sensitive Eyes Soln  Botox Solr  botulinum toxin type A  buPROPion  clonazePAM  cyanocobalamin  diazepam  doxycycline monohydrate  DULoxetine  EPINEPHrine  ibuprofen  iloperidone Tabs  multivitamin w/minerals  ONABOTULINUMTOXINA IJ  prednisolone 0.25% and hyaluronate in balanced salt Susp  sodium chloride  spironolactone  Systane Nighttime Oint  tretinoin  Tyrvaya Soln  Vitamin D3  Xiidra Soln      Family history / social history:  Patient's family history is not on file.     Patient  reports that she has never smoked. She has never used smokeless tobacco. She reports that she does not drink alcohol and does not use drugs.       Exam:  Corrected distance visual acuity was 20/40 -1 in the right eye and 20/40 +2 in the left eye. Intraocular pressure was 20 in the right eye and 19 in the left eye using ICare.  Color vision 11/11 right eye and 11/11 left eye.  Pupils isocoric with no APD.  Anterior segment exam was remarkable for 2+ MGD in both eyes and tr-1+ papillae of the palpebral conjunctiva.  Her TBUT was less the 5 seconds in both eyes, with minimal corneal staining.      Assessment/Plan:   It is my impression that this patient has significant eye pain.  The most common cause of eye pain is dry eye.  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  She has tried a myriad of dry eye treatments including plugs, Xiidra, serum tears, pred, and mucomyst.    Her foreign body sensation improves with instillation of proparacaine by 90%.  This really argues that her eye pain is the result of dry eye syndrome.  She reports frequent pulling of mucus strands out of both eyes, I discussed the importance of discontinuing this behavior to avoid exacerbating the stranding.    I will try prescribing Vimpat, which is lacosamide compounded as an eyedrop.  This will be sent to [facility] pharmacy.  Additionally, I will talk to Dr. Anthony about trying a [place] lens and whether she thinks this would be more helpful than the plain scleral lens.  If these changes are not helpful, then she may consider obtaining a third opinion for the management of her dry eye.      Precharting:  Yamila Riddle MD  Resident Physician, PGY-3  Department of Ophthalmology    Cleo Dacosta, OD          Again, thank you for allowing me to participate in the care of your patient.      Sincerely,    Robert Parikh MD

## 2023-02-28 NOTE — NURSING NOTE
Chief Complaints and History of Present Illnesses   Patient presents with     New Patient     Chief Complaint(s) and History of Present Illness(es)     New Patient            Laterality: both eyes    Associated symptoms: dryness, eye pain, tearing and headache (once a week.).  Negative for double vision, flashes and floaters    Treatments tried: eye drops    Pain scale: 8/10          Comments    Pt is concerned that her plugs are completely dissolved.  She would like to find out if she can figure out all the sensations going on in her eyes.   There pain in both eyes are a 7-10 all the time.     Ocular meds:  Warm compresses 2-3x daily   Doxycycline 50 mg orally twice a day   Xiidra twice a day, both eyes   Pred healon 4x daily, both eyes   Serum tears to fill scleral lenses 2-3x daily  Systane ointment PRN when lenses are out   Mucomyst 4-6 times a day, both eyes   Trefalia(nasal spray) -to get her tear ducts to work better.     STELLA ESCALONA COA February 28, 2023 1:10 PM

## 2023-02-28 NOTE — PROGRESS NOTES
"     Roxanna Celis is a 52 year old female with the following diagnoses:   1. Pain in or around eye, unspecified laterality    2. Dry eyes, bilateral         Patient was sent for consultation by Dr. Ocampo for evaluation of eye pain and cervical dystonia.    HPI:    Roxanna Celis is a 52 year old female who presents for evaluation of eye pain.     Beginning about 5 years ago, the patient reports she noticed the \"feeling of rocks and wood\" in BOTH her eyes as well as itchiness and redness.  She reports this was sudden onset, woke up one morning and eyes felt terrible.  Prior to this, she denies any ocular irritation.  This has progressively worsened; she now has a constant foreign body sensation and \"never has a moment of peace.\"  She often experiences tearing, running over down her face.    She notes associated blurry vision, variable.  She has had painful mucous strands that she reports she fishes out often from her lower lid or off of her scleral lenses.  She has episodes of both dull, achey pain and sharp, stabbing pain in her eyes.    She has been in scleral CLs for 3 years; she notes some improvement, but thus has not resolved her episodes of pain.  Despite wearing sclerals, she feels she has debris trapped under her upper eyelid.  She is currently trialint bandage contact lenses and does not note a significant improvement in surface comfort.  She reports her symptoms are steady throughout the year and do not worsen during the winter.    Independent historians:  Patient    Review of outside testing:  None    Review of outside clinical notes:    -- Visit with Dr. Ocampo 12/29/22          -- Visit with Dr. Ocampo/Rakan 6/23/22      Past medical history:    Patient Active Problem List   Diagnosis     Torticollis, spasmodic     Achlorhydria     Depression with anxiety     KIANA (generalized anxiety disorder)     Hypokalemia     Hypothyroid     Intramural leiomyoma of uterus     Bariatric surgery status     Morbid " obesity with BMI of 40.0-44.9, adult (H)     Multiple thyroid nodules     Obesity     Sleep apnea     Essential hypertension     Chronic bilateral low back pain without sciatica     Precordial pain     Chronic myofascial pain     Neuropathic pain         Medications:   acetylcysteine 10 % Soln  autologous serum  B&L Sensitive Eyes Soln  Botox Solr  botulinum toxin type A  buPROPion  clonazePAM  cyanocobalamin  diazepam  doxycycline monohydrate  DULoxetine  EPINEPHrine  ibuprofen  iloperidone Tabs  multivitamin w/minerals  ONABOTULINUMTOXINA IJ  prednisolone 0.25% and hyaluronate in balanced salt Susp  sodium chloride  spironolactone  Systane Nighttime Oint  tretinoin  Tyrvaya Soln  Vitamin D3  Xiidra Soln      Family history / social history:  Patient's family history is not on file.     Patient  reports that she has never smoked. She has never used smokeless tobacco. She reports that she does not drink alcohol and does not use drugs.       Exam:  Corrected distance visual acuity was 20/40 -1 in the right eye and 20/40 +2 in the left eye. Intraocular pressure was 20 in the right eye and 19 in the left eye using ICare.  Color vision 11/11 right eye and 11/11 left eye.  Pupils isocoric with no APD.  Anterior segment exam was remarkable for 2+ MGD in both eyes and tr-1+ papillae of the palpebral conjunctiva.  Her TBUT was less the 5 seconds in both eyes, with minimal corneal staining.      Assessment/Plan:   It is my impression that this patient has significant eye pain.  The most common cause of eye pain is dry eye.  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  She has tried a myriad of dry eye treatments including plugs, Xiidra, serum tears, pred, and mucomyst.    Her foreign body sensation improves with instillation of proparacaine by 90%.  This really argues that her eye pain is the result of dry eye syndrome.  She reports frequent pulling of mucus strands out of both eyes, I discussed the importance of  discontinuing this behavior to avoid exacerbating the stranding.    I will try prescribing Vimpat, which is lacosamide compounded as an eyedrop.  This will be sent to [facility].  Additionally, I will talk to Dr. Anthony about trying a [place] lens and whether she thinks this would be more helpful than the plain scleral lens.  If these changes are not helpful, then she may consider obtaining a third opinion for the management of her dry eye.    Addendum: [facility] pharmacy closed.  No other options available that I can find.        Precharting:  Yamila Riddle MD  Resident Physician, PGY-3  Department of Ophthalmology    Cleo Dacosta, SHELLI    Attending Physician Attestation:  Complete documentation of historical and exam elements from today's encounter can be found in the full encounter summary report (not reduplicated in this progress note).  I personally obtained the chief complaint(s) and history of present illness.  I confirmed and edited as necessary the review of systems, past medical/surgical history, family history, social history, and examination findings as documented by others; and I examined the patient myself.  I personally reviewed the relevant tests, images, and reports as documented above.  I formulated and edited as necessary the assessment and plan and discussed the findings and management plan with the patient and family. - Robert Parikh MD

## 2023-03-02 ENCOUNTER — CARE COORDINATION (OUTPATIENT)
Dept: PHYSICAL MEDICINE AND REHAB | Facility: CLINIC | Age: 53
End: 2023-03-02
Payer: COMMERCIAL

## 2023-03-02 DIAGNOSIS — G24.3 CERVICAL DYSTONIA: Primary | ICD-10-CM

## 2023-03-02 NOTE — PROGRESS NOTES
Spoke with patient regarding her response to previous injection of Dysport. Patient reports that she is unable to raise her left eyebrow. She can raise her right eyebrow slightly. However, she has not noticed any improvement in her symptoms. She continues to experience neck pain. Ms. Celis feels as though her symptoms were under better control when she was receiving a higher dose of Dysport. The dose she was receiving was decreased due to neck weakness in the past. Rather than switching back to Botox, her preference is to increase the dose of Dysport. This will be discussed with Dr. Gonzalez.    Anna Pineda, PT

## 2023-03-07 ENCOUNTER — TELEPHONE (OUTPATIENT)
Dept: PALLIATIVE MEDICINE | Facility: CLINIC | Age: 53
End: 2023-03-07
Payer: COMMERCIAL

## 2023-03-07 NOTE — TELEPHONE ENCOUNTER
Called pt. IDALIA to call back to discuss    Cleo NEUMANN, RN Care Coordinator  [facility]  Pain [facility]

## 2023-03-07 NOTE — TELEPHONE ENCOUNTER
Roxanna has made and cancelled several appointments with me:  12/14/23: patient cancelled  2/8/23: Patient scheduled, but only for 30 min visit, so was rescheduled to 2/15/23.  2/15/23: patient cancelled  3/9/23: patient cancelled    No future appointments have been made. Since she has been going to PMR and neurology for pain related issues, she may choose to continue to see them and get a new referral if needed in the future. She is also no longer following with [facility] for pain psychology either.     Will send to RN care coordinators for assessment.      Estella Reeves, AUGUSTO

## 2023-03-09 NOTE — TELEPHONE ENCOUNTER
Routing to , this pt needs to be r/s for 60min tx of care with Estella and not 30 as currently planned. Please facilitate, thanks!    Annika ZEE RN Care Coordinator  [facility]

## 2023-03-10 NOTE — NURSING NOTE
Chief Complaint   Patient presents with     RECHECK     UMP- BOTOX-CERVICAL DYSTONIA        [email or web address]

## 2023-03-10 NOTE — TELEPHONE ENCOUNTER
Appointment has been corrected, the slot right after was open so changed to 60 minutes and updated the appointment notes

## 2023-03-17 ENCOUNTER — OFFICE VISIT (OUTPATIENT)
Dept: OPHTHALMOLOGY | Facility: CLINIC | Age: 53
End: 2023-03-17
Attending: OPHTHALMOLOGY
Payer: COMMERCIAL

## 2023-03-17 ENCOUNTER — TELEPHONE (OUTPATIENT)
Dept: OPHTHALMOLOGY | Facility: CLINIC | Age: 53
End: 2023-03-17
Payer: COMMERCIAL

## 2023-03-17 DIAGNOSIS — H57.10 PAIN IN OR AROUND EYE, UNSPECIFIED LATERALITY: Primary | ICD-10-CM

## 2023-03-17 DIAGNOSIS — H04.123 DRY EYES, BILATERAL: ICD-10-CM

## 2023-03-17 PROCEDURE — 68761 CLOSE TEAR DUCT OPENING: CPT | Performed by: OPHTHALMOLOGY

## 2023-03-17 PROCEDURE — G0463 HOSPITAL OUTPT CLINIC VISIT: HCPCS | Mod: 25 | Performed by: OPHTHALMOLOGY

## 2023-03-17 ASSESSMENT — VISUAL ACUITY
OD_CC: 20/20
OS_CC: 20/20
CORRECTION_TYPE: GLASSES
OD_CC+: -1
METHOD: SNELLEN - LINEAR

## 2023-03-17 ASSESSMENT — CONF VISUAL FIELD
OD_NORMAL: 1
OS_SUPERIOR_NASAL_RESTRICTION: 0
OS_INFERIOR_NASAL_RESTRICTION: 0
OS_INFERIOR_TEMPORAL_RESTRICTION: 0
OD_INFERIOR_NASAL_RESTRICTION: 0
OD_SUPERIOR_NASAL_RESTRICTION: 0
OD_SUPERIOR_TEMPORAL_RESTRICTION: 0
OS_NORMAL: 1
OS_SUPERIOR_TEMPORAL_RESTRICTION: 0
OD_INFERIOR_TEMPORAL_RESTRICTION: 0
METHOD: COUNTING FINGERS

## 2023-03-17 ASSESSMENT — REFRACTION_WEARINGRX
OS_ADD: +2.00
OS_CYLINDER: SPHERE
OD_SPHERE: -2.00
OS_SPHERE: -2.00
OD_CYLINDER: SPHERE
OD_ADD: +2.00

## 2023-03-17 ASSESSMENT — TONOMETRY
OD_IOP_MMHG: 17
IOP_METHOD: ICARE
OS_IOP_MMHG: 18

## 2023-03-17 ASSESSMENT — EXTERNAL EXAM - LEFT EYE: OS_EXAM: NORMAL

## 2023-03-17 ASSESSMENT — EXTERNAL EXAM - RIGHT EYE: OD_EXAM: NORMAL

## 2023-03-17 NOTE — TELEPHONE ENCOUNTER
Pt left message on triage line this afternoon    Pt feels plug fell out after placement today and thinks has found plug. Pt would like to confirm by color shape.    --pt states harder time placing in one area of eye    Note to Dr. Ocampo to assist in identifying specifics and review recommendation to come back to have placed.    Haider Webber RN 5:24 PM 03/17/23

## 2023-03-17 NOTE — NURSING NOTE
Chief Complaints and History of Present Illnesses   Patient presents with     Dry Eye Syndrome Follow Up     Chief Complaint(s) and History of Present Illness(es)     Dry Eye Syndrome Follow Up            Laterality: both eyes (Feels the va is not as clear but will be going back to Dr BATES)    Associated symptoms: mattering, red eyes, dryness, foreign body sensation, irritation, burning, photophobia and lid swelling    Duration: months    Treatments tried: warm compresses    Pain scale: 0/10          Comments    Xiidra BID each eye  pred healon QID each eye  serum tears 3-4 times per day   systane ointment at bedtime   mucomyst 4-6 times per day both eyes     Carmen Ansari COT 10:57 AM March 17, 2023

## 2023-03-19 NOTE — PROGRESS NOTES
Chief Complaint(s) and History of Present Illness(es)     Dry Eye Syndrome Follow Up            Laterality: both eyes (Feels the va is not as clear but will be going   back to Dr BATES)    Associated symptoms: mattering, red eyes, dryness, foreign body   sensation, irritation, burning, photophobia and lid swelling    Duration: months    Treatments tried: warm compresses    Pain scale: 0/10          Comments    Xiidra BID each eye  pred healon QID each eye  serum tears 3-4 times per day   systane ointment at bedtime   mucomyst 4-6 times per day both eyes     Carmen Ponchogueritaon COT 10:57 AM March 17, 2023                  Review of systems for the eyes was negative other than the pertinent positives/negatives listed in the HPI.      Assessment & Plan      Roxanna Celis is a 52 year old female with the following diagnoses:   1. Pain in or around eye, unspecified laterality    2. Dry eyes, bilateral         Appreciate consultation from Dr. Parikh.  Patient was excited by the prospect of trying Vimpat, but is frustrated that she was unable to receive the medication.  She feels the communication regarding [facility]'s pharmacy was poor and she has been exploring other compounding pharmacy options herself.  She would like to try to receive the medication via [facility] if possible.    Discussed stable exam today   Reviewed limited literature on the use of ophthalmic Vimpat available, but reasonable to try.  Reviewed off-label use of medication and no FDA approval for ocular use.  She expressed understanding and would like to proceed.    Rx sent to [facility] for 1% COMPONDED DROP to be instilled 1 gtt 6x daily to both eyes     Felt that previous 6 mo dissolvable plugs provided some relief.  She would like these replaced today.  Bilateral lower lids (double stuff) and bilateral upper lids performed at  today.      Keep scheduled appt with Dr. Anthony for continued scleral contact lens adjustments  Schedule 1 mo follow up  with me after starting Vimpat  Return precautions reviewed     Patient disposition:   Return in about 6 weeks (around 4/28/2023) for VT only.           Attending Physician Attestation:  Complete documentation of historical and exam elements from today's encounter can be found in the full encounter summary report (not reduplicated in this progress note).  I personally obtained the chief complaint(s) and history of present illness.  I confirmed and edited as necessary the review of systems, past medical/surgical history, family history, social history, and examination findings as documented by others; and I examined the patient myself.  I personally reviewed the relevant tests, images, and reports as documented above.  I formulated and edited as necessary the assessment and plan and discussed the findings and management plan with the patient and family. . - Faheem Ocampo MD

## 2023-03-21 ENCOUNTER — OFFICE VISIT (OUTPATIENT)
Dept: PALLIATIVE MEDICINE | Facility: CLINIC | Age: 53
End: 2023-03-21
Payer: COMMERCIAL

## 2023-03-21 VITALS — OXYGEN SATURATION: 99 % | DIASTOLIC BLOOD PRESSURE: 78 MMHG | SYSTOLIC BLOOD PRESSURE: 112 MMHG | HEART RATE: 83 BPM

## 2023-03-21 DIAGNOSIS — G89.29 CHRONIC MYOFASCIAL PAIN: ICD-10-CM

## 2023-03-21 DIAGNOSIS — G89.4 CHRONIC PAIN SYNDROME: Primary | ICD-10-CM

## 2023-03-21 DIAGNOSIS — M79.18 CHRONIC MYOFASCIAL PAIN: ICD-10-CM

## 2023-03-21 DIAGNOSIS — M79.7 FIBROMYALGIA: ICD-10-CM

## 2023-03-21 PROCEDURE — 99215 OFFICE O/P EST HI 40 MIN: CPT | Performed by: NURSE PRACTITIONER

## 2023-03-21 ASSESSMENT — PAIN SCALES - GENERAL: PAINLEVEL: EXTREME PAIN (8)

## 2023-03-21 NOTE — PATIENT INSTRUCTIONS
You could consider a consult with a Functional Medicine practitioner.  If I find out anything new about the [facility] Fibromyalgia program, I will let you know via my chart.

## 2023-03-21 NOTE — PROGRESS NOTES
[facility] Pain Management     Date of visit: 3/21/2023     Reason for visit:  Transfer of Care: Roxanna Celis is a 52 year old female with PMH significant for ELY, Obesity, HTN, SICCA symptoms, spasmodic torticollis who is seen today for transfer of care from Erica Marks CNP for ongoing management of chronic pain. She was last seen in the pain clinic with Erica on 10/6/2022, prior to that had been seen by Dr. Rivera at this clinic.     History:  1. Painful peripheral neuropathy: Patient reports gradual onset of paresthesias, dysesthesias and burning/tingling type pain around 2019 that began in the periphery and spread proximally in their upper and lower extremities. Neurological exam of the upper and lower extremities has been normal. Upper extremity EMG and repeat were normal. Cervical, thoracic and lumbar MRI completed, no pathology to explain symptoms. Seen by Dr. Torres from Neurology.   2. Chronic Myofascial Pain: Patient meets the diagnostic criteria for fibromyalgia based on history and exam. Prior work-up has been unrevealing for a neurologic or rheumatologic cause for their symptoms. Has had care from PMR, Pain Management, Neurology.   3. Mental Health - the patient's mental health concerns, specifically anxiety, depression, affect her experience of pain and contribute to her clinically significant distress.      Recommendations from last visit:   Physical Therapy: I encouraged she continue to implement chronic pain physical therapy exercises. Continue pain Phd as long as this provides benefit.  Continue working with primary psychiatry services. Discussed various medications Roxanna has tried in the past. Though Lyrica was most effective it caused substantial weight gain. Diarrhea reported in July was definitely not related to Topamax or low dose naltrexone. We discussed restarting low dose naltrexone but at a lower dose and in solution form to allow for closer titration and she is interested. She  "will restart low dose naltrexone 1mml. Start 1ml daily for 7d, then 1.5ml daily for 7d, then 2ml daily for 7 and so on until max of 6ml daily. Continue methocarbamol, Cymbalta and medical cannabis per direction of the pharmacist. Referrals: Roxanna is very interested in fibromyalgia program at [facility].  ____________________________________________________________    Chief Complaint:    Chief Complaint   Patient presents with     Pain     Since last visit, Roxanna reports:  - She tried ketamine treatment at a clinic in [place]. Had one infusion over a 3 hour period. Pain significantly worsened by the next morning. The goal was to treat peripheral neuropathy pain, and was covered by insurance as her psychiatrist ordered it. Did not complete the other planned 2 infusions due to her reaction. Has not followed up with the clinic to discuss this reaction yet.  - She tried LDN, but reports that she had too many side effects \"my body doesn't like it\". Reports that this made her nauseated and was throwing up.  - Tried Lyrica, \"gained a pound a day\" while taking it per her report. This was stopped and she would not like to try this again.   - Tried pool therapy and could not tolerate the sensation of water resistence against her skin. Did this at the [facility] for 4 months. Stopped as she was not able to make any progress. May be interested in trying this again in the future.   - Did Med-X program at [facility] in [place] for her neck and back (late ), she feels like this made her pain worse and now is not able to walk or stand as well as she did in the past. This is slowly improving. Did this instead of completing pain PT.  - Was doing botox for cervical dystonia, but \"I developed immunity.\" Now started with Dysport. Her last round of Dysport injections on 2022 were not effective as compared to previous rounds, and therefore was concern that she may have developed resistance to the Dysport. On 2023 dose of Dysport " "remained at 500 units, however, frontalis was included to check for movement,  (would not have a change in movement if resistant to Dysport). Dr. Gonzalez had low suspicion for resistance. Roxanna thinks that her last injections with Dysport were not helpful either, attributes this to the injector being in training (unclear from notes).    - Saw Dr. Bedoya with PMR for evaluation of her lower back pain. Shew was offered bilateral L5-S1 ESIs, however tells me that she was never called to set it up and took this a \"sign from God\" that she no longer needed this. She does feel like her lower back pain is better now than it was a month ago.  They also discussed possibility of lumbar diagnostic medial branch blocks and the pathway to radiofrequency ablation; she tells me that she is not interested in this procedure.  - 3/11/2022: last visit with Dr. Torres (Neurology)  \"She has had several years of ongoing symptoms that began with dry eyes, other Sicca like symptoms, though she has reportedly have negative lip biopsy and has had negative SSA/SSB antibodies.   She has seen multiple specialists without answer including MRIs as above (including seeing one of our neuroimmunological specialists who discussed with her she does not have MS), rheumatologic lab work, normal EMG.  She has received a diagnosis of neuropathy and of fibromyalgia in the past but medications to treat as such have provided limited benefit. She unfortunately remains quite debilitated from her pain despite trials of many medications and approaches.  Subsequent small fiber biopsy which was normal in her legs, borderline reduced in her forearm which is where symptoms began.   Because of this borderline finding we did send [facility] in [facility] send out labs to further work up a potential cause (resulted as inconclusive.)  It is uncommon in such situations for dangelo weakness outside of the context of pain to develop.  We also discussed that this can be very " "difficult to treat and that I agree with her pain doctor (Dr. Romano) that I do think central pain processing/fibromyalgia are likely components of her condition as well even if other reasons are in time discovered.\"    - Consulted with  functional medicine provider (Dr. Cy Gannon), started super aloe supplement and this has completely resolved her bowel issues. Also uses vitamin C.  Changed diet, stopped gluten and added sugars.  - She was interested in [facility] fibromyalgia program. She states that she was not accepted into this program. She did complete the pain program in the late 1990's, it was somewhat helpful.   -She stopped medical cannabis in February, \"my body doesn't like the THC.\" She was working closely with the pharmacist at the dispensary to try to find products that may provide benefit, although notes that nothing ever helped her pain.  -She stopped visits with pain psychologist Taisha Graham PsyD . She stopped pain physical therapy with [facility] as she ran out of coverage.  - Asks about opioid treatment trial.     Pain description:  Location: feet, ankles, \"from my toes up my legs to mid thigh\", sometimes worse over her shins than the other parts of her legs. Also present in her bilateral arms from her upper arm to her wrists. Has pain at the base of her neck with radiation to her forehead. Mid/upper back pain.   Quality: burning, aching, sharp, cutting, dull, aching, pressure  Duration: constant   Severity/Intensity (0 = No pain to 10 = Worst pain imaginable)   Now: 8  Average in past 2 weeks: 10  Best: in past 24 hours: 8  Worst in past 24 hours: 10  Aggravating factors include: standing, sitting, walking, exercise, sneezing, bowel movements.  Relieving factors include: lying down    Current pain medications:  Cymbalta - only helps her mood, not pain    Review of [place] Prescription Monitoring Program ():   No concern for abuse or misuse of controlled medications based on " "this report. Viewed on 23  No controlled medications are being prescribed.    Prior pain medications:    2. Physical Therapy:     3. Pain psychology:   4. Surgery: hasn't tried  5. Injections: none  6. Alternative Therapies:               Chiropractic: helpful, sees chiropractor               Acupuncture: hasn't tried    PAIN MANAGEMENT TREATMENT HISTORY  1. MEDICATIONS:  Opiates: Not indicated, would not recommend  NSAIDS: Patient had bariatric surgery, cannot take NSAIDS due to high risk for gastric ulcers  Muscle Relaxants: Robaxin- did nothing  Anti-depressants: \"help my mood, not my pain\"  Neuropathics: Gabapentin 1200mg at night- drowsy with higher dose, -not helpful, Lyrica 100mg BID- SE, weight gain right away, Topamax 100mg BID-diarrhea  Topicals: -not helpful  Adjuvant pain medications: Medical cannabis, decided in February to stop, does not give her any relief. Worked with [facility] extensively but did not get any relief. Low dose naltrexone 5mg - affected mood, tried again, Low dose naltrexone 3mg daily-diarrhea  2. PHYSICAL THERAPY:   pain physical therapy - Somewhat helpful               pool PT -not helpful   MedX -not helpful  TENS unit -not helpful   3. PAIN PSYCHOLOGY:   Yes. Taisha Graham PsyD LP - Somewhat helpful, stopped   4. SURGERY:   No  5. INJECTIONS:   Not interested  6. COMPLEMENTARY THERAPY:  Chiropractic  helpful  Acupuncture/acupressure/ not helpful  TENS unit  not helpful    Imagin2023 MRI lumbar spine without contrast ([facility])  Interpretation:  Mild generalized levocurvature.  No acute fracture or spondylolysis.  Conus is normal and terminates at L1-2.  Imaged upper sacrum and paraspinal soft tissue structures are unremarkable.  Disc desiccation and annular bulging are demonstrated at L5-S1 through L2-3.  Disc space narrowing is mild at L5-S1  L5-S1: Progressed annular bulging and new central to right paracentral annular fissure and overlying 2 to 3 mm disc " protrusion without central stenosis or traversing neural compression.  Facets are unremarkable, but there is mild left chronic foraminal narrowing.  L4-5: Unchanged central annular fissure without overlying disc herniation, although progressed annular bulging is more prominent in the midline with mild bilateral subarticular recess narrowing, no traversing neural compression or central stenosis.  Mild left facet arthrosis with patent foramina.  L3-4: Previous annular bulging and disc space narrowing with a new left foraminal to far left lateral annular fissure, no overlying disc herniation, central stenosis or traversing neural compression.  Mild bilateral facet hypertrophy with patent foramina  L2-3: Progressed annular bulging slightly more prominent left foraminally to the far left laterally without central stenosis or traversing neural compression.  Mild bilateral facet hypertrophy.  Patent foramina.  L1-2 and T12-L1: Normal posterior disc margins and facets.  Patent foramina.     Conclusion: Multilevel degenerative lumbar spondylosis with the following notable findings:  1.  New, 2 to 3 mm central to right paracentral L5-S1 disc protrusion without central stenosis or traversing neural compression.  2.  Unchanged central L4-5 and new left foraminal to far left lateral L3-4 annular fissures without disc herniations in these locations.  3.  No acute fracture or spondylolysis  4.  Mild left L4-5 facet arthrosis.  5.  Chronic mild left L5-S1 foraminal narrowing without ganglionic compression.    6/14/2022: UE EMG testing with Dr. Stern, Neurology:   Interpretation:  This is a normal study. There is no electrophysiologic evidence of a large-fiber polyneuropathy, focal neuropathy, or cervical radiculopathy affecting the upper limbs on the basis of this study.    Past Medical History:  She  has a past medical history of Cervical dystonia (1993) and Fibromyalgia.   Past Surgical History:  She  has a past surgical history  that includes Gallbladder surgery; Stomach surgery; and FRAGMENTING OF KIDNEY STONE.   Social History:  She  reports that she has never smoked. She has never used smokeless tobacco. She reports that she does not drink alcohol and does not use drugs.   Social History     Social History Narrative    Work: Not working    Current living situation: Lives with      She  reports that she has never smoked. She has never used smokeless tobacco. She reports that she does not drink alcohol and does not use drugs.     Last updated 3/21/2023           Current Outpatient Medications:      acetylcysteine 10 % (MUCOMYST) 10% SOLN, Place 2 drops into both eyes 6 times daily, Disp: 30 mL, Rfl: 11     autologous serum compounded ophthalmic solution, Place 1 drop into both eyes every 2 hours (while awake), Disp: , Rfl:      BOTOX 100 units injection, Inject 250 Units into the muscle once Lot # /C3 with Expiration Date:  11/2020, Disp: , Rfl:      buPROPion (WELLBUTRIN XL) 300 MG 24 hr tablet, Take 300 mg by mouth every morning, Disp: , Rfl:      clonazePAM (KLONOPIN) 0.5 MG tablet, , Disp: , Rfl:      clonazePAM (KLONOPIN) 1 MG tablet, Take 1 mg by mouth 2 times daily as needed, Disp: , Rfl:      COMPOUNDED NON-CONTROLLED SUBSTANCE (CMPD RX) - PHARMACY TO MIX COMPOUNDED MEDICATION, Lactosamide 1% drop, instill 1-2 drops 6x daily into both eyes, Disp: 10 mL, Rfl: 3     cyanocobalamin (VITAMIN B-12) 1000 MCG tablet, Take 1,000 mcg by mouth daily, Disp: , Rfl:      diazepam (VALIUM) 5 MG tablet, Take 2 tablets (10 mg) by mouth once as needed for anxiety, Disp: 2 tablet, Rfl: 0     doxycycline monohydrate (ADOXA) 50 MG tablet, Take 1 tablet (50 mg) by mouth 2 times daily, Disp: 180 tablet, Rfl: 3     DULoxetine (CYMBALTA) 60 MG capsule, TAKE 2 CAPSULES BY MOUTH EVERY DAY, Disp: , Rfl:      EPINEPHrine (ANY BX GENERIC EQUIV) 0.3 MG/0.3ML injection 2-pack, , Disp: , Rfl:      ibuprofen (ADVIL/MOTRIN) 200 MG capsule, Take 200 mg  by mouth every 4 hours as needed., Disp: , Rfl:      iloperidone (FANAPT) 6 MG TABS tablet, Take 4 mg by mouth daily , Disp: , Rfl:      lifitegrast (XIIDRA) 5 % opthalmic solution, Place 1 drop into both eyes 2 times daily, Disp: 60 each, Rfl: 11     multivitamin w/minerals (THERA-VIT-M) tablet, Take 1 tablet by mouth daily, Disp: , Rfl:      ONABOTULINUMTOXINA IJ, Inject 250 Units as directed once LOT # /C3 EXPIRATION: 03/2020, Disp: , Rfl:      prednisolone 0.25% and hyaluronate in balanced salt SUSP compounded ophthalmic suspension, Place 1 Drop into both eyes 4 times daily. Discard bottle 15 days after opening. Refrigerate. Unopened bottle expires: Strength: 0.25 %, Disp: 6.67 mL, Rfl: 11     prednisoLONE acetate (PRED FORTE) 1 % ophthalmic suspension, Place 1 drop into both eyes 3 times daily for 7 days, THEN 1 drop 2 times daily for 7 days, THEN 1 drop daily for 7 days., Disp: 5 mL, Rfl: 1     sodium chloride 0.9 % neb solution, Medically necessary for scleral contact lens use. May use 3 ml 6 times a day for contact lens use, Disp: 300 mL, Rfl: 11     Soft Lens Products (B&L SENSITIVE EYES) SOLN, Apply 1 drop to eye, Disp: , Rfl:      spironolactone (ALDACTONE) 50 MG tablet, Take 1 tablet by mouth daily, Disp: , Rfl:      tretinoin (RETIN-A) 0.025 % external cream, Apply a pea size to entire face QD, Disp: 45 g, Rfl: 11     tretinoin (RETIN-A) 0.05 % external cream, Spread a pea size amount into affected area topically at bedtime.  Use sunscreen SPF>20., Disp: 45 g, Rfl: 11     TYRVAYA 0.03 MG/ACT nasal spray, USE 1 SPRAY IN EACH NOSTRIL TWICE DAILY., Disp: , Rfl:      Vitamin D3 (CHOLECALCIFEROL) 25 mcg (1000 units) tablet, Take by mouth daily, Disp: , Rfl:      White Petrolatum-Mineral Oil (SYSTANE NIGHTTIME) OINT, Apply 3.5 g to eye 4 times daily as needed (dry eyes), Disp: 7 g, Rfl: 11    Current Facility-Administered Medications:      botulinum toxin type A (DYSPORT) injection 500 Units, 500 Units,  Intramuscular, Q90 Days, America Gonzalez MD, 500 Units at 23 2120     botulinum toxin type A (DYSPORT) injection 500 Units, 500 Units, Intramuscular, Q90 Days, America Gonzalez MD, 500 Units at 22 1028     botulinum toxin type A (DYSPORT) injection 800 Units, 800 Units, Intramuscular, Q90 Days, America Gonzalez MD        Allergies   Allergen Reactions     Contrast Dye Anaphylaxis     Other reaction(s): almost  from it     Diagnostic X-Ray Materials Anaphylaxis     IVP DYE     Lisinopril Anaphylaxis     Lisinopril-Hctz Anaphylaxis     Maple Flavor Anaphylaxis     MAPLE SYRUP  MAPLE SYRUP  MAPLE SYRUP     Maple Tree Anaphylaxis     Allergy includes maple syrup.     No Clinical Screening - See Comments      maple syrup==anaphylaxis  Dairy- causes to not feel well     Gluten Itching and Swelling     Gluten Meal Itching and Swelling     Cramping     [place] Meal Unknown     Chamomile Unknown     Codeine Sulfate Cramps and GI Disturbance     Food Unknown     Grapes, almond, lactose, gluten, real maple syrup     Nsaids Other (See Comments) and Unknown     Patient had bariatric surgery. Should not ever take NSAIDS due to high risk for gastric ulcers.   Patient had bariatric surgery. Should not ever take NSAIDS due to high risk for gastric ulcers.      Polyethylene Glycol Unknown     Per allergy testing  Per allergy testing       Erythromycin Nausea and Vomiting, Cramps, Diarrhea and Nausea     PN: LW Reaction: stomach upset  cramping     Lactose Diarrhea     Morphine Other (See Comments)       Family History   Problem Relation Age of Onset     Glaucoma No family hx of      Macular Degeneration No family hx of      OBJECTIVE  Vitals:    23 1331   BP: 112/78   Pulse: 83   SpO2: 99%     Constitutional: Well developed, well nourished, appears stated age. No acute distress.  Gait is steady and uses walker  HEENT: Head atraumatic, normocephalic. Eyes without conjunctival injection or  jaundice. Neck supple. No obvious neck masses.  Gastrointestinal: Normoactive bowel sounds. Abdomen soft, non-tender, without guarding/rebound.  Skin: No obvious rash, lesions, or petechiae of exposed skin.   Extremities: Peripheral pulses intact. No clubbing, cyanosis, or edema. Moves all extremities.  Psychiatric/mental status: Alert, without lethargy or stupor. Speech fluent. Appropriate affect. Mood normal. Able to follow commands without difficulty.   Musculoskeletal exam: Normal bulk and tone. Unremarkable spinal curvature.       ASSESSMENT AND PLAN:   1. Fibromyalgia  2. Chronic myofascial pain  3. Chronic pain syndrome    Unfortunately, I do not have anything additional to add to Roxanna's care at this time. She is already seeing multiple specialists for treatment. She could consider follow-up with functional medicine as this approach has provided her with the only significant change in her symptoms.  No future appointments have been made. Since she has been going to PMR and neurology for pain related issues, she may choose to continue to see them and get a new referral if needed in the future. She is also no longer following for pain psychology or pain PT. I told her that I do not see any indication for starting opioids and that I would not recommend opioid treatment for her symptoms. She verbalized understanding.      Estella Reeves, CNP-BC, PMGT-BC, AP-PMN  [facility]          BILLING TIME DOCUMENTATION:   The total TIME spent on this patient on the date of the encounter/appointment was 64 minutes.      TOTAL TIME includes:   Time spent preparing to see the patient by reviewing available medical information in the patient's medical record, including relevant provider notes, laboratory work, and imaging 12 minutes  Time spent face to face (or over the phone) with the patient 38 minutes  Time spent ordering tests, medications, procedures and referrals 1 minutes  Time  spent Referring and communicating with other healthcare professionals 0 minutes  Time spent documenting clinical information in Epic 13 minutes

## 2023-03-21 NOTE — TELEPHONE ENCOUNTER
Spoke to pt after review by Dr. Ocampo at 0805    Blue in color and if left lower lid plug out would not recommend replacing (second plug likely in place).    Updated pt with information and pt complaint of irritation/pain in lower lid area and soreness in upper lid since placement.    Scheduled exam tomorrow with Dr. Mariee at 1200 (Dr. Ocampo out this week)    Pt aware of date/time/location and seemed comfortable with the scheduling    Haider BERNA Webber 8:28 AM 03/21/23

## 2023-03-21 NOTE — PROGRESS NOTES
HPI:  Patient complains of mild discomfort on the left lower lid after the appointment with Dr. Ocampo on 03/17/2023. After she went home, the punctal plug was fell out of the left eye. There is extreme pain on the corner of WILI and LLL.       Pertinent Medical History:    Sleep apnea    Hypothyroid    Bariatric surgery    Ocular History:    Dry Eye Syndrome, both eyes. S/P punctal plugs, upper and lower lids both eyes. Placed on 12/29/2022    Filamentary keratitis, both eyes.     Eye Medications:    Allergic to NSAIDS    Mucomyst 4-6 times daily both eyes.     Xiidra 2 times daily both eyes.     Pred healon 4 times daily both eyes.     Doxycycline 50 mg orally 2 times daily    Assessment and Plan:  1.   Dry Eye Syndrome, both eyes.     S/P punctal plugs, upper and lower lids both eyes. Placed on 12/29/2022    Continue xiidra 2 times daily both eyes.     Continue pred healon 4 times daily both eyes.     Continue serum tears 4 times daily both eyes.     Continue doxycycline 50 mg orally 2 times daily    Vimpat sent to compounded pharmacy.     Continue with scleral lenses.     Punctal plugs replaced today 03/22/2023: WILI and LOL - Oasis 0.4 HANSEL, RUL - Oasis 0.3 HANSEL     Follow up with Dr. Ocampo next week.     2.   Filamentary Keratitis, both eyes.     Continue mucomyst 4-6 times daily both eyes.         Patient consented to a dilated eye exam:    No.     Medical History:  Past Medical History:   Diagnosis Date     Cervical dystonia 1993       Medications:  Current Outpatient Medications   Medication Sig Dispense Refill     acetylcysteine 10 % (MUCOMYST) 10% SOLN Place 2 drops into both eyes 6 times daily 30 mL 11     autologous serum compounded ophthalmic solution Place 1 drop into both eyes every 2 hours (while awake)       BOTOX 100 units injection Inject 250 Units into the muscle once Lot # /C3 with Expiration Date:  11/2020       buPROPion (WELLBUTRIN XL) 300 MG 24 hr tablet Take 300 mg by mouth every morning        clonazePAM (KLONOPIN) 0.5 MG tablet        clonazePAM (KLONOPIN) 1 MG tablet Take 1 mg by mouth 2 times daily as needed       COMPOUNDED NON-CONTROLLED SUBSTANCE (CMPD RX) - PHARMACY TO MIX COMPOUNDED MEDICATION Lactosamide 1% drop, instill 1-2 drops 6x daily into both eyes 10 mL 3     cyanocobalamin (VITAMIN B-12) 1000 MCG tablet Take 1,000 mcg by mouth daily       diazepam (VALIUM) 5 MG tablet Take 2 tablets (10 mg) by mouth once as needed for anxiety 2 tablet 0     doxycycline monohydrate (ADOXA) 50 MG tablet Take 1 tablet (50 mg) by mouth 2 times daily 180 tablet 3     DULoxetine (CYMBALTA) 60 MG capsule TAKE 2 CAPSULES BY MOUTH EVERY DAY       EPINEPHrine (ANY BX GENERIC EQUIV) 0.3 MG/0.3ML injection 2-pack        ibuprofen (ADVIL/MOTRIN) 200 MG capsule Take 200 mg by mouth every 4 hours as needed.       iloperidone (FANAPT) 6 MG TABS tablet Take 4 mg by mouth daily        lifitegrast (XIIDRA) 5 % opthalmic solution Place 1 drop into both eyes 2 times daily 60 each 11     multivitamin w/minerals (THERA-VIT-M) tablet Take 1 tablet by mouth daily       ONABOTULINUMTOXINA IJ Inject 250 Units as directed once LOT # /C3  EXPIRATION: 03/2020       prednisolone 0.25% and hyaluronate in balanced salt SUSP compounded ophthalmic suspension Place 1 Drop into both eyes 4 times daily. Discard bottle 15 days after opening. Refrigerate. Unopened bottle expires: Strength: 0.25 % 6.67 mL 11     prednisoLONE acetate (PRED FORTE) 1 % ophthalmic suspension Place 1 drop into both eyes 3 times daily for 7 days, THEN 1 drop 2 times daily for 7 days, THEN 1 drop daily for 7 days. 5 mL 1     sodium chloride 0.9 % neb solution Medically necessary for scleral contact lens use. May use 3 ml 6 times a day for contact lens use 300 mL 11     Soft Lens Products (B&L SENSITIVE EYES) SOLN Apply 1 drop to eye       spironolactone (ALDACTONE) 50 MG tablet Take 1 tablet by mouth daily       tretinoin (RETIN-A) 0.025 % external cream Apply  a pea size to entire face QD 45 g 11     tretinoin (RETIN-A) 0.05 % external cream Spread a pea size amount into affected area topically at bedtime.  Use sunscreen SPF>20. 45 g 11     TYRVAYA 0.03 MG/ACT nasal spray USE 1 SPRAY IN EACH NOSTRIL TWICE DAILY.       Vitamin D3 (CHOLECALCIFEROL) 25 mcg (1000 units) tablet Take by mouth daily       White Petrolatum-Mineral Oil (SYSTANE NIGHTTIME) OINT Apply 3.5 g to eye 4 times daily as needed (dry eyes) 7 g 11   Complete documentation of historical and exam elements from today's encounter can be found in the full encounter summary report (not reduplicated in this progress note). I personally obtained the chief complaint(s) and history of present illness.  I confirmed and edited as necessary the review of systems, past medical/surgical history, family history, social history, and examination findings as documented by others; and I examined the patient myself. I personally reviewed the relevant tests, images, and reports as documented above. I formulated and edited as necessary the assessment and plan and discussed the findings and management plan with the patient and family. - Emmanuel Cortés OD

## 2023-03-22 ENCOUNTER — OFFICE VISIT (OUTPATIENT)
Dept: OPHTHALMOLOGY | Facility: CLINIC | Age: 53
End: 2023-03-22
Attending: OPTOMETRIST
Payer: COMMERCIAL

## 2023-03-22 DIAGNOSIS — H04.123 DRY EYES, BILATERAL: Primary | ICD-10-CM

## 2023-03-22 PROCEDURE — G0463 HOSPITAL OUTPT CLINIC VISIT: HCPCS | Mod: 25 | Performed by: OPTOMETRIST

## 2023-03-22 PROCEDURE — 68761 CLOSE TEAR DUCT OPENING: CPT | Performed by: OPTOMETRIST

## 2023-03-22 PROCEDURE — 2894A VOIDCORRECT: CPT | Mod: 25 | Performed by: OPTOMETRIST

## 2023-03-22 ASSESSMENT — CONF VISUAL FIELD
OD_NORMAL: 1
OS_SUPERIOR_NASAL_RESTRICTION: 0
OS_INFERIOR_TEMPORAL_RESTRICTION: 0
OD_SUPERIOR_TEMPORAL_RESTRICTION: 0
OS_NORMAL: 1
OD_SUPERIOR_NASAL_RESTRICTION: 0
OD_INFERIOR_TEMPORAL_RESTRICTION: 0
OS_SUPERIOR_TEMPORAL_RESTRICTION: 0
OD_INFERIOR_NASAL_RESTRICTION: 0
OS_INFERIOR_NASAL_RESTRICTION: 0

## 2023-03-22 ASSESSMENT — VISUAL ACUITY
METHOD: SNELLEN - LINEAR
CORRECTION_TYPE: GLASSES
OS_CC: 20/25
OD_CC: 20/30
OD_PH_CC: 20/20

## 2023-03-22 ASSESSMENT — TONOMETRY
OS_IOP_MMHG: 15
OD_IOP_MMHG: 16
IOP_METHOD: ICARE

## 2023-03-22 ASSESSMENT — REFRACTION_WEARINGRX
OD_SPHERE: -2.00
OD_CYLINDER: SPHERE
OD_ADD: +2.00
OS_CYLINDER: SPHERE
OS_SPHERE: -2.00
OS_ADD: +2.00

## 2023-03-22 ASSESSMENT — EXTERNAL EXAM - LEFT EYE: OS_EXAM: NORMAL

## 2023-03-22 ASSESSMENT — EXTERNAL EXAM - RIGHT EYE: OD_EXAM: NORMAL

## 2023-03-22 ASSESSMENT — SLIT LAMP EXAM - LIDS: COMMENTS: NORMAL

## 2023-03-22 NOTE — NURSING NOTE
Chief Complaints and History of Present Illnesses   Patient presents with     Eye Pain     Chief Complaint(s) and History of Present Illness(es)     Eye Pain            Laterality: In left eye          Comments    Pt. States that she had discomfort LE after leaving appointment with Dr. Ocampo 03/17/2023. After getting home, could see that punctal plug was hanging out and then fell out of LE. Has had ongoing worsening discomfort ever since. Has become extremely painful on inside corner of WILI and LLL.   Vianey Mcginnis COT 12:56 PM March 22, 2023

## 2023-03-23 ENCOUNTER — TELEPHONE (OUTPATIENT)
Dept: OPHTHALMOLOGY | Facility: CLINIC | Age: 53
End: 2023-03-23
Payer: COMMERCIAL

## 2023-03-23 NOTE — TELEPHONE ENCOUNTER
"Confirmed with compounding pharmacy Lacosamide.    Provided additional literature per Dr. Ocampo to support compounded eye drop    Compounding pharmacy will continue research compounding the eye drop for ophthalmic use.    Haider Webber RN 11:59 AM 03/23/23              .[facility] Call Center    Phone Message    May a detailed message be left on voicemail: yes     Reason for Call: Medication Question or concern regarding medication   Prescription Clarification  Name of Medication: Lactosamide  Prescribing Provider: Faheem Ocampo MD   Pharmacy: [facility] [address]    What on the order needs clarification? Erica states their records show Lacosamide without the \"t\", so she is calling to clarify the name.     Also, they do not find any reference or study for use as an eye drop.  Can the  provider provide a study.  Please call.  Thank you.      Action Taken: Message routed to:  [facility] Ophthalmology    Travel Screening: Not Applicable                                                                      "

## 2023-03-23 NOTE — TELEPHONE ENCOUNTER
Spoke to pt at 1000-- pt left voicemail yesterday on triage line after yesterday's eye visit    States all punctal plugs came out (4?) after visit    Pt states able to schedule f/u with Dr. Ocampo on 3- and satisfied with appt    Pt working with compounding pharmacy on new eye drop-- will not be able to know cost until pharmacy able to obtain all ingredients.    Haider Webber RN 10:02 AM 03/23/23

## 2023-03-31 ENCOUNTER — OFFICE VISIT (OUTPATIENT)
Dept: OPHTHALMOLOGY | Facility: CLINIC | Age: 53
End: 2023-03-31
Attending: OPHTHALMOLOGY
Payer: COMMERCIAL

## 2023-03-31 DIAGNOSIS — H04.123 BILATERAL DRY EYES: Primary | ICD-10-CM

## 2023-03-31 PROCEDURE — 68761 CLOSE TEAR DUCT OPENING: CPT | Performed by: OPHTHALMOLOGY

## 2023-03-31 ASSESSMENT — CONF VISUAL FIELD
OS_NORMAL: 1
OS_INFERIOR_NASAL_RESTRICTION: 0
OS_SUPERIOR_NASAL_RESTRICTION: 0
OD_NORMAL: 1
OD_SUPERIOR_NASAL_RESTRICTION: 0
OD_SUPERIOR_TEMPORAL_RESTRICTION: 0
OS_INFERIOR_TEMPORAL_RESTRICTION: 0
OD_INFERIOR_TEMPORAL_RESTRICTION: 0
OD_INFERIOR_NASAL_RESTRICTION: 0
OS_SUPERIOR_TEMPORAL_RESTRICTION: 0

## 2023-03-31 ASSESSMENT — EXTERNAL EXAM - RIGHT EYE: OD_EXAM: NORMAL

## 2023-03-31 ASSESSMENT — EXTERNAL EXAM - LEFT EYE: OS_EXAM: NORMAL

## 2023-03-31 ASSESSMENT — TONOMETRY
IOP_METHOD: ICARE
OD_IOP_MMHG: 16
OS_IOP_MMHG: 16

## 2023-03-31 ASSESSMENT — SLIT LAMP EXAM - LIDS
COMMENTS: LL PLUG IN PLACE
COMMENTS: LL PLUG IN PLACE

## 2023-03-31 ASSESSMENT — VISUAL ACUITY
OS_CC: 20/25
OD_CC: 20/30
METHOD: SNELLEN - LINEAR
OD_PH_CC: 20/20

## 2023-03-31 NOTE — NURSING NOTE
Chief Complaints and History of Present Illnesses   Patient presents with     Follow Up     1 week follow up   Need punctal plugs   Plug fell out 1 hour after they were put in  Xiidra bid BE   Mucomyst 4-6 times daily both  BE  Saline q1H BE  Alethea Kimbleiuk [facility] 9:46 AM March 31, 2023        Chief Complaint(s) and History of Present Illness(es)     Follow Up            Laterality: both eyes    Associated symptoms: dryness, eye pain, tearing and pain with eye movement.  Negative for redness    Response to treatment: no improvement    Pain scale: 8/10    Comments: 1 week follow up   Need punctal plugs   Plug fell out 1 hour after they were put in  Xiidra bid BE   Mucomyst 4-6 times daily both  BE  Saline q1H BE  Alethea Dziuk [facility] 9:46 AM March 31, 2023

## 2023-03-31 NOTE — PROGRESS NOTES
Chief Complaint(s) and History of Present Illness(es)     Follow Up            Laterality: both eyes    Associated symptoms: dryness, eye pain, tearing and pain with eye   movement.  Negative for redness    Response to treatment: no improvement    Pain scale: 8/10    Comments: 1 week follow up   Need punctal plugs   Plug fell out 1 hour after they were put in  Xiidra bid BE   Mucomyst 4-6 times daily both  BE  Saline q1H BE  Alethea Dziuk [facility] 9:46 AM March 31, 2023               Review of systems for the eyes was negative other than the pertinent positives/negatives listed in the HPI.      Assessment & Plan      Roxanna Celis is a 52 year old female with the following diagnoses:   1. Bilateral dry eyes         S/P collagen plugs x many   Recently fallen out of bilateral lower lids x 2 and second plug fell out of the left lower lid   Previously did not tolerate silicone plugs (placed by Dr. Schultz in 2015)  Discussed option of retrying silicone v cautery  Will try silicone plugs for now  Place bilateral upper lids today   Lower eyelids still with well positioned collagen plug               Attending Physician Attestation:  Complete documentation of historical and exam elements from today's encounter can be found in the full encounter summary report (not reduplicated in this progress note).  I personally obtained the chief complaint(s) and history of present illness.  I confirmed and edited as necessary the review of systems, past medical/surgical history, family history, social history, and examination findings as documented by others; and I examined the patient myself.  I personally reviewed the relevant tests, images, and reports as documented above.  I formulated and edited as necessary the assessment and plan and discussed the findings and management plan with the patient and family. . - Faheem Ocampo MD

## 2023-04-10 ENCOUNTER — TELEPHONE (OUTPATIENT)
Dept: OPHTHALMOLOGY | Facility: CLINIC | Age: 53
End: 2023-04-10

## 2023-04-10 NOTE — TELEPHONE ENCOUNTER
[facility] Call Center    Phone Message    May a detailed message be left on voicemail: yes     Reason for Call: Medication Refill Request    Has the patient contacted the pharmacy for the refill? Yes   Name of medication being requested: Vital Tears  Provider who prescribed the medication: Dr. Ocampo  Pharmacy: [facility]  Date medication is needed: ASAP    Sorin with [facility] called in to request a refill of the Vital Tears for pt. He is also requesting that more refills be included on file for the pt. Sorin can be reached at  # [phone]. Fax # is [phone]. Thank you.      Action Taken: Message routed to:  [facility] EYE    Travel Screening: Not Applicable

## 2023-04-11 ENCOUNTER — TELEPHONE (OUTPATIENT)
Dept: OPHTHALMOLOGY | Facility: CLINIC | Age: 53
End: 2023-04-11
Payer: COMMERCIAL

## 2023-04-11 ENCOUNTER — MEDICAL CORRESPONDENCE (OUTPATIENT)
Dept: HEALTH INFORMATION MANAGEMENT | Facility: CLINIC | Age: 53
End: 2023-04-11

## 2023-04-11 NOTE — TELEPHONE ENCOUNTER
[facility] Call Center    Phone Message    May a detailed message be left on voicemail: yes     Reason for Call: Medication Question or concern regarding medication   Prescription Clarification  Name of Medication: Autologous Serum Ophthalmic Solution  Prescribing Provider: Dr. Ford Beckford   Pharmacy: [facility] - [place], [place] - [facility] [address]   What on the order needs clarification? Miya from the [facility] calling in to ask for clarification if they should delete the request for this medication. She states they do not compound the autologous serum there. Please call Pharmacy back to advise. Thank you!      Action Taken: Message routed to:  [facility] Ophthalmology    Travel Screening: Not Applicable

## 2023-04-11 NOTE — TELEPHONE ENCOUNTER
Spoke to compounding pharmacy and reviewed ok to discontinue the serum drops    Note to facilitator to assist in ordering thru company[facility]    Haider Webber RN 9:15 AM 04/11/23

## 2023-04-17 DIAGNOSIS — L98.8 FACIAL RHYTIDS: ICD-10-CM

## 2023-04-17 DIAGNOSIS — Z97.3 USES CONTACT LENSES: ICD-10-CM

## 2023-04-17 DIAGNOSIS — L70.0 OPEN COMEDONE: ICD-10-CM

## 2023-04-17 NOTE — TELEPHONE ENCOUNTER
Requested Prescriptions   Pending Prescriptions Disp Refills     tretinoin (RETIN-A) 0.025 % external cream 45 g 11     Sig: Apply a pea size to entire face QD       There is no refill protocol information for this order        Last Written Prescription Date:  10/15/2022  Last Fill Quantity: 45g,  # refills: 11   Last office visit: 10/18/2022 ; last virtual visit: Visit date not found with prescribing provider:  Kimberley Nicolas    Future Office Visit:      Thank you   Estephanie DEAN

## 2023-04-18 DIAGNOSIS — Z97.3 USES CONTACT LENSES: ICD-10-CM

## 2023-04-18 RX ORDER — TRETINOIN 0.25 MG/G
CREAM TOPICAL
Qty: 45 G | Refills: 11 | Status: SHIPPED | OUTPATIENT
Start: 2023-04-18

## 2023-04-18 RX ORDER — SODIUM CHLORIDE FOR INHALATION 0.9 %
VIAL, NEBULIZER (ML) INHALATION
Qty: 300 ML | Refills: 2 | Status: SHIPPED | OUTPATIENT
Start: 2023-04-18 | End: 2023-04-19

## 2023-04-18 NOTE — TELEPHONE ENCOUNTER
sodium chloride 0.9 % neb solution      Last Written Prescription Date:  12-  Last Fill Quantity: 300 ml,   # refills: 11  Last Office Visit : 3-  Future Office visit:  5-3-2023

## 2023-04-19 RX ORDER — SODIUM CHLORIDE FOR INHALATION 0.9 %
VIAL, NEBULIZER (ML) INHALATION
Qty: 300 ML | Refills: 11 | Status: SHIPPED | OUTPATIENT
Start: 2023-04-19 | End: 2023-12-13

## 2023-04-19 NOTE — TELEPHONE ENCOUNTER
SODIUM CHLORIDE 0.9% INHAL VL  Pharmacy comment: Alternative Requested:PLEASE SEND 90 DAY PER PT REQUEST.      Caryn Manning RN  Central Triage Red Flags/Med Refills

## 2023-05-03 ENCOUNTER — OFFICE VISIT (OUTPATIENT)
Dept: OPTOMETRY | Facility: CLINIC | Age: 53
End: 2023-05-03
Payer: COMMERCIAL

## 2023-05-03 DIAGNOSIS — H04.123 DRY EYES: Primary | ICD-10-CM

## 2023-05-03 DIAGNOSIS — H57.13 EYE PAIN, BILATERAL: ICD-10-CM

## 2023-05-03 ASSESSMENT — REFRACTION_CURRENTRX
OS_SPHERE: +0.50
OS_BRAND: DAILIES TOTAL 1
OD_BRAND: EYEFIT PRO
OD_SPHERE: -0.50
OS_BRAND: EYEFIT PRO
OD_BRAND: DAILIES TOTAL 1
OD_BASECURVE: 8.5
OS_DIAMETER: 18.0
OS_SPHERE: -0.50
OS_BASECURVE: 7.899
OD_BASECURVE: 8.047
OD_SPHERE: +0.75
OS_DIAMETER: 14.1
OD_DIAMETER: 14.1
OS_BASECURVE: 8.5
OD_DIAMETER: 18.0

## 2023-05-03 ASSESSMENT — REFRACTION_MANIFEST
OD_SPHERE: -3.25
OD_AXIS: 160
OS_AXIS: 010
OS_SPHERE: -3.00
OS_CYLINDER: +0.25
OD_CYLINDER: +0.25

## 2023-05-03 ASSESSMENT — REFRACTION_WEARINGRX
OD_CYLINDER: +0.75
OS_CYLINDER: SPHERE
OS_SPHERE: -2.75
OD_SPHERE: -3.50
OD_AXIS: 155

## 2023-05-03 ASSESSMENT — VISUAL ACUITY
OD_CC: 20/25+
METHOD: SNELLEN - LINEAR
OS_CC: 20/25
CORRECTION_TYPE: GLASSES, CONTACTS

## 2023-05-03 ASSESSMENT — TONOMETRY
OD_IOP_MMHG: 19
IOP_METHOD: ICARE
OS_IOP_MMHG: 21

## 2023-05-03 ASSESSMENT — SLIT LAMP EXAM - LIDS
COMMENTS: NO PLUG
COMMENTS: NO PLUG

## 2023-05-03 ASSESSMENT — EXTERNAL EXAM - RIGHT EYE: OD_EXAM: NORMAL

## 2023-05-03 ASSESSMENT — EXTERNAL EXAM - LEFT EYE: OS_EXAM: NORMAL

## 2023-05-03 NOTE — PROGRESS NOTES
A/P  1.) Dry Eye each eye  -Severely symptomatic despite max topical treatment and significant objective improvement in corneal appearance with scleral lens wear  -Currently in Eyeprint scleral lenses (failed standard lenses) with excellent fit. Happy with nearsighted target in CL's (-2.50 distance Rx)  -Symptoms MUCH improved on Vimpat (per Dr. Parikh). She also stopped Celluvisc which feels better without. Overall doing much better symptom-wise  -Trialed BCL's with limited success - she would still like as an option for when unable to get sclerals lens (hand mobility issues)  -Eyeprint lenses fit excellently today. No fit changes recommended. Some suction on removal but no lens edge stain today. Would trial fenestrations in the haptics to see if this improves removal suction  -Mild myopic shift to improve distance. Should improve uncorrected near/computer as well, currently having to get too close to computer    She would like new scleral lenses ordered in updated Rx. Will HOLD here to enlarge dots prior to mailing out to her, per her request. F/u 6 months, sooner prn    Contact Lens Billing  V-Code:  - Scleral Cover Shell  Final Contact Lens Rx       Brand Base Curve Diameter Sphere Lens Addl. Specs    Right Dailies Total 1 8.5 14.1 -0.50      Left Dailies Total 1 8.5 14.1 -0.50      Expiration Date: 5/3/25    Replacement: Daily    Solutions: n/a    Wearing Schedule: Daily Wear      Final Contact Lens Rx #2       Brand Base Curve Diameter Sphere Lens Addl. Specs    Right Eyefit Pro 8.047 18.0 +0.25 BOZ: 10.56 x 9.50, CT 0.451 [id number]    Left Eyefit Pro 7.899 18.0 [place] BOZ: 9.97 x 9.25, CT 0.435 [id number]         # of units: 2  Price per Unit: $550    This patient requires contact lenses that are medically necessary for either improvement in vision over spectacles, support of the ocular surface, or other therapeutic benefit. These are not cosmetic contact lenses.     Encounter Diagnoses   Name Primary?     Dry eyes  Yes     Eye pain, bilateral

## 2023-05-04 ENCOUNTER — OFFICE VISIT (OUTPATIENT)
Dept: PHYSICAL MEDICINE AND REHAB | Facility: CLINIC | Age: 53
End: 2023-05-04
Payer: COMMERCIAL

## 2023-05-04 VITALS — SYSTOLIC BLOOD PRESSURE: 109 MMHG | HEART RATE: 104 BPM | DIASTOLIC BLOOD PRESSURE: 66 MMHG

## 2023-05-04 DIAGNOSIS — G24.3 CERVICAL DYSTONIA: Primary | ICD-10-CM

## 2023-05-04 PROCEDURE — 64612 DESTROY NERVE FACE MUSCLE: CPT | Mod: LT | Performed by: PHYSICAL MEDICINE & REHABILITATION

## 2023-05-04 PROCEDURE — 64616 CHEMODENERV MUSC NECK DYSTON: CPT | Mod: LT | Performed by: PHYSICAL MEDICINE & REHABILITATION

## 2023-05-04 PROCEDURE — 95874 GUIDE NERV DESTR NEEDLE EMG: CPT | Performed by: PHYSICAL MEDICINE & REHABILITATION

## 2023-05-04 PROCEDURE — 64616 CHEMODENERV MUSC NECK DYSTON: CPT | Mod: RT | Performed by: PHYSICAL MEDICINE & REHABILITATION

## 2023-05-04 NOTE — LETTER
5/4/2023         RE: Roxanna Celis  [id number] [place] [address]        Dear Colleague,    Thank you for referring your patient, Roxanna Celis, to the [facility]. Please see a copy of my visit note below.      [facility]     PM&R CLINIC NOTE  BOTULINUM TOXIN PROCEDURE      HPI  Chief Complaint   Patient presents with     Procedure     Dysport     Roxanna Celis is a 52 year old female with a history of cervical dystonia who presents to clinic for botulinum toxin injections for management of involuntary muscle spasms and neck pain secondary to her cervical dystonia.     SINCE LAST VISIT  Roxanna Celis was last seen here in clinic on 2/9/2023, at which time she received 500 units of Dysport. Her facial muscles were also included to determine if facial movement would be decreased to determine resistance, and she did have far less frontalis movement.     Patient denies new medical diagnoses, illnesses, hospitalizations, emergency room visits, and injuries since the previous injection with botulinum neurotoxin.     She sees her chiropractor on a regular basis to help with neck, upper back and scalp pain.     RESPONSE TO PREVIOUS TREATMENT    Side effects: None.     Pain Improvement: No, this round was ineffective and therefore pain has been poorly-controlled. She reports that her pain is typically a 10/10, and is constant and debilitating.     Dystonia Improvement: No, this round was ineffective towards controlling the dystonic movements of her neck and shoulder muscles.        PHYSICAL EXAM  VS: /66 (BP Location: Right arm, Patient Position: Sitting, Cuff Size: Adult Regular)   Pulse 104    GEN: Pleasant and cooperative, in no acute distress  HEENT: No facial asymmetry  NECK AND TRUNK PATTERN:   Head Tremor: Pt states tremor is intermittent   Left Side-bending: Present but patient states she feels she has more right side bending  now  Left Shoulder Elevation: slightly elevated  Head with right shift  Focalized pain at occipitalis - has pain today during visit    ALLERGIES  Allergies   Allergen Reactions     Contrast Dye Anaphylaxis     Other reaction(s): almost  from it     Iodinated Contrast Media Anaphylaxis     IVP DYE     Lisinopril Anaphylaxis     Lisinopril-Hydrochlorothiazide Anaphylaxis     Maple Flavor Anaphylaxis     MAPLE SYRUP  MAPLE SYRUP  MAPLE SYRUP     Maple Tree Anaphylaxis     Allergy includes maple syrup.     No Clinical Screening - See Comments      maple syrup==anaphylaxis  Dairy- causes to not feel well     Gluten Itching and Swelling     Gluten Meal Itching and Swelling     Cramping     [place] Meal Unknown     Chamomile Unknown     Codeine Sulfate Cramps and GI Disturbance     Food Unknown     Grapes, almond, lactose, gluten, real maple syrup     Nsaids Other (See Comments) and Unknown     Patient had bariatric surgery. Should not ever take NSAIDS due to high risk for gastric ulcers.   Patient had bariatric surgery. Should not ever take NSAIDS due to high risk for gastric ulcers.      Polyethylene Glycol Unknown     Per allergy testing  Per allergy testing       Erythromycin Nausea and Vomiting, Cramps, Diarrhea and Nausea     PN: LW Reaction: stomach upset  cramping     Lactose Diarrhea     Morphine Other (See Comments)       CURRENT MEDICATIONS    Current Outpatient Medications:      acetylcysteine 10 % (MUCOMYST) 10% SOLN, Place 2 drops into both eyes 6 times daily, Disp: 30 mL, Rfl: 11     BOTOX 100 units injection, Inject 250 Units into the muscle once Lot # /C3 with Expiration Date:  2020, Disp: , Rfl:      buPROPion (WELLBUTRIN XL) 300 MG 24 hr tablet, Take 300 mg by mouth every morning, Disp: , Rfl:      clonazePAM (KLONOPIN) 1 MG tablet, Take 1 mg by mouth 2 times daily as needed, Disp: , Rfl:      COMPOUNDED NON-CONTROLLED SUBSTANCE (CMPD RX) - PHARMACY TO MIX COMPOUNDED MEDICATION, Lactosamide 1%  drop, instill 1-2 drops 6x daily into both eyes, Disp: 10 mL, Rfl: 3     cyanocobalamin (VITAMIN B-12) 1000 MCG tablet, Take 1,000 mcg by mouth daily, Disp: , Rfl:      doxycycline monohydrate (ADOXA) 50 MG tablet, Take 1 tablet (50 mg) by mouth 2 times daily, Disp: 180 tablet, Rfl: 3     DULoxetine (CYMBALTA) 60 MG capsule, TAKE 2 CAPSULES BY MOUTH EVERY DAY, Disp: , Rfl:      EPINEPHrine (ANY BX GENERIC EQUIV) 0.3 MG/0.3ML injection 2-pack, , Disp: , Rfl:      ibuprofen (ADVIL/MOTRIN) 200 MG capsule, Take 200 mg by mouth every 4 hours as needed., Disp: , Rfl:      iloperidone (FANAPT) 6 MG TABS tablet, Take 4 mg by mouth daily , Disp: , Rfl:      lifitegrast (XIIDRA) 5 % opthalmic solution, Place 1 drop into both eyes 2 times daily, Disp: 60 each, Rfl: 11     multivitamin w/minerals (THERA-VIT-M) tablet, Take 1 tablet by mouth daily, Disp: , Rfl:      ONABOTULINUMTOXINA IJ, Inject 250 Units as directed once LOT # /C3 EXPIRATION: 03/2020, Disp: , Rfl:      prednisolone 0.25% and hyaluronate in balanced salt SUSP compounded ophthalmic suspension, Place 1 Drop into both eyes 4 times daily. Discard bottle 15 days after opening. Refrigerate. Unopened bottle expires: Strength: 0.25 %, Disp: 6.67 mL, Rfl: 11     sodium chloride 0.9 % neb solution, MEDICALLY NECESSARY FOR SCLERAL CONTACT LENS USE. MAY USE 3 ML 6 TIMES A DAY FOR CONTACT LENS USE, Disp: 300 mL, Rfl: 11     Soft Lens Products (B&L SENSITIVE EYES) SOLN, Apply 1 drop to eye, Disp: , Rfl:      spironolactone (ALDACTONE) 50 MG tablet, Take 1 tablet by mouth daily, Disp: , Rfl:      tretinoin (RETIN-A) 0.025 % external cream, Apply a pea size to entire face QD, Disp: 45 g, Rfl: 11     tretinoin (RETIN-A) 0.05 % external cream, Spread a pea size amount into affected area topically at bedtime.  Use sunscreen SPF>20., Disp: 45 g, Rfl: 11     TYRVAYA 0.03 MG/ACT nasal spray, USE 1 SPRAY IN EACH NOSTRIL TWICE DAILY., Disp: , Rfl:      Vitamin D3 (CHOLECALCIFEROL)  25 mcg (1000 units) tablet, Take by mouth daily, Disp: , Rfl:      White Petrolatum-Mineral Oil (SYSTANE NIGHTTIME) OINT, Apply 3.5 g to eye 4 times daily as needed (dry eyes), Disp: 7 g, Rfl: 11     prednisoLONE acetate (PRED FORTE) 1 % ophthalmic suspension, Place 1 drop into both eyes 3 times daily for 7 days, THEN 1 drop 2 times daily for 7 days, THEN 1 drop daily for 7 days., Disp: 5 mL, Rfl: 1    Current Facility-Administered Medications:      botulinum toxin type A (DYSPORT) injection 500 Units, 500 Units, Intramuscular, Q90 Days, America Gonzalez MD, 500 Units at 23 2120     botulinum toxin type A (DYSPORT) injection 500 Units, 500 Units, Intramuscular, Q90 Days, America Gonzalez MD, 500 Units at 22 1028     botulinum toxin type A (DYSPORT) injection 800 Units, 800 Units, Intramuscular, Q90 Days, America Gonzalez MD       BOTULINUM NEUROTOXIN INJECTION PROCEDURES    VERIFICATION OF PATIENT IDENTIFICATION AND PROCEDURE     Initials   Patient Name SES   Patient  SES   Procedure Verified by: SES     Prior to the start of the procedure and with procedural staff participation, I verbally confirmed the patient s identity using two indicators, relevant allergies, that the procedure was appropriate and matched the consent or emergent situation, and that the correct equipment/implants were available. Immediately prior to starting the procedure I conducted the Time Out with the procedural staff and re-confirmed the patient s name, procedure, and site/side. (The Joint Commission universal protocol was followed.)  Yes    Sedation (Moderate or Deep): None    ABOVE ASSESSMENTS PERFORMED BY    America Gonzalez MD      INDICATIONS FOR PROCEDURES  Roxanna Celis is a 52 year old patient with involuntary muscle spasms and pain secondary to the diagnosis of cervical dystonia. Her baseline symptoms have been recalcitrant to oral medications and conservative therapy.  She is here today for  reinjection with Dysport.    GOAL OF PROCEDURE  The goal of this procedure is to increase active range of motion, improve volitional motor control, decrease pain  and enhance functional independence.      TOTAL DOSE ADMINISTERED  Dose Administered:  500 units  Dysport (Botulinum Toxin Type A)       1:1 Dilution   Unavoidable Drug Waste: No.  Diluent Used:  Preservative Free Normal Saline  Total Volume of Diluent Used:  5 ml  NDC #: Dysport 500u ([address]-38)      CONSENT  The risks, benefits, and treatment options were discussed with Roxanna KEE Llaita and she agreed to proceed.    Written consent was obtained by [facility].     EQUIPMENT USED  Needle-37mm stimulating/recording  Needle-30 gauge  EMG/NCS Machine    SKIN PREPARATION  Skin preparation was performed using an alcohol wipe.    GUIDANCE DESCRIPTION  Electro-myographic guidance was necessary throughout the procedure to accurately identify all areas of dystonic muscles while avoiding injection of non-dystonic muscles, neighboring nerves and nearby vascular structures.       AREA/MUSCLE INJECTED: 500 UNITS DYSPORT = TOTAL DOSE, 1:1 DILUTION  1. NECK & SHOULDER GIRDLE MUSCLES: 260 UNITS DYSPORT = TOTAL DOSE   Right Rectus Capitis - (upper cervical) - 30 units of Dysport at 1 site/s.   Left Rectus Capitis - (upper cervical) - 30 units of Dysport at 1 site/s.     Right Splenius Cervicis - 30 units of Dysport at 1 site/s.   Left Splenius Cervicis - 30 units of Dysport at 1 site/s.    Right Levator Scapulae - 10 units of Dysport at 1 site/s.   Left Levator Scapulae - 40 units of Dysport at 1 site/s.    Right Sternocleidomastoid - 10 units of Dysport at 1 site/s.  Left Sternocleidomastoid - 10 units of Dysport at 1 site/s.    Right Cervical Paraspinals - 15 units of Dysport at 4 site/s.  Left Cervical Paraspinals - 15 units of Dysport at 4 site/s.    Right Thoracic Paraspinals - 20 units of Dysport at 4 site/s.  Left Thoracic Paraspinals - 20 units of Dysport at 4  site/s.    2. FACIAL & SCALP MUSCLES: 240 UNITS DYSPORT = TOTAL DOSE  Right Occipitalis - 80 units of Dysport at 10 site/s.   Left Occipitails - 80 units of Dysport at 10 site/s.     Right Temporalis - 40 units of Dysport at 5 site/s.    Left Temporalis - 40 units of Dysport at 5 site/s.        RESPONSE TO PROCEDURE  Roxanna Celis tolerated the procedure well and there were no immediate complications. She was allowed to recover for an appropriate period of time and was discharged home in stable condition.    ASSESSMENT AND PLAN   1. Botulinum toxin injections: Dose of Dysport remained unchanged today at 500 units, however, distribution changed based on symptoms. Caution was again used in posterior neck muscles as to prevent excessive weakness. Patient will continue to monitor response and report at next appointment.   2. Referrals: None, however recommendations made today regarding possible chiropractor care vs functional medicine to see if her pain can be better controlled.   3. Follow up: Roxanna Celis was rescheduled for the next series of injections in 12 weeks, at which time we will evaluate response to today's injections. she may call the clinic prior with any questions or concerns prior to the next appointment.       I spent a total of 20 minutes, face-to-face and managing the care of Roxanna Celis, excluding the procedure. Over 50% of my time was spent counseling the patient and coordinating care. Please see note for details.         Again, thank you for allowing me to participate in the care of your patient.        Sincerely,        America Gonzalez MD

## 2023-05-04 NOTE — PROGRESS NOTES
[facility]     PM&R CLINIC NOTE  BOTULINUM TOXIN PROCEDURE      HPI  Chief Complaint   Patient presents with     Procedure     Dysport     Roxanna Celis is a 52 year old female with a history of cervical dystonia who presents to clinic for botulinum toxin injections for management of involuntary muscle spasms and neck pain secondary to her cervical dystonia.     SINCE LAST VISIT  Roxanna Celis was last seen here in clinic on 2023, at which time she received 500 units of Dysport. Her facial muscles were also included to determine if facial movement would be decreased to determine resistance, and she did have far less frontalis movement.     Patient denies new medical diagnoses, illnesses, hospitalizations, emergency room visits, and injuries since the previous injection with botulinum neurotoxin.     She sees her chiropractor on a regular basis to help with neck, upper back and scalp pain.     RESPONSE TO PREVIOUS TREATMENT    Side effects: None.     Pain Improvement: No, this round was ineffective and therefore pain has been poorly-controlled. She reports that her pain is typically a 10/10, and is constant and debilitating.     Dystonia Improvement: No, this round was ineffective towards controlling the dystonic movements of her neck and shoulder muscles.        PHYSICAL EXAM  VS: /66 (BP Location: Right arm, Patient Position: Sitting, Cuff Size: Adult Regular)   Pulse 104    GEN: Pleasant and cooperative, in no acute distress  HEENT: No facial asymmetry  NECK AND TRUNK PATTERN:   Head Tremor: Pt states tremor is intermittent   Left Side-bending: Present but patient states she feels she has more right side bending now  Left Shoulder Elevation: slightly elevated  Head with right shift  Focalized pain at occipitalis - has pain today during visit    ALLERGIES  Allergies   Allergen Reactions     Contrast Dye Anaphylaxis     Other reaction(s): almost  from it      Iodinated Contrast Media Anaphylaxis     IVP DYE     Lisinopril Anaphylaxis     Lisinopril-Hydrochlorothiazide Anaphylaxis     Maple Flavor Anaphylaxis     MAPLE SYRUP  MAPLE SYRUP  MAPLE SYRUP     Maple Tree Anaphylaxis     Allergy includes maple syrup.     No Clinical Screening - See Comments      maple syrup==anaphylaxis  Dairy- causes to not feel well     Gluten Itching and Swelling     Gluten Meal Itching and Swelling     Cramping     [place] Meal Unknown     Chamomile Unknown     Codeine Sulfate Cramps and GI Disturbance     Food Unknown     Grapes, almond, lactose, gluten, real maple syrup     Nsaids Other (See Comments) and Unknown     Patient had bariatric surgery. Should not ever take NSAIDS due to high risk for gastric ulcers.   Patient had bariatric surgery. Should not ever take NSAIDS due to high risk for gastric ulcers.      Polyethylene Glycol Unknown     Per allergy testing  Per allergy testing       Erythromycin Nausea and Vomiting, Cramps, Diarrhea and Nausea     PN: LW Reaction: stomach upset  cramping     Lactose Diarrhea     Morphine Other (See Comments)       CURRENT MEDICATIONS    Current Outpatient Medications:      acetylcysteine 10 % (MUCOMYST) 10% SOLN, Place 2 drops into both eyes 6 times daily, Disp: 30 mL, Rfl: 11     BOTOX 100 units injection, Inject 250 Units into the muscle once Lot # /C3 with Expiration Date:  11/2020, Disp: , Rfl:      buPROPion (WELLBUTRIN XL) 300 MG 24 hr tablet, Take 300 mg by mouth every morning, Disp: , Rfl:      clonazePAM (KLONOPIN) 1 MG tablet, Take 1 mg by mouth 2 times daily as needed, Disp: , Rfl:      COMPOUNDED NON-CONTROLLED SUBSTANCE (CMPD RX) - PHARMACY TO MIX COMPOUNDED MEDICATION, Lactosamide 1% drop, instill 1-2 drops 6x daily into both eyes, Disp: 10 mL, Rfl: 3     cyanocobalamin (VITAMIN B-12) 1000 MCG tablet, Take 1,000 mcg by mouth daily, Disp: , Rfl:      doxycycline monohydrate (ADOXA) 50 MG tablet, Take 1 tablet (50 mg) by mouth 2  times daily, Disp: 180 tablet, Rfl: 3     DULoxetine (CYMBALTA) 60 MG capsule, TAKE 2 CAPSULES BY MOUTH EVERY DAY, Disp: , Rfl:      EPINEPHrine (ANY BX GENERIC EQUIV) 0.3 MG/0.3ML injection 2-pack, , Disp: , Rfl:      ibuprofen (ADVIL/MOTRIN) 200 MG capsule, Take 200 mg by mouth every 4 hours as needed., Disp: , Rfl:      iloperidone (FANAPT) 6 MG TABS tablet, Take 4 mg by mouth daily , Disp: , Rfl:      lifitegrast (XIIDRA) 5 % opthalmic solution, Place 1 drop into both eyes 2 times daily, Disp: 60 each, Rfl: 11     multivitamin w/minerals (THERA-VIT-M) tablet, Take 1 tablet by mouth daily, Disp: , Rfl:      ONABOTULINUMTOXINA IJ, Inject 250 Units as directed once LOT # /C3 EXPIRATION: 03/2020, Disp: , Rfl:      prednisolone 0.25% and hyaluronate in balanced salt SUSP compounded ophthalmic suspension, Place 1 Drop into both eyes 4 times daily. Discard bottle 15 days after opening. Refrigerate. Unopened bottle expires: Strength: 0.25 %, Disp: 6.67 mL, Rfl: 11     sodium chloride 0.9 % neb solution, MEDICALLY NECESSARY FOR SCLERAL CONTACT LENS USE. MAY USE 3 ML 6 TIMES A DAY FOR CONTACT LENS USE, Disp: 300 mL, Rfl: 11     Soft Lens Products (B&L SENSITIVE EYES) SOLN, Apply 1 drop to eye, Disp: , Rfl:      spironolactone (ALDACTONE) 50 MG tablet, Take 1 tablet by mouth daily, Disp: , Rfl:      tretinoin (RETIN-A) 0.025 % external cream, Apply a pea size to entire face QD, Disp: 45 g, Rfl: 11     tretinoin (RETIN-A) 0.05 % external cream, Spread a pea size amount into affected area topically at bedtime.  Use sunscreen SPF>20., Disp: 45 g, Rfl: 11     TYRVAYA 0.03 MG/ACT nasal spray, USE 1 SPRAY IN EACH NOSTRIL TWICE DAILY., Disp: , Rfl:      Vitamin D3 (CHOLECALCIFEROL) 25 mcg (1000 units) tablet, Take by mouth daily, Disp: , Rfl:      White Petrolatum-Mineral Oil (SYSTANE NIGHTTIME) OINT, Apply 3.5 g to eye 4 times daily as needed (dry eyes), Disp: 7 g, Rfl: 11     prednisoLONE acetate (PRED FORTE) 1 %  ophthalmic suspension, Place 1 drop into both eyes 3 times daily for 7 days, THEN 1 drop 2 times daily for 7 days, THEN 1 drop daily for 7 days., Disp: 5 mL, Rfl: 1    Current Facility-Administered Medications:      botulinum toxin type A (DYSPORT) injection 500 Units, 500 Units, Intramuscular, Q90 Days, America Gonzalez MD, 500 Units at 230     botulinum toxin type A (DYSPORT) injection 500 Units, 500 Units, Intramuscular, Q90 Days, America Gonzalez MD, 500 Units at 22 1028     botulinum toxin type A (DYSPORT) injection 800 Units, 800 Units, Intramuscular, Q90 Days, America Gonzalez MD       BOTULINUM NEUROTOXIN INJECTION PROCEDURES    VERIFICATION OF PATIENT IDENTIFICATION AND PROCEDURE     Initials   Patient Name SES   Patient  SES   Procedure Verified by: KIM     Prior to the start of the procedure and with procedural staff participation, I verbally confirmed the patient s identity using two indicators, relevant allergies, that the procedure was appropriate and matched the consent or emergent situation, and that the correct equipment/implants were available. Immediately prior to starting the procedure I conducted the Time Out with the procedural staff and re-confirmed the patient s name, procedure, and site/side. (The Joint Commission universal protocol was followed.)  Yes    Sedation (Moderate or Deep): None    ABOVE ASSESSMENTS PERFORMED BY    America Gonzalez MD      INDICATIONS FOR PROCEDURES  Roxanna Celis is a 52 year old patient with involuntary muscle spasms and pain secondary to the diagnosis of cervical dystonia. Her baseline symptoms have been recalcitrant to oral medications and conservative therapy.  She is here today for reinjection with Dysport.    GOAL OF PROCEDURE  The goal of this procedure is to increase active range of motion, improve volitional motor control, decrease pain  and enhance functional independence.      TOTAL DOSE ADMINISTERED  Dose  Administered:  500 units  Dysport (Botulinum Toxin Type A)       1:1 Dilution   Unavoidable Drug Waste: No.  Diluent Used:  Preservative Free Normal Saline  Total Volume of Diluent Used:  5 ml  NDC #: Dysport 500u ([id number])      CONSENT  The risks, benefits, and treatment options were discussed with Roxanna Celis and she agreed to proceed.    Written consent was obtained by [facility].     EQUIPMENT USED  Needle-37mm stimulating/recording  Needle-30 gauge  EMG/NCS Machine    SKIN PREPARATION  Skin preparation was performed using an alcohol wipe.    GUIDANCE DESCRIPTION  Electro-myographic guidance was necessary throughout the procedure to accurately identify all areas of dystonic muscles while avoiding injection of non-dystonic muscles, neighboring nerves and nearby vascular structures.       AREA/MUSCLE INJECTED: 500 UNITS DYSPORT = TOTAL DOSE, 1:1 DILUTION  1. NECK & SHOULDER GIRDLE MUSCLES: 260 UNITS DYSPORT = TOTAL DOSE   Right Rectus Capitis - (upper cervical) - 30 units of Dysport at 1 site/s.   Left Rectus Capitis - (upper cervical) - 30 units of Dysport at 1 site/s.     Right Splenius Cervicis - 30 units of Dysport at 1 site/s.   Left Splenius Cervicis - 30 units of Dysport at 1 site/s.    Right Levator Scapulae - 10 units of Dysport at 1 site/s.   Left Levator Scapulae - 40 units of Dysport at 1 site/s.    Right Sternocleidomastoid - 10 units of Dysport at 1 site/s.  Left Sternocleidomastoid - 10 units of Dysport at 1 site/s.    Right Cervical Paraspinals - 15 units of Dysport at 4 site/s.  Left Cervical Paraspinals - 15 units of Dysport at 4 site/s.    Right Thoracic Paraspinals - 20 units of Dysport at 4 site/s.  Left Thoracic Paraspinals - 20 units of Dysport at 4 site/s.    2. FACIAL & SCALP MUSCLES: 240 UNITS DYSPORT = TOTAL DOSE  Right Occipitalis - 80 units of Dysport at 10 site/s.   Left Occipitails - 80 units of Dysport at 10 site/s.     Right Temporalis - 40 units of Dysport at 5 site/s.    Left  Temporalis - 40 units of Dysport at 5 site/s.        RESPONSE TO PROCEDURE  Roxanna Celis tolerated the procedure well and there were no immediate complications. She was allowed to recover for an appropriate period of time and was discharged home in stable condition.    ASSESSMENT AND PLAN   1. Botulinum toxin injections: Dose of Dysport remained unchanged today at 500 units, however, distribution changed based on symptoms. Caution was again used in posterior neck muscles as to prevent excessive weakness. Patient will continue to monitor response and report at next appointment.   2. Referrals: None, however recommendations made today regarding possible chiropractor care vs functional medicine to see if her pain can be better controlled.   3. Follow up: Roxanna Celis was rescheduled for the next series of injections in 12 weeks, at which time we will evaluate response to today's injections. she may call the clinic prior with any questions or concerns prior to the next appointment.       I spent a total of 20 minutes, face-to-face and managing the care of Roxanna Celis, excluding the procedure. Over 50% of my time was spent counseling the patient and coordinating care. Please see note for details.

## 2023-05-16 ENCOUNTER — DOCUMENTATION ONLY (OUTPATIENT)
Dept: OPTOMETRY | Facility: CLINIC | Age: 53
End: 2023-05-16

## 2023-05-16 NOTE — PROGRESS NOTES
Eyeprint lenses mailed out today [facility] [phone]    Dots enlarged

## 2023-05-19 ENCOUNTER — TELEPHONE (OUTPATIENT)
Dept: PALLIATIVE MEDICINE | Facility: CLINIC | Age: 53
End: 2023-05-19
Payer: COMMERCIAL

## 2023-05-23 ENCOUNTER — TELEPHONE (OUTPATIENT)
Dept: PALLIATIVE MEDICINE | Facility: CLINIC | Age: 53
End: 2023-05-23

## 2023-05-24 ENCOUNTER — TELEPHONE (OUTPATIENT)
Dept: PHYSICAL MEDICINE AND REHAB | Facility: CLINIC | Age: 53
End: 2023-05-24
Payer: COMMERCIAL

## 2023-05-24 NOTE — TELEPHONE ENCOUNTER
[facility] Center    Phone Message    May a detailed message be left on voicemail: yes     Reason for Call: Other: Patient is requesting a call back from Anna to discuss her last Dysport injection. Please call patient back to discuss further.     Action Taken: Message routed to:  [facility]    Travel Screening: Not Applicable                                                                       Metronidazole Counseling:  I discussed with the patient the risks of metronidazole including but not limited to seizures, nausea/vomiting, a metallic taste in the mouth, nausea/vomiting and severe allergy.

## 2023-06-01 ENCOUNTER — TRANSFERRED RECORDS (OUTPATIENT)
Dept: HEALTH INFORMATION MANAGEMENT | Facility: CLINIC | Age: 53
End: 2023-06-01

## 2023-06-01 ENCOUNTER — CARE COORDINATION (OUTPATIENT)
Dept: PHYSICAL MEDICINE AND REHAB | Facility: CLINIC | Age: 53
End: 2023-06-01
Payer: COMMERCIAL

## 2023-06-09 ENCOUNTER — TELEPHONE (OUTPATIENT)
Dept: OPTOMETRY | Facility: CLINIC | Age: 53
End: 2023-06-09

## 2023-06-09 ENCOUNTER — OFFICE VISIT (OUTPATIENT)
Dept: OPTOMETRY | Facility: CLINIC | Age: 53
End: 2023-06-09
Payer: COMMERCIAL

## 2023-06-09 DIAGNOSIS — H57.13 EYE PAIN, BILATERAL: ICD-10-CM

## 2023-06-09 DIAGNOSIS — H04.123 DRY EYES: Primary | ICD-10-CM

## 2023-06-09 DIAGNOSIS — H16.223 KERATITIS SICCA, BILATERAL: ICD-10-CM

## 2023-06-09 ASSESSMENT — REFRACTION_CURRENTRX
OD_ADDL_SPECS: 165953
OD_BRAND: EYEFIT PRO
OD_BASECURVE: 8.5
OD_SPHERE: +0.25
OS_BRAND: EYEFIT PRO
OS_ADDL_SPECS: 165953
OD_BASECURVE: 8.047
OS_BRAND: DAILIES TOTAL 1
OS_DIAMETER: 18.0
OD_BRAND: DAILIES TOTAL 1
OS_DIAMETER: 14.1
OS_BASECURVE: 8.5
OD_DIAMETER: 18.0
OS_SPHERE: -0.50
OD_DIAMETER: 14.1
OS_BASECURVE: 7.899
OS_SPHERE: PLANO
OD_SPHERE: -0.50

## 2023-06-09 NOTE — PROGRESS NOTES
No office visit. CL order only. Pt requesting duplicate pair of Eyeprints as well as soft CL supply. Mail to pt.     Contact Lens Billing  V-Code:  - Soft spherical  Final Contact Lens Rx         Brand Base Curve Diameter Sphere    Right Dailies Total 1 8.5 14.1 -0.50    Left Dailies Total 1 8.5 14.1 -0.50           Fait order mailed direct: [id number]  # of units: 1 90 pack @ $95 + 1 30 pack @ $35 = $130 total      +      V-Code:  - Scleral Cover Shell  Final Contact Lens Rx         Brand Base Curve Diameter Sphere Lens Addl. Specs    Right Eyefit Pro 8.047 18.0 +0.25 BOZ: 10.56 x 9.50, CT 0.451 [id number] dupe    Left Eyefit Pro 7.899 18.0 [place] BOZ: 9.97 x 9.25, CT 0.435 [id number] dupe           # of units: 2  Price per Unit: $550    This patient requires contact lenses that are medically necessary for either improvement in vision over spectacles, support of the ocular surface, or other therapeutic benefit. These are not cosmetic contact lenses.     Encounter Diagnoses   Name Primary?    Dry eyes Yes    Eye pain, bilateral     Keratitis sicca, bilateral (H)         Date of last eye exam: 5/3/23

## 2023-06-09 NOTE — TELEPHONE ENCOUNTER
Called and spoke to Roxanna     Made her a tech appointment for 6/21 for glasses recheck     Roxanna stated her Scleral lens are causing her some problems as well     Bethany, I'll talk to you on Monday about this message     Lela Guzman Communication Facilitator on 6/9/2023 at 12:07 PM

## 2023-06-09 NOTE — TELEPHONE ENCOUNTER
[facility] Call Center    Phone Message    May a detailed message be left on voicemail: yes     Reason for Call: Other: Patient was seen recently with Dr Anthony and just got her glasses. Patient is unable to see out of them and thinks the RX prescription is incorrect. Patient scheduled an apt with Dr Anthony for 7/5 but would like to be seen sooner so she can get a new glasses without having to pay more. July 5th is outside of the time period. Please call patient back at [phone]. Thank you.    Action Taken: Message routed to:  [facility] ([facility] Eye    Travel Screening: Not Applicable

## 2023-06-12 NOTE — TELEPHONE ENCOUNTER
Good Morning,  Would you mind taking a look at this.  I wonder if I should triage it.  Her glasses would not be perfect I imagine.  Let me know what you think.  I could do the tech visit if you think appropriate.  Thank you

## 2023-06-13 ENCOUNTER — OFFICE VISIT (OUTPATIENT)
Dept: OPHTHALMOLOGY | Facility: CLINIC | Age: 53
End: 2023-06-13
Attending: OPHTHALMOLOGY
Payer: COMMERCIAL

## 2023-06-13 DIAGNOSIS — H01.02B SQUAMOUS BLEPHARITIS OF UPPER AND LOWER EYELIDS OF BOTH EYES: ICD-10-CM

## 2023-06-13 DIAGNOSIS — H04.123 BILATERAL DRY EYES: Primary | ICD-10-CM

## 2023-06-13 DIAGNOSIS — H04.123 DRY EYES, BILATERAL: ICD-10-CM

## 2023-06-13 DIAGNOSIS — H01.02A SQUAMOUS BLEPHARITIS OF UPPER AND LOWER EYELIDS OF BOTH EYES: ICD-10-CM

## 2023-06-13 DIAGNOSIS — H02.889 MGD (MEIBOMIAN GLAND DYSFUNCTION): ICD-10-CM

## 2023-06-13 DIAGNOSIS — H16.123 FILAMENTARY KERATITIS OF BOTH EYES: ICD-10-CM

## 2023-06-13 PROCEDURE — G0463 HOSPITAL OUTPT CLINIC VISIT: HCPCS | Performed by: OPHTHALMOLOGY

## 2023-06-13 PROCEDURE — 99214 OFFICE O/P EST MOD 30 MIN: CPT | Mod: GC | Performed by: OPHTHALMOLOGY

## 2023-06-13 RX ORDER — VARENICLINE 0.03 MG/.05ML
1 SPRAY NASAL 2 TIMES DAILY
Qty: 4.2 ML | Refills: 11 | Status: SHIPPED | OUTPATIENT
Start: 2023-06-13

## 2023-06-13 RX ORDER — LIFITEGRAST 50 MG/ML
1 SOLUTION/ DROPS OPHTHALMIC 2 TIMES DAILY
Qty: 60 EACH | Refills: 11 | Status: SHIPPED | OUTPATIENT
Start: 2023-06-13 | End: 2024-07-09

## 2023-06-13 RX ORDER — DOXYCYCLINE 50 MG/1
50 TABLET ORAL 2 TIMES DAILY
Qty: 180 TABLET | Refills: 3 | Status: SHIPPED | OUTPATIENT
Start: 2023-06-13 | End: 2024-08-06

## 2023-06-13 ASSESSMENT — VISUAL ACUITY
OS_CC: 20/25
CORRECTION_TYPE: GLASSES
OD_CC: 20/25
METHOD: SNELLEN - LINEAR
OS_CC+: -1

## 2023-06-13 ASSESSMENT — REFRACTION_WEARINGRX
OD_AXIS: 160
OS_AXIS: 010
OD_CYLINDER: SPHERE
OS_SPHERE: -3.00
OD_SPHERE: -3.25
OD_ADD: +2.50
OD_CYLINDER: +0.25
OD_SPHERE: -2.25
OS_CYLINDER: +0.25
OS_ADD: +2.50
OS_CYLINDER: SPHERE
OS_SPHERE: -2.25

## 2023-06-13 ASSESSMENT — CONF VISUAL FIELD
OD_NORMAL: 1
OD_SUPERIOR_NASAL_RESTRICTION: 0
OS_NORMAL: 1
OS_SUPERIOR_NASAL_RESTRICTION: 0
OD_INFERIOR_NASAL_RESTRICTION: 0
OS_INFERIOR_NASAL_RESTRICTION: 0
OD_INFERIOR_TEMPORAL_RESTRICTION: 0
OD_SUPERIOR_TEMPORAL_RESTRICTION: 0
OS_INFERIOR_TEMPORAL_RESTRICTION: 0
METHOD: COUNTING FINGERS
OS_SUPERIOR_TEMPORAL_RESTRICTION: 0

## 2023-06-13 ASSESSMENT — SLIT LAMP EXAM - LIDS
COMMENTS: NO PLUG
COMMENTS: NO PLUG

## 2023-06-13 ASSESSMENT — TONOMETRY
OS_IOP_MMHG: 19
OD_IOP_MMHG: 18
IOP_METHOD: ICARE

## 2023-06-13 ASSESSMENT — EXTERNAL EXAM - LEFT EYE: OS_EXAM: NORMAL

## 2023-06-13 ASSESSMENT — EXTERNAL EXAM - RIGHT EYE: OD_EXAM: NORMAL

## 2023-06-13 NOTE — PROGRESS NOTES
Chief Complaint(s) and History of Present Illness(es)     Dry Eye(s) Follow-Up            Laterality: both eyes    Duration: months          Comments    Patient states no changes in vision since last seen. Still reports dry   eyes symptoms but has very much improved since stopping the Refresh   celluvisc.   Compliant with drops and taking as prescribed, did not take any today yet.     Denies double vision. Denies light sensitivity. Both eyes    Eye Drops :      Prednisone lgtts 10 pm     Xiidra lgtts 9:30 pm     Mucomyst lgtts 9:40 pm    Lactosamide lgtts 9:50 pm      DERICKElisabeth Lawrence GAVIN June 13, 2023        She stopped celluvisc drops about 2 months ago due to running out. Immediately had significant symptom relief. She took a break from celluvisc and is much better. She did try put those tears in again, and immediately had recurrence of her symptoms. Around this time she started taking the lactosamide drops. It is less clear if this is helping or not.  These compounded drops cost out of pocket about $165 every 2 weeks.       Current Regimen  Doxycycline 50mg BID  xiidra gtt BID  Prednisolone 4-6x daily  Lactosamide gtt 4-6x daily  Mucomyst 6x daily     She is wearing the scleral lenses  All of the time. Finds that they are very helpful.      Review of systems for the eyes was negative other than the pertinent positives/negatives listed in the HPI.      Assessment & Plan      Roxanna Celis is a 52 year old female with the following diagnoses:   1. Bilateral dry eyes           Doing much better than last visit. Feels that celluvisc was exacerbating the symptoms  Has been using lactosamide 2 weeks on and 2 weeks off to try and determine if this is helping.  She is out of the medication at this time and stopped about 1 week ago.  She also reports trying to stop pred healon, but this caused worsening symptoms.    We discussed the stable SLE today.  Given current symptom control, I would plan to continue holding lactosamide for  now.  If symptoms recur we will revisit using it longer term    Continue the following:   Doxycycline 50mg twice a day   xiidra gtt twice a day both eyes   Pred Healon 4x daily both eyes   Mucomyst 4-6x daily both eyes     If symptoms continue to do well, I have encouraged her to try to slowly taper the pred 3-2-1 every month  Return precautions reviewed     Alejandrina De Los Santos MD  Ophthalmology Resident PGY4  [facility]           Patient disposition:   Return in about 2 months (around 8/13/2023) for VT only.         Attending Physician Attestation:  Complete documentation of historical and exam elements from today's encounter can be found in the full encounter summary report (not reduplicated in this progress note).  I personally obtained the chief complaint(s) and history of present illness.  I confirmed and edited as necessary the review of systems, past medical/surgical history, family history, social history, and examination findings as documented by others; and I examined the patient myself.  I personally reviewed the relevant tests, images, and reports as documented above.  I formulated and edited as necessary the assessment and plan and discussed the findings and management plan with the patient and family. I spent a total of 30 minutes reviewing chart, interpreting testing, documenting and face to face with the patient.  Over 50% of this time was spent counseling and coordinating care regarding their diagnosis and management.  . - Faheem Ocampo MD

## 2023-06-13 NOTE — NURSING NOTE
Patient states no changes in vision since last seen. Still reports dry eyes symptoms but has very much improved since stopping the Refresh celluvisc.   Compliant with drops and taking as prescribed, did not take any today yet.   Denies double vision. Denies light sensitivity. Both eyes    Eye Drops :      Prednisone lgtts 10 pm     Xiidra lgtts 9:30 pm     Mucomyst lgtts 9:40 pm    Lactosamide lgtts 9:50 pm      IMELDA ALONSO June 13, 2023

## 2023-06-14 DIAGNOSIS — G24.3 CERVICAL DYSTONIA: Primary | ICD-10-CM

## 2023-06-20 ENCOUNTER — TELEPHONE (OUTPATIENT)
Dept: NEUROLOGY | Facility: CLINIC | Age: 53
End: 2023-06-20
Payer: COMMERCIAL

## 2023-06-20 NOTE — TELEPHONE ENCOUNTER
LMTC- need to reschedule 7-25.  Provider not in clinic. Offered 8-15 at either 1p or 1:30 per Dr. Torres.

## 2023-06-22 ENCOUNTER — TELEPHONE (OUTPATIENT)
Dept: OPTOMETRY | Facility: CLINIC | Age: 53
End: 2023-06-22

## 2023-06-28 ENCOUNTER — OFFICE VISIT (OUTPATIENT)
Dept: RHEUMATOLOGY | Facility: CLINIC | Age: 53
End: 2023-06-28
Payer: COMMERCIAL

## 2023-06-28 VITALS
WEIGHT: 219 LBS | BODY MASS INDEX: 33.79 KG/M2 | DIASTOLIC BLOOD PRESSURE: 83 MMHG | HEART RATE: 83 BPM | OXYGEN SATURATION: 98 % | SYSTOLIC BLOOD PRESSURE: 132 MMHG

## 2023-06-28 DIAGNOSIS — M79.7 FIBROMYALGIA: Primary | ICD-10-CM

## 2023-06-28 PROCEDURE — 99214 OFFICE O/P EST MOD 30 MIN: CPT | Performed by: STUDENT IN AN ORGANIZED HEALTH CARE EDUCATION/TRAINING PROGRAM

## 2023-06-28 ASSESSMENT — PAIN SCALES - GENERAL: PAINLEVEL: SEVERE PAIN (7)

## 2023-06-28 NOTE — PROGRESS NOTES
Rheumatology Clinic Visit     Roxanna Celis MRN# [id number]   YOB: 1970 Age: 50 year old     Date of Visit: Jun 28, 2023   Primary care provider: [facility] [place]          Assessment and Plan:     Assessment     Sicca symptoms ( severe dry mouth and eyes )  Paresthesia  KIANA  Thyroid nodule  HTN  Neg ENEIDA, SSA/SSB, RF, Anti CCP, normal ESR , CRP   Negative Minor salivary gland lip biopsy - 8/24/21    Ms. Celis is 52 year old female seen in clinic for evaluation of possible Sjogren's disease.     Sicca symptoms : She has severe dry mouth and eyes for few years.  Progressively getting worse.  She is using multiple eyedrops even then eye symptoms are difficult to control. Her ENEIDA, SSA/SSB antibodies done in 2/21 were negative.  Due to severity of sicca symptoms,  Minor salivary gland lip biopsy was done but biopsy is negative for Sjogren's disease.     Early Sjogren's panel which is novel send out lab to look for salivary protein 1 IgG/IgA/IgM antibodies, carbonic anhydrase VI IgG/IgA/IgM, parotid specific protein IgG/IgA/IgM to check for Seronegative Sjogren's came back negative as well.     She was prescribed cevimeline to help with severe dry mouth but discontinued it due to no benefit.    I recommend her to follow up with her Ophthalmologist to manage dry eye symptoms. Use Biotene products and follow up with dentist for dry mouth symptoms. No evidence of systemic autoimmune connective tissue disease to warrant immune suppressive use.      Generalized body aches : She reports having progressive worsening of pain in her extremities. She has trouble walking, standing or holding things for long time.  Repeat rheumatoid serologies, inflammatory markers done after last visit were negative.  She was given short prednisone taper to see if it helped with pain, but she did not notice any benefit.  On exam today she has no synovitis to suggest inflammatory joint pain.  I will refer her to pain clinic for  chronic pain management.  She will also benefit from behavioral pain specialist evaluation.     Paresthesia: She reports burning sensation, pins and needles sensation in her arms and legs.  Progressively getting worse.  It started over her hands and feet and gradually went up to involve her entire arm and leg up to the mid thigh.  EMG of bilateral upper extremities done in 2/2021 was normal.  MRI of the cervical and thoracic spine done in 6/2021 did not reveal any evidence of demyelinating disease. MRI brain in 8/2020 was normal. She has seen Dr. Bell in Neurology who ruled out demyelinating process.  She is on gabapentin, Cymbalta. Follow up with Neurology.     Myofascial pain : She has multiple myofascial tender points on exam suggestive of Fibromyalgia. For fibromyalgia she was instructed to try relaxing techniques like Yoga, Dustin chi. She can try Aerobic conditioning. Low-impact aerobic activities such as fast walking, biking, swimming, or water aerobics are most successful among the interventions that have been studied.       Plan    No sign of inflammation noted on joint exam.     No further blood tests are required     Follow up as needed.     -- Orders placed this encounter  Orders Placed This Encounter   Procedures    Pain Management  Referral    Adult Mental Health  Referral     TT 30 min was spent on date of the encounter doing chart review, history and exam, documentation and further activities as noted above. Any prior notes, outside records, laboratory results, and imaging studies were reviewed if relevant.    Patient verbalized agreement with and understanding of the rationale for the diagnosis and treatment plan.  All questions were answered to best of my ability and the patient's satisfaction.      Chart documentation done in part with Dragon Voice recognition Software. Although reviewed after completion, some word and grammatical error may remain.             Active Problem  List:     Patient Active Problem List    Diagnosis Date Noted    Fibromyalgia      Priority: Medium    Chronic myofascial pain 05/18/2022     Priority: Medium    Neuropathic pain 05/18/2022     Priority: Medium    Precordial pain 07/22/2021     Priority: Medium    Chronic bilateral low back pain without sciatica 08/06/2020     Priority: Medium    Sleep apnea 05/01/2018     Priority: Medium     Overview:   on CPAP      Torticollis, spasmodic 03/24/2017     Priority: Medium    Achlorhydria 10/20/2015     Priority: Medium    Bariatric surgery status 10/20/2015     Priority: Medium    Morbid obesity with BMI of 40.0-44.9, adult (H) 10/20/2015     Priority: Medium    Hypokalemia 06/05/2014     Priority: Medium    Hypothyroid 06/05/2014     Priority: Medium    KIANA (generalized anxiety disorder) 12/03/2010     Priority: Medium    Depression with anxiety 02/12/2010     Priority: Medium    Multiple thyroid nodules 10/23/2008     Priority: Medium    Essential hypertension 06/23/2008     Priority: Medium    Intramural leiomyoma of uterus 07/18/2007     Priority: Medium    Obesity 07/19/2006     Priority: Medium            History of Present Illness:   Roxanna Celis is a 52 year old female with PMH of paresthesia, essential hypertension, generalized anxiety disorder, depression, sleep apnea, morbid obesity seen in the clinic in consultation at request of Dr Jackson for evaluation of sicca symptoms.    She describes that couple years ago she developed burning pain around her left wrist which progressed to involve the entire hand and went up to the elbows.  She also noticed that on the right wrist which progressive disease.  Now she has burning sensation, pins-and-needles, electric shocks and pressure sensation up to her shoulders.  She also noticed it in her feet and sensation has progressed up to the mid thighs. She feel that somebody has put blood pressure cuff and is not released. Muscles of her leg feel weak, unstable. She  has to use walker all the time. She feel she will end up falling. She has been evaluated by neurologist and had MRI of the brain, MRI cervical spine, thoracic spine which has been normal.  No evidence of demyelinating lesion noted.  EMG of bilateral upper extremities was normal.  She is on gabapentin and Cymbalta which helps some.    Couple years ago she woke up with severe pain in her eyes.  She felt sand and gritty sensation in her eyes.  She was seen by ophthalmologist and diagnosed with dry eyes.  Since then she has used multiple eyedrops over-the-counter as well as autologous serum eyedrops which has helped some but still her symptoms are debilitating.  She also has dry mouth and as result has developed multiple cavities in her teeth.     She had chest pain recently. She is going to see cardiologist to make sure she does not need stress tests. She is very exhausted all the time.  She has thyroid nodule and will be getting thyroid nodule biopsy at [facility].    She had a fall in February 2021 and injured her wrist. MRI of the left wrist did not reveal any evidence of synovitis, effusion or fracture.  Low-grade sprain was noted.  She was given wrist injection which helped.     She has hx of gastric bypass 8 years ago and ever since reports abdominal discomfort.  She has never seen a GI to get upper or lower GI endoscopy.    Denies any raynauds, malar rash, photosensitivity, recurrent mouth/genital ulcers, pleuritic chest pains, recurrent sinusitis/rhinitis, swallowing difficulty, hearing or visual changes recently. No h/o arterial/venous thrombosis in the past.  September 15, 2021 - She has pain in her arms, legs. Reports having Pins and needles sensation in her fingers, legs. Hands hurt so much all the time and can not function. Joints started to hurt bad. She has pain in her elbows, knees, legs and feet. Minor salivary gland lip biopsy done in 8/2021 did not meet criteria for Sjogren's disease.   April 29, 2022 -  She is using walker and braces all the time. Her wrists and hands hurt very bad. She feel pain in her ankles which travel up the leg. It is up to the hips. On her arms she has pain up the shoulder. It feels like Burning, pins, needles, freezing cold, fire, electricity like pain. She can not cook or brush her hair due to pain in her hands. She has pain in her feet. When she step down she feel that her legs will give out. She is very tired all the time. She can sleep for 10 hours and even then she is wiped out. Her eyes are very dry. Pool therapy did not work for her. She will starting land PT for pain management. MRI of B/L wirsts done last year was normal. She has appointment for EMG  June 28, 2023 - She has pain in her hands and forearms. It is effecting everything in her life. She can not perform any daily activities. Pain in her hands is bad, fingers are more inward. Limbs gets pins and needles, shooting pain, heat in her legs. It is all the way to the bottom of her feet and comes up to her thighs. Her legs could not tolerate and she can not walk any more without assistance. Her pain in the afternoon is upto 10. Trying to dress herself is difficulty. More she use them more pain she gets in her hands. She will be seeing Neurologist in end of July.  She has seen pain clinic and has tried gabapentin, lyrica which cause side effects. She has started medical cannabis to see if it will make difference. She walk differently, she feel that her legs are confused and feel weak. She has cervical dystonia and will be trying botox. She feel that her limbs are heavy and she has pain while sitting, from her feet up to her knees and shins. She has enlarged lymph nodes in her neck. All her finger joints and thumb joints hurt. Water touching her skin was having negative effect on her. She has tried hand therapy at the end of last year and beginning of this year. Her eyes are dry and have dry mouth. Morning is the best time. If she  overdo she has to rest for few days. She has sleep apnea borderline. Tyring to use CPAP is challenging due to her eye googles for dry eyes.          Review of Systems:     Review Of Systems  Constitutional: denies fever, chills, night sweats and weight loss.  Skin: No skin rash.  Eyes: + dryness or irritation in eyes. No episode of eye inflammation or redness.   Ears/Nose/Throat: no recurrent sinus infections.  Respiratory: No shortness of breath, dyspnea on exertion, cough, or hemoptysis  Cardiovascular: no chest pain or palpitations.  Gastrointestinal: no nausea, vomiting, abdominal pain.  Normal bowel movements.  Genitourinary: no dysuria, frequency  or hematuria.  Musculoskeletal: as in HPI  Neurologic: + numbness, tingling.  Psychiatric: no mood disorders.  Hematologic/Lymphatic/Immunologic: no history of easy bruising, petechia or purpura.  No abnormal bleeding.   Endocrine: no h/o thyroid disease or Diabetes.                  Past Medical History:     Past Medical History:   Diagnosis Date    Cervical dystonia     Fibromyalgia      Past Surgical History:   Procedure Laterality Date    AS FRAGMENTING OF KIDNEY STONE      GALLBLADDER SURGERY      STOMACH SURGERY      for weight loss            Social History:     Social History     Occupational History    Not on file   Tobacco Use    Smoking status: Never    Smokeless tobacco: Never   Substance and Sexual Activity    Alcohol use: No    Drug use: No    Sexual activity: Yes     Partners: Male            Family History:     Family History   Problem Relation Age of Onset    Glaucoma No family hx of     Macular Degeneration No family hx of             Allergies:     Allergies   Allergen Reactions    Contrast Dye Anaphylaxis     Other reaction(s): almost  from it    Iodinated Contrast Media Anaphylaxis     IVP DYE    Lisinopril Anaphylaxis    Lisinopril-Hydrochlorothiazide Anaphylaxis    Maple Flavor Anaphylaxis     MAPLE SYRUP  MAPLE SYRUP  MAPLE SYRUP     Maple Tree Anaphylaxis     Allergy includes maple syrup.    No Clinical Screening - See Comments      maple syrup==anaphylaxis  Dairy- causes to not feel well    Gluten Itching and Swelling    Gluten Meal Itching and Swelling     Cramping    [place] Meal Unknown    Chamomile Unknown    Codeine Sulfate Cramps and GI Disturbance    Food Unknown     Grapes, almond, lactose, gluten, real maple syrup    Nsaids Other (See Comments) and Unknown     Patient had bariatric surgery. Should not ever take NSAIDS due to high risk for gastric ulcers.   Patient had bariatric surgery. Should not ever take NSAIDS due to high risk for gastric ulcers.     Polyethylene Glycol Unknown     Per allergy testing  Per allergy testing      Erythromycin Nausea and Vomiting, Cramps, Diarrhea and Nausea     PN: LW Reaction: stomach upset  cramping    Lactose Diarrhea    Morphine Other (See Comments)            Medications:     Current Outpatient Medications   Medication Sig Dispense Refill    acetylcysteine 10 % (MUCOMYST) 10% SOLN Place 2 drops into both eyes 6 times daily 30 mL 11    BOTOX 100 units injection Inject 250 Units into the muscle once Lot # /C3 with Expiration Date:  11/2020      buPROPion (WELLBUTRIN XL) 300 MG 24 hr tablet Take 300 mg by mouth every morning      clonazePAM (KLONOPIN) 1 MG tablet Take 1 mg by mouth 2 times daily as needed      COMPOUNDED NON-CONTROLLED SUBSTANCE (CMPD RX) - PHARMACY TO MIX COMPOUNDED MEDICATION Lactosamide 1% drop, instill 1-2 drops 6x daily into both eyes 10 mL 3    cyanocobalamin (VITAMIN B-12) 1000 MCG tablet Take 1,000 mcg by mouth daily      doxycycline monohydrate (ADOXA) 50 MG tablet Take 1 tablet (50 mg) by mouth 2 times daily 180 tablet 3    DULoxetine (CYMBALTA) 60 MG capsule TAKE 2 CAPSULES BY MOUTH EVERY DAY      EPINEPHrine (ANY BX GENERIC EQUIV) 0.3 MG/0.3ML injection 2-pack       ibuprofen (ADVIL/MOTRIN) 200 MG capsule Take 200 mg by mouth every 4 hours as needed.       iloperidone (FANAPT) 6 MG TABS tablet Take 4 mg by mouth daily       lifitegrast (XIIDRA) 5 % opthalmic solution Place 1 drop into both eyes 2 times daily 60 each 11    medical cannabis (Patient's own supply) See Admin Instructions (The purpose of this order is to document that the patient reports taking medical cannabis.  This is not a prescription, and is not used to certify that the patient has a qualifying medical condition.)      multivitamin w/minerals (THERA-VIT-M) tablet Take 1 tablet by mouth daily      ONABOTULINUMTOXINA IJ Inject 250 Units as directed once LOT # /C3  EXPIRATION: 03/2020      prednisolone 0.25% and hyaluronate in balanced salt SUSP compounded ophthalmic suspension Place 1 Drop into both eyes 4 times daily. Discard bottle 15 days after opening. Refrigerate. Unopened bottle expires: Strength: 0.25 % 6.67 mL 11    sodium chloride 0.9 % neb solution MEDICALLY NECESSARY FOR SCLERAL CONTACT LENS USE. MAY USE 3 ML 6 TIMES A DAY FOR CONTACT LENS  mL 11    Soft Lens Products (B&L SENSITIVE EYES) SOLN Apply 1 drop to eye      spironolactone (ALDACTONE) 50 MG tablet Take 1 tablet by mouth daily      tretinoin (RETIN-A) 0.025 % external cream Apply a pea size to entire face QD 45 g 11    tretinoin (RETIN-A) 0.05 % external cream Spread a pea size amount into affected area topically at bedtime.  Use sunscreen SPF>20. 45 g 11    TYRVAYA 0.03 MG/ACT nasal spray Spray 1 spray into both nostrils 2 times daily 4.2 mL 11    Vitamin D3 (CHOLECALCIFEROL) 25 mcg (1000 units) tablet Take by mouth daily      White Petrolatum-Mineral Oil (SYSTANE NIGHTTIME) OINT Apply 3.5 g to eye 4 times daily as needed (dry eyes) 7 g 11            Physical Exam:   Blood pressure 132/83, pulse 83, weight 99.3 kg (219 lb), SpO2 98 %, not currently breastfeeding.  Wt Readings from Last 4 Encounters:   07/21/23 99.3 kg (219 lb)   06/28/23 99.3 kg (219 lb)   02/14/23 100.7 kg (222 lb)   04/29/22 99 kg (218 lb 3.2 oz)        Constitutional: Obese, appearing stated age; cooperative  Eyes: nl EOM, PERRLA, vision, conjunctiva, sclera  ENT: nl external ears, nose, hearing, lips, teeth, gums, throat  No mucous membrane lesions, normal saliva pool  Neck: no mass or thyroid enlargement  Resp: lungs clear to auscultation, nl to palpation  CV: RRR, no murmurs, rubs or gallops, no edema  GI: no ABD mass or tenderness, no HSM  : not tested  Lymph: no cervical, supraclavicular, inguinal or epitrochlear nodes    MS: All TMJ, neck, shoulder, elbow, wrist, MCP/PIP/DIP, spine, hip, knee, ankle, and foot MTP/IP joints were examined.   -- No active synovitis or deformity. Full ROM.  Normal  strength.   -- No dactylitis,  tenosynovitis, enthesopathy.  -- Multiple myofascial tender points present over interscapular area, upper back, inner thighs, medial border of knees, calves, arms.     Skin: no nail pitting, alopecia, rash, nodules or lesions  Neuro: nl cranial nerves, strength, sensation, DTRs.   Psych: nl judgement, orientation, memory, affect.         Data:     No results found for any visits on 06/28/23.    Recent Labs   Lab Test 07/10/21  1931 07/10/21  1931   WBC 8.5  --    RBC 4.76  --    HGB 13.9  --    HCT 41.6  --    MCV 87  --    RDW 13.3  --      --    ALBUMIN  --  4.0   BUN  --  19      No results for input(s): TSH, T4 in the last 85792 hours.  Hemoglobin   Date Value Ref Range Status   08/04/2021 14.4 11.7 - 15.7 g/dL Final   07/10/2021 13.9 12.0 - 16.0 g/dL Final   02/10/2006 14.0 11.7 - 15.7 g/dL Final   02/07/2006 14.1 11.7 - 15.7 g/dL Final     Urea Nitrogen   Date Value Ref Range Status   08/04/2021 17 7 - 30 mg/dL Final   07/10/2021 19 8 - 22 mg/dL Final   02/10/2006 22 5 - 24 mg/dL Final     Erythrocyte Sedimentation Rate   Date Value Ref Range Status   05/16/2022 9 0 - 30 mm/hr Final   07/21/2021 14 0 - 30 mm/hr Final     CRP Inflammation   Date Value Ref Range Status   05/16/2022 <2.9 0.0 - 8.0 mg/L Final    07/21/2021 <2.9 0.0 - 8.0 mg/L Final     AST   Date Value Ref Range Status   07/10/2021 20 0 - 40 U/L Final     Albumin   Date Value Ref Range Status   07/10/2021 4.0 3.5 - 5.0 g/dL Final     Alkaline Phosphatase   Date Value Ref Range Status   07/10/2021 124 (H) 45 - 120 U/L Final     ALT   Date Value Ref Range Status   07/10/2021 22 0 - 45 U/L Final     Rheumatoid Factor   Date Value Ref Range Status   05/16/2022 <6 <12 IU/mL Final     Recent Labs   Lab Test 11/11/21  1609 08/04/21  1728 07/10/21  1931 07/10/21  1931   WBC  --  7.3 8.5  --    HGB  --  14.4 13.9  --    HCT  --  42.8 41.6  --    MCV  --  90 87  --    PLT  --  265 261  --    BUN  --  17  --  19   TSH 3.40  --   --   --    AST  --   --   --  20   ALT  --   --   --  22   ALKPHOS  --   --   --  124*     Outside studies reviewed:     INDICATION:   Paresthesia of both hands. Chronic pain.     COMPARISON:   Plain film 22 January 2021.     TECHNIQUE:   Axial PD and PD fat-sat, coronal T1, PD fat sat and 3D gradient recall and sagittal PD fat-sat sequences left wrist without contrast.     FINDINGS:   Tendons: Intact normal caliber flexor and extensor tendons. No tenosynovitis. No subluxation.   -   Ligaments: Somewhat indistinct intermediate signal articular disc and central triangle fibrocartilage for mild likely mucoid change and degenerative marginal tearing. No full-thickness defect appreciated. Intermediate signal intact scapholunate ligament. Normal low signal lunotriquetral ligament.   -   Wrist joint: Anatomic alignment. Uniform cartilage thickness and signal. Physiologic fluid. No periarticular fluid collection. No synovitis.   -   Bones and soft tissues: Anatomic carpal alignment. No fracture or bone lesion. Carpal metacarpal joints appear normal. Normal muscle bulk and signal through the field-of-view.     IMPRESSION:   Minor mucoid degeneration and perhaps low-grade sprain appearance of triangle fibrocartilage and scapholunate ligament.        EMG done in [facility] reviewed .    Reviewed Rheumatology lab flowsheet    Jose Velazquez MD  [facility]  Department of Rheumatology & Autoimmune Disorders  [a]list gamesPerham Health Hospital: [phone]   Pager - [phone]

## 2023-07-17 ENCOUNTER — TELEPHONE (OUTPATIENT)
Dept: PALLIATIVE MEDICINE | Facility: CLINIC | Age: 53
End: 2023-07-17
Payer: COMMERCIAL

## 2023-07-17 NOTE — TELEPHONE ENCOUNTER
[facility] Call Center    Phone Message    May a detailed message be left on voicemail: yes     Reason for Call: Other: Patient's rheumatologist placed another referral for her to be seen but she wanted to discuss to see if it would be worth it since at her last appointment she was told nothing additional could be done.      Action Taken: Other: Pain    Travel Screening: Not Applicable

## 2023-07-18 NOTE — TELEPHONE ENCOUNTER
Called pt and LM to return call    Cleo NEUMANN, RN Care Coordinator  [facility]  Pain [facility]

## 2023-07-20 ENCOUNTER — TELEPHONE (OUTPATIENT)
Dept: NEUROLOGY | Facility: CLINIC | Age: 53
End: 2023-07-20
Payer: COMMERCIAL

## 2023-07-21 ENCOUNTER — OFFICE VISIT (OUTPATIENT)
Dept: NEUROLOGY | Facility: CLINIC | Age: 53
End: 2023-07-21
Payer: COMMERCIAL

## 2023-07-21 VITALS
WEIGHT: 219 LBS | SYSTOLIC BLOOD PRESSURE: 127 MMHG | DIASTOLIC BLOOD PRESSURE: 84 MMHG | HEART RATE: 83 BPM | BODY MASS INDEX: 33.79 KG/M2 | OXYGEN SATURATION: 91 %

## 2023-07-21 DIAGNOSIS — M79.7 FIBROMYALGIA: ICD-10-CM

## 2023-07-21 DIAGNOSIS — R20.2 PARESTHESIA: ICD-10-CM

## 2023-07-21 DIAGNOSIS — R27.8 CLUMSINESS: ICD-10-CM

## 2023-07-21 DIAGNOSIS — M62.81 GENERALIZED MUSCLE WEAKNESS: Primary | ICD-10-CM

## 2023-07-21 DIAGNOSIS — M79.2 NEUROPATHIC PAIN: ICD-10-CM

## 2023-07-21 PROCEDURE — 99215 OFFICE O/P EST HI 40 MIN: CPT | Performed by: STUDENT IN AN ORGANIZED HEALTH CARE EDUCATION/TRAINING PROGRAM

## 2023-07-21 NOTE — PATIENT INSTRUCTIONS
Default is fibromyalgia  Try a different pain clinic  [facility] patient portal would be a good option to look through again  MRI brain C spine

## 2023-07-21 NOTE — NURSING NOTE
"Roxanna Celis is a 52 year old female who presents for:  Chief Complaint   Patient presents with     RECHECK     EMG test results        Initial Vitals:  /84   Pulse 83   SpO2 91%  Estimated body mass index is 33.79 kg/m  as calculated from the following:    Height as of 2/14/23: 1.715 m (5' 7.5\").    Weight as of 6/28/23: 99.3 kg (219 lb).. There is no height or weight on file to calculate BSA. BP completed using cuff size: kentrell Beavers    "

## 2023-07-21 NOTE — LETTER
7/21/2023         RE: Roxanna Celis  [id number] [place] [address]        Dear Colleague,    Thank you for referring your patient, Roxanna Celis, to the [facility] NEUROLOGY CLINICS [facility]. Please see a copy of my visit note below.    [facility]  Section of General Neurology  Return Patient Visit    Roxanna Celis MRN# [id number]   Age: 52 year old YOB: 1970          Assessment and Plan:     Roxanna Celis is a 52 year old female who presents in follow up today.  She was previously evaluated for diffuse pain, tingling, diffuse weakness.  EMG and other testing has been unrevealing previously as below.  She has been extensively evaluated by rheumatology, other neurology colleagues (neuroimmunology notably) without clear diagnosis unfortunately besides fibromyalgia/a central pain processing disorder.  Small fiber biopsy was normal in the lower extremity/equivocal in upper extremities with non actionable antibody panel to review.  We discussed a continued trial and error approach to her pain control.    Regarding newer symptoms such as falling more, hand weakness--This could all be intertwined with her pain disorder, I think the most actionable data we could get would be repeating MRI brain/C spine to ensure no changes in these regards that could relate to such symptoms.  She of note does have a profound contrast allergy so we elected to get these studies without contrast.   Overall if work up keeps returning without a clear answer neurologically I would continue to work with her pain doctors on other ideas of things to try to hopefully get her quality of life improved.    All questions answered.  I will reach out with MRI brain and C spine data.             Agustín Torres MD   of Neurology   [facility]      Interval history:     Falling more.    Legs give way.   Practicing walking.  Pain gets in the way for sure.    Pain  never below 7.   Re referral to pain clinic discussed  Is going to resume PT/OT via [facility].    Thinking about pool therapy, accupuncture, massage.  Discussed her course to date.   She is still concerned about sjogrens as a possibility.       Reviewed recent rheumatology note, picture concerning for fibromyalgia, may be considering further testing.  Seeing ophthalmology for dry eye  No recent pain notes, she was told nothing more that could be done.  Questionable coordination deficits deficits.     Pain remains so profound she cannot work       A/P at previous visit  Roxanna Celis is a 51 year old female who presents in follow up for diffuse pain, numbness, tingling  She has a PMH of ELY, anxiety, depression and is s/p baratric surgery.  She has had several years of ongoing symptoms that began with dry eyes, other Sicca like symptoms, though she has reportedly have negative lip biopsy and has had negative SSA/SSB antibodies.   She has seen multiple specialists without answer including MRIs as above (including seeing one of our neuroimmunological specialists who discussed with her she does not have MS), rheumatologic lab work, normal EMG.  She has received a diagnosis of neuropathy and of fibromyalgia in the past but medications to treat as such have provided limited benefit.  We have subsequently repeated SSA/SSB antibodies which were again negative.  Blood work otherwise only revealing for slightly elevated vitamin B6 as above.  Her symptoms have continued to be refractory.  She is considered medical cannabis which could be worth trialing.  She unfortunately remains quite debilitated from her pain despite trials of many medications and approaches.  Subsequent small fiber biopsy which was normal in her legs, borderline reduced in her forearm which is where symptoms began.   Because of this borderline finding we did send [facility] send out labs to further work up a potential cause.  She will discuss if this small  fiber biopsy finding would be of any further rheumatological consideration given Sicca symptoms though it is possible her work up will be considered definitive, which we did discuss.  We discussed that small fiber changes are often idiopathic and treated symptomatically in such situations, but the [facility] panel will give us more data in this regard.  It is uncommon in such situations for dangelo weakness outside of the context of pain to develop.  We also discussed that this can be very difficult to treat and that I agree with her pain doctor (Dr. Romano) that I do think central pain processing/fibromyalgia are likely components of her condition as well even if other reasons are in time discovered.       --She will continue working with therapy team, multi disciplinary pain team regarding symptoms  --[facility], will contact Ms. Celis with results, follow up will depend on these results.    All questions answered.         Past Medical History:     Patient Active Problem List   Diagnosis     Torticollis, spasmodic     Achlorhydria     Depression with anxiety     KIANA (generalized anxiety disorder)     Hypokalemia     Hypothyroid     Intramural leiomyoma of uterus     Bariatric surgery status     Morbid obesity with BMI of 40.0-44.9, adult (H)     Multiple thyroid nodules     Obesity     Sleep apnea     Essential hypertension     Chronic bilateral low back pain without sciatica     Precordial pain     Chronic myofascial pain     Neuropathic pain     Fibromyalgia     Past Medical History:   Diagnosis Date     Cervical dystonia 1993     Fibromyalgia         Past Surgical History:     Past Surgical History:   Procedure Laterality Date     AS FRAGMENTING OF KIDNEY STONE       GALLBLADDER SURGERY       STOMACH SURGERY      for weight loss        Social History:     Social History     Tobacco Use     Smoking status: Never     Smokeless tobacco: Never   Substance Use Topics     Alcohol use: No     Drug use:  No        Family History:     Family History   Problem Relation Age of Onset     Glaucoma No family hx of      Macular Degeneration No family hx of         Medications:     Current Outpatient Medications   Medication Sig     acetylcysteine 10 % (MUCOMYST) 10% SOLN Place 2 drops into both eyes 6 times daily     BOTOX 100 units injection Inject 250 Units into the muscle once Lot # /C3 with Expiration Date:  11/2020     buPROPion (WELLBUTRIN XL) 300 MG 24 hr tablet Take 300 mg by mouth every morning     clonazePAM (KLONOPIN) 1 MG tablet Take 1 mg by mouth 2 times daily as needed     COMPOUNDED NON-CONTROLLED SUBSTANCE (CMPD RX) - PHARMACY TO MIX COMPOUNDED MEDICATION Lactosamide 1% drop, instill 1-2 drops 6x daily into both eyes     cyanocobalamin (VITAMIN B-12) 1000 MCG tablet Take 1,000 mcg by mouth daily     doxycycline monohydrate (ADOXA) 50 MG tablet Take 1 tablet (50 mg) by mouth 2 times daily     DULoxetine (CYMBALTA) 60 MG capsule TAKE 2 CAPSULES BY MOUTH EVERY DAY     EPINEPHrine (ANY BX GENERIC EQUIV) 0.3 MG/0.3ML injection 2-pack      ibuprofen (ADVIL/MOTRIN) 200 MG capsule Take 200 mg by mouth every 4 hours as needed.     iloperidone (FANAPT) 6 MG TABS tablet Take 4 mg by mouth daily      lifitegrast (XIIDRA) 5 % opthalmic solution Place 1 drop into both eyes 2 times daily     medical cannabis (Patient's own supply) See Admin Instructions (The purpose of this order is to document that the patient reports taking medical cannabis.  This is not a prescription, and is not used to certify that the patient has a qualifying medical condition.)     multivitamin w/minerals (THERA-VIT-M) tablet Take 1 tablet by mouth daily     ONABOTULINUMTOXINA IJ Inject 250 Units as directed once LOT # /C3  EXPIRATION: 03/2020     prednisolone 0.25% and hyaluronate in balanced salt SUSP compounded ophthalmic suspension Place 1 Drop into both eyes 4 times daily. Discard bottle 15 days after opening. Refrigerate. Unopened  bottle expires: Strength: 0.25 %     sodium chloride 0.9 % neb solution MEDICALLY NECESSARY FOR SCLERAL CONTACT LENS USE. MAY USE 3 ML 6 TIMES A DAY FOR CONTACT LENS USE     Soft Lens Products (B&L SENSITIVE EYES) SOLN Apply 1 drop to eye     spironolactone (ALDACTONE) 50 MG tablet Take 1 tablet by mouth daily     tretinoin (RETIN-A) 0.025 % external cream Apply a pea size to entire face QD     tretinoin (RETIN-A) 0.05 % external cream Spread a pea size amount into affected area topically at bedtime.  Use sunscreen SPF>20.     TYRVAYA 0.03 MG/ACT nasal spray Spray 1 spray into both nostrils 2 times daily     Vitamin D3 (CHOLECALCIFEROL) 25 mcg (1000 units) tablet Take by mouth daily     White Petrolatum-Mineral Oil (SYSTANE NIGHTTIME) OINT Apply 3.5 g to eye 4 times daily as needed (dry eyes)     Current Facility-Administered Medications   Medication     botulinum toxin type A (BOTOX) 100 units injection 300 Units     botulinum toxin type A (DYSPORT) injection 500 Units     botulinum toxin type A (DYSPORT) injection 800 Units        Allergies:     Allergies   Allergen Reactions     Contrast Dye Anaphylaxis     Other reaction(s): almost  from it     Iodinated Contrast Media Anaphylaxis     IVP DYE     Lisinopril Anaphylaxis     Lisinopril-Hydrochlorothiazide Anaphylaxis     Maple Flavor Anaphylaxis     MAPLE SYRUP  MAPLE SYRUP  MAPLE SYRUP     Maple Tree Anaphylaxis     Allergy includes maple syrup.     No Clinical Screening - See Comments      maple syrup==anaphylaxis  Dairy- causes to not feel well     Gluten Itching and Swelling     Gluten Meal Itching and Swelling     Cramping     [place] Meal Unknown     Chamomile Unknown     Codeine Sulfate Cramps and GI Disturbance     Food Unknown     Grapes, almond, lactose, gluten, real maple syrup     Nsaids Other (See Comments) and Unknown     Patient had bariatric surgery. Should not ever take NSAIDS due to high risk for gastric ulcers.   Patient had bariatric  surgery. Should not ever take NSAIDS due to high risk for gastric ulcers.      Polyethylene Glycol Unknown     Per allergy testing  Per allergy testing       Erythromycin Nausea and Vomiting, Cramps, Diarrhea and Nausea     PN: LW Reaction: stomach upset  cramping     Lactose Diarrhea     Morphine Other (See Comments)          Physical Exam:   Vitals: /84   Pulse 83   SpO2 91%      Neuro:   General Appearance: Intermittently tearful     Mental Status: Alert and oriented to person, place, and time. Speech fluent and comprehension intact. No dysarthria. Normal memory.  Cranial Nerves:   II: Visual fields: normal  III: Pupils: 3 mm, equal, round, reactive to light   III,IV,VI: Extraocular Movements: intact   V: Facial sensation: intact to light touch  VII: Facial strength: intact without asymmetry  VIII: Hearing: intact grossly       Motor Exam:   No focal weakness appreciated but limited by pain   testing was  limited by pain most prominently L>R     No drift is present. No abnormal movements. Tone is normal throughout.     Sensory: intact to light touch, vibration      Coordination: no dysmetria with finger-to-nose bilaterally     Reflexes: biceps, triceps, brachioradialis, patellar, and ankle jerks 2+ and symmetric.      Gait: quite antalgic and cautious gait.            Data: Pertinent prior to visit   MR CERVICAL SPINE W/O CONTRAST, MR THORACIC SPINE W/O CONTRAST  6/18/2021 5:42 PM     History: Demyelinating disease; Neuropathic pain; Paresthesia     Comparison: CT cervical spine dated 8/31/2016, CT cervical spine dated  8/31/2016     Technique: Sagittal T2- and T1-weighted images of the cervical and  thoracic spine, axial T2* gradient echo images of the cervical spine  and axial T2-weighted images were obtained.     Findings:  Cervical: The cervical vertebrae appear normally aligned.  There is no  disc height narrowing at any level.  There is normal signal within the  cervical spinal cord which also  demonstrates a normal contour.  The  findings on a level by level basis are as follows:     C2-3:  No significant spinal canal or neural foraminal narrowing.     C3-4:  No significant spinal canal or neural foraminal narrowing.     C4-5:  No significant spinal canal or neuroforaminal narrowing.     C5-6:  Uncovertebral hypertrophy and facet arthropathy bilaterally. No  significant spinal canal stenosis. Mild bilateral neuroforaminal  narrowing, left greater than right.     C6-7:  No significant spinal canal or neuroforaminal narrowing.     C7-T1:  No significant spinal canal or neuroforaminal narrowing.     Thoracic: The thoracic vertebral column appears in normal alignment.  There is loss of disk height at any level. The spinal cord contour and  signal pattern appear within normal limits. No significant spinal  canal or neural foraminal narrowing.     Right inferior thyroid gland 1.5 x 1.0 cm T2 hyperintense lesion,  corresponding to CT neck on 8/31/2016, not significantly changed.                                                                      Impression:   1.  Cervical spine: No definite myelopathic spinal cord signal  identified. No high-grade spinal canal or neuroforaminal narrowing at  any level.  2.  Thoracic spine: No definite myelopathic spinal cord signal  identified. No high-grade spinal canal or neuroforaminal narrowing at  any level.     I personally reviewed the above imaging and agree with the findings in the report.       Brain MRI without contrast 2010      History: Possible MS and blurred vision.       Comparison: MRI brain 6/17/2009       Technique: Sagittal and axial turboFLAIR, T1-weighted axial,   diffusion-weighted axial with ADC map, T2-weighted axial, and T2*   gradient echo axial images were obtained without contrast. After   intravenous administration of gadolinium, axial turboFLAIR and coronal   and axial T1-weighted images were obtained.       Findings: There is a single focus of  increased T2 signal on sagittal   and axial turboFLAIR images in the left frontal parasagittal   subcortical white matter that was not seen on the prior study of   6/17/2009. The previously noted area of contrast enhancement   surrounding the left optic nerve is not evident on this study.   However, the T1-weighted postcontrast images on this study are not   fat-saturated which limits the ability to detect subtle enhancement   adjacent to the intraconal fat.  The postcontrast turboFLAIR sequences   with fat saturation do not demonstrate contrast enhancement   surrounding the optic nerves. The images reveal no intracranial mass   lesion, midline shift or abnormal extraaxial fluid collection. The   ventricles and sulci appear within normal for age.  Diffusion-weighted   images appear normal.       Normal intravascular flow voids are identified at the base of the   brain bilaterally.       Impression:   1. There is a nonspecific small focus of increased signal on   turboFLAIR and T2-weighted imaging in the left subcortical   parasagittal white matter.   2. The previously noted subtle contrast enhancement surrounding the   left optic nerve is not appreciated on the current study.  However,   the T1-weighted postcontrast images of the orbits were not   fat-saturated, which limits the ability to distinguish subtle   peripheral optic nerve enhancement. The postcontrast turboFLAIR images   of this region demonstrate no abnormal contrast enhancement   surrounding the left optic nerve.         Procedures:  Previous EMG WNL (9/21)  Epidermal nerve biopsy: normal in leg, borderline reduced in her forearm     Laboratory:       previous CK wnl  b12 wnl    EMG 6/2022  History & Examination:  51 year old woman with several years of pain throughout her upper limbs. The left side is more affected than the right. Evaluate for polyneuropathy vs focal neuropathy vs radiculopathy.      Techniques: Motor and sensory conduction studies  were done with surface recording electrodes. EMG was done with a concentric needle electrode.      Results:  Nerve conduction studies:  1. Bilateral median-D2, ulnar-D5, and radial sensory responses are normal.   2. Bilateral median-ulnar palmar interlatency differences are normal.   3. Bilateral median-APB and ulnar-ADM motor responses are normal.   4. Bilateral median-lumbrical vs ulnar-interosseous latency differences are normal.     Needle EMG of selected proximal and distal right and left upper limb muscles was performed as tabulated below. No abnormal spontaneous activity was observed in the sampled muscles. Motor unit potential morphology and recruitment patterns were normal.      Interpretation:  This is a normal study. There is no electrophysiologic evidence of a large-fiber polyneuropathy, focal neuropathy, or cervical radiculopathy affecting the upper limbs on the basis of this study.             The total time of this encounter today amounted to 43 minutes. This time included time spent with the patient, prep work, ordering tests, and performing post visit documentation.      Again, thank you for allowing me to participate in the care of your patient.        Sincerely,        Huber Torres MD

## 2023-07-24 NOTE — TELEPHONE ENCOUNTER
[facility] Call Center    Phone Message    May a detailed message be left on voicemail: yes     Reason for Call: Other: Patient is returning phone call to discuss referral with nurse, please call back to discuss further.      Action Taken: Other: Pain    Travel Screening: Not Applicable

## 2023-07-25 ENCOUNTER — DOCUMENTATION ONLY (OUTPATIENT)
Dept: NEUROLOGY | Facility: CLINIC | Age: 53
End: 2023-07-25

## 2023-07-25 DIAGNOSIS — R29.898 HAND WEAKNESS: Primary | ICD-10-CM

## 2023-07-25 NOTE — PROGRESS NOTES
"Faxed  Referral  July 25, 2023 to fax number [phone].   Per patient's request \"[facility] orth[facility] in [facility]. The order can be faxed directly to them at [phone] \"    Right Fax confirmed at 1:08 PM       Patito Beavers    "

## 2023-07-25 NOTE — TELEPHONE ENCOUNTER
[facility] Call Center    Phone Message    May a detailed message be left on voicemail: yes     Reason for Call: Other: Patient is caling to discuss her referral since she has not heard back yet. Please call back when available.      Action Taken: Other: Pain    Travel Screening: Not Applicable

## 2023-07-26 ENCOUNTER — HOSPITAL ENCOUNTER (OUTPATIENT)
Dept: MRI IMAGING | Facility: CLINIC | Age: 53
Discharge: HOME | End: 2023-07-26
Attending: STUDENT IN AN ORGANIZED HEALTH CARE EDUCATION/TRAINING PROGRAM
Payer: COMMERCIAL

## 2023-07-26 DIAGNOSIS — M62.81 GENERALIZED MUSCLE WEAKNESS: ICD-10-CM

## 2023-07-26 DIAGNOSIS — R27.8 CLUMSINESS: ICD-10-CM

## 2023-07-26 PROCEDURE — 72141 MRI NECK SPINE W/O DYE: CPT

## 2023-07-26 PROCEDURE — 70551 MRI BRAIN STEM W/O DYE: CPT

## 2023-07-27 ENCOUNTER — OFFICE VISIT (OUTPATIENT)
Dept: PHYSICAL MEDICINE AND REHAB | Facility: CLINIC | Age: 53
End: 2023-07-27
Payer: COMMERCIAL

## 2023-07-27 VITALS — HEART RATE: 83 BPM | SYSTOLIC BLOOD PRESSURE: 130 MMHG | DIASTOLIC BLOOD PRESSURE: 90 MMHG

## 2023-07-27 DIAGNOSIS — G24.3 CERVICAL DYSTONIA: Primary | ICD-10-CM

## 2023-07-27 PROCEDURE — 64616 CHEMODENERV MUSC NECK DYSTON: CPT | Mod: RT | Performed by: PHYSICAL MEDICINE & REHABILITATION

## 2023-07-27 PROCEDURE — 95874 GUIDE NERV DESTR NEEDLE EMG: CPT | Performed by: PHYSICAL MEDICINE & REHABILITATION

## 2023-07-27 PROCEDURE — 64616 CHEMODENERV MUSC NECK DYSTON: CPT | Mod: LT | Performed by: PHYSICAL MEDICINE & REHABILITATION

## 2023-07-27 NOTE — TELEPHONE ENCOUNTER
Called pt. Advised in message with no pt identifiers that per provider discussion, no further options available at this time. Advised to call back if had any questions    Cleo NEUMANN, RN Care Coordinator  [facility]  Pain [facility]

## 2023-07-27 NOTE — PROGRESS NOTES
[facility]     PM&R CLINIC NOTE  BOTULINUM TOXIN PROCEDURE      HPI  Chief Complaint   Patient presents with    Procedure     Botox       Roxanna Celis is a 52 year old female with a history of cervical dystonia who presents to clinic for botulinum toxin injections for management of involuntary muscle spasms and neck pain secondary to her cervical dystonia.     SINCE LAST VISIT  Roxanna Celis was last seen here in clinic on 5/4/2023, at which time she received 500 units of Dysport. Plan is to switch to Botox today, which she has had in the past. Rationale for switching back to Botox is due to the fact that Dysport is exhibiting suboptimal duration of benefit.     Since her last visit, she was started on medical cannabis for intractable pain. She also sees her chiropractor on a regular basis to help with neck, upper back and scalp pain. She had her hair cut very short recently due to the fact that her arms/shoulders are too painful to wash her hair anymore. Her  usually washes her hair for her. She had an MRI of her neck and brain yesterday, and these were essentially normal.     Otherwise, patient denies new medical diagnoses, illnesses, hospitalizations, emergency room visits, and injuries since the previous injection with botulinum neurotoxin.     RESPONSE TO PREVIOUS TREATMENT    Side effects: None.     Pain Improvement: Yes, it typically takes about 20 days for the Dysport to start giving her some pain reduction, and then the effects are gradual. She notes at least a 60% reduction in pain when the Dysport is in effect. She reports that her pain is typically a 3-4/10 as compared to her usual baseline of 10/10.      Dystonia Improvement: Yes, this round was quite effective as is pertains to controlling the dystonic movements of her neck and shoulder muscles. Duration of benefit: 10 weeks, followed by a gradual reduction in benefit.       PHYSICAL EXAM  VS: BP (!)  130/90 (BP Location: Right arm, Patient Position: Sitting)   Pulse 83    GEN: Pleasant and cooperative, in no acute distress  HEENT: No facial asymmetry  NECK AND TRUNK PATTERN:   Head Tremor: Pt states tremor is intermittent   Left Side-bending: Present but patient states she feels she has more right side bending now  Left Shoulder Elevation: slightly elevated  Head with right shift  Focalized pain at occipitalis - has pain today during visit    ALLERGIES  Allergies   Allergen Reactions    Contrast Dye Anaphylaxis     Other reaction(s): almost  from it    Iodinated Contrast Media Anaphylaxis     IVP DYE    Lisinopril Anaphylaxis    Lisinopril-Hydrochlorothiazide Anaphylaxis    Maple Flavor Anaphylaxis     MAPLE SYRUP  MAPLE SYRUP  MAPLE SYRUP    Maple Tree Anaphylaxis     Allergy includes maple syrup.    No Clinical Screening - See Comments      maple syrup==anaphylaxis  Dairy- causes to not feel well    Gluten Itching and Swelling    Gluten Meal Itching and Swelling     Cramping    [place] Meal Unknown    Chamomile Unknown    Codeine Sulfate Cramps and GI Disturbance    Food Unknown     Grapes, almond, lactose, gluten, real maple syrup    Nsaids Other (See Comments) and Unknown     Patient had bariatric surgery. Should not ever take NSAIDS due to high risk for gastric ulcers.   Patient had bariatric surgery. Should not ever take NSAIDS due to high risk for gastric ulcers.     Polyethylene Glycol Unknown     Per allergy testing  Per allergy testing      Erythromycin Nausea and Vomiting, Cramps, Diarrhea and Nausea     PN: LW Reaction: stomach upset  cramping    Lactose Diarrhea    Morphine Other (See Comments)       CURRENT MEDICATIONS    Current Outpatient Medications:     acetylcysteine 10 % (MUCOMYST) 10% SOLN, Place 2 drops into both eyes 6 times daily, Disp: 30 mL, Rfl: 11    BOTOX 100 units injection, Inject 250 Units into the muscle once Lot # /C3 with Expiration Date:  2020, Disp: , Rfl:      buPROPion (WELLBUTRIN XL) 300 MG 24 hr tablet, Take 300 mg by mouth every morning, Disp: , Rfl:     clonazePAM (KLONOPIN) 1 MG tablet, Take 1 mg by mouth 2 times daily as needed, Disp: , Rfl:     COMPOUNDED NON-CONTROLLED SUBSTANCE (CMPD RX) - PHARMACY TO MIX COMPOUNDED MEDICATION, Lactosamide 1% drop, instill 1-2 drops 6x daily into both eyes, Disp: 10 mL, Rfl: 3    cyanocobalamin (VITAMIN B-12) 1000 MCG tablet, Take 1,000 mcg by mouth daily, Disp: , Rfl:     doxycycline monohydrate (ADOXA) 50 MG tablet, Take 1 tablet (50 mg) by mouth 2 times daily, Disp: 180 tablet, Rfl: 3    DULoxetine (CYMBALTA) 60 MG capsule, TAKE 2 CAPSULES BY MOUTH EVERY DAY, Disp: , Rfl:     EPINEPHrine (ANY BX GENERIC EQUIV) 0.3 MG/0.3ML injection 2-pack, , Disp: , Rfl:     ibuprofen (ADVIL/MOTRIN) 200 MG capsule, Take 200 mg by mouth every 4 hours as needed., Disp: , Rfl:     iloperidone (FANAPT) 6 MG TABS tablet, Take 4 mg by mouth daily , Disp: , Rfl:     lifitegrast (XIIDRA) 5 % opthalmic solution, Place 1 drop into both eyes 2 times daily, Disp: 60 each, Rfl: 11    medical cannabis (Patient's own supply), See Admin Instructions (The purpose of this order is to document that the patient reports taking medical cannabis.  This is not a prescription, and is not used to certify that the patient has a qualifying medical condition.), Disp: , Rfl:     multivitamin w/minerals (THERA-VIT-M) tablet, Take 1 tablet by mouth daily, Disp: , Rfl:     ONABOTULINUMTOXINA IJ, Inject 250 Units as directed once LOT # /C3 EXPIRATION: 03/2020, Disp: , Rfl:     prednisolone 0.25% and hyaluronate in balanced salt SUSP compounded ophthalmic suspension, Place 1 Drop into both eyes 4 times daily. Discard bottle 15 days after opening. Refrigerate. Unopened bottle expires: Strength: 0.25 %, Disp: 6.67 mL, Rfl: 11    sodium chloride 0.9 % neb solution, MEDICALLY NECESSARY FOR SCLERAL CONTACT LENS USE. MAY USE 3 ML 6 TIMES A DAY FOR CONTACT LENS USE, Disp: 300  mL, Rfl: 11    Soft Lens Products (B&L SENSITIVE EYES) SOLN, Apply 1 drop to eye, Disp: , Rfl:     spironolactone (ALDACTONE) 50 MG tablet, Take 1 tablet by mouth daily, Disp: , Rfl:     tretinoin (RETIN-A) 0.025 % external cream, Apply a pea size to entire face QD, Disp: 45 g, Rfl: 11    tretinoin (RETIN-A) 0.05 % external cream, Spread a pea size amount into affected area topically at bedtime.  Use sunscreen SPF>20., Disp: 45 g, Rfl: 11    TYRVAYA 0.03 MG/ACT nasal spray, Spray 1 spray into both nostrils 2 times daily, Disp: 4.2 mL, Rfl: 11    Vitamin D3 (CHOLECALCIFEROL) 25 mcg (1000 units) tablet, Take by mouth daily, Disp: , Rfl:     White Petrolatum-Mineral Oil (SYSTANE NIGHTTIME) OINT, Apply 3.5 g to eye 4 times daily as needed (dry eyes), Disp: 7 g, Rfl: 11    Current Facility-Administered Medications:     botulinum toxin type A (BOTOX) 100 units injection 300 Units, 300 Units, Intramuscular, Q90 Days, America Gonzalez MD    botulinum toxin type A (DYSPORT) injection 500 Units, 500 Units, Intramuscular, Q90 Days, America Gonzalez MD, 500 Units at 22 1028    botulinum toxin type A (DYSPORT) injection 800 Units, 800 Units, Intramuscular, Q90 Days, America Gonzalez MD, 500 Units at 23 1547       BOTULINUM NEUROTOXIN INJECTION PROCEDURES    VERIFICATION OF PATIENT IDENTIFICATION AND PROCEDURE     Initials   Patient Name SES   Patient  SES   Procedure Verified by: KIM     Prior to the start of the procedure and with procedural staff participation, I verbally confirmed the patient s identity using two indicators, relevant allergies, that the procedure was appropriate and matched the consent or emergent situation, and that the correct equipment/implants were available. Immediately prior to starting the procedure I conducted the Time Out with the procedural staff and re-confirmed the patient s name, procedure, and site/side. (The Joint [facility] universal protocol was  followed.)  Yes    Sedation (Moderate or Deep): None    ABOVE ASSESSMENTS PERFORMED BY    America Gonzalez MD      INDICATIONS FOR PROCEDURES  Roxanna Celis is a 52 year old patient with involuntary muscle spasms and pain secondary to the diagnosis of cervical dystonia. Her baseline symptoms have been recalcitrant to oral medications and conservative therapy.  She is here today for reinjection with Dysport.    GOAL OF PROCEDURE  The goal of this procedure is to increase active range of motion, improve volitional motor control, decrease pain  and enhance functional independence.      TOTAL DOSE ADMINISTERED  Dose Administered:  200 units  Botox (Botulinum Toxin Type A)       2:1 Dilution   Unavoidable Drug Waste: No.  Diluent Used:  Preservative Free Normal Saline  Total Volume of Diluent Used:  4 ml  NDC #: Botox 100u ([address]-80)      CONSENT  The risks, benefits, and treatment options were discussed with Roxanna Celis and she agreed to proceed.    Written consent was obtained by [facility].     EQUIPMENT USED  Needle-37mm stimulating/recording  Needle-30 gauge  EMG/NCS Machine    SKIN PREPARATION  Skin preparation was performed using an alcohol wipe.    GUIDANCE DESCRIPTION  Electro-myographic guidance was necessary throughout the procedure to accurately identify all areas of dystonic muscles while avoiding injection of non-dystonic muscles, neighboring nerves and nearby vascular structures.       AREA/MUSCLE INJECTED: 200 UNITS BOTOX = TOTAL DOSE, 2:1 DILUTION  1. NECK & SHOULDER GIRDLE MUSCLES: 100 UNITS BOTOX = TOTAL DOSE   Right Rectus Capitis - (upper cervical) - 5 units of Botox at 1 site/s.   Left Rectus Capitis - (upper cervical) - 5 units of Botox at 1 site/s.     Right Splenius Cervicis - 5 units of Botox at 1 site/s.   Left Splenius Cervicis - 5 units of Botox at 1 site/s.    Right Levator Scapulae - 10 units of Botox at 2 site/s.   Left Levator Scapulae - 10 units of Botox at 2 site/s.    Right  Sternocleidomastoid - 10 units of Botox at 1 site/s.  Left Sternocleidomastoid - 10 units of Botox at 1 site/s.    Right Cervical Paraspinals - 10 units of Botox at 4 site/s.  Left Cervical Paraspinals - 10 units of Botox at 4 site/s.    Right Thoracic Paraspinals - 10 units of Botox at 4 site/s.  Left Thoracic Paraspinals - 10 units of Botox at 4 site/s.    2. SCALP MUSCLES: 100 UNITS BOTOX = TOTAL DOSE  Right Occipitalis - 50 units of Botox at 10 site/s.   Left Occipitails - 50 units of Botox at 10 site/s.      RESPONSE TO PROCEDURE  Roxanna Celis tolerated the procedure well and there were no immediate complications. She was allowed to recover for an appropriate period of time and was discharged home in stable condition.    ASSESSMENT AND PLAN   Botulinum toxin injections: Switched back to Botox today from Dysport, as she believes she had a better response to Botox in the past. Caution was again used in posterior neck muscles as to prevent excessive weakness, which was experienced with previous injections. Patient will continue to monitor response and report at next appointment.   Referrals: None, however recommendations made today regarding possible chiropractor care vs functional medicine to see if her pain can be better controlled.   Follow up: Roxanna Celis was rescheduled for the next series of injections in 12 weeks, at which time we will evaluate response to today's injections. she may call the clinic prior with any questions or concerns prior to the next appointment.

## 2023-07-27 NOTE — LETTER
7/27/2023         RE: Roxanna Celis  [address] [place] [address]        Dear Colleague,    Thank you for referring your patient, Roxanna Celis, to the [facility]. Please see a copy of my visit note below.      [facility]     PM&R CLINIC NOTE  BOTULINUM TOXIN PROCEDURE      HPI  Chief Complaint   Patient presents with     Procedure     Botox       Roxanna Celis is a 52 year old female with a history of cervical dystonia who presents to clinic for botulinum toxin injections for management of involuntary muscle spasms and neck pain secondary to her cervical dystonia.     SINCE LAST VISIT  Roxanna Celis was last seen here in clinic on 5/4/2023, at which time she received 500 units of Dysport. Plan is to switch to Botox today, which she has had in the past. Rationale for switching back to Botox is due to the fact that Dysport is exhibiting suboptimal duration of benefit.     Since her last visit, she was started on medical cannabis for intractable pain. She also sees her chiropractor on a regular basis to help with neck, upper back and scalp pain. She had her hair cut very short recently due to the fact that her arms/shoulders are too painful to wash her hair anymore. Her  usually washes her hair for her. She had an MRI of her neck and brain yesterday, and these were essentially normal.     Otherwise, patient denies new medical diagnoses, illnesses, hospitalizations, emergency room visits, and injuries since the previous injection with botulinum neurotoxin.     RESPONSE TO PREVIOUS TREATMENT    Side effects: None.     Pain Improvement: Yes, it typically takes about 20 days for the Dysport to start giving her some pain reduction, and then the effects are gradual. She notes at least a 60% reduction in pain when the Dysport is in effect. She reports that her pain is typically a 3-4/10 as compared to her usual baseline of 10/10.       Dystonia Improvement: Yes, this round was quite effective as is pertains to controlling the dystonic movements of her neck and shoulder muscles. Duration of benefit: 10 weeks, followed by a gradual reduction in benefit.       PHYSICAL EXAM  VS: BP (!) 130/90 (BP Location: Right arm, Patient Position: Sitting)   Pulse 83    GEN: Pleasant and cooperative, in no acute distress  HEENT: No facial asymmetry  NECK AND TRUNK PATTERN:   Head Tremor: Pt states tremor is intermittent   Left Side-bending: Present but patient states she feels she has more right side bending now  Left Shoulder Elevation: slightly elevated  Head with right shift  Focalized pain at occipitalis - has pain today during visit    ALLERGIES  Allergies   Allergen Reactions     Contrast Dye Anaphylaxis     Other reaction(s): almost  from it     Iodinated Contrast Media Anaphylaxis     IVP DYE     Lisinopril Anaphylaxis     Lisinopril-Hydrochlorothiazide Anaphylaxis     Maple Flavor Anaphylaxis     MAPLE SYRUP  MAPLE SYRUP  MAPLE SYRUP     Maple Tree Anaphylaxis     Allergy includes maple syrup.     No Clinical Screening - See Comments      maple syrup==anaphylaxis  Dairy- causes to not feel well     Gluten Itching and Swelling     Gluten Meal Itching and Swelling     Cramping     [place] Meal Unknown     Chamomile Unknown     Codeine Sulfate Cramps and GI Disturbance     Food Unknown     Grapes, almond, lactose, gluten, real maple syrup     Nsaids Other (See Comments) and Unknown     Patient had bariatric surgery. Should not ever take NSAIDS due to high risk for gastric ulcers.   Patient had bariatric surgery. Should not ever take NSAIDS due to high risk for gastric ulcers.      Polyethylene Glycol Unknown     Per allergy testing  Per allergy testing       Erythromycin Nausea and Vomiting, Cramps, Diarrhea and Nausea     PN: LW Reaction: stomach upset  cramping     Lactose Diarrhea     Morphine Other (See Comments)       CURRENT  MEDICATIONS    Current Outpatient Medications:      acetylcysteine 10 % (MUCOMYST) 10% SOLN, Place 2 drops into both eyes 6 times daily, Disp: 30 mL, Rfl: 11     BOTOX 100 units injection, Inject 250 Units into the muscle once Lot # /C3 with Expiration Date:  11/2020, Disp: , Rfl:      buPROPion (WELLBUTRIN XL) 300 MG 24 hr tablet, Take 300 mg by mouth every morning, Disp: , Rfl:      clonazePAM (KLONOPIN) 1 MG tablet, Take 1 mg by mouth 2 times daily as needed, Disp: , Rfl:      COMPOUNDED NON-CONTROLLED SUBSTANCE (CMPD RX) - PHARMACY TO MIX COMPOUNDED MEDICATION, Lactosamide 1% drop, instill 1-2 drops 6x daily into both eyes, Disp: 10 mL, Rfl: 3     cyanocobalamin (VITAMIN B-12) 1000 MCG tablet, Take 1,000 mcg by mouth daily, Disp: , Rfl:      doxycycline monohydrate (ADOXA) 50 MG tablet, Take 1 tablet (50 mg) by mouth 2 times daily, Disp: 180 tablet, Rfl: 3     DULoxetine (CYMBALTA) 60 MG capsule, TAKE 2 CAPSULES BY MOUTH EVERY DAY, Disp: , Rfl:      EPINEPHrine (ANY BX GENERIC EQUIV) 0.3 MG/0.3ML injection 2-pack, , Disp: , Rfl:      ibuprofen (ADVIL/MOTRIN) 200 MG capsule, Take 200 mg by mouth every 4 hours as needed., Disp: , Rfl:      iloperidone (FANAPT) 6 MG TABS tablet, Take 4 mg by mouth daily , Disp: , Rfl:      lifitegrast (XIIDRA) 5 % opthalmic solution, Place 1 drop into both eyes 2 times daily, Disp: 60 each, Rfl: 11     medical cannabis (Patient's own supply), See Admin Instructions (The purpose of this order is to document that the patient reports taking medical cannabis.  This is not a prescription, and is not used to certify that the patient has a qualifying medical condition.), Disp: , Rfl:      multivitamin w/minerals (THERA-VIT-M) tablet, Take 1 tablet by mouth daily, Disp: , Rfl:      ONABOTULINUMTOXINA IJ, Inject 250 Units as directed once LOT # /C3 EXPIRATION: 03/2020, Disp: , Rfl:      prednisolone 0.25% and hyaluronate in balanced salt SUSP compounded ophthalmic suspension,  Place 1 Drop into both eyes 4 times daily. Discard bottle 15 days after opening. Refrigerate. Unopened bottle expires: Strength: 0.25 %, Disp: 6.67 mL, Rfl: 11     sodium chloride 0.9 % neb solution, MEDICALLY NECESSARY FOR SCLERAL CONTACT LENS USE. MAY USE 3 ML 6 TIMES A DAY FOR CONTACT LENS USE, Disp: 300 mL, Rfl: 11     Soft Lens Products (B&L SENSITIVE EYES) SOLN, Apply 1 drop to eye, Disp: , Rfl:      spironolactone (ALDACTONE) 50 MG tablet, Take 1 tablet by mouth daily, Disp: , Rfl:      tretinoin (RETIN-A) 0.025 % external cream, Apply a pea size to entire face QD, Disp: 45 g, Rfl: 11     tretinoin (RETIN-A) 0.05 % external cream, Spread a pea size amount into affected area topically at bedtime.  Use sunscreen SPF>20., Disp: 45 g, Rfl: 11     TYRVAYA 0.03 MG/ACT nasal spray, Spray 1 spray into both nostrils 2 times daily, Disp: 4.2 mL, Rfl: 11     Vitamin D3 (CHOLECALCIFEROL) 25 mcg (1000 units) tablet, Take by mouth daily, Disp: , Rfl:      White Petrolatum-Mineral Oil (SYSTANE NIGHTTIME) OINT, Apply 3.5 g to eye 4 times daily as needed (dry eyes), Disp: 7 g, Rfl: 11    Current Facility-Administered Medications:      botulinum toxin type A (BOTOX) 100 units injection 300 Units, 300 Units, Intramuscular, Q90 Days, America Gonzalez MD     botulinum toxin type A (DYSPORT) injection 500 Units, 500 Units, Intramuscular, Q90 Days, America Gonzalez MD, 500 Units at 22 1028     botulinum toxin type A (DYSPORT) injection 800 Units, 800 Units, Intramuscular, Q90 Days, America Gonzalez MD, 500 Units at 23 1547       BOTULINUM NEUROTOXIN INJECTION PROCEDURES    VERIFICATION OF PATIENT IDENTIFICATION AND PROCEDURE     Initials   Patient Name SES   Patient  SES   Procedure Verified by: KIM     Prior to the start of the procedure and with procedural staff participation, I verbally confirmed the patient s identity using two indicators, relevant allergies, that the procedure was  appropriate and matched the consent or emergent situation, and that the correct equipment/implants were available. Immediately prior to starting the procedure I conducted the Time Out with the procedural staff and re-confirmed the patient s name, procedure, and site/side. (The Joint Commission universal protocol was followed.)  Yes    Sedation (Moderate or Deep): None    ABOVE ASSESSMENTS PERFORMED BY    America Gonzalez MD      INDICATIONS FOR PROCEDURES  Roxanna Celis is a 52 year old patient with involuntary muscle spasms and pain secondary to the diagnosis of cervical dystonia. Her baseline symptoms have been recalcitrant to oral medications and conservative therapy.  She is here today for reinjection with Dysport.    GOAL OF PROCEDURE  The goal of this procedure is to increase active range of motion, improve volitional motor control, decrease pain  and enhance functional independence.      TOTAL DOSE ADMINISTERED  Dose Administered:  200 units  Botox (Botulinum Toxin Type A)       2:1 Dilution   Unavoidable Drug Waste: No.  Diluent Used:  Preservative Free Normal Saline  Total Volume of Diluent Used:  4 ml  NDC #: Botox 100u ([address]-61)      CONSENT  The risks, benefits, and treatment options were discussed with Roxanna Celis and she agreed to proceed.    Written consent was obtained by [facility].     EQUIPMENT USED  Needle-37mm stimulating/recording  Needle-30 gauge  EMG/NCS Machine    SKIN PREPARATION  Skin preparation was performed using an alcohol wipe.    GUIDANCE DESCRIPTION  Electro-myographic guidance was necessary throughout the procedure to accurately identify all areas of dystonic muscles while avoiding injection of non-dystonic muscles, neighboring nerves and nearby vascular structures.       AREA/MUSCLE INJECTED: 200 UNITS BOTOX = TOTAL DOSE, 2:1 DILUTION  1. NECK & SHOULDER GIRDLE MUSCLES: 100 UNITS BOTOX = TOTAL DOSE   Right Rectus Capitis - (upper cervical) - 5 units of Botox at 1 site/s.   Left  Rectus Capitis - (upper cervical) - 5 units of Botox at 1 site/s.     Right Splenius Cervicis - 5 units of Botox at 1 site/s.   Left Splenius Cervicis - 5 units of Botox at 1 site/s.    Right Levator Scapulae - 10 units of Botox at 2 site/s.   Left Levator Scapulae - 10 units of Botox at 2 site/s.    Right Sternocleidomastoid - 10 units of Botox at 1 site/s.  Left Sternocleidomastoid - 10 units of Botox at 1 site/s.    Right Cervical Paraspinals - 10 units of Botox at 4 site/s.  Left Cervical Paraspinals - 10 units of Botox at 4 site/s.    Right Thoracic Paraspinals - 10 units of Botox at 4 site/s.  Left Thoracic Paraspinals - 10 units of Botox at 4 site/s.    2. SCALP MUSCLES: 100 UNITS BOTOX = TOTAL DOSE  Right Occipitalis - 50 units of Botox at 10 site/s.   Left Occipitails - 50 units of Botox at 10 site/s.      RESPONSE TO PROCEDURE  Roxanna Celis tolerated the procedure well and there were no immediate complications. She was allowed to recover for an appropriate period of time and was discharged home in stable condition.    ASSESSMENT AND PLAN   Botulinum toxin injections: Switched back to Botox today from Dysport, as she believes she had a better response to Botox in the past. Caution was again used in posterior neck muscles as to prevent excessive weakness, which was experienced with previous injections. Patient will continue to monitor response and report at next appointment.   Referrals: None, however recommendations made today regarding possible chiropractor care vs functional medicine to see if her pain can be better controlled.   Follow up: Roxanna Celis was rescheduled for the next series of injections in 12 weeks, at which time we will evaluate response to today's injections. she may call the clinic prior with any questions or concerns prior to the next appointment.       Again, thank you for allowing me to participate in the care of your patient.        Sincerely,        America Gonzalez MD

## 2023-07-27 NOTE — NURSING NOTE
Chief Complaint   Patient presents with    Procedure     Kathrynox       Leandra Bautista MA on 7/27/2023 at 3:04 PM

## 2023-08-29 ENCOUNTER — DOCUMENTATION ONLY (OUTPATIENT)
Dept: NEUROLOGY | Facility: CLINIC | Age: 53
End: 2023-08-29
Payer: COMMERCIAL

## 2023-08-29 ENCOUNTER — TELEPHONE (OUTPATIENT)
Dept: PALLIATIVE MEDICINE | Facility: CLINIC | Age: 53
End: 2023-08-29

## 2023-08-29 ENCOUNTER — MYC MEDICAL ADVICE (OUTPATIENT)
Dept: NEUROLOGY | Facility: CLINIC | Age: 53
End: 2023-08-29
Payer: COMMERCIAL

## 2023-08-29 DIAGNOSIS — M62.81 GENERALIZED MUSCLE WEAKNESS: Primary | ICD-10-CM

## 2023-08-29 NOTE — TELEPHONE ENCOUNTER
"LVM to cancel appointment with Taisha.     Per Taisha, \"Patient is scheduled with me next Tuesday for an appointment. She was told when re-referred to [facility] in July that she has nothing else to offer her for pain management. She is NOT an active participant in our pain program, and [facility] does not recommend any further appointments with our pain program.\"    please call Roxanna and explain to her that since the re-referral to our program was declined due to lack of further treatment options, she is NOT able to schedule further with me and her appointment on Tuesday 9/5 must be cancelled since she is not actively working with our pain program.         Marcia Thomas  Complex   [facility]         "

## 2023-08-29 NOTE — PROGRESS NOTES
Faxed Form August 29, 2023 to fax number [phone], [facility]    Right Fax confirmed at 7:30 AM    Patito Beavers

## 2023-08-30 NOTE — TELEPHONE ENCOUNTER
Definitely willing . Not sure how to.  Maybe it would be an out of network PT order?  When  you have time if you wouldn't mind figuring out what I would need to order I would sign it.  Thanks

## 2023-08-30 NOTE — TELEPHONE ENCOUNTER
Pended PT pool therapy order below (external). Please review and sign.     Once signed please route to MA team so that orders can be faxed to [facility] at fax #[phone].     Thank you  Dipesh Bermudez RN, BSN  [facility]

## 2023-09-05 ENCOUNTER — MYC MEDICAL ADVICE (OUTPATIENT)
Dept: NEUROLOGY | Facility: CLINIC | Age: 53
End: 2023-09-05

## 2023-09-05 NOTE — PROGRESS NOTES
2nd Attempt    Faxed Form September 5, 2023 to fax number Fax: [phone] TC ATTN: Ansley Ramirez Fax confirmed at 2:33 PM    Patito Beavers

## 2023-09-05 NOTE — TELEPHONE ENCOUNTER
[facility] Call Center    Phone Message    May a detailed message be left on voicemail: yes     Reason for Call: Other: Ansley from [facility] is calling to request that the PT for pool therapy be faxed over again. Fax: [phone].Still has not been received.      Action Taken: Message routed to:  [facility]    Travel Screening: Not Applicable

## 2023-09-06 NOTE — TELEPHONE ENCOUNTER
[facility] Call Center    Phone Message    May a detailed message be left on voicemail: yes     Reason for Call: Other: Ansley is calling from [facility] and received a fax but its not for the correct order. Needs to be PT pool therapy.     Action Taken: Message routed to:  Other: [facility]    Travel Screening: Not Applicable

## 2023-09-07 NOTE — TELEPHONE ENCOUNTER
Faxed  Referral  September 7, 2023 to fax number [phone], ATTN: Ansley @[facility]    Right Fax confirmed at 11:03 AM    Patito Beavers

## 2023-09-27 ENCOUNTER — TRANSFERRED RECORDS (OUTPATIENT)
Dept: HEALTH INFORMATION MANAGEMENT | Facility: CLINIC | Age: 53
End: 2023-09-27
Payer: COMMERCIAL

## 2023-10-02 ENCOUNTER — TELEPHONE (OUTPATIENT)
Dept: NEUROLOGY | Facility: CLINIC | Age: 53
End: 2023-10-02
Payer: COMMERCIAL

## 2023-10-02 NOTE — TELEPHONE ENCOUNTER
Writer faxed Pool therapy orders to [facility] at patient's request. See TE below:    Roxanna Celis [facility] [place]  Phone Number: [phone]     Hi Dr. Torres,  Could you please do a new referral for pool therapy to a different location for me?  Where I was planning on going isn t working out.  I need to go to a different location to receive proper care, regarding my medical condition.    Please send referral to:    [facility]  [place], [place]    Fax:  [phone]    Also, can you please have your staff call me when the referral has been sent?  I would like to contact them as soon as possible to set up pool appointments.    Thank you again for all of your help,  Roxanna Celis  [phone]    Verified delivery by right fax.  Patient called and told orders have been faxed.  CATALINA Anderson., CHRISTOPHER ([facility])

## 2023-10-24 ENCOUNTER — OFFICE VISIT (OUTPATIENT)
Dept: PHYSICAL MEDICINE AND REHAB | Facility: CLINIC | Age: 53
End: 2023-10-24
Payer: COMMERCIAL

## 2023-10-24 VITALS — SYSTOLIC BLOOD PRESSURE: 130 MMHG | HEART RATE: 74 BPM | DIASTOLIC BLOOD PRESSURE: 90 MMHG

## 2023-10-24 DIAGNOSIS — G24.3 CERVICAL DYSTONIA: Primary | ICD-10-CM

## 2023-10-24 DIAGNOSIS — M54.81 BILATERAL OCCIPITAL NEURALGIA: ICD-10-CM

## 2023-10-24 PROCEDURE — 64616 CHEMODENERV MUSC NECK DYSTON: CPT | Mod: LT | Performed by: PHYSICAL MEDICINE & REHABILITATION

## 2023-10-24 PROCEDURE — 64616 CHEMODENERV MUSC NECK DYSTON: CPT | Mod: RT | Performed by: PHYSICAL MEDICINE & REHABILITATION

## 2023-10-24 PROCEDURE — 64405 NJX AA&/STRD GR OCPL NRV: CPT | Mod: XS | Performed by: PHYSICAL MEDICINE & REHABILITATION

## 2023-10-24 PROCEDURE — 95874 GUIDE NERV DESTR NEEDLE EMG: CPT | Performed by: PHYSICAL MEDICINE & REHABILITATION

## 2023-10-24 NOTE — LETTER
10/24/2023         RE: Roxanna Celis  [id number] [place] [address]        Dear Colleague,    Thank you for referring your patient, Roxanna Celis, to the [facility]. Please see a copy of my visit note below.      [facility]     PM&R CLINIC NOTE  BOTULINUM TOXIN PROCEDURE      HPI  Chief Complaint   Patient presents with     Procedure     Botox     Roxanna Celis is a 52 year old female with a history of cervical dystonia who presents to clinic for botulinum toxin injections for management of involuntary muscle spasms and neck pain secondary to her cervical dystonia.     SINCE LAST VISIT  Roxanna Celis was last seen here in clinic on 7/27/2023, at which time she received 250 units of Botox. She was switched from Dysport to Botox injections at her last appointment due to suboptimal response to the Dysport injections.     The patient denies new medical diagnoses, illnesses, hospitalizations, emergency room visits, and injuries since the previous injection with botulinum neurotoxin.     RESPONSE TO PREVIOUS TREATMENT    Side effects: None.     Pain Improvement: Yes, there was noticeable improvement of her pain, which started a few days after the Botox was administered and lasted 5 weeks. There was then an abrupt decrease in efficacy at the 5 week point, and since then, her pain has been a 10/10 and simply intolerable.       Dystonia Improvement: Yes, this round was quite effective as is pertains to controlling the dystonic movements of her neck and shoulder muscles. Duration of benefit: 5 weeks, followed by a rapid reduction in benefit.     Functional Improvement: When the Botox is working, she is able to socialize and liver her life normally. When the Botox wore off, she was in such terrible pain that she was stuck in bed for prolonged periods of time, and missed out on essentially every area of life.       PHYSICAL EXAM  VS: BP (!) 130/90  (BP Location: Right arm, Patient Position: Sitting)   Pulse 74    GEN: Pleasant and cooperative, in no acute distress  HEENT: No facial asymmetry  NECK AND TRUNK PATTERN:   Head Tremor: Pt states tremor is intermittent   Left Side-bending: Present but patient states she feels she has more right side bending now  Left Shoulder Elevation: slightly elevated  Head with right shift  Focalized pain at occipitalis - has pain today during visit    ALLERGIES  Allergies   Allergen Reactions     Contrast Dye Anaphylaxis     Other reaction(s): almost  from it     Iodinated Contrast Media Anaphylaxis     IVP DYE     Lisinopril Anaphylaxis     Lisinopril-Hydrochlorothiazide Anaphylaxis     Maple Flavor Anaphylaxis     MAPLE SYRUP  MAPLE SYRUP  MAPLE SYRUP     Maple Tree Anaphylaxis     Allergy includes maple syrup.     No Clinical Screening - See Comments      maple syrup==anaphylaxis  Dairy- causes to not feel well     Gluten Itching and Swelling     Gluten Meal Itching and Swelling     Cramping     Oneida Meal Unknown     Chamomile Unknown     Codeine Sulfate Cramps and GI Disturbance     Food Unknown     Grapes, almond, lactose, gluten, real maple syrup     Nsaids Other (See Comments) and Unknown     Patient had bariatric surgery. Should not ever take NSAIDS due to high risk for gastric ulcers.   Patient had bariatric surgery. Should not ever take NSAIDS due to high risk for gastric ulcers.      Polyethylene Glycol Unknown     Per allergy testing  Per allergy testing       Erythromycin Nausea and Vomiting, Cramps, Diarrhea and Nausea     PN: LW Reaction: stomach upset  cramping     Lactose Diarrhea     Morphine Other (See Comments)       CURRENT MEDICATIONS    Current Outpatient Medications:      acetylcysteine 10 % (MUCOMYST) 10% SOLN, Place 2 drops into both eyes 6 times daily, Disp: 30 mL, Rfl: 11     BOTOX 100 units injection, Inject 250 Units into the muscle once Lot # /C3 with Expiration Date:  2020, Disp: ,  Rfl:      buPROPion (WELLBUTRIN XL) 300 MG 24 hr tablet, Take 300 mg by mouth every morning, Disp: , Rfl:      clonazePAM (KLONOPIN) 1 MG tablet, Take 1 mg by mouth 2 times daily as needed, Disp: , Rfl:      COMPOUNDED NON-CONTROLLED SUBSTANCE (CMPD RX) - PHARMACY TO MIX COMPOUNDED MEDICATION, Lactosamide 1% drop, instill 1-2 drops 6x daily into both eyes, Disp: 10 mL, Rfl: 3     cyanocobalamin (VITAMIN B-12) 1000 MCG tablet, Take 1,000 mcg by mouth daily, Disp: , Rfl:      doxycycline monohydrate (ADOXA) 50 MG tablet, Take 1 tablet (50 mg) by mouth 2 times daily, Disp: 180 tablet, Rfl: 3     DULoxetine (CYMBALTA) 60 MG capsule, TAKE 2 CAPSULES BY MOUTH EVERY DAY, Disp: , Rfl:      EPINEPHrine (ANY BX GENERIC EQUIV) 0.3 MG/0.3ML injection 2-pack, , Disp: , Rfl:      erythromycin (ROMYCIN) 5 MG/GM ophthalmic ointment, Place 0.5 inches into both eyes 2 times daily, Disp: 3.5 g, Rfl: 4     ibuprofen (ADVIL/MOTRIN) 200 MG capsule, Take 200 mg by mouth every 4 hours as needed., Disp: , Rfl:      iloperidone (FANAPT) 6 MG TABS tablet, Take 4 mg by mouth daily , Disp: , Rfl:      lifitegrast (XIIDRA) 5 % opthalmic solution, Place 1 drop into both eyes 2 times daily, Disp: 60 each, Rfl: 11     medical cannabis (Patient's own supply), See Admin Instructions (The purpose of this order is to document that the patient reports taking medical cannabis.  This is not a prescription, and is not used to certify that the patient has a qualifying medical condition.), Disp: , Rfl:      multivitamin w/minerals (THERA-VIT-M) tablet, Take 1 tablet by mouth daily, Disp: , Rfl:      ONABOTULINUMTOXINA IJ, Inject 250 Units as directed once LOT # /C3 EXPIRATION: 03/2020, Disp: , Rfl:      prednisolone 0.25% and hyaluronate in balanced salt SUSP compounded ophthalmic suspension, Place 1 Drop into both eyes 4 times daily. Discard bottle 15 days after opening. Refrigerate. Unopened bottle expires: Strength: 0.25 %, Disp: 6.67 mL, Rfl: 11      sodium chloride 0.9 % neb solution, MEDICALLY NECESSARY FOR SCLERAL CONTACT LENS USE. MAY USE 3 ML 6 TIMES A DAY FOR CONTACT LENS USE, Disp: 300 mL, Rfl: 11     Soft Lens Products (B&L SENSITIVE EYES) SOLN, Apply 1 drop to eye, Disp: , Rfl:      spironolactone (ALDACTONE) 50 MG tablet, Take 1 tablet by mouth daily, Disp: , Rfl:      tretinoin (RETIN-A) 0.025 % external cream, Apply a pea size to entire face QD, Disp: 45 g, Rfl: 11     tretinoin (RETIN-A) 0.05 % external cream, Spread a pea size amount into affected area topically at bedtime.  Use sunscreen SPF>20., Disp: 45 g, Rfl: 11     TYRVAYA 0.03 MG/ACT nasal spray, Spray 1 spray into both nostrils 2 times daily, Disp: 4.2 mL, Rfl: 11     Vitamin D3 (CHOLECALCIFEROL) 25 mcg (1000 units) tablet, Take by mouth daily, Disp: , Rfl:      White Petrolatum-Mineral Oil (SYSTANE NIGHTTIME) OINT, Apply 3.5 g to eye 4 times daily as needed (dry eyes), Disp: 7 g, Rfl: 11    Current Facility-Administered Medications:      botulinum toxin type A (BOTOX) 100 units injection 300 Units, 300 Units, Intramuscular, Q90 Days, America Gonzalez MD, 200 Units at 23 0026     botulinum toxin type A (DYSPORT) injection 500 Units, 500 Units, Intramuscular, Q90 Days, America Gonzalez MD, 500 Units at 22 1028     botulinum toxin type A (DYSPORT) injection 800 Units, 800 Units, Intramuscular, Q90 Days, America Gonzalez MD, 500 Units at 23 1547       BOTULINUM NEUROTOXIN INJECTION PROCEDURES    VERIFICATION OF PATIENT IDENTIFICATION AND PROCEDURE     Initials   Patient Name SES   Patient  SES   Procedure Verified by: KIM     Prior to the start of the procedure and with procedural staff participation, I verbally confirmed the patient s identity using two indicators, relevant allergies, that the procedure was appropriate and matched the consent or emergent situation, and that the correct equipment/implants were available. Immediately prior to  starting the procedure I conducted the Time Out with the procedural staff and re-confirmed the patient s name, procedure, and site/side. (The Joint Commission universal protocol was followed.)  Yes    Sedation (Moderate or Deep): None    ABOVE ASSESSMENTS PERFORMED BY    America Gonzalez MD      INDICATIONS FOR PROCEDURES  Roxanna Celis is a 52 year old patient with involuntary muscle spasms and pain secondary to the diagnosis of cervical dystonia. Her baseline symptoms have been recalcitrant to oral medications and conservative therapy.  She is here today for reinjection with Dysport.    GOAL OF PROCEDURE  The goal of this procedure is to increase active range of motion, improve volitional motor control, decrease pain  and enhance functional independence.      TOTAL DOSE ADMINISTERED  Dose Administered:  300 units  Botox (Botulinum Toxin Type A)       2:1 Dilution   Unavoidable Drug Waste: No.  Diluent Used:  Preservative Free Normal Saline  Total Volume of Diluent Used:  6 ml  NDC #: Botox 100u ([id number])      CONSENT  The risks, benefits, and treatment options were discussed with Roxanna Celis and she agreed to proceed.    Written consent was obtained by [facility].     EQUIPMENT USED  Needle-37mm stimulating/recording  Needle-30 gauge  EMG/NCS Machine    SKIN PREPARATION  Skin preparation was performed using an alcohol wipe.    GUIDANCE DESCRIPTION  Electro-myographic guidance was necessary throughout the procedure to accurately identify all areas of dystonic muscles while avoiding injection of non-dystonic muscles, neighboring nerves and nearby vascular structures.       AREA/MUSCLE INJECTED: 300 UNITS BOTOX = TOTAL DOSE, 2:1 DILUTION  1. NECK & SHOULDER GIRDLE MUSCLES: 170 UNITS BOTOX = TOTAL DOSE   Right Rectus Capitis - (upper cervical) - 5 units of Botox at 1 site/s.   Left Rectus Capitis - (upper cervical) - 5 units of Botox at 1 site/s.     Right Upper Trapezius - 25 units of Botox at 7 site/s.   Left  Upper Trapezius - 25 units of Botox at 7 site/s.     Right Splenius Cervicis - 5 units of Botox at 1 site/s.   Left Splenius Cervicis - 5 units of Botox at 1 site/s.    Right Levator Scapulae - 20 units of Botox at 2 site/s.   Left Levator Scapulae - 20 units of Botox at 2 site/s.    Right Sternocleidomastoid - 10 units of Botox at 3 site/s.  Left Sternocleidomastoid - 10 units of Botox at 3 site/s.    Right Cervical Paraspinals - 10 units of Botox at 4 site/s.  Left Cervical Paraspinals - 10 units of Botox at 4 site/s.    Right Thoracic Paraspinals - 10 units of Botox at 4 site/s.  Left Thoracic Paraspinals - 10 units of Botox at 4 site/s.    2. SCALP MUSCLES: 130 UNITS BOTOX = TOTAL DOSE  Right Occipitalis - 65 units of Botox at 15 site/s.   Left Occipitails - 65 units of Botox at 15 site/s.      BILATERAL GREATER OCCIPITAL NERVE BLOCKS    Dru% lidocaine: 2 ml   0.5% bupivacaine: 7 ml   Kenalo mg = 1 ml     Area just inferior to insertion of the right and left superior trapezius insertion onto skull was cleansed with ChloraPrep. Needle was advanced anteriorly to base of skull then slightly withdrawn and injectate was injected in a fan-like distribution at different depths. A 10 ml mixture of 1% lidocaine, 0.5% bupivacaine and Kenalog was divided into two 5 ml syringes. Total injection volume = 5 ml per side.       RESPONSE TO PROCEDURE  Roxanna Celis tolerated the procedure well and there were no immediate complications. She was allowed to recover for an appropriate period of time and was discharged home in stable condition.    ASSESSMENT AND PLAN   Botulinum toxin injections: Dose of Botox was increased from 200 units to 300 units in hopes of increasing effectiveness and prolonging duration of benefit of injections. Additionally, Occipital nerve blocks were administered to address probable occipital neuralgia. She will continue to monitor her response and report at next appointment.   Referrals: None,  however recommendations made today regarding possible chiropractor care vs functional medicine to see if her pain can be better controlled.   Follow up: Roxanna Celis was rescheduled for the next series of injections in 12 weeks, at which time we will evaluate response to today's injections. she may call the clinic prior with any questions or concerns prior to the next appointment.       I spent a total of 15 minutes, face-to-face and managing the care of Roxanna Celis, excluding the procedure. Over 50% of my time was spent counseling the patient and coordinating care. Please see note for details.         Again, thank you for allowing me to participate in the care of your patient.        Sincerely,        America Gonzalez MD

## 2023-10-24 NOTE — PROGRESS NOTES
[facility]     PM&R CLINIC NOTE  BOTULINUM TOXIN PROCEDURE      HPI  Chief Complaint   Patient presents with    Procedure     Botox     Roxanna Celis is a 52 year old female with a history of cervical dystonia who presents to clinic for botulinum toxin injections for management of involuntary muscle spasms and neck pain secondary to her cervical dystonia.     SINCE LAST VISIT  Roxanna Celis was last seen here in clinic on 7/27/2023, at which time she received 250 units of Botox. She was switched from Dysport to Botox injections at her last appointment due to suboptimal response to the Dysport injections.     The patient denies new medical diagnoses, illnesses, hospitalizations, emergency room visits, and injuries since the previous injection with botulinum neurotoxin.     RESPONSE TO PREVIOUS TREATMENT    Side effects: None.     Pain Improvement: Yes, there was noticeable improvement of her pain, which started a few days after the Botox was administered and lasted 5 weeks. There was then an abrupt decrease in efficacy at the 5 week point, and since then, her pain has been a 10/10 and simply intolerable.       Dystonia Improvement: Yes, this round was quite effective as is pertains to controlling the dystonic movements of her neck and shoulder muscles. Duration of benefit: 5 weeks, followed by a rapid reduction in benefit.     Functional Improvement: When the Botox is working, she is able to socialize and liver her life normally. When the Botox wore off, she was in such terrible pain that she was stuck in bed for prolonged periods of time, and missed out on essentially every area of life.       PHYSICAL EXAM  VS: BP (!) 130/90 (BP Location: Right arm, Patient Position: Sitting)   Pulse 74    GEN: Pleasant and cooperative, in no acute distress  HEENT: No facial asymmetry  NECK AND TRUNK PATTERN:   Head Tremor: Pt states tremor is intermittent   Left Side-bending: Present but  patient states she feels she has more right side bending now  Left Shoulder Elevation: slightly elevated  Head with right shift  Focalized pain at occipitalis - has pain today during visit    ALLERGIES  Allergies   Allergen Reactions    Contrast Dye Anaphylaxis     Other reaction(s): almost  from it    Iodinated Contrast Media Anaphylaxis     IVP DYE    Lisinopril Anaphylaxis    Lisinopril-Hydrochlorothiazide Anaphylaxis    Maple Flavor Anaphylaxis     MAPLE SYRUP  MAPLE SYRUP  MAPLE SYRUP    Maple Tree Anaphylaxis     Allergy includes maple syrup.    No Clinical Screening - See Comments      maple syrup==anaphylaxis  Dairy- causes to not feel well    Gluten Itching and Swelling    Gluten Meal Itching and Swelling     Cramping    [place] Meal Unknown    Chamomile Unknown    Codeine Sulfate Cramps and GI Disturbance    Food Unknown     Grapes, almond, lactose, gluten, real maple syrup    Nsaids Other (See Comments) and Unknown     Patient had bariatric surgery. Should not ever take NSAIDS due to high risk for gastric ulcers.   Patient had bariatric surgery. Should not ever take NSAIDS due to high risk for gastric ulcers.     Polyethylene Glycol Unknown     Per allergy testing  Per allergy testing      Erythromycin Nausea and Vomiting, Cramps, Diarrhea and Nausea     PN: LW Reaction: stomach upset  cramping    Lactose Diarrhea    Morphine Other (See Comments)       CURRENT MEDICATIONS    Current Outpatient Medications:     acetylcysteine 10 % (MUCOMYST) 10% SOLN, Place 2 drops into both eyes 6 times daily, Disp: 30 mL, Rfl: 11    BOTOX 100 units injection, Inject 250 Units into the muscle once Lot # /C3 with Expiration Date:  2020, Disp: , Rfl:     buPROPion (WELLBUTRIN XL) 300 MG 24 hr tablet, Take 300 mg by mouth every morning, Disp: , Rfl:     clonazePAM (KLONOPIN) 1 MG tablet, Take 1 mg by mouth 2 times daily as needed, Disp: , Rfl:     COMPOUNDED NON-CONTROLLED SUBSTANCE (CMPD RX) - PHARMACY TO MIX  COMPOUNDED MEDICATION, Lactosamide 1% drop, instill 1-2 drops 6x daily into both eyes, Disp: 10 mL, Rfl: 3    cyanocobalamin (VITAMIN B-12) 1000 MCG tablet, Take 1,000 mcg by mouth daily, Disp: , Rfl:     doxycycline monohydrate (ADOXA) 50 MG tablet, Take 1 tablet (50 mg) by mouth 2 times daily, Disp: 180 tablet, Rfl: 3    DULoxetine (CYMBALTA) 60 MG capsule, TAKE 2 CAPSULES BY MOUTH EVERY DAY, Disp: , Rfl:     EPINEPHrine (ANY BX GENERIC EQUIV) 0.3 MG/0.3ML injection 2-pack, , Disp: , Rfl:     erythromycin (ROMYCIN) 5 MG/GM ophthalmic ointment, Place 0.5 inches into both eyes 2 times daily, Disp: 3.5 g, Rfl: 4    ibuprofen (ADVIL/MOTRIN) 200 MG capsule, Take 200 mg by mouth every 4 hours as needed., Disp: , Rfl:     iloperidone (FANAPT) 6 MG TABS tablet, Take 4 mg by mouth daily , Disp: , Rfl:     lifitegrast (XIIDRA) 5 % opthalmic solution, Place 1 drop into both eyes 2 times daily, Disp: 60 each, Rfl: 11    medical cannabis (Patient's own supply), See Admin Instructions (The purpose of this order is to document that the patient reports taking medical cannabis.  This is not a prescription, and is not used to certify that the patient has a qualifying medical condition.), Disp: , Rfl:     multivitamin w/minerals (THERA-VIT-M) tablet, Take 1 tablet by mouth daily, Disp: , Rfl:     ONABOTULINUMTOXINA IJ, Inject 250 Units as directed once LOT # /C3 EXPIRATION: 03/2020, Disp: , Rfl:     prednisolone 0.25% and hyaluronate in balanced salt SUSP compounded ophthalmic suspension, Place 1 Drop into both eyes 4 times daily. Discard bottle 15 days after opening. Refrigerate. Unopened bottle expires: Strength: 0.25 %, Disp: 6.67 mL, Rfl: 11    sodium chloride 0.9 % neb solution, MEDICALLY NECESSARY FOR SCLERAL CONTACT LENS USE. MAY USE 3 ML 6 TIMES A DAY FOR CONTACT LENS USE, Disp: 300 mL, Rfl: 11    Soft Lens Products (B&L SENSITIVE EYES) SOLN, Apply 1 drop to eye, Disp: , Rfl:     spironolactone (ALDACTONE) 50 MG tablet,  Take 1 tablet by mouth daily, Disp: , Rfl:     tretinoin (RETIN-A) 0.025 % external cream, Apply a pea size to entire face QD, Disp: 45 g, Rfl: 11    tretinoin (RETIN-A) 0.05 % external cream, Spread a pea size amount into affected area topically at bedtime.  Use sunscreen SPF>20., Disp: 45 g, Rfl: 11    TYRVAYA 0.03 MG/ACT nasal spray, Spray 1 spray into both nostrils 2 times daily, Disp: 4.2 mL, Rfl: 11    Vitamin D3 (CHOLECALCIFEROL) 25 mcg (1000 units) tablet, Take by mouth daily, Disp: , Rfl:     White Petrolatum-Mineral Oil (SYSTANE NIGHTTIME) OINT, Apply 3.5 g to eye 4 times daily as needed (dry eyes), Disp: 7 g, Rfl: 11    Current Facility-Administered Medications:     botulinum toxin type A (BOTOX) 100 units injection 300 Units, 300 Units, Intramuscular, Q90 Days, America Gonzalez MD, 200 Units at 23 0026    botulinum toxin type A (DYSPORT) injection 500 Units, 500 Units, Intramuscular, Q90 Days, America Gonzalez MD, 500 Units at 22 1028    botulinum toxin type A (DYSPORT) injection 800 Units, 800 Units, Intramuscular, Q90 Days, America Gonzalez MD, 500 Units at 23 1547       BOTULINUM NEUROTOXIN INJECTION PROCEDURES    VERIFICATION OF PATIENT IDENTIFICATION AND PROCEDURE     Initials   Patient Name SES   Patient  SES   Procedure Verified by: SES     Prior to the start of the procedure and with procedural staff participation, I verbally confirmed the patient s identity using two indicators, relevant allergies, that the procedure was appropriate and matched the consent or emergent situation, and that the correct equipment/implants were available. Immediately prior to starting the procedure I conducted the Time Out with the procedural staff and re-confirmed the patient s name, procedure, and site/side. (The Joint Commission universal protocol was followed.)  Yes    Sedation (Moderate or Deep): None    ABOVE ASSESSMENTS PERFORMED BY    America Gonzalez  MD      INDICATIONS FOR PROCEDURES  Roxanna Celis is a 52 year old patient with involuntary muscle spasms and pain secondary to the diagnosis of cervical dystonia. Her baseline symptoms have been recalcitrant to oral medications and conservative therapy.  She is here today for reinjection with Dysport.    GOAL OF PROCEDURE  The goal of this procedure is to increase active range of motion, improve volitional motor control, decrease pain  and enhance functional independence.      TOTAL DOSE ADMINISTERED  Dose Administered:  300 units  Botox (Botulinum Toxin Type A)       2:1 Dilution   Unavoidable Drug Waste: No.  Diluent Used:  Preservative Free Normal Saline  Total Volume of Diluent Used:  6 ml  NDC #: Botox 100u ([address]-99)      CONSENT  The risks, benefits, and treatment options were discussed with Roxanna Celis and she agreed to proceed.    Written consent was obtained by [facility].     EQUIPMENT USED  Needle-37mm stimulating/recording  Needle-30 gauge  EMG/NCS Machine    SKIN PREPARATION  Skin preparation was performed using an alcohol wipe.    GUIDANCE DESCRIPTION  Electro-myographic guidance was necessary throughout the procedure to accurately identify all areas of dystonic muscles while avoiding injection of non-dystonic muscles, neighboring nerves and nearby vascular structures.       AREA/MUSCLE INJECTED: 300 UNITS BOTOX = TOTAL DOSE, 2:1 DILUTION  1. NECK & SHOULDER GIRDLE MUSCLES: 170 UNITS BOTOX = TOTAL DOSE   Right Rectus Capitis - (upper cervical) - 5 units of Botox at 1 site/s.   Left Rectus Capitis - (upper cervical) - 5 units of Botox at 1 site/s.     Right Upper Trapezius - 25 units of Botox at 7 site/s.   Left Upper Trapezius - 25 units of Botox at 7 site/s.     Right Splenius Cervicis - 5 units of Botox at 1 site/s.   Left Splenius Cervicis - 5 units of Botox at 1 site/s.    Right Levator Scapulae - 20 units of Botox at 2 site/s.   Left Levator Scapulae - 20 units of Botox at 2 site/s.    Right  Sternocleidomastoid - 10 units of Botox at 3 site/s.  Left Sternocleidomastoid - 10 units of Botox at 3 site/s.    Right Cervical Paraspinals - 10 units of Botox at 4 site/s.  Left Cervical Paraspinals - 10 units of Botox at 4 site/s.    Right Thoracic Paraspinals - 10 units of Botox at 4 site/s.  Left Thoracic Paraspinals - 10 units of Botox at 4 site/s.    2. SCALP MUSCLES: 130 UNITS BOTOX = TOTAL DOSE  Right Occipitalis - 65 units of Botox at 15 site/s.   Left Occipitails - 65 units of Botox at 15 site/s.      BILATERAL GREATER OCCIPITAL NERVE BLOCKS    Dru% lidocaine: 2 ml   0.5% bupivacaine: 7 ml   Kenalo mg = 1 ml     Area just inferior to insertion of the right and left superior trapezius insertion onto skull was cleansed with ChloraPrep. Needle was advanced anteriorly to base of skull then slightly withdrawn and injectate was injected in a fan-like distribution at different depths. A 10 ml mixture of 1% lidocaine, 0.5% bupivacaine and Kenalog was divided into two 5 ml syringes. Total injection volume = 5 ml per side.       RESPONSE TO PROCEDURE  Roxanna Celis tolerated the procedure well and there were no immediate complications. She was allowed to recover for an appropriate period of time and was discharged home in stable condition.    ASSESSMENT AND PLAN   Botulinum toxin injections: Dose of Botox was increased from 200 units to 300 units in hopes of increasing effectiveness and prolonging duration of benefit of injections. Additionally, Occipital nerve blocks were administered to address probable occipital neuralgia. She will continue to monitor her response and report at next appointment.   Referrals: None, however recommendations made today regarding possible chiropractor care vs functional medicine to see if her pain can be better controlled.   Follow up: Roxanna Celis was rescheduled for the next series of injections in 12 weeks, at which time we will evaluate response to today's  injections. she may call the clinic prior with any questions or concerns prior to the next appointment.       I spent a total of 15 minutes, face-to-face and managing the care of Roxanna Celis, excluding the procedure. Over 50% of my time was spent counseling the patient and coordinating care. Please see note for details.

## 2023-10-24 NOTE — NURSING NOTE
Chief Complaint   Patient presents with    Procedure     Blake Bautista MA on 10/24/2023 at 12:40 PM

## 2023-10-25 RX ORDER — BUPIVACAINE HYDROCHLORIDE 5 MG/ML
10 INJECTION, SOLUTION PERINEURAL ONCE
Status: COMPLETED | OUTPATIENT
Start: 2023-10-25 | End: 2023-10-26

## 2023-10-25 RX ORDER — TRIAMCINOLONE ACETONIDE 40 MG/ML
40 INJECTION, SUSPENSION INTRA-ARTICULAR; INTRAMUSCULAR ONCE
Status: COMPLETED | OUTPATIENT
Start: 2023-10-25 | End: 2023-10-26

## 2023-10-26 ENCOUNTER — TRANSFERRED RECORDS (OUTPATIENT)
Dept: HEALTH INFORMATION MANAGEMENT | Facility: CLINIC | Age: 53
End: 2023-10-26

## 2023-10-26 RX ADMIN — TRIAMCINOLONE ACETONIDE 40 MG: 40 INJECTION, SUSPENSION INTRA-ARTICULAR; INTRAMUSCULAR at 17:17

## 2023-10-26 RX ADMIN — BUPIVACAINE HYDROCHLORIDE 50 MG: 5 INJECTION, SOLUTION PERINEURAL at 17:17

## 2023-10-30 DIAGNOSIS — H04.123 DRY EYES, BILATERAL: ICD-10-CM

## 2023-11-03 ENCOUNTER — DOCUMENTATION ONLY (OUTPATIENT)
Dept: NEUROLOGY | Facility: CLINIC | Age: 53
End: 2023-11-03
Payer: COMMERCIAL

## 2023-11-04 ENCOUNTER — HEALTH MAINTENANCE LETTER (OUTPATIENT)
Age: 53
End: 2023-11-04

## 2023-11-06 DIAGNOSIS — L98.8 FACIAL RHYTIDS: ICD-10-CM

## 2023-11-06 RX ORDER — TRETINOIN 0.5 MG/G
CREAM TOPICAL
Qty: 45 G | Refills: 1 | Status: SHIPPED | OUTPATIENT
Start: 2023-11-06

## 2023-11-06 NOTE — TELEPHONE ENCOUNTER
Marcia refill provided. Please have patient schedule a follow up appointment prior to additional refills.

## 2023-11-06 NOTE — PROGRESS NOTES
Faxed Form November 6, 2023 to fax number [phone], [facility]- ATTN: Katheryn Mittal    Right Fax confirmed at 9:04 AM    Patito Beavers

## 2023-11-06 NOTE — CONFIDENTIAL NOTE
Pt already has follow up appt scheduled.    Thank you,  Toyin TESFAYE RN  Dermatology   [phone]

## 2023-11-13 ENCOUNTER — OFFICE VISIT (OUTPATIENT)
Dept: OPTOMETRY | Facility: CLINIC | Age: 53
End: 2023-11-13
Payer: COMMERCIAL

## 2023-11-13 DIAGNOSIS — H16.223 KERATITIS SICCA, BILATERAL: ICD-10-CM

## 2023-11-13 DIAGNOSIS — H04.123 DRY EYES: Primary | ICD-10-CM

## 2023-11-13 DIAGNOSIS — H57.13 EYE PAIN, BILATERAL: ICD-10-CM

## 2023-11-13 RX ORDER — PREDNISOLONE ACETATE 10 MG/ML
SUSPENSION/ DROPS OPHTHALMIC
Qty: 5 ML | Refills: 0 | Status: SHIPPED | OUTPATIENT
Start: 2023-11-13 | End: 2024-07-08

## 2023-11-13 ASSESSMENT — REFRACTION_WEARINGRX
OD_SPHERE: -3.25
OS_CYLINDER: +0.25
OS_SPHERE: -2.25
OS_SPHERE: -3.00
OS_CYLINDER: SPHERE
OD_AXIS: 160
OD_CYLINDER: +0.25
OD_CYLINDER: SPHERE
OS_ADD: +2.50
OD_ADD: +2.50
OS_AXIS: 010
OD_SPHERE: -2.25

## 2023-11-13 ASSESSMENT — REFRACTION_CURRENTRX
OS_DIAMETER: 18.0
OS_BRAND: DAILIES TOTAL 1
OS_DIAMETER: 14.1
OS_BASECURVE: 8.5
OD_SPHERE: -0.50
OD_BRAND: EYEFIT PRO
OD_BASECURVE: 8.5
OD_BASECURVE: 8.047
OS_BRAND: EYEFIT PRO
OS_SPHERE: PLANO
OS_BASECURVE: 7.899
OD_SPHERE: +0.25
OD_DIAMETER: 14.1
OS_SPHERE: -0.50
OD_DIAMETER: 18.0
OD_BRAND: DAILIES TOTAL 1

## 2023-11-13 ASSESSMENT — VISUAL ACUITY
OD_CC: 20/20
CORRECTION_TYPE: GLASSES, CONTACTS
OS_CC: 20/25-3
METHOD: SNELLEN - LINEAR

## 2023-11-13 ASSESSMENT — SLIT LAMP EXAM - LIDS
COMMENTS: MGD
COMMENTS: MGD

## 2023-11-13 ASSESSMENT — EXTERNAL EXAM - LEFT EYE: OS_EXAM: NORMAL

## 2023-11-13 ASSESSMENT — EXTERNAL EXAM - RIGHT EYE: OD_EXAM: NORMAL

## 2023-11-13 NOTE — PROGRESS NOTES
Current Regimen:  Doxycycline 50mg twice a day  Xiidra gtt twice a day both eyes  Pred Healon 4x daily both eyes  Mucomyst 4-6x daily both eyes   Systane ointment   iVizia - tried without CL's in and seems like it did okay.    Debo - needs 90 day supply for insurance   Serum Tears - 6 drops in the lens, rest saline.   Moisture chamber goggles     Failed: Restasis, Lipiflow, Vimpat, Celluvisc    A/P  1.) Dry Eye each eye  -Severely symptomatic despite max topical treatment and significant objective improvement in corneal appearance with scleral lens wear  -Currently in Eyeprint scleral lenses (failed standard lenses) with excellent fit. Happy with nearsighted target in CL's (-2.50 distance Rx)  -Worsening symptoms x several months. Today there is evidence of K stain on lens removal.   -No adjustments recommended on current CL's. May explore larger diameter if able - will contact lab  -Consider punctal cautery - she has previously has improved symptoms with plugs but they fall out often  -Given flare may trial steroid pulse with prednisolone but need to monitor IOP  -Also trial lid hygiene (Avenova and Zocular samples given)    She is seeing Dr. Ocampo in 1 month and can further discuss options then

## 2023-11-14 ASSESSMENT — TONOMETRY
OD_IOP_MMHG: 19
IOP_METHOD: ICARE
OS_IOP_MMHG: 18

## 2023-11-17 ENCOUNTER — TELEPHONE (OUTPATIENT)
Dept: OPHTHALMOLOGY | Facility: CLINIC | Age: 53
End: 2023-11-17
Payer: COMMERCIAL

## 2023-11-21 ENCOUNTER — MYC MEDICAL ADVICE (OUTPATIENT)
Dept: OPTOMETRY | Facility: CLINIC | Age: 53
End: 2023-11-21

## 2023-11-22 ENCOUNTER — TELEPHONE (OUTPATIENT)
Dept: OPHTHALMOLOGY | Facility: CLINIC | Age: 53
End: 2023-11-22
Payer: COMMERCIAL

## 2023-11-22 DIAGNOSIS — H04.123 BILATERAL DRY EYES: ICD-10-CM

## 2023-11-22 DIAGNOSIS — H16.123 FILAMENTARY KERATITIS OF BOTH EYES: ICD-10-CM

## 2023-12-01 NOTE — TELEPHONE ENCOUNTER
Central Prior Authorization Team   Phone: [phone]          
MEDICATION APPEAL APPROVED    Medication: XIIDRA 5 % OP SOLN  Authorization Effective Date: 10/30/2023  Authorization Expiration Date: 11/30/2024  Approved Dose/Quantity: 60  Reference #:     Appeal Insurance Company: EXPRESS  Expected CoPay: $       CoPay Card Available:    Financial Assistance Needed: No  Filling Pharmacy: [facility] [id number] [place] [id number] [facility] [address]  Patient Notified: Pharmacy will notify patient.    Comments:      
Medication Appeal Initiation    Medication: XIIDRA 5 % OP SOLN  Appeal Start Date:  11/29/2023  Insurance Company: EXPRESS  Insurance Phone:   Insurance Fax: [phone]  Comments:      Faxed 2 page appeal form and appeal letter from provider.      
PA Initiation    Medication: XIIDRA 5 % OP SOLN  Insurance Company: [employer] Non-Specialty PA's - Phone [phone] Fax [phone]  Pharmacy Filling the Rx: [facility] [id number] [place] [facility]  [address] [facility] [address]  Filling Pharmacy Phone: [phone]  Filling Pharmacy Fax:    Start Date: 11/21/2023  Central Prior Authorization Team   Phone: [phone]      
PRIOR AUTHORIZATION DENIED    Medication: XIIDRA 5 % OP SOLN  Insurance Company: [employer] Non-Specialty PA's - Phone [phone] Fax [phone]  Denial Date: 11/28/2023  Denial Rational:       Appeal Information:   Patient Notified: Pharmacy will notify patient.      
Pt calling triage line today.    Spoke to pt 11- at 1555    Reviewed PA for Xiidra was denied.    Pt states used restasis in past, but did not seem to work.    Pt also using Tyrvaya nasal spray.    Reviewed the denial information stated Xiidra to not be used in combination with cyclosporine eye drop or Tyrvaya.    Reviewed with pt will ask in clinic PA personnel to assist in letter of appeal with Dr. Ocampo for Xiidra.    Pt has been seen in clinic recently and dryness/dry eyes symptoms have been worsening.    -- will ask to send more urgent appeal request.    Haider Webber, RN 4:00 PM 11/28/23    
Xiidra approved    Left message with pt eye drop approved and pharmacy also notified.    Reviewed with pt ok to reach out to pharmacy for pickup if does not hear from pharmacy by around noon.    Provided direct number for any further assistance.    Haider Webber RN 8:21 AM 12/01/23    
ILL APPEARING

## 2023-12-06 NOTE — TELEPHONE ENCOUNTER
Spoke to pt at 1100    Pt using non-scleral lens and eye more comfortable.    Pt did receive feedback from Dr. Anthony ok to stay out of scleral lenses and use the bandage contacts.    Reviewed if symptoms better ok to follow up as scheduled next Monday.    Reviewed also scheduling today in acute eye clinic as previously reviewed.    Pt holding on acute visit for now and will reach out if feels needs sooner exam before next week.    Haider Webber RN 11:21 AM 12/06/23

## 2023-12-12 ENCOUNTER — OFFICE VISIT (OUTPATIENT)
Dept: OPHTHALMOLOGY | Facility: CLINIC | Age: 53
End: 2023-12-12
Attending: OPHTHALMOLOGY
Payer: COMMERCIAL

## 2023-12-12 DIAGNOSIS — H02.88A MEIBOMIAN GLAND DYSFUNCTION (MGD), BILATERAL, BOTH UPPER AND LOWER LIDS: ICD-10-CM

## 2023-12-12 DIAGNOSIS — H04.123 BILATERAL DRY EYES: Primary | ICD-10-CM

## 2023-12-12 DIAGNOSIS — H02.88B MEIBOMIAN GLAND DYSFUNCTION (MGD), BILATERAL, BOTH UPPER AND LOWER LIDS: ICD-10-CM

## 2023-12-12 PROCEDURE — 99207 PR BUNDLED PROCEDURE IN GLOBAL PKG: CPT | Mod: 25 | Performed by: OPHTHALMOLOGY

## 2023-12-12 PROCEDURE — 68761 CLOSE TEAR DUCT OPENING: CPT | Mod: E2 | Performed by: OPHTHALMOLOGY

## 2023-12-12 PROCEDURE — 99213 OFFICE O/P EST LOW 20 MIN: CPT | Mod: 25 | Performed by: OPHTHALMOLOGY

## 2023-12-12 PROCEDURE — 68761 CLOSE TEAR DUCT OPENING: CPT | Performed by: OPHTHALMOLOGY

## 2023-12-12 ASSESSMENT — REFRACTION_WEARINGRX
OD_SPHERE: -2.25
OD_SPHERE: -3.25
OD_AXIS: 160
OS_ADD: +2.50
OS_AXIS: 010
OS_CYLINDER: +0.25
OS_SPHERE: -2.25
OD_ADD: +2.50
OD_CYLINDER: +0.25
OD_CYLINDER: SPHERE
OS_SPHERE: -3.00
OS_CYLINDER: SPHERE

## 2023-12-12 ASSESSMENT — TONOMETRY
OS_IOP_MMHG: 22
IOP_METHOD: TONOPEN
OD_IOP_MMHG: 19

## 2023-12-12 ASSESSMENT — VISUAL ACUITY
OS_PH_CC: 20/20
OD_PH_CC: 20/20
OS_CC+: +2
OD_CC+: -2
OS_CC: 20/40
OD_PH_CC+: -2
METHOD: SNELLEN - LINEAR
OD_CC: 20/50

## 2023-12-12 ASSESSMENT — EXTERNAL EXAM - LEFT EYE: OS_EXAM: NORMAL

## 2023-12-12 ASSESSMENT — EXTERNAL EXAM - RIGHT EYE: OD_EXAM: NORMAL

## 2023-12-12 NOTE — NURSING NOTE
Chief Complaints and History of Present Illnesses   Patient presents with    Dry Eye(s) Follow-Up     Chief Complaint(s) and History of Present Illness(es)       Dry Eye(s) Follow-Up              Laterality: both eyes    Associated signs and symptoms: irritation, burning and discharge    Response to treatment: no improvement              Comments    Roxanna is here to continue care for bilateral dry eyes. She was put on Pred Healon that seemed to help with 2-3 times per day, but now down to once a day and feels eyes are more dry, and irritated.    Jean Pierre Kelly COT 1:41 PM December 12, 2023

## 2023-12-12 NOTE — PROGRESS NOTES
Chief Complaint(s) and History of Present Illness(es)     Dry Eye(s) Follow-Up            Laterality: both eyes    Associated signs and symptoms: irritation, burning and discharge    Response to treatment: no improvement          Comments    Roxanna is here to continue care for bilateral dry eyes. She was put on Pred   Healon that seemed to help with 2-3 times per day, but now down to once a   day and feels eyes are more dry, and irritated.    Jean Pierre Kelly COT 1:41 PM December 12, 2023     Currently on QID predhealon.  Once daily prednisolone, 6x/day mucomyst and serum tears with scleral lenses.      Review of systems for the eyes was negative other than the pertinent positives/negatives listed in the HPI.      Assessment & Plan      Roxanna Celis is a 53 year old female with the following diagnoses:   1. Bilateral dry eyes    2. MGD (meibomian gland dysfunction)       Discussed repeat plugs with dissolvable plugs vs cautery  She preferred to place punctal plugs today  Continue current drops     Lower puncta both eyes   Right eye 0.5 mm collagen extended duration  Left eye 0.5 mm collagen extended duration    Patient disposition:   Return in about 4 months (around 4/12/2024) for Follow Up. And with Gabrielle as planned          Attending Physician Attestation:  Complete documentation of historical and exam elements from today's encounter can be found in the full encounter summary report (not reduplicated in this progress note).  I personally obtained the chief complaint(s) and history of present illness.  I confirmed and edited as necessary the review of systems, past medical/surgical history, family history, social history, and examination findings as documented by others; and I examined the patient myself.  I personally reviewed the relevant tests, images, and reports as documented above.  I formulated and edited as necessary the assessment and plan and discussed the findings and management plan with the patient and  family. .Attending Physician Procedure Attestation: I was present for the entire procedure    - Faheem Ocampo MD

## 2023-12-13 ENCOUNTER — OFFICE VISIT (OUTPATIENT)
Dept: OPTOMETRY | Facility: CLINIC | Age: 53
End: 2023-12-13
Payer: COMMERCIAL

## 2023-12-13 DIAGNOSIS — H04.123 DRY EYES: Primary | ICD-10-CM

## 2023-12-13 DIAGNOSIS — H16.223 KERATITIS SICCA, BILATERAL: ICD-10-CM

## 2023-12-13 DIAGNOSIS — H57.13 EYE PAIN, BILATERAL: ICD-10-CM

## 2023-12-15 ASSESSMENT — REFRACTION_CURRENTRX
OS_BRAND: EYEFIT PRO
OD_BASECURVE: 8.047
OS_SPHERE: PLANO
OD_SPHERE: +0.25
OS_BRAND: DAILIES TOTAL 1
OD_DIAMETER: 18.0
OS_DIAMETER: 18.0
OS_BASECURVE: 8.5
OS_SPHERE: -0.50
OD_DIAMETER: 14.1
OD_SPHERE: -0.50
OS_DIAMETER: 14.1
OD_BASECURVE: 8.5
OD_BRAND: DAILIES TOTAL 1
OS_BASECURVE: 7.899
OD_BRAND: EYEFIT PRO

## 2023-12-15 NOTE — PROGRESS NOTES
No office visit. CL order only. Per discussion/MyChart follow-up we are going to try 19.0mm lenses. Lenses on order from the lab and can mail to pt when arrive    Contact Lens Billing  V-Code:  - Scleral Cover Shell  Final Contact Lens Rx         Brand Base Curve Diameter Sphere Lens Addl. Specs    Right Eyefit Pro  19.0 TBD      Left Eyefit Pro  19.0 TBD            Final Contact Lens Rx #2         Brand Base Curve Diameter Sphere Lens Addl. Specs    Right Eyefit Pro 8.047 18.0 +0.25 BOZ: 10.56 x 9.50, CT 0.451 [id number] dupe    Left Eyefit Pro 7.899 18.0 [place] BOZ: 9.97 x 9.25, CT 0.435 [id number] dupe           # of units: 2  Price per Unit: $550    This patient requires contact lenses that are medically necessary for either improvement in vision over spectacles, support of the ocular surface, or other therapeutic benefit. These are not cosmetic contact lenses.     Encounter Diagnoses   Name Primary?    Dry eyes Yes    Keratitis sicca, bilateral (H24)     Eye pain, bilateral         Date of last eye exam: 11/13/23

## 2023-12-26 ENCOUNTER — TELEPHONE (OUTPATIENT)
Dept: OPTOMETRY | Facility: CLINIC | Age: 53
End: 2023-12-26

## 2024-01-10 ENCOUNTER — OFFICE VISIT (OUTPATIENT)
Dept: OPTOMETRY | Facility: CLINIC | Age: 54
End: 2024-01-10
Payer: COMMERCIAL

## 2024-01-10 DIAGNOSIS — H16.223 KERATITIS SICCA, BILATERAL: ICD-10-CM

## 2024-01-10 DIAGNOSIS — H57.13 EYE PAIN, BILATERAL: ICD-10-CM

## 2024-01-10 DIAGNOSIS — H04.123 DRY EYES: Primary | ICD-10-CM

## 2024-01-10 DIAGNOSIS — Z97.3 USES CONTACT LENSES: ICD-10-CM

## 2024-01-10 RX ORDER — SODIUM CHLORIDE FOR INHALATION 0.9 %
VIAL, NEBULIZER (ML) INHALATION
Qty: 1800 ML | Refills: 4 | Status: SHIPPED | OUTPATIENT
Start: 2024-01-10 | End: 2024-05-01

## 2024-01-10 ASSESSMENT — REFRACTION_CURRENTRX
OD_DIAMETER: 18.0
OD_DIAMETER: 19.0
OS_SPHERE: TBD
OD_BRAND: EYEFIT PRO
OS_SPHERE: PLANO
OD_BRAND: EYEFIT PRO
OD_SPHERE: +0.25
OS_BASECURVE: 7.899
OS_BRAND: EYEFIT PRO
OS_DIAMETER: 18.0
OD_BASECURVE: 8.047
OD_SPHERE: TBD
OS_DIAMETER: 19.0
OS_BRAND: EYEFIT PRO

## 2024-01-10 ASSESSMENT — EXTERNAL EXAM - LEFT EYE: OS_EXAM: NORMAL

## 2024-01-10 ASSESSMENT — VISUAL ACUITY
OS_CC+: -1
CORRECTION_TYPE: GLASSES, CONTACTS
METHOD: SNELLEN - LINEAR
OD_CC: 20/20
OS_CC: 20/20

## 2024-01-10 ASSESSMENT — TONOMETRY
IOP_METHOD: ICARE
OS_IOP_MMHG: 24
OD_IOP_MMHG: 22

## 2024-01-10 ASSESSMENT — EXTERNAL EXAM - RIGHT EYE: OD_EXAM: NORMAL

## 2024-01-10 NOTE — NURSING NOTE
"Chief Complaints and History of Present Illnesses   Patient presents with    Dry Eye(s) Follow-Up     Pt here for dry eye follow up.      Chief Complaint(s) and History of Present Illness(es)       Dry Eye(s) Follow-Up              Laterality: both eyes    Comments: Pt here for dry eye follow up.               Comments    Pt had punctal plugs placed with Dr Ocampo. Pt notes punctal plugs made a minor improvement and states she \"wishes he would have put them in the top lid too\". Pt c/o discharge and soreness.   Pt using:  Doxycycline 50mg twice a day  Xiidra gtt twice a day both eyes  Pred Healon 4x daily both eyes  Mucomyst 4-6x daily both eyes   Systane ointment   iVizia - tried without CL's in and seems like it did okay.    Debo - needs 90 day supply for insurance   Serum Tears - 6 drops in the lens, rest saline.   Moisture chamber goggles   STANISLAV Garza on 1/10/2024 at 8:17 AM                     "

## 2024-01-10 NOTE — PROGRESS NOTES
Current Regimen:  Doxycycline 50mg twice a day  Xiidra gtt twice a day both eyes  Pred Healon 4x daily both eyes  Mucomyst 4-6x daily both eyes   Systane ointment   iVizia - tried without CL's in and seems like it did okay.    Debo - needs 90 day supply for insurance   Serum Tears - 6 drops in the lens, rest saline.   Moisture chamber goggles     Failed: Restasis, Lipiflow, Vimpat, Celluvisc    A/P  1.) Dry Eye each eye  -Severely symptomatic despite max topical treatment and significant objective improvement in corneal appearance with scleral lens wear  -Currently in Eyeprint scleral lenses (failed standard lenses) with excellent fit. Happy with nearsighted target in CL's (-2.50 distance Rx)  -Still with ongoing symptoms, though some improvement with plugs from Dr. Ocampo last month  -Current 18.0mm lenses fit well - no adjustments recommended  -Per last exam we are trialing 19.0mm lenses for more ocular surface coverage. These fit well today. Discussed how to clean larger diam lenses with Hydrogen Peroxide    Trial 19.0mm lenses. If unable to tolerate may return to 18.0. Following routinely with Dr. Ocampo, can RTC here 3-4 months    I have confirmed the patient's CC, HPI and reviewed Past Medical History, Past Surgical History, Social History, Family History, Problem List, Medication List and agree with Tech note.     SHELLI StevensS

## 2024-01-23 ENCOUNTER — OFFICE VISIT (OUTPATIENT)
Dept: PHYSICAL MEDICINE AND REHAB | Facility: CLINIC | Age: 54
End: 2024-01-23
Payer: COMMERCIAL

## 2024-01-23 VITALS — HEART RATE: 97 BPM | DIASTOLIC BLOOD PRESSURE: 78 MMHG | SYSTOLIC BLOOD PRESSURE: 117 MMHG

## 2024-01-23 DIAGNOSIS — M54.81 BILATERAL OCCIPITAL NEURALGIA: ICD-10-CM

## 2024-01-23 DIAGNOSIS — G24.3 CERVICAL DYSTONIA: Primary | ICD-10-CM

## 2024-01-23 PROCEDURE — 64405 NJX AA&/STRD GR OCPL NRV: CPT | Performed by: PHYSICAL MEDICINE & REHABILITATION

## 2024-01-23 PROCEDURE — 95874 GUIDE NERV DESTR NEEDLE EMG: CPT | Performed by: PHYSICAL MEDICINE & REHABILITATION

## 2024-01-23 PROCEDURE — 64616 CHEMODENERV MUSC NECK DYSTON: CPT | Mod: 59 | Performed by: PHYSICAL MEDICINE & REHABILITATION

## 2024-01-23 PROCEDURE — 64612 DESTROY NERVE FACE MUSCLE: CPT | Mod: 59 | Performed by: PHYSICAL MEDICINE & REHABILITATION

## 2024-01-23 RX ORDER — BUPIVACAINE HYDROCHLORIDE 5 MG/ML
7 INJECTION, SOLUTION PERINEURAL ONCE
Status: COMPLETED | OUTPATIENT
Start: 2024-01-23 | End: 2024-01-23

## 2024-01-23 RX ORDER — TRIAMCINOLONE ACETONIDE 40 MG/ML
40 INJECTION, SUSPENSION INTRA-ARTICULAR; INTRAMUSCULAR ONCE
Status: COMPLETED | OUTPATIENT
Start: 2024-01-23 | End: 2024-01-23

## 2024-01-23 RX ADMIN — TRIAMCINOLONE ACETONIDE 40 MG: 40 INJECTION, SUSPENSION INTRA-ARTICULAR; INTRAMUSCULAR at 16:13

## 2024-01-23 RX ADMIN — BUPIVACAINE HYDROCHLORIDE 35 MG: 5 INJECTION, SOLUTION PERINEURAL at 16:13

## 2024-01-23 NOTE — PROGRESS NOTES
[facility]     PM&R CLINIC NOTE  BOTULINUM TOXIN PROCEDURE      HPI  Chief Complaint   Patient presents with    Procedure     Botox     Roxanna Celis is a 53 year old female with a history of cervical dystonia who presents to clinic for botulinum toxin injections for management of involuntary muscle spasms and neck pain secondary to her cervical dystonia.     SINCE LAST VISIT  Roxanna Celis was last seen here in clinic on 7/27/2023, at which time she received 300 units of Botox. She also received bilateral occipital nerve blocks to address occipital pain.     This past round of Botox was more effective than many previous rounds. The effects have lasted up until 3 weeks ago, which is much longer than usual. She believes that the Botox would have lasted longer if she did not have several incidents that made her pain worse (ie: an aggressive massage, jerking her head while being a passenger in the car.)    The patient denies new medical diagnoses, illnesses, hospitalizations, emergency room visits, and injuries since the previous injection with botulinum neurotoxin.     RESPONSE TO PREVIOUS TREATMENT    Side effects: None.     Pain Improvement: Yes, there was noticeable improvement of her pain, which started a few days after the Botox was administered and lasted approximately 9-10 weeks.     Dystonia Improvement: Yes, this round was quite effective as is pertains to controlling the dystonic movements of her neck and shoulder muscles. Duration of benefit: 9-10 weeks, followed by a gradual reduction in benefit.      Functional Improvement: When the Botox is working, she is able to socialize and liver her life normally. When the Botox wears off, she is in such terrible pain that she becomes stuck in bed for prolonged periods of time, and misses out on essentially every area of life.       PHYSICAL EXAM  VS: /78 (BP Location: Right arm, Patient Position: Sitting)   Pulse 97     GEN: Pleasant and cooperative, in no acute distress  HEENT: No facial asymmetry  NECK AND TRUNK PATTERN:   Head Tremor: Pt states tremor is intermittent   Left Side-bending: Present but patient states she feels she has more right side bending now  Left Shoulder Elevation: slightly elevated  Head with right shift  Focalized pain at occipitalis - has pain today during visit    ALLERGIES  Allergies   Allergen Reactions    Contrast Dye Anaphylaxis     Other reaction(s): almost  from it    Iodinated Contrast Media Anaphylaxis     IVP DYE    Lisinopril Anaphylaxis    Lisinopril-Hydrochlorothiazide Anaphylaxis    Maple Flavor Anaphylaxis     MAPLE SYRUP  MAPLE SYRUP  MAPLE SYRUP    Maple Tree Anaphylaxis     Allergy includes maple syrup.    No Clinical Screening - See Comments      maple syrup==anaphylaxis  Dairy- causes to not feel well    Gluten Itching and Swelling    Gluten Meal Itching and Swelling     Cramping    [place] Meal Unknown    Chamomile Unknown    Codeine Sulfate Cramps and GI Disturbance    Food Unknown     Grapes, almond, lactose, gluten, real maple syrup    Nsaids Other (See Comments) and Unknown     Patient had bariatric surgery. Should not ever take NSAIDS due to high risk for gastric ulcers.   Patient had bariatric surgery. Should not ever take NSAIDS due to high risk for gastric ulcers.     Polyethylene Glycol Unknown     Per allergy testing  Per allergy testing      Erythromycin Nausea and Vomiting, Cramps, Diarrhea and Nausea     PN: LW Reaction: stomach upset  cramping    Lactose Diarrhea    Morphine Other (See Comments)       CURRENT MEDICATIONS    Current Outpatient Medications:     acetylcysteine 10 % (MUCOMYST) 10% SOLN, Place 2 drops into both eyes 6 times daily, Disp: 30 mL, Rfl: 11    BOTOX 100 units injection, Inject 250 Units into the muscle once Lot # /C3 with Expiration Date:  2020, Disp: , Rfl:     buPROPion (WELLBUTRIN XL) 300 MG 24 hr tablet, Take 300 mg by mouth every  morning, Disp: , Rfl:     clonazePAM (KLONOPIN) 1 MG tablet, Take 1 mg by mouth 2 times daily as needed, Disp: , Rfl:     COMPOUNDED NON-CONTROLLED SUBSTANCE (CMPD RX) - PHARMACY TO MIX COMPOUNDED MEDICATION, Lactosamide 1% drop, instill 1-2 drops 6x daily into both eyes, Disp: 10 mL, Rfl: 3    cyanocobalamin (VITAMIN B-12) 1000 MCG tablet, Take 1,000 mcg by mouth daily, Disp: , Rfl:     doxycycline monohydrate (ADOXA) 50 MG tablet, Take 1 tablet (50 mg) by mouth 2 times daily, Disp: 180 tablet, Rfl: 3    DULoxetine (CYMBALTA) 60 MG capsule, TAKE 2 CAPSULES BY MOUTH EVERY DAY, Disp: , Rfl:     EPINEPHrine (ANY BX GENERIC EQUIV) 0.3 MG/0.3ML injection 2-pack, , Disp: , Rfl:     erythromycin (ROMYCIN) 5 MG/GM ophthalmic ointment, Place 0.5 inches into both eyes 2 times daily, Disp: 3.5 g, Rfl: 4    ibuprofen (ADVIL/MOTRIN) 200 MG capsule, Take 200 mg by mouth every 4 hours as needed., Disp: , Rfl:     iloperidone (FANAPT) 6 MG TABS tablet, Take 4 mg by mouth daily , Disp: , Rfl:     lifitegrast (XIIDRA) 5 % opthalmic solution, Place 1 drop into both eyes 2 times daily, Disp: 60 each, Rfl: 11    multivitamin w/minerals (THERA-VIT-M) tablet, Take 1 tablet by mouth daily, Disp: , Rfl:     ONABOTULINUMTOXINA IJ, Inject 250 Units as directed once LOT # /C3 EXPIRATION: 03/2020, Disp: , Rfl:     prednisolone 0.25% and hyaluronate in balanced salt SUSP compounded ophthalmic suspension, Place 1 Drop into both eyes 4 times daily. Discard bottle 15 days after opening. Refrigerate. Unopened bottle expires: Strength: 0.25 %, Disp: 6.67 mL, Rfl: 11    sodium chloride 0.9 % neb solution, Medically necessary for scleral contact lens use. May use 3 ml 6 times a day for contact lens use, Disp: 1800 mL, Rfl: 4    Soft Lens Products (B&L SENSITIVE EYES) SOLN, Apply 1 drop to eye, Disp: , Rfl:     spironolactone (ALDACTONE) 50 MG tablet, Take 1 tablet by mouth daily, Disp: , Rfl:     tretinoin (RETIN-A) 0.025 % external cream, Apply  a pea size to entire face QD, Disp: 45 g, Rfl: 11    tretinoin (RETIN-A) 0.05 % external cream, Spread a pea size amount into affected area topically at bedtime.  Use sunscreen SPF>20., Disp: 45 g, Rfl: 1    TYRVAYA 0.03 MG/ACT nasal spray, Spray 1 spray into both nostrils 2 times daily, Disp: 4.2 mL, Rfl: 11    varenicline (TYRVAYA) 0.03 MG/ACT nasal spray, Spray 1 spray into both nostrils 2 times daily for 90 days, Disp: 25.2 mL, Rfl: 4    Vitamin D3 (CHOLECALCIFEROL) 25 mcg (1000 units) tablet, Take by mouth daily, Disp: , Rfl:     White Petrolatum-Mineral Oil (SYSTANE NIGHTTIME) OINT, Apply 3.5 g to eye 4 times daily as needed (dry eyes), Disp: 7 g, Rfl: 11    prednisoLONE acetate (PRED FORTE) 1 % ophthalmic suspension, Place 1 drop into both eyes 3 times daily for 7 days, THEN 1 drop 2 times daily for 7 days, THEN 1 drop daily for 14 days., Disp: 5 mL, Rfl: 0    Current Facility-Administered Medications:     botulinum toxin type A (BOTOX) 100 units injection 300 Units, 300 Units, Intramuscular, Q90 Days, America Gonzalez MD, 300 Units at 10/26/23 1717    botulinum toxin type A (DYSPORT) injection 500 Units, 500 Units, Intramuscular, Q90 Days, America Gonzalez MD, 500 Units at 22 1028    botulinum toxin type A (DYSPORT) injection 800 Units, 800 Units, Intramuscular, Q90 Days, America Gonzalez MD, 500 Units at 23 1547       BOTULINUM NEUROTOXIN INJECTION PROCEDURES    VERIFICATION OF PATIENT IDENTIFICATION AND PROCEDURE     Initials   Patient Name SES   Patient  SES   Procedure Verified by: KIM     Prior to the start of the procedure and with procedural staff participation, I verbally confirmed the patient s identity using two indicators, relevant allergies, that the procedure was appropriate and matched the consent or emergent situation, and that the correct equipment/implants were available. Immediately prior to starting the procedure I conducted the Time Out with the  procedural staff and re-confirmed the patient s name, procedure, and site/side. (The Joint Commission universal protocol was followed.)  Yes    Sedation (Moderate or Deep): None    ABOVE ASSESSMENTS PERFORMED BY    America Gonzalez MD      INDICATIONS FOR PROCEDURES  Roxanna Celis is a 53 year old patient with involuntary muscle spasms and pain secondary to the diagnosis of cervical dystonia. Her baseline symptoms have been recalcitrant to oral medications and conservative therapy.  She is here today for reinjection with Dysport.    GOAL OF PROCEDURE  The goal of this procedure is to increase active range of motion, improve volitional motor control, decrease pain  and enhance functional independence.      TOTAL DOSE ADMINISTERED  Dose Administered:  300 units  Botox (Botulinum Toxin Type A)       2:1 Dilution   Unavoidable Drug Waste: No.  Diluent Used:  Preservative Free Normal Saline  Total Volume of Diluent Used:  6 ml  NDC #: Botox 100u ([address]-54)      CONSENT  The risks, benefits, and treatment options were discussed with Roxanna Celis and she agreed to proceed.    Written consent was obtained by [facility].     EQUIPMENT USED  Needle-37mm stimulating/recording  Needle-30 gauge  EMG/NCS Machine    SKIN PREPARATION  Skin preparation was performed using an alcohol wipe.    GUIDANCE DESCRIPTION  Electro-myographic guidance was necessary throughout the procedure to accurately identify all areas of dystonic muscles while avoiding injection of non-dystonic muscles, neighboring nerves and nearby vascular structures.       AREA/MUSCLE INJECTED: 300 UNITS BOTOX = TOTAL DOSE, 2:1 DILUTION  1. NECK & SHOULDER GIRDLE MUSCLES: 170 UNITS BOTOX = TOTAL DOSE   Right Rectus Capitis - (upper cervical) - 5 units of Botox at 1 site/s.   Left Rectus Capitis - (upper cervical) - 5 units of Botox at 1 site/s.     Right Upper Trapezius - 25 units of Botox at 7 site/s.   Left Upper Trapezius - 25 units of Botox at 7 site/s.     Right  Splenius Cervicis - 5 units of Botox at 1 site/s.   Left Splenius Cervicis - 5 units of Botox at 1 site/s.    Right Levator Scapulae - 20 units of Botox at 2 site/s.   Left Levator Scapulae - 20 units of Botox at 2 site/s.    Right Sternocleidomastoid - 10 units of Botox at 3 site/s.  Left Sternocleidomastoid - 10 units of Botox at 3 site/s.    Right Cervical Paraspinals - 10 units of Botox at 4 site/s.  Left Cervical Paraspinals - 10 units of Botox at 4 site/s.    Right Thoracic Paraspinals - 10 units of Botox at 4 site/s.  Left Thoracic Paraspinals - 10 units of Botox at 4 site/s.    2. SCALP MUSCLES: 130 UNITS BOTOX = TOTAL DOSE  Right Occipitalis - 65 units of Botox at 15 site/s.   Left Occipitails - 65 units of Botox at 15 site/s.      BILATERAL GREATER OCCIPITAL NERVE BLOCKS    Dru% lidocaine: 2 ml   0.5% bupivacaine: 7 ml   Kenalo mg = 1 ml     Area just inferior to insertion of the right and left superior trapezius insertion onto skull was cleansed with ChloraPrep. Needle was advanced anteriorly to base of skull then slightly withdrawn and injectate was injected in a fan-like distribution at different depths. A 10 ml mixture of 1% lidocaine, 0.5% bupivacaine and Kenalog was divided into two 5 ml syringes. Total injection volume = 5 ml per side.       RESPONSE TO PROCEDURE  Roxanna Celis tolerated the procedure well and there were no immediate complications. She was allowed to recover for an appropriate period of time and was discharged home in stable condition.    ASSESSMENT AND PLAN   Botulinum toxin injections: No changes made to Botox dose or distribution today due to excellent response to the last series of injections. Patient will continue to monitor response to Botox and report at next appointment. Additionally, Occipital nerve blocks were administered to address probable occipital neuralgia. She will continue to monitor her response and report at next appointment.   Referrals: None, however  recommendations made today regarding possible chiropractor care vs functional medicine to see if her pain can be better controlled.   Follow up: Roxanna Celis was rescheduled for the next series of injections in 12 weeks, at which time we will evaluate response to today's injections. she may call the clinic prior with any questions or concerns prior to the next appointment.

## 2024-01-23 NOTE — LETTER
1/23/2024         RE: Roxanna Celis  [id number] [place] [address]        Dear Colleague,    Thank you for referring your patient, Roxanna Celis, to the [facility]. Please see a copy of my visit note below.      [facility]     PM&R CLINIC NOTE  BOTULINUM TOXIN PROCEDURE      HPI  Chief Complaint   Patient presents with     Procedure     Botox     Roxanna Celis is a 53 year old female with a history of cervical dystonia who presents to clinic for botulinum toxin injections for management of involuntary muscle spasms and neck pain secondary to her cervical dystonia.     SINCE LAST VISIT  Roxanna Celis was last seen here in clinic on 7/27/2023, at which time she received 300 units of Botox. She also received bilateral occipital nerve blocks to address occipital pain.     This past round of Botox was more effective than many previous rounds. The effects have lasted up until 3 weeks ago, which is much longer than usual. She believes that the Botox would have lasted longer if she did not have several incidents that made her pain worse (ie: an aggressive massage, jerking her head while being a passenger in the car.)    The patient denies new medical diagnoses, illnesses, hospitalizations, emergency room visits, and injuries since the previous injection with botulinum neurotoxin.     RESPONSE TO PREVIOUS TREATMENT    Side effects: None.     Pain Improvement: Yes, there was noticeable improvement of her pain, which started a few days after the Botox was administered and lasted approximately 9-10 weeks.     Dystonia Improvement: Yes, this round was quite effective as is pertains to controlling the dystonic movements of her neck and shoulder muscles. Duration of benefit: 9-10 weeks, followed by a gradual reduction in benefit.      Functional Improvement: When the Botox is working, she is able to socialize and liver her life normally. When the Botox  wears off, she is in such terrible pain that she becomes stuck in bed for prolonged periods of time, and misses out on essentially every area of life.       PHYSICAL EXAM  VS: /78 (BP Location: Right arm, Patient Position: Sitting)   Pulse 97    GEN: Pleasant and cooperative, in no acute distress  HEENT: No facial asymmetry  NECK AND TRUNK PATTERN:   Head Tremor: Pt states tremor is intermittent   Left Side-bending: Present but patient states she feels she has more right side bending now  Left Shoulder Elevation: slightly elevated  Head with right shift  Focalized pain at occipitalis - has pain today during visit    ALLERGIES  Allergies   Allergen Reactions     Contrast Dye Anaphylaxis     Other reaction(s): almost  from it     Iodinated Contrast Media Anaphylaxis     IVP DYE     Lisinopril Anaphylaxis     Lisinopril-Hydrochlorothiazide Anaphylaxis     Maple Flavor Anaphylaxis     MAPLE SYRUP  MAPLE SYRUP  MAPLE SYRUP     Maple Tree Anaphylaxis     Allergy includes maple syrup.     No Clinical Screening - See Comments      maple syrup==anaphylaxis  Dairy- causes to not feel well     Gluten Itching and Swelling     Gluten Meal Itching and Swelling     Cramping     Standard Meal Unknown     Chamomile Unknown     Codeine Sulfate Cramps and GI Disturbance     Food Unknown     Grapes, almond, lactose, gluten, real maple syrup     Nsaids Other (See Comments) and Unknown     Patient had bariatric surgery. Should not ever take NSAIDS due to high risk for gastric ulcers.   Patient had bariatric surgery. Should not ever take NSAIDS due to high risk for gastric ulcers.      Polyethylene Glycol Unknown     Per allergy testing  Per allergy testing       Erythromycin Nausea and Vomiting, Cramps, Diarrhea and Nausea     PN: LW Reaction: stomach upset  cramping     Lactose Diarrhea     Morphine Other (See Comments)       CURRENT MEDICATIONS    Current Outpatient Medications:      acetylcysteine 10 % (MUCOMYST) 10% SOLN  Place 2 drops into both eyes 6 times daily, Disp: 30 mL, Rfl: 11     BOTOX 100 units injection, Inject 250 Units into the muscle once Lot # /C3 with Expiration Date:  11/2020, Disp: , Rfl:      buPROPion (WELLBUTRIN XL) 300 MG 24 hr tablet, Take 300 mg by mouth every morning, Disp: , Rfl:      clonazePAM (KLONOPIN) 1 MG tablet, Take 1 mg by mouth 2 times daily as needed, Disp: , Rfl:      COMPOUNDED NON-CONTROLLED SUBSTANCE (CMPD RX) - PHARMACY TO MIX COMPOUNDED MEDICATION, Lactosamide 1% drop, instill 1-2 drops 6x daily into both eyes, Disp: 10 mL, Rfl: 3     cyanocobalamin (VITAMIN B-12) 1000 MCG tablet, Take 1,000 mcg by mouth daily, Disp: , Rfl:      doxycycline monohydrate (ADOXA) 50 MG tablet, Take 1 tablet (50 mg) by mouth 2 times daily, Disp: 180 tablet, Rfl: 3     DULoxetine (CYMBALTA) 60 MG capsule, TAKE 2 CAPSULES BY MOUTH EVERY DAY, Disp: , Rfl:      EPINEPHrine (ANY BX GENERIC EQUIV) 0.3 MG/0.3ML injection 2-pack, , Disp: , Rfl:      erythromycin (ROMYCIN) 5 MG/GM ophthalmic ointment, Place 0.5 inches into both eyes 2 times daily, Disp: 3.5 g, Rfl: 4     ibuprofen (ADVIL/MOTRIN) 200 MG capsule, Take 200 mg by mouth every 4 hours as needed., Disp: , Rfl:      iloperidone (FANAPT) 6 MG TABS tablet, Take 4 mg by mouth daily , Disp: , Rfl:      lifitegrast (XIIDRA) 5 % opthalmic solution, Place 1 drop into both eyes 2 times daily, Disp: 60 each, Rfl: 11     multivitamin w/minerals (THERA-VIT-M) tablet, Take 1 tablet by mouth daily, Disp: , Rfl:      ONABOTULINUMTOXINA IJ, Inject 250 Units as directed once LOT # /C3 EXPIRATION: 03/2020, Disp: , Rfl:      prednisolone 0.25% and hyaluronate in balanced salt SUSP compounded ophthalmic suspension, Place 1 Drop into both eyes 4 times daily. Discard bottle 15 days after opening. Refrigerate. Unopened bottle expires: Strength: 0.25 %, Disp: 6.67 mL, Rfl: 11     sodium chloride 0.9 % neb solution, Medically necessary for scleral contact lens use. May use 3  ml 6 times a day for contact lens use, Disp: 1800 mL, Rfl: 4     Soft Lens Products (B&L SENSITIVE EYES) SOLN, Apply 1 drop to eye, Disp: , Rfl:      spironolactone (ALDACTONE) 50 MG tablet, Take 1 tablet by mouth daily, Disp: , Rfl:      tretinoin (RETIN-A) 0.025 % external cream, Apply a pea size to entire face QD, Disp: 45 g, Rfl: 11     tretinoin (RETIN-A) 0.05 % external cream, Spread a pea size amount into affected area topically at bedtime.  Use sunscreen SPF>20., Disp: 45 g, Rfl: 1     TYRVAYA 0.03 MG/ACT nasal spray, Spray 1 spray into both nostrils 2 times daily, Disp: 4.2 mL, Rfl: 11     varenicline (TYRVAYA) 0.03 MG/ACT nasal spray, Spray 1 spray into both nostrils 2 times daily for 90 days, Disp: 25.2 mL, Rfl: 4     Vitamin D3 (CHOLECALCIFEROL) 25 mcg (1000 units) tablet, Take by mouth daily, Disp: , Rfl:      White Petrolatum-Mineral Oil (SYSTANE NIGHTTIME) OINT, Apply 3.5 g to eye 4 times daily as needed (dry eyes), Disp: 7 g, Rfl: 11     prednisoLONE acetate (PRED FORTE) 1 % ophthalmic suspension, Place 1 drop into both eyes 3 times daily for 7 days, THEN 1 drop 2 times daily for 7 days, THEN 1 drop daily for 14 days., Disp: 5 mL, Rfl: 0    Current Facility-Administered Medications:      botulinum toxin type A (BOTOX) 100 units injection 300 Units, 300 Units, Intramuscular, Q90 Days, America Gonzalez MD, 300 Units at 10/26/23 1717     botulinum toxin type A (DYSPORT) injection 500 Units, 500 Units, Intramuscular, Q90 Days, America Gonzalez MD, 500 Units at 22 1028     botulinum toxin type A (DYSPORT) injection 800 Units, 800 Units, Intramuscular, Q90 Days, America Gonzalez MD, 500 Units at 23 1547       BOTULINUM NEUROTOXIN INJECTION PROCEDURES    VERIFICATION OF PATIENT IDENTIFICATION AND PROCEDURE     Initials   Patient Name SES   Patient  SES   Procedure Verified by: KIM     Prior to the start of the procedure and with procedural staff participation, I  verbally confirmed the patient s identity using two indicators, relevant allergies, that the procedure was appropriate and matched the consent or emergent situation, and that the correct equipment/implants were available. Immediately prior to starting the procedure I conducted the Time Out with the procedural staff and re-confirmed the patient s name, procedure, and site/side. (The Joint Commission universal protocol was followed.)  Yes    Sedation (Moderate or Deep): None    ABOVE ASSESSMENTS PERFORMED BY    America Gonzalez MD      INDICATIONS FOR PROCEDURES  Roxanna Celis is a 53 year old patient with involuntary muscle spasms and pain secondary to the diagnosis of cervical dystonia. Her baseline symptoms have been recalcitrant to oral medications and conservative therapy.  She is here today for reinjection with Dysport.    GOAL OF PROCEDURE  The goal of this procedure is to increase active range of motion, improve volitional motor control, decrease pain  and enhance functional independence.      TOTAL DOSE ADMINISTERED  Dose Administered:  300 units  Botox (Botulinum Toxin Type A)       2:1 Dilution   Unavoidable Drug Waste: No.  Diluent Used:  Preservative Free Normal Saline  Total Volume of Diluent Used:  6 ml  NDC #: Botox 100u ([address]-74)      CONSENT  The risks, benefits, and treatment options were discussed with Roxanna Celis and she agreed to proceed.    Written consent was obtained by [facility].     EQUIPMENT USED  Needle-37mm stimulating/recording  Needle-30 gauge  EMG/NCS Machine    SKIN PREPARATION  Skin preparation was performed using an alcohol wipe.    GUIDANCE DESCRIPTION  Electro-myographic guidance was necessary throughout the procedure to accurately identify all areas of dystonic muscles while avoiding injection of non-dystonic muscles, neighboring nerves and nearby vascular structures.       AREA/MUSCLE INJECTED: 300 UNITS BOTOX = TOTAL DOSE, 2:1 DILUTION  1. NECK & SHOULDER GIRDLE MUSCLES:  170 UNITS BOTOX = TOTAL DOSE   Right Rectus Capitis - (upper cervical) - 5 units of Botox at 1 site/s.   Left Rectus Capitis - (upper cervical) - 5 units of Botox at 1 site/s.     Right Upper Trapezius - 25 units of Botox at 7 site/s.   Left Upper Trapezius - 25 units of Botox at 7 site/s.     Right Splenius Cervicis - 5 units of Botox at 1 site/s.   Left Splenius Cervicis - 5 units of Botox at 1 site/s.    Right Levator Scapulae - 20 units of Botox at 2 site/s.   Left Levator Scapulae - 20 units of Botox at 2 site/s.    Right Sternocleidomastoid - 10 units of Botox at 3 site/s.  Left Sternocleidomastoid - 10 units of Botox at 3 site/s.    Right Cervical Paraspinals - 10 units of Botox at 4 site/s.  Left Cervical Paraspinals - 10 units of Botox at 4 site/s.    Right Thoracic Paraspinals - 10 units of Botox at 4 site/s.  Left Thoracic Paraspinals - 10 units of Botox at 4 site/s.    2. SCALP MUSCLES: 130 UNITS BOTOX = TOTAL DOSE  Right Occipitalis - 65 units of Botox at 15 site/s.   Left Occipitails - 65 units of Botox at 15 site/s.      BILATERAL GREATER OCCIPITAL NERVE BLOCKS    Dru% lidocaine: 2 ml   0.5% bupivacaine: 7 ml   Kenalo mg = 1 ml     Area just inferior to insertion of the right and left superior trapezius insertion onto skull was cleansed with ChloraPrep. Needle was advanced anteriorly to base of skull then slightly withdrawn and injectate was injected in a fan-like distribution at different depths. A 10 ml mixture of 1% lidocaine, 0.5% bupivacaine and Kenalog was divided into two 5 ml syringes. Total injection volume = 5 ml per side.       RESPONSE TO PROCEDURE  Roxanna Celis tolerated the procedure well and there were no immediate complications. She was allowed to recover for an appropriate period of time and was discharged home in stable condition.    ASSESSMENT AND PLAN   Botulinum toxin injections: No changes made to Botox dose or distribution today due to excellent response to the last  series of injections. Patient will continue to monitor response to Botox and report at next appointment. Additionally, Occipital nerve blocks were administered to address probable occipital neuralgia. She will continue to monitor her response and report at next appointment.   Referrals: None, however recommendations made today regarding possible chiropractor care vs functional medicine to see if her pain can be better controlled.   Follow up: Roxanna Celis was rescheduled for the next series of injections in 12 weeks, at which time we will evaluate response to today's injections. she may call the clinic prior with any questions or concerns prior to the next appointment.       Again, thank you for allowing me to participate in the care of your patient.        Sincerely,        America Gonzalez MD

## 2024-01-23 NOTE — NURSING NOTE
Chief Complaint   Patient presents with    Procedure     Botox     Leandra Bautista MA on 1/23/2024 at 1:15 PM

## 2024-03-11 NOTE — TELEPHONE ENCOUNTER
Calling to see if Dr. Velazquez spoke to the  [facility]  ne[facility]rologist yet. Patient did see a neurologist yesterday at [facility] and that didn't go well.     No indicators present

## 2024-03-14 ENCOUNTER — OFFICE VISIT (OUTPATIENT)
Dept: OPHTHALMOLOGY | Facility: CLINIC | Age: 54
End: 2024-03-14
Attending: OPHTHALMOLOGY
Payer: COMMERCIAL

## 2024-03-14 DIAGNOSIS — H02.88A MEIBOMIAN GLAND DYSFUNCTION (MGD), BILATERAL, BOTH UPPER AND LOWER LIDS: ICD-10-CM

## 2024-03-14 DIAGNOSIS — H02.88B MEIBOMIAN GLAND DYSFUNCTION (MGD), BILATERAL, BOTH UPPER AND LOWER LIDS: ICD-10-CM

## 2024-03-14 DIAGNOSIS — H04.123 BILATERAL DRY EYES: Primary | ICD-10-CM

## 2024-03-14 PROCEDURE — 99214 OFFICE O/P EST MOD 30 MIN: CPT | Mod: GC | Performed by: OPHTHALMOLOGY

## 2024-03-14 PROCEDURE — 99211 OFF/OP EST MAY X REQ PHY/QHP: CPT | Performed by: OPHTHALMOLOGY

## 2024-03-14 RX ORDER — PERFLUOROHEXYLOCTANE 1 MG/MG
1 SOLUTION OPHTHALMIC 4 TIMES DAILY
Qty: 3 ML | Refills: 11 | Status: SHIPPED | OUTPATIENT
Start: 2024-03-14 | End: 2024-08-29

## 2024-03-14 ASSESSMENT — TONOMETRY
OS_IOP_MMHG: 14
OD_IOP_MMHG: 13
IOP_METHOD: ICARE

## 2024-03-14 ASSESSMENT — VISUAL ACUITY
OS_CC: 20/40
OS_CC+: -2
METHOD: SNELLEN - LINEAR
OD_CC: 20/40
OD_CC+: -1
CORRECTION_TYPE: GLASSES

## 2024-03-14 ASSESSMENT — REFRACTION_WEARINGRX
OD_CYLINDER: +0.25
OD_SPHERE: -3.25
OD_AXIS: 160
OS_AXIS: 010
OS_CYLINDER: +0.25
OS_SPHERE: -3.00

## 2024-03-14 ASSESSMENT — EXTERNAL EXAM - LEFT EYE: OS_EXAM: NORMAL

## 2024-03-14 ASSESSMENT — EXTERNAL EXAM - RIGHT EYE: OD_EXAM: NORMAL

## 2024-03-14 NOTE — PROGRESS NOTES
Chief Complaint(s) and History of Present Illness(es)     Follow Up            Laterality: both eyes    Associated symptoms: dryness and eye pain.  Negative for flashes and   floaters    Treatments tried: eye drops          Comments    Here for follow up. VA is about the same. Some pain and dryness. Mild   improvement with drops. No flashes or floaters.    Norberto Caraballo COT 11:59 AM March 14, 2024                Review of systems for the eyes was negative other than the pertinent positives/negatives listed in the HPI.      Assessment & Plan      Roxanna Celis is a 53 year old female with the following diagnoses:   1. Bilateral dry eyes         Symptoms unchanged  Felt lactosamide made dryness worse after 1 month of use  Scleral lens fit is optimal per exam with Dr. Anthony in Jan    Add miebo four times a day both eyes     Continue current drops: Xiidra BID OU, erythromycin ointment (not using), systane ointment (using), pred healon (using QID both eyes), using saline instead of artificial tears  Continue doxycycline 50 mg BID  S/p RLL and LLL collagen punctal plugs 12/12/23  Good tear meniscus today.  No indication for replacement at this time  Continue scleral lenses    Patient disposition:   Gabrielle in 6 weeks as scheduled   Terrence in 6 months for dilated fundus exam        Attending Physician Attestation:  Complete documentation of historical and exam elements from today's encounter can be found in the full encounter summary report (not reduplicated in this progress note).  I personally obtained the chief complaint(s) and history of present illness.  I confirmed and edited as necessary the review of systems, past medical/surgical history, family history, social history, and examination findings as documented by others; and I examined the patient myself.  I personally reviewed the relevant tests, images, and reports as documented above.  I formulated and edited as necessary the assessment and plan and discussed the  findings and management plan with the patient and family. . - Faheem Ocampo MD

## 2024-03-14 NOTE — NURSING NOTE
Chief Complaints and History of Present Illnesses   Patient presents with    Follow Up     Chief Complaint(s) and History of Present Illness(es)       Follow Up              Laterality: both eyes    Associated symptoms: dryness and eye pain.  Negative for flashes and floaters    Treatments tried: eye drops              Comments    Here for follow up. VA is about the same. Some pain and dryness. Mild improvement with drops. No flashes or floaters.    Norberto Caraballo COT 11:59 AM March 14, 2024

## 2024-04-18 ENCOUNTER — OFFICE VISIT (OUTPATIENT)
Dept: PHYSICAL MEDICINE AND REHAB | Facility: CLINIC | Age: 54
End: 2024-04-18
Payer: COMMERCIAL

## 2024-04-18 VITALS — SYSTOLIC BLOOD PRESSURE: 129 MMHG | HEART RATE: 82 BPM | DIASTOLIC BLOOD PRESSURE: 80 MMHG

## 2024-04-18 DIAGNOSIS — G24.3 CERVICAL DYSTONIA: Primary | ICD-10-CM

## 2024-04-18 DIAGNOSIS — M54.81 BILATERAL OCCIPITAL NEURALGIA: ICD-10-CM

## 2024-04-18 PROCEDURE — 95874 GUIDE NERV DESTR NEEDLE EMG: CPT | Performed by: PHYSICAL MEDICINE & REHABILITATION

## 2024-04-18 PROCEDURE — 64616 CHEMODENERV MUSC NECK DYSTON: CPT | Mod: XS | Performed by: PHYSICAL MEDICINE & REHABILITATION

## 2024-04-18 PROCEDURE — 64616 CHEMODENERV MUSC NECK DYSTON: CPT | Mod: RT | Performed by: PHYSICAL MEDICINE & REHABILITATION

## 2024-04-18 PROCEDURE — 64405 NJX AA&/STRD GR OCPL NRV: CPT | Mod: XS | Performed by: PHYSICAL MEDICINE & REHABILITATION

## 2024-04-18 NOTE — PROGRESS NOTES
[facility] [place]     PM&R CLINIC NOTE  BOTULINUM TOXIN PROCEDURE      HPI  Chief Complaint   Patient presents with    Procedure     Botox     Roxanna Celis is a 53 year old female with a history of cervical dystonia who presents to clinic for botulinum toxin injections for management of involuntary muscle spasms and neck pain secondary to her cervical dystonia.     SINCE LAST VISIT  Roxanna Celis was last seen here in clinic on 1/23/2024, at which time she received 300 units of Botox. She also received bilateral occipital nerve blocks to address occipital pain.     She was recently diagnosed with chronic Lyme's disease, and therefore is going to be undergoing homeopathic treatment for this.     The patient otherwise denies new medical diagnoses, illnesses, hospitalizations, emergency room visits, and injuries since the previous injection with botulinum neurotoxin.     RESPONSE TO PREVIOUS TREATMENT    Side effects: None.     Pain Improvement: Yes, there was noticeable improvement of her pain, which started a few days after the Botox was administered and lasted approximately 9-10 weeks.     Dystonia Improvement: Yes, this round was quite effective as is pertains to controlling the dystonic movements of her neck and shoulder muscles. Duration of benefit: 9-10 weeks, followed by a gradual reduction in benefit.      Functional Improvement: When the Botox is working, she is able to socialize and liver her life normally. When the Botox wears off, she is in such terrible pain that she becomes stuck in bed for prolonged periods of time, and misses out on essentially every area of life.       PHYSICAL EXAM  VS: /80 (BP Location: Right arm, Patient Position: Sitting)   Pulse 82    GEN: Pleasant and cooperative, in no acute distress  HEENT: No facial asymmetry  NECK AND TRUNK PATTERN:   Head Tremor: Pt states tremor is intermittent   Left Side-bending: Present but patient states she  feels she has more right side bending now  Left Shoulder Elevation: slightly elevated  Head with right shift  Focalized pain at occipitalis - has pain today during visit    ALLERGIES  Allergies   Allergen Reactions    Contrast Dye Anaphylaxis     Other reaction(s): almost  from it    Iodinated Contrast Media Anaphylaxis     IVP DYE    Lisinopril Anaphylaxis    Lisinopril-Hydrochlorothiazide Anaphylaxis    Maple Flavor Anaphylaxis     MAPLE SYRUP  MAPLE SYRUP  MAPLE SYRUP    Maple Tree Anaphylaxis     Allergy includes maple syrup.    No Clinical Screening - See Comments      maple syrup==anaphylaxis  Dairy- causes to not feel well    Gluten Itching and Swelling    Gluten Meal Itching and Swelling     Cramping    [place] Meal Unknown    Chamomile Unknown    Codeine Sulfate Cramps and GI Disturbance    Food Unknown     Grapes, almond, lactose, gluten, real maple syrup    Nsaids Other (See Comments) and Unknown     Patient had bariatric surgery. Should not ever take NSAIDS due to high risk for gastric ulcers.   Patient had bariatric surgery. Should not ever take NSAIDS due to high risk for gastric ulcers.     Polyethylene Glycol Unknown     Per allergy testing  Per allergy testing      Erythromycin Nausea and Vomiting, Cramps, Diarrhea and Nausea     PN: LW Reaction: stomach upset  cramping    Lactose Diarrhea    Morphine Other (See Comments)       CURRENT MEDICATIONS    Current Outpatient Medications:     acetylcysteine 10 % (MUCOMYST) 10% SOLN, Place 2 drops into both eyes 6 times daily, Disp: 30 mL, Rfl: 11    BOTOX 100 units injection, Inject 250 Units into the muscle once Lot # /C3 with Expiration Date:  2020, Disp: , Rfl:     buPROPion (WELLBUTRIN XL) 300 MG 24 hr tablet, Take 300 mg by mouth every morning, Disp: , Rfl:     clonazePAM (KLONOPIN) 1 MG tablet, Take 1 mg by mouth 2 times daily as needed, Disp: , Rfl:     COMPOUNDED NON-CONTROLLED SUBSTANCE (CMPD RX) - PHARMACY TO MIX COMPOUNDED MEDICATION,  Lactosamide 1% drop, instill 1-2 drops 6x daily into both eyes, Disp: 10 mL, Rfl: 3    cyanocobalamin (VITAMIN B-12) 1000 MCG tablet, Take 1,000 mcg by mouth daily, Disp: , Rfl:     doxycycline monohydrate (ADOXA) 50 MG tablet, Take 1 tablet (50 mg) by mouth 2 times daily, Disp: 180 tablet, Rfl: 3    DULoxetine (CYMBALTA) 60 MG capsule, TAKE 2 CAPSULES BY MOUTH EVERY DAY, Disp: , Rfl:     EPINEPHrine (ANY BX GENERIC EQUIV) 0.3 MG/0.3ML injection 2-pack, , Disp: , Rfl:     erythromycin (ROMYCIN) 5 MG/GM ophthalmic ointment, Place 0.5 inches into both eyes 2 times daily, Disp: 3.5 g, Rfl: 4    ibuprofen (ADVIL/MOTRIN) 200 MG capsule, Take 200 mg by mouth every 4 hours as needed., Disp: , Rfl:     iloperidone (FANAPT) 6 MG TABS tablet, Take 4 mg by mouth daily , Disp: , Rfl:     lifitegrast (XIIDRA) 5 % opthalmic solution, Place 1 drop into both eyes 2 times daily, Disp: 60 each, Rfl: 11    multivitamin w/minerals (THERA-VIT-M) tablet, Take 1 tablet by mouth daily, Disp: , Rfl:     ONABOTULINUMTOXINA IJ, Inject 250 Units as directed once LOT # /C3 EXPIRATION: 03/2020, Disp: , Rfl:     Perfluorohexyloctane (MIEBO) 1.338 GM/ML SOLN, Apply 1 drop to eye 4 times daily, Disp: 3 mL, Rfl: 11    prednisolone 0.25% and hyaluronate in balanced salt SUSP compounded ophthalmic suspension, Place 1 Drop into both eyes 4 times daily. Discard bottle 15 days after opening. Refrigerate. Unopened bottle expires: Strength: 0.25 %, Disp: 6.67 mL, Rfl: 11    sodium chloride 0.9 % neb solution, Medically necessary for scleral contact lens use. May use 3 ml 6 times a day for contact lens use, Disp: 1800 mL, Rfl: 4    Soft Lens Products (B&L SENSITIVE EYES) SOLN, Apply 1 drop to eye, Disp: , Rfl:     spironolactone (ALDACTONE) 50 MG tablet, Take 1 tablet by mouth daily, Disp: , Rfl:     tretinoin (RETIN-A) 0.025 % external cream, Apply a pea size to entire face QD, Disp: 45 g, Rfl: 11    tretinoin (RETIN-A) 0.05 % external cream, Spread a  pea size amount into affected area topically at bedtime.  Use sunscreen SPF>20., Disp: 45 g, Rfl: 1    TYRVAYA 0.03 MG/ACT nasal spray, Spray 1 spray into both nostrils 2 times daily, Disp: 4.2 mL, Rfl: 11    Vitamin D3 (CHOLECALCIFEROL) 25 mcg (1000 units) tablet, Take by mouth daily, Disp: , Rfl:     White Petrolatum-Mineral Oil (SYSTANE NIGHTTIME) OINT, Apply 3.5 g to eye 4 times daily as needed (dry eyes), Disp: 7 g, Rfl: 11    prednisoLONE acetate (PRED FORTE) 1 % ophthalmic suspension, Place 1 drop into both eyes 3 times daily for 7 days, THEN 1 drop 2 times daily for 7 days, THEN 1 drop daily for 14 days., Disp: 5 mL, Rfl: 0    Current Facility-Administered Medications:     botulinum toxin type A (BOTOX) 100 units injection 300 Units, 300 Units, Intramuscular, Q90 Days, America Gonzalez MD, 300 Units at 24 1613    botulinum toxin type A (DYSPORT) injection 500 Units, 500 Units, Intramuscular, Q90 Days, America Gonzalez MD, 500 Units at 22 1028    botulinum toxin type A (DYSPORT) injection 800 Units, 800 Units, Intramuscular, Q90 Days, America Gonzalez MD, 500 Units at 23 1547       BOTULINUM NEUROTOXIN INJECTION PROCEDURES    VERIFICATION OF PATIENT IDENTIFICATION AND PROCEDURE     Initials   Patient Name SES   Patient  SES   Procedure Verified by: SES     Prior to the start of the procedure and with procedural staff participation, I verbally confirmed the patient s identity using two indicators, relevant allergies, that the procedure was appropriate and matched the consent or emergent situation, and that the correct equipment/implants were available. Immediately prior to starting the procedure I conducted the Time Out with the procedural staff and re-confirmed the patient s name, procedure, and site/side. (The Joint Commission universal protocol was followed.)  Yes    Sedation (Moderate or Deep): None    ABOVE ASSESSMENTS PERFORMED BY    America Gonzalez  MD      INDICATIONS FOR PROCEDURES  Roxanna Celis is a 53 year old patient with involuntary muscle spasms and pain secondary to the diagnosis of cervical dystonia. Her baseline symptoms have been recalcitrant to oral medications and conservative therapy.  She is here today for reinjection with Dysport.    GOAL OF PROCEDURE  The goal of this procedure is to increase active range of motion, improve volitional motor control, decrease pain  and enhance functional independence.      TOTAL DOSE ADMINISTERED  Dose Administered:  300 units  Botox (Botulinum Toxin Type A)       2:1 Dilution   Unavoidable Drug Waste: No.  Diluent Used:  Preservative Free Normal Saline  Total Volume of Diluent Used:  6 ml  NDC #: Botox 100u ([id number])      CONSENT  The risks, benefits, and treatment options were discussed with Roxanna Celis and she agreed to proceed.    Written consent was obtained by [facility].     EQUIPMENT USED  Needle-37mm stimulating/recording  Needle-30 gauge  EMG/NCS Machine    SKIN PREPARATION  Skin preparation was performed using an alcohol wipe.    GUIDANCE DESCRIPTION  Electro-myographic guidance was necessary throughout the procedure to accurately identify all areas of dystonic muscles while avoiding injection of non-dystonic muscles, neighboring nerves and nearby vascular structures.       AREA/MUSCLE INJECTED: 300 UNITS BOTOX = TOTAL DOSE, 2:1 DILUTION  1. NECK & SHOULDER GIRDLE MUSCLES: 170 UNITS BOTOX = TOTAL DOSE   Right Rectus Capitis - (upper cervical) - 5 units of Botox at 1 site/s.   Left Rectus Capitis - (upper cervical) - 5 units of Botox at 1 site/s.     Right Upper Trapezius - 25 units of Botox at 7 site/s.   Left Upper Trapezius - 25 units of Botox at 7 site/s.     Right Splenius Cervicis - 5 units of Botox at 1 site/s.   Left Splenius Cervicis - 5 units of Botox at 1 site/s.    Right Levator Scapulae - 20 units of Botox at 2 site/s.   Left Levator Scapulae - 20 units of Botox at 2 site/s.    Right  Sternocleidomastoid - 10 units of Botox at 3 site/s.  Left Sternocleidomastoid - 10 units of Botox at 3 site/s.    Right Cervical Paraspinals - 10 units of Botox at 4 site/s.  Left Cervical Paraspinals - 10 units of Botox at 4 site/s.    Right Thoracic Paraspinals - 10 units of Botox at 4 site/s.  Left Thoracic Paraspinals - 10 units of Botox at 4 site/s.    2. SCALP MUSCLES: 130 UNITS BOTOX = TOTAL DOSE  Right Occipitalis - 65 units of Botox at 15 site/s.   Left Occipitails - 65 units of Botox at 15 site/s.      BILATERAL GREATER OCCIPITAL NERVE BLOCKS    Dru% lidocaine: 2 ml   0.5% bupivacaine: 7 ml   Kenalo mg = 1 ml     Area just inferior to insertion of the right and left superior trapezius insertion onto skull was cleansed with ChloraPrep. Needle was advanced anteriorly to base of skull then slightly withdrawn and injectate was injected in a fan-like distribution at different depths. A 10 ml mixture of 1% lidocaine, 0.5% bupivacaine and Kenalog was divided into two 5 ml syringes. Total injection volume = 5 ml per side.       RESPONSE TO PROCEDURE  Roxanna Celis tolerated the procedure well and there were no immediate complications. She was allowed to recover for an appropriate period of time and was discharged home in stable condition.    ASSESSMENT AND PLAN   Botulinum toxin injections: No changes made to Botox dose or distribution today due to excellent response to the last series of injections. Patient will continue to monitor response to Botox and report at next appointment. Additionally, Occipital nerve blocks were administered to address probable occipital neuralgia. She will continue to monitor her response and report at next appointment.   Referrals: None, however recommendations made today regarding possible chiropractor care vs functional medicine to see if her pain can be better controlled.   Follow up: Roxanna Celis was rescheduled for the next series of injections in 12 weeks, at which  time we will evaluate response to today's injections. she may call the clinic prior with any questions or concerns prior to the next appointment.

## 2024-04-18 NOTE — NURSING NOTE
Chief Complaint   Patient presents with    Procedure     Blake Bautista MA on 4/18/2024 at 2:30 PM

## 2024-04-18 NOTE — PROGRESS NOTES
[facility]     PM&R CLINIC NOTE  BOTULINUM TOXIN PROCEDURE      HPI  Chief Complaint   Patient presents with    Procedure     Botox     Roxanna Celis is a 53 year old female with a history of cervical dystonia who presents to clinic for botulinum toxin injections for management of involuntary muscle spasms and neck pain secondary to her cervical dystonia.     SINCE LAST VISIT  Roxanna Celis was last seen here in clinic on 1/23/2024, at which time she received 300 units of Botox. She also received bilateral occipital nerve blocks to address occipital pain.     The patient denies new medical diagnoses, illnesses, hospitalizations, emergency room visits, and injuries since the previous injection with botulinum neurotoxin.     RESPONSE TO PREVIOUS TREATMENT    Side effects: None.     Pain Improvement: Yes, there was noticeable improvement of her pain, which started a few days after the Botox was administered and lasted approximately 9-10 weeks.     Dystonia Improvement: Yes, this round was quite effective as is pertains to controlling the dystonic movements of her neck and shoulder muscles. Duration of benefit: 9-10 weeks, followed by a gradual reduction in benefit.      Functional Improvement: When the Botox is working, she is able to socialize and liver her life normally. When the Botox wears off, she is in such terrible pain that she becomes stuck in bed for prolonged periods of time, and misses out on essentially every area of life.       PHYSICAL EXAM  VS: /80 (BP Location: Right arm, Patient Position: Sitting)   Pulse 82    GEN: Pleasant and cooperative, in no acute distress  HEENT: No facial asymmetry  NECK AND TRUNK PATTERN:   Head Tremor: Pt states tremor is intermittent   Left Side-bending: Present but patient states she feels she has more right side bending now  Left Shoulder Elevation: slightly elevated  Head with right shift  Focalized pain at occipitalis - has  pain today during visit    ALLERGIES  Allergies   Allergen Reactions    Contrast Dye Anaphylaxis     Other reaction(s): almost  from it    Iodinated Contrast Media Anaphylaxis     IVP DYE    Lisinopril Anaphylaxis    Lisinopril-Hydrochlorothiazide Anaphylaxis    Maple Flavor Anaphylaxis     MAPLE SYRUP  MAPLE SYRUP  MAPLE SYRUP    Maple Tree Anaphylaxis     Allergy includes maple syrup.    No Clinical Screening - See Comments      maple syrup==anaphylaxis  Dairy- causes to not feel well    Gluten Itching and Swelling    Gluten Meal Itching and Swelling     Cramping    [place] Meal Unknown    Chamomile Unknown    Codeine Sulfate Cramps and GI Disturbance    Food Unknown     Grapes, almond, lactose, gluten, real maple syrup    Nsaids Other (See Comments) and Unknown     Patient had bariatric surgery. Should not ever take NSAIDS due to high risk for gastric ulcers.   Patient had bariatric surgery. Should not ever take NSAIDS due to high risk for gastric ulcers.     Polyethylene Glycol Unknown     Per allergy testing  Per allergy testing      Erythromycin Nausea and Vomiting, Cramps, Diarrhea and Nausea     PN: LW Reaction: stomach upset  cramping    Lactose Diarrhea    Morphine Other (See Comments)       CURRENT MEDICATIONS    Current Outpatient Medications:     acetylcysteine 10 % (MUCOMYST) 10% SOLN, Place 2 drops into both eyes 6 times daily, Disp: 30 mL, Rfl: 11    BOTOX 100 units injection, Inject 250 Units into the muscle once Lot # /C3 with Expiration Date:  2020, Disp: , Rfl:     buPROPion (WELLBUTRIN XL) 300 MG 24 hr tablet, Take 300 mg by mouth every morning, Disp: , Rfl:     clonazePAM (KLONOPIN) 1 MG tablet, Take 1 mg by mouth 2 times daily as needed, Disp: , Rfl:     COMPOUNDED NON-CONTROLLED SUBSTANCE (CMPD RX) - PHARMACY TO MIX COMPOUNDED MEDICATION, Lactosamide 1% drop, instill 1-2 drops 6x daily into both eyes, Disp: 10 mL, Rfl: 3    cyanocobalamin (VITAMIN B-12) 1000 MCG tablet, Take 1,000  mcg by mouth daily, Disp: , Rfl:     doxycycline monohydrate (ADOXA) 50 MG tablet, Take 1 tablet (50 mg) by mouth 2 times daily, Disp: 180 tablet, Rfl: 3    DULoxetine (CYMBALTA) 60 MG capsule, TAKE 2 CAPSULES BY MOUTH EVERY DAY, Disp: , Rfl:     EPINEPHrine (ANY BX GENERIC EQUIV) 0.3 MG/0.3ML injection 2-pack, , Disp: , Rfl:     erythromycin (ROMYCIN) 5 MG/GM ophthalmic ointment, Place 0.5 inches into both eyes 2 times daily, Disp: 3.5 g, Rfl: 4    ibuprofen (ADVIL/MOTRIN) 200 MG capsule, Take 200 mg by mouth every 4 hours as needed., Disp: , Rfl:     iloperidone (FANAPT) 6 MG TABS tablet, Take 4 mg by mouth daily , Disp: , Rfl:     lifitegrast (XIIDRA) 5 % opthalmic solution, Place 1 drop into both eyes 2 times daily, Disp: 60 each, Rfl: 11    multivitamin w/minerals (THERA-VIT-M) tablet, Take 1 tablet by mouth daily, Disp: , Rfl:     ONABOTULINUMTOXINA IJ, Inject 250 Units as directed once LOT # /C3 EXPIRATION: 03/2020, Disp: , Rfl:     Perfluorohexyloctane (MIEBO) 1.338 GM/ML SOLN, Apply 1 drop to eye 4 times daily, Disp: 3 mL, Rfl: 11    prednisolone 0.25% and hyaluronate in balanced salt SUSP compounded ophthalmic suspension, Place 1 Drop into both eyes 4 times daily. Discard bottle 15 days after opening. Refrigerate. Unopened bottle expires: Strength: 0.25 %, Disp: 6.67 mL, Rfl: 11    sodium chloride 0.9 % neb solution, Medically necessary for scleral contact lens use. May use 3 ml 6 times a day for contact lens use, Disp: 1800 mL, Rfl: 4    Soft Lens Products (B&L SENSITIVE EYES) SOLN, Apply 1 drop to eye, Disp: , Rfl:     spironolactone (ALDACTONE) 50 MG tablet, Take 1 tablet by mouth daily, Disp: , Rfl:     tretinoin (RETIN-A) 0.025 % external cream, Apply a pea size to entire face QD, Disp: 45 g, Rfl: 11    tretinoin (RETIN-A) 0.05 % external cream, Spread a pea size amount into affected area topically at bedtime.  Use sunscreen SPF>20., Disp: 45 g, Rfl: 1    TYRVAYA 0.03 MG/ACT nasal spray, Spray 1  spray into both nostrils 2 times daily, Disp: 4.2 mL, Rfl: 11    Vitamin D3 (CHOLECALCIFEROL) 25 mcg (1000 units) tablet, Take by mouth daily, Disp: , Rfl:     White Petrolatum-Mineral Oil (SYSTANE NIGHTTIME) OINT, Apply 3.5 g to eye 4 times daily as needed (dry eyes), Disp: 7 g, Rfl: 11    prednisoLONE acetate (PRED FORTE) 1 % ophthalmic suspension, Place 1 drop into both eyes 3 times daily for 7 days, THEN 1 drop 2 times daily for 7 days, THEN 1 drop daily for 14 days., Disp: 5 mL, Rfl: 0    Current Facility-Administered Medications:     botulinum toxin type A (BOTOX) 100 units injection 300 Units, 300 Units, Intramuscular, Q90 Days, America Gonzalez MD, 300 Units at 24 1613    botulinum toxin type A (DYSPORT) injection 500 Units, 500 Units, Intramuscular, Q90 Days, America Gonzalez MD, 500 Units at 22 1028    botulinum toxin type A (DYSPORT) injection 800 Units, 800 Units, Intramuscular, Q90 Days, America Gonzalez MD, 500 Units at 23 1547       BOTULINUM NEUROTOXIN INJECTION PROCEDURES    VERIFICATION OF PATIENT IDENTIFICATION AND PROCEDURE     Initials   Patient Name SES   Patient  SES   Procedure Verified by: KIM     Prior to the start of the procedure and with procedural staff participation, I verbally confirmed the patient s identity using two indicators, relevant allergies, that the procedure was appropriate and matched the consent or emergent situation, and that the correct equipment/implants were available. Immediately prior to starting the procedure I conducted the Time Out with the procedural staff and re-confirmed the patient s name, procedure, and site/side. (The Joint Commission universal protocol was followed.)  Yes    Sedation (Moderate or Deep): None    ABOVE ASSESSMENTS PERFORMED BY    America Gonzalez MD      INDICATIONS FOR PROCEDURES  Roxanna Celis is a 53 year old patient with involuntary muscle spasms and pain secondary to the diagnosis of  cervical dystonia. Her baseline symptoms have been recalcitrant to oral medications and conservative therapy.  She is here today for reinjection with Dysport.    GOAL OF PROCEDURE  The goal of this procedure is to increase active range of motion, improve volitional motor control, decrease pain  and enhance functional independence.      TOTAL DOSE ADMINISTERED  Dose Administered:  300 units  Botox (Botulinum Toxin Type A)       2:1 Dilution   Unavoidable Drug Waste: No.  Diluent Used:  Preservative Free Normal Saline  Total Volume of Diluent Used:  6 ml  NDC #: Botox 100u ([address]-83)      CONSENT  The risks, benefits, and treatment options were discussed with Roxanna KEE Lalita and she agreed to proceed.    Written consent was obtained by [facility].     EQUIPMENT USED  Needle-37mm stimulating/recording  Needle-30 gauge  EMG/NCS Machine    SKIN PREPARATION  Skin preparation was performed using an alcohol wipe.    GUIDANCE DESCRIPTION  Electro-myographic guidance was necessary throughout the procedure to accurately identify all areas of dystonic muscles while avoiding injection of non-dystonic muscles, neighboring nerves and nearby vascular structures.       AREA/MUSCLE INJECTED: 300 UNITS BOTOX = TOTAL DOSE, 2:1 DILUTION  1. NECK & SHOULDER GIRDLE MUSCLES: 170 UNITS BOTOX = TOTAL DOSE   Right Rectus Capitis - (upper cervical) - 5 units of Botox at 1 site/s.   Left Rectus Capitis - (upper cervical) - 5 units of Botox at 1 site/s.     Right Upper Trapezius - 25 units of Botox at 7 site/s.   Left Upper Trapezius - 25 units of Botox at 7 site/s.     Right Splenius Cervicis - 5 units of Botox at 1 site/s.   Left Splenius Cervicis - 5 units of Botox at 1 site/s.    Right Levator Scapulae - 20 units of Botox at 2 site/s.   Left Levator Scapulae - 20 units of Botox at 2 site/s.    Right Sternocleidomastoid - 10 units of Botox at 3 site/s.  Left Sternocleidomastoid - 10 units of Botox at 3 site/s.    Right Cervical Paraspinals - 10  units of Botox at 4 site/s.  Left Cervical Paraspinals - 10 units of Botox at 4 site/s.    Right Thoracic Paraspinals - 10 units of Botox at 4 site/s.  Left Thoracic Paraspinals - 10 units of Botox at 4 site/s.    2. SCALP MUSCLES: 130 UNITS BOTOX = TOTAL DOSE  Right Occipitalis - 65 units of Botox at 15 site/s.   Left Occipitails - 65 units of Botox at 15 site/s.      BILATERAL GREATER OCCIPITAL NERVE BLOCKS    Dru% lidocaine: 2 ml   0.5% bupivacaine: 7 ml   Kenalo mg = 1 ml     Area just inferior to insertion of the right and left superior trapezius insertion onto skull was cleansed with ChloraPrep. Needle was advanced anteriorly to base of skull then slightly withdrawn and injectate was injected in a fan-like distribution at different depths. A 10 ml mixture of 1% lidocaine, 0.5% bupivacaine and Kenalog was divided into two 5 ml syringes. Total injection volume = 5 ml per side.       RESPONSE TO PROCEDURE  Roxanna Celis tolerated the procedure well and there were no immediate complications. She was allowed to recover for an appropriate period of time and was discharged home in stable condition.    ASSESSMENT AND PLAN   Botulinum toxin injections: No changes made to Botox dose or distribution today due to excellent response to the last series of injections. Patient will continue to monitor response to Botox and report at next appointment. Additionally, Occipital nerve blocks were administered to address probable occipital neuralgia. She will continue to monitor her response and report at next appointment.   Referrals: None, however recommendations made today regarding possible chiropractor care vs functional medicine to see if her pain can be better controlled.   Follow up: Roxanna Celis was rescheduled for the next series of injections in 12 weeks, at which time we will evaluate response to today's injections. she may call the clinic prior with any questions or concerns prior to the next appointment.

## 2024-04-18 NOTE — LETTER
4/18/2024         RE: Roxanna Celis  [address] [place] [address]        Dear Colleague,    Thank you for referring your patient, Roxanna Celis, to the [facility]. Please see a copy of my visit note below.      [facility]     PM&R CLINIC NOTE  BOTULINUM TOXIN PROCEDURE      HPI  Chief Complaint   Patient presents with     Procedure     Botox     Roxanna Celis is a 53 year old female with a history of cervical dystonia who presents to clinic for botulinum toxin injections for management of involuntary muscle spasms and neck pain secondary to her cervical dystonia.     SINCE LAST VISIT  Roxanna Celis was last seen here in clinic on 1/23/2024, at which time she received 300 units of Botox. She also received bilateral occipital nerve blocks to address occipital pain.     She was recently diagnosed with chronic Lyme's disease, and therefore is going to be undergoing homeopathic treatment for this.     The patient otherwise denies new medical diagnoses, illnesses, hospitalizations, emergency room visits, and injuries since the previous injection with botulinum neurotoxin.     RESPONSE TO PREVIOUS TREATMENT    Side effects: None.     Pain Improvement: Yes, there was noticeable improvement of her pain, which started a few days after the Botox was administered and lasted approximately 9-10 weeks.     Dystonia Improvement: Yes, this round was quite effective as is pertains to controlling the dystonic movements of her neck and shoulder muscles. Duration of benefit: 9-10 weeks, followed by a gradual reduction in benefit.      Functional Improvement: When the Botox is working, she is able to socialize and liver her life normally. When the Botox wears off, she is in such terrible pain that she becomes stuck in bed for prolonged periods of time, and misses out on essentially every area of life.       PHYSICAL EXAM  VS: /80 (BP Location: Right  arm, Patient Position: Sitting)   Pulse 82    GEN: Pleasant and cooperative, in no acute distress  HEENT: No facial asymmetry  NECK AND TRUNK PATTERN:   Head Tremor: Pt states tremor is intermittent   Left Side-bending: Present but patient states she feels she has more right side bending now  Left Shoulder Elevation: slightly elevated  Head with right shift  Focalized pain at occipitalis - has pain today during visit    ALLERGIES  Allergies   Allergen Reactions     Contrast Dye Anaphylaxis     Other reaction(s): almost  from it     Iodinated Contrast Media Anaphylaxis     IVP DYE     Lisinopril Anaphylaxis     Lisinopril-Hydrochlorothiazide Anaphylaxis     Maple Flavor Anaphylaxis     MAPLE SYRUP  MAPLE SYRUP  MAPLE SYRUP     Maple Tree Anaphylaxis     Allergy includes maple syrup.     No Clinical Screening - See Comments      maple syrup==anaphylaxis  Dairy- causes to not feel well     Gluten Itching and Swelling     Gluten Meal Itching and Swelling     Cramping     [place] Meal Unknown     Chamomile Unknown     Codeine Sulfate Cramps and GI Disturbance     Food Unknown     Grapes, almond, lactose, gluten, real maple syrup     Nsaids Other (See Comments) and Unknown     Patient had bariatric surgery. Should not ever take NSAIDS due to high risk for gastric ulcers.   Patient had bariatric surgery. Should not ever take NSAIDS due to high risk for gastric ulcers.      Polyethylene Glycol Unknown     Per allergy testing  Per allergy testing       Erythromycin Nausea and Vomiting, Cramps, Diarrhea and Nausea     PN: LW Reaction: stomach upset  cramping     Lactose Diarrhea     Morphine Other (See Comments)       CURRENT MEDICATIONS    Current Outpatient Medications:      acetylcysteine 10 % (MUCOMYST) 10% SOLN, Place 2 drops into both eyes 6 times daily, Disp: 30 mL, Rfl: 11     BOTOX 100 units injection, Inject 250 Units into the muscle once Lot # /C3 with Expiration Date:  2020, Disp: , Rfl:      buPROPion  (WELLBUTRIN XL) 300 MG 24 hr tablet, Take 300 mg by mouth every morning, Disp: , Rfl:      clonazePAM (KLONOPIN) 1 MG tablet, Take 1 mg by mouth 2 times daily as needed, Disp: , Rfl:      COMPOUNDED NON-CONTROLLED SUBSTANCE (CMPD RX) - PHARMACY TO MIX COMPOUNDED MEDICATION, Lactosamide 1% drop, instill 1-2 drops 6x daily into both eyes, Disp: 10 mL, Rfl: 3     cyanocobalamin (VITAMIN B-12) 1000 MCG tablet, Take 1,000 mcg by mouth daily, Disp: , Rfl:      doxycycline monohydrate (ADOXA) 50 MG tablet, Take 1 tablet (50 mg) by mouth 2 times daily, Disp: 180 tablet, Rfl: 3     DULoxetine (CYMBALTA) 60 MG capsule, TAKE 2 CAPSULES BY MOUTH EVERY DAY, Disp: , Rfl:      EPINEPHrine (ANY BX GENERIC EQUIV) 0.3 MG/0.3ML injection 2-pack, , Disp: , Rfl:      erythromycin (ROMYCIN) 5 MG/GM ophthalmic ointment, Place 0.5 inches into both eyes 2 times daily, Disp: 3.5 g, Rfl: 4     ibuprofen (ADVIL/MOTRIN) 200 MG capsule, Take 200 mg by mouth every 4 hours as needed., Disp: , Rfl:      iloperidone (FANAPT) 6 MG TABS tablet, Take 4 mg by mouth daily , Disp: , Rfl:      lifitegrast (XIIDRA) 5 % opthalmic solution, Place 1 drop into both eyes 2 times daily, Disp: 60 each, Rfl: 11     multivitamin w/minerals (THERA-VIT-M) tablet, Take 1 tablet by mouth daily, Disp: , Rfl:      ONABOTULINUMTOXINA IJ, Inject 250 Units as directed once LOT # /C3 EXPIRATION: 03/2020, Disp: , Rfl:      Perfluorohexyloctane (MIEBO) 1.338 GM/ML SOLN, Apply 1 drop to eye 4 times daily, Disp: 3 mL, Rfl: 11     prednisolone 0.25% and hyaluronate in balanced salt SUSP compounded ophthalmic suspension, Place 1 Drop into both eyes 4 times daily. Discard bottle 15 days after opening. Refrigerate. Unopened bottle expires: Strength: 0.25 %, Disp: 6.67 mL, Rfl: 11     sodium chloride 0.9 % neb solution, Medically necessary for scleral contact lens use. May use 3 ml 6 times a day for contact lens use, Disp: 1800 mL, Rfl: 4     Soft Lens Products (B&L SENSITIVE  EYES) SOLN, Apply 1 drop to eye, Disp: , Rfl:      spironolactone (ALDACTONE) 50 MG tablet, Take 1 tablet by mouth daily, Disp: , Rfl:      tretinoin (RETIN-A) 0.025 % external cream, Apply a pea size to entire face QD, Disp: 45 g, Rfl: 11     tretinoin (RETIN-A) 0.05 % external cream, Spread a pea size amount into affected area topically at bedtime.  Use sunscreen SPF>20., Disp: 45 g, Rfl: 1     TYRVAYA 0.03 MG/ACT nasal spray, Spray 1 spray into both nostrils 2 times daily, Disp: 4.2 mL, Rfl: 11     Vitamin D3 (CHOLECALCIFEROL) 25 mcg (1000 units) tablet, Take by mouth daily, Disp: , Rfl:      White Petrolatum-Mineral Oil (SYSTANE NIGHTTIME) OINT, Apply 3.5 g to eye 4 times daily as needed (dry eyes), Disp: 7 g, Rfl: 11     prednisoLONE acetate (PRED FORTE) 1 % ophthalmic suspension, Place 1 drop into both eyes 3 times daily for 7 days, THEN 1 drop 2 times daily for 7 days, THEN 1 drop daily for 14 days., Disp: 5 mL, Rfl: 0    Current Facility-Administered Medications:      botulinum toxin type A (BOTOX) 100 units injection 300 Units, 300 Units, Intramuscular, Q90 Days, America Gonzalez MD, 300 Units at 24 1613     botulinum toxin type A (DYSPORT) injection 500 Units, 500 Units, Intramuscular, Q90 Days, America Gonzalez MD, 500 Units at 22 1028     botulinum toxin type A (DYSPORT) injection 800 Units, 800 Units, Intramuscular, Q90 Days, America Gonzalez MD, 500 Units at 23 1547       BOTULINUM NEUROTOXIN INJECTION PROCEDURES    VERIFICATION OF PATIENT IDENTIFICATION AND PROCEDURE     Initials   Patient Name SES   Patient  SES   Procedure Verified by: KIM     Prior to the start of the procedure and with procedural staff participation, I verbally confirmed the patient s identity using two indicators, relevant allergies, that the procedure was appropriate and matched the consent or emergent situation, and that the correct equipment/implants were available. Immediately  prior to starting the procedure I conducted the Time Out with the procedural staff and re-confirmed the patient s name, procedure, and site/side. (The Joint Commission universal protocol was followed.)  Yes    Sedation (Moderate or Deep): None    ABOVE ASSESSMENTS PERFORMED BY    America Gonzalez MD      INDICATIONS FOR PROCEDURES  Roxanna Celis is a 53 year old patient with involuntary muscle spasms and pain secondary to the diagnosis of cervical dystonia. Her baseline symptoms have been recalcitrant to oral medications and conservative therapy.  She is here today for reinjection with Dysport.    GOAL OF PROCEDURE  The goal of this procedure is to increase active range of motion, improve volitional motor control, decrease pain  and enhance functional independence.      TOTAL DOSE ADMINISTERED  Dose Administered:  300 units  Botox (Botulinum Toxin Type A)       2:1 Dilution   Unavoidable Drug Waste: No.  Diluent Used:  Preservative Free Normal Saline  Total Volume of Diluent Used:  6 ml  NDC #: Botox 100u ([address]-54)      CONSENT  The risks, benefits, and treatment options were discussed with Roxanna Celis and she agreed to proceed.    Written consent was obtained by [facility].     EQUIPMENT USED  Needle-37mm stimulating/recording  Needle-30 gauge  EMG/NCS Machine    SKIN PREPARATION  Skin preparation was performed using an alcohol wipe.    GUIDANCE DESCRIPTION  Electro-myographic guidance was necessary throughout the procedure to accurately identify all areas of dystonic muscles while avoiding injection of non-dystonic muscles, neighboring nerves and nearby vascular structures.       AREA/MUSCLE INJECTED: 300 UNITS BOTOX = TOTAL DOSE, 2:1 DILUTION  1. NECK & SHOULDER GIRDLE MUSCLES: 170 UNITS BOTOX = TOTAL DOSE   Right Rectus Capitis - (upper cervical) - 5 units of Botox at 1 site/s.   Left Rectus Capitis - (upper cervical) - 5 units of Botox at 1 site/s.     Right Upper Trapezius - 25 units of Botox at 7 site/s.    Left Upper Trapezius - 25 units of Botox at 7 site/s.     Right Splenius Cervicis - 5 units of Botox at 1 site/s.   Left Splenius Cervicis - 5 units of Botox at 1 site/s.    Right Levator Scapulae - 20 units of Botox at 2 site/s.   Left Levator Scapulae - 20 units of Botox at 2 site/s.    Right Sternocleidomastoid - 10 units of Botox at 3 site/s.  Left Sternocleidomastoid - 10 units of Botox at 3 site/s.    Right Cervical Paraspinals - 10 units of Botox at 4 site/s.  Left Cervical Paraspinals - 10 units of Botox at 4 site/s.    Right Thoracic Paraspinals - 10 units of Botox at 4 site/s.  Left Thoracic Paraspinals - 10 units of Botox at 4 site/s.    2. SCALP MUSCLES: 130 UNITS BOTOX = TOTAL DOSE  Right Occipitalis - 65 units of Botox at 15 site/s.   Left Occipitails - 65 units of Botox at 15 site/s.      BILATERAL GREATER OCCIPITAL NERVE BLOCKS    Dru% lidocaine: 2 ml   0.5% bupivacaine: 7 ml   Kenalo mg = 1 ml     Area just inferior to insertion of the right and left superior trapezius insertion onto skull was cleansed with ChloraPrep. Needle was advanced anteriorly to base of skull then slightly withdrawn and injectate was injected in a fan-like distribution at different depths. A 10 ml mixture of 1% lidocaine, 0.5% bupivacaine and Kenalog was divided into two 5 ml syringes. Total injection volume = 5 ml per side.       RESPONSE TO PROCEDURE  Roxanna Celis tolerated the procedure well and there were no immediate complications. She was allowed to recover for an appropriate period of time and was discharged home in stable condition.    ASSESSMENT AND PLAN   Botulinum toxin injections: No changes made to Botox dose or distribution today due to excellent response to the last series of injections. Patient will continue to monitor response to Botox and report at next appointment. Additionally, Occipital nerve blocks were administered to address probable occipital neuralgia. She will continue to monitor her  response and report at next appointment.   Referrals: None, however recommendations made today regarding possible chiropractor care vs functional medicine to see if her pain can be better controlled.   Follow up: Roxanna Celis was rescheduled for the next series of injections in 12 weeks, at which time we will evaluate response to today's injections. she may call the clinic prior with any questions or concerns prior to the next appointment.       Again, thank you for allowing me to participate in the care of your patient.        Sincerely,        America Gonzalez MD

## 2024-04-24 ENCOUNTER — OFFICE VISIT (OUTPATIENT)
Dept: OPTOMETRY | Facility: CLINIC | Age: 54
End: 2024-04-24
Payer: COMMERCIAL

## 2024-04-24 DIAGNOSIS — H04.123 DRY EYES: Primary | ICD-10-CM

## 2024-04-24 DIAGNOSIS — H16.223 KERATITIS SICCA, BILATERAL: ICD-10-CM

## 2024-04-24 DIAGNOSIS — H57.13 EYE PAIN, BILATERAL: ICD-10-CM

## 2024-04-24 PROBLEM — M54.12 CERVICAL MYELOPATHY WITH CERVICAL RADICULOPATHY (H): Status: ACTIVE | Noted: 2024-01-12

## 2024-04-24 PROBLEM — K62.5 HEMORRHAGE OF RECTUM AND ANUS: Status: ACTIVE | Noted: 2018-03-09

## 2024-04-24 PROBLEM — Z87.19 HISTORY OF DIVERTICULITIS: Status: ACTIVE | Noted: 2022-07-28

## 2024-04-24 PROBLEM — M35.00 SICCA SYNDROME (H): Status: ACTIVE | Noted: 2021-04-13

## 2024-04-24 PROBLEM — T78.49XA ALLERGIC REACTION TO COVID-19 VACCINE: Status: ACTIVE | Noted: 2021-11-29

## 2024-04-24 PROBLEM — G95.9 CERVICAL MYELOPATHY WITH CERVICAL RADICULOPATHY (H): Status: ACTIVE | Noted: 2024-01-12

## 2024-04-24 PROBLEM — K59.09 CONSTIPATION, CHRONIC: Status: ACTIVE | Noted: 2021-09-23

## 2024-04-24 PROBLEM — K63.89 MELANOSIS COLI: Status: ACTIVE | Noted: 2022-05-09

## 2024-04-24 PROBLEM — Z90.09 HISTORY OF LOBECTOMY OF THYROID: Status: ACTIVE | Noted: 2023-10-06

## 2024-04-24 RX ORDER — HYDROCORTISONE 2.5 %
CREAM (GRAM) TOPICAL
COMMUNITY
Start: 2024-02-22

## 2024-04-24 RX ORDER — KETOCONAZOLE 20 MG/G
CREAM TOPICAL
COMMUNITY
Start: 2024-02-22

## 2024-04-24 ASSESSMENT — CONF VISUAL FIELD
OD_INFERIOR_NASAL_RESTRICTION: 0
OS_INFERIOR_NASAL_RESTRICTION: 0
OD_INFERIOR_TEMPORAL_RESTRICTION: 0
OS_SUPERIOR_TEMPORAL_RESTRICTION: 0
METHOD: COUNTING FINGERS
OS_INFERIOR_TEMPORAL_RESTRICTION: 0
OD_NORMAL: 1
OS_SUPERIOR_NASAL_RESTRICTION: 0
OD_SUPERIOR_NASAL_RESTRICTION: 0
OS_NORMAL: 1
OD_SUPERIOR_TEMPORAL_RESTRICTION: 0

## 2024-04-24 ASSESSMENT — REFRACTION_CURRENTRX
OD_SPHERE: +0.25
OS_DIAMETER: 19.0
OD_DIAMETER: 19.0
OS_SPHERE: PLANO
OD_SPHERE: +0.25
OD_BASECURVE: 8.047
OD_BASECURVE: 8.047
OS_ADDL_SPECS: OPT EXTREME CLEAR
OS_BASECURVE: 7.899
OS_SPHERE: PLANO
OS_BASECURVE: 7.899
OD_DIAMETER: 18.0
OS_DIAMETER: 18.0
OD_ADDL_SPECS: OPT EXTREME CLEAR

## 2024-04-24 ASSESSMENT — VISUAL ACUITY
OS_CC+: -1
METHOD: SNELLEN - LINEAR
OD_CC: 20/20
OD_CC+: -1
CORRECTION_TYPE: CONTACTS
OS_CC: 20/20

## 2024-04-24 ASSESSMENT — EXTERNAL EXAM - RIGHT EYE: OD_EXAM: NORMAL

## 2024-04-24 ASSESSMENT — EXTERNAL EXAM - LEFT EYE: OS_EXAM: NORMAL

## 2024-04-24 ASSESSMENT — TONOMETRY
OS_IOP_MMHG: 18
IOP_METHOD: ICARE
OD_IOP_MMHG: 19

## 2024-04-24 NOTE — PROGRESS NOTES
Current Regimen:  Doxycycline 50mg twice a day   Xiidra gtt twice a day both eyes   Pred Healon 4x daily both eyes   Mucomyst 4-6x daily both eyes   Systane ointment   Lisa Edmondson - needs 90 day supply for insurance   Serum Tears - 6 drops in the lens, rest saline.   Moisture chamber goggles     Failed: Restasis, Lipiflow, Vimpat, Celluvisc    A/P  1.) Dry Eye each eye  -Severely symptomatic despite max topical treatment and significant objective improvement in corneal appearance with scleral lens wear  -Currently in Eyeprint scleral lenses (failed standard lenses) with excellent fit. Happy with nearsighted target in CL's (-2.50 distance Rx)  -Excellent response to larger diam lenses (19.0). No fit adjustments recommended. She would like spare pair.   -Purilens rinse prn (she was using preserved saline to rinse eyes in the morning and did not like. Switched to PF saline vials but using 6+. Switch to bottle version)    Continue with current lenses. Order duplicate pair. She would like the dot size increased prior to mailing. Follow-up 9-12 months, sooner prn    I have confirmed the patient's CC, HPI and reviewed Past Medical History, Past Surgical History, Social History, Family History, Problem List, Medication List and agree with Tech note.     Sudha Anthony, SHELLI GARCÍAO DALIAS

## 2024-04-24 NOTE — NURSING NOTE
Chief Complaints and History of Present Illnesses   Patient presents with    Contact Lens Follow Up     Pt here for EyeFit Pro lens follow up.      Chief Complaint(s) and History of Present Illness(es)       Contact Lens Follow Up              Laterality: both eyes    Comments: Pt here for EyeFit Pro lens follow up.               Comments    Pt was fit into larger diameter lens. Pt is happy with new lens. She feels they are easier to get in than the smaller ones because of the shape. Vision is unchanged.     Pt using:  Doxycycline 50mg twice a day   Xiidra gtt twice a day both eyes   Pred Healon 4x daily both eyes   Mucomyst 4-6x daily both eyes   Systane ointment   Lisa Edmondson - needs 90 day supply for insurance   Serum Tears - 6 drops in the lens, rest saline.   Moisture chamber goggles   STANISLAV Garza on 4/24/2024 at 12:59 PM

## 2024-04-30 ENCOUNTER — MYC MEDICAL ADVICE (OUTPATIENT)
Dept: OPTOMETRY | Facility: CLINIC | Age: 54
End: 2024-04-30

## 2024-04-30 DIAGNOSIS — Z97.3 USES CONTACT LENSES: ICD-10-CM

## 2024-05-01 DIAGNOSIS — Z97.3 USES CONTACT LENSES: ICD-10-CM

## 2024-05-01 RX ORDER — SODIUM CHLORIDE FOR INHALATION 0.9 %
VIAL, NEBULIZER (ML) INHALATION
Qty: 1800 ML | Refills: 11 | Status: SHIPPED | OUTPATIENT
Start: 2024-05-01

## 2024-05-02 ENCOUNTER — TELEPHONE (OUTPATIENT)
Dept: OPTOMETRY | Facility: CLINIC | Age: 54
End: 2024-05-02

## 2024-05-02 RX ORDER — SODIUM CHLORIDE FOR INHALATION 0.9 %
VIAL, NEBULIZER (ML) INHALATION
Refills: 11 | OUTPATIENT
Start: 2024-05-02

## 2024-05-02 NOTE — TELEPHONE ENCOUNTER
Duplicate request for Neb Solution -- Rx sent to same pharmacy yesterday.    Haider Webber RN 12:21 PM 05/02/24

## 2024-05-02 NOTE — TELEPHONE ENCOUNTER
Note to PA team to initiate prior authorization for Sodium Chloride neb solution.    Quantity confirmed by Dr. Anthony on Rx    Haider Webber RN 2:06 PM 05/02/24

## 2024-05-02 NOTE — TELEPHONE ENCOUNTER
" Health Call Center    Phone Message    May a detailed message be left on voicemail: yes     Reason for Call: Other: Rosa from [employer], is calling requesting the clinic to send a prior authorization to the patient's insurance company regarding the quantity of odium chloride 0.9 % neb solution.  Rosa is available at [phone] for any questions.  Rosa states she does not understand the \"denied-duplicate order\" response she was given.  Thank you!      Action Taken: Message routed to:  [facility]     Travel Screening: Not Applicable                                                                   "

## 2024-05-06 ENCOUNTER — DOCUMENTATION ONLY (OUTPATIENT)
Dept: OPTOMETRY | Facility: CLINIC | Age: 54
End: 2024-05-06

## 2024-05-08 RX ORDER — BUPIVACAINE HYDROCHLORIDE 5 MG/ML
10 INJECTION, SOLUTION PERINEURAL ONCE
Status: COMPLETED | OUTPATIENT
Start: 2024-05-08 | End: 2024-05-08

## 2024-05-08 RX ORDER — TRIAMCINOLONE ACETONIDE 40 MG/ML
40 INJECTION, SUSPENSION INTRA-ARTICULAR; INTRAMUSCULAR ONCE
Status: COMPLETED | OUTPATIENT
Start: 2024-05-08 | End: 2024-05-08

## 2024-05-08 RX ADMIN — TRIAMCINOLONE ACETONIDE 40 MG: 40 INJECTION, SUSPENSION INTRA-ARTICULAR; INTRAMUSCULAR at 16:40

## 2024-05-08 RX ADMIN — BUPIVACAINE HYDROCHLORIDE 50 MG: 5 INJECTION, SOLUTION PERINEURAL at 16:40

## 2024-05-08 NOTE — TELEPHONE ENCOUNTER
Prior Authorization Not Needed per Insurance    Medication: SODIUM CHLORIDE 0.9 % IN NEBU  Insurance Company: [employer] Non-Specialty PA's - Phone [phone] Fax [phone]  Expected CoPay: $    Pharmacy Filling the Rx: [facility] [id number] [place] [id number]  Pharmacy Notified: YES  Patient Notified: **Instructed pharmacy to notify patient when script is ready to /ship.**    Rejection is a DUR reject that pharmacy can override, I advised pharmacy if they are having problems still they would need to call the help desk to help override the DUR reject.

## 2024-05-16 DIAGNOSIS — G24.3 CERVICAL DYSTONIA: Primary | ICD-10-CM

## 2024-06-07 DIAGNOSIS — M54.81 BILATERAL OCCIPITAL NEURALGIA: Primary | ICD-10-CM

## 2024-06-07 RX ORDER — METHYLPREDNISOLONE 4 MG
TABLET, DOSE PACK ORAL
Qty: 21 TABLET | Refills: 0 | Status: SHIPPED | OUTPATIENT
Start: 2024-06-07 | End: 2024-09-24

## 2024-07-03 ENCOUNTER — MYC MEDICAL ADVICE (OUTPATIENT)
Dept: OPTOMETRY | Facility: CLINIC | Age: 54
End: 2024-07-03

## 2024-07-09 ENCOUNTER — TELEPHONE (OUTPATIENT)
Dept: OPTOMETRY | Facility: CLINIC | Age: 54
End: 2024-07-09

## 2024-07-09 DIAGNOSIS — H04.123 BILATERAL DRY EYES: ICD-10-CM

## 2024-07-09 RX ORDER — LIFITEGRAST 50 MG/ML
1 SOLUTION/ DROPS OPHTHALMIC 2 TIMES DAILY
Qty: 60 EACH | Refills: 11 | Status: SHIPPED | OUTPATIENT
Start: 2024-07-09 | End: 2024-08-29

## 2024-07-19 ENCOUNTER — TELEPHONE (OUTPATIENT)
Dept: OPHTHALMOLOGY | Facility: CLINIC | Age: 54
End: 2024-07-19
Payer: COMMERCIAL

## 2024-07-19 DIAGNOSIS — H04.123 DRY EYES, BILATERAL: ICD-10-CM

## 2024-07-19 NOTE — TELEPHONE ENCOUNTER
Spoke to pt at 1414 and confirmed the pt request for Pred Healon sent to [facility] pharmacy-- pt left message on triage line requesting Rx.    Haider Webber RN 2:13 PM 07/19/24

## 2024-08-03 DIAGNOSIS — H02.889 MGD (MEIBOMIAN GLAND DYSFUNCTION): ICD-10-CM

## 2024-08-03 DIAGNOSIS — H01.02B SQUAMOUS BLEPHARITIS OF UPPER AND LOWER EYELIDS OF BOTH EYES: ICD-10-CM

## 2024-08-03 DIAGNOSIS — H01.02A SQUAMOUS BLEPHARITIS OF UPPER AND LOWER EYELIDS OF BOTH EYES: ICD-10-CM

## 2024-08-05 ENCOUNTER — TELEPHONE (OUTPATIENT)
Dept: OPTOMETRY | Facility: CLINIC | Age: 54
End: 2024-08-05

## 2024-08-05 DIAGNOSIS — L98.8 FACIAL RHYTIDS: ICD-10-CM

## 2024-08-05 NOTE — TELEPHONE ENCOUNTER
Patient called to tell us that she will need a PA for TYRVAYA nasal spray. I see the PA was received and is in process.

## 2024-08-05 NOTE — TELEPHONE ENCOUNTER
[email or web address] sent to schedule appt for refills    Toyin TESFAYE RN  Dermatology   [phone]

## 2024-08-06 RX ORDER — DOXYCYCLINE 50 MG/1
50 TABLET ORAL 2 TIMES DAILY
Qty: 180 TABLET | Refills: 0 | Status: SHIPPED | OUTPATIENT
Start: 2024-08-06 | End: 2024-08-29

## 2024-08-06 NOTE — TELEPHONE ENCOUNTER
doxycycline monohydrate (ADOXA) 50 MG tablet   Disp-180 tablet, R-3   Start: 06/13/2023     3/14/2024  [facility]    Faheem Ocampo MD  Ophthalmology    Continue doxycycline 50 mg BID     Terrence in 6 months for dilated fundus exam     Nv: 8/29/24    medium med alert      Warnings Override History for doxycycline monohydrate (ADOXA) 50 MG tablet [[id number]]    Overridden by Faheem Ocampo MD on Jun 13, 2023 11:27 AM   Drug-Drug   1. DOXYCYCLINE / IRON SALTS (ORAL) [Level: Moderate] [Reason: Tolerated medication/side effects in past]   Other Orders: multivitamin w/minerals (THERA-VIT-M) tablet      2. TETRACYCLINES / MAGNESIUM SALTS [Level: Moderate] [Reason: Tolerated medication/side effects in past]   Other Orders: multivitamin w/minerals (THERA-VIT-M) tablet

## 2024-08-07 RX ORDER — TRETINOIN 0.5 MG/G
CREAM TOPICAL
Qty: 45 G | Refills: 1 | OUTPATIENT
Start: 2024-08-07

## 2024-08-29 ENCOUNTER — OFFICE VISIT (OUTPATIENT)
Dept: OPHTHALMOLOGY | Facility: CLINIC | Age: 54
End: 2024-08-29
Attending: OPHTHALMOLOGY
Payer: COMMERCIAL

## 2024-08-29 DIAGNOSIS — H02.889 MGD (MEIBOMIAN GLAND DYSFUNCTION): ICD-10-CM

## 2024-08-29 DIAGNOSIS — H04.123 DRY EYES, BILATERAL: Primary | ICD-10-CM

## 2024-08-29 DIAGNOSIS — H01.02A SQUAMOUS BLEPHARITIS OF UPPER AND LOWER EYELIDS OF BOTH EYES: ICD-10-CM

## 2024-08-29 DIAGNOSIS — H02.88A MEIBOMIAN GLAND DYSFUNCTION (MGD), BILATERAL, BOTH UPPER AND LOWER LIDS: ICD-10-CM

## 2024-08-29 DIAGNOSIS — H01.02B SQUAMOUS BLEPHARITIS OF UPPER AND LOWER EYELIDS OF BOTH EYES: ICD-10-CM

## 2024-08-29 DIAGNOSIS — H04.123 BILATERAL DRY EYES: ICD-10-CM

## 2024-08-29 DIAGNOSIS — H02.88B MEIBOMIAN GLAND DYSFUNCTION (MGD), BILATERAL, BOTH UPPER AND LOWER LIDS: ICD-10-CM

## 2024-08-29 PROCEDURE — 99214 OFFICE O/P EST MOD 30 MIN: CPT | Mod: 25 | Performed by: OPHTHALMOLOGY

## 2024-08-29 PROCEDURE — 68761 CLOSE TEAR DUCT OPENING: CPT | Performed by: OPHTHALMOLOGY

## 2024-08-29 PROCEDURE — 99212 OFFICE O/P EST SF 10 MIN: CPT | Mod: 25 | Performed by: OPHTHALMOLOGY

## 2024-08-29 RX ORDER — PREDNISOLONE ACETATE 10 MG/ML
1-2 SUSPENSION/ DROPS OPHTHALMIC 2 TIMES DAILY
Qty: 6 ML | Refills: 1 | Status: SHIPPED | OUTPATIENT
Start: 2024-08-29 | End: 2024-10-07

## 2024-08-29 RX ORDER — PERFLUOROHEXYLOCTANE 1 MG/MG
1 SOLUTION OPHTHALMIC 4 TIMES DAILY
Qty: 6 ML | Refills: 11 | Status: SHIPPED | OUTPATIENT
Start: 2024-08-29

## 2024-08-29 RX ORDER — LIFITEGRAST 50 MG/ML
1 SOLUTION/ DROPS OPHTHALMIC 2 TIMES DAILY
Qty: 180 EACH | Refills: 3 | Status: SHIPPED | OUTPATIENT
Start: 2024-08-29

## 2024-08-29 RX ORDER — DOXYCYCLINE 50 MG/1
50 TABLET ORAL 2 TIMES DAILY
Qty: 180 TABLET | Refills: 3 | Status: SHIPPED | OUTPATIENT
Start: 2024-08-29

## 2024-08-29 ASSESSMENT — CONF VISUAL FIELD
OS_INFERIOR_NASAL_RESTRICTION: 0
OS_NORMAL: 1
OS_SUPERIOR_TEMPORAL_RESTRICTION: 0
OD_SUPERIOR_TEMPORAL_RESTRICTION: 0
OS_SUPERIOR_NASAL_RESTRICTION: 0
OD_INFERIOR_TEMPORAL_RESTRICTION: 0
OS_INFERIOR_TEMPORAL_RESTRICTION: 0
OD_NORMAL: 1
OD_SUPERIOR_NASAL_RESTRICTION: 0
OD_INFERIOR_NASAL_RESTRICTION: 0
METHOD: COUNTING FINGERS

## 2024-08-29 ASSESSMENT — VISUAL ACUITY
OS_CC: 20/30
CORRECTION_TYPE: GLASSES
OS_CC+: -2
OD_CC: 20/30
METHOD: SNELLEN - LINEAR

## 2024-08-29 ASSESSMENT — REFRACTION_WEARINGRX
OD_CYLINDER: +0.25
OS_CYLINDER: +0.25
OS_SPHERE: -3.00
OD_AXIS: 160
OS_AXIS: 010
OD_SPHERE: -3.25

## 2024-08-29 ASSESSMENT — EXTERNAL EXAM - LEFT EYE: OS_EXAM: NORMAL

## 2024-08-29 ASSESSMENT — EXTERNAL EXAM - RIGHT EYE: OD_EXAM: NORMAL

## 2024-08-29 ASSESSMENT — TONOMETRY
OS_IOP_MMHG: 17
OD_IOP_MMHG: 15
IOP_METHOD: TONOPEN

## 2024-08-29 NOTE — PROGRESS NOTES
HPI    Patient states that her eyes are itchy/scratchy, drainage, and crusting all the time in both eyes. She states that her eyes are very uncomfortable. She states that her lenses are constantly dirty with mucous. She states that her eye lids burn. She states that her vision is fine unless she has mucous. No flashes of lights. No floaters.     Ocular Meds: prednisolone ( was tapering)- STOPPED two weeks ago  Xiidra BID each eye  Mucomyst 4-6x daily both eyes   Pred Healon 4x daily both eyes   Systane ointment   Serum Tears  Tyrvaya   Doxycycline 50mg twice a day     Rufus Woods COA, August 29, 2024 12:23 PM        Last edited by uRfus Woods on 8/29/2024 12:32 PM.          Review of systems for the eyes was negative other than the pertinent positives/negatives listed in the HPI.      Assessment & Plan      Roxanna Celis is a 53 year old female with the following diagnoses:   1. Dry eyes, bilateral    2. Bilateral dry eyes    3. Meibomian gland dysfunction (MGD), bilateral, both upper and lower lids    4. Squamous blepharitis of upper and lower eyelids of both eyes    5. MGD (meibomian gland dysfunction)           Here for dilated fundus exam   Worsening symptoms this summer  Added short burst of prednisolone twice a day for 2 weeks, then daily for 2 weeks, then stopped (2 weeks ago)  Felt much better when using this drop  Exam stable today.  Symptoms still >> signs  Discussed difference of pred healon v prednisolone  Ok to trial switch for now  Replaced bilateral plugs upper and lower lids today     BOTH EYES    Stop Pred Healon  Start prednisolone twice a day   Continue mucomyst four times a day    Continue Doxycycline 50 mg orally twice a day   Continue serum tears four times a day and as needed   Continue Xiidra twice a day   Continue PM ointment  Warm compresses 2-3x daily   Lid scrubs (or baby shampoo) daily       Patient disposition:   Return in about 6 weeks (around 10/10/2024) for VT only.            Attending Physician Attestation:  Complete documentation of historical and exam elements from today's encounter can be found in the full encounter summary report (not reduplicated in this progress note).  I personally obtained the chief complaint(s) and history of present illness.  I confirmed and edited as necessary the review of systems, past medical/surgical history, family history, social history, and examination findings as documented by others; and I examined the patient myself.  I personally reviewed the relevant tests, images, and reports as documented above.  I formulated and edited as necessary the assessment and plan and discussed the findings and management plan with the patient and family. . - Faheem Ocampo MD

## 2024-08-29 NOTE — NURSING NOTE
Chief Complaints and History of Present Illnesses   Patient presents with    Dry Eye Syndrome Follow Up     Chief Complaint(s) and History of Present Illness(es)       Dry Eye Syndrome Follow Up               Comments    Patient states that her eyes are itchy/scratchy, drainage, and crusting all the time in both eyes. She states that her eyes are very uncomfortable. She states that her lenses are constantly dirty with mucous. She states that her eye lids burn. She states that her vision is fine unless she has mucous. No flashes of lights. No floaters.     Ocular Meds: prednisolone ( was tapering)- STOPPED two weeks ago  Xiidra BID each eye  Mucomyst 4-6x daily both eyes   Pred Healon 4x daily both eyes   Systane ointment   Serum Tears  Tyrvaya   Doxycycline 50mg twice a day     Rufus [place], August 29, 2024 12:23 PM

## 2024-08-29 NOTE — PATIENT INSTRUCTIONS
BOTH EYES    Stop Pred Healon  Start prednisolone twice a day   Continue mucomyst four times a day    Continue Doxycycline 50 mg orally twice a day   Continue serum tears four times a day and as needed   Continue Xiidra twice a day   Continue PM ointment  Warm compresses 2-3x daily   Lid scrubs (or baby shampoo) daily

## 2024-09-10 NOTE — PATIENT INSTRUCTIONS
No sign of inflammation noted on joint exam.     No further blood tests are required     Follow up as needed.           
1

## 2024-09-17 DIAGNOSIS — M54.81 BILATERAL OCCIPITAL NEURALGIA: ICD-10-CM

## 2024-09-24 RX ORDER — METHYLPREDNISOLONE 4 MG
TABLET, DOSE PACK ORAL
Qty: 21 TABLET | Refills: 0 | Status: SHIPPED | OUTPATIENT
Start: 2024-09-24

## 2024-09-24 NOTE — TELEPHONE ENCOUNTER
Refill request for the following medication (s) listed below.    Pending Prescriptions:                       Disp   Refills    methylPREDNISolone (MEDROL DOSEPAK) 4 MG *21 tab*0            Sig: TAKE 6 TABLETS ON DAY 1 AS DIRECTED ON PACKAGE           AND DECREASE BY 1 TAB EACH DAY FOR A TOTAL OF 6           DAYS      Last office visit provider:  04/18/24  Next appointment scheduled: 10/22/24      Medication T'd for review and signature  Leandra Bautista MA on 9/24/2024 at 2:59 PM

## 2024-10-01 DIAGNOSIS — H16.223 KERATITIS SICCA, BILATERAL: ICD-10-CM

## 2024-10-01 DIAGNOSIS — H57.13 EYE PAIN, BILATERAL: ICD-10-CM

## 2024-10-01 DIAGNOSIS — H04.123 DRY EYES: ICD-10-CM

## 2024-10-01 NOTE — TELEPHONE ENCOUNTER
"  ERYTHROMYCIN 0.5% EYE OINTMENT      Last Written Prescription Date:  10/4/23  Last Fill Quantity: 3.5 g ,   # refills: 4  Last Office Visit  date and excerpted note/ plan: 8/29/24 Terrence    \" Stop Pred Healon  Start prednisolone twice a day   Continue mucomyst four times a day    Continue Doxycycline 50 mg orally twice a day   Continue serum tears four times a day and as needed   Continue Xiidra twice a day   Continue PM ointment  Warm compresses 2-3x daily   Lid scrubs (or baby shampoo) daily \"     Future Office visit:  10/10/24 Terrence    Routing refill request to provider for review/approval because:   ERYTHROMYCIN 0.5% EYE OINTMENT     not on the [facility], [facility] or [facility] refill protocol    Last Office Visit  date and excerpted note/ plan: 8/29/24 Terrence    \" Stop Pred Healon  Start prednisolone twice a day   Continue mucomyst four times a day    Continue Doxycycline 50 mg orally twice a day   Continue serum tears four times a day and as needed   Continue Xiidra twice a day   Continue PM ointment  Warm compresses 2-3x daily   Lid scrubs (or baby shampoo) daily \"   "

## 2024-10-03 ENCOUNTER — APPOINTMENT (OUTPATIENT)
Dept: CT IMAGING | Facility: CLINIC | Age: 54
End: 2024-10-03
Attending: EMERGENCY MEDICINE
Payer: COMMERCIAL

## 2024-10-03 ENCOUNTER — HOSPITAL ENCOUNTER (EMERGENCY)
Facility: CLINIC | Age: 54
Discharge: HOME | End: 2024-10-04
Attending: EMERGENCY MEDICINE | Admitting: EMERGENCY MEDICINE
Payer: COMMERCIAL

## 2024-10-03 ENCOUNTER — APPOINTMENT (OUTPATIENT)
Dept: GENERAL RADIOLOGY | Facility: CLINIC | Age: 54
End: 2024-10-03
Attending: EMERGENCY MEDICINE
Payer: COMMERCIAL

## 2024-10-03 DIAGNOSIS — H04.123 DRY EYES: ICD-10-CM

## 2024-10-03 DIAGNOSIS — S20.229A CONTUSION OF BACK, UNSPECIFIED LATERALITY, INITIAL ENCOUNTER: ICD-10-CM

## 2024-10-03 DIAGNOSIS — S13.9XXA NECK SPRAIN, INITIAL ENCOUNTER: ICD-10-CM

## 2024-10-03 DIAGNOSIS — H16.223 KERATITIS SICCA, BILATERAL: ICD-10-CM

## 2024-10-03 DIAGNOSIS — H04.123 DRY EYES, BILATERAL: ICD-10-CM

## 2024-10-03 DIAGNOSIS — S00.03XA CONTUSION OF SCALP, INITIAL ENCOUNTER: ICD-10-CM

## 2024-10-03 DIAGNOSIS — H57.13 EYE PAIN, BILATERAL: ICD-10-CM

## 2024-10-03 PROCEDURE — 99285 EMERGENCY DEPT VISIT HI MDM: CPT | Mod: 25

## 2024-10-03 PROCEDURE — 72100 X-RAY EXAM L-S SPINE 2/3 VWS: CPT

## 2024-10-03 PROCEDURE — 250N000013 HC RX MED GY IP 250 OP 250 PS 637: Performed by: EMERGENCY MEDICINE

## 2024-10-03 PROCEDURE — 96372 THER/PROPH/DIAG INJ SC/IM: CPT | Performed by: EMERGENCY MEDICINE

## 2024-10-03 PROCEDURE — 250N000011 HC RX IP 250 OP 636: Performed by: EMERGENCY MEDICINE

## 2024-10-03 PROCEDURE — 70450 CT HEAD/BRAIN W/O DYE: CPT

## 2024-10-03 PROCEDURE — 72125 CT NECK SPINE W/O DYE: CPT

## 2024-10-03 RX ORDER — OXYCODONE HYDROCHLORIDE 5 MG/1
5 TABLET ORAL ONCE
Status: COMPLETED | OUTPATIENT
Start: 2024-10-03 | End: 2024-10-03

## 2024-10-03 RX ORDER — ERYTHROMYCIN 5 MG/G
OINTMENT OPHTHALMIC
Qty: 3.5 G | Refills: 11 | Status: SHIPPED | OUTPATIENT
Start: 2024-10-03

## 2024-10-03 RX ORDER — ONDANSETRON 4 MG/1
4 TABLET, ORALLY DISINTEGRATING ORAL ONCE
Status: COMPLETED | OUTPATIENT
Start: 2024-10-03 | End: 2024-10-03

## 2024-10-03 RX ADMIN — ONDANSETRON 4 MG: 4 TABLET, ORALLY DISINTEGRATING ORAL at 22:06

## 2024-10-03 RX ADMIN — OXYCODONE HYDROCHLORIDE 5 MG: 5 TABLET ORAL at 22:06

## 2024-10-03 RX ADMIN — HYDROMORPHONE HYDROCHLORIDE 1 MG: 1 INJECTION, SOLUTION INTRAMUSCULAR; INTRAVENOUS; SUBCUTANEOUS at 23:01

## 2024-10-03 ASSESSMENT — COLUMBIA-SUICIDE SEVERITY RATING SCALE - C-SSRS
1. IN THE PAST MONTH, HAVE YOU WISHED YOU WERE DEAD OR WISHED YOU COULD GO TO SLEEP AND NOT WAKE UP?: NO
6. HAVE YOU EVER DONE ANYTHING, STARTED TO DO ANYTHING, OR PREPARED TO DO ANYTHING TO END YOUR LIFE?: NO
2. HAVE YOU ACTUALLY HAD ANY THOUGHTS OF KILLING YOURSELF IN THE PAST MONTH?: NO

## 2024-10-03 ASSESSMENT — ACTIVITIES OF DAILY LIVING (ADL)
ADLS_ACUITY_SCORE: 35
ADLS_ACUITY_SCORE: 35

## 2024-10-03 NOTE — TELEPHONE ENCOUNTER
Ok for either OTC lubricating eye ointment or Erythromycin ointment after review with Dr. Ocampo.    Rx for Erythromycin sent per request.    Haider Webber RN 8:36 AM 10/03/24

## 2024-10-04 VITALS
DIASTOLIC BLOOD PRESSURE: 58 MMHG | HEIGHT: 66 IN | SYSTOLIC BLOOD PRESSURE: 125 MMHG | HEART RATE: 97 BPM | BODY MASS INDEX: 36.48 KG/M2 | WEIGHT: 227 LBS | TEMPERATURE: 98.3 F | RESPIRATION RATE: 17 BRPM | OXYGEN SATURATION: 97 %

## 2024-10-04 NOTE — ED TRIAGE NOTES
Pt states she was walking in the kitchen and slipped on a plastic cutting board falling backward and hitting the back of her head.  She denies any LOC and denies blood thinner usage.  Pt states she also has chronic neck pain and is experiencing some pain in her neck as well.       Triage Assessment (Adult)       Row Name 10/03/24 5436          Triage Assessment    Airway WDL WDL        Respiratory WDL    Respiratory WDL WDL        Peripheral/Neurovascular WDL    Peripheral Neurovascular WDL WDL
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
## 2024-10-04 NOTE — ED NOTES
Pt discharged. Discharged instructions and papers given. Discharged on wheelchair on stable condition. A&Ox4

## 2024-10-04 NOTE — DISCHARGE INSTRUCTIONS
Tylenol 1000mg every 6 hours for pain  Ice to reduce swelling  Recheck with your doctor as needed in 1 week  Return if feeling worse

## 2024-10-04 NOTE — ED PROVIDER NOTES
Briefly evaluated patient.  Slip and fall over backward in home, witnessed by spouse.  Headache, neck pain (hx chronic neck pain).  Sober, not anticoagulated.  No other injuries.  Ordered CT head, CT c-spine, zofran/oxycodone.       Sergio Horan MD  10/03/24 3208

## 2024-10-06 NOTE — TELEPHONE ENCOUNTER
prednisoLONE acetate (PRED FORTE) 1 % ophthalmic suspension   Disp-5 mL, R-0,   Start: 07/08/2024 8/29/2024  [facility]    Faheem Ocampo MD  Ophthalmology    Routed because:  provider review.

## 2024-10-07 DIAGNOSIS — H04.123 DRY EYES: ICD-10-CM

## 2024-10-07 DIAGNOSIS — H04.123 DRY EYES, BILATERAL: ICD-10-CM

## 2024-10-07 DIAGNOSIS — H57.13 EYE PAIN, BILATERAL: ICD-10-CM

## 2024-10-07 DIAGNOSIS — H16.223 KERATITIS SICCA, BILATERAL: ICD-10-CM

## 2024-10-07 RX ORDER — PREDNISOLONE ACETATE 10 MG/ML
1-2 SUSPENSION/ DROPS OPHTHALMIC 2 TIMES DAILY
Qty: 10 ML | Refills: 3 | Status: SHIPPED | OUTPATIENT
Start: 2024-10-07

## 2024-10-07 RX ORDER — ERYTHROMYCIN 5 MG/G
OINTMENT OPHTHALMIC
Qty: 3.5 G | Refills: 11 | OUTPATIENT
Start: 2024-10-07

## 2024-10-07 NOTE — TELEPHONE ENCOUNTER
Left message with pt at 1445 re viewing Rx for prednisolone eye drop sent to pharmacy and should have on file (also sent [employer] earlier today).    Reviewed last week sent Rx for EES ointment and sent [employer] with information and today I had new message to fill EES ointment and denied as was a duplicate message.    Reviewed both should be on file for pharmacy and provided direct number if having any trouble obtaining.    Haider Webber RN 2:56 PM 10/07/24

## 2024-10-07 NOTE — TELEPHONE ENCOUNTER
[facility] Call Center    Phone Message    May a detailed message be left on voicemail: yes     Reason for Call: Medication Question or concern regarding medication   Prescription Clarification  Name of Medication: prednisoLONE acetate (PRED FORTE) 1 % ophthalmic suspension   Prescribing Provider: Faheem Ocampo MD   Pharmacy: [facility] [id number] [place] [id number] [facility] [address]    What on the order needs clarification? Rosa from [facility] states the insurance is requesting a Prior Auth as they will only approve 15 ml every 980-days.  Please call.  Thank you.    She also states she doesn't know what's going on with the erythromycin (ROMYCIN) 5 MG/GM ophthalmic ointment. She got a message the PA is denied due to a duplicate request, and she cannot tell who sent the denial message.      Action Taken: Message routed to:  [facility] Ophthalmology    Travel Screening: Not Applicable     Date of Service:

## 2024-10-07 NOTE — TELEPHONE ENCOUNTER
Pt to start prednisolone eye drops 2/day in each eye following August 29th visit with Dr. Ocampo.    Rx sent per request and will send [employer] message with information.    Haider Webber RN 7:23 AM 10/07/24

## 2024-10-08 ENCOUNTER — APPOINTMENT (OUTPATIENT)
Dept: URBAN - METROPOLITAN AREA CLINIC 257 | Age: 54
Setting detail: DERMATOLOGY
End: 2024-10-08

## 2024-10-08 DIAGNOSIS — L68.0 HIRSUTISM: ICD-10-CM

## 2024-10-08 DIAGNOSIS — L24.9 IRRITANT CONTACT DERMATITIS, UNSPECIFIED CAUSE: ICD-10-CM

## 2024-10-08 PROCEDURE — OTHER MIPS QUALITY: OTHER

## 2024-10-08 PROCEDURE — 99203 OFFICE O/P NEW LOW 30 MIN: CPT

## 2024-10-08 PROCEDURE — OTHER PRESCRIPTION: OTHER

## 2024-10-08 PROCEDURE — OTHER COUNSELING: OTHER

## 2024-10-08 RX ORDER — TRIAMCINOLONE ACETONIDE 1 MG/G
OINTMENT TOPICAL
Qty: 30 | Refills: 3 | Status: ERX | COMMUNITY
Start: 2024-10-08

## 2024-10-08 RX ORDER — PREDNISOLONE ACETATE 10 MG/ML
1-2 SUSPENSION/ DROPS OPHTHALMIC 2 TIMES DAILY
Qty: 10 ML | Refills: 3 | OUTPATIENT
Start: 2024-10-08

## 2024-10-08 RX ORDER — ERYTHROMYCIN 5 MG/G
OINTMENT OPHTHALMIC
Qty: 3.5 G | Refills: 11 | OUTPATIENT
Start: 2024-10-08

## 2024-10-08 ASSESSMENT — LOCATION SIMPLE DESCRIPTION DERM
LOCATION SIMPLE: CHIN
LOCATION SIMPLE: LEFT LIP

## 2024-10-08 ASSESSMENT — LOCATION ZONE DERM
LOCATION ZONE: LIP
LOCATION ZONE: FACE

## 2024-10-08 ASSESSMENT — LOCATION DETAILED DESCRIPTION DERM
LOCATION DETAILED: LEFT CHIN
LOCATION DETAILED: LEFT INFERIOR VERMILION LIP

## 2024-10-08 NOTE — HPI: RASH
What Type Of Note Output Would You Prefer (Optional)?: Standard Output
How Severe Is Your Rash?: moderate
Is This A New Presentation, Or A Follow-Up?: Rash
Additional History: Patient presents today for dryness and peeling of her lips. States it has been an ongoing issue for several years. She is also here seeking recommendations for excessive facial hair

## 2024-10-08 NOTE — TELEPHONE ENCOUNTER
[facility] [id number] requesting prior auth of Prednisolone eye gtts( amount)and Erythromycin eye ointment( also amount)    PREDNISOLONE AC 1% EYE DROP prescribed 10/7/24  DISP 10 ML / 3 RF     Alternative Requested:INSURANCE LIMITS TO MAX 15ML PREDNISOLONE EYE DROPS EVERY 90 DAYS. PLEASE INITIATE A PRIOR AUTHORIZATION TO OVERRIDE THIS QTY LIMIT AND UPDATE PHARMACY ON STATUS.       ERYTHROMYCIN 0.5% EYE OINTMENT: 3.5 g and 11 refills sent on 10/3/24 to [facility] [id number]    Alternative Requested:INSURANCE LIMITS TO MAX 3 TUBES OF ERYTHROMYCIN OINT PER 90 DAYS. PLEASE INITIATE A PRIOR AUTHORIZATION TO OVERRIDE QTY LIMIT AND UPDATE PHARMACY ON STATUS.

## 2024-10-10 ENCOUNTER — OFFICE VISIT (OUTPATIENT)
Dept: OPHTHALMOLOGY | Facility: CLINIC | Age: 54
End: 2024-10-10
Attending: OPHTHALMOLOGY
Payer: COMMERCIAL

## 2024-10-10 DIAGNOSIS — H02.88B MEIBOMIAN GLAND DYSFUNCTION (MGD), BILATERAL, BOTH UPPER AND LOWER LIDS: ICD-10-CM

## 2024-10-10 DIAGNOSIS — H57.13 EYE PAIN, BILATERAL: ICD-10-CM

## 2024-10-10 DIAGNOSIS — H40.043 BORDERLINE STEROID-INDUCED GLAUCOMA OF BOTH EYES: Primary | ICD-10-CM

## 2024-10-10 DIAGNOSIS — H02.88A MEIBOMIAN GLAND DYSFUNCTION (MGD), BILATERAL, BOTH UPPER AND LOWER LIDS: ICD-10-CM

## 2024-10-10 PROCEDURE — 99213 OFFICE O/P EST LOW 20 MIN: CPT | Performed by: OPHTHALMOLOGY

## 2024-10-10 ASSESSMENT — TONOMETRY
OS_IOP_MMHG: 26
OD_IOP_MMHG: 29
OD_IOP_MMHG: 30
IOP_METHOD: ICARE
OD_IOP_MMHG: 26
IOP_METHOD: ICARE
OS_IOP_MMHG: 28
IOP_METHOD: TONOPEN
OS_IOP_MMHG: 31

## 2024-10-10 ASSESSMENT — CONF VISUAL FIELD
OS_NORMAL: 1
OS_SUPERIOR_TEMPORAL_RESTRICTION: 0
OD_INFERIOR_NASAL_RESTRICTION: 0
OD_INFERIOR_TEMPORAL_RESTRICTION: 0
OS_INFERIOR_NASAL_RESTRICTION: 0
OD_NORMAL: 1
OS_SUPERIOR_NASAL_RESTRICTION: 0
METHOD: COUNTING FINGERS
OD_SUPERIOR_TEMPORAL_RESTRICTION: 0
OS_INFERIOR_TEMPORAL_RESTRICTION: 0
OD_SUPERIOR_NASAL_RESTRICTION: 0

## 2024-10-10 ASSESSMENT — VISUAL ACUITY
CORRECTION_TYPE: GLASSES
OD_CC: 20/60
OS_PH_CC: 20/20
OS_CC: 20/70
OD_PH_CC: 20/25
METHOD: SNELLEN - LINEAR
OD_PH_CC+: -3
OS_CC+: -1

## 2024-10-10 ASSESSMENT — EXTERNAL EXAM - LEFT EYE: OS_EXAM: NORMAL

## 2024-10-10 ASSESSMENT — EXTERNAL EXAM - RIGHT EYE: OD_EXAM: NORMAL

## 2024-10-10 ASSESSMENT — REFRACTION_WEARINGRX
OD_AXIS: 160
OS_SPHERE: -3.00
OD_CYLINDER: +0.25
OS_CYLINDER: +0.25
OD_SPHERE: -3.25
OS_AXIS: 010

## 2024-10-10 NOTE — NURSING NOTE
Chief Complaints and History of Present Illnesses   Patient presents with    Dry Eye(s) Both Eyes     Dry eyes, bilateral     Chief Complaint(s) and History of Present Illness(es)       Dry Eye(s) Both Eyes              Laterality: both eyes    Associated symptoms: eye pain, dryness, foreign body sensation, irritation, itching and photophobia    Pain scale: 4/10    Comments: Dry eyes, bilateral              Comments    Pt states drops aren't working as well as they were last month but better than the drops she was using before last month.  BE are still itchy, irritated, dry, FBS, and pain 4/10.  But overall eyes are better than before.    Prednisolone twice a day   Mucomyst 6x/day    Doxycycline 50 mg orally twice a day   Serum tears 6x/day and as needed   Xiidra twice a day   PM ointment  Warm compresses 1-2x daily   Lid scrubs LAURENN     JEAN Johnson October 10, 2024 12:10 PM

## 2024-10-10 NOTE — PROGRESS NOTES
HPI       Dry Eye(s) Both Eyes    In both eyes.  Associated symptoms include eye pain, dryness, foreign body sensation, irritation, itching and photophobia.  Pain was noted as 4/10. Additional comments: Dry eyes, bilateral             Comments    Pt states drops aren't working as well as they were last month but better than the drops she was using before last month.  BE are still itchy, irritated, dry, FBS, and pain 4/10.  But overall eyes are better than before.    Prednisolone twice a day   Mucomyst 6x/day    Doxycycline 50 mg orally twice a day   Serum tears 6x/day and as needed   Xiidra twice a day   PM ointment  Warm compresses 1-2x daily   Lid scrubs PRN     JEAN Johnson October 10, 2024 12:10 PM              Last edited by Khalif Cordova COT on 10/10/2024 12:10 PM.          Review of systems for the eyes was negative other than the pertinent positives/negatives listed in the HPI.      Assessment & Plan      Roxanna Celis is a 53 year old female with the following diagnoses:   1. Borderline steroid-induced glaucoma of both eyes    2. Eye pain, bilateral    3. Meibomian gland dysfunction (MGD), bilateral, both upper and lower lids         Here for recheck of chronic bilateral eye pain  Previously switched to prednisolone based on reported symptom relief with previous administration this summer  Symptoms minimally changed with drop switch.  Intraocular pressure now elevated both eyes   Exam stable today.  Symptoms still >> signs     Plan:    BOTH EYES     Stop prednisolone   Continue mucomyst four times a day    Continue Doxycycline 50 mg orally twice a day   Continue serum tears four times a day and as needed   Continue Xiidra twice a day   Continue PM ointment  Warm compresses 2-3x daily   Lid scrubs (or baby shampoo) daily     Refer for second opinion to Dr. Soria at [facility].  Previously seen by Dr. Parikh and Michelle      Patient disposition:   Return in about 4 weeks (around 11/7/2024) for VT only.          Attending Physician Attestation:  Complete documentation of historical and exam elements from today's encounter can be found in the full encounter summary report (not reduplicated in this progress note).  I personally obtained the chief complaint(s) and history of present illness.  I confirmed and edited as necessary the review of systems, past medical/surgical history, family history, social history, and examination findings as documented by others; and I examined the patient myself.  I personally reviewed the relevant tests, images, and reports as documented above.  I formulated and edited as necessary the assessment and plan and discussed the findings and management plan with the patient and family. . - Faheem Ocampo MD

## 2024-10-10 NOTE — PATIENT INSTRUCTIONS
BOTH EYES    Stop Prednisolone   Continue mucomyst four times a day    Continue Doxycycline 50 mg orally twice a day   Continue serum tears four times a day and as needed   Continue Xiidra twice a day   Continue PM ointment  Warm compresses 2-3x daily   Lid scrubs (or baby shampoo) daily     Refer to Dr. Emeka Soria for second opinion

## 2024-10-11 ASSESSMENT — SLIT LAMP EXAM - LIDS
COMMENTS: MILD MGD
COMMENTS: MILD MGD

## 2024-10-17 ENCOUNTER — TELEPHONE (OUTPATIENT)
Dept: OPHTHALMOLOGY | Facility: CLINIC | Age: 54
End: 2024-10-17
Payer: COMMERCIAL

## 2024-10-20 ENCOUNTER — MYC MEDICAL ADVICE (OUTPATIENT)
Dept: PHYSICAL MEDICINE AND REHAB | Facility: CLINIC | Age: 54
End: 2024-10-20
Payer: COMMERCIAL

## 2024-10-21 ENCOUNTER — OFFICE VISIT (OUTPATIENT)
Dept: PHYSICAL MEDICINE AND REHAB | Facility: CLINIC | Age: 54
End: 2024-10-21
Payer: COMMERCIAL

## 2024-10-21 DIAGNOSIS — G24.3 CERVICAL DYSTONIA: Primary | ICD-10-CM

## 2024-10-21 DIAGNOSIS — M54.81 BILATERAL OCCIPITAL NEURALGIA: ICD-10-CM

## 2024-10-21 PROCEDURE — 64405 NJX AA&/STRD GR OCPL NRV: CPT | Mod: XS | Performed by: PHYSICAL MEDICINE & REHABILITATION

## 2024-10-21 PROCEDURE — 95874 GUIDE NERV DESTR NEEDLE EMG: CPT | Performed by: PHYSICAL MEDICINE & REHABILITATION

## 2024-10-21 PROCEDURE — 64616 CHEMODENERV MUSC NECK DYSTON: CPT | Mod: RT | Performed by: PHYSICAL MEDICINE & REHABILITATION

## 2024-10-21 PROCEDURE — 64616 CHEMODENERV MUSC NECK DYSTON: CPT | Mod: XS | Performed by: PHYSICAL MEDICINE & REHABILITATION

## 2024-10-21 RX ORDER — BUPIVACAINE HYDROCHLORIDE 5 MG/ML
7 INJECTION, SOLUTION EPIDURAL; INTRACAUDAL ONCE
Status: COMPLETED | OUTPATIENT
Start: 2024-10-21 | End: 2024-10-21

## 2024-10-21 RX ORDER — TRIAMCINOLONE ACETONIDE 40 MG/ML
40 INJECTION, SUSPENSION INTRA-ARTICULAR; INTRAMUSCULAR ONCE
Status: COMPLETED | OUTPATIENT
Start: 2024-10-21 | End: 2024-10-21

## 2024-10-21 RX ADMIN — BUPIVACAINE HYDROCHLORIDE 35 MG: 5 INJECTION, SOLUTION EPIDURAL; INTRACAUDAL at 12:21

## 2024-10-21 RX ADMIN — TRIAMCINOLONE ACETONIDE 40 MG: 40 INJECTION, SUSPENSION INTRA-ARTICULAR; INTRAMUSCULAR at 12:22

## 2024-10-21 NOTE — PROGRESS NOTES
[facility]     PM&R CLINIC NOTE  BOTULINUM TOXIN PROCEDURE      HPI  Chief Complaint   Patient presents with    Procedure     botox     Roxanna Celis is a 53 year old female with a history of cervical dystonia who presents to clinic for botulinum toxin injections for management of involuntary muscle spasms and neck pain secondary to her cervical dystonia.     SINCE LAST VISIT  Roxanna Celis was last seen here in clinic on 4/18/2024, at which time she received 300 units of Botox. She also received bilateral occipital nerve blocks to address occipital pain.     She did have ED visit on 10/3/2024 after a fall in her kitchen where it was unknown if she hit her head. Imaging of spine and head were unremarkable. She was discharged same day. Since the fall she is having more pain in the posterior chain of her head and neck area as well as her shoulders.     The patient otherwise denies new medical diagnoses, illnesses, hospitalizations, and other injuries since the previous injection with botulinum neurotoxin.     RESPONSE TO PREVIOUS TREATMENT    Side effects: None.     Pain Improvement: Yes, there was noticeable improvement of her pain but not as much as previous injections. Benefit started a few days after the Botox was administered and lasted approximately 4 weeks.     Dystonia Improvement: Yes, this round was not as effective as is pertains to controlling the dystonic movements of her neck and shoulder muscles. Duration of benefit: 4 weeks, followed by a gradual reduction in benefit.      Functional Improvement: When the Botox is working, she is able to socialize and liver her life normally. When the Botox wears off, she is in such terrible pain that she becomes stuck in bed for prolonged periods of time, and misses out on essentially every area of life.       PHYSICAL EXAM  VS: There were no vitals taken for this visit.   GEN: Pleasant and cooperative, in no acute  distress  HEENT: No facial asymmetry  NECK AND TRUNK PATTERN:   Head Tremor: Pt states tremor is intermittent   Left side bending: Very slight  Left Shoulder Elevation: slightly elevated  Head with right shift  Focalized pain at occipitalis - has pain today during visit  Shoulders: Hypertonic and painful to palpation bilateral shoulder girdles (Right > Left)    ALLERGIES  Allergies   Allergen Reactions    Contrast Dye Anaphylaxis     Other reaction(s): almost  from it    Iodinated Contrast Media Anaphylaxis     IVP DYE    Lisinopril Anaphylaxis    Lisinopril-Hydrochlorothiazide Anaphylaxis    Maple Flavor Anaphylaxis     MAPLE SYRUP  MAPLE SYRUP  MAPLE SYRUP    Maple Tree Anaphylaxis     Allergy includes maple syrup.    No Clinical Screening - See Comments      maple syrup==anaphylaxis  Dairy- causes to not feel well    Gluten Itching and Swelling    Gluten Meal Itching and Swelling     Cramping    [place] Na Unknown    Chamomile Unknown    Codeine Sulfate Cramps and GI Disturbance    Food Unknown     Grapes, almond, lactose, gluten, real maple syrup    Nsaids Other (See Comments) and Unknown     Patient had bariatric surgery. Should not ever take NSAIDS due to high risk for gastric ulcers.   Patient had bariatric surgery. Should not ever take NSAIDS due to high risk for gastric ulcers.     Polyethylene Glycol Unknown     Per allergy testing  Per allergy testing      Erythromycin Nausea and Vomiting, Cramps, Diarrhea and Nausea     PN: LW Reaction: stomach upset  cramping    Lactose Diarrhea    Morphine Other (See Comments)       CURRENT MEDICATIONS    Current Outpatient Medications:     acetylcysteine 10 % (MUCOMYST) 10% SOLN, Place 2 drops into both eyes 6 times daily, Disp: 30 mL, Rfl: 11    BOTOX 100 units injection, Inject 250 Units into the muscle once Lot # /C3 with Expiration Date:  2020, Disp: , Rfl:     buPROPion (WELLBUTRIN XL) 300 MG 24 hr tablet, Take 300 mg by mouth every morning, Disp: ,  Rfl:     clonazePAM (KLONOPIN) 1 MG tablet, Take 1 mg by mouth 2 times daily as needed, Disp: , Rfl:     cyanocobalamin (VITAMIN B-12) 1000 MCG tablet, Take 1,000 mcg by mouth daily, Disp: , Rfl:     doxycycline monohydrate (ADOXA) 50 MG tablet, Take 1 tablet (50 mg) by mouth 2 times daily. Please keep appt 8/29/24., Disp: 180 tablet, Rfl: 3    DULoxetine (CYMBALTA) 60 MG capsule, TAKE 2 CAPSULES BY MOUTH EVERY DAY, Disp: , Rfl:     EPINEPHrine (ANY BX GENERIC EQUIV) 0.3 MG/0.3ML injection 2-pack, , Disp: , Rfl:     erythromycin (ROMYCIN) 5 MG/GM ophthalmic ointment, 0.5 inch strip each eye at night (may use either OTC Lubricating eye ointment or Erythromycin ointment), Disp: 3.5 g, Rfl: 11    hydrocortisone 2.5 % cream, APPLY TO AFFECTED SAUD ON LIP TWICE A DAY MAX 2 WEEKS, 1 WEEK OFF REPEAT IF NEEDED, Disp: , Rfl:     ibuprofen (ADVIL/MOTRIN) 200 MG capsule, Take 200 mg by mouth every 4 hours as needed., Disp: , Rfl:     iloperidone (FANAPT) 6 MG TABS tablet, Take 4 mg by mouth daily , Disp: , Rfl:     ketoconazole (NIZORAL) 2 % external cream, PLEASE SEE ATTACHED FOR DETAILED DIRECTIONS, Disp: , Rfl:     lifitegrast (XIIDRA) 5 % opthalmic solution, Place 1 drop into both eyes 2 times daily., Disp: 180 each, Rfl: 3    methylPREDNISolone (MEDROL DOSEPAK) 4 MG tablet therapy pack, TAKE 6 TABLETS ON DAY 1 AS DIRECTED ON PACKAGE AND DECREASE BY 1 TAB EACH DAY FOR A TOTAL OF 6 DAYS, Disp: 21 tablet, Rfl: 0    multivitamin w/minerals (THERA-VIT-M) tablet, Take 1 tablet by mouth daily, Disp: , Rfl:     ONABOTULINUMTOXINA IJ, Inject 250 Units as directed once LOT # /C3 EXPIRATION: 03/2020, Disp: , Rfl:     Perfluorohexyloctane (MIEBO) 1.338 GM/ML SOLN, Apply 1 drop to eye 4 times daily., Disp: 6 mL, Rfl: 11    prednisoLONE acetate (PRED FORTE) 1 % ophthalmic suspension, Place 1-2 drops into both eyes 2 times daily., Disp: 10 mL, Rfl: 3    sodium chloride 0.9 % neb solution, Medically necessary for scleral contact  lens use. May use 3 ml 10-15 times a day for contact lens use, Disp: 1800 mL, Rfl: 11    Soft Lens Products (B&L SENSITIVE EYES) SOLN, Apply 1 drop to eye, Disp: , Rfl:     spironolactone (ALDACTONE) 50 MG tablet, Take 1 tablet by mouth daily, Disp: , Rfl:     tretinoin (RETIN-A) 0.025 % external cream, Apply a pea size to entire face QD, Disp: 45 g, Rfl: 11    tretinoin (RETIN-A) 0.05 % external cream, Spread a pea size amount into affected area topically at bedtime.  Use sunscreen SPF>20., Disp: 45 g, Rfl: 1    TYRVAYA 0.03 MG/ACT nasal spray, Spray 1 spray into both nostrils 2 times daily, Disp: 4.2 mL, Rfl: 11    Vitamin D3 (CHOLECALCIFEROL) 25 mcg (1000 units) tablet, Take by mouth daily, Disp: , Rfl:     White Petrolatum-Mineral Oil (SYSTANE NIGHTTIME) OINT, Apply 3.5 g to eye 4 times daily as needed (dry eyes), Disp: 7 g, Rfl: 11    Current Facility-Administered Medications:     botulinum toxin type A (BOTOX) 100 units injection 300 Units, 300 Units, Intramuscular, Q90 Days, America Gonzalez MD, 300 Units at 10/21/24 1038    botulinum toxin type A (BOTOX) 100 units injection 300 Units, 300 Units, Intramuscular, Q90 Days, America Gonzalez MD, 300 Units at 24 1639    botulinum toxin type A (DYSPORT) injection 500 Units, 500 Units, Intramuscular, Q90 Days, America Gonzalez MD, 500 Units at 22 1028    botulinum toxin type A (DYSPORT) injection 800 Units, 800 Units, Intramuscular, Q90 Days, America Gonzalez MD, 500 Units at 23 1547       BOTULINUM NEUROTOXIN INJECTION PROCEDURES    VERIFICATION OF PATIENT IDENTIFICATION AND PROCEDURE     Initials   Patient Name SES   Patient  SES   Procedure Verified by: KIM     Prior to the start of the procedure and with procedural staff participation, I verbally confirmed the patient s identity using two indicators, relevant allergies, that the procedure was appropriate and matched the consent or emergent situation, and that  the correct equipment/implants were available. Immediately prior to starting the procedure I conducted the Time Out with the procedural staff and re-confirmed the patient s name, procedure, and site/side. (The Joint Commission universal protocol was followed.)  Yes    Sedation (Moderate or Deep): None    ABOVE ASSESSMENTS PERFORMED BY    MD Eduardo Deleon, DO PGY-3          The attending provider was present for the entire procedure documented below.     INDICATIONS FOR PROCEDURES  Roxanna Celis is a 53 year old patient with involuntary muscle spasms and pain secondary to the diagnosis of cervical dystonia. Her baseline symptoms have been recalcitrant to oral medications and conservative therapy.  She is here today for reinjection with Botox.    GOAL OF PROCEDURE  The goal of this procedure is to increase active range of motion, improve volitional motor control, decrease pain  and enhance functional independence.      TOTAL DOSE ADMINISTERED  Dose Administered:  300 units  Botox (Botulinum Toxin Type A)       2:1 Dilution   Unavoidable Drug Waste: No.  Diluent Used:  Preservative Free Normal Saline  Total Volume of Diluent Used:  6 ml  NDC #: Botox 100u ([address]-69)      CONSENT  The risks, benefits, and treatment options were discussed with Roxanna Celis and she agreed to proceed.    Written consent was obtained by [facility].     EQUIPMENT USED  Needle-37mm stimulating/recording  Needle-30 gauge  EMG/NCS Machine    SKIN PREPARATION  Skin preparation was performed using an alcohol wipe.    GUIDANCE DESCRIPTION  Electro-myographic guidance was necessary throughout the procedure to accurately identify all areas of dystonic muscles while avoiding injection of non-dystonic muscles, neighboring nerves and nearby vascular structures.       AREA/MUSCLE INJECTED: 300 UNITS BOTOX = TOTAL DOSE, 2:1 DILUTION  1. NECK & SHOULDER GIRDLE MUSCLES: 170 UNITS BOTOX = TOTAL DOSE   Right Rectus Capitis - (upper  cervical) - 5 units of Botox at 1 site/s.   Left Rectus Capitis - (upper cervical) - 5 units of Botox at 1 site/s.     Right Upper Trapezius - 25 units of Botox at 7 site/s.   Left Upper Trapezius - 25 units of Botox at 7 site/s.     Right Splenius Cervicis - 5 units of Botox at 1 site/s.   Left Splenius Cervicis - 5 units of Botox at 1 site/s.    Right Levator Scapulae - 20 units of Botox at 2 site/s.   Left Levator Scapulae - 20 units of Botox at 2 site/s.    Right Sternocleidomastoid - 10 units of Botox at 3 site/s.  Left Sternocleidomastoid - 10 units of Botox at 3 site/s.    Right Cervical Paraspinals - 10 units of Botox at 4 site/s.  Left Cervical Paraspinals - 10 units of Botox at 4 site/s.    Right Thoracic Paraspinals - 10 units of Botox at 4 site/s.  Left Thoracic Paraspinals - 10 units of Botox at 4 site/s.    2. SCALP MUSCLES: 130 UNITS BOTOX = TOTAL DOSE  Right Occipitalis - 65 units of Botox at 15 site/s.   Left Occipitails - 65 units of Botox at 15 site/s.      BILATERAL GREATER OCCIPITAL NERVE BLOCKS    Dru% lidocaine: 2 ml   0.5% bupivacaine: 7 ml   Kenalo mg = 1 ml     Area just inferior to insertion of the right and left superior trapezius insertion onto skull was cleansed with ChloraPrep. Needle was advanced anteriorly to base of skull then slightly withdrawn and injectate was injected in a fan-like distribution at different depths. A 10 ml mixture of 1% lidocaine, 0.5% bupivacaine and Kenalog was divided into two 5 ml syringes. Total injection volume = 5 ml per side.       RESPONSE TO PROCEDURE  Roxanna Celis tolerated the procedure well and there were no immediate complications. She was allowed to recover for an appropriate period of time and was discharged home in stable condition.    ASSESSMENT AND PLAN   Botulinum toxin injections: No changes made to Botox dose or distribution today due to excellent response to the last series of injections. Patient will continue to monitor  response to Botox and report at next appointment. Additionally, Occipital nerve blocks were administered to address probable occipital neuralgia. She will continue to monitor her response and report at next appointment.   Referrals: None, however recommendations made today regarding possible chiropractor care vs functional medicine to see if her pain can be better controlled.   Follow up: Roxanna Celis was rescheduled for the next series of injections in 12 weeks, at which time we will evaluate response to today's injections. she may call the clinic prior with any questions or concerns prior to the next appointment.       I, America Gonzalez MD, saw this patient with resident, Dr. Espinosa, and agree with the findings and plan of care as documented in this note. I personally reviewed the chart (vitals signs, medications, labs and imaging). My key findings and key management decisions made are reflected in this note.  I performed the entire procedure listed above.      America Gonzalez MD

## 2024-10-21 NOTE — LETTER
10/21/2024       RE: Roxanna Celis  [id number] [place] [address]     Dear Colleague,    Thank you for referring your patient, Roxanna Celis, to the [facility] at [facility]. Please see a copy of my visit note below.      [facility]     PM&R CLINIC NOTE  BOTULINUM TOXIN PROCEDURE      HPI  Chief Complaint   Patient presents with     Procedure     botox     Roxanna Celis is a 53 year old female with a history of cervical dystonia who presents to clinic for botulinum toxin injections for management of involuntary muscle spasms and neck pain secondary to her cervical dystonia.     SINCE LAST VISIT  Roxanna Celis was last seen here in clinic on 4/18/2024, at which time she received 300 units of Botox. She also received bilateral occipital nerve blocks to address occipital pain.     She did have ED visit on 10/3/2024 after a fall in her kitchen where it was unknown if she hit her head. Imaging of spine and head were unremarkable. She was discharged same day. Since the fall she is having more pain in the posterior chain of her head and neck area as well as her shoulders.     The patient otherwise denies new medical diagnoses, illnesses, hospitalizations, and other injuries since the previous injection with botulinum neurotoxin.     RESPONSE TO PREVIOUS TREATMENT    Side effects: None.     Pain Improvement: Yes, there was noticeable improvement of her pain but not as much as previous injections. Benefit started a few days after the Botox was administered and lasted approximately 4 weeks.     Dystonia Improvement: Yes, this round was not as effective as is pertains to controlling the dystonic movements of her neck and shoulder muscles. Duration of benefit: 4 weeks, followed by a gradual reduction in benefit.      Functional Improvement: When the Botox is working, she is able  to socialize and liver her life normally. When the Botox wears off, she is in such terrible pain that she becomes stuck in bed for prolonged periods of time, and misses out on essentially every area of life.       PHYSICAL EXAM  VS: There were no vitals taken for this visit.   GEN: Pleasant and cooperative, in no acute distress  HEENT: No facial asymmetry  NECK AND TRUNK PATTERN:   Head Tremor: Pt states tremor is intermittent   Left side bending: Very slight  Left Shoulder Elevation: slightly elevated  Head with right shift  Focalized pain at occipitalis - has pain today during visit  Shoulders: Hypertonic and painful to palpation bilateral shoulder girdles (Right > Left)    ALLERGIES  Allergies   Allergen Reactions     Contrast Dye Anaphylaxis     Other reaction(s): almost  from it     Iodinated Contrast Media Anaphylaxis     IVP DYE     Lisinopril Anaphylaxis     Lisinopril-Hydrochlorothiazide Anaphylaxis     Maple Flavor Anaphylaxis     MAPLE SYRUP  MAPLE SYRUP  MAPLE SYRUP     Maple Tree Anaphylaxis     Allergy includes maple syrup.     No Clinical Screening - See Comments      maple syrup==anaphylaxis  Dairy- causes to not feel well     Gluten Itching and Swelling     Gluten Meal Itching and Swelling     Cramping     McNabb Na Unknown     Chamomile Unknown     Codeine Sulfate Cramps and GI Disturbance     Food Unknown     Grapes, almond, lactose, gluten, real maple syrup     Nsaids Other (See Comments) and Unknown     Patient had bariatric surgery. Should not ever take NSAIDS due to high risk for gastric ulcers.   Patient had bariatric surgery. Should not ever take NSAIDS due to high risk for gastric ulcers.      Polyethylene Glycol Unknown     Per allergy testing  Per allergy testing       Erythromycin Nausea and Vomiting, Cramps, Diarrhea and Nausea     PN: LW Reaction: stomach upset  cramping     Lactose Diarrhea     Morphine Other (See Comments)       CURRENT MEDICATIONS    Current Outpatient  Medications:      acetylcysteine 10 % (MUCOMYST) 10% SOLN, Place 2 drops into both eyes 6 times daily, Disp: 30 mL, Rfl: 11     BOTOX 100 units injection, Inject 250 Units into the muscle once Lot # /C3 with Expiration Date:  11/2020, Disp: , Rfl:      buPROPion (WELLBUTRIN XL) 300 MG 24 hr tablet, Take 300 mg by mouth every morning, Disp: , Rfl:      clonazePAM (KLONOPIN) 1 MG tablet, Take 1 mg by mouth 2 times daily as needed, Disp: , Rfl:      cyanocobalamin (VITAMIN B-12) 1000 MCG tablet, Take 1,000 mcg by mouth daily, Disp: , Rfl:      doxycycline monohydrate (ADOXA) 50 MG tablet, Take 1 tablet (50 mg) by mouth 2 times daily. Please keep appt 8/29/24., Disp: 180 tablet, Rfl: 3     DULoxetine (CYMBALTA) 60 MG capsule, TAKE 2 CAPSULES BY MOUTH EVERY DAY, Disp: , Rfl:      EPINEPHrine (ANY BX GENERIC EQUIV) 0.3 MG/0.3ML injection 2-pack, , Disp: , Rfl:      erythromycin (ROMYCIN) 5 MG/GM ophthalmic ointment, 0.5 inch strip each eye at night (may use either OTC Lubricating eye ointment or Erythromycin ointment), Disp: 3.5 g, Rfl: 11     hydrocortisone 2.5 % cream, APPLY TO AFFECTED SAUD ON LIP TWICE A DAY MAX 2 WEEKS, 1 WEEK OFF REPEAT IF NEEDED, Disp: , Rfl:      ibuprofen (ADVIL/MOTRIN) 200 MG capsule, Take 200 mg by mouth every 4 hours as needed., Disp: , Rfl:      iloperidone (FANAPT) 6 MG TABS tablet, Take 4 mg by mouth daily , Disp: , Rfl:      ketoconazole (NIZORAL) 2 % external cream, PLEASE SEE ATTACHED FOR DETAILED DIRECTIONS, Disp: , Rfl:      lifitegrast (XIIDRA) 5 % opthalmic solution, Place 1 drop into both eyes 2 times daily., Disp: 180 each, Rfl: 3     methylPREDNISolone (MEDROL DOSEPAK) 4 MG tablet therapy pack, TAKE 6 TABLETS ON DAY 1 AS DIRECTED ON PACKAGE AND DECREASE BY 1 TAB EACH DAY FOR A TOTAL OF 6 DAYS, Disp: 21 tablet, Rfl: 0     multivitamin w/minerals (THERA-VIT-M) tablet, Take 1 tablet by mouth daily, Disp: , Rfl:      ONABOTULINUMTOXINA IJ, Inject 250 Units as directed once LOT #  /C3 EXPIRATION: 03/2020, Disp: , Rfl:      Perfluorohexyloctane (MIEBO) 1.338 GM/ML SOLN, Apply 1 drop to eye 4 times daily., Disp: 6 mL, Rfl: 11     prednisoLONE acetate (PRED FORTE) 1 % ophthalmic suspension, Place 1-2 drops into both eyes 2 times daily., Disp: 10 mL, Rfl: 3     sodium chloride 0.9 % neb solution, Medically necessary for scleral contact lens use. May use 3 ml 10-15 times a day for contact lens use, Disp: 1800 mL, Rfl: 11     Soft Lens Products (B&L SENSITIVE EYES) SOLN, Apply 1 drop to eye, Disp: , Rfl:      spironolactone (ALDACTONE) 50 MG tablet, Take 1 tablet by mouth daily, Disp: , Rfl:      tretinoin (RETIN-A) 0.025 % external cream, Apply a pea size to entire face QD, Disp: 45 g, Rfl: 11     tretinoin (RETIN-A) 0.05 % external cream, Spread a pea size amount into affected area topically at bedtime.  Use sunscreen SPF>20., Disp: 45 g, Rfl: 1     TYRVAYA 0.03 MG/ACT nasal spray, Spray 1 spray into both nostrils 2 times daily, Disp: 4.2 mL, Rfl: 11     Vitamin D3 (CHOLECALCIFEROL) 25 mcg (1000 units) tablet, Take by mouth daily, Disp: , Rfl:      White Petrolatum-Mineral Oil (SYSTANE NIGHTTIME) OINT, Apply 3.5 g to eye 4 times daily as needed (dry eyes), Disp: 7 g, Rfl: 11    Current Facility-Administered Medications:      botulinum toxin type A (BOTOX) 100 units injection 300 Units, 300 Units, Intramuscular, Q90 Days, America Gonzalez MD, 300 Units at 10/21/24 1038     botulinum toxin type A (BOTOX) 100 units injection 300 Units, 300 Units, Intramuscular, Q90 Days, America Gonzalez MD, 300 Units at 05/08/24 1639     botulinum toxin type A (DYSPORT) injection 500 Units, 500 Units, Intramuscular, Q90 Days, America Gonzalez MD, 500 Units at 05/20/22 1028     botulinum toxin type A (DYSPORT) injection 800 Units, 800 Units, Intramuscular, Q90 Days, Standal, America Jack MD, 500 Units at 05/04/23 1547       BOTULINUM NEUROTOXIN INJECTION PROCEDURES    VERIFICATION OF  PATIENT IDENTIFICATION AND PROCEDURE     Initials   Patient Name SES   Patient  SES   Procedure Verified by: SES     Prior to the start of the procedure and with procedural staff participation, I verbally confirmed the patient s identity using two indicators, relevant allergies, that the procedure was appropriate and matched the consent or emergent situation, and that the correct equipment/implants were available. Immediately prior to starting the procedure I conducted the Time Out with the procedural staff and re-confirmed the patient s name, procedure, and site/side. (The Joint Commission universal protocol was followed.)  Yes    Sedation (Moderate or Deep): None    ABOVE ASSESSMENTS PERFORMED BY    MD Eduardo Deleon, DO PGY-3          The attending provider was present for the entire procedure documented below.     INDICATIONS FOR PROCEDURES  Roxanna Celis is a 53 year old patient with involuntary muscle spasms and pain secondary to the diagnosis of cervical dystonia. Her baseline symptoms have been recalcitrant to oral medications and conservative therapy.  She is here today for reinjection with Botox.    GOAL OF PROCEDURE  The goal of this procedure is to increase active range of motion, improve volitional motor control, decrease pain  and enhance functional independence.      TOTAL DOSE ADMINISTERED  Dose Administered:  300 units  Botox (Botulinum Toxin Type A)       2:1 Dilution   Unavoidable Drug Waste: No.  Diluent Used:  Preservative Free Normal Saline  Total Volume of Diluent Used:  6 ml  NDC #: Botox 100u ([address]-49)      CONSENT  The risks, benefits, and treatment options were discussed with Roxanna Celis and she agreed to proceed.    Written consent was obtained by [facility].     EQUIPMENT USED  Needle-37mm stimulating/recording  Needle-30 gauge  EMG/NCS Machine    SKIN PREPARATION  Skin preparation was performed using an alcohol wipe.    GUIDANCE DESCRIPTION  Electro-myographic  guidance was necessary throughout the procedure to accurately identify all areas of dystonic muscles while avoiding injection of non-dystonic muscles, neighboring nerves and nearby vascular structures.       AREA/MUSCLE INJECTED: 300 UNITS BOTOX = TOTAL DOSE, 2:1 DILUTION  1. NECK & SHOULDER GIRDLE MUSCLES: 170 UNITS BOTOX = TOTAL DOSE   Right Rectus Capitis - (upper cervical) - 5 units of Botox at 1 site/s.   Left Rectus Capitis - (upper cervical) - 5 units of Botox at 1 site/s.     Right Upper Trapezius - 25 units of Botox at 7 site/s.   Left Upper Trapezius - 25 units of Botox at 7 site/s.     Right Splenius Cervicis - 5 units of Botox at 1 site/s.   Left Splenius Cervicis - 5 units of Botox at 1 site/s.    Right Levator Scapulae - 20 units of Botox at 2 site/s.   Left Levator Scapulae - 20 units of Botox at 2 site/s.    Right Sternocleidomastoid - 10 units of Botox at 3 site/s.  Left Sternocleidomastoid - 10 units of Botox at 3 site/s.    Right Cervical Paraspinals - 10 units of Botox at 4 site/s.  Left Cervical Paraspinals - 10 units of Botox at 4 site/s.    Right Thoracic Paraspinals - 10 units of Botox at 4 site/s.  Left Thoracic Paraspinals - 10 units of Botox at 4 site/s.    2. SCALP MUSCLES: 130 UNITS BOTOX = TOTAL DOSE  Right Occipitalis - 65 units of Botox at 15 site/s.   Left Occipitails - 65 units of Botox at 15 site/s.      BILATERAL GREATER OCCIPITAL NERVE BLOCKS    Dru% lidocaine: 2 ml   0.5% bupivacaine: 7 ml   Kenalo mg = 1 ml     Area just inferior to insertion of the right and left superior trapezius insertion onto skull was cleansed with ChloraPrep. Needle was advanced anteriorly to base of skull then slightly withdrawn and injectate was injected in a fan-like distribution at different depths. A 10 ml mixture of 1% lidocaine, 0.5% bupivacaine and Kenalog was divided into two 5 ml syringes. Total injection volume = 5 ml per side.       RESPONSE TO PROCEDURE  Roxanna cheek  the procedure well and there were no immediate complications. She was allowed to recover for an appropriate period of time and was discharged home in stable condition.    ASSESSMENT AND PLAN   Botulinum toxin injections: No changes made to Botox dose or distribution today due to excellent response to the last series of injections. Patient will continue to monitor response to Botox and report at next appointment. Additionally, Occipital nerve blocks were administered to address probable occipital neuralgia. She will continue to monitor her response and report at next appointment.   Referrals: None, however recommendations made today regarding possible chiropractor care vs functional medicine to see if her pain can be better controlled.   Follow up: Roxanna Celis was rescheduled for the next series of injections in 12 weeks, at which time we will evaluate response to today's injections. she may call the clinic prior with any questions or concerns prior to the next appointment.       I, America Gonzalez MD, saw this patient with resident, Dr. Espinosa, and agree with the findings and plan of care as documented in this note. I personally reviewed the chart (vitals signs, medications, labs and imaging). My key findings and key management decisions made are reflected in this note.  I performed the entire procedure listed above.      America Gonzalez MD          Again, thank you for allowing me to participate in the care of your patient.      Sincerely,    America Gonzalez MD

## 2024-10-21 NOTE — TELEPHONE ENCOUNTER
Patient was present for her visit with Dr. Gonzalez today at the [facility].    Leonie Chaudhary RN on 10/21/2024 at 3:49 PM

## 2024-10-24 ENCOUNTER — OFFICE VISIT (OUTPATIENT)
Dept: OPTOMETRY | Facility: CLINIC | Age: 54
End: 2024-10-24
Payer: COMMERCIAL

## 2024-10-24 DIAGNOSIS — H04.123 DRY EYES: Primary | ICD-10-CM

## 2024-10-24 DIAGNOSIS — H57.13 EYE PAIN, BILATERAL: ICD-10-CM

## 2024-10-24 DIAGNOSIS — H16.223 KERATITIS SICCA, BILATERAL: ICD-10-CM

## 2024-10-24 ASSESSMENT — REFRACTION_CURRENTRX
OS_BASECURVE: 7.899
OD_ADDL_SPECS: OPT EXTREME CLEAR
OS_SPHERE: -0.50
OD_DIAMETER: 19.0
OS_DIAMETER: 14.1
OD_BRAND: DAILIES TOTAL 1
OD_SPHERE: -0.50
OD_BASECURVE: 8.5
OD_DIAMETER: 14.1
OD_BASECURVE: 8.047
OS_BASECURVE: 8.5
OS_DIAMETER: 19.0
OS_SPHERE: PLANO/+0.25
OS_BRAND: DAILIES TOTAL 1
OS_ADDL_SPECS: OPT EXTREME CLEAR

## 2024-10-24 NOTE — PROGRESS NOTES
No office visit. Soft CL order only per patient MyChart request.     Contact Lens Billing  V-Code:  - Soft spherical  Final Contact Lens Rx           Brand Base Curve Diameter Sphere     Right Dailies Total 1 8.5 14.1 -0.50     Left Dailies Total 1 8.5 14.1 -0.50             Fait order mailed direct: [id number]  # of units: 1- 90 pack @ $100 + $7.95 shipping = Total $107.95    JEAN Garza on 10/24/2024 at 9:52 AM      This patient requires contact lenses that are medically necessary for either improvement in vision over spectacles, support of the ocular surface, or other therapeutic benefit. These are not cosmetic contact lenses.     Encounter Diagnoses   Name Primary?    Dry eyes Yes    Eye pain, bilateral     Keratitis sicca, bilateral         Date of last eye exam: 4/24/24

## 2024-10-25 DIAGNOSIS — H16.123 FILAMENTARY KERATITIS OF BOTH EYES: ICD-10-CM

## 2024-10-25 DIAGNOSIS — H04.123 BILATERAL DRY EYES: ICD-10-CM

## 2024-11-14 ENCOUNTER — OFFICE VISIT (OUTPATIENT)
Dept: OPHTHALMOLOGY | Facility: CLINIC | Age: 54
End: 2024-11-14
Attending: OPHTHALMOLOGY
Payer: COMMERCIAL

## 2024-11-14 DIAGNOSIS — H04.123 DRY EYES, BILATERAL: Primary | ICD-10-CM

## 2024-11-14 DIAGNOSIS — H57.13 EYE PAIN, BILATERAL: ICD-10-CM

## 2024-11-14 PROCEDURE — 99211 OFF/OP EST MAY X REQ PHY/QHP: CPT | Performed by: OPHTHALMOLOGY

## 2024-11-14 ASSESSMENT — VISUAL ACUITY
OS_CC: 20/50
OS_PH_CC+: -1
CORRECTION_TYPE: GLASSES
METHOD: SNELLEN - LINEAR
OS_PH_CC: 20/20
OS_CC+: -2
OD_CC+: -2
OD_CC: 20/30

## 2024-11-14 ASSESSMENT — SLIT LAMP EXAM - LIDS
COMMENTS: MILD MGD
COMMENTS: MILD MGD

## 2024-11-14 ASSESSMENT — REFRACTION_WEARINGRX
OD_CYLINDER: +0.25
OD_AXIS: 160
OD_SPHERE: -3.25
OS_AXIS: 010
OS_CYLINDER: +0.25
OS_SPHERE: -3.00

## 2024-11-14 ASSESSMENT — EXTERNAL EXAM - LEFT EYE: OS_EXAM: NORMAL

## 2024-11-14 ASSESSMENT — EXTERNAL EXAM - RIGHT EYE: OD_EXAM: NORMAL

## 2024-11-14 ASSESSMENT — TONOMETRY
OD_IOP_MMHG: 11
IOP_METHOD: TONOPEN
OS_IOP_MMHG: 12

## 2024-11-14 NOTE — PROGRESS NOTES
HPI       Follow Up    In both eyes.  Since onset it is stable.  Associated symptoms include eye pain (improved).  Negative for flashes and floaters.  Treatments tried include eye drops.             Comments    Here for follow up. [facility] is about the same. Pain has improved. Compliant with drops. No flashes or floaters.    Norberto PENA 12:03 PM November 14, 2024             Last edited by Norberto Caraballo on 11/14/2024 12:03 PM.          Review of systems for the eyes was negative other than the pertinent positives/negatives listed in the HPI.      Assessment & Plan      Roxanna Celis is a 54 year old female with the following diagnoses:   1. Dry eyes, bilateral    2. Eye pain, bilateral       Here for recheck of intraocular pressure chronic bilateral eye pain  Intraocular pressure elevated both eyes last visit  Improved with cessation of pred  Exam stable today.  Symptoms improving     Plan:     BOTH EYES     Scheduled for second opinion with Dr. Soria in Jan.  Contact lens follow up with Dr. BATES in Jan  Continue mucomyst four times a day    Continue Doxycycline 50 mg orally twice a day   Continue serum tears four times a day and as needed   Continue Xiidra twice a day   Continue PM ointment  Warm compresses 2-3x daily   Lid scrubs (or baby shampoo) daily               Attending Physician Attestation:  Complete documentation of historical and exam elements from today's encounter can be found in the full encounter summary report (not reduplicated in this progress note).  I personally obtained the chief complaint(s) and history of present illness.  I confirmed and edited as necessary the review of systems, past medical/surgical history, family history, social history, and examination findings as documented by others; and I examined the patient myself.  I personally reviewed the relevant tests, images, and reports as documented above.  I formulated and edited as necessary the assessment and plan and discussed the findings and  management plan with the patient and family. . - Faheem Ocampo MD

## 2024-11-17 DIAGNOSIS — H04.123 BILATERAL DRY EYES: ICD-10-CM

## 2024-11-18 RX ORDER — VARENICLINE 0.03 MG/.05ML
1 SPRAY NASAL 2 TIMES DAILY
Qty: 8.4 ML | Refills: 11 | Status: SHIPPED | OUTPATIENT
Start: 2024-11-18

## 2024-11-18 NOTE — TELEPHONE ENCOUNTER
Medication: TYRVAYA 0.03 MG NASAL SPRAY     Requested directions: SPRAY 1 SPRAY INTO BOTH NOSTRILS 2 TIMES DAILY FOR 90 DAYS  Current directions on the medication list:  Spray 1 spray into both nostrils 2 times daily - Both Nostrils  Dr. Ocampo    Last Written Prescription Date:  6-13-23  Last Fill Quantity: 4.2 ml,   # refills: 11    Last Office Visit: 11-14-24 Terrence  Future Office visit: 3-11-25 Terrence    Attending Provider: Terrence  Last Clinic Note: Plan:     BOTH EYES     Scheduled for second opinion with Dr. Soria in Jan.  Contact lens follow up with Dr. BATES in Jan  Continue mucomyst four times a day    Continue Doxycycline 50 mg orally twice a day   Continue serum tears four times a day and as needed   Continue Xiidra twice a day   Continue PM ointment  Warm compresses 2-3x daily   Lid scrubs (or baby shampoo) daily     Routing refill request to provider for review/approval because:    Not on protocol  Requires provider review  Med not in last clinic note

## 2024-11-27 NOTE — PATIENT INSTRUCTIONS
1. You can call the [facility] and check with your insurance to see if pool therapy will be covered at this location or any other location in the [facility].    2. Start lyrica and increase as follows:    AM    PM    50mg    50mg   If tolerating, after 7 days, increase as tolerated to next line   50mg             100mg  If tolerating, after 3-4 days, increase as tolerated to next line   100mg     100mg  If tolerating, after 3-4 days, increase as tolerated to next line  Call us when you are at this dose, or with any concerns.     Avoid activities that require mental alertness or coordination until you realize the effects of gabapentin as it can cause dizziness, sleepiness, fatigue and incoordination.    3. When you start lyrica, reduce gabapentin to 600mg at night. After 1 week of taking lyrica 50mg twice daily, stop gabapentin.    4. I placed a pain psychology referral, you'll be called to schedule.    5. Follow up in 6 weeks.    Take care,    Yasmany Rivera [facility]        ----------------------------------------------------------------  Clinic Number:  [phone]     Call with any questions about your care and for scheduling assistance.     Calls are returned Monday through Friday between 8 AM and 4:30 PM. We usually get back to you within 2 business days depending on the issue/request.    If we are prescribing your medications:    For opioid medication refills, call the clinic or send a [employer] message 7 days in advance.  Please include:    Name of requested medication    Name of the pharmacy.    For non-opioid medications, call your pharmacy directly to request a refill. Please allow 3-4 days to be processed.     Per [place] State Law:    All controlled substance prescriptions must be filled within 30 days of being written.      For those controlled substances allowing refills, pickup must occur within 30 days of last fill.      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 Past medical history includes diabetes, hyperlipidemia, obstructive sleep apnea (on CPAP) and reflux disease.  In 2022 he developed COVID-pneumonia possible COVID pneumonitis and progressive shortness of breath with CT findings also demonstrating calcification LAD and circumflex vessel..  He was also noted to have significant pulmonary hypertension with RVSP of 60 mmHg.  By 2023 he was found to have severe mitral regurgitation with annular dilatation and moderate severe tricuspid regurgitation.  He underwent mitral valve repair with annuloplasty ring with residual mild to moderate regurgitation subsequent mitral valve replacement with 29 mm Schwartz 2 porcine prosthesis.  He also had tricuspid valve repair and left atrial appendage closure.  Preoperative cardiac cath showed normal coronary arteries.  Postoperatively he also had a pericardial effusion which slowly resolved.  He initially had LV systolic dysfunction with an ejection fraction reduced to 25% though on medical therapy and with treatment of CPAP by 2024 ejection fraction 54% with mild left ventricular hypertrophy, indeterminate diastolic function, mitral valve prosthesis in place with a mean gradient of 11 mmHg and a pressure half-time of 73 ms suggestive of hyperdynamic state or patient valve mismatch.  Right-sided chambers were not well-seen.  Aortic valve was thickened without stenosis.  His postop care has been also complicated by renal insufficiency and poor control of diabetes.  He continued complaint of dyspnea and edema.  On 10/2024 echo showed an ejection fraction 43% with regional wall motion abnormality  our program!      Any time you are unable to keep your appointment we ask that you call us at least 24 hours in advance to cancel.This will allow us to offer the appointment time to another patient.     Multiple missed appointments may lead to dismissal from the clinic.       with normal prosthetic valve function mildly reduced RV function.  Tricuspid ring present with normal function.  Demadex was increased and patient was to consider reinitiation of Xarelto.  He was switched from losartan to Entresto, but was unable to go to pharmacy to make this change in a timely manner.     At most recent visit with Dr. Arango, proBNP and creatinine had worsened and edema was significant with associated SOA. He was admitted to the hospital for further evaluation.  He initially was started on IV furosemide 40 mg twice daily and diuresed well.  Oral diuretics were adjusted during hospitalization and he was discharged on torsemide 100 mg daily and Entresto was actually started during admission.  Spironolactone and Jardiance were discussed but ultimately deferred to ensure stability of renal function and electrolytes with recent medication changes.    Interval history  Patient presents today for hospital follow-up.  I will visit with him today and have reviewed his medical record.  His wife has accompanied him to visit today per his preference.  Since discharge he has been completing pulmonary rehab and feels breathing is better but not quite to his baseline.  Edema has improved.  He has occasional dizziness with quick position changes.  He denies chest pain or chest pressure, palpitations, syncope or presyncope.  He reports compliance with CPAP.  Blood pressure today is well-controlled and wife reports readings are similar at home.  A1c was 12 during hospitalization and during visit today, wife states he frequently forgets to take insulin at home.  They are to get labs today at nephrology office and have an appointment with nephrologist (Dr. Varner) on 12/17/2024.       Past Medical History:   Diagnosis Date    Allergic rhinitis     Arthritis     BPH (benign prostatic hyperplasia)     Diabetes mellitus     TYPE 2    Foraminal stenosis of cervical region     C5-C6    GERD (gastroesophageal reflux  disease)     Hemorrhoids     History of urinary retention 2010    S/P TURP    Hyperlipidemia     Macular degeneration     PING (obstructive sleep apnea) 01/07/2016    MILD, SEES DR. AMARILIS MORGAN    Pericardial effusion, acute      Past Surgical History:   Procedure Laterality Date    BRONCHOSCOPY N/A 7/10/2024    Procedure: BRONCHOSCOPY WITH C-ARM with biopsies and washing;  Surgeon: Luis Slater Jr., MD;  Location: [facility] ENDOSCOPY;  Service: Pulmonary;  Laterality: N/A;  pre-possible sarcoidosis; multiple nodules  post-multiple nodules    CARDIAC CATHETERIZATION N/A 5/11/2023    Procedure: Right Heart Cath;  Surgeon: Corey Gaffney MD;  Location:  [facility] CATH INVASIVE LOCATION;  Service: Cardiology;  Laterality: N/A;    CARDIAC CATHETERIZATION N/A 5/11/2023    Procedure: Left Heart Cath;  Surgeon: Corey Gaffney MD;  Location: [facility] CATH INVASIVE LOC[facility];  Service: Cardiology;  Laterality: N/A;    CARDIAC CATHETERIZATION N/A 5/11/2023    Procedure: Left ventriculography;  Surgeon: Corey Gaffney MD;  Location:  [facility] CATH INVASIVE LOCATION;  Service: Cardiology;  Laterality: N/A;    CARDIAC CATHETERIZATION N/A 5/11/2023    Procedure: Coronary angiography;  Surgeon: Corey Gaffney MD;  Location: [facility] CATH INVASIVE LOCATION;  Service: Cardiology;  Laterality: N/A;    COLONOSCOPY N/A     WNL PER PT, NO RECORDS,     COLONOSCOPY N/A 04/07/2009    DR. GORGE BENAVIDEZ AT [facility]    MITRAL VALVE REPAIR/REPLACEMENT N/A 5/24/2023    Procedure: MITRAL VALVE REPAIR/REPLACEMENT TRICUSPID VALVE REPAIR/REPLACEMENT TRANSESOPHAGEAL ECHOCARDIOGRAM WITH ANESTHESIA;  Surgeon: George Frey MD;  Location: [facility] CVOR;  Service: Cardiothoracic;  Laterality: N/A;    PROSTATE SURGERY N/A 11/12/2010    TURP, DR. COREY COLLAZO AT [facility]    SKIN TAG REMOVAL N/A 12/20/2017    ANAL SKIN TAG X2, PERFORMED IN OFFICE, DR. LISA ORANTES    TURP / TRANSURETHRAL INCISION / DRAINAGE PROSTATE  2010    Dr. Goodrich      Outpatient Medications Prior to Visit   Medication Sig Dispense Refill    acetaminophen (TYLENOL) 325 MG tablet Take 2 tablets by mouth Every 6 (Six) Hours As Needed for Mild Pain.      albuterol sulfate  (90 Base) MCG/ACT inhaler Inhale 2 puffs Every 4 (Four) Hours As Needed.      apixaban (ELIQUIS) 5 MG tablet tablet Take 1 tablet by mouth 2 (Two) Times a Day. 180 tablet 3    atorvastatin (LIPITOR) 40 MG tablet Take 1 tablet by mouth Every Night. 30 tablet 1    budesonide-formoterol (SYMBICORT) 160-4.5 MCG/ACT inhaler Inhale 2 puffs 2 (Two) Times a Day.      calcium carbonate (OS-QUE) 600 MG tablet Take 1 tablet by mouth Daily.      calcium carbonate (TUMS) 500 MG chewable tablet Chew 2 tablets 4 (Four) Times a Day As Needed for Indigestion or Heartburn.      Continuous Blood Gluc Sensor (FreeStyle Ysabel 2 Sensor) misc 1 each by Other route Every 14 (Fourteen) Days. Use 1 sensor every 14 days  Indications: Type 2 Diabetes 6 each 3    glucose blood (OneTouch Verio) test strip 1 each by Other route As Needed. Use as instructed      insulin degludec (Tresiba FlexTouch) 100 UNIT/ML solution pen-injector injection Inject 60 Units under the skin into the appropriate area as directed Daily.      Insulin Lispro, 1 Unit Dial, (HumaLOG KwikPen) 100 UNIT/ML solution pen-injector Inject 20 Units under the skin into the appropriate area as directed 3 (Three) Times a Day With Meals.      Insulin Pen Needle 32G X 4 MM misc 1 each by Other route 3 (Three) Times a Day. Use to inject insulin under the skin via pens up to 4 times daily  Indications: Type 2 Diabetes 300 each 3    metoprolol succinate XL (TOPROL-XL) 25 MG 24 hr tablet Take 1 tablet by mouth 2 (Two) Times a Day. 180 tablet 2    Multiple Vitamins-Minerals (PRESERVISION AREDS 2+MULTI VIT PO) Take 1 tablet by mouth 2 (Two) Times a Day.      pantoprazole (PROTONIX) 40 MG EC tablet Take 1 tablet by mouth Daily. 30 tablet 1    potassium chloride (KLOR-CON M20) 20  "MEQ CR tablet Take 1 tablet by mouth Daily. 30 tablet 1    sacubitril-valsartan (Entresto) 24-26 MG tablet Take 1 tablet by mouth 2 (Two) Times a Day. 180 tablet 3    sertraline (Zoloft) 25 MG tablet Take 1 tablet by mouth Daily. 30 tablet 2    torsemide (DEMADEX) 100 MG tablet Take 1 tablet by mouth Daily. (Patient taking differently: Take 1 tablet by mouth Daily. Takes 40 mg bid) 30 tablet 1     No facility-administered medications prior to visit.       Allergies as of 12/03/2024    (No Known Allergies)     Social History     Socioeconomic History    Marital status:    Tobacco Use    Smoking status: Never     Passive exposure: Never    Smokeless tobacco: Never   Vaping Use    Vaping status: Never Used   Substance and Sexual Activity    Alcohol use: Yes     Alcohol/week: 1.0 standard drink of alcohol     Types: 1 Drinks containing 0.5 oz of alcohol per week     Comment: RARELY    Drug use: No    Sexual activity: Defer     Partners: Female     Family History   Problem Relation Age of Onset    Lung cancer Mother     Stroke Father     Dementia Father     Hyperlipidemia Brother     Hypertension Brother     Heart disease Brother     Diabetes Brother     Colon polyps Brother     Colon polyps Brother     Malig Hyperthermia Neg Hx      Review of Systems   Constitutional: Positive for malaise/fatigue.   Cardiovascular:  Positive for dyspnea on exertion and leg swelling. Negative for chest pain, claudication, near-syncope, orthopnea, palpitations, paroxysmal nocturnal dyspnea and syncope.   Respiratory:  Negative for shortness of breath.    Neurological:  Positive for dizziness. Negative for brief paralysis, headaches and light-headedness.   All other systems reviewed and are negative.       Objective:     Vitals:    12/03/24 1325   BP: 124/76   Pulse: 78   Weight: 127 kg (279 lb)   Height: 185.4 cm (73\")     Body mass index is 36.81 kg/m².    Vitals reviewed.   Constitutional:       General: Not in acute distress.    "  Appearance: Well-developed and not in distress. Chronically ill-appearing. Not diaphoretic.   HENT:      Head: Normocephalic.   Pulmonary:      Effort: Pulmonary effort is normal. No respiratory distress.      Breath sounds: Normal breath sounds. No wheezing. No rhonchi. No rales.   Cardiovascular:      Normal rate. Regular rhythm.      Murmurs: There is no murmur.   Pulses:     Radial: 2+ bilaterally.  Edema:     Peripheral edema present.     Pretibial: trace edema of the left pretibial area and 1+ edema of the right pretibial area.     Ankle: trace edema of the left ankle and 1+ edema of the right ankle.     Feet: trace edema of the left foot and 1+ edema of the right foot.  Skin:     General: Skin is warm and dry. There is no cyanosis.      Findings: No rash.   Neurological:      Mental Status: Alert and oriented to person, place, and time.   Psychiatric:         Behavior: Behavior normal. Behavior is cooperative.         Thought Content: Thought content normal.         Judgment: Judgment normal.       Lab Review:     Lab Results   Component Value Date     11/13/2024     11/12/2024    K 3.8 11/13/2024    K 3.8 11/12/2024     11/13/2024     11/12/2024    CO2 25.8 11/13/2024    CO2 27.2 11/12/2024    BUN 21 11/13/2024    BUN 18 11/12/2024    CREATININE 1.52 (H) 11/13/2024    CREATININE 1.39 (H) 11/12/2024    EGFRIFNONA 66 12/15/2021    EGFRIFNONA 60 06/07/2021    EGFRIFAFRI 76 12/15/2021    EGFRIFAFRI 69 06/07/2021    GLUCOSE 109 (H) 11/13/2024    GLUCOSE 110 (H) 11/12/2024    CALCIUM 8.7 11/13/2024    CALCIUM 9.0 11/12/2024    PROTENTOTREF 6.2 02/13/2024    PROTENTOTREF 6.3 03/29/2023    ALBUMIN 3.6 11/13/2024    ALBUMIN 3.5 11/12/2024    BILITOT 0.6 11/12/2024    BILITOT 0.4 06/11/2024    AST 24 11/12/2024    AST 10 06/11/2024    ALT 32 11/12/2024    ALT 16 06/11/2024     Lab Results   Component Value Date    WBC 8.85 11/13/2024    WBC 9.01 06/11/2024    HGB 12.5 (L) 11/13/2024    HGB  12.6 (L) 06/11/2024    HCT 37.9 11/13/2024    HCT 37.3 (L) 06/11/2024    MCV 84.2 11/13/2024    MCV 80.9 06/11/2024     11/13/2024     06/11/2024     Lab Results   Component Value Date    PROBNP 3,327.0 (H) 11/11/2024    PROBNP 3,078.0 (H) 10/16/2024     Lab Results   Component Value Date    CKMB 1.09 06/24/2023    TROPONINT 95 (C) 06/25/2023     Lab Results   Component Value Date    TSH 2.930 11/11/2024    TSH 1.760 02/13/2024      Lipid Panel          2/13/2024    12:11   Lipid Panel   Total Cholesterol 105    Triglycerides 191    HDL Cholesterol 31    VLDL Cholesterol 31    LDL Cholesterol  43    LDL/HDL Ratio 1.15           ECG 12 Lead    Date/Time: 12/3/2024 2:56 PM  Performed by: Christina Diaz APRN    Authorized by: Christina Diaz APRN  Comparison: compared with previous ECG   Similar to previous ECG  Rhythm: sinus rhythm  Rate: normal  BPM: 78  Conduction: right bundle branch block, left anterior fascicular block and 1st degree AV block  QRS axis: normal  Other findings: left ventricular hypertrophy and prolonged QTc interval  Comments: Similar to prior        Assessment:       Diagnosis Plan   1. Non-ischemic cardiomyopathy        2. Nonrheumatic mitral valve regurgitation        3. H/O mitral valve replacement with tissue graft        4. Stage 3a chronic kidney disease        5. Type 2 diabetes mellitus with hyperglycemia, with long-term current use of insulin        6. Essential hypertension        7. PING (obstructive sleep apnea)        8. Anticoagulated        9. Paroxysmal atrial flutter        10. Mixed hyperlipidemia          Plan:       1.  Acute on chronic combined CHF (unclear trigger).  Successfully diuresed during hospitalization.  Nephrology was consulted and manages diuretics.  He was discharged on torsemide 100 mg daily but has only been taking 40 mg BID. Salt and fluid restriction were discussed with him during admission.  Asked him to increase torsemide to 80 mg  every morning and 20 mg at lunchtime given residual edema.  He had been taking his evening dose of torsemide at 8 PM and was complaining of significant nocturia with this.  I asked him to move dose to around lunchtime to help with this.  2.  Cardiomyopathy with EF 43% by echo on 10/22/2024.  Recently started on Entresto.  Diuretics were also increased.  He is getting labs with nephrology today.  If all stable could consider addition of spironolactone and/or Jardiance if okay with nephrology.  He has appointment with them on 12/17/2024.  3.  Acute kidney injury with stage IIIa chronic kidney disease.  Following with nephrology.  4.  Status post tissue mitral valve replacement and tricuspid valve repair (and left atrial appendage closure) on 5/24/2023 by Dr. Frey  5.  Postoperative junctional bradycardia, complete AV block, typical atrial flutter (status post DCCV on 6/2/2023)  6.  Paroxysmal atrial fibrillation.  Tolerating Eliquis with no falls or abnormal bleeding.  CBC stable during hospitalization.  7.  Obstructive sleep apnea, on CPAP.  Encourage strict compliance nightly and also with naps during the day.  8.  Bifascicular block (RBBB, LAFB).    9.  Type 2 diabetes.  Poorly controlled.  A1c was 12 during admission.  Follows with endocrinology.  Consider SGLT2 if okay with nephrology.  He also has poor compliance with insulin dosing.  10.  History of COVID-19 pneumonia with potential residual ILD      Time Spent: I spent 40 minutes caring for Vernon on this date of service. This time includes time spent by me in the following activities: preparing for the visit, reviewing tests, performing a medically appropriate examination and/or evaluations, counseling and educating the patient/family/caregiver, ordering medications, tests, or procedures, referring and communicating with other healthcare professionals, documenting information in the medical record, independently interpreting results and communicating that  information with the patient/family/caregiver, and Obtaining an outside history.   I spent 1 minutes on the separately reported service of ECG. This time is not included in the time used to support the E/M service also reported today.        Your medication list            Accurate as of December 3, 2024  3:02 PM. If you have any questions, ask your nurse or doctor.                CHANGE how you take these medications        Instructions Last Dose Given Next Dose Due   torsemide 100 MG tablet  Commonly known as: DEMADEX  What changed: additional instructions      Take 1 tablet by mouth Daily.              CONTINUE taking these medications        Instructions Last Dose Given Next Dose Due   acetaminophen 325 MG tablet  Commonly known as: TYLENOL      Take 2 tablets by mouth Every 6 (Six) Hours As Needed for Mild Pain.       albuterol sulfate  (90 Base) MCG/ACT inhaler  Commonly known as: PROVENTIL HFA;VENTOLIN HFA;PROAIR HFA      Inhale 2 puffs Every 4 (Four) Hours As Needed.       apixaban 5 MG tablet tablet  Commonly known as: ELIQUIS      Take 1 tablet by mouth 2 (Two) Times a Day.       atorvastatin 40 MG tablet  Commonly known as: LIPITOR      Take 1 tablet by mouth Every Night.       budesonide-formoterol 160-4.5 MCG/ACT inhaler  Commonly known as: SYMBICORT      Inhale 2 puffs 2 (Two) Times a Day.       calcium carbonate 500 MG chewable tablet  Commonly known as: TUMS      Chew 2 tablets 4 (Four) Times a Day As Needed for Indigestion or Heartburn.       calcium carbonate 600 MG tablet  Commonly known as: OS-QUE      Take 1 tablet by mouth Daily.       Entresto 24-26 MG tablet  Generic drug: sacubitril-valsartan      Take 1 tablet by mouth 2 (Two) Times a Day.       FreeStyle Ysabel 2 Sensor misc      1 each by Other route Every 14 (Fourteen) Days. Use 1 sensor every 14 days  Indications: Type 2 Diabetes       HumaLOG KwikPen 100 UNIT/ML solution pen-injector  Generic drug: Insulin Lispro (1 Unit Dial)       Inject 20 Units under the skin into the appropriate area as directed 3 (Three) Times a Day With Meals.       Insulin Pen Needle 32G X 4 MM misc      1 each by Other route 3 (Three) Times a Day. Use to inject insulin under the skin via pens up to 4 times daily  Indications: Type 2 Diabetes       metoprolol succinate XL 25 MG 24 hr tablet  Commonly known as: TOPROL-XL      Take 1 tablet by mouth 2 (Two) Times a Day.       OneTouch Verio test strip  Generic drug: glucose blood      1 each by Other route As Needed. Use as instructed       pantoprazole 40 MG EC tablet  Commonly known as: PROTONIX      Take 1 tablet by mouth Daily.       potassium chloride 20 MEQ CR tablet  Commonly known as: KLOR-CON M20      Take 1 tablet by mouth Daily.       PRESERVISION AREDS 2+MULTI VIT PO      Take 1 tablet by mouth 2 (Two) Times a Day.       sertraline 25 MG tablet  Commonly known as: Zoloft      Take 1 tablet by mouth Daily.       Tresiba FlexTouch 100 UNIT/ML solution pen-injector injection  Generic drug: insulin degludec      Inject 60 Units under the skin into the appropriate area as directed Daily.                 Where to Get Your Medications        Information about where to get these medications is not yet available    Ask your nurse or doctor about these medications  torsemide 100 MG tablet         Patient is no longer taking -.  I corrected the med list to reflect this.  I did not stop these medications.    Return in about 6 weeks (around 1/14/2025) for with Dr. Arango.      Dictated utilizing Dragon dictation

## 2024-12-10 ENCOUNTER — OFFICE VISIT (OUTPATIENT)
Dept: NEUROLOGY | Facility: CLINIC | Age: 54
End: 2024-12-10
Payer: COMMERCIAL

## 2024-12-10 VITALS — HEART RATE: 107 BPM | DIASTOLIC BLOOD PRESSURE: 79 MMHG | SYSTOLIC BLOOD PRESSURE: 135 MMHG | OXYGEN SATURATION: 98 %

## 2024-12-10 DIAGNOSIS — M79.2 NEUROPATHIC PAIN: Primary | ICD-10-CM

## 2024-12-10 DIAGNOSIS — M79.7 FIBROMYALGIA: ICD-10-CM

## 2024-12-10 DIAGNOSIS — R20.2 PARESTHESIA: ICD-10-CM

## 2024-12-10 DIAGNOSIS — M62.81 GENERALIZED MUSCLE WEAKNESS: ICD-10-CM

## 2024-12-10 PROCEDURE — 99215 OFFICE O/P EST HI 40 MIN: CPT | Performed by: STUDENT IN AN ORGANIZED HEALTH CARE EDUCATION/TRAINING PROGRAM

## 2024-12-10 RX ORDER — GABAPENTIN 300 MG/1
300 CAPSULE ORAL 3 TIMES DAILY
Qty: 270 CAPSULE | Refills: 1 | Status: SHIPPED | OUTPATIENT
Start: 2024-12-10

## 2024-12-10 NOTE — PROGRESS NOTES
[facility]  Section of General Neurology  Return Patient Visit    Roxanna Celis MRN# [id number]   Age: 54 year old YOB: 1970          Assessment and Plan:   Assessment:  Roxanna Celis is a 54 year old female who presents in follow up today.  She was previously evaluated for diffuse pain, tingling, diffuse weakness.  EMG and other testing has been unrevealing previously as below.  She has been extensively evaluated by rheumatology, other neurology colleagues (neuroimmunology notably) without clear neurological diagnosis unfortunately besides fibromyalgia/a central pain processing disorder.  Small fiber biopsy was normal in the lower extremity/equivocal in upper extremities with non actionable antibody panel to review.  We discussed a continued trial and error approach to her pain control.   We discussed new updates/ideas:  Still no data to do more for borderline small fiber changes, borderline [facility] abs, in fact a newer study by Dr Cruz further argues against using IVIG in such situations.    Discussed equivocal data for alpha lipoic acid for nerve health, coenzyme Q10 for non specific weakness  I do think a dedicated fibromyalgia program would be great for her, she is going to apply for this at [facility] (again)   Discussed common nerve pain medications from a neurological perspective as below.  Lyrica caused weight gain, I would re trial gabapentin.    Our pain clinic told her they could not help her.  She has tried a lot of things, even ketamine.  Another pain clinic perspective would be of future consideration.       Plan:  --Coenzyme f48---135 up to 600 mg  --Nerve health--alpha lipoic acid 600 mg   --Start gabapentin 300 mg nightly. After about one week, take 300mg twice daily. After another week, take 300mg three times daily for a total of 900mg per day, or 300 mg in AM, 600 mg at bedtime.   A common side effect of gabapentin is fatigue, which is why we advise  patients to start taking the medication at nighttime. The side effect of fatigue should resolve with time.  Other possible side effects include swelling, and less likely diarrhea.  It is commonly a well tolerated medicine.    --Pool therapy a great idea --PT order placed  --[facility] --re apply fibromyalgia course--encouraged.    Nerve pain medicines with the best data (unchanged-discussed)  Effexor or cymbalta  nortriptyline  Gabapentin/lyrica   Off label   Oxcarbazepine, valproic acid      We can plan on checking in again in 6 months for now.  To reach out sooner with any issues questions or changes      Agustín Torres MD   of Neurology   [facility]      Interval history:     Previous water therapy was too painful but now benefiting from this .  Still quite limited  Worries about leg weakness  Pain a large variable  Agrees fibromyalgia a large component.        Newer recent data reviewed:     PMR botox note reviewed:     SINCE LAST VISIT  Roxanna Celis was last seen here in clinic on 4/18/2024, at which time she received 300 units of Botox. She also received bilateral occipital nerve blocks to address occipital pain.      She did have ED visit on 10/3/2024 after a fall in her kitchen where it was unknown if she hit her head. Imaging of spine and head were unremarkable. She was discharged same day. Since the fall she is having more pain in the posterior chain of her head and neck area as well as her shoulders.      The patient otherwise denies new medical diagnoses, illnesses, hospitalizations, and other injuries since the previous injection with botulinum neurotoxin.      RESPONSE TO PREVIOUS TREATMENT     Side effects: None.      Pain Improvement: Yes, there was noticeable improvement of her pain but not as much as previous injections. Benefit started a few days after the Botox was administered and lasted approximately 4 weeks.      Dystonia Improvement: Yes, this  round was not as effective as is pertains to controlling the dystonic movements of her neck and shoulder muscles. Duration of benefit: 4 weeks, followed by a gradual reduction in benefit.       Functional Improvement: When the Botox is working, she is able to socialize and liver her life normally. When the Botox wears off, she is in such terrible pain that she becomes stuck in bed for prolonged periods of time, and misses out on essentially every area of life.       Recent Ophthalmology note reviewed:  Assessment & Plan  Roxanna Celis is a 54 year old female with the following diagnoses:   1. Dry eyes, bilateral    2. Eye pain, bilateral       Here for recheck of intraocular pressure chronic bilateral eye pain  Intraocular pressure elevated both eyes last visit  Improved with cessation of pred  Exam stable today.  Symptoms improving     Plan:     BOTH EYES     Scheduled for second opinion with Dr. Soria in Jan.  Contact lens follow up with Dr. BATES in Jan  Continue mucomyst four times a day    Continue Doxycycline 50 mg orally twice a day   Continue serum tears four times a day and as needed   Continue Xiidra twice a day   Continue PM ointment  Warm compresses 2-3x daily   Lid scrubs (or baby shampoo) daily       A/P at last visit  Roxanna Celis is a 52 year old female who presents in follow up today.  She was previously evaluated for diffuse pain, tingling, diffuse weakness.  EMG and other testing has been unrevealing previously as below.  She has been extensively evaluated by rheumatology, other neurology colleagues (neuroimmunology notably) without clear diagnosis unfortunately besides fibromyalgia/a central pain processing disorder.  Small fiber biopsy was normal in the lower extremity/equivocal in upper extremities with non actionable antibody panel to review.  We discussed a continued trial and error approach to her pain control.    Regarding newer symptoms such as falling more, hand weakness--This could all be  intertwined with her pain disorder, I think the most actionable data we could get would be repeating MRI brain/C spine to ensure no changes in these regards that could relate to such symptoms.  She of note does have a profound contrast allergy so we elected to get these studies without contrast.   Overall if work up keeps returning without a clear answer neurologically I would continue to work with her pain doctors on other ideas of things to try to hopefully get her quality of life improved.    All questions answered.  I will reach out with MRI brain and C spine data.            A/P at a different/ previous visit  Roxanna Celis is a 51 year old female who presents in follow up for diffuse pain, numbness, tingling  She has a PMH of ELY, anxiety, depression and is s/p baratric surgery.  She has had several years of ongoing symptoms that began with dry eyes, other Sicca like symptoms, though she has reportedly have negative lip biopsy and has had negative SSA/SSB antibodies.   She has seen multiple specialists without answer including MRIs as above (including seeing one of our neuroimmunological specialists who discussed with her she does not have MS), rheumatologic lab work, normal EMG.  She has received a diagnosis of neuropathy and of fibromyalgia in the past but medications to treat as such have provided limited benefit.  We have subsequently repeated SSA/SSB antibodies which were again negative.  Blood work otherwise only revealing for slightly elevated vitamin B6 as above.  Her symptoms have continued to be refractory.  She is considered medical cannabis which could be worth trialing.  She unfortunately remains quite debilitated from her pain despite trials of many medications and approaches.  Subsequent small fiber biopsy which was normal in her legs, borderline reduced in her forearm which is where symptoms began.   Because of this borderline finding we did send [facility] send out labs to further work up  "a potential cause.  She will discuss if this small fiber biopsy finding would be of any further rheumatological consideration given Sicca symptoms though it is possible her work up will be considered definitive, which we did discuss.  We discussed that small fiber changes are often idiopathic and treated symptomatically in such situations, but the [facility] panel will give us more data in this regard.  It is uncommon in such situations for dangelo weakness outside of the context of pain to develop.  We also discussed that this can be very difficult to treat and that I agree with her pain doctor (Dr. Romano) that I do think central pain processing/fibromyalgia are likely components of her condition as well even if other reasons are in time discovered.       --She will continue working with therapy team, multi disciplinary pain team regarding symptoms  --Will await send out [facility] panel, will contact Ms. Celis with results, follow up will depend on these results.    All questions answered.       2023 pain clinic  Review of [place] Prescription Monitoring Program ():   No concern for abuse or misuse of controlled medications based on this report. Viewed on 03/21/23  No controlled medications are being prescribed.     Prior pain medications:     2. Physical Therapy:      3. Pain psychology:   4. Surgery: hasn't tried  5. Injections: none  6. Alternative Therapies:               Chiropractic: helpful, sees chiropractor               Acupuncture: hasn't tried     PAIN MANAGEMENT TREATMENT HISTORY  1. MEDICATIONS:  Opiates: Not indicated, would not recommend  NSAIDS: Patient had bariatric surgery, cannot take NSAIDS due to high risk for gastric ulcers  Muscle Relaxants: Robaxin- did nothing  Anti-depressants: \"help my mood, not my pain\"  Neuropathics: Gabapentin 1200mg at night- drowsy with higher dose, -not helpful, Lyrica 100mg BID- SE, weight gain right away, Topamax 100mg BID-diarrhea  Topicals: -not helpful  Adjuvant " pain medications: Medical cannabis, decided in February to stop, does not give her any relief. Worked with [facility] extensively but did not get any relief. Low dose naltrexone 5mg - affected mood, tried again, Low dose naltrexone 3mg daily-diarrhea  2. PHYSICAL THERAPY:   pain physical therapy - Somewhat helpful               pool PT -not helpful              [facility] -not helpful  TENS unit -not helpful   3. PAIN PSYCHOLOGY:   Yes. Taisha Graham PsyD [facility] - Somewhat helpful, stopped   4. SURGERY:   No  5. INJECTIONS:   Not interested  6. COMPLEMENTARY THERAPY:  Chiropractic  helpful  Acupuncture/acupressure/ not helpful  TENS unit  not helpful       Past Medical History:     Patient Active Problem List   Diagnosis    Torticollis, spasmodic    Achlorhydria    Depression with anxiety    KIANA (generalized anxiety disorder)    Hypokalemia    Hypothyroid    Intramural leiomyoma of uterus    Bariatric surgery status    Morbid obesity with BMI of 40.0-44.9, adult (H)    Multiple thyroid nodules    Obesity    Sleep apnea    Essential hypertension    Chronic bilateral low back pain without sciatica    Precordial pain    Chronic myofascial pain    Neuropathic pain    Fibromyalgia    Abdominal pain    Allergic reaction to COVID-19 vaccine    Allergic rhinitis    Anxiety state    Bipolar disorder (H)    Cervical myelopathy with cervical radiculopathy (H)    Constipation, chronic    Hemorrhage of rectum and anus    History of diverticulitis    History of lobectomy of thyroid    Melanosis coli    History of colonic polyps    Sicca syndrome (H)    Esophageal reflux    Stricture and stenosis of esophagus     Past Medical History:   Diagnosis Date    Cervical dystonia 1993    Fibromyalgia         Past Surgical History:     Past Surgical History:   Procedure Laterality Date    AS FRAGMENTING OF KIDNEY STONE      GALLBLADDER SURGERY      STOMACH SURGERY      for weight loss        Social History:     Social History     Tobacco Use     Smoking status: Never    Smokeless tobacco: Never   Substance Use Topics    Alcohol use: No    Drug use: No        Family History:     Family History   Problem Relation Age of Onset    Glaucoma No family hx of     Macular Degeneration No family hx of         Medications:     Current Outpatient Medications   Medication Sig Dispense Refill    acetylcysteine 10 % (MUCOMYST) 10% SOLN Place 2 drops into both eyes 6 times daily. 30 mL 11    BOTOX 100 units injection Inject 250 Units into the muscle once Lot # /C3 with Expiration Date:  11/2020      buPROPion (WELLBUTRIN XL) 300 MG 24 hr tablet Take 300 mg by mouth every morning      clonazePAM (KLONOPIN) 1 MG tablet Take 1 mg by mouth 2 times daily as needed      cyanocobalamin (VITAMIN B-12) 1000 MCG tablet Take 1,000 mcg by mouth daily      doxycycline monohydrate (ADOXA) 50 MG tablet Take 1 tablet (50 mg) by mouth 2 times daily. Please keep appt 8/29/24. 180 tablet 3    DULoxetine (CYMBALTA) 60 MG capsule TAKE 2 CAPSULES BY MOUTH EVERY DAY      EPINEPHrine (ANY BX GENERIC EQUIV) 0.3 MG/0.3ML injection 2-pack       erythromycin (ROMYCIN) 5 MG/GM ophthalmic ointment 0.5 inch strip each eye at night (may use either OTC Lubricating eye ointment or Erythromycin ointment) 3.5 g 11    hydrocortisone 2.5 % cream APPLY TO AFFECTED SAUD ON LIP TWICE A DAY MAX 2 WEEKS, 1 WEEK OFF REPEAT IF NEEDED      ibuprofen (ADVIL/MOTRIN) 200 MG capsule Take 200 mg by mouth every 4 hours as needed.      iloperidone (FANAPT) 6 MG TABS tablet Take 4 mg by mouth daily       ketoconazole (NIZORAL) 2 % external cream PLEASE SEE ATTACHED FOR DETAILED DIRECTIONS      lifitegrast (XIIDRA) 5 % opthalmic solution Place 1 drop into both eyes 2 times daily. 180 each 3    methylPREDNISolone (MEDROL DOSEPAK) 4 MG tablet therapy pack TAKE 6 TABLETS ON DAY 1 AS DIRECTED ON PACKAGE AND DECREASE BY 1 TAB EACH DAY FOR A TOTAL OF 6 DAYS 21 tablet 0    multivitamin w/minerals (THERA-VIT-M) tablet Take 1  tablet by mouth daily      ONABOTULINUMTOXINA IJ Inject 250 Units as directed once LOT # /C3  EXPIRATION: 03/2020      Perfluorohexyloctane (MIEBO) 1.338 GM/ML SOLN Apply 1 drop to eye 4 times daily. 6 mL 11    prednisoLONE acetate (PRED FORTE) 1 % ophthalmic suspension Place 1-2 drops into both eyes 2 times daily. 10 mL 3    sodium chloride 0.9 % neb solution Medically necessary for scleral contact lens use. May use 3 ml 10-15 times a day for contact lens use 1800 mL 11    Soft Lens Products (B&L SENSITIVE EYES) SOLN Apply 1 drop to eye      spironolactone (ALDACTONE) 50 MG tablet Take 1 tablet by mouth daily      tretinoin (RETIN-A) 0.025 % external cream Apply a pea size to entire face QD 45 g 11    tretinoin (RETIN-A) 0.05 % external cream Spread a pea size amount into affected area topically at bedtime.  Use sunscreen SPF>20. 45 g 1    varenicline (TYRVAYA) 0.03 MG/ACT nasal spray Spray 1 spray into both nostrils 2 times daily. 8.4 mL 11    Vitamin D3 (CHOLECALCIFEROL) 25 mcg (1000 units) tablet Take by mouth daily      White Petrolatum-Mineral Oil (SYSTANE NIGHTTIME) OINT Apply 3.5 g to eye 4 times daily as needed (dry eyes) 7 g 11     Current Facility-Administered Medications   Medication Dose Route Frequency Provider Last Rate Last Admin    botulinum toxin type A (BOTOX) 100 units injection 300 Units  300 Units Intramuscular Q90 Days America Gonzalez MD   300 Units at 10/21/24 1038    botulinum toxin type A (BOTOX) 100 units injection 300 Units  300 Units Intramuscular Q90 Days America Gonzalez MD   300 Units at 05/08/24 1639    botulinum toxin type A (DYSPORT) injection 500 Units  500 Units Intramuscular Q90 Days America Gonzalez MD   500 Units at 05/20/22 1028    botulinum toxin type A (DYSPORT) injection 800 Units  800 Units Intramuscular Q90 Days America Gonzalez MD   500 Units at 05/04/23 1547        Allergies:     Allergies   Allergen Reactions    Contrast Dye  Anaphylaxis     Other reaction(s): almost  from it    Iodinated Contrast Media Anaphylaxis     IVP DYE    Lisinopril Anaphylaxis    Lisinopril-Hydrochlorothiazide Anaphylaxis    Maple Flavor Anaphylaxis     MAPLE SYRUP  MAPLE SYRUP  MAPLE SYRUP    Maple Tree Anaphylaxis     Allergy includes maple syrup.    No Clinical Screening - See Comments      maple syrup==anaphylaxis  Dairy- causes to not feel well    Gluten Itching and Swelling    Gluten Meal Itching and Swelling     Cramping    [place] Na Unknown    Chamomile Unknown    Codeine Sulfate Cramps and GI Disturbance    Food Unknown     Grapes, almond, lactose, gluten, real maple syrup    Nsaids Other (See Comments) and Unknown     Patient had bariatric surgery. Should not ever take NSAIDS due to high risk for gastric ulcers.   Patient had bariatric surgery. Should not ever take NSAIDS due to high risk for gastric ulcers.     Polyethylene Glycol Unknown     Per allergy testing  Per allergy testing      Erythromycin Nausea and Vomiting, Cramps, Diarrhea and Nausea     PN: LW Reaction: stomach upset  cramping    Lactose Diarrhea    Morphine Other (See Comments)          Physical Exam:   Vitals: /79   Pulse 107   SpO2 98%      Neuro:   General Appearance: No apparent distress, well-nourished, well-groomed, pleasant     Mental Status: Alert and oriented to person, place, and time. Speech fluent and comprehension intact. No dysarthria.      Cranial Nerves:   II: Visual fields: normal  III: Pupils: 3 mm, equal, round, reactive to light   III,IV,VI: Extraocular Movements: intact   V: Facial sensation: intact to light touch  VII: Facial strength: intact without asymmetry      Motor Exam:   Limited by pain but able to demonstrate better motor control today than in past from what I recall.  UE 5/5, LE tougher/limited by pain today overall.     Sensory: intact to light touch, vibration, PP in fact    Coordination: no dysmetria noted    Reflexes: biceps, triceps,  brachioradialis, patellar 1+ and symmetric.            Data: Pertinent prior to visit   MR CERVICAL SPINE W/O CONTRAST, MR THORACIC SPINE W/O CONTRAST  6/18/2021 5:42 PM     History: Demyelinating disease; Neuropathic pain; Paresthesia     Comparison: CT cervical spine dated 8/31/2016, CT cervical spine dated  8/31/2016     Technique: Sagittal T2- and T1-weighted images of the cervical and  thoracic spine, axial T2* gradient echo images of the cervical spine  and axial T2-weighted images were obtained.     Findings:  Cervical: The cervical vertebrae appear normally aligned.  There is no  disc height narrowing at any level.  There is normal signal within the  cervical spinal cord which also demonstrates a normal contour.  The  findings on a level by level basis are as follows:     C2-3:  No significant spinal canal or neural foraminal narrowing.     C3-4:  No significant spinal canal or neural foraminal narrowing.     C4-5:  No significant spinal canal or neuroforaminal narrowing.     C5-6:  Uncovertebral hypertrophy and facet arthropathy bilaterally. No  significant spinal canal stenosis. Mild bilateral neuroforaminal  narrowing, left greater than right.     C6-7:  No significant spinal canal or neuroforaminal narrowing.     C7-T1:  No significant spinal canal or neuroforaminal narrowing.     Thoracic: The thoracic vertebral column appears in normal alignment.  There is loss of disk height at any level. The spinal cord contour and  signal pattern appear within normal limits. No significant spinal  canal or neural foraminal narrowing.     Right inferior thyroid gland 1.5 x 1.0 cm T2 hyperintense lesion,  corresponding to CT neck on 8/31/2016, not significantly changed.                                                                      Impression:   1.  Cervical spine: No definite myelopathic spinal cord signal  identified. No high-grade spinal canal or neuroforaminal narrowing at  any level.  2.  Thoracic spine: No  definite myelopathic spinal cord signal  identified. No high-grade spinal canal or neuroforaminal narrowing at  any level.     I personally reviewed the above imaging and agree with the findings in the report.       Brain MRI without contrast 2010      History: Possible MS and blurred vision.       Comparison: MRI brain 6/17/2009       Technique: Sagittal and axial turboFLAIR, T1-weighted axial,   diffusion-weighted axial with ADC map, T2-weighted axial, and T2*   gradient echo axial images were obtained without contrast. After   intravenous administration of gadolinium, axial turboFLAIR and coronal   and axial T1-weighted images were obtained.       Findings: There is a single focus of increased T2 signal on sagittal   and axial turboFLAIR images in the left frontal parasagittal   subcortical white matter that was not seen on the prior study of   6/17/2009. The previously noted area of contrast enhancement   surrounding the left optic nerve is not evident on this study.   However, the T1-weighted postcontrast images on this study are not   fat-saturated which limits the ability to detect subtle enhancement   adjacent to the intraconal fat.  The postcontrast turboFLAIR sequences   with fat saturation do not demonstrate contrast enhancement   surrounding the optic nerves. The images reveal no intracranial mass   lesion, midline shift or abnormal extraaxial fluid collection. The   ventricles and sulci appear within normal for age.  Diffusion-weighted   images appear normal.       Normal intravascular flow voids are identified at the base of the   brain bilaterally.       Impression:   1. There is a nonspecific small focus of increased signal on   turboFLAIR and T2-weighted imaging in the left subcortical   parasagittal white matter.   2. The previously noted subtle contrast enhancement surrounding the   left optic nerve is not appreciated on the current study.  However,   the T1-weighted postcontrast images of the  orbits were not   fat-saturated, which limits the ability to distinguish subtle   peripheral optic nerve enhancement. The postcontrast turboFLAIR images   of this region demonstrate no abnormal contrast enhancement   surrounding the left optic nerve.         Procedures:  Previous EMG WNL (9/21)  Epidermal nerve biopsy: normal in leg, borderline reduced in her forearm     Laboratory:       previous CK wnl  b12 wnl     EMG 6/2022  History & Examination:  51 year old woman with several years of pain throughout her upper limbs. The left side is more affected than the right. Evaluate for polyneuropathy vs focal neuropathy vs radiculopathy.      Techniques: Motor and sensory conduction studies were done with surface recording electrodes. EMG was done with a concentric needle electrode.      Results:  Nerve conduction studies:  1. Bilateral median-D2, ulnar-D5, and radial sensory responses are normal.   2. Bilateral median-ulnar palmar interlatency differences are normal.   3. Bilateral median-APB and ulnar-ADM motor responses are normal.   4. Bilateral median-lumbrical vs ulnar-interosseous latency differences are normal.     Needle EMG of selected proximal and distal right and left upper limb muscles was performed as tabulated below. No abnormal spontaneous activity was observed in the sampled muscles. Motor unit potential morphology and recruitment patterns were normal.      Interpretation:  This is a normal study. There is no electrophysiologic evidence of a large-fiber polyneuropathy, focal neuropathy, or cervical radiculopathy affecting the upper limbs on the basis of this study.               Jen abs    SSA/SSB negative       Newer data:  EXAM: CT HEAD W/O CONTRAST, CT CERVICAL SPINE W/O CONTRAST  LOCATION: [facility]  DATE: 10/3/2024     INDICATION: Fall, headache, neck pain.  COMPARISON: MRI 7/26/2023.  TECHNIQUE:   1) Routine CT Head without IV contrast. Multiplanar reformats. Dose  reduction techniques were used.  2) Routine CT Cervical Spine without IV contrast. Multiplanar reformats. Dose reduction techniques were used.     FINDINGS:   HEAD CT:   INTRACRANIAL CONTENTS: No intracranial hemorrhage, extraaxial collection, or mass effect.  No CT evidence of acute infarct. Mild presumed chronic small vessel ischemic changes. Mild generalized volume loss. No hydrocephalus.      VISUALIZED ORBITS/SINUSES/MASTOIDS: No intraorbital abnormality. No paranasal sinus mucosal disease. No middle ear or mastoid effusion.     BONES/SOFT TISSUES: No acute abnormality.     CERVICAL SPINE CT:   VERTEBRA: Normal vertebral body heights and alignment. No fracture or posttraumatic subluxation.      CANAL/FORAMINA: No canal or neural foraminal stenosis.     PARASPINAL: No extraspinal abnormality. Visualized lung fields are clear.                                                                      IMPRESSION:  HEAD CT:  1.  No CT evidence for acute intracranial process.  2.  Mild chronic microvascular ischemic changes as above.     CERVICAL SPINE CT:  1.  No CT evidence for acute fracture or post traumatic subluxation.        The total time of this encounter today amounted to 44 minutes. This time included time spent with the patient, prep work, ordering tests, and performing post visit documentation.

## 2024-12-10 NOTE — PATIENT INSTRUCTIONS
Coenzyme b11---378 up to 600 mg  Nerve health--alpha lipoic acid 600 mg     Start gabapentin 300 mg nightly. After about one week, take 300mg twice daily. After another week, take 300mg three times daily for a total of 900mg per day, or 300 mg in AM, 600 mg at bedtime.   A common side effect of gabapentin is fatigue, which is why we advise patients to start taking the medication at nighttime. The side effect of fatigue should resolve with time.  Other possible side effects include swelling, and less likely diarrhea.  It is commonly a well tolerated medicine.      Pool therapy a great idea     [facility] --re apply fibromyalgia course.        In general we discussed  Nerve pain medicines with the best data (unchanged)  Effexor or cymbalta  nortriptyline  Gabapentin/lyrica     Off label   Oxcarbazepine, valproic acid

## 2024-12-10 NOTE — NURSING NOTE
"Roxanna Celis is a 54 year old female who presents for:  Chief Complaint   Patient presents with    Generalized muscle weakness        Initial Vitals:  /79   Pulse 107   SpO2 98%  Estimated body mass index is 36.64 kg/m  as calculated from the following:    Height as of 10/3/24: 1.676 m (5' 6\").    Weight as of 10/3/24: 103 kg (227 lb).. There is no height or weight on file to calculate BSA. BP completed using cuff size: kentrell Sorto    "

## 2024-12-10 NOTE — LETTER
12/10/2024      Roxanna Celis  [address] [place] [address]      Dear Colleague,    Thank you for referring your patient, Roxanna Celis, to the [facility] NEUROLOGY CLINICS [facility]. Please see a copy of my visit note below.    [facility]  Section of General Neurology  Return Patient Visit    Roxanna Celis MRN# [id number]   Age: 54 year old YOB: 1970          Assessment and Plan:   Assessment:  Roxanna Celis is a 54 year old female who presents in follow up today.  She was previously evaluated for diffuse pain, tingling, diffuse weakness.  EMG and other testing has been unrevealing previously as below.  She has been extensively evaluated by rheumatology, other neurology colleagues (neuroimmunology notably) without clear neurological diagnosis unfortunately besides fibromyalgia/a central pain processing disorder.  Small fiber biopsy was normal in the lower extremity/equivocal in upper extremities with non actionable antibody panel to review.  We discussed a continued trial and error approach to her pain control.   We discussed new updates/ideas:  Still no data to do more for borderline small fiber changes, borderline [facility] abs, in fact a newer study by Dr Cruz further argues against using IVIG in such situations.    Discussed equivocal data for alpha lipoic acid for nerve health, coenzyme Q10 for non specific weakness  I do think a dedicated fibromyalgia program would be great for her, she is going to apply for this at [facility] (again)   Discussed common nerve pain medications from a neurological perspective as below.  Lyrica caused weight gain, I would re trial gabapentin.    Our pain clinic told her they could not help her.  She has tried a lot of things, even ketamine.  Another pain clinic perspective would be of future consideration.       Plan:  --Coenzyme o71---772 up to 600 mg  --Nerve health--alpha lipoic acid 600 mg   --Start gabapentin 300 mg  nightly. After about one week, take 300mg twice daily. After another week, take 300mg three times daily for a total of 900mg per day, or 300 mg in AM, 600 mg at bedtime.   A common side effect of gabapentin is fatigue, which is why we advise patients to start taking the medication at nighttime. The side effect of fatigue should resolve with time.  Other possible side effects include swelling, and less likely diarrhea.  It is commonly a well tolerated medicine.    --Pool therapy a great idea --PT order placed  --[facility] --re apply fibromyalgia course--encouraged.    Nerve pain medicines with the best data (unchanged-discussed)  Effexor or cymbalta  nortriptyline  Gabapentin/lyrica   Off label   Oxcarbazepine, valproic acid      We can plan on checking in again in 6 months for now.  To reach out sooner with any issues questions or changes      Agustín Torres MD   of Neurology   [facility]      Interval history:     Previous water therapy was too painful but now benefiting from this .  Still quite limited  Worries about leg weakness  Pain a large variable  Agrees fibromyalgia a large component.        Newer recent data reviewed:     PMR botox note reviewed:     SINCE LAST VISIT  Roxanna Celis was last seen here in clinic on 4/18/2024, at which time she received 300 units of Botox. She also received bilateral occipital nerve blocks to address occipital pain.      She did have ED visit on 10/3/2024 after a fall in her kitchen where it was unknown if she hit her head. Imaging of spine and head were unremarkable. She was discharged same day. Since the fall she is having more pain in the posterior chain of her head and neck area as well as her shoulders.      The patient otherwise denies new medical diagnoses, illnesses, hospitalizations, and other injuries since the previous injection with botulinum neurotoxin.      RESPONSE TO PREVIOUS TREATMENT     Side effects: None.       Pain Improvement: Yes, there was noticeable improvement of her pain but not as much as previous injections. Benefit started a few days after the Botox was administered and lasted approximately 4 weeks.      Dystonia Improvement: Yes, this round was not as effective as is pertains to controlling the dystonic movements of her neck and shoulder muscles. Duration of benefit: 4 weeks, followed by a gradual reduction in benefit.       Functional Improvement: When the Botox is working, she is able to socialize and liver her life normally. When the Botox wears off, she is in such terrible pain that she becomes stuck in bed for prolonged periods of time, and misses out on essentially every area of life.       Recent Ophthalmology note reviewed:  Assessment & Plan  Roxanna Celis is a 54 year old female with the following diagnoses:   1. Dry eyes, bilateral    2. Eye pain, bilateral       Here for recheck of intraocular pressure chronic bilateral eye pain  Intraocular pressure elevated both eyes last visit  Improved with cessation of pred  Exam stable today.  Symptoms improving     Plan:     BOTH EYES     Scheduled for second opinion with Dr. Soria in Jan.  Contact lens follow up with Dr. BATES in Jan  Continue mucomyst four times a day    Continue Doxycycline 50 mg orally twice a day   Continue serum tears four times a day and as needed   Continue Xiidra twice a day   Continue PM ointment  Warm compresses 2-3x daily   Lid scrubs (or baby shampoo) daily       A/P at last visit  Roxanna Celis is a 52 year old female who presents in follow up today.  She was previously evaluated for diffuse pain, tingling, diffuse weakness.  EMG and other testing has been unrevealing previously as below.  She has been extensively evaluated by rheumatology, other neurology colleagues (neuroimmunology notably) without clear diagnosis unfortunately besides fibromyalgia/a central pain processing disorder.  Small fiber biopsy was normal in the lower  extremity/equivocal in upper extremities with non actionable antibody panel to review.  We discussed a continued trial and error approach to her pain control.    Regarding newer symptoms such as falling more, hand weakness--This could all be intertwined with her pain disorder, I think the most actionable data we could get would be repeating MRI brain/C spine to ensure no changes in these regards that could relate to such symptoms.  She of note does have a profound contrast allergy so we elected to get these studies without contrast.   Overall if work up keeps returning without a clear answer neurologically I would continue to work with her pain doctors on other ideas of things to try to hopefully get her quality of life improved.    All questions answered.  I will reach out with MRI brain and C spine data.            A/P at a different/ previous visit  Roxanna Celis is a 51 year old female who presents in follow up for diffuse pain, numbness, tingling  She has a PMH of ELY, anxiety, depression and is s/p baratric surgery.  She has had several years of ongoing symptoms that began with dry eyes, other Sicca like symptoms, though she has reportedly have negative lip biopsy and has had negative SSA/SSB antibodies.   She has seen multiple specialists without answer including MRIs as above (including seeing one of our neuroimmunological specialists who discussed with her she does not have MS), rheumatologic lab work, normal EMG.  She has received a diagnosis of neuropathy and of fibromyalgia in the past but medications to treat as such have provided limited benefit.  We have subsequently repeated SSA/SSB antibodies which were again negative.  Blood work otherwise only revealing for slightly elevated vitamin B6 as above.  Her symptoms have continued to be refractory.  She is considered medical cannabis which could be worth trialing.  She unfortunately remains quite debilitated from her pain despite trials of many medications  and approaches.  Subsequent small fiber biopsy which was normal in her legs, borderline reduced in her forearm which is where symptoms began.   Because of this borderline finding we did send [facility] in [facility] send out labs to further work up a potential cause.  She will discuss if this small fiber biopsy finding would be of any further rheumatological consideration given Sicca symptoms though it is possible her work up will be considered definitive, which we did discuss.  We discussed that small fiber changes are often idiopathic and treated symptomatically in such situations, but the [facility] panel will give us more data in this regard.  It is uncommon in such situations for dangelo weakness outside of the context of pain to develop.  We also discussed that this can be very difficult to treat and that I agree with her pain doctor (Dr. Romano) that I do think central pain processing/fibromyalgia are likely components of her condition as well even if other reasons are in time discovered.       --She will continue working with therapy team, multi disciplinary pain team regarding symptoms  --Will await send out [facility] panel, will contact Ms. Celis with results, follow up will depend on these results.    All questions answered.       2023 pain clinic  Review of [place] Prescription Monitoring Program ():   No concern for abuse or misuse of controlled medications based on this report. Viewed on 03/21/23  No controlled medications are being prescribed.     Prior pain medications:     2. Physical Therapy:      3. Pain psychology:   4. Surgery: hasn't tried  5. Injections: none  6. Alternative Therapies:               Chiropractic: helpful, sees chiropractor               Acupuncture: hasn't tried     PAIN MANAGEMENT TREATMENT HISTORY  1. MEDICATIONS:  Opiates: Not indicated, would not recommend  NSAIDS: Patient had bariatric surgery, cannot take NSAIDS due to high risk for gastric ulcers  Muscle Relaxants: Robaxin- did  "nothing  Anti-depressants: \"help my mood, not my pain\"  Neuropathics: Gabapentin 1200mg at night- drowsy with higher dose, -not helpful, Lyrica 100mg BID- SE, weight gain right away, Topamax 100mg BID-diarrhea  Topicals: -not helpful  Adjuvant pain medications: Medical cannabis, decided in February to stop, does not give her any relief. Worked with Graine de Cadeauxary extensively but did not get any relief. Low dose naltrexone 5mg - affected mood, tried again, Low dose naltrexone 3mg daily-diarrhea  2. PHYSICAL THERAPY:   pain physical therapy - Somewhat helpful               pool PT -not helpful              [facility]X -not helpful  TENS unit -not helpful   3. PAIN PSYCHOLOGY:   Yes. Taisha Graham PsyD LP - Somewhat helpful, stopped   4. SURGERY:   No  5. INJECTIONS:   Not interested  6. COMPLEMENTARY THERAPY:  Chiropractic  helpful  Acupuncture/acupressure/ not helpful  TENS unit  not helpful       Past Medical History:     Patient Active Problem List   Diagnosis     Torticollis, spasmodic     Achlorhydria     Depression with anxiety     KIANA (generalized anxiety disorder)     Hypokalemia     Hypothyroid     Intramural leiomyoma of uterus     Bariatric surgery status     Morbid obesity with BMI of 40.0-44.9, adult (H)     Multiple thyroid nodules     Obesity     Sleep apnea     Essential hypertension     Chronic bilateral low back pain without sciatica     Precordial pain     Chronic myofascial pain     Neuropathic pain     Fibromyalgia     Abdominal pain     Allergic reaction to COVID-19 vaccine     Allergic rhinitis     Anxiety state     Bipolar disorder (H)     Cervical myelopathy with cervical radiculopathy (H)     Constipation, chronic     Hemorrhage of rectum and anus     History of diverticulitis     History of lobectomy of thyroid     Melanosis coli     History of colonic polyps     Sicca syndrome (H)     Esophageal reflux     Stricture and stenosis of esophagus     Past Medical History:   Diagnosis Date     " Cervical dystonia 1993     Fibromyalgia         Past Surgical History:     Past Surgical History:   Procedure Laterality Date     AS FRAGMENTING OF KIDNEY STONE       GALLBLADDER SURGERY       STOMACH SURGERY      for weight loss        Social History:     Social History     Tobacco Use     Smoking status: Never     Smokeless tobacco: Never   Substance Use Topics     Alcohol use: No     Drug use: No        Family History:     Family History   Problem Relation Age of Onset     Glaucoma No family hx of      Macular Degeneration No family hx of         Medications:     Current Outpatient Medications   Medication Sig Dispense Refill     acetylcysteine 10 % (MUCOMYST) 10% SOLN Place 2 drops into both eyes 6 times daily. 30 mL 11     BOTOX 100 units injection Inject 250 Units into the muscle once Lot # /C3 with Expiration Date:  11/2020       buPROPion (WELLBUTRIN XL) 300 MG 24 hr tablet Take 300 mg by mouth every morning       clonazePAM (KLONOPIN) 1 MG tablet Take 1 mg by mouth 2 times daily as needed       cyanocobalamin (VITAMIN B-12) 1000 MCG tablet Take 1,000 mcg by mouth daily       doxycycline monohydrate (ADOXA) 50 MG tablet Take 1 tablet (50 mg) by mouth 2 times daily. Please keep appt 8/29/24. 180 tablet 3     DULoxetine (CYMBALTA) 60 MG capsule TAKE 2 CAPSULES BY MOUTH EVERY DAY       EPINEPHrine (ANY BX GENERIC EQUIV) 0.3 MG/0.3ML injection 2-pack        erythromycin (ROMYCIN) 5 MG/GM ophthalmic ointment 0.5 inch strip each eye at night (may use either OTC Lubricating eye ointment or Erythromycin ointment) 3.5 g 11     hydrocortisone 2.5 % cream APPLY TO AFFECTED SAUD ON LIP TWICE A DAY MAX 2 WEEKS, 1 WEEK OFF REPEAT IF NEEDED       ibuprofen (ADVIL/MOTRIN) 200 MG capsule Take 200 mg by mouth every 4 hours as needed.       iloperidone (FANAPT) 6 MG TABS tablet Take 4 mg by mouth daily        ketoconazole (NIZORAL) 2 % external cream PLEASE SEE ATTACHED FOR DETAILED DIRECTIONS       lifitegrast (XIIDRA) 5  % opthalmic solution Place 1 drop into both eyes 2 times daily. 180 each 3     methylPREDNISolone (MEDROL DOSEPAK) 4 MG tablet therapy pack TAKE 6 TABLETS ON DAY 1 AS DIRECTED ON PACKAGE AND DECREASE BY 1 TAB EACH DAY FOR A TOTAL OF 6 DAYS 21 tablet 0     multivitamin w/minerals (THERA-VIT-M) tablet Take 1 tablet by mouth daily       ONABOTULINUMTOXINA IJ Inject 250 Units as directed once LOT # /C3  EXPIRATION: 03/2020       Perfluorohexyloctane (MIEBO) 1.338 GM/ML SOLN Apply 1 drop to eye 4 times daily. 6 mL 11     prednisoLONE acetate (PRED FORTE) 1 % ophthalmic suspension Place 1-2 drops into both eyes 2 times daily. 10 mL 3     sodium chloride 0.9 % neb solution Medically necessary for scleral contact lens use. May use 3 ml 10-15 times a day for contact lens use 1800 mL 11     Soft Lens Products (B&L SENSITIVE EYES) SOLN Apply 1 drop to eye       spironolactone (ALDACTONE) 50 MG tablet Take 1 tablet by mouth daily       tretinoin (RETIN-A) 0.025 % external cream Apply a pea size to entire face QD 45 g 11     tretinoin (RETIN-A) 0.05 % external cream Spread a pea size amount into affected area topically at bedtime.  Use sunscreen SPF>20. 45 g 1     varenicline (TYRVAYA) 0.03 MG/ACT nasal spray Spray 1 spray into both nostrils 2 times daily. 8.4 mL 11     Vitamin D3 (CHOLECALCIFEROL) 25 mcg (1000 units) tablet Take by mouth daily       White Petrolatum-Mineral Oil (SYSTANE NIGHTTIME) OINT Apply 3.5 g to eye 4 times daily as needed (dry eyes) 7 g 11     Current Facility-Administered Medications   Medication Dose Route Frequency Provider Last Rate Last Admin     botulinum toxin type A (BOTOX) 100 units injection 300 Units  300 Units Intramuscular Q90 Days America Gonzalez MD   300 Units at 10/21/24 1038     botulinum toxin type A (BOTOX) 100 units injection 300 Units  300 Units Intramuscular Q90 Days America Gonzalez MD   300 Units at 05/08/24 1639     botulinum toxin type A (DYSPORT) injection  500 Units  500 Units Intramuscular Q90 Days America Gonzalez MD   500 Units at 22 1028     botulinum toxin type A (DYSPORT) injection 800 Units  800 Units Intramuscular Q90 Days America Gonzalez MD   500 Units at 23 1547        Allergies:     Allergies   Allergen Reactions     Contrast Dye Anaphylaxis     Other reaction(s): almost  from it     Iodinated Contrast Media Anaphylaxis     IVP DYE     Lisinopril Anaphylaxis     Lisinopril-Hydrochlorothiazide Anaphylaxis     Maple Flavor Anaphylaxis     MAPLE SYRUP  MAPLE SYRUP  MAPLE SYRUP     Maple Tree Anaphylaxis     Allergy includes maple syrup.     No Clinical Screening - See Comments      maple syrup==anaphylaxis  Dairy- causes to not feel well     Gluten Itching and Swelling     Gluten Meal Itching and Swelling     Cramping     [place] Na Unknown     Chamomile Unknown     Codeine Sulfate Cramps and GI Disturbance     Food Unknown     Grapes, almond, lactose, gluten, real maple syrup     Nsaids Other (See Comments) and Unknown     Patient had bariatric surgery. Should not ever take NSAIDS due to high risk for gastric ulcers.   Patient had bariatric surgery. Should not ever take NSAIDS due to high risk for gastric ulcers.      Polyethylene Glycol Unknown     Per allergy testing  Per allergy testing       Erythromycin Nausea and Vomiting, Cramps, Diarrhea and Nausea     PN: LW Reaction: stomach upset  cramping     Lactose Diarrhea     Morphine Other (See Comments)          Physical Exam:   Vitals: /79   Pulse 107   SpO2 98%      Neuro:   General Appearance: No apparent distress, well-nourished, well-groomed, pleasant     Mental Status: Alert and oriented to person, place, and time. Speech fluent and comprehension intact. No dysarthria.      Cranial Nerves:   II: Visual fields: normal  III: Pupils: 3 mm, equal, round, reactive to light   III,IV,VI: Extraocular Movements: intact   V: Facial sensation: intact to light touch  VII:  Facial strength: intact without asymmetry      Motor Exam:   Limited by pain but able to demonstrate better motor control today than in past from what I recall.  UE 5/5, LE tougher/limited by pain today overall.     Sensory: intact to light touch, vibration, PP in fact    Coordination: no dysmetria noted    Reflexes: biceps, triceps, brachioradialis, patellar 1+ and symmetric.            Data: Pertinent prior to visit   MR CERVICAL SPINE W/O CONTRAST, MR THORACIC SPINE W/O CONTRAST  6/18/2021 5:42 PM     History: Demyelinating disease; Neuropathic pain; Paresthesia     Comparison: CT cervical spine dated 8/31/2016, CT cervical spine dated  8/31/2016     Technique: Sagittal T2- and T1-weighted images of the cervical and  thoracic spine, axial T2* gradient echo images of the cervical spine  and axial T2-weighted images were obtained.     Findings:  Cervical: The cervical vertebrae appear normally aligned.  There is no  disc height narrowing at any level.  There is normal signal within the  cervical spinal cord which also demonstrates a normal contour.  The  findings on a level by level basis are as follows:     C2-3:  No significant spinal canal or neural foraminal narrowing.     C3-4:  No significant spinal canal or neural foraminal narrowing.     C4-5:  No significant spinal canal or neuroforaminal narrowing.     C5-6:  Uncovertebral hypertrophy and facet arthropathy bilaterally. No  significant spinal canal stenosis. Mild bilateral neuroforaminal  narrowing, left greater than right.     C6-7:  No significant spinal canal or neuroforaminal narrowing.     C7-T1:  No significant spinal canal or neuroforaminal narrowing.     Thoracic: The thoracic vertebral column appears in normal alignment.  There is loss of disk height at any level. The spinal cord contour and  signal pattern appear within normal limits. No significant spinal  canal or neural foraminal narrowing.     Right inferior thyroid gland 1.5 x 1.0 cm T2  hyperintense lesion,  corresponding to CT neck on 8/31/2016, not significantly changed.                                                                      Impression:   1.  Cervical spine: No definite myelopathic spinal cord signal  identified. No high-grade spinal canal or neuroforaminal narrowing at  any level.  2.  Thoracic spine: No definite myelopathic spinal cord signal  identified. No high-grade spinal canal or neuroforaminal narrowing at  any level.     I personally reviewed the above imaging and agree with the findings in the report.       Brain MRI without contrast 2010      History: Possible MS and blurred vision.       Comparison: MRI brain 6/17/2009       Technique: Sagittal and axial turboFLAIR, T1-weighted axial,   diffusion-weighted axial with ADC map, T2-weighted axial, and T2*   gradient echo axial images were obtained without contrast. After   intravenous administration of gadolinium, axial turboFLAIR and coronal   and axial T1-weighted images were obtained.       Findings: There is a single focus of increased T2 signal on sagittal   and axial turboFLAIR images in the left frontal parasagittal   subcortical white matter that was not seen on the prior study of   6/17/2009. The previously noted area of contrast enhancement   surrounding the left optic nerve is not evident on this study.   However, the T1-weighted postcontrast images on this study are not   fat-saturated which limits the ability to detect subtle enhancement   adjacent to the intraconal fat.  The postcontrast turboFLAIR sequences   with fat saturation do not demonstrate contrast enhancement   surrounding the optic nerves. The images reveal no intracranial mass   lesion, midline shift or abnormal extraaxial fluid collection. The   ventricles and sulci appear within normal for age.  Diffusion-weighted   images appear normal.       Normal intravascular flow voids are identified at the base of the   brain bilaterally.       Impression:    1. There is a nonspecific small focus of increased signal on   turboFLAIR and T2-weighted imaging in the left subcortical   parasagittal white matter.   2. The previously noted subtle contrast enhancement surrounding the   left optic nerve is not appreciated on the current study.  However,   the T1-weighted postcontrast images of the orbits were not   fat-saturated, which limits the ability to distinguish subtle   peripheral optic nerve enhancement. The postcontrast turboFLAIR images   of this region demonstrate no abnormal contrast enhancement   surrounding the left optic nerve.         Procedures:  Previous EMG WNL (9/21)  Epidermal nerve biopsy: normal in leg, borderline reduced in her forearm     Laboratory:       previous CK wnl  b12 wnl     EMG 6/2022  History & Examination:  51 year old woman with several years of pain throughout her upper limbs. The left side is more affected than the right. Evaluate for polyneuropathy vs focal neuropathy vs radiculopathy.      Techniques: Motor and sensory conduction studies were done with surface recording electrodes. EMG was done with a concentric needle electrode.      Results:  Nerve conduction studies:  1. Bilateral median-D2, ulnar-D5, and radial sensory responses are normal.   2. Bilateral median-ulnar palmar interlatency differences are normal.   3. Bilateral median-APB and ulnar-ADM motor responses are normal.   4. Bilateral median-lumbrical vs ulnar-interosseous latency differences are normal.     Needle EMG of selected proximal and distal right and left upper limb muscles was performed as tabulated below. No abnormal spontaneous activity was observed in the sampled muscles. Motor unit potential morphology and recruitment patterns were normal.      Interpretation:  This is a normal study. There is no electrophysiologic evidence of a large-fiber polyneuropathy, focal neuropathy, or cervical radiculopathy affecting the upper limbs on the basis of this study.                Jen abs    SSA/SSB negative       Newer data:  EXAM: CT HEAD W/O CONTRAST, CT CERVICAL SPINE W/O CONTRAST  LOCATION: [facility]  DATE: 10/3/2024     INDICATION: Fall, headache, neck pain.  COMPARISON: MRI 7/26/2023.  TECHNIQUE:   1) Routine CT Head without IV contrast. Multiplanar reformats. Dose reduction techniques were used.  2) Routine CT Cervical Spine without IV contrast. Multiplanar reformats. Dose reduction techniques were used.     FINDINGS:   HEAD CT:   INTRACRANIAL CONTENTS: No intracranial hemorrhage, extraaxial collection, or mass effect.  No CT evidence of acute infarct. Mild presumed chronic small vessel ischemic changes. Mild generalized volume loss. No hydrocephalus.      VISUALIZED ORBITS/SINUSES/MASTOIDS: No intraorbital abnormality. No paranasal sinus mucosal disease. No middle ear or mastoid effusion.     BONES/SOFT TISSUES: No acute abnormality.     CERVICAL SPINE CT:   VERTEBRA: Normal vertebral body heights and alignment. No fracture or posttraumatic subluxation.      CANAL/FORAMINA: No canal or neural foraminal stenosis.     PARASPINAL: No extraspinal abnormality. Visualized lung fields are clear.                                                                      IMPRESSION:  HEAD CT:  1.  No CT evidence for acute intracranial process.  2.  Mild chronic microvascular ischemic changes as above.     CERVICAL SPINE CT:  1.  No CT evidence for acute fracture or post traumatic subluxation.        The total time of this encounter today amounted to 44 minutes. This time included time spent with the patient, prep work, ordering tests, and performing post visit documentation.          Again, thank you for allowing me to participate in the care of your patient.        Sincerely,        Huber Torres MD

## 2024-12-12 DIAGNOSIS — M54.81 BILATERAL OCCIPITAL NEURALGIA: ICD-10-CM

## 2024-12-17 NOTE — TELEPHONE ENCOUNTER
methylPREDNISolone (MEDROL DOSEPAK) 4 MG tablet therapy pack 21 tablet 0 9/24/2024     Last Office Visit : 10/21/24  Future Office visit:  1/14/25    Routing refill request to provider for review/approval because:  Medication is not on protocol

## 2024-12-22 ENCOUNTER — HEALTH MAINTENANCE LETTER (OUTPATIENT)
Age: 54
End: 2024-12-22

## 2024-12-28 RX ORDER — METHYLPREDNISOLONE 4 MG/1
TABLET ORAL
OUTPATIENT
Start: 2024-12-28

## 2025-01-14 ENCOUNTER — OFFICE VISIT (OUTPATIENT)
Dept: PHYSICAL MEDICINE AND REHAB | Facility: CLINIC | Age: 55
End: 2025-01-14
Payer: COMMERCIAL

## 2025-01-14 VITALS — SYSTOLIC BLOOD PRESSURE: 118 MMHG | HEART RATE: 75 BPM | DIASTOLIC BLOOD PRESSURE: 80 MMHG

## 2025-01-14 DIAGNOSIS — G24.3 CERVICAL DYSTONIA: Primary | ICD-10-CM

## 2025-01-14 DIAGNOSIS — M54.81 BILATERAL OCCIPITAL NEURALGIA: ICD-10-CM

## 2025-01-14 PROCEDURE — 64616 CHEMODENERV MUSC NECK DYSTON: CPT | Mod: 50 | Performed by: PHYSICAL MEDICINE & REHABILITATION

## 2025-01-14 PROCEDURE — 64612 DESTROY NERVE FACE MUSCLE: CPT | Mod: 50 | Performed by: PHYSICAL MEDICINE & REHABILITATION

## 2025-01-14 PROCEDURE — 95874 GUIDE NERV DESTR NEEDLE EMG: CPT | Performed by: PHYSICAL MEDICINE & REHABILITATION

## 2025-01-14 PROCEDURE — 64405 NJX AA&/STRD GR OCPL NRV: CPT | Performed by: PHYSICAL MEDICINE & REHABILITATION

## 2025-01-14 RX ORDER — BUPIVACAINE HYDROCHLORIDE 5 MG/ML
10 INJECTION, SOLUTION EPIDURAL; INTRACAUDAL ONCE
Status: COMPLETED | OUTPATIENT
Start: 2025-01-14 | End: 2025-01-15

## 2025-01-14 RX ORDER — TRIAMCINOLONE ACETONIDE 40 MG/ML
40 INJECTION, SUSPENSION INTRA-ARTICULAR; INTRAMUSCULAR ONCE
Status: COMPLETED | OUTPATIENT
Start: 2025-01-14 | End: 2025-01-15

## 2025-01-14 NOTE — PROGRESS NOTES
[facility]     PM&R CLINIC NOTE  BOTULINUM TOXIN PROCEDURE      HPI  Chief Complaint   Patient presents with    Procedure     Botox     Roxanna Celis is a 54 year old female with a history of cervical dystonia who presents to clinic for botulinum toxin injections for management of involuntary muscle spasms and neck pain secondary to her cervical dystonia.     SINCE LAST VISIT  Roxanna Celis was last seen here in clinic on 10/21/2024, at which time she received 300 units of Botox. She also received bilateral occipital nerve blocks to address occipital pain.     The patient denies new medical diagnoses, illnesses, hospitalizations, and other injuries since the previous injection with botulinum neurotoxin.     RESPONSE TO PREVIOUS TREATMENT    Side effects: None.     Pain Improvement: Yes, there was noticeable improvement of her pain but not as much as previous injections. Benefit started a few days after the Botox was administered and lasted approximately 4 weeks.     Dystonia Improvement: Yes, this round was not as effective as is pertains to controlling the dystonic movements of her neck and shoulder muscles. Duration of benefit: 3 weeks, followed by a gradual reduction in benefit.      Functional Improvement: When the Botox is working, she is able to socialize and liver her life normally. When the Botox wears off, she is in such terrible pain that she becomes stuck in bed for prolonged periods of time, and misses out on essentially every area of life.       PHYSICAL EXAM  VS: /80 (BP Location: Right arm, Patient Position: Sitting, Cuff Size: Adult Regular)   Pulse 75    GEN: Pleasant and cooperative, in no acute distress  HEENT: No facial asymmetry  NECK AND TRUNK PATTERN:   Head Tremor: Pt states tremor is intermittent   Left side bending: Very slight  Left Shoulder Elevation: slightly elevated  Head with right shift  Focalized pain at occipitalis - has pain today  during visit  Shoulders: Hypertonic and painful to palpation bilateral shoulder girdles (Right > Left)      ALLERGIES  Allergies   Allergen Reactions    Contrast Dye Anaphylaxis     Other reaction(s): almost  from it    Iodinated Contrast Media Anaphylaxis     IVP DYE    Lisinopril Anaphylaxis    Lisinopril-Hydrochlorothiazide Anaphylaxis    Maple Flavoring Agent (Non-Screening) Anaphylaxis     MAPLE SYRUP  MAPLE SYRUP  MAPLE SYRUP    Maple Tree Anaphylaxis     Allergy includes maple syrup.    No Clinical Screening - See Comments      maple syrup==anaphylaxis  Dairy- causes to not feel well    Gluten Itching and Swelling    Gluten Meal Itching and Swelling     Cramping    [place] Na Unknown    Chamomile Unknown    Codeine Sulfate Cramps and GI Disturbance    Food Unknown     Grapes, almond, lactose, gluten, real maple syrup    Nsaids Other (See Comments) and Unknown     Patient had bariatric surgery. Should not ever take NSAIDS due to high risk for gastric ulcers.   Patient had bariatric surgery. Should not ever take NSAIDS due to high risk for gastric ulcers.     Polyethylene Glycol Unknown     Per allergy testing  Per allergy testing      Erythromycin Nausea and Vomiting, Cramps, Diarrhea and Nausea     PN: LW Reaction: stomach upset  cramping    Lactose Diarrhea    Morphine Other (See Comments)       CURRENT MEDICATIONS    Current Outpatient Medications:     acetylcysteine 10 % (MUCOMYST) 10% SOLN, Place 2 drops into both eyes 6 times daily., Disp: 30 mL, Rfl: 11    BOTOX 100 units injection, Inject 250 Units into the muscle once Lot # /C3 with Expiration Date:  2020, Disp: , Rfl:     buPROPion (WELLBUTRIN XL) 300 MG 24 hr tablet, Take 300 mg by mouth every morning, Disp: , Rfl:     clonazePAM (KLONOPIN) 1 MG tablet, Take 1 mg by mouth 2 times daily as needed, Disp: , Rfl:     cyanocobalamin (VITAMIN B-12) 1000 MCG tablet, Take 1,000 mcg by mouth daily, Disp: , Rfl:     doxycycline monohydrate (ADOXA)  50 MG tablet, Take 1 tablet (50 mg) by mouth 2 times daily. Please keep appt 8/29/24., Disp: 180 tablet, Rfl: 3    DULoxetine (CYMBALTA) 60 MG capsule, TAKE 2 CAPSULES BY MOUTH EVERY DAY, Disp: , Rfl:     EPINEPHrine (ANY BX GENERIC EQUIV) 0.3 MG/0.3ML injection 2-pack, , Disp: , Rfl:     erythromycin (ROMYCIN) 5 MG/GM ophthalmic ointment, 0.5 inch strip each eye at night (may use either OTC Lubricating eye ointment or Erythromycin ointment), Disp: 3.5 g, Rfl: 11    gabapentin (NEURONTIN) 300 MG capsule, Take 1 capsule (300 mg) by mouth 3 times daily., Disp: 270 capsule, Rfl: 1    hydrocortisone 2.5 % cream, APPLY TO AFFECTED SAUD ON LIP TWICE A DAY MAX 2 WEEKS, 1 WEEK OFF REPEAT IF NEEDED, Disp: , Rfl:     ibuprofen (ADVIL/MOTRIN) 200 MG capsule, Take 200 mg by mouth every 4 hours as needed., Disp: , Rfl:     iloperidone (FANAPT) 6 MG TABS tablet, Take 4 mg by mouth daily , Disp: , Rfl:     ketoconazole (NIZORAL) 2 % external cream, PLEASE SEE ATTACHED FOR DETAILED DIRECTIONS, Disp: , Rfl:     lifitegrast (XIIDRA) 5 % opthalmic solution, Place 1 drop into both eyes 2 times daily., Disp: 180 each, Rfl: 3    methylPREDNISolone (MEDROL DOSEPAK) 4 MG tablet therapy pack, TAKE 6 TABLETS ON DAY 1 AS DIRECTED ON PACKAGE AND DECREASE BY 1 TAB EACH DAY FOR A TOTAL OF 6 DAYS, Disp: 21 tablet, Rfl: 0    multivitamin w/minerals (THERA-VIT-M) tablet, Take 1 tablet by mouth daily, Disp: , Rfl:     ONABOTULINUMTOXINA IJ, Inject 250 Units as directed once LOT # /C3 EXPIRATION: 03/2020, Disp: , Rfl:     Perfluorohexyloctane (MIEBO) 1.338 GM/ML SOLN, Apply 1 drop to eye 4 times daily., Disp: 6 mL, Rfl: 11    sodium chloride 0.9 % neb solution, Medically necessary for scleral contact lens use. May use 3 ml 10-15 times a day for contact lens use, Disp: 1800 mL, Rfl: 11    Soft Lens Products (B&L SENSITIVE EYES) SOLN, Apply 1 drop to eye, Disp: , Rfl:     spironolactone (ALDACTONE) 50 MG tablet, Take 1 tablet by mouth daily, Disp:  , Rfl:     tretinoin (RETIN-A) 0.025 % external cream, Apply a pea size to entire face QD, Disp: 45 g, Rfl: 11    tretinoin (RETIN-A) 0.05 % external cream, Spread a pea size amount into affected area topically at bedtime.  Use sunscreen SPF>20., Disp: 45 g, Rfl: 1    varenicline (TYRVAYA) 0.03 MG/ACT nasal spray, Spray 1 spray into both nostrils 2 times daily., Disp: 8.4 mL, Rfl: 11    Vitamin D3 (CHOLECALCIFEROL) 25 mcg (1000 units) tablet, Take by mouth daily, Disp: , Rfl:     White Petrolatum-Mineral Oil (SYSTANE NIGHTTIME) OINT, Apply 3.5 g to eye 4 times daily as needed (dry eyes), Disp: 7 g, Rfl: 11    Current Facility-Administered Medications:     botulinum toxin type A (BOTOX) 100 units injection 300 Units, 300 Units, Intramuscular, Q90 Days, America Gonzalez MD, 300 Units at 10/21/24 1038    botulinum toxin type A (BOTOX) 100 units injection 300 Units, 300 Units, Intramuscular, Q90 Days, America Gonzalez MD, 300 Units at 24 1639    botulinum toxin type A (DYSPORT) injection 500 Units, 500 Units, Intramuscular, Q90 Days, America Gonzalez MD, 500 Units at 22 1028    botulinum toxin type A (DYSPORT) injection 800 Units, 800 Units, Intramuscular, Q90 Days, America Gonzalez MD, 500 Units at 23 1547       BOTULINUM NEUROTOXIN INJECTION PROCEDURES    VERIFICATION OF PATIENT IDENTIFICATION AND PROCEDURE     Initials   Patient Name SES   Patient  SES   Procedure Verified by: SES     Prior to the start of the procedure and with procedural staff participation, I verbally confirmed the patient s identity using two indicators, relevant allergies, that the procedure was appropriate and matched the consent or emergent situation, and that the correct equipment/implants were available. Immediately prior to starting the procedure I conducted the Time Out with the procedural staff and re-confirmed the patient s name, procedure, and site/side. (The Joint Commission  universal protocol was followed.)  Yes    Sedation (Moderate or Deep): None    ABOVE ASSESSMENTS PERFORMED BY    America Gonzalez MD      INDICATIONS FOR PROCEDURES  Roxanna Celis is a 54 year old patient with involuntary muscle spasms and pain secondary to the diagnosis of cervical dystonia. Her baseline symptoms have been recalcitrant to oral medications and conservative therapy.  She is here today for reinjection with Botox.    GOAL OF PROCEDURE  The goal of this procedure is to increase active range of motion, improve volitional motor control, decrease pain  and enhance functional independence.      TOTAL DOSE ADMINISTERED  Dose Administered:  300 units  Botox (Botulinum Toxin Type A)       2:1 Dilution   Unavoidable Drug Waste: No.  Diluent Used:  Preservative Free Normal Saline  Total Volume of Diluent Used:  6 ml  NDC #: Botox 100u ([address]-14)      CONSENT  The risks, benefits, and treatment options were discussed with Roxanna Celis and she agreed to proceed.    Written consent was obtained by [facility].     EQUIPMENT USED  Needle-37mm stimulating/recording  Needle-30 gauge  EMG/NCS Machine    SKIN PREPARATION  Skin preparation was performed using an alcohol wipe.    GUIDANCE DESCRIPTION  Electro-myographic guidance was necessary throughout the procedure to accurately identify all areas of dystonic muscles while avoiding injection of non-dystonic muscles, neighboring nerves and nearby vascular structures.       AREA/MUSCLE INJECTED: 300 UNITS BOTOX = TOTAL DOSE, 2:1 DILUTION  1. NECK & SHOULDER GIRDLE MUSCLES: 170 UNITS BOTOX = TOTAL DOSE   Right Rectus Capitis - (upper cervical) - 5 units of Botox at 1 site/s.   Left Rectus Capitis - (upper cervical) - 5 units of Botox at 1 site/s.     Right Upper Trapezius - 25 units of Botox at 7 site/s.   Left Upper Trapezius - 25 units of Botox at 7 site/s.     Right Splenius Cervicis - 5 units of Botox at 1 site/s.   Left Splenius Cervicis - 5 units of Botox at 1  site/s.    Right Levator Scapulae - 20 units of Botox at 2 site/s.   Left Levator Scapulae - 20 units of Botox at 2 site/s.    Right Sternocleidomastoid - 10 units of Botox at 3 site/s.  Left Sternocleidomastoid - 10 units of Botox at 3 site/s.    Right Cervical Paraspinals - 10 units of Botox at 4 site/s.  Left Cervical Paraspinals - 10 units of Botox at 4 site/s.    Right Thoracic Paraspinals - 10 units of Botox at 4 site/s.  Left Thoracic Paraspinals - 10 units of Botox at 4 site/s.    2. SCALP MUSCLES: 130 UNITS BOTOX = TOTAL DOSE  Right Occipitalis - 65 units of Botox at 15 site/s.   Left Occipitails - 65 units of Botox at 15 site/s.      BILATERAL GREATER OCCIPITAL NERVE BLOCKS    Dru% lidocaine: 2 ml   0.5% bupivacaine: 7 ml   [place] mg = 1 ml     Area just inferior to insertion of the right and left superior trapezius insertion onto skull was cleansed with ChloraPrep. Needle was advanced anteriorly to base of skull then slightly withdrawn and injectate was injected in a fan-like distribution at different depths. A 10 ml mixture of 1% lidocaine, 0.5% bupivacaine and Kenalog was divided into two 5 ml syringes. Total injection volume = 5 ml per side.       RESPONSE TO PROCEDURE  Roxanna Celis tolerated the procedure well and there were no immediate complications. She was allowed to recover for an appropriate period of time and was discharged home in stable condition.    ASSESSMENT AND PLAN   Botulinum toxin injections: No changes made to Botox dose or distribution today, however, she would like to potentially switch to a different neurotoxin in the near future, so we may pursue approval for Daxxify or Dysport. Patient will continue to monitor response to Botox and report at next appointment.   Other: Occipital nerve blocks were administered to address bilateral occipital neuralgia. She will continue to monitor her response and report at next appointment.   Referrals: None.   Medications: No changes.    Follow up: Roxanna Celis was rescheduled for the next series of injections in 12 weeks, at which time we will evaluate response to today's injections. She may call the clinic prior with any questions or concerns prior to the next appointment.       Amreica Gonzalez MD

## 2025-01-14 NOTE — LETTER
1/14/2025      Roxanna Celis  [id number] [place] [address]      Dear Colleague,    Thank you for referring your patient, Roxanna Celis, to the [facility]. Please see a copy of my visit note below.      [facility]     PM&R CLINIC NOTE  BOTULINUM TOXIN PROCEDURE      HPI  Chief Complaint   Patient presents with     Procedure     Botox     Roxanna Celis is a 54 year old female with a history of cervical dystonia who presents to clinic for botulinum toxin injections for management of involuntary muscle spasms and neck pain secondary to her cervical dystonia.     SINCE LAST VISIT  Roxanna Celis was last seen here in clinic on 10/21/2024, at which time she received 300 units of Botox. She also received bilateral occipital nerve blocks to address occipital pain.     The patient denies new medical diagnoses, illnesses, hospitalizations, and other injuries since the previous injection with botulinum neurotoxin.     RESPONSE TO PREVIOUS TREATMENT    Side effects: None.     Pain Improvement: Yes, there was noticeable improvement of her pain but not as much as previous injections. Benefit started a few days after the Botox was administered and lasted approximately 4 weeks.     Dystonia Improvement: Yes, this round was not as effective as is pertains to controlling the dystonic movements of her neck and shoulder muscles. Duration of benefit: 3 weeks, followed by a gradual reduction in benefit.      Functional Improvement: When the Botox is working, she is able to socialize and liver her life normally. When the Botox wears off, she is in such terrible pain that she becomes stuck in bed for prolonged periods of time, and misses out on essentially every area of life.       PHYSICAL EXAM  VS: /80 (BP Location: Right arm, Patient Position: Sitting, Cuff Size: Adult Regular)   Pulse 75    GEN: Pleasant and cooperative, in no acute distress  HEENT: No  facial asymmetry  NECK AND TRUNK PATTERN:   Head Tremor: Pt states tremor is intermittent   Left side bending: Very slight  Left Shoulder Elevation: slightly elevated  Head with right shift  Focalized pain at occipitalis - has pain today during visit  Shoulders: Hypertonic and painful to palpation bilateral shoulder girdles (Right > Left)      ALLERGIES  Allergies   Allergen Reactions     Contrast Dye Anaphylaxis     Other reaction(s): almost  from it     Iodinated Contrast Media Anaphylaxis     IVP DYE     Lisinopril Anaphylaxis     Lisinopril-Hydrochlorothiazide Anaphylaxis     Maple Flavoring Agent (Non-Screening) Anaphylaxis     MAPLE SYRUP  MAPLE SYRUP  MAPLE SYRUP     Maple Tree Anaphylaxis     Allergy includes maple syrup.     No Clinical Screening - See Comments      maple syrup==anaphylaxis  Dairy- causes to not feel well     Gluten Itching and Swelling     Gluten Meal Itching and Swelling     Cramping     [place] Na Unknown     Chamomile Unknown     Codeine Sulfate Cramps and GI Disturbance     Food Unknown     Grapes, almond, lactose, gluten, real maple syrup     Nsaids Other (See Comments) and Unknown     Patient had bariatric surgery. Should not ever take NSAIDS due to high risk for gastric ulcers.   Patient had bariatric surgery. Should not ever take NSAIDS due to high risk for gastric ulcers.      Polyethylene Glycol Unknown     Per allergy testing  Per allergy testing       Erythromycin Nausea and Vomiting, Cramps, Diarrhea and Nausea     PN: LW Reaction: stomach upset  cramping     Lactose Diarrhea     Morphine Other (See Comments)       CURRENT MEDICATIONS    Current Outpatient Medications:      acetylcysteine 10 % (MUCOMYST) 10% SOLN, Place 2 drops into both eyes 6 times daily., Disp: 30 mL, Rfl: 11     BOTOX 100 units injection, Inject 250 Units into the muscle once Lot # /C3 with Expiration Date:  2020, Disp: , Rfl:      buPROPion (WELLBUTRIN XL) 300 MG 24 hr tablet, Take 300 mg by  mouth every morning, Disp: , Rfl:      clonazePAM (KLONOPIN) 1 MG tablet, Take 1 mg by mouth 2 times daily as needed, Disp: , Rfl:      cyanocobalamin (VITAMIN B-12) 1000 MCG tablet, Take 1,000 mcg by mouth daily, Disp: , Rfl:      doxycycline monohydrate (ADOXA) 50 MG tablet, Take 1 tablet (50 mg) by mouth 2 times daily. Please keep appt 8/29/24., Disp: 180 tablet, Rfl: 3     DULoxetine (CYMBALTA) 60 MG capsule, TAKE 2 CAPSULES BY MOUTH EVERY DAY, Disp: , Rfl:      EPINEPHrine (ANY BX GENERIC EQUIV) 0.3 MG/0.3ML injection 2-pack, , Disp: , Rfl:      erythromycin (ROMYCIN) 5 MG/GM ophthalmic ointment, 0.5 inch strip each eye at night (may use either OTC Lubricating eye ointment or Erythromycin ointment), Disp: 3.5 g, Rfl: 11     gabapentin (NEURONTIN) 300 MG capsule, Take 1 capsule (300 mg) by mouth 3 times daily., Disp: 270 capsule, Rfl: 1     hydrocortisone 2.5 % cream, APPLY TO AFFECTED SAUD ON LIP TWICE A DAY MAX 2 WEEKS, 1 WEEK OFF REPEAT IF NEEDED, Disp: , Rfl:      ibuprofen (ADVIL/MOTRIN) 200 MG capsule, Take 200 mg by mouth every 4 hours as needed., Disp: , Rfl:      iloperidone (FANAPT) 6 MG TABS tablet, Take 4 mg by mouth daily , Disp: , Rfl:      ketoconazole (NIZORAL) 2 % external cream, PLEASE SEE ATTACHED FOR DETAILED DIRECTIONS, Disp: , Rfl:      lifitegrast (XIIDRA) 5 % opthalmic solution, Place 1 drop into both eyes 2 times daily., Disp: 180 each, Rfl: 3     methylPREDNISolone (MEDROL DOSEPAK) 4 MG tablet therapy pack, TAKE 6 TABLETS ON DAY 1 AS DIRECTED ON PACKAGE AND DECREASE BY 1 TAB EACH DAY FOR A TOTAL OF 6 DAYS, Disp: 21 tablet, Rfl: 0     multivitamin w/minerals (THERA-VIT-M) tablet, Take 1 tablet by mouth daily, Disp: , Rfl:      ONABOTULINUMTOXINA IJ, Inject 250 Units as directed once LOT # /C3 EXPIRATION: 03/2020, Disp: , Rfl:      Perfluorohexyloctane (MIEBO) 1.338 GM/ML SOLN, Apply 1 drop to eye 4 times daily., Disp: 6 mL, Rfl: 11     sodium chloride 0.9 % neb solution, Medically  necessary for scleral contact lens use. May use 3 ml 10-15 times a day for contact lens use, Disp: 1800 mL, Rfl: 11     Soft Lens Products (B&L SENSITIVE EYES) SOLN, Apply 1 drop to eye, Disp: , Rfl:      spironolactone (ALDACTONE) 50 MG tablet, Take 1 tablet by mouth daily, Disp: , Rfl:      tretinoin (RETIN-A) 0.025 % external cream, Apply a pea size to entire face QD, Disp: 45 g, Rfl: 11     tretinoin (RETIN-A) 0.05 % external cream, Spread a pea size amount into affected area topically at bedtime.  Use sunscreen SPF>20., Disp: 45 g, Rfl: 1     varenicline (TYRVAYA) 0.03 MG/ACT nasal spray, Spray 1 spray into both nostrils 2 times daily., Disp: 8.4 mL, Rfl: 11     Vitamin D3 (CHOLECALCIFEROL) 25 mcg (1000 units) tablet, Take by mouth daily, Disp: , Rfl:      White Petrolatum-Mineral Oil (SYSTANE NIGHTTIME) OINT, Apply 3.5 g to eye 4 times daily as needed (dry eyes), Disp: 7 g, Rfl: 11    Current Facility-Administered Medications:      botulinum toxin type A (BOTOX) 100 units injection 300 Units, 300 Units, Intramuscular, Q90 Days, America Gonzalez MD, 300 Units at 10/21/24 1038     botulinum toxin type A (BOTOX) 100 units injection 300 Units, 300 Units, Intramuscular, Q90 Days, America Gonzalez MD, 300 Units at 24 1639     botulinum toxin type A (DYSPORT) injection 500 Units, 500 Units, Intramuscular, Q90 Days, America Gonzalez MD, 500 Units at 22 1028     botulinum toxin type A (DYSPORT) injection 800 Units, 800 Units, Intramuscular, Q90 Days, America Gonzalez MD, 500 Units at 23 1547       BOTULINUM NEUROTOXIN INJECTION PROCEDURES    VERIFICATION OF PATIENT IDENTIFICATION AND PROCEDURE     Initials   Patient Name SES   Patient  SES   Procedure Verified by: SES     Prior to the start of the procedure and with procedural staff participation, I verbally confirmed the patient s identity using two indicators, relevant allergies, that the procedure was appropriate  and matched the consent or emergent situation, and that the correct equipment/implants were available. Immediately prior to starting the procedure I conducted the Time Out with the procedural staff and re-confirmed the patient s name, procedure, and site/side. (The Joint Commission universal protocol was followed.)  Yes    Sedation (Moderate or Deep): None    ABOVE ASSESSMENTS PERFORMED BY    America Gonzalez MD      INDICATIONS FOR PROCEDURES  Roxanna Celis is a 54 year old patient with involuntary muscle spasms and pain secondary to the diagnosis of cervical dystonia. Her baseline symptoms have been recalcitrant to oral medications and conservative therapy.  She is here today for reinjection with Botox.    GOAL OF PROCEDURE  The goal of this procedure is to increase active range of motion, improve volitional motor control, decrease pain  and enhance functional independence.      TOTAL DOSE ADMINISTERED  Dose Administered:  300 units  Botox (Botulinum Toxin Type A)       2:1 Dilution   Unavoidable Drug Waste: No.  Diluent Used:  Preservative Free Normal Saline  Total Volume of Diluent Used:  6 ml  NDC #: Botox 100u ([address]-43)      CONSENT  The risks, benefits, and treatment options were discussed with Roxanna Celis and she agreed to proceed.    Written consent was obtained by [facility].     EQUIPMENT USED  Needle-37mm stimulating/recording  Needle-30 gauge  EMG/NCS Machine    SKIN PREPARATION  Skin preparation was performed using an alcohol wipe.    GUIDANCE DESCRIPTION  Electro-myographic guidance was necessary throughout the procedure to accurately identify all areas of dystonic muscles while avoiding injection of non-dystonic muscles, neighboring nerves and nearby vascular structures.       AREA/MUSCLE INJECTED: 300 UNITS BOTOX = TOTAL DOSE, 2:1 DILUTION  1. NECK & SHOULDER GIRDLE MUSCLES: 170 UNITS BOTOX = TOTAL DOSE   Right Rectus Capitis - (upper cervical) - 5 units of Botox at 1 site/s.   Left Rectus  Capitis - (upper cervical) - 5 units of Botox at 1 site/s.     Right Upper Trapezius - 25 units of Botox at 7 site/s.   Left Upper Trapezius - 25 units of Botox at 7 site/s.     Right Splenius Cervicis - 5 units of Botox at 1 site/s.   Left Splenius Cervicis - 5 units of Botox at 1 site/s.    Right Levator Scapulae - 20 units of Botox at 2 site/s.   Left Levator Scapulae - 20 units of Botox at 2 site/s.    Right Sternocleidomastoid - 10 units of Botox at 3 site/s.  Left Sternocleidomastoid - 10 units of Botox at 3 site/s.    Right Cervical Paraspinals - 10 units of Botox at 4 site/s.  Left Cervical Paraspinals - 10 units of Botox at 4 site/s.    Right Thoracic Paraspinals - 10 units of Botox at 4 site/s.  Left Thoracic Paraspinals - 10 units of Botox at 4 site/s.    2. SCALP MUSCLES: 130 UNITS BOTOX = TOTAL DOSE  Right Occipitalis - 65 units of Botox at 15 site/s.   Left Occipitails - 65 units of Botox at 15 site/s.      BILATERAL GREATER OCCIPITAL NERVE BLOCKS    Dru% lidocaine: 2 ml   0.5% bupivacaine: 7 ml   Kenalo mg = 1 ml     Area just inferior to insertion of the right and left superior trapezius insertion onto skull was cleansed with ChloraPrep. Needle was advanced anteriorly to base of skull then slightly withdrawn and injectate was injected in a fan-like distribution at different depths. A 10 ml mixture of 1% lidocaine, 0.5% bupivacaine and Kenalog was divided into two 5 ml syringes. Total injection volume = 5 ml per side.       RESPONSE TO PROCEDURE  Roxanna Celis tolerated the procedure well and there were no immediate complications. She was allowed to recover for an appropriate period of time and was discharged home in stable condition.    ASSESSMENT AND PLAN   Botulinum toxin injections: No changes made to Botox dose or distribution today, however, she would like to potentially switch to a different neurotoxin in the near future, so we may pursue approval for Daxxify or Dysport. Patient will  continue to monitor response to Botox and report at next appointment.   Other: Occipital nerve blocks were administered to address bilateral occipital neuralgia. She will continue to monitor her response and report at next appointment.   Referrals: None.   Medications: No changes.   Follow up: Roxanna Celis was rescheduled for the next series of injections in 12 weeks, at which time we will evaluate response to today's injections. She may call the clinic prior with any questions or concerns prior to the next appointment.       America Gonzalez MD          Again, thank you for allowing me to participate in the care of your patient.        Sincerely,        America Gonzalez MD    Electronically signed

## 2025-01-15 ENCOUNTER — MEDICAL CORRESPONDENCE (OUTPATIENT)
Dept: HEALTH INFORMATION MANAGEMENT | Facility: CLINIC | Age: 55
End: 2025-01-15
Payer: COMMERCIAL

## 2025-01-15 ENCOUNTER — MYC MEDICAL ADVICE (OUTPATIENT)
Dept: NEUROLOGY | Facility: CLINIC | Age: 55
End: 2025-01-15

## 2025-01-15 RX ADMIN — TRIAMCINOLONE ACETONIDE 40 MG: 40 INJECTION, SUSPENSION INTRA-ARTICULAR; INTRAMUSCULAR at 08:15

## 2025-01-15 RX ADMIN — BUPIVACAINE HYDROCHLORIDE 50 MG: 5 INJECTION, SOLUTION EPIDURAL; INTRACAUDAL at 08:14

## 2025-01-15 NOTE — TELEPHONE ENCOUNTER
Writer faxed referral, OV note and face sheet to [facility] at [phone] at Dr Torres and patient request.  Verified delivered by right fax.  Paperwork sent to [facility] for scan.  CATALINA Anderson., CHRISTOPHER ([facility])

## 2025-01-16 DIAGNOSIS — M79.7 FIBROMYALGIA: ICD-10-CM

## 2025-01-16 DIAGNOSIS — M79.2 NEUROPATHIC PAIN: Primary | ICD-10-CM

## 2025-01-16 RX ORDER — GABAPENTIN 100 MG/1
300 CAPSULE ORAL 3 TIMES DAILY
Qty: 90 CAPSULE | Refills: 5 | Status: SHIPPED | OUTPATIENT
Start: 2025-01-16

## 2025-01-29 ENCOUNTER — OFFICE VISIT (OUTPATIENT)
Dept: OPTOMETRY | Facility: CLINIC | Age: 55
End: 2025-01-29
Payer: COMMERCIAL

## 2025-01-29 DIAGNOSIS — H04.123 DRY EYES: Primary | ICD-10-CM

## 2025-01-29 DIAGNOSIS — H04.123 BILATERAL DRY EYES: ICD-10-CM

## 2025-01-29 PROBLEM — R53.1 GENERALIZED WEAKNESS: Status: ACTIVE | Noted: 2024-07-24

## 2025-01-29 ASSESSMENT — REFRACTION_CURRENTRX
OS_BASECURVE: 7.899
OD_BRAND: DAILIES TOTAL 1
OS_SPHERE: -0.50
OD_DIAMETER: 19.0
OS_DIAMETER: 19.0
OD_DIAMETER: 14.1
OS_BASECURVE: 8.5
OD_BASECURVE: 8.5
OD_SPHERE: -0.50
OS_SPHERE: PLANO/+0.25
OS_BRAND: DAILIES TOTAL 1
OD_ADDL_SPECS: OPT EXTREME CLEAR
OD_BASECURVE: 8.047
OS_ADDL_SPECS: OPT EXTREME CLEAR
OS_DIAMETER: 14.1

## 2025-01-29 ASSESSMENT — CONF VISUAL FIELD
OD_NORMAL: 1
OD_INFERIOR_TEMPORAL_RESTRICTION: 0
OS_INFERIOR_TEMPORAL_RESTRICTION: 0
OS_INFERIOR_NASAL_RESTRICTION: 0
OD_SUPERIOR_NASAL_RESTRICTION: 0
OD_INFERIOR_NASAL_RESTRICTION: 0
OD_SUPERIOR_TEMPORAL_RESTRICTION: 0
OS_SUPERIOR_TEMPORAL_RESTRICTION: 0
OS_SUPERIOR_NASAL_RESTRICTION: 0
OS_NORMAL: 1

## 2025-01-29 ASSESSMENT — SLIT LAMP EXAM - LIDS
COMMENTS: MILD MGD
COMMENTS: MILD MGD

## 2025-01-29 ASSESSMENT — VISUAL ACUITY
OS_CC: 20/20
OD_CC: 20/20
OD_CC+: -2
CORRECTION_TYPE: CONTACTS
METHOD: SNELLEN - LINEAR

## 2025-01-29 ASSESSMENT — EXTERNAL EXAM - RIGHT EYE: OD_EXAM: NORMAL

## 2025-01-29 ASSESSMENT — TONOMETRY
IOP_METHOD: ICARE
OS_IOP_MMHG: 16
OD_IOP_MMHG: 18

## 2025-01-29 ASSESSMENT — EXTERNAL EXAM - LEFT EYE: OS_EXAM: NORMAL

## 2025-01-29 NOTE — NURSING NOTE
"Chief Complaints and History of Present Illnesses   Patient presents with    Contact Lens Follow Up     Pt here for contact lens follow up each eye.     Chief Complaint(s) and History of Present Illness(es)       Contact Lens Follow Up              Laterality: both eyes    Comments: Pt here for contact lens follow up each eye.              Comments    Vision is largely unchanged since last exam with Dr Ocampo. She notes eyes are \"real dry\". Pt has no issues with comfort of lenses.     Debbie Lemon, COT on 1/29/2025 at 12:38 PM                     "

## 2025-01-29 NOTE — PROGRESS NOTES
Current Regimen:  Doxycycline 50mg twice a day   Xiidra gtt twice a day both eyes   Mucomyst 4-6x daily both eyes   Systane ointment   Lisa Edmondson - needs 90 day supply for insurance   Serum Tears - 6 drops in the lens, rest saline.  Miebo - has only been using limitedly as did not know she could use with scleral lens   Moisture chamber goggles     Failed: Restasis, Lipiflow, Vimpat, Celluvisc, Pred Healon    A/P  1.) Dry Eye each eye  -Severely symptomatic despite max topical treatment and significant objective improvement in corneal appearance with scleral lens wear  -Currently in Eyeprint scleral lenses (failed standard lenses) with excellent fit. Happy with nearsighted target in CL's (-2.50 distance Rx)  -Excellent response to larger diam lenses (19.0). No fit adjustments recommended. Can currently wear 8-9 hours, then needs to remove  -Somewhat increased K stain compared to previous. Already using PF regimen. Would consider trying Nutrifill inside bowl of lens in addition to serum tears. Can also use Miebo over scleral lenses    Continue with current lenses. No changes recommended. Follow-up 1 year here given lens fit stability. Following with other providers  as well    I have confirmed the patient's CC, HPI and reviewed Past Medical History, Past Surgical History, Social History, Family History, Problem List, Medication List and agree with Tech note.     Sudha Anthony, SHELLI FAAO FSLS

## 2025-02-12 ENCOUNTER — CARE COORDINATION (OUTPATIENT)
Dept: PHYSICAL MEDICINE AND REHAB | Facility: CLINIC | Age: 55
End: 2025-02-12
Payer: COMMERCIAL

## 2025-03-11 ENCOUNTER — OFFICE VISIT (OUTPATIENT)
Dept: OPHTHALMOLOGY | Facility: CLINIC | Age: 55
End: 2025-03-11
Attending: OPHTHALMOLOGY
Payer: COMMERCIAL

## 2025-03-11 DIAGNOSIS — H04.123 BILATERAL DRY EYES: Primary | ICD-10-CM

## 2025-03-11 DIAGNOSIS — H02.88A MEIBOMIAN GLAND DYSFUNCTION (MGD), BILATERAL, BOTH UPPER AND LOWER LIDS: ICD-10-CM

## 2025-03-11 DIAGNOSIS — H02.88B MEIBOMIAN GLAND DYSFUNCTION (MGD), BILATERAL, BOTH UPPER AND LOWER LIDS: ICD-10-CM

## 2025-03-11 DIAGNOSIS — H40.043 BORDERLINE STEROID-INDUCED GLAUCOMA OF BOTH EYES: ICD-10-CM

## 2025-03-11 PROCEDURE — 99214 OFFICE O/P EST MOD 30 MIN: CPT | Mod: 25 | Performed by: OPHTHALMOLOGY

## 2025-03-11 PROCEDURE — 99213 OFFICE O/P EST LOW 20 MIN: CPT | Performed by: OPHTHALMOLOGY

## 2025-03-11 PROCEDURE — 68761 CLOSE TEAR DUCT OPENING: CPT | Performed by: OPHTHALMOLOGY

## 2025-03-11 ASSESSMENT — SLIT LAMP EXAM - LIDS
COMMENTS: MILD MGD
COMMENTS: MILD MGD

## 2025-03-11 ASSESSMENT — EXTERNAL EXAM - RIGHT EYE: OD_EXAM: NORMAL

## 2025-03-11 ASSESSMENT — REFRACTION_WEARINGRX
OS_SPHERE: -3.00
OS_AXIS: 010
OS_CYLINDER: +0.25
OD_SPHERE: -3.25
OD_AXIS: 160
OD_CYLINDER: +0.25

## 2025-03-11 ASSESSMENT — EXTERNAL EXAM - LEFT EYE: OS_EXAM: NORMAL

## 2025-03-11 ASSESSMENT — VISUAL ACUITY
CORRECTION_TYPE: GLASSES
OS_CC: 20/20
METHOD: SNELLEN - LINEAR
OD_CC+: -2
OD_CC: 20/20

## 2025-03-11 ASSESSMENT — TONOMETRY
IOP_METHOD: ICARE
OD_IOP_MMHG: 14
OS_IOP_MMHG: 15

## 2025-03-11 NOTE — NURSING NOTE
Chief Complaints and History of Present Illnesses   Patient presents with    Follow Up     Dry eyes, bilateral     Chief Complaint(s) and History of Present Illness(es)       Follow Up              Laterality: both eyes    Course: stable    Associated symptoms: dryness, eye pain, photophobia, foreign body sensation and burning    Treatments tried: eye drops, artificial tears, ointment and warm compresses    Pain scale: 2/10    Comments: Dry eyes, bilateral              Comments    She states that her vision has seemed stable in both eyes since her last eye exam.       She is using:  Mucomyst four times a day    Doxycycline 50 mg orally twice a day   Serum tears six times a day each eye   Xiidra twice a day each eye   Ointment PM each eye   Warm compresses 2-3x daily   Lid scrubs (Occusoft) daily     JEAN Syed 12:03 PM  March 11, 2025

## 2025-03-11 NOTE — PROGRESS NOTES
HPI       Follow Up    In both eyes.  Since onset it is stable.  Associated symptoms include dryness, eye pain, photophobia, foreign body sensation and burning.  Treatments tried include eye drops, artificial tears, ointment and warm compresses.  Pain was noted as 2/10. Additional comments: Dry eyes, bilateral             Comments    She states that her vision has seemed stable in both eyes since her last eye exam.       She is using:  Mucomyst four times a day    Varenicline nasal spray twice a day  Doxycycline 50 mg orally twice a day   Serum tears six times a day each eye   Xiidra twice a day each eye   Ointment PM each eye   Warm compresses 2-3x daily   Lid scrubs (Occusoft) daily     JEAN Syed 12:03 PM  March 11, 2025               Last edited by Siri Ashley COT on 3/11/2025 12:06 PM.          Review of systems for the eyes was negative other than the pertinent positives/negatives listed in the HPI.      Assessment & Plan      Roxanna Celis is a 54 year old female with the following diagnoses:   1. Bilateral dry eyes    2. 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 Soria next month  Replaced collagen plugs x 4 today    Continue mucomyst four times a day    Continue Doxycycline 50 mg orally twice a day   Continue serum tears 6 times a day and as needed   Continue Xiidra twice a day   Continue PM ointment  Warm compresses 2-3x daily   Lid scrubs (or baby shampoo) daily       Patient disposition:   Return in about 6 months (around 9/11/2025) for DFE.           Attending Physician Attestation:  Complete documentation of historical and exam elements from today's encounter can be found  in the full encounter summary report (not reduplicated in this progress note).  I personally obtained the chief complaint(s) and history of present illness.  I confirmed and edited as necessary the review of systems, past medical/surgical history, family history, social history, and examination findings as documented by others; and I examined the patient myself.  I personally reviewed the relevant tests, images, and reports as documented above.  I formulated and edited as necessary the assessment and plan and discussed the findings and management plan with the patient and family. . - Faheem Ocampo MD

## 2025-03-24 DIAGNOSIS — H04.123 DRY EYES, BILATERAL: ICD-10-CM

## 2025-03-24 RX ORDER — PREDNISOLONE ACETATE 10 MG/ML
1-2 SUSPENSION/ DROPS OPHTHALMIC 2 TIMES DAILY
Qty: 10 ML | Refills: 3 | OUTPATIENT
Start: 2025-03-24

## 2025-03-24 NOTE — TELEPHONE ENCOUNTER
pharm called: Discontinued Alternate therapy Haider Webber RN 12/20/24 1420      clinic note:  Spoke to pt and pt aware prednisolone eye drops discontinued 10-   Rx denied and removed from med list   Pt did need new Rx for tyrvaya   -- pt states does not receive 90 day supply -- resent the rx that had long term box previously marked and also commented in pharmacy notes to dispense 90 day supply   Haider Webber RN 2:22 PM 12/20/24

## 2025-03-31 ENCOUNTER — TRANSFERRED RECORDS (OUTPATIENT)
Dept: HEALTH INFORMATION MANAGEMENT | Facility: CLINIC | Age: 55
End: 2025-03-31
Payer: COMMERCIAL

## 2025-04-15 ENCOUNTER — OFFICE VISIT (OUTPATIENT)
Dept: PHYSICAL MEDICINE AND REHAB | Facility: CLINIC | Age: 55
End: 2025-04-15
Payer: COMMERCIAL

## 2025-04-15 VITALS — DIASTOLIC BLOOD PRESSURE: 61 MMHG | SYSTOLIC BLOOD PRESSURE: 98 MMHG | HEART RATE: 104 BPM

## 2025-04-15 DIAGNOSIS — M54.81 BILATERAL OCCIPITAL NEURALGIA: ICD-10-CM

## 2025-04-15 DIAGNOSIS — G24.3 CERVICAL DYSTONIA: Primary | ICD-10-CM

## 2025-04-15 RX ORDER — BUPIVACAINE HYDROCHLORIDE 5 MG/ML
10 INJECTION, SOLUTION PERINEURAL ONCE
Status: COMPLETED | OUTPATIENT
Start: 2025-04-15 | End: 2025-04-15

## 2025-04-15 RX ORDER — TRIAMCINOLONE ACETONIDE 40 MG/ML
40 INJECTION, SUSPENSION INTRA-ARTICULAR; INTRAMUSCULAR ONCE
Status: COMPLETED | OUTPATIENT
Start: 2025-04-15 | End: 2025-04-15

## 2025-04-15 RX ADMIN — BUPIVACAINE HYDROCHLORIDE 50 MG: 5 INJECTION, SOLUTION PERINEURAL at 15:00

## 2025-04-15 RX ADMIN — TRIAMCINOLONE ACETONIDE 40 MG: 40 INJECTION, SUSPENSION INTRA-ARTICULAR; INTRAMUSCULAR at 15:03

## 2025-04-15 NOTE — LETTER
"4/15/2025      Roxanna Celis  [address] [place] [address]      Dear Colleague,    Thank you for referring your patient, Roxanna Celis, to the [facility]. Please see a copy of my visit note below.      [facility]     PM&R CLINIC NOTE  BOTULINUM TOXIN PROCEDURE      HPI  Chief Complaint   Patient presents with     Procedure     Botox      Roxanna Celis is a 54 year old female with a history of cervical dystonia who presents to clinic for botulinum toxin injections for management of involuntary muscle spasms and neck pain secondary to her cervical dystonia.     SINCE LAST VISIT  Roxanna Celis was last seen here in clinic on 1/14/2025, at which time she received 300 units of Botox. She also received bilateral occipital nerve blocks to address occipital pain.     Today, she reports the usual pain around her skull base, which travels around the \"little doo-hicky\" in the back of the head and travels up to the top of the head. She also has pain from the upper cervical region, down into the thoracic spine and coming out into the shoulder blades. She also endorses headaches in her forehead, and a \"pounding\" sensation in her head.     The patient denies new medical diagnoses, illnesses, hospitalizations, and other injuries since the previous injection with botulinum neurotoxin.     RESPONSE TO PREVIOUS TREATMENT    Side effects: None.     Pain Improvement: Yes, there is a noticeable improvement of her pain with the combination of the Botox and ONBs, however, it is not nearly as effective as it used to be.     Dystonia Improvement: Yes, she notes less involuntary posturing/pulling/tension in her neck and shoulder muscles when the Botox is in effect. Duration of benefit: 3 weeks, followed by a gradual reduction in benefit.     Functional Improvement: When the Botox is working, she is able to socialize and liver her life normally. When the Botox wears " off, she is in such terrible pain that she becomes stuck in bed for prolonged periods of time, and misses out on essentially every area of life. Right now, pain is excruciating, and she thinks that for this reason, she cannot travel anywhere. She would need her pain under better control if she were to start traveling again.       PHYSICAL EXAM  VS: BP 98/61 (BP Location: Right arm, Patient Position: Sitting, Cuff Size: Adult Regular)   Pulse 104    GEN: Pleasant and cooperative, in no acute distress  HEENT: No facial asymmetry  NECK AND TRUNK PATTERN:   Head Tremor: Pt states tremor is intermittent   Left side bending: Very slight  Left Shoulder Elevation: slightly elevated  Head with right shift  Focalized pain at occipitalis - has pain today during visit  Shoulders: Hypertonic and painful to palpation bilateral shoulder girdles (Right > Left)      ALLERGIES  Allergies   Allergen Reactions     Contrast Dye Anaphylaxis     Other reaction(s): almost  from it     Iodinated Contrast Media Anaphylaxis     IVP DYE     Lisinopril Anaphylaxis     Lisinopril-Hydrochlorothiazide Anaphylaxis     Maple Flavoring Agent (Non-Screening) Anaphylaxis     MAPLE SYRUP  MAPLE SYRUP  MAPLE SYRUP     Maple Tree Anaphylaxis     Allergy includes maple syrup.     No Clinical Screening - See Comments      maple syrup==anaphylaxis  Dairy- causes to not feel well     Gluten Itching and Swelling     Gluten Meal Itching and Swelling     Cramping     [place] Meal (Obsolete) Unknown     Chamomile Unknown     Codeine Sulfate Cramps and GI Disturbance     Food Unknown     Grapes, almond, lactose, gluten, real maple syrup     Nsaids Other (See Comments) and Unknown     Patient had bariatric surgery. Should not ever take NSAIDS due to high risk for gastric ulcers.   Patient had bariatric surgery. Should not ever take NSAIDS due to high risk for gastric ulcers.      Polyethylene Glycol Unknown     Per allergy testing  Per allergy testing        Erythromycin Nausea and Vomiting, Cramps, Diarrhea and Nausea     PN: LW Reaction: stomach upset  cramping     Lactose Diarrhea     Morphine Other (See Comments)       CURRENT MEDICATIONS    Current Outpatient Medications:      acetylcysteine 10 % (MUCOMYST) 10% SOLN, Place 2 drops into both eyes 6 times daily., Disp: 30 mL, Rfl: 11     BOTOX 100 units injection, Inject 250 Units into the muscle once Lot # /C3 with Expiration Date:  11/2020, Disp: , Rfl:      buPROPion (WELLBUTRIN XL) 300 MG 24 hr tablet, Take 300 mg by mouth every morning, Disp: , Rfl:      clonazePAM (KLONOPIN) 1 MG tablet, Take 1 mg by mouth 2 times daily as needed, Disp: , Rfl:      cyanocobalamin (VITAMIN B-12) 1000 MCG tablet, Take 1,000 mcg by mouth daily, Disp: , Rfl:      doxycycline monohydrate (ADOXA) 50 MG tablet, Take 1 tablet (50 mg) by mouth 2 times daily. Please keep appt 8/29/24., Disp: 180 tablet, Rfl: 3     DULoxetine (CYMBALTA) 60 MG capsule, TAKE 2 CAPSULES BY MOUTH EVERY DAY, Disp: , Rfl:      EPINEPHrine (ANY BX GENERIC EQUIV) 0.3 MG/0.3ML injection 2-pack, , Disp: , Rfl:      erythromycin (ROMYCIN) 5 MG/GM ophthalmic ointment, 0.5 inch strip each eye at night (may use either OTC Lubricating eye ointment or Erythromycin ointment), Disp: 3.5 g, Rfl: 11     gabapentin (NEURONTIN) 100 MG capsule, Take 3 capsules (300 mg) by mouth 3 times daily., Disp: 90 capsule, Rfl: 5     hydrocortisone 2.5 % cream, APPLY TO AFFECTED SAUD ON LIP TWICE A DAY MAX 2 WEEKS, 1 WEEK OFF REPEAT IF NEEDED, Disp: , Rfl:      ibuprofen (ADVIL/MOTRIN) 200 MG capsule, Take 200 mg by mouth every 4 hours as needed., Disp: , Rfl:      iloperidone (FANAPT) 6 MG TABS tablet, Take 4 mg by mouth daily , Disp: , Rfl:      ketoconazole (NIZORAL) 2 % external cream, PLEASE SEE ATTACHED FOR DETAILED DIRECTIONS, Disp: , Rfl:      lifitegrast (XIIDRA) 5 % opthalmic solution, Place 1 drop into both eyes 2 times daily., Disp: 180 each, Rfl: 3     methylPREDNISolone  (MEDROL DOSEPAK) 4 MG tablet therapy pack, TAKE 6 TABLETS ON DAY 1 AS DIRECTED ON PACKAGE AND DECREASE BY 1 TAB EACH DAY FOR A TOTAL OF 6 DAYS, Disp: 21 tablet, Rfl: 0     multivitamin w/minerals (THERA-VIT-M) tablet, Take 1 tablet by mouth daily, Disp: , Rfl:      Perfluorohexyloctane (MIEBO) 1.338 GM/ML SOLN, Apply 1 drop to eye 4 times daily., Disp: 6 mL, Rfl: 11     sodium chloride 0.9 % neb solution, Medically necessary for scleral contact lens use. May use 3 ml 10-15 times a day for contact lens use, Disp: 1800 mL, Rfl: 11     Soft Lens Products (B&L SENSITIVE EYES) SOLN, Apply 1 drop to eye, Disp: , Rfl:      spironolactone (ALDACTONE) 50 MG tablet, Take 1 tablet by mouth daily, Disp: , Rfl:      tretinoin (RETIN-A) 0.025 % external cream, Apply a pea size to entire face QD, Disp: 45 g, Rfl: 11     tretinoin (RETIN-A) 0.05 % external cream, Spread a pea size amount into affected area topically at bedtime.  Use sunscreen SPF>20., Disp: 45 g, Rfl: 1     varenicline (TYRVAYA) 0.03 MG/ACT nasal spray, Spray 1 spray into both nostrils 2 times daily., Disp: 8.4 mL, Rfl: 11     Vitamin D3 (CHOLECALCIFEROL) 25 mcg (1000 units) tablet, Take by mouth daily, Disp: , Rfl:      White Petrolatum-Mineral Oil (SYSTANE NIGHTTIME) OINT, Apply 3.5 g to eye 4 times daily as needed (dry eyes), Disp: 7 g, Rfl: 11    Current Facility-Administered Medications:      botulinum toxin type A (BOTOX) 100 units injection 300 Units, 300 Units, Intramuscular, Q90 Days, America Gonzalez MD, 300 Units at 01/15/25 0816     botulinum toxin type A (BOTOX) 100 units injection 300 Units, 300 Units, Intramuscular, Q90 Days, America Gonzalez MD, 300 Units at 05/08/24 1639     botulinum toxin type A (DYSPORT) injection 500 Units, 500 Units, Intramuscular, Q90 Days, America Gonzalez MD, 500 Units at 05/20/22 1028     botulinum toxin type A (DYSPORT) injection 800 Units, 800 Units, Intramuscular, Q90 Days, Standal, America  MD Marcie, 500 Units at 23 1547     bupivacaine (MARCAINE) 0.5% injection MDV, 10 mL, Other, Once,      lidocaine 1 % 2 mL, 2 mL, Other, Once,      triamcinolone (KENALOG-40) injection 40 mg, 40 mg, Other, Once,        BOTULINUM NEUROTOXIN INJECTION PROCEDURES    VERIFICATION OF PATIENT IDENTIFICATION AND PROCEDURE     Initials   Patient Name SES   Patient  SES   Procedure Verified by: SES     Prior to the start of the procedure and with procedural staff participation, I verbally confirmed the patient s identity using two indicators, relevant allergies, that the procedure was appropriate and matched the consent or emergent situation, and that the correct equipment/implants were available. Immediately prior to starting the procedure I conducted the Time Out with the procedural staff and re-confirmed the patient s name, procedure, and site/side. (The Joint Commission universal protocol was followed.)  Yes    Sedation (Moderate or Deep): None    ABOVE ASSESSMENTS PERFORMED BY    America Gonzalez MD      INDICATIONS FOR PROCEDURES  Roxanna Celis is a 54 year old patient with involuntary muscle spasms and pain secondary to the diagnosis of cervical dystonia. Her baseline symptoms have been recalcitrant to oral medications and conservative therapy.  She is here today for reinjection with Botox.    GOAL OF PROCEDURE  The goal of this procedure is to increase active range of motion, improve volitional motor control, decrease pain  and enhance functional independence.      TOTAL DOSE ADMINISTERED  Dose Administered:  300 units  Botox (Botulinum Toxin Type A)       2:1 Dilution   Unavoidable Drug Waste: No.  Diluent Used:  Preservative Free Normal Saline  Total Volume of Diluent Used:  6 ml  NDC #: Botox 100u ([address]-32)      CONSENT  The risks, benefits, and treatment options were discussed with Roxanna Celis and she agreed to proceed.    Written consent was obtained by [facility].     EQUIPMENT USED  Needle-37mm  stimulating/recording  Needle-30 gauge  EMG/NCS Machine    SKIN PREPARATION  Skin preparation was performed using an alcohol wipe.    GUIDANCE DESCRIPTION  Electro-myographic guidance was necessary throughout the procedure to accurately identify all areas of dystonic muscles while avoiding injection of non-dystonic muscles, neighboring nerves and nearby vascular structures.       AREA/MUSCLE INJECTED: 300 UNITS BOTOX = TOTAL DOSE, 2:1 DILUTION  1. NECK & SHOULDER GIRDLE MUSCLES: 300 UNITS BOTOX = TOTAL DOSE   Right Rectus Capitis - (upper cervical) - 5 units of Botox at 1 site/s.   Left Rectus Capitis - (upper cervical) - 5 units of Botox at 1 site/s.     Right Upper Trapezius - 25 units of Botox at 7 site/s.   Left Upper Trapezius - 25 units of Botox at 7 site/s.     Right Splenius Cervicis - 5 units of Botox at 1 site/s.   Left Splenius Cervicis - 5 units of Botox at 1 site/s.    Right Levator Scapulae - 20 units of Botox at 2 site/s.   Left Levator Scapulae - 20 units of Botox at 2 site/s.    Right Sternocleidomastoid - 10 units of Botox at 3 site/s.  Left Sternocleidomastoid - 10 units of Botox at 3 site/s.    Right Cervical Paraspinals - 10 units of Botox at 4 site/s.  Left Cervical Paraspinals - 10 units of Botox at 4 site/s.    Right Thoracic Paraspinals - 10 units of Botox at 4 site/s.  Left Thoracic Paraspinals - 10 units of Botox at 4 site/s.    Right Occipitalis - 65 units of Botox at 15 site/s.   Left Occipitails - 65 units of Botox at 15 site/s.      BILATERAL GREATER OCCIPITAL NERVE BLOCKS    Dru% lidocaine: 2 ml   0.5% bupivacaine: 7 ml   Kenalo mg = 1 ml     Area just inferior to insertion of the right and left superior trapezius insertion onto skull was cleansed with ChloraPrep. Needle was advanced anteriorly to base of skull then slightly withdrawn and injectate was injected in a fan-like distribution at different depths. A 10 ml mixture of 1% lidocaine, 0.5% bupivacaine and Kenalog was  divided into two 5 ml syringes. Total injection volume = 5 ml per side.       RESPONSE TO PROCEDURE  Roxanna Celis tolerated the procedure well and there were no immediate complications. She was allowed to recover for an appropriate period of time and was discharged home in stable condition.    ASSESSMENT AND PLAN   Botulinum toxin injections: No changes made to Botox dose or distribution today, however, she would like to switch to a different neurotoxin asap due to the decreasing efficacy with Botox over time, and therefore, we will pursue approval for Daxxify. Patient will continue to monitor response to Botox and report at next appointment.   Other: Occipital nerve blocks were administered to address bilateral occipital neuralgia. She will continue to monitor her response and report at next appointment.   Referrals: None.   Medications: No changes.   Follow up: Roxanna Celis was rescheduled for the next series of injections in 12 weeks, at which time we will evaluate response to today's injections. She may call the clinic prior with any questions or concerns prior to the next appointment.       America Gonzalez MD          Again, thank you for allowing me to participate in the care of your patient.        Sincerely,        America Gonzalez MD    Electronically signed

## 2025-04-15 NOTE — PATIENT INSTRUCTIONS
DIFFERENCES BETWEEN BOTOX AND DAXXIFY    1. Active Ingredient  Botox: The active ingredient in Botox is botulinum toxin type A. It works by blocking nerve signals to muscles, temporarily relaxing them, and reducing the appearance of wrinkles.    Daxxify: Daxxify also contains botulinum toxin type A, but it is a newer formulation. Its key difference lies in the stabilizing protein used in its formulation.    2. Duration of Effect  Botox: Botox typically lasts between 3 to 4 months before the effects begin to fade.    Daxxify: Daxxify is marketed to last longer, with effects lasting up to 6 months or more, depending on the individual. Some studies suggest Daxxify may last significantly longer than Botox due to its unique formulation.    3. Formulation & Stability  Botox: Botox has been around for many years and has a well-established track record. It uses a different stabilizer than Daxxify.    Daxxify: Daxxify uses a proprietary peptide-based stabilizer that helps the botulinum toxin stay effective longer, which is why it s able to have a longer duration of effect compared to Botox.    4. Onset of Action  Botox: Botox usually starts to show results within 3 to 7 days after injection, with full results visible in 10-14 days.    Daxxify: Daxxify may also show results in a similar timeframe, but because it's designed for a longer-lasting effect, it might take slightly longer to see the full benefits in some people.    5. Approval & Use  Botox: Botox has been approved by the FDA for multiple uses, including cervical dystonia, chronic migraines, excessive sweating, and more.    Daxxify: Daxxify is a newer product and has FDA approval for cervical dystonia only. It s gaining popularity but isn t as widely used for as many conditions as Botox.    6. Side Effects  Both Botox and Daxxify have similar side effects, such as bruising, pain at the injection site, or mild headaches. However, because Daxxify is a newer product,  more data will be gathered over time to fully understand its long-term safety profile.    Conclusion:  Both are effective, but Daxxify offers a longer-lasting result, which may be appealing to people who prefer fewer treatments throughout the year. Botox has a longer history of use and is more widely available for both aesthetic and medical purposes.

## 2025-04-15 NOTE — PROGRESS NOTES
"  [facility]     PM&R CLINIC NOTE  BOTULINUM TOXIN PROCEDURE      HPI  Chief Complaint   Patient presents with    Procedure     Botox      Roxanna Celis is a 54 year old female with a history of cervical dystonia who presents to clinic for botulinum toxin injections for management of involuntary muscle spasms and neck pain secondary to her cervical dystonia.     SINCE LAST VISIT  Roxanna Celis was last seen here in clinic on 1/14/2025, at which time she received 300 units of Botox. She also received bilateral occipital nerve blocks to address occipital pain.     Today, she reports the usual pain around her skull base, which travels around the \"little doo-hicky\" in the back of the head and travels up to the top of the head. She also has pain from the upper cervical region, down into the thoracic spine and coming out into the shoulder blades. She also endorses headaches in her forehead, and a \"pounding\" sensation in her head.     The patient denies new medical diagnoses, illnesses, hospitalizations, and other injuries since the previous injection with botulinum neurotoxin.     RESPONSE TO PREVIOUS TREATMENT    Side effects: None.     Pain Improvement: Yes, there is a noticeable improvement of her pain with the combination of the Botox and ONBs, however, it is not nearly as effective as it used to be.     Dystonia Improvement: Yes, she notes less involuntary posturing/pulling/tension in her neck and shoulder muscles when the Botox is in effect. Duration of benefit: 3 weeks, followed by a gradual reduction in benefit.     Functional Improvement: When the Botox is working, she is able to socialize and liver her life normally. When the Botox wears off, she is in such terrible pain that she becomes stuck in bed for prolonged periods of time, and misses out on essentially every area of life. Right now, pain is excruciating, and she thinks that for this reason, she cannot travel anywhere. " She would need her pain under better control if she were to start traveling again.       PHYSICAL EXAM  VS: BP 98/61 (BP Location: Right arm, Patient Position: Sitting, Cuff Size: Adult Regular)   Pulse 104    GEN: Pleasant and cooperative, in no acute distress  HEENT: No facial asymmetry  NECK AND TRUNK PATTERN:   Head Tremor: Pt states tremor is intermittent   Left side bending: Very slight  Left Shoulder Elevation: slightly elevated  Head with right shift  Focalized pain at occipitalis - has pain today during visit  Shoulders: Hypertonic and painful to palpation bilateral shoulder girdles (Right > Left)      ALLERGIES  Allergies   Allergen Reactions    Contrast Dye Anaphylaxis     Other reaction(s): almost  from it    Iodinated Contrast Media Anaphylaxis     IVP DYE    Lisinopril Anaphylaxis    Lisinopril-Hydrochlorothiazide Anaphylaxis    Maple Flavoring Agent (Non-Screening) Anaphylaxis     MAPLE SYRUP  MAPLE SYRUP  MAPLE SYRUP    Maple Tree Anaphylaxis     Allergy includes maple syrup.    No Clinical Screening - See Comments      maple syrup==anaphylaxis  Dairy- causes to not feel well    Gluten Itching and Swelling    Gluten Meal Itching and Swelling     Cramping    [place] Meal (Obsolete) Unknown    Chamomile Unknown    Codeine Sulfate Cramps and GI Disturbance    Food Unknown     Grapes, almond, lactose, gluten, real maple syrup    Nsaids Other (See Comments) and Unknown     Patient had bariatric surgery. Should not ever take NSAIDS due to high risk for gastric ulcers.   Patient had bariatric surgery. Should not ever take NSAIDS due to high risk for gastric ulcers.     Polyethylene Glycol Unknown     Per allergy testing  Per allergy testing      Erythromycin Nausea and Vomiting, Cramps, Diarrhea and Nausea     PN: LW Reaction: stomach upset  cramping    Lactose Diarrhea    Morphine Other (See Comments)       CURRENT MEDICATIONS    Current Outpatient Medications:     acetylcysteine 10 % (MUCOMYST) 10%  SOLN, Place 2 drops into both eyes 6 times daily., Disp: 30 mL, Rfl: 11    BOTOX 100 units injection, Inject 250 Units into the muscle once Lot # /C3 with Expiration Date:  11/2020, Disp: , Rfl:     buPROPion (WELLBUTRIN XL) 300 MG 24 hr tablet, Take 300 mg by mouth every morning, Disp: , Rfl:     clonazePAM (KLONOPIN) 1 MG tablet, Take 1 mg by mouth 2 times daily as needed, Disp: , Rfl:     cyanocobalamin (VITAMIN B-12) 1000 MCG tablet, Take 1,000 mcg by mouth daily, Disp: , Rfl:     doxycycline monohydrate (ADOXA) 50 MG tablet, Take 1 tablet (50 mg) by mouth 2 times daily. Please keep appt 8/29/24., Disp: 180 tablet, Rfl: 3    DULoxetine (CYMBALTA) 60 MG capsule, TAKE 2 CAPSULES BY MOUTH EVERY DAY, Disp: , Rfl:     EPINEPHrine (ANY BX GENERIC EQUIV) 0.3 MG/0.3ML injection 2-pack, , Disp: , Rfl:     erythromycin (ROMYCIN) 5 MG/GM ophthalmic ointment, 0.5 inch strip each eye at night (may use either OTC Lubricating eye ointment or Erythromycin ointment), Disp: 3.5 g, Rfl: 11    gabapentin (NEURONTIN) 100 MG capsule, Take 3 capsules (300 mg) by mouth 3 times daily., Disp: 90 capsule, Rfl: 5    hydrocortisone 2.5 % cream, APPLY TO AFFECTED SAUD ON LIP TWICE A DAY MAX 2 WEEKS, 1 WEEK OFF REPEAT IF NEEDED, Disp: , Rfl:     ibuprofen (ADVIL/MOTRIN) 200 MG capsule, Take 200 mg by mouth every 4 hours as needed., Disp: , Rfl:     iloperidone (FANAPT) 6 MG TABS tablet, Take 4 mg by mouth daily , Disp: , Rfl:     ketoconazole (NIZORAL) 2 % external cream, PLEASE SEE ATTACHED FOR DETAILED DIRECTIONS, Disp: , Rfl:     lifitegrast (XIIDRA) 5 % opthalmic solution, Place 1 drop into both eyes 2 times daily., Disp: 180 each, Rfl: 3    methylPREDNISolone (MEDROL DOSEPAK) 4 MG tablet therapy pack, TAKE 6 TABLETS ON DAY 1 AS DIRECTED ON PACKAGE AND DECREASE BY 1 TAB EACH DAY FOR A TOTAL OF 6 DAYS, Disp: 21 tablet, Rfl: 0    multivitamin w/minerals (THERA-VIT-M) tablet, Take 1 tablet by mouth daily, Disp: , Rfl:      Perfluorohexyloctane (MIEBO) 1.338 GM/ML SOLN, Apply 1 drop to eye 4 times daily., Disp: 6 mL, Rfl: 11    sodium chloride 0.9 % neb solution, Medically necessary for scleral contact lens use. May use 3 ml 10-15 times a day for contact lens use, Disp: 1800 mL, Rfl: 11    Soft Lens Products (B&L SENSITIVE EYES) SOLN, Apply 1 drop to eye, Disp: , Rfl:     spironolactone (ALDACTONE) 50 MG tablet, Take 1 tablet by mouth daily, Disp: , Rfl:     tretinoin (RETIN-A) 0.025 % external cream, Apply a pea size to entire face QD, Disp: 45 g, Rfl: 11    tretinoin (RETIN-A) 0.05 % external cream, Spread a pea size amount into affected area topically at bedtime.  Use sunscreen SPF>20., Disp: 45 g, Rfl: 1    varenicline (TYRVAYA) 0.03 MG/ACT nasal spray, Spray 1 spray into both nostrils 2 times daily., Disp: 8.4 mL, Rfl: 11    Vitamin D3 (CHOLECALCIFEROL) 25 mcg (1000 units) tablet, Take by mouth daily, Disp: , Rfl:     White Petrolatum-Mineral Oil (SYSTANE NIGHTTIME) OINT, Apply 3.5 g to eye 4 times daily as needed (dry eyes), Disp: 7 g, Rfl: 11    Current Facility-Administered Medications:     botulinum toxin type A (BOTOX) 100 units injection 300 Units, 300 Units, Intramuscular, Q90 Days, America Gonzalez MD, 300 Units at 01/15/25 0816    botulinum toxin type A (BOTOX) 100 units injection 300 Units, 300 Units, Intramuscular, Q90 Days, America Gonzalez MD, 300 Units at 05/08/24 1639    botulinum toxin type A (DYSPORT) injection 500 Units, 500 Units, Intramuscular, Q90 Days, America Gonzalez MD, 500 Units at 05/20/22 1028    botulinum toxin type A (DYSPORT) injection 800 Units, 800 Units, Intramuscular, Q90 Days, America Gonzalez MD, 500 Units at 05/04/23 1547    bupivacaine (MARCAINE) 0.5% injection MDV, 10 mL, Other, Once,     lidocaine 1 % 2 mL, 2 mL, Other, Once,     triamcinolone (KENALOG-40) injection 40 mg, 40 mg, Other, Once,        BOTULINUM NEUROTOXIN INJECTION PROCEDURES    VERIFICATION OF  PATIENT IDENTIFICATION AND PROCEDURE     Initials   Patient Name SES   Patient  SES   Procedure Verified by: KIM     Prior to the start of the procedure and with procedural staff participation, I verbally confirmed the patient s identity using two indicators, relevant allergies, that the procedure was appropriate and matched the consent or emergent situation, and that the correct equipment/implants were available. Immediately prior to starting the procedure I conducted the Time Out with the procedural staff and re-confirmed the patient s name, procedure, and site/side. (The Joint Commission universal protocol was followed.)  Yes    Sedation (Moderate or Deep): None    ABOVE ASSESSMENTS PERFORMED BY    America Gonzalez MD      INDICATIONS FOR PROCEDURES  Roxanna Celis is a 54 year old patient with involuntary muscle spasms and pain secondary to the diagnosis of cervical dystonia. Her baseline symptoms have been recalcitrant to oral medications and conservative therapy.  She is here today for reinjection with Botox.    GOAL OF PROCEDURE  The goal of this procedure is to increase active range of motion, improve volitional motor control, decrease pain  and enhance functional independence.      TOTAL DOSE ADMINISTERED  Dose Administered:  300 units  Botox (Botulinum Toxin Type A)       2:1 Dilution   Unavoidable Drug Waste: No.  Diluent Used:  Preservative Free Normal Saline  Total Volume of Diluent Used:  6 ml  NDC #: Botox 100u ([address]-22)      CONSENT  The risks, benefits, and treatment options were discussed with Roxanna Celis and she agreed to proceed.    Written consent was obtained by [facility].     EQUIPMENT USED  Needle-37mm stimulating/recording  Needle-30 gauge  EMG/NCS Machine    SKIN PREPARATION  Skin preparation was performed using an alcohol wipe.    GUIDANCE DESCRIPTION  Electro-myographic guidance was necessary throughout the procedure to accurately identify all areas of dystonic muscles while  avoiding injection of non-dystonic muscles, neighboring nerves and nearby vascular structures.       AREA/MUSCLE INJECTED: 300 UNITS BOTOX = TOTAL DOSE, 2:1 DILUTION  1. NECK & SHOULDER GIRDLE MUSCLES: 300 UNITS BOTOX = TOTAL DOSE   Right Rectus Capitis - (upper cervical) - 5 units of Botox at 1 site/s.   Left Rectus Capitis - (upper cervical) - 5 units of Botox at 1 site/s.     Right Upper Trapezius - 25 units of Botox at 7 site/s.   Left Upper Trapezius - 25 units of Botox at 7 site/s.     Right Splenius Cervicis - 5 units of Botox at 1 site/s.   Left Splenius Cervicis - 5 units of Botox at 1 site/s.    Right Levator Scapulae - 20 units of Botox at 2 site/s.   Left Levator Scapulae - 20 units of Botox at 2 site/s.    Right Sternocleidomastoid - 10 units of Botox at 3 site/s.  Left Sternocleidomastoid - 10 units of Botox at 3 site/s.    Right Cervical Paraspinals - 10 units of Botox at 4 site/s.  Left Cervical Paraspinals - 10 units of Botox at 4 site/s.    Right Thoracic Paraspinals - 10 units of Botox at 4 site/s.  Left Thoracic Paraspinals - 10 units of Botox at 4 site/s.    Right Occipitalis - 65 units of Botox at 15 site/s.   Left Occipitails - 65 units of Botox at 15 site/s.      BILATERAL GREATER OCCIPITAL NERVE BLOCKS    Dru% lidocaine: 2 ml   0.5% bupivacaine: 7 ml   Kenalo mg = 1 ml     Area just inferior to insertion of the right and left superior trapezius insertion onto skull was cleansed with ChloraPrep. Needle was advanced anteriorly to base of skull then slightly withdrawn and injectate was injected in a fan-like distribution at different depths. A 10 ml mixture of 1% lidocaine, 0.5% bupivacaine and Kenalog was divided into two 5 ml syringes. Total injection volume = 5 ml per side.       RESPONSE TO PROCEDURE  Roxanna Celis tolerated the procedure well and there were no immediate complications. She was allowed to recover for an appropriate period of time and was discharged home in stable  condition.    ASSESSMENT AND PLAN   Botulinum toxin injections: No changes made to Botox dose or distribution today, however, she would like to switch to a different neurotoxin asap due to the decreasing efficacy with Botox over time, and therefore, we will pursue approval for Daxxify. Patient will continue to monitor response to Botox and report at next appointment.   Other: Occipital nerve blocks were administered to address bilateral occipital neuralgia. She will continue to monitor her response and report at next appointment.   Referrals: None.   Medications: No changes.   Follow up: Roxanna Celis was rescheduled for the next series of injections in 12 weeks, at which time we will evaluate response to today's injections. She may call the clinic prior with any questions or concerns prior to the next appointment.       America Gonzalez MD

## 2025-04-16 DIAGNOSIS — M79.7 FIBROMYALGIA: Primary | ICD-10-CM

## 2025-04-16 DIAGNOSIS — R52 DIFFUSE PAIN: ICD-10-CM

## 2025-04-16 DIAGNOSIS — M79.18 CHRONIC MYOFASCIAL PAIN: ICD-10-CM

## 2025-04-16 DIAGNOSIS — M79.2 NEUROPATHIC PAIN: ICD-10-CM

## 2025-04-16 DIAGNOSIS — G89.29 CHRONIC MYOFASCIAL PAIN: ICD-10-CM

## 2025-04-30 ENCOUNTER — TELEPHONE (OUTPATIENT)
Dept: OPHTHALMOLOGY | Facility: CLINIC | Age: 55
End: 2025-04-30
Payer: COMMERCIAL

## 2025-05-05 ENCOUNTER — TELEPHONE (OUTPATIENT)
Dept: OPHTHALMOLOGY | Facility: CLINIC | Age: 55
End: 2025-05-05
Payer: COMMERCIAL

## 2025-05-05 NOTE — TELEPHONE ENCOUNTER
Patient called asking for an appointment as her symptoms are getting extremely worse.   She has extreme dry eyes and FB sensation.  She is asking to see her provider if possible.

## 2025-05-08 ENCOUNTER — OFFICE VISIT (OUTPATIENT)
Dept: OPHTHALMOLOGY | Facility: CLINIC | Age: 55
End: 2025-05-08
Attending: OPHTHALMOLOGY
Payer: COMMERCIAL

## 2025-05-08 DIAGNOSIS — H02.88B MEIBOMIAN GLAND DYSFUNCTION (MGD), BILATERAL, BOTH UPPER AND LOWER LIDS: ICD-10-CM

## 2025-05-08 DIAGNOSIS — H57.13 EYE PAIN, BILATERAL: ICD-10-CM

## 2025-05-08 DIAGNOSIS — H02.88A MEIBOMIAN GLAND DYSFUNCTION (MGD), BILATERAL, BOTH UPPER AND LOWER LIDS: ICD-10-CM

## 2025-05-08 DIAGNOSIS — H04.123 DRY EYES, BILATERAL: Primary | ICD-10-CM

## 2025-05-08 ASSESSMENT — TONOMETRY
OS_IOP_MMHG: 16
IOP_METHOD: TONOPEN
OD_IOP_MMHG: 15

## 2025-05-08 ASSESSMENT — REFRACTION_WEARINGRX
OD_AXIS: 160
OS_AXIS: 010
OD_SPHERE: -3.25
OD_SPHERE: -2.25
OS_SPHERE: -2.25
OD_ADD: +2.50
OD_CYLINDER: SPHERE
OS_CYLINDER: +0.25
OS_SPHERE: -3.00
OS_ADD: +2.50
OS_CYLINDER: SPHERE
OD_CYLINDER: +0.25

## 2025-05-08 ASSESSMENT — VISUAL ACUITY
OS_CC+: +1
METHOD: SNELLEN - LINEAR
OS_CC: 20/40
OD_PH_CC: 20/25
OS_PH_CC: 20/20
OD_CC: 20/40
OD_PH_CC+: -1
OD_CC+: +2

## 2025-05-08 ASSESSMENT — CONF VISUAL FIELD
OD_SUPERIOR_NASAL_RESTRICTION: 0
OS_INFERIOR_NASAL_RESTRICTION: 0
OD_SUPERIOR_TEMPORAL_RESTRICTION: 0
OS_SUPERIOR_NASAL_RESTRICTION: 0
OS_SUPERIOR_TEMPORAL_RESTRICTION: 0
OS_INFERIOR_TEMPORAL_RESTRICTION: 0
METHOD: COUNTING FINGERS
OD_INFERIOR_TEMPORAL_RESTRICTION: 0
OS_NORMAL: 1
OD_INFERIOR_NASAL_RESTRICTION: 0
OD_NORMAL: 1

## 2025-05-08 ASSESSMENT — SLIT LAMP EXAM - LIDS: COMMENTS: MILD MGD, TR BLEPHARITIS

## 2025-05-08 ASSESSMENT — EXTERNAL EXAM - LEFT EYE: OS_EXAM: NORMAL

## 2025-05-08 ASSESSMENT — EXTERNAL EXAM - RIGHT EYE: OD_EXAM: NORMAL

## 2025-05-08 NOTE — NURSING NOTE
Chief Complaints and History of Present Illnesses   Patient presents with    Dry Eye(s) Follow-Up     Chief Complaint(s) and History of Present Illness(es)       Dry Eye(s) Follow-Up              Laterality: both eyes    Associated signs and symptoms: eye pain              Comments    Roxanna is here complaining of worsening symptoms related to dry eye. She was given a steroid that starters with F (Flourometholone?)but forgot the bottle in her car. Sometimes the pain level is a (9)    Jean Pierre Kelly COT 2:46 PM May 8, 2025

## 2025-05-08 NOTE — PROGRESS NOTES
"HPI       Dry Eye(s) Follow-Up    In both eyes.  Associated signs and symptoms include eye pain.             Comments    Roxanna is here complaining of worsening symptoms related to dry eye. She was given a steroid that starters with F (Flourometholone?)but forgot the bottle in her car. Sometimes the pain level is a (9)    Jean Pierre Kelly COT 2:46 PM May 8, 2025              Last edited by Jean Pierre Kelly on 5/8/2025  2:47 PM.          Review of systems for the eyes was negative other than the pertinent positives/negatives listed in the HPI.      Assessment & Plan      Roxanna Celis is a 54 year old female with the following diagnoses:   1. Dry eyes, bilateral    2. Meibomian gland dysfunction (MGD), bilateral, both upper and lower lids    3. Eye pain, bilateral      Here today for acute appt due to worsening bilateral foreign body sensation and eye pain  Vision stable  Started having worsening symptoms for a few weeks  Elected to refill Pred Healon and started four times a day for 1 week  No change in ocular symptoms since that time  Overall pain in her body is also worse.  She is working with neurology to get a visit in the pain clinic for follow up    Patient last seen by Dr. Soria at [facility] for dry eye symptoms on 3/31/25 (Eye Exam - HIM Scan - EYE EXAM [facility] (04/03/2025) plan per [facility]: lid hygiene, tears 2-10x/day, omega 7, xiidra BID each eye, Miebo PRN each eye, serum tears 6x/day each eye, fluorometholone m/w/f each eye, systane ointment at bedtime each eye, continue scleral lens wear. Notes hx of 2x lipiflow treatments with \"trouble\" following second. Osmolarity reported as \"evaporative range.\" Recommendation to consider IPL with alternating lipiflow. Patient not interested in resuming lipiflow and states that IPL is not covered by insurance/expensive.    S/P repeat collagen plugs x 4 (extended collagen) 3/2025  Intraocular pressure within normal limits both eyes today   Steroid responder  Exam " stable from last visit     Plan:     BOTH EYES     Reviewed symptoms >>> signs of dry eye again today  Agree that referral to pain clinic is a good next step  Ok to continue Pred Healon four times a day  for 1 month.  If symptoms are improving and she wishes to continue the medication, repeat intraocular pressure should be checked.  If doing well, tapering the drop 3-2-1 every other day is recommended  Continue Doxycycline 50 mg orally twice a day   Continue serum tears 6 times a day and as needed   Continue Xiidra twice a day   Continue Miebo as needed   Continue PM ointment  Warm compresses 2-3x daily   Lid scrubs (or baby shampoo) daily       Patient disposition:   Return in about 2 months (around 7/8/2025) for VT only.    Stanislav Artis MD  Resident Physician, PGY-2  Department of Ophthalmology  May 8, 2025            Attending Physician Attestation:  Complete documentation of historical and exam elements from today's encounter can be found in the full encounter summary report (not reduplicated in this progress note).  I personally obtained the chief complaint(s) and history of present illness.  I confirmed and edited as necessary the review of systems, past medical/surgical history, family history, social history, and examination findings as documented by others; and I examined the patient myself.  I personally reviewed the relevant tests, images, and reports as documented above.  I formulated and edited as necessary the assessment and plan and discussed the findings and management plan with the patient and family. . - Faheem Ocampo MD

## 2025-06-09 NOTE — TELEPHONE ENCOUNTER
LMP: 3/14/25  Date of Positive Pregnancy Test: 4/23/25    Is this your first pregnancy?  NO If No what pregnancy? 2nd    History of:  Diabetes? NO  Hypertension? NO  Miscarriage? YES    Are you currently taking prenatal vitamins? YES    Date of first OB appt:  6/12/25 Patient is aware of early arrival time for nurse portion of obstetric visit.     Materials may be sent to the address on file.     Patient had referral from Marina Mayorga   [employer] message sent with Rx approval from provider.  J Carlos  Pain Clinic Management Team

## 2025-06-10 ENCOUNTER — OFFICE VISIT (OUTPATIENT)
Dept: NEUROLOGY | Facility: CLINIC | Age: 55
End: 2025-06-10
Payer: COMMERCIAL

## 2025-06-10 VITALS — DIASTOLIC BLOOD PRESSURE: 75 MMHG | SYSTOLIC BLOOD PRESSURE: 130 MMHG | HEART RATE: 103 BPM | OXYGEN SATURATION: 100 %

## 2025-06-10 DIAGNOSIS — R20.2 PARESTHESIA: ICD-10-CM

## 2025-06-10 DIAGNOSIS — R52 DIFFUSE PAIN: Primary | ICD-10-CM

## 2025-06-10 DIAGNOSIS — M62.81 GENERALIZED MUSCLE WEAKNESS: ICD-10-CM

## 2025-06-10 PROCEDURE — 3078F DIAST BP <80 MM HG: CPT | Performed by: STUDENT IN AN ORGANIZED HEALTH CARE EDUCATION/TRAINING PROGRAM

## 2025-06-10 PROCEDURE — 3075F SYST BP GE 130 - 139MM HG: CPT | Performed by: STUDENT IN AN ORGANIZED HEALTH CARE EDUCATION/TRAINING PROGRAM

## 2025-06-10 PROCEDURE — 99215 OFFICE O/P EST HI 40 MIN: CPT | Performed by: STUDENT IN AN ORGANIZED HEALTH CARE EDUCATION/TRAINING PROGRAM

## 2025-06-10 NOTE — NURSING NOTE
"Roxanna Celis is a 54 year old female who presents for:  Chief Complaint   Patient presents with    Generalized muscle weakness        Initial Vitals:  /75   Pulse 103   SpO2 100%  Estimated body mass index is 36.64 kg/m  as calculated from the following:    Height as of 10/3/24: 1.676 m (5' 6\").    Weight as of 10/3/24: 103 kg (227 lb).. There is no height or weight on file to calculate BSA. BP completed using cuff size: kentrell Sorto    "

## 2025-06-10 NOTE — PATIENT INSTRUCTIONS
Agree with start low/go slow on lyrica   All the PT/pool therapy is helpful --let me know if need a script        Future options:  Depakote--valproic acid (my vote)  Oxcarbazepine    Another future option--repeat EMG but of lower extremities

## 2025-06-10 NOTE — LETTER
6/10/2025      Roxanna Celis  [address] [place] [address]      Dear Colleague,    Thank you for referring your patient, Roxanna Celis, to the [facility] NEUROLOGY CLINICS [facility]. Please see a copy of my visit note below.    [facility]  Section of General Neurology  Return Patient Visit    Roxanna Celis MRN# [id number]   Age: 54 year old YOB: 1970            Assessment and Plan:   Assessment:  Roxanna Celis is a 54 year old female who presents in follow up today.  She was previously evaluated for diffuse pain, tingling, diffuse weakness.  EMG and other testing has been unrevealing previously as below.  She has been extensively evaluated by rheumatology, other neurology colleagues (neuroimmunology notably) without clear neurological diagnosis unfortunately besides fibromyalgia/a central pain processing disorder.  Small fiber biopsy was normal in the lower extremity/equivocal in upper extremities with non actionable antibody panel to review.  We discussed a continued trial and error approach to her pain control.   We discussed new updates/ideas:  [facility] will not see her  She is having trouble getting in with our pain clinic, I will reach out in this regard, I think she would derive benefit from a multi disciplinary pain clinic.    Gabapentin--caused significant swelling  On cymbalta high doses  Lyrica--trialing with psychiatrist  Might resume pool therapy when able  Interested in pain psychology via pain clinice  Discussed depakote, OXC as future off label options but would value psychiatry input and pain clinic input in this regard.      Plan:  Will reach out to our pain clinic   Discussed future ideas as above  To give lyrica a fair re-trial  EMG discussed, has had a normal one of UE, nothing in lower extremites.  I think it would be likely to be normal and hard on her to tolerate, will not obtain at this time.    Continue trial of botox via PM&R  Follow up  with neurology can be in 1 year or PRN pending how she is doing       Agustín Torres MD   of Neurology   [facility]      Interval history:     Botox--hasn't been as helpful as she hoped   Awaiting some newer type of botox approval, will continue to work with PMR    Pain clinic---having trouble getting in.  Trying to see in [place]   Was told she needs to see [facility]   [facility] will not see her again    Gabapentin--blew up at 700 mg she notes  Going to re trial lyrica     Needs pain psychologist too she notes    Pool therapy--hard for her to do    Weakness is getting worse.    Last EMG was arms only  Legs--biopsy    EMG --feels legs are worsening.      A/P at last visit  Roxanna Celis is a 54 year old female who presents in follow up today.  She was previously evaluated for diffuse pain, tingling, diffuse weakness.  EMG and other testing has been unrevealing previously as below.  She has been extensively evaluated by rheumatology, other neurology colleagues (neuroimmunology notably) without clear neurological diagnosis unfortunately besides fibromyalgia/a central pain processing disorder.  Small fiber biopsy was normal in the lower extremity/equivocal in upper extremities with non actionable antibody panel to review.  We discussed a continued trial and error approach to her pain control.   We discussed new updates/ideas:  Still no data to do more for borderline small fiber changes, borderline [facility] abs, in fact a newer study by Dr Cruz further argues against using IVIG in such situations.    Discussed equivocal data for alpha lipoic acid for nerve health, coenzyme Q10 for non specific weakness  I do think a dedicated fibromyalgia program would be great for her, she is going to apply for this at [facility] ([facility])   Discussed common nerve pain medications from a neurological perspective as below.  Lyrica caused weight gain, I would re trial gabapentin.     Our pain clinic told her they could not help her.  She has tried a lot of things, even ketamine.  Another pain clinic perspective would be of future consideration.       Plan:  --Coenzyme k90---336 up to 600 mg  --Nerve health--alpha lipoic acid 600 mg   --Start gabapentin 300 mg nightly. After about one week, take 300mg twice daily. After another week, take 300mg three times daily for a total of 900mg per day, or 300 mg in AM, 600 mg at bedtime.   A common side effect of gabapentin is fatigue, which is why we advise patients to start taking the medication at nighttime. The side effect of fatigue should resolve with time.  Other possible side effects include swelling, and less likely diarrhea.  It is commonly a well tolerated medicine.    --Pool therapy a great idea --PT order placed  --[facility] --re apply fibromyalgia course--encouraged.    Nerve pain medicines with the best data (unchanged-discussed)  Effexor or cymbalta  nortriptyline  Gabapentin/lyrica   Off label   Oxcarbazepine, valproic acid      We can plan on checking in again in 6 months for now.  To reach out sooner with any issues questions or changes             A/P at a different/ previous visit  Roxanna Celis is a 51 year old female who presents in follow up for diffuse pain, numbness, tingling  She has a PMH of ELY, anxiety, depression and is s/p baratric surgery.  She has had several years of ongoing symptoms that began with dry eyes, other Sicca like symptoms, though she has reportedly have negative lip biopsy and has had negative SSA/SSB antibodies.   She has seen multiple specialists without answer including MRIs as above (including seeing one of our neuroimmunological specialists who discussed with her she does not have MS), rheumatologic lab work, normal EMG.  She has received a diagnosis of neuropathy and of fibromyalgia in the past but medications to treat as such have provided limited benefit.  We have subsequently repeated SSA/SSB  antibodies which were again negative.  Blood work otherwise only revealing for slightly elevated vitamin B6 as above.  Her symptoms have continued to be refractory.  She is considered medical cannabis which could be worth trialing.  She unfortunately remains quite debilitated from her pain despite trials of many medications and approaches.  Subsequent small fiber biopsy which was normal in her legs, borderline reduced in her forearm which is where symptoms began.   Because of this borderline finding we did send [facility] send out labs to further work up a potential cause.  She will discuss if this small fiber biopsy finding would be of any further rheumatological consideration given Sicca symptoms though it is possible her work up will be considered definitive, which we did discuss.  We discussed that small fiber changes are often idiopathic and treated symptomatically in such situations, but the [facility] panel will give us more data in this regard.  It is uncommon in such situations for dangelo weakness outside of the context of pain to develop.  We also discussed that this can be very difficult to treat and that I agree with her pain doctor (Dr. Romano) that I do think central pain processing/fibromyalgia are likely components of her condition as well even if other reasons are in time discovered.       --She will continue working with therapy team, multi disciplinary pain team regarding symptoms  --Will await send out [facility] panel, will contact Ms. Celis with results, follow up will depend on these results.    All questions answered.         2023 pain clinic  Review of [place] Prescription Monitoring Program ():   No concern for abuse or misuse of controlled medications based on this report. Viewed on 03/21/23  No controlled medications are being prescribed.     Prior pain medications:     2. Physical Therapy:      3. Pain psychology:   4. Surgery: hasn't tried  5. Injections: none  6. Alternative  "Therapies:               Chiropractic: helpful, sees chiropractor               Acupuncture: hasn't tried     PAIN MANAGEMENT TREATMENT HISTORY  1. MEDICATIONS:  Opiates: Not indicated, would not recommend  NSAIDS: Patient had bariatric surgery, cannot take NSAIDS due to high risk for gastric ulcers  Muscle Relaxants: Robaxin- did nothing  Anti-depressants: \"help my mood, not my pain\"  Neuropathics: Gabapentin 1200mg at night- drowsy with higher dose, -not helpful, Lyrica 100mg BID- SE, weight gain right away, Topamax 100mg BID-diarrhea  Topicals: -not helpful  Adjuvant pain medications: Medical cannabis, decided in February to stop, does not give her any relief. Worked with [facility] extensively but did not get any relief. Low dose naltrexone 5mg - affected mood, tried again, Low dose naltrexone 3mg daily-diarrhea  2. PHYSICAL THERAPY:   pain physical therapy - Somewhat helpful               pool PT -not helpful              MedX -not helpful  TENS unit -not helpful   3. PAIN PSYCHOLOGY:   Yes. Taisha Graham PsyD [facility] - Somewhat helpful, stopped   4. SURGERY:   No  5. INJECTIONS:   Not interested  6. COMPLEMENTARY THERAPY:  Chiropractic  helpful  Acupuncture/acupressure/ not helpful  TENS unit  not helpful      2023 rheum note re reviewed         History of Present Illness:   Roxanna Celis is a 52 year old female with PMH of paresthesia, essential hypertension, generalized anxiety disorder, depression, sleep apnea, morbid obesity seen in the clinic in consultation at request of Dr Jackson for evaluation of sicca symptoms.     She describes that couple years ago she developed burning pain around her left wrist which progressed to involve the entire hand and went up to the elbows.  She also noticed that on the right wrist which progressive disease.  Now she has burning sensation, pins-and-needles, electric shocks and pressure sensation up to her shoulders.  She also noticed it in her feet and sensation has " progressed up to the mid thighs. She feel that somebody has put blood pressure cuff and is not released. Muscles of her leg feel weak, unstable. She has to use walker all the time. She feel she will end up falling. She has been evaluated by neurologist and had MRI of the brain, MRI cervical spine, thoracic spine which has been normal.  No evidence of demyelinating lesion noted.  EMG of bilateral upper extremities was normal.  She is on gabapentin and Cymbalta which helps some.     Couple years ago she woke up with severe pain in her eyes.  She felt sand and gritty sensation in her eyes.  She was seen by ophthalmologist and diagnosed with dry eyes.  Since then she has used multiple eyedrops over-the-counter as well as autologous serum eyedrops which has helped some but still her symptoms are debilitating.  She also has dry mouth and as result has developed multiple cavities in her teeth.     She had chest pain recently. She is going to see cardiologist to make sure she does not need stress tests. She is very exhausted all the time.  She has thyroid nodule and will be getting thyroid nodule biopsy at [facility].     She had a fall in February 2021 and injured her wrist. MRI of the left wrist did not reveal any evidence of synovitis, effusion or fracture.  Low-grade sprain was noted.  She was given wrist injection which helped.      She has hx of gastric bypass 8 years ago and ever since reports abdominal discomfort.  She has never seen a GI to get upper or lower GI endoscopy.     Denies any raynauds, malar rash, photosensitivity, recurrent mouth/genital ulcers, pleuritic chest pains, recurrent sinusitis/rhinitis, swallowing difficulty, hearing or visual changes recently. No h/o arterial/venous thrombosis in the past.  September 15, 2021 - She has pain in her arms, legs. Reports having Pins and needles sensation in her fingers, legs. Hands hurt so much all the time and can not function. Joints started to hurt bad. She has  pain in her elbows, knees, legs and feet. Minor salivary gland lip biopsy done in 8/2021 did not meet criteria for Sjogren's disease.   April 29, 2022 - She is using walker and braces all the time. Her wrists and hands hurt very bad. She feel pain in her ankles which travel up the leg. It is up to the hips. On her arms she has pain up the shoulder. It feels like Burning, pins, needles, freezing cold, fire, electricity like pain. She can not cook or brush her hair due to pain in her hands. She has pain in her feet. When she step down she feel that her legs will give out. She is very tired all the time. She can sleep for 10 hours and even then she is wiped out. Her eyes are very dry. Pool therapy did not work for her. She will starting land PT for pain management. MRI of B/L wirsts done last year was normal. She has appointment for EMG  June 28, 2023 - She has pain in her hands and forearms. It is effecting everything in her life. She can not perform any daily activities. Pain in her hands is bad, fingers are more inward. Limbs gets pins and needles, shooting pain, heat in her legs. It is all the way to the bottom of her feet and comes up to her thighs. Her legs could not tolerate and she can not walk any more without assistance. Her pain in the afternoon is upto 10. Trying to dress herself is difficulty. More she use them more pain she gets in her hands. She will be seeing Neurologist in end of July.  She has seen pain clinic and has tried gabapentin, lyrica which cause side effects. She has started medical cannabis to see if it will make difference. She walk differently, she feel that her legs are confused and feel weak. She has cervical dystonia and will be trying botox. She feel that her limbs are heavy and she has pain while sitting, from her feet up to her knees and shins. She has enlarged lymph nodes in her neck. All her finger joints and thumb joints hurt. Water touching her skin was having negative effect on  her. She has tried hand therapy at the end of last year and beginning of this year. Her eyes are dry and have dry mouth. Morning is the best time. If she overdo she has to rest for few days. She has sleep apnea borderline. Tyring to use CPAP is challenging due to her eye googles for dry eyes.     Past Medical History:     Patient Active Problem List   Diagnosis     Torticollis, spasmodic     Achlorhydria     Depression with anxiety     KIANA (generalized anxiety disorder)     Hypokalemia     Hypothyroid     Intramural leiomyoma of uterus     Bariatric surgery status     Morbid obesity with BMI of 40.0-44.9, adult (H)     Multiple thyroid nodules     Obesity     Sleep apnea     Essential hypertension     Chronic bilateral low back pain without sciatica     Precordial pain     Chronic myofascial pain     Neuropathic pain     Fibromyalgia     Abdominal pain     Allergic reaction to COVID-19 vaccine     Allergic rhinitis     Anxiety state     Bipolar disorder (H)     Cervical myelopathy with cervical radiculopathy (H)     Constipation, chronic     Hemorrhage of rectum and anus     History of diverticulitis     History of lobectomy of thyroid     Melanosis coli     History of colonic polyps     Sicca syndrome     Esophageal reflux     Stricture and stenosis of esophagus     Generalized weakness     Past Medical History:   Diagnosis Date     Cervical dystonia 1993     Fibromyalgia         Past Surgical History:     Past Surgical History:   Procedure Laterality Date     AS FRAGMENTING OF KIDNEY STONE       GALLBLADDER SURGERY       STOMACH SURGERY      for weight loss        Social History:     Social History     Tobacco Use     Smoking status: Never     Smokeless tobacco: Never   Substance Use Topics     Alcohol use: No     Drug use: No        Family History:     Family History   Problem Relation Age of Onset     Glaucoma No family hx of      Macular Degeneration No family hx of         Medications:     Current Outpatient  Medications   Medication Sig Dispense Refill     BOTOX 100 units injection Inject 250 Units into the muscle once Lot # /C3 with Expiration Date:  11/2020       buPROPion (WELLBUTRIN XL) 300 MG 24 hr tablet Take 300 mg by mouth every morning       clonazePAM (KLONOPIN) 1 MG tablet Take 1 mg by mouth 2 times daily as needed       cyanocobalamin (VITAMIN B-12) 1000 MCG tablet Take 1,000 mcg by mouth daily       doxycycline monohydrate (ADOXA) 50 MG tablet Take 1 tablet (50 mg) by mouth 2 times daily. Please keep appt 8/29/24. 180 tablet 3     DULoxetine (CYMBALTA) 60 MG capsule TAKE 2 CAPSULES BY MOUTH EVERY DAY       EPINEPHrine (ANY BX GENERIC EQUIV) 0.3 MG/0.3ML injection 2-pack        gabapentin (NEURONTIN) 100 MG capsule Take 3 capsules (300 mg) by mouth 3 times daily. (Patient not taking: Reported on 5/8/2025) 90 capsule 5     hydrocortisone 2.5 % cream APPLY TO AFFECTED SAUD ON LIP TWICE A DAY MAX 2 WEEKS, 1 WEEK OFF REPEAT IF NEEDED       ibuprofen (ADVIL/MOTRIN) 200 MG capsule Take 200 mg by mouth every 4 hours as needed.       iloperidone (FANAPT) 6 MG TABS tablet Take 4 mg by mouth daily        ketoconazole (NIZORAL) 2 % external cream PLEASE SEE ATTACHED FOR DETAILED DIRECTIONS       lifitegrast (XIIDRA) 5 % opthalmic solution Place 1 drop into both eyes 2 times daily. 180 each 3     methylPREDNISolone (MEDROL DOSEPAK) 4 MG tablet therapy pack TAKE 6 TABLETS ON DAY 1 AS DIRECTED ON PACKAGE AND DECREASE BY 1 TAB EACH DAY FOR A TOTAL OF 6 DAYS (Patient not taking: Reported on 5/8/2025) 21 tablet 0     multivitamin w/minerals (THERA-VIT-M) tablet Take 1 tablet by mouth daily       Perfluorohexyloctane (MIEBO) 1.338 GM/ML SOLN Apply 1 drop to eye 4 times daily. 6 mL 11     sodium chloride 0.9 % neb solution Medically necessary for scleral contact lens use. May use 3 ml 10-15 times a day for contact lens use 1800 mL 11     Soft Lens Products (B&L SENSITIVE EYES) SOLN Apply 1 drop to eye       spironolactone  (ALDACTONE) 50 MG tablet Take 1 tablet by mouth daily       tretinoin (RETIN-A) 0.025 % external cream Apply a pea size to entire face QD 45 g 11     tretinoin (RETIN-A) 0.05 % external cream Spread a pea size amount into affected area topically at bedtime.  Use sunscreen SPF>20. 45 g 1     varenicline (TYRVAYA) 0.03 MG/ACT nasal spray Spray 1 spray into both nostrils 2 times daily. 8.4 mL 11     Vitamin D3 (CHOLECALCIFEROL) 25 mcg (1000 units) tablet Take by mouth daily       White Petrolatum-Mineral Oil (SYSTANE NIGHTTIME) OINT Apply 3.5 g to eye 4 times daily as needed (dry eyes) 7 g 11     Current Facility-Administered Medications   Medication Dose Route Frequency Provider Last Rate Last Admin     botulinum toxin type A (BOTOX) 100 units injection 300 Units  300 Units Intramuscular Q90 Days America Gonzalez MD   300 Units at 04/15/25 1503     botulinum toxin type A (BOTOX) 100 units injection 300 Units  300 Units Intramuscular Q90 Days America Gonzalez MD   300 Units at 24 1639     botulinum toxin type A (DYSPORT) injection 500 Units  500 Units Intramuscular Q90 Days America Gonzalez MD   500 Units at 22 1028     botulinum toxin type A (DYSPORT) injection 800 Units  800 Units Intramuscular Q90 Days America Gonzalez MD   500 Units at 23 1547     daxibotulinumtoxinA-lanm (DAXXIFY) 100 units injection 300 Units  300 Units Intramuscular Q12 weeks             Allergies:     Allergies   Allergen Reactions     Contrast Dye Anaphylaxis     Other reaction(s): almost  from it     Iodinated Contrast Media Anaphylaxis     IVP DYE     Lisinopril Anaphylaxis     Lisinopril-Hydrochlorothiazide Anaphylaxis     Maple Flavoring Agent (Non-Screening) Anaphylaxis     MAPLE SYRUP  MAPLE SYRUP  MAPLE SYRUP     Maple Tree Anaphylaxis     Allergy includes maple syrup.     No Clinical Screening - See Comments      maple syrup==anaphylaxis  Dairy- causes to not feel well     Gluten  Itching and Swelling     Gluten Meal Itching and Swelling     Cramping     [place] Meal (Obsolete) Unknown     Chamomile Unknown     Codeine Sulfate Cramps and GI Disturbance     Food Unknown     Grapes, almond, lactose, gluten, real maple syrup     Nsaids Other (See Comments) and Unknown     Patient had bariatric surgery. Should not ever take NSAIDS due to high risk for gastric ulcers.   Patient had bariatric surgery. Should not ever take NSAIDS due to high risk for gastric ulcers.      Polyethylene Glycol Unknown     Per allergy testing  Per allergy testing       Erythromycin Nausea and Vomiting, Cramps, Diarrhea and Nausea     PN: LW Reaction: stomach upset  cramping     Lactose Diarrhea     Morphine Other (See Comments)          Physical Exam:   Vitals: /75   Pulse 103   SpO2 100%    Neuro:   General Appearance: No apparent distress, well-nourished, well-groomed, pleasant      Mental Status: Alert and oriented to person, place, and time. Speech fluent and comprehension intact. No dysarthria.        Cranial Nerves:   II: Visual fields: normal  III: Pupils: 3 mm, equal, round, reactive to light   III,IV,VI: Extraocular Movements: intact   V: Facial sensation: intact to light touch  VII: Facial strength: intact without asymmetry        Motor Exam:    UE 5/5, LE tougher/limited by pain today overall, similar to previously     Sensory: intact to light touch, vibration, PP in fact     Coordination: no dysmetria noted     Reflexes: biceps, triceps, brachioradialis, patellar 1+ and symmetric.             Data: Pertinent prior to visit   MR CERVICAL SPINE W/O CONTRAST, MR THORACIC SPINE W/O CONTRAST  6/18/2021 5:42 PM     History: Demyelinating disease; Neuropathic pain; Paresthesia     Comparison: CT cervical spine dated 8/31/2016, CT cervical spine dated  8/31/2016     Technique: Sagittal T2- and T1-weighted images of the cervical and  thoracic spine, axial T2* gradient echo images of the cervical spine  and  axial T2-weighted images were obtained.     Findings:  Cervical: The cervical vertebrae appear normally aligned.  There is no  disc height narrowing at any level.  There is normal signal within the  cervical spinal cord which also demonstrates a normal contour.  The  findings on a level by level basis are as follows:     C2-3:  No significant spinal canal or neural foraminal narrowing.     C3-4:  No significant spinal canal or neural foraminal narrowing.     C4-5:  No significant spinal canal or neuroforaminal narrowing.     C5-6:  Uncovertebral hypertrophy and facet arthropathy bilaterally. No  significant spinal canal stenosis. Mild bilateral neuroforaminal  narrowing, left greater than right.     C6-7:  No significant spinal canal or neuroforaminal narrowing.     C7-T1:  No significant spinal canal or neuroforaminal narrowing.     Thoracic: The thoracic vertebral column appears in normal alignment.  There is loss of disk height at any level. The spinal cord contour and  signal pattern appear within normal limits. No significant spinal  canal or neural foraminal narrowing.     Right inferior thyroid gland 1.5 x 1.0 cm T2 hyperintense lesion,  corresponding to CT neck on 8/31/2016, not significantly changed.                                                                      Impression:   1.  Cervical spine: No definite myelopathic spinal cord signal  identified. No high-grade spinal canal or neuroforaminal narrowing at  any level.  2.  Thoracic spine: No definite myelopathic spinal cord signal  identified. No high-grade spinal canal or neuroforaminal narrowing at  any level.     I personally reviewed the above imaging and agree with the findings in the report.       Brain MRI without contrast 2010      History: Possible MS and blurred vision.       Comparison: MRI brain 6/17/2009       Technique: Sagittal and axial turboFLAIR, T1-weighted axial,   diffusion-weighted axial with ADC map, T2-weighted axial, and T2*    gradient echo axial images were obtained without contrast. After   intravenous administration of gadolinium, axial turboFLAIR and coronal   and axial T1-weighted images were obtained.       Findings: There is a single focus of increased T2 signal on sagittal   and axial turboFLAIR images in the left frontal parasagittal   subcortical white matter that was not seen on the prior study of   6/17/2009. The previously noted area of contrast enhancement   surrounding the left optic nerve is not evident on this study.   However, the T1-weighted postcontrast images on this study are not   fat-saturated which limits the ability to detect subtle enhancement   adjacent to the intraconal fat.  The postcontrast turboFLAIR sequences   with fat saturation do not demonstrate contrast enhancement   surrounding the optic nerves. The images reveal no intracranial mass   lesion, midline shift or abnormal extraaxial fluid collection. The   ventricles and sulci appear within normal for age.  Diffusion-weighted   images appear normal.       Normal intravascular flow voids are identified at the base of the   brain bilaterally.       Impression:   1. There is a nonspecific small focus of increased signal on   turboFLAIR and T2-weighted imaging in the left subcortical   parasagittal white matter.   2. The previously noted subtle contrast enhancement surrounding the   left optic nerve is not appreciated on the current study.  However,   the T1-weighted postcontrast images of the orbits were not   fat-saturated, which limits the ability to distinguish subtle   peripheral optic nerve enhancement. The postcontrast turboFLAIR images   of this region demonstrate no abnormal contrast enhancement   surrounding the left optic nerve.         Procedures:  Previous EMG WNL (9/21)  Epidermal nerve biopsy: normal in leg, borderline reduced in her forearm     Laboratory:       previous CK wnl  b12 wnl     EMG 6/2022  History & Examination:  51 year old  woman with several years of pain throughout her upper limbs. The left side is more affected than the right. Evaluate for polyneuropathy vs focal neuropathy vs radiculopathy.      Techniques: Motor and sensory conduction studies were done with surface recording electrodes. EMG was done with a concentric needle electrode.      Results:  Nerve conduction studies:  1. Bilateral median-D2, ulnar-D5, and radial sensory responses are normal.   2. Bilateral median-ulnar palmar interlatency differences are normal.   3. Bilateral median-APB and ulnar-ADM motor responses are normal.   4. Bilateral median-lumbrical vs ulnar-interosseous latency differences are normal.     Needle EMG of selected proximal and distal right and left upper limb muscles was performed as tabulated below. No abnormal spontaneous activity was observed in the sampled muscles. Motor unit potential morphology and recruitment patterns were normal.      Interpretation:  This is a normal study. There is no electrophysiologic evidence of a large-fiber polyneuropathy, focal neuropathy, or cervical radiculopathy affecting the upper limbs on the basis of this study.                 Jen abs    SSA/SSB negative         Newer data:  EXAM: CT HEAD W/O CONTRAST, CT CERVICAL SPINE W/O CONTRAST  LOCATION: [facility]  DATE: 10/3/2024     INDICATION: Fall, headache, neck pain.  COMPARISON: MRI 7/26/2023.  TECHNIQUE:   1) Routine CT Head without IV contrast. Multiplanar reformats. Dose reduction techniques were used.  2) Routine CT Cervical Spine without IV contrast. Multiplanar reformats. Dose reduction techniques were used.     FINDINGS:   HEAD CT:   INTRACRANIAL CONTENTS: No intracranial hemorrhage, extraaxial collection, or mass effect.  No CT evidence of acute infarct. Mild presumed chronic small vessel ischemic changes. Mild generalized volume loss. No hydrocephalus.      VISUALIZED ORBITS/SINUSES/MASTOIDS: No intraorbital abnormality. No  paranasal sinus mucosal disease. No middle ear or mastoid effusion.     BONES/SOFT TISSUES: No acute abnormality.     CERVICAL SPINE CT:   VERTEBRA: Normal vertebral body heights and alignment. No fracture or posttraumatic subluxation.      CANAL/FORAMINA: No canal or neural foraminal stenosis.     PARASPINAL: No extraspinal abnormality. Visualized lung fields are clear.                                                                      IMPRESSION:  HEAD CT:  1.  No CT evidence for acute intracranial process.  2.  Mild chronic microvascular ischemic changes as above.     CERVICAL SPINE CT:  1.  No CT evidence for acute fracture or post traumatic subluxation.              The total time of this encounter today amounted to 40 minutes. This time included time spent with the patient, prep work, ordering tests, and performing post visit documentation.          Again, thank you for allowing me to participate in the care of your patient.        Sincerely,        Huber Torres MD    Electronically signed

## 2025-06-10 NOTE — PROGRESS NOTES
[facility]/[place]  Section of General Neurology  Return Patient Visit    Roxanna Celis MRN# [id number]   Age: 54 year old YOB: 1970            Assessment and Plan:   Assessment:  Roxanna Celis is a 54 year old female who presents in follow up today.  She was previously evaluated for diffuse pain, tingling, diffuse weakness.  EMG and other testing has been unrevealing previously as below.  She has been extensively evaluated by rheumatology, other neurology colleagues (neuroimmunology notably) without clear neurological diagnosis unfortunately besides fibromyalgia/a central pain processing disorder.  Small fiber biopsy was normal in the lower extremity/equivocal in upper extremities with non actionable antibody panel to review.  We discussed a continued trial and error approach to her pain control.   We discussed new updates/ideas:  [facility] will not see her  She is having trouble getting in with our pain clinic, I will reach out in this regard, I think she would derive benefit from a multi disciplinary pain clinic.    Gabapentin--caused significant swelling  On cymbalta high doses  Lyrica--trialing with psychiatrist  Might resume pool therapy when able  Interested in pain psychology via pain clinice  Discussed depakote, OXC as future off label options but would value psychiatry input and pain clinic input in this regard.      Plan:  Will reach out to our pain clinic   Discussed future ideas as above  To give lyrica a fair re-trial  EMG discussed, has had a normal one of UE, nothing in lower extremites.  I think it would be likely to be normal and hard on her to tolerate, will not obtain at this time.    Continue trial of botox via PM&R  Follow up with neurology can be in 1 year or PRN pending how she is doing       Agustín Torres MD   of Neurology   [facility]      Interval history:     Botox--hasn't been as helpful as she hoped   Awaiting  some newer type of botox approval, will continue to work with PMR    Pain clinic---having trouble getting in.  Trying to see in [place]   Was told she needs to see [facility]   [facility] will not see her again    Gabapentin--blew up at 700 mg she notes  Going to re trial lyrica     Needs pain psychologist too she notes    Pool therapy--hard for her to do    Weakness is getting worse.    Last EMG was arms only  Legs--biopsy    EMG --feels legs are worsening.      A/P at last visit  Roxanna Celis is a 54 year old female who presents in follow up today.  She was previously evaluated for diffuse pain, tingling, diffuse weakness.  EMG and other testing has been unrevealing previously as below.  She has been extensively evaluated by rheumatology, other neurology colleagues (neuroimmunology notably) without clear neurological diagnosis unfortunately besides fibromyalgia/a central pain processing disorder.  Small fiber biopsy was normal in the lower extremity/equivocal in upper extremities with non actionable antibody panel to review.  We discussed a continued trial and error approach to her pain control.   We discussed new updates/ideas:  Still no data to do more for borderline small fiber changes, borderline [facility] abs, in fact a newer study by Dr Cruz further argues against using IVIG in such situations.    Discussed equivocal data for alpha lipoic acid for nerve health, coenzyme Q10 for non specific weakness  I do think a dedicated fibromyalgia program would be great for her, she is going to apply for this at [facility] (again)   Discussed common nerve pain medications from a neurological perspective as below.  Lyrica caused weight gain, I would re trial gabapentin.    Our pain clinic told her they could not help her.  She has tried a lot of things, even ketamine.  Another pain clinic perspective would be of future consideration.       Plan:  --Coenzyme b59---508 up to 600 mg  --Nerve health--alpha lipoic  acid 600 mg   --Start gabapentin 300 mg nightly. After about one week, take 300mg twice daily. After another week, take 300mg three times daily for a total of 900mg per day, or 300 mg in AM, 600 mg at bedtime.   A common side effect of gabapentin is fatigue, which is why we advise patients to start taking the medication at nighttime. The side effect of fatigue should resolve with time.  Other possible side effects include swelling, and less likely diarrhea.  It is commonly a well tolerated medicine.    --Pool therapy a great idea --PT order placed  --[facility] --re apply fibromyalgia course--encouraged.    Nerve pain medicines with the best data (unchanged-discussed)  Effexor or cymbalta  nortriptyline  Gabapentin/lyrica   Off label   Oxcarbazepine, valproic acid      We can plan on checking in again in 6 months for now.  To reach out sooner with any issues questions or changes             A/P at a different/ previous visit  Roxanna Celis is a 51 year old female who presents in follow up for diffuse pain, numbness, tingling  She has a PMH of ELY, anxiety, depression and is s/p baratric surgery.  She has had several years of ongoing symptoms that began with dry eyes, other Sicca like symptoms, though she has reportedly have negative lip biopsy and has had negative SSA/SSB antibodies.   She has seen multiple specialists without answer including MRIs as above (including seeing one of our neuroimmunological specialists who discussed with her she does not have MS), rheumatologic lab work, normal EMG.  She has received a diagnosis of neuropathy and of fibromyalgia in the past but medications to treat as such have provided limited benefit.  We have subsequently repeated SSA/SSB antibodies which were again negative.  Blood work otherwise only revealing for slightly elevated vitamin B6 as above.  Her symptoms have continued to be refractory.  She is considered medical cannabis which could be worth trialing.  She  unfortunately remains quite debilitated from her pain despite trials of many medications and approaches.  Subsequent small fiber biopsy which was normal in her legs, borderline reduced in her forearm which is where symptoms began.   Because of this borderline finding we did send [facility] send out labs to further work up a potential cause.  She will discuss if this small fiber biopsy finding would be of any further rheumatological consideration given Sicca symptoms though it is possible her work up will be considered definitive, which we did discuss.  We discussed that small fiber changes are often idiopathic and treated symptomatically in such situations, but the [facility] panel will give us more data in this regard.  It is uncommon in such situations for dangelo weakness outside of the context of pain to develop.  We also discussed that this can be very difficult to treat and that I agree with her pain doctor (Dr. Romano) that I do think central pain processing/fibromyalgia are likely components of her condition as well even if other reasons are in time discovered.       --She will continue working with therapy team, multi disciplinary pain team regarding symptoms  --Will await send out [facility] panel, will contact Ms. Celis with results, follow up will depend on these results.    All questions answered.         2023 pain clinic  Review of [place] Prescription Monitoring Program ():   No concern for abuse or misuse of controlled medications based on this report. Viewed on 03/21/23  No controlled medications are being prescribed.     Prior pain medications:     2. Physical Therapy:      3. Pain psychology:   4. Surgery: hasn't tried  5. Injections: none  6. Alternative Therapies:               Chiropractic: helpful, sees chiropractor               Acupuncture: hasn't tried     PAIN MANAGEMENT TREATMENT HISTORY  1. MEDICATIONS:  Opiates: Not indicated, would not recommend  NSAIDS: Patient had bariatric surgery,  "cannot take NSAIDS due to high risk for gastric ulcers  Muscle Relaxants: Robaxin- did nothing  Anti-depressants: \"help my mood, not my pain\"  Neuropathics: Gabapentin 1200mg at night- drowsy with higher dose, -not helpful, Lyrica 100mg BID- SE, weight gain right away, Topamax 100mg BID-diarrhea  Topicals: -not helpful  Adjuvant pain medications: Medical cannabis, decided in February to stop, does not give her any relief. Worked with [facility] extensively but did not get any relief. Low dose naltrexone 5mg - affected mood, tried again, Low dose naltrexone 3mg daily-diarrhea  2. PHYSICAL THERAPY:   pain physical therapy - Somewhat helpful               pool PT -not helpful              [facility] -not helpful  TENS unit -not helpful   3. PAIN PSYCHOLOGY:   Yes. Taisha Graham PsyD, LP - Somewhat helpful, stopped   4. SURGERY:   No  5. INJECTIONS:   Not interested  6. COMPLEMENTARY THERAPY:  Chiropractic  helpful  Acupuncture/acupressure/ not helpful  TENS unit  not helpful      2023 rheum note re reviewed         History of Present Illness:   Roxanna Celis is a 52 year old female with PMH of paresthesia, essential hypertension, generalized anxiety disorder, depression, sleep apnea, morbid obesity seen in the clinic in consultation at request of Dr Jackson for evaluation of sicca symptoms.     She describes that couple years ago she developed burning pain around her left wrist which progressed to involve the entire hand and went up to the elbows.  She also noticed that on the right wrist which progressive disease.  Now she has burning sensation, pins-and-needles, electric shocks and pressure sensation up to her shoulders.  She also noticed it in her feet and sensation has progressed up to the mid thighs. She feel that somebody has put blood pressure cuff and is not released. Muscles of her leg feel weak, unstable. She has to use walker all the time. She feel she will end up falling. She has been evaluated by " neurologist and had MRI of the brain, MRI cervical spine, thoracic spine which has been normal.  No evidence of demyelinating lesion noted.  EMG of bilateral upper extremities was normal.  She is on gabapentin and Cymbalta which helps some.     Couple years ago she woke up with severe pain in her eyes.  She felt sand and gritty sensation in her eyes.  She was seen by ophthalmologist and diagnosed with dry eyes.  Since then she has used multiple eyedrops over-the-counter as well as autologous serum eyedrops which has helped some but still her symptoms are debilitating.  She also has dry mouth and as result has developed multiple cavities in her teeth.     She had chest pain recently. She is going to see cardiologist to make sure she does not need stress tests. She is very exhausted all the time.  She has thyroid nodule and will be getting thyroid nodule biopsy at [facility].     She had a fall in February 2021 and injured her wrist. MRI of the left wrist did not reveal any evidence of synovitis, effusion or fracture.  Low-grade sprain was noted.  She was given wrist injection which helped.      She has hx of gastric bypass 8 years ago and ever since reports abdominal discomfort.  She has never seen a GI to get upper or lower GI endoscopy.     Denies any raynauds, malar rash, photosensitivity, recurrent mouth/genital ulcers, pleuritic chest pains, recurrent sinusitis/rhinitis, swallowing difficulty, hearing or visual changes recently. No h/o arterial/venous thrombosis in the past.  September 15, 2021 - She has pain in her arms, legs. Reports having Pins and needles sensation in her fingers, legs. Hands hurt so much all the time and can not function. Joints started to hurt bad. She has pain in her elbows, knees, legs and feet. Minor salivary gland lip biopsy done in 8/2021 did not meet criteria for Sjogren's disease.   April 29, 2022 - She is using walker and braces all the time. Her wrists and hands hurt very bad. She  feel pain in her ankles which travel up the leg. It is up to the hips. On her arms she has pain up the shoulder. It feels like Burning, pins, needles, freezing cold, fire, electricity like pain. She can not cook or brush her hair due to pain in her hands. She has pain in her feet. When she step down she feel that her legs will give out. She is very tired all the time. She can sleep for 10 hours and even then she is wiped out. Her eyes are very dry. Pool therapy did not work for her. She will starting land PT for pain management. MRI of B/L wirsts done last year was normal. She has appointment for EMG  June 28, 2023 - She has pain in her hands and forearms. It is effecting everything in her life. She can not perform any daily activities. Pain in her hands is bad, fingers are more inward. Limbs gets pins and needles, shooting pain, heat in her legs. It is all the way to the bottom of her feet and comes up to her thighs. Her legs could not tolerate and she can not walk any more without assistance. Her pain in the afternoon is upto 10. Trying to dress herself is difficulty. More she use them more pain she gets in her hands. She will be seeing Neurologist in end of July.  She has seen pain clinic and has tried gabapentin, lyrica which cause side effects. She has started medical cannabis to see if it will make difference. She walk differently, she feel that her legs are confused and feel weak. She has cervical dystonia and will be trying botox. She feel that her limbs are heavy and she has pain while sitting, from her feet up to her knees and shins. She has enlarged lymph nodes in her neck. All her finger joints and thumb joints hurt. Water touching her skin was having negative effect on her. She has tried hand therapy at the end of last year and beginning of this year. Her eyes are dry and have dry mouth. Morning is the best time. If she overdo she has to rest for few days. She has sleep apnea borderline. Tyring to use  CPAP is challenging due to her eye googles for dry eyes.     Past Medical History:     Patient Active Problem List   Diagnosis    Torticollis, spasmodic    Achlorhydria    Depression with anxiety    KIANA (generalized anxiety disorder)    Hypokalemia    Hypothyroid    Intramural leiomyoma of uterus    Bariatric surgery status    Morbid obesity with BMI of 40.0-44.9, adult (H)    Multiple thyroid nodules    Obesity    Sleep apnea    Essential hypertension    Chronic bilateral low back pain without sciatica    Precordial pain    Chronic myofascial pain    Neuropathic pain    Fibromyalgia    Abdominal pain    Allergic reaction to COVID-19 vaccine    Allergic rhinitis    Anxiety state    Bipolar disorder (H)    Cervical myelopathy with cervical radiculopathy (H)    Constipation, chronic    Hemorrhage of rectum and anus    History of diverticulitis    History of lobectomy of thyroid    Melanosis coli    History of colonic polyps    Sicca syndrome    Esophageal reflux    Stricture and stenosis of esophagus    Generalized weakness     Past Medical History:   Diagnosis Date    Cervical dystonia 1993    Fibromyalgia         Past Surgical History:     Past Surgical History:   Procedure Laterality Date    AS FRAGMENTING OF KIDNEY STONE      GALLBLADDER SURGERY      STOMACH SURGERY      for weight loss        Social History:     Social History     Tobacco Use    Smoking status: Never    Smokeless tobacco: Never   Substance Use Topics    Alcohol use: No    Drug use: No        Family History:     Family History   Problem Relation Age of Onset    Glaucoma No family hx of     Macular Degeneration No family hx of         Medications:     Current Outpatient Medications   Medication Sig Dispense Refill    BOTOX 100 units injection Inject 250 Units into the muscle once Lot # /C3 with Expiration Date:  11/2020      buPROPion (WELLBUTRIN XL) 300 MG 24 hr tablet Take 300 mg by mouth every morning      clonazePAM (KLONOPIN) 1 MG tablet  Take 1 mg by mouth 2 times daily as needed      cyanocobalamin (VITAMIN B-12) 1000 MCG tablet Take 1,000 mcg by mouth daily      doxycycline monohydrate (ADOXA) 50 MG tablet Take 1 tablet (50 mg) by mouth 2 times daily. Please keep appt 8/29/24. 180 tablet 3    DULoxetine (CYMBALTA) 60 MG capsule TAKE 2 CAPSULES BY MOUTH EVERY DAY      EPINEPHrine (ANY BX GENERIC EQUIV) 0.3 MG/0.3ML injection 2-pack       gabapentin (NEURONTIN) 100 MG capsule Take 3 capsules (300 mg) by mouth 3 times daily. (Patient not taking: Reported on 5/8/2025) 90 capsule 5    hydrocortisone 2.5 % cream APPLY TO AFFECTED SAUD ON LIP TWICE A DAY MAX 2 WEEKS, 1 WEEK OFF REPEAT IF NEEDED      ibuprofen (ADVIL/MOTRIN) 200 MG capsule Take 200 mg by mouth every 4 hours as needed.      iloperidone (FANAPT) 6 MG TABS tablet Take 4 mg by mouth daily       ketoconazole (NIZORAL) 2 % external cream PLEASE SEE ATTACHED FOR DETAILED DIRECTIONS      lifitegrast (XIIDRA) 5 % opthalmic solution Place 1 drop into both eyes 2 times daily. 180 each 3    methylPREDNISolone (MEDROL DOSEPAK) 4 MG tablet therapy pack TAKE 6 TABLETS ON DAY 1 AS DIRECTED ON PACKAGE AND DECREASE BY 1 TAB EACH DAY FOR A TOTAL OF 6 DAYS (Patient not taking: Reported on 5/8/2025) 21 tablet 0    multivitamin w/minerals (THERA-VIT-M) tablet Take 1 tablet by mouth daily      Perfluorohexyloctane (MIEBO) 1.338 GM/ML SOLN Apply 1 drop to eye 4 times daily. 6 mL 11    sodium chloride 0.9 % neb solution Medically necessary for scleral contact lens use. May use 3 ml 10-15 times a day for contact lens use 1800 mL 11    Soft Lens Products (B&L SENSITIVE EYES) SOLN Apply 1 drop to eye      spironolactone (ALDACTONE) 50 MG tablet Take 1 tablet by mouth daily      tretinoin (RETIN-A) 0.025 % external cream Apply a pea size to entire face QD 45 g 11    tretinoin (RETIN-A) 0.05 % external cream Spread a pea size amount into affected area topically at bedtime.  Use sunscreen SPF>20. 45 g 1    varenicline  (TYRVAYA) 0.03 MG/ACT nasal spray Spray 1 spray into both nostrils 2 times daily. 8.4 mL 11    Vitamin D3 (CHOLECALCIFEROL) 25 mcg (1000 units) tablet Take by mouth daily      White Petrolatum-Mineral Oil (SYSTANE NIGHTTIME) OINT Apply 3.5 g to eye 4 times daily as needed (dry eyes) 7 g 11     Current Facility-Administered Medications   Medication Dose Route Frequency Provider Last Rate Last Admin    botulinum toxin type A (BOTOX) 100 units injection 300 Units  300 Units Intramuscular Q90 Days America Gonzalez MD   300 Units at 04/15/25 1503    botulinum toxin type A (BOTOX) 100 units injection 300 Units  300 Units Intramuscular Q90 Days America Gonzalez MD   300 Units at 24 1639    botulinum toxin type A (DYSPORT) injection 500 Units  500 Units Intramuscular Q90 Days America Gonzalez MD   500 Units at 22 1028    botulinum toxin type A (DYSPORT) injection 800 Units  800 Units Intramuscular Q90 Days America Gonzalez MD   500 Units at 23 1547    daxibotulinumtoxinA-lanm (DAXXIFY) 100 units injection 300 Units  300 Units Intramuscular Q12 weeks             Allergies:     Allergies   Allergen Reactions    Contrast Dye Anaphylaxis     Other reaction(s): almost  from it    Iodinated Contrast Media Anaphylaxis     IVP DYE    Lisinopril Anaphylaxis    Lisinopril-Hydrochlorothiazide Anaphylaxis    Maple Flavoring Agent (Non-Screening) Anaphylaxis     MAPLE SYRUP  MAPLE SYRUP  MAPLE SYRUP    Maple Tree Anaphylaxis     Allergy includes maple syrup.    No Clinical Screening - See Comments      maple syrup==anaphylaxis  Dairy- causes to not feel well    Gluten Itching and Swelling    Gluten Meal Itching and Swelling     Cramping    [place] Meal (Obsolete) Unknown    Chamomile Unknown    Codeine Sulfate Cramps and GI Disturbance    Food Unknown     Grapes, almond, lactose, gluten, real maple syrup    Nsaids Other (See Comments) and Unknown     Patient had bariatric surgery.  Should not ever take NSAIDS due to high risk for gastric ulcers.   Patient had bariatric surgery. Should not ever take NSAIDS due to high risk for gastric ulcers.     Polyethylene Glycol Unknown     Per allergy testing  Per allergy testing      Erythromycin Nausea and Vomiting, Cramps, Diarrhea and Nausea     PN: LW Reaction: stomach upset  cramping    Lactose Diarrhea    Morphine Other (See Comments)          Physical Exam:   Vitals: /75   Pulse 103   SpO2 100%    Neuro:   General Appearance: No apparent distress, well-nourished, well-groomed, pleasant      Mental Status: Alert and oriented to person, place, and time. Speech fluent and comprehension intact. No dysarthria.        Cranial Nerves:   II: Visual fields: normal  III: Pupils: 3 mm, equal, round, reactive to light   III,IV,VI: Extraocular Movements: intact   V: Facial sensation: intact to light touch  VII: Facial strength: intact without asymmetry        Motor Exam:    UE 5/5, LE tougher/limited by pain today overall, similar to previously     Sensory: intact to light touch, vibration, PP in fact     Coordination: no dysmetria noted     Reflexes: biceps, triceps, brachioradialis, patellar 1+ and symmetric.             Data: Pertinent prior to visit   MR CERVICAL SPINE W/O CONTRAST, MR THORACIC SPINE W/O CONTRAST  6/18/2021 5:42 PM     History: Demyelinating disease; Neuropathic pain; Paresthesia     Comparison: CT cervical spine dated 8/31/2016, CT cervical spine dated  8/31/2016     Technique: Sagittal T2- and T1-weighted images of the cervical and  thoracic spine, axial T2* gradient echo images of the cervical spine  and axial T2-weighted images were obtained.     Findings:  Cervical: The cervical vertebrae appear normally aligned.  There is no  disc height narrowing at any level.  There is normal signal within the  cervical spinal cord which also demonstrates a normal contour.  The  findings on a level by level basis are as follows:     C2-3:   No significant spinal canal or neural foraminal narrowing.     C3-4:  No significant spinal canal or neural foraminal narrowing.     C4-5:  No significant spinal canal or neuroforaminal narrowing.     C5-6:  Uncovertebral hypertrophy and facet arthropathy bilaterally. No  significant spinal canal stenosis. Mild bilateral neuroforaminal  narrowing, left greater than right.     C6-7:  No significant spinal canal or neuroforaminal narrowing.     C7-T1:  No significant spinal canal or neuroforaminal narrowing.     Thoracic: The thoracic vertebral column appears in normal alignment.  There is loss of disk height at any level. The spinal cord contour and  signal pattern appear within normal limits. No significant spinal  canal or neural foraminal narrowing.     Right inferior thyroid gland 1.5 x 1.0 cm T2 hyperintense lesion,  corresponding to CT neck on 8/31/2016, not significantly changed.                                                                      Impression:   1.  Cervical spine: No definite myelopathic spinal cord signal  identified. No high-grade spinal canal or neuroforaminal narrowing at  any level.  2.  Thoracic spine: No definite myelopathic spinal cord signal  identified. No high-grade spinal canal or neuroforaminal narrowing at  any level.     I personally reviewed the above imaging and agree with the findings in the report.       Brain MRI without contrast 2010      History: Possible MS and blurred vision.       Comparison: MRI brain 6/17/2009       Technique: Sagittal and axial turboFLAIR, T1-weighted axial,   diffusion-weighted axial with ADC map, T2-weighted axial, and T2*   gradient echo axial images were obtained without contrast. After   intravenous administration of gadolinium, axial turboFLAIR and coronal   and axial T1-weighted images were obtained.       Findings: There is a single focus of increased T2 signal on sagittal   and axial turboFLAIR images in the left frontal parasagittal    subcortical white matter that was not seen on the prior study of   6/17/2009. The previously noted area of contrast enhancement   surrounding the left optic nerve is not evident on this study.   However, the T1-weighted postcontrast images on this study are not   fat-saturated which limits the ability to detect subtle enhancement   adjacent to the intraconal fat.  The postcontrast turboFLAIR sequences   with fat saturation do not demonstrate contrast enhancement   surrounding the optic nerves. The images reveal no intracranial mass   lesion, midline shift or abnormal extraaxial fluid collection. The   ventricles and sulci appear within normal for age.  Diffusion-weighted   images appear normal.       Normal intravascular flow voids are identified at the base of the   brain bilaterally.       Impression:   1. There is a nonspecific small focus of increased signal on   turboFLAIR and T2-weighted imaging in the left subcortical   parasagittal white matter.   2. The previously noted subtle contrast enhancement surrounding the   left optic nerve is not appreciated on the current study.  However,   the T1-weighted postcontrast images of the orbits were not   fat-saturated, which limits the ability to distinguish subtle   peripheral optic nerve enhancement. The postcontrast turboFLAIR images   of this region demonstrate no abnormal contrast enhancement   surrounding the left optic nerve.         Procedures:  Previous EMG WNL (9/21)  Epidermal nerve biopsy: normal in leg, borderline reduced in her forearm     Laboratory:       previous CK wnl  b12 wnl     EMG 6/2022  History & Examination:  51 year old woman with several years of pain throughout her upper limbs. The left side is more affected than the right. Evaluate for polyneuropathy vs focal neuropathy vs radiculopathy.      Techniques: Motor and sensory conduction studies were done with surface recording electrodes. EMG was done with a concentric needle electrode.       Results:  Nerve conduction studies:  1. Bilateral median-D2, ulnar-D5, and radial sensory responses are normal.   2. Bilateral median-ulnar palmar interlatency differences are normal.   3. Bilateral median-APB and ulnar-ADM motor responses are normal.   4. Bilateral median-lumbrical vs ulnar-interosseous latency differences are normal.     Needle EMG of selected proximal and distal right and left upper limb muscles was performed as tabulated below. No abnormal spontaneous activity was observed in the sampled muscles. Motor unit potential morphology and recruitment patterns were normal.      Interpretation:  This is a normal study. There is no electrophysiologic evidence of a large-fiber polyneuropathy, focal neuropathy, or cervical radiculopathy affecting the upper limbs on the basis of this study.                 Jen abs    SSA/SSB negative         Newer data:  EXAM: CT HEAD W/O CONTRAST, CT CERVICAL SPINE W/O CONTRAST  LOCATION: [facility]  DATE: 10/3/2024     INDICATION: Fall, headache, neck pain.  COMPARISON: MRI 7/26/2023.  TECHNIQUE:   1) Routine CT Head without IV contrast. Multiplanar reformats. Dose reduction techniques were used.  2) Routine CT Cervical Spine without IV contrast. Multiplanar reformats. Dose reduction techniques were used.     FINDINGS:   HEAD CT:   INTRACRANIAL CONTENTS: No intracranial hemorrhage, extraaxial collection, or mass effect.  No CT evidence of acute infarct. Mild presumed chronic small vessel ischemic changes. Mild generalized volume loss. No hydrocephalus.      VISUALIZED ORBITS/SINUSES/MASTOIDS: No intraorbital abnormality. No paranasal sinus mucosal disease. No middle ear or mastoid effusion.     BONES/SOFT TISSUES: No acute abnormality.     CERVICAL SPINE CT:   VERTEBRA: Normal vertebral body heights and alignment. No fracture or posttraumatic subluxation.      CANAL/FORAMINA: No canal or neural foraminal stenosis.     PARASPINAL: No  extraspinal abnormality. Visualized lung fields are clear.                                                                      IMPRESSION:  HEAD CT:  1.  No CT evidence for acute intracranial process.  2.  Mild chronic microvascular ischemic changes as above.     CERVICAL SPINE CT:  1.  No CT evidence for acute fracture or post traumatic subluxation.              The total time of this encounter today amounted to 40 minutes. This time included time spent with the patient, prep work, ordering tests, and performing post visit documentation.

## 2025-06-19 DIAGNOSIS — R52 DIFFUSE PAIN: Primary | ICD-10-CM

## 2025-06-23 ENCOUNTER — PATIENT OUTREACH (OUTPATIENT)
Dept: CARE COORDINATION | Facility: CLINIC | Age: 55
End: 2025-06-23
Payer: COMMERCIAL

## 2025-06-25 ENCOUNTER — PATIENT OUTREACH (OUTPATIENT)
Dept: CARE COORDINATION | Facility: CLINIC | Age: 55
End: 2025-06-25
Payer: COMMERCIAL

## 2025-07-12 NOTE — PROGRESS NOTES
[facility]     PM&R CLINIC NOTE  BOTULINUM TOXIN PROCEDURE      CC: Cervical Dystonia    HPI  Roxanna Celis is a 54 year old female with a history of cervical dystonia who presents to clinic for botulinum toxin injections for management of involuntary muscle spasms and neck pain secondary to her cervical dystonia.     SINCE LAST VISIT  Roxanna Celis was last seen here in clinic on 4/15/2025, at which time she received 300 units of Botox with no change in dose or distribution. She also received bilateral occipital nerve blocks to address occipital pain. However, she did mention that she'd like to switch to an alternative neurotoxin as she has felt Botox has not been as effective lately. As such, we will use Daxxify today.     The patient denies new medical diagnoses, illnesses, hospitalizations, and other injuries since the previous injection with botulinum neurotoxin.     RESPONSE TO PREVIOUS TREATMENT    Side effects: None.     Pain Improvement: Yes, there is a noticeable improvement of her pain with the combination of the Botox and ONBs, however, it is not nearly as effective as it used to be.     Dystonia Improvement: Yes, she notes less involuntary posturing/pulling/tension in her neck and shoulder muscles when the Botox is in effect. Duration of benefit: 3 weeks, followed by a gradual reduction in benefit.     Functional Improvement: When the Botox is working, she is able to socialize and liver her life normally. When the Botox wears off, she is in such terrible pain that she becomes stuck in bed for prolonged periods of time, and misses out on essentially every area of life. Right now, pain is excruciating, and she thinks that for this reason, she cannot travel anywhere. She would need her pain under better control if she were to start traveling again.       PHYSICAL EXAM  VS: There were no vitals taken for this visit.   GEN: Pleasant and cooperative, in no acute  distress  HEENT: No facial asymmetry  NECK AND TRUNK PATTERN:   Head Tremor: Pt states tremor is intermittent   Left side bending: Very slight  Left Shoulder Elevation: slightly elevated  Head with right shift  Focalized pain at occipitalis - has pain today during visit  Shoulders: Hypertonic and painful to palpation bilateral shoulder girdles (Right > Left)      ALLERGIES  Allergies   Allergen Reactions    Contrast Dye Anaphylaxis     Other reaction(s): almost  from it    Iodinated Contrast Media Anaphylaxis     IVP DYE    Lisinopril Anaphylaxis    Lisinopril-Hydrochlorothiazide Anaphylaxis    Maple Flavoring Agent (Non-Screening) Anaphylaxis     MAPLE SYRUP  MAPLE SYRUP  MAPLE SYRUP    Maple Tree Anaphylaxis     Allergy includes maple syrup.    No Clinical Screening - See Comments      maple syrup==anaphylaxis  Dairy- causes to not feel well    Gluten Itching and Swelling    Gluten Meal Itching and Swelling     Cramping    Hockley Meal (Obsolete) Unknown    Chamomile Unknown    Codeine Sulfate Cramps and GI Disturbance    Food Unknown     Grapes, almond, lactose, gluten, real maple syrup    Nsaids Other (See Comments) and Unknown     Patient had bariatric surgery. Should not ever take NSAIDS due to high risk for gastric ulcers.   Patient had bariatric surgery. Should not ever take NSAIDS due to high risk for gastric ulcers.     Polyethylene Glycol Unknown     Per allergy testing  Per allergy testing      Erythromycin Nausea and Vomiting, Cramps, Diarrhea and Nausea     PN: LW Reaction: stomach upset  cramping    Lactose Diarrhea    Morphine Other (See Comments)       CURRENT MEDICATIONS    Current Outpatient Medications:     BOTOX 100 units injection, Inject 250 Units into the muscle once Lot # /[id number] with Expiration Date:  2020, Disp: , Rfl:     buPROPion (WELLBUTRIN XL) 300 MG 24 hr tablet, Take 300 mg by mouth every morning, Disp: , Rfl:     clonazePAM (KLONOPIN) 1 MG tablet, Take 1 mg by mouth 2 times  daily as needed, Disp: , Rfl:     cyanocobalamin (VITAMIN B-12) 1000 MCG tablet, Take 1,000 mcg by mouth daily, Disp: , Rfl:     doxycycline monohydrate (ADOXA) 50 MG tablet, Take 1 tablet (50 mg) by mouth 2 times daily. Please keep appt 8/29/24., Disp: 180 tablet, Rfl: 3    DULoxetine (CYMBALTA) 60 MG capsule, TAKE 2 CAPSULES BY MOUTH EVERY DAY, Disp: , Rfl:     EPINEPHrine (ANY BX GENERIC EQUIV) 0.3 MG/0.3ML injection 2-pack, , Disp: , Rfl:     gabapentin (NEURONTIN) 100 MG capsule, Take 3 capsules (300 mg) by mouth 3 times daily. (Patient not taking: Reported on 5/8/2025), Disp: 90 capsule, Rfl: 5    hydrocortisone 2.5 % cream, APPLY TO AFFECTED SAUD ON LIP TWICE A DAY MAX 2 WEEKS, 1 WEEK OFF REPEAT IF NEEDED, Disp: , Rfl:     ibuprofen (ADVIL/MOTRIN) 200 MG capsule, Take 200 mg by mouth every 4 hours as needed., Disp: , Rfl:     iloperidone (FANAPT) 6 MG TABS tablet, Take 4 mg by mouth daily , Disp: , Rfl:     ketoconazole (NIZORAL) 2 % external cream, PLEASE SEE ATTACHED FOR DETAILED DIRECTIONS, Disp: , Rfl:     lifitegrast (XIIDRA) 5 % opthalmic solution, Place 1 drop into both eyes 2 times daily., Disp: 180 each, Rfl: 3    methylPREDNISolone (MEDROL DOSEPAK) 4 MG tablet therapy pack, TAKE 6 TABLETS ON DAY 1 AS DIRECTED ON PACKAGE AND DECREASE BY 1 TAB EACH DAY FOR A TOTAL OF 6 DAYS (Patient not taking: Reported on 5/8/2025), Disp: 21 tablet, Rfl: 0    multivitamin w/minerals (THERA-VIT-M) tablet, Take 1 tablet by mouth daily, Disp: , Rfl:     Perfluorohexyloctane (MIEBO) 1.338 GM/ML SOLN, Apply 1 drop to eye 4 times daily., Disp: 6 mL, Rfl: 11    sodium chloride 0.9 % neb solution, Medically necessary for scleral contact lens use. May use 3 ml 10-15 times a day for contact lens use, Disp: 1800 mL, Rfl: 11    Soft Lens Products (B&L SENSITIVE EYES) SOLN, Apply 1 drop to eye, Disp: , Rfl:     spironolactone (ALDACTONE) 50 MG tablet, Take 1 tablet by mouth daily, Disp: , Rfl:     tretinoin (RETIN-A) 0.025 %  external cream, Apply a pea size to entire face QD, Disp: 45 g, Rfl: 11    tretinoin (RETIN-A) 0.05 % external cream, Spread a pea size amount into affected area topically at bedtime.  Use sunscreen SPF>20., Disp: 45 g, Rfl: 1    varenicline (TYRVAYA) 0.03 MG/ACT nasal spray, Spray 1 spray into both nostrils 2 times daily., Disp: 8.4 mL, Rfl: 11    Vitamin D3 (CHOLECALCIFEROL) 25 mcg (1000 units) tablet, Take by mouth daily, Disp: , Rfl:     White Petrolatum-Mineral Oil (SYSTANE NIGHTTIME) OINT, Apply 3.5 g to eye 4 times daily as needed (dry eyes), Disp: 7 g, Rfl: 11    Current Facility-Administered Medications:     botulinum toxin type A (BOTOX) 100 units injection 300 Units, 300 Units, Intramuscular, Q90 Days, America Gonzalez MD, 300 Units at 04/15/25 1503    daxibotulinumtoxinA-lanm (DAXXIFY) 100 units injection 300 Units, 300 Units, Intramuscular, Q12 weeks,        BOTULINUM NEUROTOXIN INJECTION PROCEDURES    VERIFICATION OF PATIENT IDENTIFICATION AND PROCEDURE     Initials   Patient Name SES   Patient  SES   Procedure Verified by: SES     Prior to the start of the procedure and with procedural staff participation, I verbally confirmed the patient s identity using two indicators, relevant allergies, that the procedure was appropriate and matched the consent or emergent situation, and that the correct equipment/implants were available. Immediately prior to starting the procedure I conducted the Time Out with the procedural staff and re-confirmed the patient s name, procedure, and site/side. (The Joint Commission universal protocol was followed.)  Yes    Sedation (Moderate or Deep): None    ABOVE ASSESSMENTS PERFORMED BY    America Gonzalez MD      INDICATIONS FOR PROCEDURES  Roxanna Celis is a 54 year old patient with involuntary muscle spasms and pain secondary to the diagnosis of cervical dystonia. Her baseline symptoms have been recalcitrant to oral medications and conservative therapy.  She is  here today for injection with Daxxify. She was previously injected using Botox but switched as she felt Botox was becoming less effective over time    GOAL OF PROCEDURE  The goal of this procedure is to increase active range of motion, improve volitional motor control, decrease pain  and enhance functional independence.      TOTAL DOSE ADMINISTERED  Dose Administered:  300 units  Daxxify (DaxibotulinumtoxinA)       2:1 Dilution   Unavoidable Drug Waste: No  Diluent Used:  Preservative Free Normal Saline  Total Volume of Diluent Used:  6 ml  NDC #: Daxxify 100u (30206-720-34)      CONSENT  The risks, benefits, and treatment options were discussed with Roxanna KEE Lalita and she agreed to proceed.    Written consent was obtained by [facility].     EQUIPMENT USED  Needle-37mm stimulating/recording  Needle-30 gauge  EMG/NCS Machine    SKIN PREPARATION  Skin preparation was performed using an alcohol wipe.    GUIDANCE DESCRIPTION  Electro-myographic guidance was necessary throughout the procedure to accurately identify all areas of dystonic muscles while avoiding injection of non-dystonic muscles, neighboring nerves and nearby vascular structures.       AREA/MUSCLE INJECTED: 300 UNITS DAXXIFY = TOTAL DOSE, 2:1 DILUTION  1. NECK & SHOULDER GIRDLE MUSCLES: 300 UNITS DAXXIFY = TOTAL DOSE   Right Rectus Capitis - (upper cervical) - 5 units of Daxxify at 1 site/s.   Left Rectus Capitis - (upper cervical) - 5 units of Daxxify at 1 site/s.     Right Upper Trapezius - 25 units of Daxxify at 7 site/s.   Left Upper Trapezius - 25 units of Daxxify at 7 site/s.     Right Splenius Cervicis - 5 units of Daxxify at 1 site/s.   Left Splenius Cervicis - 5 units of Daxxify at 1 site/s.    Right Levator Scapulae - 20 units of Daxxify at 2 site/s.   Left Levator Scapulae - 20 units of Daxxify at 2 site/s.    Right Sternocleidomastoid - 10 units of Daxxify at 3 site/s.  Left Sternocleidomastoid - 10 units of Daxxify at 3 site/s.    Right Cervical  Paraspinals - 10 units of Daxxify at 4 site/s.  Left Cervical Paraspinals - 10 units of Daxxify at 4 site/s.    Right Thoracic Paraspinals - 10 units of Daxxify at 4 site/s.  Left Thoracic Paraspinals - 10 units of Daxxify at 4 site/s.    Right Occipitalis - 65 units of Daxxify at 15 site/s.   Left Occipitails - 65 units of Daxxify at 15 site/s.      BILATERAL GREATER OCCIPITAL NERVE BLOCKS    Dru% lidocaine: 2 ml   0.5% bupivacaine: 7 ml   Kenalo mg = 1 ml     Area just inferior to insertion of the right and left superior trapezius insertion onto skull was cleansed with ChloraPrep. Needle was advanced anteriorly to base of skull then slightly withdrawn and injectate was injected in a fan-like distribution at different depths. A 10 ml mixture of 1% lidocaine, 0.5% bupivacaine and Kenalog was divided into two 5 ml syringes. Total injection volume = 5 ml per side.       RESPONSE TO PROCEDURE  Roxanna Celis tolerated the procedure well and there were no immediate complications. She was allowed to recover for an appropriate period of time and was discharged home in stable condition.    ASSESSMENT AND PLAN   Botulinum toxin injections: Today is the patient's initial injections with Daxxify, having switched to this from Botox. An equivalent dose was given in the same distribution pattern. Patient will continue to monitor response to Botox and report at next appointment.   Other: Occipital nerve blocks were administered to address bilateral occipital neuralgia. She will continue to monitor her response and report at next appointment.   Referrals: None.   Medications: No changes.   Follow up: Roxanna Celis was rescheduled for the next series of injections in 12 weeks, at which time we will evaluate response to today's injections. She may call the clinic prior with any questions or concerns prior to the next appointment.       A resident physician prepared the note for today's encounter, but did not participate in  the visit.    America Gonzalez MD

## 2025-07-15 ENCOUNTER — OFFICE VISIT (OUTPATIENT)
Dept: PHYSICAL MEDICINE AND REHAB | Facility: CLINIC | Age: 55
End: 2025-07-15
Payer: COMMERCIAL

## 2025-07-15 VITALS — DIASTOLIC BLOOD PRESSURE: 73 MMHG | SYSTOLIC BLOOD PRESSURE: 107 MMHG | HEART RATE: 94 BPM

## 2025-07-15 DIAGNOSIS — M54.81 BILATERAL OCCIPITAL NEURALGIA: ICD-10-CM

## 2025-07-15 DIAGNOSIS — G24.3 CERVICAL DYSTONIA: Primary | ICD-10-CM

## 2025-07-15 RX ORDER — TRAZODONE HYDROCHLORIDE 50 MG/1
50-150 TABLET ORAL AT BEDTIME
COMMUNITY
Start: 2025-07-14

## 2025-07-15 RX ORDER — BUPIVACAINE HYDROCHLORIDE 5 MG/ML
7 INJECTION, SOLUTION PERINEURAL ONCE
Status: COMPLETED | OUTPATIENT
Start: 2025-07-15 | End: 2025-07-15

## 2025-07-15 RX ORDER — TRIAMCINOLONE ACETONIDE 40 MG/ML
40 INJECTION, SUSPENSION INTRA-ARTICULAR; INTRAMUSCULAR ONCE
Status: COMPLETED | OUTPATIENT
Start: 2025-07-15 | End: 2025-07-15

## 2025-07-15 RX ADMIN — TRIAMCINOLONE ACETONIDE 40 MG: 40 INJECTION, SUSPENSION INTRA-ARTICULAR; INTRAMUSCULAR at 10:01

## 2025-07-15 RX ADMIN — BUPIVACAINE HYDROCHLORIDE 35 MG: 5 INJECTION, SOLUTION PERINEURAL at 10:00

## 2025-07-15 NOTE — LETTER
7/15/2025      Roxanna Celis  [address] [place] [address]      Dear Colleague,    Thank you for referring your patient, Roxanna Celis, to the [facility]. Please see a copy of my visit note below.      [facility]     PM&R CLINIC NOTE  BOTULINUM TOXIN PROCEDURE      CC: Cervical Dystonia    HPI  Roxanna Celis is a 54 year old female with a history of cervical dystonia who presents to clinic for botulinum toxin injections for management of involuntary muscle spasms and neck pain secondary to her cervical dystonia.     SINCE LAST VISIT  Roxanna Celis was last seen here in clinic on 4/15/2025, at which time she received 300 units of Botox with no change in dose or distribution. She also received bilateral occipital nerve blocks to address occipital pain. However, she did mention that she'd like to switch to an alternative neurotoxin as she has felt Botox has not been as effective lately. As such, we will use Daxxify today.     The patient denies new medical diagnoses, illnesses, hospitalizations, and other injuries since the previous injection with botulinum neurotoxin.     RESPONSE TO PREVIOUS TREATMENT    Side effects: None.     Pain Improvement: Yes, there is a noticeable improvement of her pain with the combination of the Botox and ONBs, however, it is not nearly as effective as it used to be.     Dystonia Improvement: Yes, she notes less involuntary posturing/pulling/tension in her neck and shoulder muscles when the Botox is in effect. Duration of benefit: 3 weeks, followed by a gradual reduction in benefit.     Functional Improvement: When the Botox is working, she is able to socialize and liver her life normally. When the Botox wears off, she is in such terrible pain that she becomes stuck in bed for prolonged periods of time, and misses out on essentially every area of life. Right now, pain is excruciating, and she thinks that for this  reason, she cannot travel anywhere. She would need her pain under better control if she were to start traveling again.       PHYSICAL EXAM  VS: There were no vitals taken for this visit.   GEN: Pleasant and cooperative, in no acute distress  HEENT: No facial asymmetry  NECK AND TRUNK PATTERN:   Head Tremor: Pt states tremor is intermittent   Left side bending: Very slight  Left Shoulder Elevation: slightly elevated  Head with right shift  Focalized pain at occipitalis - has pain today during visit  Shoulders: Hypertonic and painful to palpation bilateral shoulder girdles (Right > Left)      ALLERGIES  Allergies   Allergen Reactions     Contrast Dye Anaphylaxis     Other reaction(s): almost  from it     Iodinated Contrast Media Anaphylaxis     IVP DYE     Lisinopril Anaphylaxis     Lisinopril-Hydrochlorothiazide Anaphylaxis     Maple Flavoring Agent (Non-Screening) Anaphylaxis     MAPLE SYRUP  MAPLE SYRUP  MAPLE SYRUP     Maple Tree Anaphylaxis     Allergy includes maple syrup.     No Clinical Screening - See Comments      maple syrup==anaphylaxis  Dairy- causes to not feel well     Gluten Itching and Swelling     Gluten Meal Itching and Swelling     Cramping     [place] Meal (Obsolete) Unknown     Chamomile Unknown     Codeine Sulfate Cramps and GI Disturbance     Food Unknown     Grapes, almond, lactose, gluten, real maple syrup     Nsaids Other (See Comments) and Unknown     Patient had bariatric surgery. Should not ever take NSAIDS due to high risk for gastric ulcers.   Patient had bariatric surgery. Should not ever take NSAIDS due to high risk for gastric ulcers.      Polyethylene Glycol Unknown     Per allergy testing  Per allergy testing       Erythromycin Nausea and Vomiting, Cramps, Diarrhea and Nausea     PN: LW Reaction: stomach upset  cramping     Lactose Diarrhea     Morphine Other (See Comments)       CURRENT MEDICATIONS    Current Outpatient Medications:      BOTOX 100 units injection, Inject 250  Units into the muscle once Lot # /C3 with Expiration Date:  11/2020, Disp: , Rfl:      buPROPion (WELLBUTRIN XL) 300 MG 24 hr tablet, Take 300 mg by mouth every morning, Disp: , Rfl:      clonazePAM (KLONOPIN) 1 MG tablet, Take 1 mg by mouth 2 times daily as needed, Disp: , Rfl:      cyanocobalamin (VITAMIN B-12) 1000 MCG tablet, Take 1,000 mcg by mouth daily, Disp: , Rfl:      doxycycline monohydrate (ADOXA) 50 MG tablet, Take 1 tablet (50 mg) by mouth 2 times daily. Please keep appt 8/29/24., Disp: 180 tablet, Rfl: 3     DULoxetine (CYMBALTA) 60 MG capsule, TAKE 2 CAPSULES BY MOUTH EVERY DAY, Disp: , Rfl:      EPINEPHrine (ANY BX GENERIC EQUIV) 0.3 MG/0.3ML injection 2-pack, , Disp: , Rfl:      gabapentin (NEURONTIN) 100 MG capsule, Take 3 capsules (300 mg) by mouth 3 times daily. (Patient not taking: Reported on 5/8/2025), Disp: 90 capsule, Rfl: 5     hydrocortisone 2.5 % cream, APPLY TO AFFECTED SAUD ON LIP TWICE A DAY MAX 2 WEEKS, 1 WEEK OFF REPEAT IF NEEDED, Disp: , Rfl:      ibuprofen (ADVIL/MOTRIN) 200 MG capsule, Take 200 mg by mouth every 4 hours as needed., Disp: , Rfl:      iloperidone (FANAPT) 6 MG TABS tablet, Take 4 mg by mouth daily , Disp: , Rfl:      ketoconazole (NIZORAL) 2 % external cream, PLEASE SEE ATTACHED FOR DETAILED DIRECTIONS, Disp: , Rfl:      lifitegrast (XIIDRA) 5 % opthalmic solution, Place 1 drop into both eyes 2 times daily., Disp: 180 each, Rfl: 3     methylPREDNISolone (MEDROL DOSEPAK) 4 MG tablet therapy pack, TAKE 6 TABLETS ON DAY 1 AS DIRECTED ON PACKAGE AND DECREASE BY 1 TAB EACH DAY FOR A TOTAL OF 6 DAYS (Patient not taking: Reported on 5/8/2025), Disp: 21 tablet, Rfl: 0     multivitamin w/minerals (THERA-VIT-M) tablet, Take 1 tablet by mouth daily, Disp: , Rfl:      Perfluorohexyloctane (MIEBO) 1.338 GM/ML SOLN, Apply 1 drop to eye 4 times daily., Disp: 6 mL, Rfl: 11     sodium chloride 0.9 % neb solution, Medically necessary for scleral contact lens use. May use 3 ml  10-15 times a day for contact lens use, Disp: 1800 mL, Rfl: 11     Soft Lens Products (B&L SENSITIVE EYES) SOLN, Apply 1 drop to eye, Disp: , Rfl:      spironolactone (ALDACTONE) 50 MG tablet, Take 1 tablet by mouth daily, Disp: , Rfl:      tretinoin (RETIN-A) 0.025 % external cream, Apply a pea size to entire face QD, Disp: 45 g, Rfl: 11     tretinoin (RETIN-A) 0.05 % external cream, Spread a pea size amount into affected area topically at bedtime.  Use sunscreen SPF>20., Disp: 45 g, Rfl: 1     varenicline (TYRVAYA) 0.03 MG/ACT nasal spray, Spray 1 spray into both nostrils 2 times daily., Disp: 8.4 mL, Rfl: 11     Vitamin D3 (CHOLECALCIFEROL) 25 mcg (1000 units) tablet, Take by mouth daily, Disp: , Rfl:      White Petrolatum-Mineral Oil (SYSTANE NIGHTTIME) OINT, Apply 3.5 g to eye 4 times daily as needed (dry eyes), Disp: 7 g, Rfl: 11    Current Facility-Administered Medications:      botulinum toxin type A (BOTOX) 100 units injection 300 Units, 300 Units, Intramuscular, Q90 Days, Standal, America Jack MD, 300 Units at 04/15/25 1503     daxibotulinumtoxinA-lanm (DAXXIFY) 100 units injection 300 Units, 300 Units, Intramuscular, Q12 weeks,        BOTULINUM NEUROTOXIN INJECTION PROCEDURES    VERIFICATION OF PATIENT IDENTIFICATION AND PROCEDURE     Initials   Patient Name SES   Patient  SES   Procedure Verified by: SES     Prior to the start of the procedure and with procedural staff participation, I verbally confirmed the patient s identity using two indicators, relevant allergies, that the procedure was appropriate and matched the consent or emergent situation, and that the correct equipment/implants were available. Immediately prior to starting the procedure I conducted the Time Out with the procedural staff and re-confirmed the patient s name, procedure, and site/side. (The Joint Commission universal protocol was followed.)  Yes    Sedation (Moderate or Deep): None    ABOVE ASSESSMENTS PERFORMED  BY    America Gonzalez MD      INDICATIONS FOR PROCEDURES  Roxanna Celis is a 54 year old patient with involuntary muscle spasms and pain secondary to the diagnosis of cervical dystonia. Her baseline symptoms have been recalcitrant to oral medications and conservative therapy.  She is here today for injection with Daxxify. She was previously injected using Botox but switched as she felt Botox was becoming less effective over time    GOAL OF PROCEDURE  The goal of this procedure is to increase active range of motion, improve volitional motor control, decrease pain  and enhance functional independence.      TOTAL DOSE ADMINISTERED  Dose Administered:  300 units  Daxxify (DaxibotulinumtoxinA)       2:1 Dilution   Unavoidable Drug Waste: No  Diluent Used:  Preservative Free Normal Saline  Total Volume of Diluent Used:  6 ml  NDC #: Daxxify 100u (21566-252-99)      CONSENT  The risks, benefits, and treatment options were discussed with Roxanna Celis and she agreed to proceed.    Written consent was obtained by [facility].     EQUIPMENT USED  Needle-37mm stimulating/recording  Needle-30 gauge  EMG/NCS Machine    SKIN PREPARATION  Skin preparation was performed using an alcohol wipe.    GUIDANCE DESCRIPTION  Electro-myographic guidance was necessary throughout the procedure to accurately identify all areas of dystonic muscles while avoiding injection of non-dystonic muscles, neighboring nerves and nearby vascular structures.       AREA/MUSCLE INJECTED: 300 UNITS DAXXIFY = TOTAL DOSE, 2:1 DILUTION  1. NECK & SHOULDER GIRDLE MUSCLES: 300 UNITS DAXXIFY = TOTAL DOSE   Right Rectus Capitis - (upper cervical) - 5 units of Daxxify at 1 site/s.   Left Rectus Capitis - (upper cervical) - 5 units of Daxxify at 1 site/s.     Right Upper Trapezius - 25 units of Daxxify at 7 site/s.   Left Upper Trapezius - 25 units of Daxxify at 7 site/s.     Right Splenius Cervicis - 5 units of Daxxify at 1 site/s.   Left Splenius Cervicis - 5 units of  Daxxify at 1 site/s.    Right Levator Scapulae - 20 units of Daxxify at 2 site/s.   Left Levator Scapulae - 20 units of Daxxify at 2 site/s.    Right Sternocleidomastoid - 10 units of Daxxify at 3 site/s.  Left Sternocleidomastoid - 10 units of Daxxify at 3 site/s.    Right Cervical Paraspinals - 10 units of Daxxify at 4 site/s.  Left Cervical Paraspinals - 10 units of Daxxify at 4 site/s.    Right Thoracic Paraspinals - 10 units of Daxxify at 4 site/s.  Left Thoracic Paraspinals - 10 units of Daxxify at 4 site/s.    Right Occipitalis - 65 units of Daxxify at 15 site/s.   Left Occipitails - 65 units of Daxxify at 15 site/s.      BILATERAL GREATER OCCIPITAL NERVE BLOCKS    Dru% lidocaine: 2 ml   0.5% bupivacaine: 7 ml   [place] mg = 1 ml     Area just inferior to insertion of the right and left superior trapezius insertion onto skull was cleansed with ChloraPrep. Needle was advanced anteriorly to base of skull then slightly withdrawn and injectate was injected in a fan-like distribution at different depths. A 10 ml mixture of 1% lidocaine, 0.5% bupivacaine and Kenalog was divided into two 5 ml syringes. Total injection volume = 5 ml per side.       RESPONSE TO PROCEDURE  Roxanna Celis tolerated the procedure well and there were no immediate complications. She was allowed to recover for an appropriate period of time and was discharged home in stable condition.    ASSESSMENT AND PLAN   Botulinum toxin injections: Today is the patient's initial injections with Daxxify, having switched to this from Botox. An equivalent dose was given in the same distribution pattern. Patient will continue to monitor response to Botox and report at next appointment.   Other: Occipital nerve blocks were administered to address bilateral occipital neuralgia. She will continue to monitor her response and report at next appointment.   Referrals: None.   Medications: No changes.   Follow up: Roxanna Celis was rescheduled for the next  series of injections in 12 weeks, at which time we will evaluate response to today's injections. She may call the clinic prior with any questions or concerns prior to the next appointment.       A resident physician prepared the note for today's encounter, but did not participate in the visit.    America Gonzalez MD      Again, thank you for allowing me to participate in the care of your patient.        Sincerely,        America Gonzalez MD    Electronically signed

## 2025-07-30 DIAGNOSIS — Z97.3 USES CONTACT LENSES: ICD-10-CM

## 2025-07-31 RX ORDER — SODIUM CHLORIDE FOR INHALATION 0.9 %
VIAL, NEBULIZER (ML) INHALATION
Qty: 1800 ML | Refills: 5 | Status: SHIPPED | OUTPATIENT
Start: 2025-07-31

## 2025-07-31 NOTE — TELEPHONE ENCOUNTER
Last Written Prescription:  sodium chloride 0.9 % neb solution   1800 mL 11 5/1/2024 -- No  Sig: Medically necessary for scleral contact lens use. May use 3 ml 10-15 times a day for contact lens use      ----------------------  Last Visit Date: 1/29/2025   [facility]  Future Visit Date: 8-27-25  ----------------------      Refill decision:   [x] Medication refilled per  Medication Refill in Ambulatory Care  policy.  Per eye protocol    Request from pharmacy:  Requested Prescriptions   Pending Prescriptions Disp Refills    sodium chloride 0.9 % neb solution [Pharmacy Med Name: SODIUM CHLORIDE 0.9% INHAL VL]  5     Sig: MEDICALLY NECESSARY FOR SCLERAL CONTACT LENS USE. MAY USE 3 ML 10-15 TIMES A DAY FOR CONTACT LENS USE       There is no refill protocol information for this order

## 2025-08-04 DIAGNOSIS — Z97.3 USES CONTACT LENSES: ICD-10-CM

## 2025-08-05 RX ORDER — SODIUM CHLORIDE FOR INHALATION 0.9 %
VIAL, NEBULIZER (ML) INHALATION
Qty: 300 ML | Refills: 11 | Status: SHIPPED | OUTPATIENT
Start: 2025-08-05

## 2025-08-07 DIAGNOSIS — Z97.3 USES CONTACT LENSES: ICD-10-CM

## 2025-08-11 ENCOUNTER — TELEPHONE (OUTPATIENT)
Dept: OPTOMETRY | Facility: CLINIC | Age: 55
End: 2025-08-11

## 2025-08-11 RX ORDER — SODIUM CHLORIDE FOR INHALATION 0.9 %
5 VIAL, NEBULIZER (ML) INHALATION
Qty: 3900 ML | Refills: 3 | Status: SHIPPED | OUTPATIENT
Start: 2025-08-11

## 2025-08-11 RX ORDER — SODIUM CHLORIDE FOR INHALATION 0.9 %
VIAL, NEBULIZER (ML) INHALATION
Qty: 300 ML | Refills: 11 | Status: SHIPPED | OUTPATIENT
Start: 2025-08-11 | End: 2025-08-11

## 2025-08-20 ENCOUNTER — OFFICE VISIT (OUTPATIENT)
Dept: PHYSICAL MEDICINE AND REHAB | Facility: CLINIC | Age: 55
End: 2025-08-20
Payer: COMMERCIAL

## 2025-08-20 VITALS — OXYGEN SATURATION: 99 % | SYSTOLIC BLOOD PRESSURE: 137 MMHG | HEART RATE: 73 BPM | DIASTOLIC BLOOD PRESSURE: 85 MMHG

## 2025-08-20 DIAGNOSIS — G24.3 CERVICAL DYSTONIA: ICD-10-CM

## 2025-08-20 DIAGNOSIS — M54.2 ACUTE NECK PAIN: Primary | ICD-10-CM

## 2025-08-20 PROCEDURE — 3079F DIAST BP 80-89 MM HG: CPT | Performed by: PHYSICAL MEDICINE & REHABILITATION

## 2025-08-20 PROCEDURE — 3075F SYST BP GE 130 - 139MM HG: CPT | Performed by: PHYSICAL MEDICINE & REHABILITATION

## 2025-08-20 PROCEDURE — 1125F AMNT PAIN NOTED PAIN PRSNT: CPT | Performed by: PHYSICAL MEDICINE & REHABILITATION

## 2025-08-20 PROCEDURE — 99214 OFFICE O/P EST MOD 30 MIN: CPT | Performed by: PHYSICAL MEDICINE & REHABILITATION

## 2025-08-20 RX ORDER — OXYCODONE HYDROCHLORIDE 5 MG/1
5 TABLET ORAL EVERY 6 HOURS PRN
Qty: 30 TABLET | Refills: 0 | Status: SHIPPED | OUTPATIENT
Start: 2025-08-20 | End: 2025-08-26

## 2025-08-20 ASSESSMENT — PAIN SCALES - GENERAL: PAINLEVEL_OUTOF10: SEVERE PAIN (7)

## (undated) RX ORDER — TRIAMCINOLONE ACETONIDE 40 MG/ML
INJECTION, SUSPENSION INTRA-ARTICULAR; INTRAMUSCULAR
Status: DISPENSED
Start: 2024-10-21

## (undated) RX ORDER — LIDOCAINE HYDROCHLORIDE 10 MG/ML
INJECTION, SOLUTION EPIDURAL; INFILTRATION; INTRACAUDAL; PERINEURAL
Status: DISPENSED
Start: 2024-10-21

## (undated) RX ORDER — BUPIVACAINE HYDROCHLORIDE 5 MG/ML
INJECTION, SOLUTION EPIDURAL; INTRACAUDAL
Status: DISPENSED
Start: 2022-12-28

## (undated) RX ORDER — TRIAMCINOLONE ACETONIDE 40 MG/ML
INJECTION, SUSPENSION INTRA-ARTICULAR; INTRAMUSCULAR
Status: DISPENSED
Start: 2022-12-28

## (undated) RX ORDER — BUPIVACAINE HYDROCHLORIDE 5 MG/ML
INJECTION, SOLUTION EPIDURAL; INTRACAUDAL
Status: DISPENSED
Start: 2024-10-21

## (undated) RX ORDER — LIDOCAINE HYDROCHLORIDE 10 MG/ML
INJECTION, SOLUTION EPIDURAL; INFILTRATION; INTRACAUDAL; PERINEURAL
Status: DISPENSED
Start: 2022-12-28